# Patient Record
Sex: FEMALE | Race: WHITE | Employment: UNEMPLOYED | URBAN - METROPOLITAN AREA
[De-identification: names, ages, dates, MRNs, and addresses within clinical notes are randomized per-mention and may not be internally consistent; named-entity substitution may affect disease eponyms.]

---

## 2017-02-01 DIAGNOSIS — R68.89 UNWANTED HAIR: ICD-10-CM

## 2017-02-01 RX ORDER — SPIRONOLACTONE 50 MG/1
50 TABLET, FILM COATED ORAL 2 TIMES DAILY
Qty: 90 TAB | Refills: 3 | Status: SHIPPED | COMMUNITY
Start: 2017-02-01 | End: 2017-07-10 | Stop reason: SDUPTHER

## 2017-02-06 ENCOUNTER — OFFICE VISIT (OUTPATIENT)
Dept: INTERNAL MEDICINE CLINIC | Age: 37
End: 2017-02-06

## 2017-02-06 VITALS
SYSTOLIC BLOOD PRESSURE: 139 MMHG | WEIGHT: 230 LBS | HEIGHT: 66 IN | DIASTOLIC BLOOD PRESSURE: 79 MMHG | HEART RATE: 91 BPM | BODY MASS INDEX: 36.96 KG/M2 | TEMPERATURE: 98.2 F | OXYGEN SATURATION: 96 %

## 2017-02-06 DIAGNOSIS — J01.80 ACUTE NON-RECURRENT SINUSITIS OF OTHER SINUS: Primary | ICD-10-CM

## 2017-02-06 DIAGNOSIS — J45.20 MILD INTERMITTENT ASTHMA WITHOUT COMPLICATION: ICD-10-CM

## 2017-02-06 RX ORDER — ALBUTEROL SULFATE 0.83 MG/ML
2.5 SOLUTION RESPIRATORY (INHALATION)
Qty: 1 PACKAGE | Refills: 3 | Status: SHIPPED | OUTPATIENT
Start: 2017-02-06 | End: 2017-02-06

## 2017-02-06 RX ORDER — TIZANIDINE HYDROCHLORIDE 2 MG/1
2 CAPSULE, GELATIN COATED ORAL 2 TIMES DAILY
COMMUNITY
End: 2018-02-19

## 2017-02-06 RX ORDER — METHOTREXATE 25 MG/ML
INJECTION INTRA-ARTERIAL; INTRAMUSCULAR; INTRATHECAL; INTRAVENOUS ONCE
COMMUNITY
End: 2017-06-12 | Stop reason: SDUPTHER

## 2017-02-06 RX ORDER — ALBUTEROL SULFATE 90 UG/1
2 AEROSOL, METERED RESPIRATORY (INHALATION)
Qty: 1 INHALER | Refills: 3 | Status: SHIPPED | OUTPATIENT
Start: 2017-02-06 | End: 2018-09-11 | Stop reason: SDUPTHER

## 2017-02-06 RX ORDER — AMOXICILLIN 500 MG/1
500 CAPSULE ORAL 3 TIMES DAILY
Qty: 30 CAP | Refills: 0 | Status: SHIPPED | OUTPATIENT
Start: 2017-02-06 | End: 2017-02-06

## 2017-02-06 RX ORDER — AMOXICILLIN AND CLAVULANATE POTASSIUM 875; 125 MG/1; MG/1
1 TABLET, FILM COATED ORAL 2 TIMES DAILY
Qty: 20 TAB | Refills: 0 | Status: SHIPPED | OUTPATIENT
Start: 2017-02-06 | End: 2017-04-16

## 2017-02-06 RX ORDER — DICLOFENAC POTASSIUM 50 MG/1
50 TABLET, FILM COATED ORAL 2 TIMES DAILY
COMMUNITY
End: 2018-02-19

## 2017-02-06 NOTE — MR AVS SNAPSHOT
Visit Information Date & Time Provider Department Dept. Phone Encounter #  
 2/6/2017  2:15 PM Samantha Dia, [address] [place] [address],[address] [phone] [id number] Follow-up Instructions Return if symptoms worsen or fail to improve. Upcoming Health Maintenance Date Due  
 PAP AKA CERVICAL CYTOLOGY 7/6/2012 DTaP/Tdap/Td series (2 - Td) 10/10/2026 Allergies as of 2/6/2017  Review Complete On: 2/6/2017 By: Samantha Dia MD  
  
 Severity Noted Reaction Type Reactions Latex  04/17/2011    Swelling Entex [Phenylephrine-guaifenesin]  07/21/2012    Anaphylaxis, Hives, Palpitations Mucinex [Guaifenesin]  07/21/2012    Anaphylaxis, Hives Pepto-bismol [Bismuth Subsalicylate]  65/77/8249    Nausea and Vomiting Current Immunizations  Never Reviewed Name Date Hep B Vaccine 10/10/2000, 5/10/2000, 10/10/1999 Influenza High Dose Vaccine PF 10/10/2016 Pneumococcal Conjugate (PCV-13) 12/5/2016 TB Skin Test (PPD) 3/3/2016 Td, Adsorbed PF 10/10/2016  8:00 PM  
 Tdap 9/10/2016 Not reviewed this visit You Were Diagnosed With   
  
 Codes Comments Acute non-recurrent sinusitis of other sinus    -  Primary ICD-10-CM: J01.80 Moderate persistent asthma with acute exacerbation     ICD-10-CM: J45.41 
ICD-9-CM: 493.92 Vitals BP Pulse Temp Height(growth percentile) Weight(growth percentile) SpO2  
 139/79 (BP 1 Location: Left arm, BP Patient Position: Sitting) 91 98.2 °F (36.8 °C) (Oral) 5' 6\" (1.676 m) 230 lb (104.3 kg) 96% BMI OB Status Smoking Status 37.12 kg/m2 Having regular periods Never Smoker BMI and BSA Data Body Mass Index Body Surface Area  
 37.12 kg/m 2 2.2 m 2 Preferred Pharmacy Pharmacy Name Phone [id number], [facility] [phone] Your Updated Medication List  
  
   
 This list is accurate as of: 2/6/17  2:56 PM.  Always use your most recent med list.  
  
  
  
  
 albuterol 2.5 mg /3 mL (0.083 %) nebulizer solution Commonly known as:  PROVENTIL VENTOLIN  
3 mL by Nebulization route every four (4) hours as needed for Wheezing. albuterol 90 mcg/actuation inhaler Commonly known as:  Junita Granados Take 1-2 Puffs by inhalation every four (4) hours as needed for Wheezing. ALLEGRA-D 24 HOUR 180-240 mg per tablet Generic drug:  fexofenadine-pseudoephedrine Take 1 Tab by mouth nightly. amoxicillin-clavulanate 875-125 mg per tablet Commonly known as:  AUGMENTIN Take 1 Tab by mouth two (2) times a day. desogestrel-ethinyl estradiol 0.15-0.03 mg Tab Commonly known as:  DESOGEN Take 1 Tab by mouth nightly. diclofenac potassium 50 mg tablet Commonly known as:  CATAFLAM  
Take 50 mg by mouth two (2) times a day. escitalopram oxalate 20 mg tablet Commonly known as:  Pollyann [place] Take 1 Tab by mouth daily. folic acid 1 mg tablet Commonly known as:  FOLVITE  
TAKE 1 TABLET ORALLY DAILY  
  
 hydroxychloroquine 200 mg tablet Commonly known as:  PLAQUENIL  
2 TABLET ONCE A DAY  
  
 meloxicam 15 mg tablet Commonly known as:  MOBIC Take 15 mg by mouth daily. metFORMIN  mg tablet Commonly known as:  GLUCOPHAGE XR  
1,000 TAKE 2 TABLET BY MOUTH TWICE A DAY AS DIRECTED  
  
 methotrexate (PF) 25 mg/mL injection  
once. Nebulizer & Compressor machine 1 Each by Does Not Apply route three (3) times daily as needed. ROBINUL 1 mg tablet Generic drug:  glycopyrrolate Take 2 mg by mouth two (2) times a day. Indications: hyperhydrosis SINGULAIR 10 mg tablet Generic drug:  montelukast  
Take 10 mg by mouth nightly. spironolactone 50 mg tablet Commonly known as:  ALDACTONE Take 1 Tab by mouth two (2) times a day. SYNTHROID 75 mcg tablet Generic drug:  levothyroxine Take 36.5 mcg by mouth Daily (before breakfast). tiZANidine 2 mg capsule Commonly known as:  [facility] Take 2 mg by mouth two (2) times a day. VITAMIN D3 2,000 unit Tab Generic drug:  cholecalciferol (vitamin D3) Take 1 Tab by mouth daily. ZyrTEC 10 mg Cap Generic drug:  Cetirizine Take 10 mg by mouth daily. Indications: ALLERGIC RHINITIS Prescriptions Sent to Pharmacy Refills  
 albuterol (PROVENTIL VENTOLIN) 2.5 mg /3 mL (0.083 %) nebulizer solution 3 Sig: 3 mL by Nebulization route every four (4) hours as needed for Wheezing. Class: Normal  
 Pharmacy: [id number]- [address] Ph #: [phone] Route: Nebulization  
 amoxicillin-clavulanate (AUGMENTIN) 875-125 mg per tablet 0 Sig: Take 1 Tab by mouth two (2) times a day. Class: Normal  
 Pharmacy: [facility] #[id number]- [address] Ph #: [phone] Route: Oral  
  
Follow-up Instructions Return if symptoms worsen or fail to improve. Introducing hospitals & HEALTH SERVICES! Dear Shakira Birch: Thank you for requesting a [employer] account. Our records indicate that you already have an active [employer] account. You can access your account anytime at [email or web address] Did you know that you can access your hospital and ER discharge instructions at any time in [employer]? You can also review all of your test results from your hospital stay or ER visit. Additional Information If you have questions, please visit the Frequently Asked Questions section of the [employer] website at [email or web address] Remember, [employer] is NOT to be used for urgent needs. For medical emergencies, dial 911. Now available from your iPhone and Android! Please provide this summary of care documentation to your next provider. Your primary care clinician is listed as JOHNATHAN Lopez. If you have any questions after today's visit, please call [phone].

## 2017-02-06 NOTE — PROGRESS NOTES
HISTORY OF PRESENT ILLNESS  Denisha Razo is a 31 y.o. female. HPI   Here for acute care  C/o feeling sick x 1 week with productive cough, left ear pain, sinus congestion and sorethtroat  Her child completed abx for strep throat recently  Has hx asthma but no flare up per pt  No sob/mc   Low grade temps    Patient Active Problem List    Diagnosis Date Noted    Rheumatoid arthritis ([address] 12/23/2016    Asthma 12/23/2016    Rectal bleeding 06/13/2016    Family history of colonic polyps 06/13/2016    Encounter for screening colonoscopy 06/13/2016    Fibromyalgia 05/03/2016    Hyperhidrosis 05/03/2016    Environmental and seasonal allergies 05/03/2016    Hypothyroidism (acquired) 05/17/2011     Current Outpatient Prescriptions   Medication Sig Dispense Refill    diclofenac potassium (CATAFLAM) 50 mg tablet Take 50 mg by mouth two (2) times a day.  methotrexate, PF, 25 mg/mL injection once.  tiZANidine (ZANAFLEX) 2 mg capsule Take 2 mg by mouth two (2) times a day.  albuterol (PROVENTIL VENTOLIN) 2.5 mg /3 mL (0.083 %) nebulizer solution 3 mL by Nebulization route every four (4) hours as needed for Wheezing. 1 Package 3    amoxicillin-clavulanate (AUGMENTIN) 875-125 mg per tablet Take 1 Tab by mouth two (2) times a day. 20 Tab 0    spironolactone (ALDACTONE) 50 mg tablet Take 1 Tab by mouth two (2) times a day. 90 Tab 3    metFORMIN ER (GLUCOPHAGE XR) 500 mg tablet 1,000 TAKE 2 TABLET BY MOUTH TWICE A DAY AS DIRECTED  1    folic acid (FOLVITE) 1 mg tablet TAKE 1 TABLET ORALLY DAILY  11    hydroxychloroquine (PLAQUENIL) 200 mg tablet 2 TABLET ONCE A DAY  4    escitalopram oxalate (LEXAPRO) 20 mg tablet Take 1 Tab by mouth daily. 90 Tab 3    desogestrel-ethinyl estradiol (DESOGEN) 0.15-0.03 mg tab Take 1 Tab by mouth nightly.  albuterol (PROVENTIL, VENTOLIN) 90 mcg/actuation inhaler Take 1-2 Puffs by inhalation every four (4) hours as needed for Wheezing.  17 g 2    Nebulizer & Compressor machine 1 Each by Does Not Apply route three (3) times daily as needed. 1 Each 0    glycopyrrolate (ROBINUL) 1 mg tablet Take 2 mg by mouth two (2) times a day. Indications: hyperhydrosis      Cetirizine (ZYRTEC) 10 mg Cap Take 10 mg by mouth daily. Indications: ALLERGIC RHINITIS      montelukast (SINGULAIR) 10 mg tablet Take 10 mg by mouth nightly.  fexofenadine-pseudoephedrine (ALLEGRA-D 24 HOUR) 180-240 mg per tablet Take 1 Tab by mouth nightly.  cholecalciferol, vitamin D3, (VITAMIN D3) 2,000 unit Tab Take 1 Tab by mouth daily.  levothyroxine (SYNTHROID) 75 mcg tablet Take 36.5 mcg by mouth Daily (before breakfast).  meloxicam (MOBIC) 15 mg tablet Take 15 mg by mouth daily. Allergies   Allergen Reactions    Latex Swelling    Entex [Phenylephrine-Guaifenesin] Anaphylaxis, Hives and Palpitations    Mucinex [Guaifenesin] Anaphylaxis and Hives    Pepto-Bismol [Bismuth Subsalicylate] Nausea and Vomiting           ROS    Physical Exam   Constitutional: She appears well-developed and well-nourished. Appears stated age   HENT:   Mouth/Throat: Oropharynx is clear and moist.   TM's clear b/l   Cardiovascular: Normal rate, regular rhythm and normal heart sounds. Exam reveals no gallop and no friction rub. No murmur heard. Pulmonary/Chest: Effort normal and breath sounds normal. No respiratory distress. She has no wheezes. Abdominal: Soft. Bowel sounds are normal.   Musculoskeletal: She exhibits no edema. Lymphadenopathy:     She has no cervical adenopathy. Neurological: She is alert. Psychiatric: She has a normal mood and affect. Nursing note and vitals reviewed. ASSESSMENT and PLAN  Denisha was seen today for sore throat and cough.     Diagnoses and all orders for this visit:    Acute non-recurrent sinusitis of other sinus   augmentin x 10d   otc decongestants ok      Mild intermittent asthma without exacerbation  -     Refill albuterol MDI    Other orders  -     Discontinue: amoxicillin (AMOXIL) 500 mg capsule; Take 1 Cap by mouth three (3) times daily. -     amoxicillin-clavulanate (AUGMENTIN) 875-125 mg per tablet; Take 1 Tab by mouth two (2) times a day. Follow-up Disposition:  Return if symptoms worsen or fail to improve.

## 2017-04-16 ENCOUNTER — APPOINTMENT (OUTPATIENT)
Dept: CT IMAGING | Age: 37
End: 2017-04-16
Attending: PHYSICIAN ASSISTANT
Payer: COMMERCIAL

## 2017-04-16 ENCOUNTER — HOSPITAL ENCOUNTER (EMERGENCY)
Age: 37
Discharge: HOME | End: 2017-04-16
Attending: EMERGENCY MEDICINE
Payer: COMMERCIAL

## 2017-04-16 VITALS
RESPIRATION RATE: 19 BRPM | DIASTOLIC BLOOD PRESSURE: 60 MMHG | HEIGHT: 66 IN | TEMPERATURE: 98.5 F | OXYGEN SATURATION: 99 % | SYSTOLIC BLOOD PRESSURE: 110 MMHG | WEIGHT: 219.14 LBS | BODY MASS INDEX: 35.22 KG/M2 | HEART RATE: 100 BPM

## 2017-04-16 DIAGNOSIS — R10.30 LOWER ABDOMINAL PAIN: Primary | ICD-10-CM

## 2017-04-16 DIAGNOSIS — K59.00 CONSTIPATION, UNSPECIFIED CONSTIPATION TYPE: ICD-10-CM

## 2017-04-16 DIAGNOSIS — B37.9 YEAST INFECTION: ICD-10-CM

## 2017-04-16 DIAGNOSIS — Z86.59 H/O: DEPRESSION: ICD-10-CM

## 2017-04-16 LAB
ALBUMIN SERPL BCP-MCNC: 3.2 G/DL (ref 3.5–5)
ALBUMIN/GLOB SERPL: 0.9 {RATIO} (ref 1.1–2.2)
ALP SERPL-CCNC: 56 U/L (ref 45–117)
ALT SERPL-CCNC: 35 U/L (ref 12–78)
ANION GAP BLD CALC-SCNC: 10 MMOL/L (ref 5–15)
APPEARANCE UR: CLEAR
AST SERPL W P-5'-P-CCNC: 22 U/L (ref 15–37)
BACTERIA URNS QL MICRO: NEGATIVE /HPF
BASOPHILS # BLD AUTO: 0 K/UL (ref 0–0.1)
BASOPHILS # BLD: 1 % (ref 0–1)
BILIRUB SERPL-MCNC: 0.4 MG/DL (ref 0.2–1)
BILIRUB UR QL: NEGATIVE
BUN SERPL-MCNC: 10 MG/DL (ref 6–20)
BUN/CREAT SERPL: 12 (ref 12–20)
CALCIUM SERPL-MCNC: 8.5 MG/DL (ref 8.5–10.1)
CHLORIDE SERPL-SCNC: 106 MMOL/L (ref 97–108)
CK SERPL-CCNC: 46 U/L (ref 26–192)
CO2 SERPL-SCNC: 23 MMOL/L (ref 21–32)
COLOR UR: ABNORMAL
CREAT SERPL-MCNC: 0.86 MG/DL (ref 0.55–1.02)
EOSINOPHIL # BLD: 0.2 K/UL (ref 0–0.4)
EOSINOPHIL NFR BLD: 4 % (ref 0–7)
EPITH CASTS URNS QL MICRO: ABNORMAL /LPF
ERYTHROCYTE [DISTWIDTH] IN BLOOD BY AUTOMATED COUNT: 12.6 % (ref 11.5–14.5)
GLOBULIN SER CALC-MCNC: 3.5 G/DL (ref 2–4)
GLUCOSE SERPL-MCNC: 96 MG/DL (ref 65–100)
GLUCOSE UR STRIP.AUTO-MCNC: NEGATIVE MG/DL
HCG UR QL: NEGATIVE
HCT VFR BLD AUTO: 39.2 % (ref 35–47)
HGB BLD-MCNC: 13.4 G/DL (ref 11.5–16)
HGB UR QL STRIP: NEGATIVE
KETONES UR QL STRIP.AUTO: NEGATIVE MG/DL
LEUKOCYTE ESTERASE UR QL STRIP.AUTO: ABNORMAL
LIPASE SERPL-CCNC: 149 U/L (ref 73–393)
LYMPHOCYTES # BLD AUTO: 45 % (ref 12–49)
LYMPHOCYTES # BLD: 2.6 K/UL (ref 0.8–3.5)
MCH RBC QN AUTO: 31.1 PG (ref 26–34)
MCHC RBC AUTO-ENTMCNC: 34.2 G/DL (ref 30–36.5)
MCV RBC AUTO: 91 FL (ref 80–99)
MONOCYTES # BLD: 0.5 K/UL (ref 0–1)
MONOCYTES NFR BLD AUTO: 9 % (ref 5–13)
MUCOUS THREADS URNS QL MICRO: ABNORMAL /LPF
NEUTS SEG # BLD: 2.4 K/UL (ref 1.8–8)
NEUTS SEG NFR BLD AUTO: 41 % (ref 32–75)
NITRITE UR QL STRIP.AUTO: NEGATIVE
PH UR STRIP: 6 [PH] (ref 5–8)
PLATELET # BLD AUTO: 275 K/UL (ref 150–400)
POTASSIUM SERPL-SCNC: 4.2 MMOL/L (ref 3.5–5.1)
PROT SERPL-MCNC: 6.7 G/DL (ref 6.4–8.2)
PROT UR STRIP-MCNC: NEGATIVE MG/DL
RBC # BLD AUTO: 4.31 M/UL (ref 3.8–5.2)
RBC #/AREA URNS HPF: ABNORMAL /HPF (ref 0–5)
SODIUM SERPL-SCNC: 139 MMOL/L (ref 136–145)
SP GR UR REFRACTOMETRY: 1.02 (ref 1–1.03)
TROPONIN I SERPL-MCNC: <0.04 NG/ML
UA: UC IF INDICATED,UAUC: ABNORMAL
UROBILINOGEN UR QL STRIP.AUTO: 1 EU/DL (ref 0.2–1)
WBC # BLD AUTO: 5.8 K/UL (ref 3.6–11)
WBC URNS QL MICRO: ABNORMAL /HPF (ref 0–4)

## 2017-04-16 PROCEDURE — 93005 ELECTROCARDIOGRAM TRACING: CPT

## 2017-04-16 PROCEDURE — 96361 HYDRATE IV INFUSION ADD-ON: CPT

## 2017-04-16 PROCEDURE — 74176 CT ABD & PELVIS W/O CONTRAST: CPT

## 2017-04-16 PROCEDURE — 36415 COLL VENOUS BLD VENIPUNCTURE: CPT | Performed by: PHYSICIAN ASSISTANT

## 2017-04-16 PROCEDURE — 80053 COMPREHEN METABOLIC PANEL: CPT | Performed by: PHYSICIAN ASSISTANT

## 2017-04-16 PROCEDURE — 82550 ASSAY OF CK (CPK): CPT | Performed by: PHYSICIAN ASSISTANT

## 2017-04-16 PROCEDURE — 87491 CHLMYD TRACH DNA AMP PROBE: CPT | Performed by: PHYSICIAN ASSISTANT

## 2017-04-16 PROCEDURE — 84484 ASSAY OF TROPONIN QUANT: CPT | Performed by: PHYSICIAN ASSISTANT

## 2017-04-16 PROCEDURE — 74011250636 HC RX REV CODE- 250/636: Performed by: PHYSICIAN ASSISTANT

## 2017-04-16 PROCEDURE — 81001 URINALYSIS AUTO W/SCOPE: CPT | Performed by: PHYSICIAN ASSISTANT

## 2017-04-16 PROCEDURE — 96376 TX/PRO/DX INJ SAME DRUG ADON: CPT

## 2017-04-16 PROCEDURE — 85025 COMPLETE CBC W/AUTO DIFF WBC: CPT | Performed by: PHYSICIAN ASSISTANT

## 2017-04-16 PROCEDURE — 96374 THER/PROPH/DIAG INJ IV PUSH: CPT

## 2017-04-16 PROCEDURE — 83690 ASSAY OF LIPASE: CPT | Performed by: PHYSICIAN ASSISTANT

## 2017-04-16 PROCEDURE — 96375 TX/PRO/DX INJ NEW DRUG ADDON: CPT

## 2017-04-16 PROCEDURE — 81025 URINE PREGNANCY TEST: CPT | Performed by: PHYSICIAN ASSISTANT

## 2017-04-16 PROCEDURE — 99285 EMERGENCY DEPT VISIT HI MDM: CPT

## 2017-04-16 RX ORDER — POLYETHYLENE GLYCOL 3350 17 G/17G
17 POWDER, FOR SOLUTION ORAL DAILY
Qty: 1 EACH | Refills: 0 | Status: SHIPPED | OUTPATIENT
Start: 2017-04-16 | End: 2018-02-19

## 2017-04-16 RX ORDER — ONDANSETRON 2 MG/ML
4 INJECTION INTRAMUSCULAR; INTRAVENOUS
Status: COMPLETED | OUTPATIENT
Start: 2017-04-16 | End: 2017-04-16

## 2017-04-16 RX ORDER — MORPHINE SULFATE 2 MG/ML
2 INJECTION, SOLUTION INTRAMUSCULAR; INTRAVENOUS
Status: COMPLETED | OUTPATIENT
Start: 2017-04-16 | End: 2017-04-16

## 2017-04-16 RX ORDER — MAGNESIUM CITRATE
296 SOLUTION, ORAL ORAL
Qty: 1 BOTTLE | Refills: 0 | Status: SHIPPED | OUTPATIENT
Start: 2017-04-16 | End: 2017-04-16

## 2017-04-16 RX ORDER — MICONAZOLE NITRATE 1200MG-2%
1 KIT VAGINAL
Qty: 1 EACH | Refills: 0 | Status: SHIPPED | OUTPATIENT
Start: 2017-04-16 | End: 2017-04-16

## 2017-04-16 RX ORDER — DICYCLOMINE HYDROCHLORIDE 10 MG/1
10 CAPSULE ORAL 3 TIMES DAILY
Qty: 20 CAP | Refills: 0 | Status: SHIPPED | OUTPATIENT
Start: 2017-04-16 | End: 2018-02-19

## 2017-04-16 RX ORDER — ONDANSETRON 4 MG/1
4 TABLET, ORALLY DISINTEGRATING ORAL
Qty: 20 TAB | Refills: 0 | Status: SHIPPED | OUTPATIENT
Start: 2017-04-16 | End: 2017-06-12

## 2017-04-16 RX ORDER — HYDROMORPHONE HYDROCHLORIDE 1 MG/ML
1 INJECTION, SOLUTION INTRAMUSCULAR; INTRAVENOUS; SUBCUTANEOUS
Status: COMPLETED | OUTPATIENT
Start: 2017-04-16 | End: 2017-04-16

## 2017-04-16 RX ADMIN — Medication 2 MG: at 11:12

## 2017-04-16 RX ADMIN — HYDROMORPHONE HYDROCHLORIDE 1 MG: 1 INJECTION, SOLUTION INTRAMUSCULAR; INTRAVENOUS; SUBCUTANEOUS at 12:55

## 2017-04-16 RX ADMIN — ONDANSETRON HYDROCHLORIDE 4 MG: 2 INJECTION, SOLUTION INTRAMUSCULAR; INTRAVENOUS at 12:53

## 2017-04-16 RX ADMIN — ONDANSETRON HYDROCHLORIDE 4 MG: 2 INJECTION, SOLUTION INTRAMUSCULAR; INTRAVENOUS at 11:12

## 2017-04-16 RX ADMIN — SODIUM CHLORIDE 1000 ML: 900 INJECTION, SOLUTION INTRAVENOUS at 11:12

## 2017-04-16 NOTE — ED NOTES
Bedside shift change report given to RN Page B (oncoming nurse) by RN  (offgoing nurse). Report included the following information SBAR, ED Summary and MAR.

## 2017-04-16 NOTE — DISCHARGE INSTRUCTIONS
Abdominal Pain: Care Instructions  Your Care Instructions    Abdominal pain has many possible causes. Some aren't serious and get better on their own in a few days. Others need more testing and treatment. If your pain continues or gets worse, you need to be rechecked and may need more tests to find out what is wrong. You may need surgery to correct the problem. Don't ignore new symptoms, such as fever, nausea and vomiting, urination problems, pain that gets worse, and dizziness. These may be signs of a more serious problem. Your doctor may have recommended a follow-up visit in the next 8 to 12 hours. If you are not getting better, you may need more tests or treatment. The doctor has checked you carefully, but problems can develop later. If you notice any problems or new symptoms, get medical treatment right away. Follow-up care is a key part of your treatment and safety. Be sure to make and go to all appointments, and call your doctor if you are having problems. It's also a good idea to know your test results and keep a list of the medicines you take. How can you care for yourself at home? · Rest until you feel better. · To prevent dehydration, drink plenty of fluids, enough so that your urine is light yellow or clear like water. Choose water and other caffeine-free clear liquids until you feel better. If you have kidney, heart, or liver disease and have to limit fluids, talk with your doctor before you increase the amount of fluids you drink. · If your stomach is upset, eat mild foods, such as rice, dry toast or crackers, bananas, and applesauce. Try eating several small meals instead of two or three large ones. · Wait until 48 hours after all symptoms have gone away before you have spicy foods, alcohol, and drinks that contain caffeine. · Do not eat foods that are high in fat. · Avoid anti-inflammatory medicines such as aspirin, ibuprofen (Advil, Motrin), and naproxen (Aleve).  These can cause stomach upset. Talk to your doctor if you take daily aspirin for another health problem. When should you call for help? Call 911 anytime you think you may need emergency care. For example, call if:  · You passed out (lost consciousness). · You pass maroon or very bloody stools. · You vomit blood or what looks like coffee grounds. · You have new, severe belly pain. Call your doctor now or seek immediate medical care if:  · Your pain gets worse, especially if it becomes focused in one area of your belly. · You have a new or higher fever. · Your stools are black and look like tar, or they have streaks of blood. · You have unexpected vaginal bleeding. · You have symptoms of a urinary tract infection. These may include:  ¨ Pain when you urinate. ¨ Urinating more often than usual.  ¨ Blood in your urine. · You are dizzy or lightheaded, or you feel like you may faint. Watch closely for changes in your health, and be sure to contact your doctor if:  · You are not getting better after 1 day (24 hours). Where can you learn more? Go to http://amandaBoxVentureslaz.info/. Enter T886 in the search box to learn more about \"Abdominal Pain: Care Instructions. \"  Current as of: May 27, 2016  Content Version: 11.2  © 5340-9439 [employer]. Care instructions adapted under license by [employer] (which disclaims liability or warranty for this information). If you have questions about a medical condition or this instruction, always ask your healthcare professional. [place] [address] any warranty or liability for your use of this information. Candidiasis: Care Instructions  Your Care Instructions  Candidiasis (say \"new-hpc-AW-uh-stephen\") is a yeast infection. Yeast normally lives in your body. But it can cause problems if your body's defenses don't work as they should. Some medicines can increase your chance of getting a yeast infection.  These include antibiotics, steroids, and cancer drugs. And some diseases like AIDS and diabetes can make you more likely to get yeast infections. There are different types of yeast infections. Neha Shad is a yeast infection in the mouth. It usually occurs in people with weak immune systems. It causes white patches inside the mouth and throat. Yeast infections of the skin usually occur in skin folds where the skin stays moist. They cause red, oozing patches on your skin. Babies can get these infections under the diaper. People who often wear gloves can get them on their hands. Many women get vaginal yeast infections. They are most common when women take antibiotics. These infections can cause the vagina to itch and burn. They also cause white discharge that looks like cottage cheese. In rare cases, yeast infects the blood. This can cause serious disease. This kind of infection is treated with medicine given through a needle into a vein (IV). After you start treatment, a yeast infection usually goes away quickly. But if your immune system is weak, the infection may come back. Tell your doctor if you get yeast infections often. Follow-up care is a key part of your treatment and safety. Be sure to make and go to all appointments, and call your doctor if you are having problems. It's also a good idea to know your test results and keep a list of the medicines you take. How can you care for yourself at home? · Take your medicines exactly as prescribed. Call your doctor if you think you are having a problem with your medicine. · Use antibiotics only as directed by your doctor. · Eat yogurt with live cultures. It has bacteria called lactobacillus. It may help prevent some types of yeast infections. · Keep your skin clean and dry. Put powder on moist places. · If you are using a cream or suppository to treat a vaginal yeast infection, don't use condoms or a diaphragm. Use a different type of birth control.   · Eat a healthy diet and get regular exercise. This will help keep your immune system strong. When should you call for help? Call your doctor now or seek immediate medical care if:  · You have a fever. · You are pregnant and have signs of a vaginal or urinary tract infection such as:  ¨ Severe itching in your vagina. ¨ Pain during sex or when you urinate. ¨ Unusual discharge from your vagina. ¨ A frequent urge to urinate. ¨ Urine that is cloudy or smells bad. Watch closely for changes in your health, and be sure to contact your doctor if:  · You do not get better as expected. Where can you learn more? Go to http://amanda-laz.info/. Enter U370 in the search box to learn more about \"Candidiasis: Care Instructions. \"  Current as of: October 13, 2016  Content Version: 11.2  © 4990-7362 [employer]. Care instructions adapted under license by [employer] (which disclaims liability or warranty for this information). If you have questions about a medical condition or this instruction, always ask your healthcare professional. [address] any warranty or liability for your use of this information.

## 2017-04-16 NOTE — LETTER
[address] 
[facility] EMERGENCY DEPT 
[address] [place] [address] [address] [id number] 
[phone] Work/School Note Date: 4/16/2017 To Whom It May concern: 
 
Baljinder Damon was seen and treated today in the emergency room by the following provider(s): 
Attending Provider: Tom Avila MD 
Physician Assistant: Asael Beckman. Denisha Kileybetsy Meebob may return to work on 4/18/2017. Sincerely, 
 
 
 
 
Asael Beckman

## 2017-04-16 NOTE — ED NOTES
Patient complain of chest pain and epigastric pain onset Wednesday. Patient also complain of nausea and intermittent loose stool with bright blood. Warm blanket and pillow given. Call bell within reach and lights dimmed for comfort.

## 2017-04-16 NOTE — ED PROVIDER NOTES
HPI Comments: Denis Joshua is a 31 y.o. Female with PMHx significant for gastritis and rheumatoid arthritis, [facility] [address] [facility] with cc of persistent and progressively worsening diarrhea, abdominal distention, abdominal pain, and nausea x 4 days. The patient states that her symptoms started three hours after eating leftover pizza. The patient states that her abdominal pain feels like a pressure, and it starts in her left upper quadrant and radiates diffusely. The patient notes that her pain is constant and exacerbated with movement, but she denies any relieving factors. The patient notes that she has taken Zofran and Tramadol with no lasting relief. The pt notes that she has visualized some blood in her stool, but notes that she has a hx of GI bleeds, and she states that she had a recent colonoscopy with no significant findings. The patient also c/o one episode of vomiting that occurred at 0630 this morning. The pt also c/o a low-grade fever of 100. 3 two nights ago with associated sweating. The patient denies taking any Tylenol/Motrin at that time. The patient also c/o chest pain that has been present for 6-9 months, but notes that it is a symptom of her RA, and that this is being followed by her rheumatologist. The patient notes that her LMP was 5 weeks ago, and she is currently on hormone therapy. The patient denies a hx of a cholecystectomy or anxiety. The patient specifically denies any new chest pain, chills, SOB, headaches, vaginal discharge, or vaginal bleeding at this time. PCP: Thomas Mccabe MD  Colorectal Surgery: Radha Hernandez MD  Rheumatology: Latrell Nunes. Araceli Tyson MD    Social history significant for: - Tobacco, + EtOH, - Illicit Drug Use    There are no other complaints, changes, or physical findings at this time. Written by JOSE Ng, as dictated by Awa Najera PA-C. The history is provided by the patient. No  was used. Past Medical History:   Diagnosis Date    Acquired hypothyroidism     Adverse effect of anesthesia     labile BP during/after C section    Asthma     Broken bones     history of 9 broken bones    Chronic UTI (urinary tract infection)     \"extra valve right kidney causes UTI\"    Gestational diabetes     GI bleed ,,,2016    lower GI last colonoscopy in  normal     Huntingtons chorea (HCC)     Hyperhydrosis disorder     Ill-defined condition     [place]/ tested genetically - daughter has Chris    Infertility     PCOS    PCOS (polycystic ovarian syndrome)     Precancerous skin lesion     removed from Right shoulder    Preeclampsia 2011    pregnancy    Rheumatoid arthritis ([address]     SVT (supraventricular tachycardia) ([address] 2011    occured during pregnancy when picc line inserted.        Past Surgical History:   Procedure Laterality Date    COLONOSCOPY N/A 2016    COLONOSCOPY performed by Corey Sanchez MD at [facility] ENDOSCOPY    HX  SECTION      HX DILATION AND CURETTAGE  2005    suction D+C    HX WISDOM TEETH EXTRACTION           Family History:   Problem Relation Age of Onset    Other Mother      Eden's disease    Hypertension Mother     High Cholesterol Father     Heart Disease Father     Diabetes Father     Hypertension Father     Asthma Brother     Diabetes Brother     Other Brother      varicocele, hernias    Hypertension Brother     Alcohol abuse Brother     Hypertension Maternal Grandmother     Elevated Lipids Maternal Grandmother     Cancer Maternal Grandmother      breast    Other Maternal Grandmother      polymyalgia rheumatica    Glaucoma Maternal Grandmother     Cancer Maternal Grandfather      prostate    Huntingtons disease Maternal Grandfather     Cancer Paternal Grandmother      lung with mets    Cancer Paternal Grandfather      prostate, colon    Diabetes Paternal Grandfather     Heart Disease Paternal Grandfather     Alzheimer Paternal Grandfather        Social History     Social History    Marital status:      Spouse name: N/A    Number of children: N/A    Years of education: N/A     Occupational History    Not on file. Social History Main Topics    Smoking status: Never Smoker    Smokeless tobacco: Never Used    Alcohol use Yes      Comment: few drinks per year    Drug use: No    Sexual activity: Not on file     Other Topics Concern    Not on file     Social History Narrative    ** Merged History Encounter **              ALLERGIES: Latex; Entex [phenylephrine-guaifenesin]; Mucinex [guaifenesin]; and Pepto-bismol [bismuth subsalicylate]    Review of Systems   Constitutional: Positive for diaphoresis and fever. Negative for activity change, appetite change, chills and unexpected weight change. HENT: Negative for congestion, hearing loss, rhinorrhea, sinus pressure, sneezing, sore throat and trouble swallowing. Eyes: Negative for pain, redness, itching and visual disturbance. Respiratory: Negative for cough, shortness of breath and wheezing. Cardiovascular: Positive for chest pain. Negative for palpitations and leg swelling. Gastrointestinal: Positive for abdominal distention, abdominal pain, diarrhea, nausea and vomiting. Negative for constipation. Genitourinary: Negative for dysuria, vaginal bleeding and vaginal discharge. Musculoskeletal: Negative for arthralgias, gait problem and myalgias. Skin: Negative for color change, pallor, rash and wound. Neurological: Negative for tremors, weakness, light-headedness, numbness and headaches. All other systems reviewed and are negative.     Patient Vitals for the past 12 hrs:   Temp Pulse Resp BP SpO2   04/16/17 1418 - 100 19 110/60 99 %   04/16/17 1130 - 77 19 120/69 95 %   04/16/17 1112 - 82 17 108/69 100 %   04/16/17 1030 - - - 119/77 100 %   04/16/17 1024 98.5 °F (36.9 °C) 80 18 115/70 100 %       Physical Exam Constitutional: She is oriented to person, place, and time. She appears well-developed and well-nourished. No distress. 31 y.o.  female in NAD  Communicates appropriately and in full sentences  Elevated BMI   HENT:   Head: Normocephalic and atraumatic. Right Ear: External ear normal.   Left Ear: External ear normal.   Mouth/Throat: Oropharynx is clear and moist. No oropharyngeal exudate. Dry mucus membranes   Eyes: Conjunctivae are normal. Pupils are equal, round, and reactive to light. Right eye exhibits no discharge. Left eye exhibits no discharge. No scleral icterus. Neck: Normal range of motion. Neck supple. No tracheal deviation present. Cardiovascular: Normal rate, regular rhythm, normal heart sounds and intact distal pulses. Exam reveals no gallop and no friction rub. No murmur heard. Pulmonary/Chest: Effort normal and breath sounds normal. No stridor. No respiratory distress. She has no wheezes. She has no rales. She exhibits no tenderness. Abdominal: Soft. Bowel sounds are normal. She exhibits no distension. There is no tenderness. No pulsatile mass   No abdominal scars  Normoactive bowel sounds x 4  No focal tenderness with light and deep palpation  no grimacing throughout entirety of abdominal exam  No rebound, guarding, or peritoneal signs   Musculoskeletal: Normal range of motion. She exhibits no edema, tenderness or deformity. Lymphadenopathy:     She has no cervical adenopathy. Neurological: She is alert and oriented to person, place, and time. No cranial nerve deficit. Coordination normal.   Skin: Skin is warm and dry. No rash noted. She is not diaphoretic. No erythema. No pallor. Psychiatric: She has a normal mood and affect. Nursing note and vitals reviewed.       MDM  Number of Diagnoses or Management Options  Constipation, unspecified constipation type:   H/O: depression:   Lower abdominal pain:   Yeast infection:   Diagnosis management comments: DDx: hepatitis, pancreatitis, cholecystitis, appendicitis, diverticulitis, obstruction, UTI, pyelonephritis, gastroenteritis, gastritis, SBO, colitis, IBD, mesenteric ischemia, AAA, ureteral stone, constipation. Though some of these considerations can be excluded by history and physical exam, others may require additional testing such as laboratory and imaging. Will begin by assessing a CBC, CMP, UA and reevaluating patient to perform serial abdominal exams to determine whether a CT is indicated. Upon re-examination, the patient has no focal abdominal tenderness to palpation. The patient has a normal WBC and signs and symptoms are consistent with a non-surgical cause of abdominal pain. The patient requests an abdominal CT. CT negative, but reveals constipation. Will treat with Mg Citrate and Miralax as well as symptomatic treatment with Bentyl and Zofran. Pt pain improved symptomatically. Will treat for yeast infection. Pt declines empiric STI treatment. Amount and/or Complexity of Data Reviewed  Clinical lab tests: ordered and reviewed  Tests in the medicine section of CPT®: ordered and reviewed  Review and summarize past medical records: yes  Independent visualization of images, tracings, or specimens: yes    Patient Progress  Patient progress: stable    ED Course       Pelvic Exam  Date/Time: 4/16/2017 12:41 PM  Performed by: PA  Exam assisted by:  Funmilayo Stock CNA. Type of exam performed: bimanual and speculum. External genitalia appearance: normal.    Vaginal exam:  discharge. The amount of discharge was:  profuse. The discharge was thick, cottage cheese like and white. Cervical exam:  normal, os closed and no cervical motion tenderness. Specimen(s) collected:  chlamydia, GC and vaginal culture.   Bimanual exam:  normal.    Patient tolerance: Patient tolerated the procedure well with no immediate complications        I reviewed our electronic medical record system for any past medical records that were available that may contribute to the patients current condition, the nursing notes and and vital signs from today's visit     Nursing notes will be reviewed as they become available in realtime while the pt is in the ED. The patients presenting problems have been discussed, and they are in agreement with the care plan formulated and outlined with them. I have encouraged them to ask questions as they arise throughout their visit. Progress Note:  10:32 AM  During the evaluation, the patient c/o chest pain. The patient states that this chest pain is not new, but she was offered an EKG, and she states that she would like an EKG performed at this time. Written by JOSE Barrett, as dictated by Arlen Olson PA-C.    1:40 PM  Prabhakar Pagan requests pain medications. Patient has an available ride not present in ED and patient is aware that the provider must see their ride before discharge. Pain medications ordered. Pt informed that he/she should not drive while taking medication. Pt is in understanding. PROGRESS NOTE:  10:45 AM  Per medical records, the patient had a colonoscopy on 6/13/16. Per medical records, the patients colonoscopy indicated that she had no evidence of any colonic polyps, hemorrhoids, arteriovenous malformations,diverticula, or inflammation. Written by JOSE Guerrero, as dictated by BAUTISTA Schwab PA-C.    EKG interpretation: (Preliminary)  11:08 AM  Rhythm: normal sinus rhythm; and regular . Rate (approx.): 79 bpm; Axis: normal; TN interval: normal; QRS interval: normal ; ST/T wave: normal.  Written by JOSE Borges, as dictated by Doyle Farias MD     11:12 AM  Pt has received their first dose of medications that I have ordered for her. Will reevaluate the patient in 20-30 minutes to monitor their symptoms and the efficacy of the treatment they have received thus far.     Procedure Note - Rectal Exam:   11:25 PM  Performed by: BAUTISTA Schwab PA-C  Chaperoned by: Kait Khan RN  Rectal exam performed. Light brown stool was collected. Stool was Hemoccult tested, and found to be heme Negative. The procedure took 1-15 minutes, and pt tolerated well. Written by JOSE Sen, as dictated by BAUTISTA Schwab PA-C. Progress Note:  11:26 AM  The patient states that she has had increased stress in her life. 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 She also states that her divorce was finalized 2 weeks ago. Written by JOSE Bethea, as dictated by Jumana Allen PA-C. Progress Note:  12:05 PM  The pt was re-evaluated and does admit to having vaginal discharge. The patient was offered a pelvic exam, and she would like one at this time. Pt declines empiric treatment today in [facility] ED. Discussed with patient the medical necessity of performing a pelvic exam. Pt consents to have the exam performed. Explained that the KOH and wet prep swabs while result while in [facility] ED, but the GC/CT will take 48-72 hours to result, which would prompt a provider to call her if the results are positive. Spoke of importance for receiving pap smears regularly with a decided timeline prescribed by her gynecologist. Pt expresses understanding. The pt states that her pain has improved to 6/10. The patient was offered to continue treating her symptomatically or have a CT ordered. Discussed with the pt the risks/benefits of a CT scan, and she states, Id rather just know what it is.  Reassured the pt that her lab results have been within normal limits so far, and her vital signs have remained stable. Written by JOSE Bethea, as dictated by Jumana Allen PA-C. Procedure Note - Pelvic Exam:    12:41 PM  Performed by: BAUTISTA Schwab PA-C   Chaperoned by: Cade Mcbride CNA  Pelvic exam was performed using bimanual and speculum.  Further findings noted in physical exam.   The procedure took 1-15 minutes, and pt tolerated well. LABS COMPLETED AND REVIEWED:  Recent Results (from the past 12 hour(s))   CBC WITH AUTOMATED DIFF    Collection Time: 04/16/17 11:02 AM   Result Value Ref Range    WBC 5.8 3.6 - 11.0 K/uL    RBC 4.31 3.80 - 5.20 M/uL    HGB 13.4 11.5 - 16.0 g/dL    HCT 39.2 35.0 - 47.0 %    MCV 91.0 80.0 - 99.0 FL    MCH 31.1 26.0 - 34.0 PG    MCHC 34.2 30.0 - 36.5 g/dL    RDW 12.6 11.5 - 14.5 %    PLATELET 954 778 - 931 K/uL    NEUTROPHILS 41 32 - 75 %    LYMPHOCYTES 45 12 - 49 %    MONOCYTES 9 5 - 13 %    EOSINOPHILS 4 0 - 7 %    BASOPHILS 1 0 - 1 %    ABS. NEUTROPHILS 2.4 1.8 - 8.0 K/UL    ABS. LYMPHOCYTES 2.6 0.8 - 3.5 K/UL    ABS. MONOCYTES 0.5 0.0 - 1.0 K/UL    ABS. EOSINOPHILS 0.2 0.0 - 0.4 K/UL    ABS. BASOPHILS 0.0 0.0 - 0.1 K/UL   METABOLIC PANEL, COMPREHENSIVE    Collection Time: 04/16/17 11:02 AM   Result Value Ref Range    Sodium 139 136 - 145 mmol/L    Potassium 4.2 3.5 - 5.1 mmol/L    Chloride 106 97 - 108 mmol/L    CO2 23 21 - 32 mmol/L    Anion gap 10 5 - 15 mmol/L    Glucose 96 65 - 100 mg/dL    BUN 10 6 - 20 MG/DL    Creatinine 0.86 0.55 - 1.02 MG/DL    BUN/Creatinine ratio 12 12 - 20      GFR est AA >60 >60 ml/min/1.73m2    GFR est non-AA >60 >60 ml/min/1.73m2    Calcium 8.5 8.5 - 10.1 MG/DL    Bilirubin, total 0.4 0.2 - 1.0 MG/DL    ALT (SGPT) 35 12 - 78 U/L    AST (SGOT) 22 15 - 37 U/L    Alk.  phosphatase 56 45 - 117 U/L    Protein, total 6.7 6.4 - 8.2 g/dL    Albumin 3.2 (L) 3.5 - 5.0 g/dL    Globulin 3.5 2.0 - 4.0 g/dL    A-G Ratio 0.9 (L) 1.1 - 2.2     LIPASE    Collection Time: 04/16/17 11:02 AM   Result Value Ref Range    Lipase 149 73 - 393 U/L   CK W/ REFLX CKMB    Collection Time: 04/16/17 11:02 AM   Result Value Ref Range    CK 46 26 - 192 U/L   TROPONIN I    Collection Time: 04/16/17 11:02 AM   Result Value Ref Range    Troponin-I, Qt. <0.04 <0.05 ng/mL   EKG, 12 LEAD, INITIAL    Collection Time: 04/16/17 11:08 AM   Result Value Ref Range    Ventricular Rate 79 BPM    Atrial Rate 79 BPM    P-R Interval 166 ms    QRS Duration 74 ms    Q-T Interval 396 ms    QTC Calculation (Bezet) 454 ms    Calculated P Axis 23 degrees    Calculated R Axis 51 degrees    Calculated T Axis 22 degrees    Diagnosis       Normal sinus rhythm  Normal ECG  When compared with ECG of 30-OCT-2015 17:03,  No significant change was found     URINALYSIS W/ REFLEX CULTURE    Collection Time: 04/16/17 11:52 AM   Result Value Ref Range    Color YELLOW/STRAW      Appearance CLEAR CLEAR      Specific gravity 1.022 1.003 - 1.030      pH (UA) 6.0 5.0 - 8.0      Protein NEGATIVE  NEG mg/dL    Glucose NEGATIVE  NEG mg/dL    Ketone NEGATIVE  NEG mg/dL    Bilirubin NEGATIVE  NEG      Blood NEGATIVE  NEG      Urobilinogen 1.0 0.2 - 1.0 EU/dL    Nitrites NEGATIVE  NEG      Leukocyte Esterase SMALL (A) NEG      WBC 0-4 0 - 4 /hpf    RBC 0-5 0 - 5 /hpf    Epithelial cells MODERATE (A) FEW /lpf    Bacteria NEGATIVE  NEG /hpf    UA:UC IF INDICATED CULTURE NOT INDICATED BY UA RESULT CNI      Mucus 2+ (A) NEG /lpf   HCG URINE, QL    Collection Time: 04/16/17 11:52 AM   Result Value Ref Range    HCG urine, Ql. NEGATIVE  NEG         IMAGING COMPLETED AND REVIEWED:  INDICATION: Abdominal pain     COMPARISON: April 2, 2016     TECHNIQUE:   Thin axial images were obtained through the abdomen and pelvis. Coronal and  sagittal reconstructions were generated. Oral contrast was not administered. CT  dose reduction was achieved through use of a standardized protocol tailored for  this examination and automatic exposure control for dose modulation.      The absence of intravenous contrast material reduces the sensitivity for  evaluation of the solid parenchymal organs of the abdomen.      FINDINGS:   LUNG BASES: Clear. INCIDENTALLY IMAGED HEART AND MEDIASTINUM: Unremarkable. LIVER: No mass or biliary dilatation. GALLBLADDER: Unremarkable. SPLEEN: No mass.   PANCREAS: No mass or ductal dilatation. ADRENALS: Unremarkable. KIDNEYS/URETERS: No mass, calculus, or hydronephrosis. STOMACH: Unremarkable. SMALL BOWEL: No dilatation or wall thickening. COLON: There is prominent amount of stool within the cecum and descending colon  with equalization of the contents of the distal ileum  APPENDIX: Unremarkable. PERITONEUM: No ascites or pneumoperitoneum. RETROPERITONEUM: No lymphadenopathy or aortic aneurysm. REPRODUCTIVE ORGANS: The uterus is not enlarged  URINARY BLADDER: No mass or calculus. BONES: No destructive bone lesion. ADDITIONAL COMMENTS: N/A     IMPRESSION  IMPRESSION:  Mild fecal retention otherwise no acute normality       CLINICAL IMPRESSION:  1. Lower abdominal pain    2. Yeast infection    3. Constipation, unspecified constipation type    4. H/O: depression        Plan:  1. Return precautions  2. Medications as prescribed  3. Follow-ups as discussed    Discharge Medication List as of 4/16/2017  2:07 PM      START taking these medications    Details   miconazole nitrate (MONISTAT 1 COMBO PACK) kit Insert 1 Each into vagina now for 1 dose. Indications: VULVOVAGINAL CANDIDIASIS, Normal, Disp-1 Each, R-0      magnesium citrate solution Take 296 mL by mouth now for 1 dose., Normal, Disp-1 Bottle, R-0      polyethylene glycol (MIRALAX) 17 gram packet Take 1 Packet by mouth daily. , Normal, Disp-1 Each, R-0         CONTINUE these medications which have NOT CHANGED    Details   diclofenac potassium (CATAFLAM) 50 mg tablet Take 50 mg by mouth two (2) times a day., Historical Med      methotrexate, PF, 25 mg/mL injection once., Historical Med      tiZANidine (ZANAFLEX) 2 mg capsule Take 2 mg by mouth two (2) times a day., Historical Med      albuterol (PROVENTIL HFA, VENTOLIN HFA, PROAIR HFA) 90 mcg/actuation inhaler Take 2 Puffs by inhalation every four (4) hours as needed for Wheezing., Normal, Disp-1 Inhaler, R-3      spironolactone (ALDACTONE) 50 mg tablet Take 1 Tab by mouth two (2) times a day., Mail Order, Disp-90 Tab, R-3      metFORMIN ER (GLUCOPHAGE XR) 500 mg tablet 1,000 TAKE 2 TABLET BY MOUTH TWICE A DAY AS DIRECTED, Historical Med, R-1      folic acid (FOLVITE) 1 mg tablet TAKE 1 TABLET ORALLY DAILY, Historical Med, R-11      hydroxychloroquine (PLAQUENIL) 200 mg tablet 2 TABLET ONCE A DAY, Historical Med, R-4      escitalopram oxalate (LEXAPRO) 20 mg tablet Take 1 Tab by mouth daily. , Normal, Disp-90 Tab, R-3      desogestrel-ethinyl estradiol (DESOGEN) 0.15-0.03 mg tab Take 1 Tab by mouth nightly., Historical Med      meloxicam (MOBIC) 15 mg tablet Take 15 mg by mouth daily. , Historical Med      albuterol (PROVENTIL, VENTOLIN) 90 mcg/actuation inhaler Take 1-2 Puffs by inhalation every four (4) hours as needed for Wheezing., Print, Disp-17 g, R-2      glycopyrrolate (ROBINUL) 1 mg tablet Take 2 mg by mouth two (2) times a day. Indications: hyperhydrosis, Historical Med      Cetirizine (ZYRTEC) 10 mg Cap Take 10 mg by mouth daily. Indications: ALLERGIC RHINITIS, Historical Med      montelukast (SINGULAIR) 10 mg tablet Take 10 mg by mouth nightly., Historical Med      fexofenadine-pseudoephedrine (ALLEGRA-D 24 HOUR) 180-240 mg per tablet Take 1 Tab by mouth nightly., Historical Med      cholecalciferol, vitamin D3, (VITAMIN D3) 2,000 unit Tab Take 1 Tab by mouth daily. , Historical Med      levothyroxine (SYNTHROID) 75 mcg tablet Take 36.5 mcg by mouth Daily (before breakfast). , Historical Med      amoxicillin-clavulanate (AUGMENTIN) 875-125 mg per tablet Take 1 Tab by mouth two (2) times a day., Normal, Disp-20 Tab, R-0      Nebulizer & Compressor machine 1 Each by Does Not Apply route three (3) times daily as needed. , Print, Disp-1 Each, R-0           Follow-up Information     Follow up With Details Comments [id number] [address] Schedule an appointment as soon as possible for a visit in 2 days As needed, If symptoms worsen, Possible further evaluation and treatment [facility]  [phone]      \[facility]\"" EMERGENCY DEPT Go to As needed, If symptoms worsen [address] [facility] [address] [address] Maged Lino MD Schedule an appointment as soon as possible for a visit in 2 days As needed, If symptoms worsen, Possible further evaluation and treatment [place] [address]  [place]  [phone]          DISCHARGE NOTE:  2:05 PM  Denisha Parekh Shadow  results have been reviewed with her. She has been counseled regarding her diagnosis. She verbally conveys understanding and agreement of the signs, symptoms, diagnosis, treatment and prognosis and additionally agrees to follow up as recommended with Dr. Goldie Lagunas MD in 24 - 48 hours. She also agrees with the care-plan and conveys that all of her questions have been answered. I have also put together some discharge instructions for her that include: 1) educational information regarding their diagnosis, 2) how to care for their diagnosis at home, as well a 3) list of reasons why they would want to return to the ED prior to their follow-up appointment, should their condition change. She and/or family's questions have been answered. I have encouraged them to see the official results in [facility]\" or to retrieve the specifics of their results from medical records. Return to the closest emergency room or follow up sooner for any deterioration    This note is prepared by Landon Mendoza, acting as Scribe for Angelika Amor [employer]. Angelika Amor PA-C: The scribe's documentation has been prepared under my direction and personally reviewed by me in its entirety. I confirm that the note above accurately reflects all work, treatment, procedures, and medical decision making performed by me. This note will not be viewable in [address]

## 2017-04-16 NOTE — ED NOTES
Patient has returned from radiology. Pt has been reconnected to monitor. Patient states that her pain has slightly improved and is now 3/10. Pt currently lying in bed with lights off and eyes closed.  Pt in NAD

## 2017-04-17 ENCOUNTER — PATIENT OUTREACH (OUTPATIENT)
Dept: OTHER | Age: 37
End: 2017-04-17

## 2017-04-17 LAB
ATRIAL RATE: 79 BPM
C TRACH DNA SPEC QL NAA+PROBE: NEGATIVE
CALCULATED P AXIS, ECG09: 23 DEGREES
CALCULATED R AXIS, ECG10: 51 DEGREES
CALCULATED T AXIS, ECG11: 22 DEGREES
DIAGNOSIS, 93000: NORMAL
N GONORRHOEA DNA SPEC QL NAA+PROBE: NEGATIVE
P-R INTERVAL, ECG05: 166 MS
Q-T INTERVAL, ECG07: 396 MS
QRS DURATION, ECG06: 74 MS
QTC CALCULATION (BEZET), ECG08: 454 MS
SAMPLE TYPE: NORMAL
SERVICE CMNT-IMP: NORMAL
SPECIMEN SOURCE: NORMAL
VENTRICULAR RATE, ECG03: 79 BPM

## 2017-04-17 NOTE — PROGRESS NOTES
Telephone outreach attempted for 04/16/17 ER visit for lower abdominal pain. Left discreet VM with my contact information. Will attempt again.

## 2017-04-18 ENCOUNTER — PATIENT OUTREACH (OUTPATIENT)
Dept: OTHER | Age: 37
End: 2017-04-18

## 2017-04-18 NOTE — PROGRESS NOTES
Second attempt telephone outreach for ANA PANTOJA for 04/16 ER visit for lower abdominal pain. Left discreet VM with my contact information. Will attempt again.

## 2017-04-21 ENCOUNTER — PATIENT OUTREACH (OUTPATIENT)
Dept: OTHER | Age: 37
End: 2017-04-21

## 2017-04-21 NOTE — PROGRESS NOTES
Telephone outreach from patient. Identity confirmed with two identifiers. Patient reviewed ER course. Care Plan:  Red flags: reviewed. She specifically denies fevers, vomiting or worsening abdominal pain. She does report nausea. GI: Reports nausea- although she states this has improved today. We reviewed methods of preventing dehydration. She is tolerating a bland diet today. She does report diarrhea. She remains on miralax as she states she has not had a formed BM yet. She states she is drinking well now and feel she is now able to maintain her hydration. She reports her abdominal pain has improved significantly. : She reports she still has a yeast infection. She completed the one day treatment. She states she has not felt any symptom relief. She contacted her GYN but was told no appointments were available. We have recommended she call the practice back and ask to leave a message for the nurse explaining the situation. She reports OTC treatment causes burning. Follow-Up: She has attempted to contact GYN. She will call again. She was encouraged to contact her PCP and speak with the nurse to update on her recent ER visit and current status. We discussed that she could be seen in the [facility] if she worsens or is unable to get prescribed treatment from her providers. Ms. Alli Jean Baptiste expressed thanks for the call. She expresses confidence in her ability to arrange this follow-up care. We have asked her to contact us should she need assistance. Will continue to follow.

## 2017-06-12 ENCOUNTER — OFFICE VISIT (OUTPATIENT)
Dept: PRIMARY CARE CLINIC | Age: 37
End: 2017-06-12

## 2017-06-12 VITALS
DIASTOLIC BLOOD PRESSURE: 78 MMHG | BODY MASS INDEX: 35.39 KG/M2 | SYSTOLIC BLOOD PRESSURE: 118 MMHG | HEART RATE: 60 BPM | OXYGEN SATURATION: 98 % | TEMPERATURE: 98.6 F | HEIGHT: 66 IN | WEIGHT: 220.2 LBS | RESPIRATION RATE: 16 BRPM

## 2017-06-12 DIAGNOSIS — J02.9 SORE THROAT: ICD-10-CM

## 2017-06-12 DIAGNOSIS — J02.0 STREP PHARYNGITIS: Primary | ICD-10-CM

## 2017-06-12 LAB
S PYO AG THROAT QL: NEGATIVE
VALID INTERNAL CONTROL?: YES

## 2017-06-12 RX ORDER — FLUCONAZOLE 150 MG/1
TABLET ORAL
Refills: 1 | COMMUNITY
Start: 2017-06-02 | End: 2017-06-12 | Stop reason: ALTCHOICE

## 2017-06-12 RX ORDER — SYRINGE WITH NEEDLE, 1 ML 28GX1/2"
SYRINGE, EMPTY DISPOSABLE MISCELLANEOUS
Refills: 2 | COMMUNITY
Start: 2017-03-21 | End: 2017-06-12

## 2017-06-12 RX ORDER — TRAMADOL HYDROCHLORIDE 50 MG/1
TABLET ORAL
Refills: 0 | COMMUNITY
Start: 2017-03-28 | End: 2017-06-12 | Stop reason: ALTCHOICE

## 2017-06-12 RX ORDER — TIZANIDINE 2 MG/1
TABLET ORAL
Refills: 6 | COMMUNITY
Start: 2017-05-19 | End: 2017-06-12 | Stop reason: SDUPTHER

## 2017-06-12 RX ORDER — ONDANSETRON 4 MG/1
TABLET, ORALLY DISINTEGRATING ORAL
Refills: 0 | COMMUNITY
Start: 2017-04-16 | End: 2017-06-12 | Stop reason: ALTCHOICE

## 2017-06-12 RX ORDER — METHOTREXATE 25 MG/ML
INJECTION, SOLUTION INTRA-ARTERIAL; INTRAMUSCULAR; INTRAVENOUS
Refills: 0 | COMMUNITY
Start: 2017-04-07 | End: 2020-05-28

## 2017-06-12 RX ORDER — AMOXICILLIN AND CLAVULANATE POTASSIUM 875; 125 MG/1; MG/1
1 TABLET, FILM COATED ORAL EVERY 12 HOURS
Qty: 20 TAB | Refills: 0 | Status: SHIPPED | OUTPATIENT
Start: 2017-06-12 | End: 2017-06-22

## 2017-06-12 RX ORDER — PREDNISONE 10 MG/1
TABLET ORAL
Refills: 0 | COMMUNITY
Start: 2017-05-23 | End: 2017-06-12 | Stop reason: ALTCHOICE

## 2017-06-12 RX ORDER — NYSTATIN AND TRIAMCINOLONE ACETONIDE 100000; 1 [USP'U]/G; MG/G
CREAM TOPICAL
Refills: 3 | COMMUNITY
Start: 2017-06-02 | End: 2017-06-12 | Stop reason: ALTCHOICE

## 2017-06-12 NOTE — PATIENT INSTRUCTIONS

## 2017-06-12 NOTE — PROGRESS NOTES
Chief Complaint   Patient presents with    Sore Throat     c/o sore throat x 2 days, states hurts to swallows and has seen white patches in back of throat, denies any fever, nausea or vomiting

## 2017-06-12 NOTE — PROGRESS NOTES
Subjective:   Marya Ferrari is a 31 y.o. female who complains of sore throat, swollen glands, nasal blockage, dry cough, productive cough, headache and pain while swallowing for 3 days. She denies a history of shortness of breath and wheezing. Patient does not smoke cigarettes. Relevant PMH: No pertinent additional PMH. Objective:      Visit Vitals    /78 (BP 1 Location: Left arm, BP Patient Position: Sitting)    Pulse 60    Temp 98.6 °F (37 °C) (Oral)    Resp 16    Ht 5' 6\" (1.676 m)    Wt 220 lb 3.2 oz (99.9 kg)    SpO2 98%    BMI 35.54 kg/m2      Appears in mild to moderate distress. Ears: bilateral TM's and external ear canals normal  Oropharynx: erythematous and exudate noted  Neck: bilateral symmetric anterior adenopathy  Lungs: clear to auscultation, no wheezes, rales or rhonchi, symmetric air entry  The abdomen is soft without tenderness or hepatosplenomegaly. Rapid Strep test is negative    Assessment/Plan:   strep pharyngitis  Per orders. Gargle, use acetaminophen or other OTC analgesic, and take Rx fully as prescribed. Call if other family members develop similar symptoms. See prn. ICD-10-CM ICD-9-CM    1. Strep pharyngitis J02.0 034.0 amoxicillin-clavulanate (AUGMENTIN) 875-125 mg per tablet   2. Sore throat J02.9 462 AMB POC RAPID STREP A   .

## 2017-06-12 NOTE — MR AVS SNAPSHOT
Visit Information Date & Time Provider Department Dept. Phone Encounter #  
 6/12/2017 12:45 PM Jenaro Underwood, [address] [facility] [phone] [id number] Follow-up Instructions Return if symptoms worsen or fail to improve. Upcoming Health Maintenance Date Due  
 PAP AKA CERVICAL CYTOLOGY 7/6/2012 INFLUENZA AGE 9 TO ADULT 8/1/2017 DTaP/Tdap/Td series (2 - Td) 10/10/2026 Allergies as of 6/12/2017  Review Complete On: 6/12/2017 By: Loye Baumgarten, LPN Severity Noted Reaction Type Reactions Latex  04/17/2011    Swelling Entex [Phenylephrine-guaifenesin]  07/21/2012    Anaphylaxis, Hives, Palpitations Mucinex [Guaifenesin]  07/21/2012    Anaphylaxis, Hives Pepto-bismol [Bismuth Subsalicylate]  19/50/0134    Nausea and Vomiting Current Immunizations  Never Reviewed Name Date Hep B Vaccine 10/10/2000, 5/10/2000, 10/10/1999 Influenza High Dose Vaccine PF 10/10/2016 Pneumococcal Conjugate (PCV-13) 12/5/2016 TB Skin Test (PPD) 3/3/2016 Td, Adsorbed PF 10/10/2016  8:00 PM  
 Tdap 9/10/2016 Not reviewed this visit You Were Diagnosed With   
  
 Codes Comments Strep pharyngitis    -  Primary ICD-10-CM: J02.0 ICD-9-CM: 034.0 Sore throat     ICD-10-CM: J02.9 ICD-9-CM: 737 Vitals BP Pulse Temp Resp Height(growth percentile) Weight(growth percentile) 118/78 (BP 1 Location: Left arm, BP Patient Position: Sitting) 60 98.6 °F (37 °C) (Oral) 16 5' 6\" (1.676 m) 220 lb 3.2 oz (99.9 kg) SpO2 BMI OB Status Smoking Status 98% 35.54 kg/m2 Having regular periods Never Smoker BMI and BSA Data Body Mass Index Body Surface Area 35.54 kg/m 2 2.16 m 2 Preferred Pharmacy Pharmacy Name Phone [id number] [address] [phone] Your Updated Medication List  
  
   
 This list is accurate as of: 6/12/17  1:18 PM.  Always use your most recent med list.  
  
  
  
  
 albuterol 90 mcg/actuation inhaler Commonly known as:  PROVENTIL HFA, VENTOLIN HFA, PROAIR HFA Take 2 Puffs by inhalation every four (4) hours as needed for Wheezing. ALLEGRA-D 24 HOUR 180-240 mg per tablet Generic drug:  fexofenadine-pseudoephedrine Take 1 Tab by mouth nightly. amoxicillin-clavulanate 875-125 mg per tablet Commonly known as:  AUGMENTIN Take 1 Tab by mouth every twelve (12) hours for 10 days. desogestrel-ethinyl estradiol 0.15-0.03 mg Tab Commonly known as:  DESOGEN Take 1 Tab by mouth nightly. diclofenac potassium 50 mg tablet Commonly known as:  CATAFLAM  
Take 50 mg by mouth two (2) times a day. dicyclomine 10 mg capsule Commonly known as:  BENTYL Take 1 Cap by mouth three (3) times daily. ENBREL SURECLICK SC  
50 mg by SubCUTAneous route every seven (7) days. escitalopram oxalate 20 mg tablet Commonly known as:  Beth Larger Take 1 Tab by mouth daily. folic acid 1 mg tablet Commonly known as:  FOLVITE  
TAKE 1 TABLET ORALLY DAILY  
  
 hydroxychloroquine 200 mg tablet Commonly known as:  PLAQUENIL  
2 TABLET ONCE A DAY  
  
 metFORMIN  mg tablet Commonly known as:  GLUCOPHAGE XR  
1,000 TAKE 2 TABLET BY MOUTH TWICE A DAY AS DIRECTED  
  
 methotrexate 25 mg/mL chemo injection INJECT 25MG (1ML) subcutaneously ONCE WEEKLY Nebulizer & Compressor machine 1 Each by Does Not Apply route three (3) times daily as needed. polyethylene glycol 17 gram packet Commonly known as:  Ken Hoops Take 1 Packet by mouth daily. ROBINUL 1 mg tablet Generic drug:  glycopyrrolate Take 2 mg by mouth two (2) times a day. Indications: hyperhydrosis SINGULAIR 10 mg tablet Generic drug:  montelukast  
Take 10 mg by mouth nightly. spironolactone 50 mg tablet Commonly known as:  ALDACTONE  
 Take 1 Tab by mouth two (2) times a day. SYNTHROID 75 mcg tablet Generic drug:  levothyroxine Take 75 mcg by mouth Daily (before breakfast). tiZANidine 2 mg capsule Commonly known as:  Oliverio Alcala Take 2 mg by mouth two (2) times a day. VITAMIN D3 2,000 unit Tab Generic drug:  cholecalciferol (vitamin D3) Take 1 Tab by mouth daily. ZyrTEC 10 mg Cap Generic drug:  Cetirizine Take 10 mg by mouth daily. Indications: ALLERGIC RHINITIS Prescriptions Sent to Pharmacy Refills  
 amoxicillin-clavulanate (AUGMENTIN) 875-125 mg per tablet 0 Sig: Take 1 Tab by mouth every twelve (12) hours for 10 days. Class: Normal  
 Pharmacy: [id number]- [address]  #: [phone] Route: Oral  
  
We Performed the Following AMB POC RAPID STREP A [79920 CPT(R)] Follow-up Instructions Return if symptoms worsen or fail to improve. Patient Instructions Strep Throat: Care Instructions Your Care Instructions Strep throat is a bacterial infection that causes sudden, severe sore throat and fever. Strep throat, which is caused by bacteria called streptococcus, is treated with antibiotics. Sometimes a strep test is necessary to tell if the sore throat is caused by strep bacteria. Treatment can help ease symptoms and may prevent future problems. Follow-up care is a key part of your treatment and safety. Be sure to make and go to all appointments, and call your doctor if you are having problems. It's also a good idea to know your test results and keep a list of the medicines you take. How can you care for yourself at home? · Take your antibiotics as directed. Do not stop taking them just because you feel better. You need to take the full course of antibiotics.  
· Strep throat can spread to others until 24 hours after you begin taking antibiotics. During this time, you should avoid contact with other people at work or home, especially infants and children. Do not sneeze or cough on others, and wash your hands often. Keep your drinking glass and eating utensils separate from those of others, and wash these items well in hot, soapy water. · Gargle with warm salt water at least once each hour to help reduce swelling and make your throat feel better. Use 1 teaspoon of salt mixed in 8 fluid ounces of warm water. · Take an over-the-counter pain medication, such as acetaminophen (Tylenol), ibuprofen (Advil, Motrin), or naproxen (Aleve). Read and follow all instructions on the label. · Try an over-the-counter anesthetic throat spray or throat lozenges, which may help relieve throat pain. · Drink plenty of fluids. Fluids may help soothe an irritated throat. Hot fluids, such as tea or soup, may help your throat feel better. · Eat soft solids and drink plenty of clear liquids. Flavored ice pops, ice cream, scrambled eggs, sherbet, and gelatin dessert (such as Jell-O) may also soothe the throat. · Get lots of rest. 
· Do not smoke, and avoid secondhand smoke. If you need help quitting, talk to your doctor about stop-smoking programs and medicines. These can increase your chances of quitting for good. · Use a vaporizer or humidifier to add moisture to the air in your bedroom. Follow the directions for cleaning the machine. When should you call for help? Call your doctor now or seek immediate medical care if: 
· You have a new or higher fever. · You have a fever with a stiff neck or severe headache. · You have new or worse trouble swallowing. · Your sore throat gets much worse on one side. · Your pain becomes much worse on one side of your throat. Watch closely for changes in your health, and be sure to contact your doctor if: 
· You are not getting better after 2 days (48 hours). · You do not get better as expected. Where can you learn more? Go to http://amanda-laz.info/. Enter K625 in the search box to learn more about \"Strep Throat: Care Instructions. \" Current as of: July 29, 2016 Content Version: 11.2 © 3434-3144 [employer]. Care instructions adapted under license by [employer] (which disclaims liability or warranty for this information). If you have questions about a medical condition or this instruction, always ask your healthcare professional. [address] any warranty or liability for your use of this information. Introducing hospitals & HEALTH SERVICES! Dear Kyle Mariscal: Thank you for requesting a [employer] account. Our records indicate that you already have an active [employer] account. You can access your account anytime at [email or web address] Did you know that you can access your hospital and ER discharge instructions at any time in [employer]? You can also review all of your test results from your hospital stay or ER visit. Additional Information If you have questions, please visit the Frequently Asked Questions section of the [employer] website at [email or web address] Remember, [employer] is NOT to be used for urgent needs. For medical emergencies, dial 911. Now available from your iPhone and Android! Please provide this summary of care documentation to your next provider. Your primary care clinician is listed as JOHNATHAN Lopez. If you have any questions after today's visit, please call [phone].

## 2017-07-10 DIAGNOSIS — R68.89 UNWANTED HAIR: ICD-10-CM

## 2017-07-10 RX ORDER — SPIRONOLACTONE 50 MG/1
TABLET, FILM COATED ORAL
Qty: 90 TAB | Refills: 2 | Status: SHIPPED | OUTPATIENT
Start: 2017-07-10 | End: 2018-02-19

## 2017-07-31 ENCOUNTER — DOCUMENTATION ONLY (OUTPATIENT)
Dept: OTHER | Age: 37
End: 2017-07-31

## 2017-07-31 ENCOUNTER — OFFICE VISIT (OUTPATIENT)
Dept: PRIMARY CARE CLINIC | Age: 37
End: 2017-07-31

## 2017-07-31 VITALS
OXYGEN SATURATION: 97 % | SYSTOLIC BLOOD PRESSURE: 91 MMHG | BODY MASS INDEX: 35.36 KG/M2 | TEMPERATURE: 98.4 F | HEIGHT: 66 IN | DIASTOLIC BLOOD PRESSURE: 54 MMHG | HEART RATE: 103 BPM | WEIGHT: 220 LBS | RESPIRATION RATE: 16 BRPM

## 2017-07-31 DIAGNOSIS — R82.90 BAD ODOR OF URINE: Primary | ICD-10-CM

## 2017-07-31 DIAGNOSIS — Z87.440 HISTORY OF RECURRENT UTI (URINARY TRACT INFECTION): ICD-10-CM

## 2017-07-31 DIAGNOSIS — Z01.89 PATIENT REQUESTED DIAGNOSTIC TESTING: ICD-10-CM

## 2017-07-31 LAB
BILIRUB UR QL STRIP: NEGATIVE
GLUCOSE UR-MCNC: NEGATIVE MG/DL
KETONES P FAST UR STRIP-MCNC: NEGATIVE MG/DL
PH UR STRIP: 5.5 [PH] (ref 4.6–8)
PROT UR QL STRIP: NEGATIVE MG/DL
S PYO AG THROAT QL: NEGATIVE
SP GR UR STRIP: 1.03 (ref 1–1.03)
UA UROBILINOGEN AMB POC: NORMAL (ref 0.2–1)
URINALYSIS CLARITY POC: NORMAL
URINALYSIS COLOR POC: YELLOW
URINE BLOOD POC: NEGATIVE
URINE LEUKOCYTES POC: NEGATIVE
URINE NITRITES POC: NEGATIVE
VALID INTERNAL CONTROL?: YES

## 2017-07-31 RX ORDER — FLUCONAZOLE 150 MG/1
TABLET ORAL
Refills: 5 | COMMUNITY
Start: 2017-06-12 | End: 2017-07-31 | Stop reason: ALTCHOICE

## 2017-07-31 RX ORDER — AMOXICILLIN AND CLAVULANATE POTASSIUM 875; 125 MG/1; MG/1
TABLET, FILM COATED ORAL
Refills: 0 | COMMUNITY
Start: 2017-06-12 | End: 2017-07-31 | Stop reason: ALTCHOICE

## 2017-07-31 RX ORDER — SYRINGE WITH NEEDLE, 1 ML 28GX1/2"
SYRINGE, EMPTY DISPOSABLE MISCELLANEOUS
Refills: 2 | COMMUNITY
Start: 2017-06-11 | End: 2017-07-31

## 2017-07-31 RX ORDER — NYSTATIN AND TRIAMCINOLONE ACETONIDE 100000; 1 [USP'U]/G; MG/G
CREAM TOPICAL
Refills: 3 | COMMUNITY
Start: 2017-06-02 | End: 2017-07-31 | Stop reason: ALTCHOICE

## 2017-07-31 RX ORDER — PREDNISONE 10 MG/1
TABLET ORAL
Refills: 0 | COMMUNITY
Start: 2017-05-23 | End: 2017-07-31 | Stop reason: ALTCHOICE

## 2017-07-31 RX ORDER — NITROFURANTOIN 25; 75 MG/1; MG/1
100 CAPSULE ORAL 2 TIMES DAILY
Qty: 14 CAP | Refills: 0 | Status: SHIPPED | OUTPATIENT
Start: 2017-07-31 | End: 2017-08-07

## 2017-07-31 RX ORDER — TIZANIDINE 2 MG/1
TABLET ORAL
Refills: 6 | COMMUNITY
Start: 2017-06-21 | End: 2017-07-31 | Stop reason: SDUPTHER

## 2017-07-31 NOTE — MR AVS SNAPSHOT
Visit Information Date & Time Provider Department Dept. Phone Encounter #  
 7/31/2017  3:30 PM Gayla Sicard, NP [address] [facility]36 [phone] [id number] Follow-up Instructions Return if symptoms worsen or fail to improve. Upcoming Health Maintenance Date Due  
 PAP AKA CERVICAL CYTOLOGY 7/6/2012 INFLUENZA AGE 9 TO ADULT 8/1/2017 DTaP/Tdap/Td series (2 - Td) 10/10/2026 Allergies as of 7/31/2017  Review Complete On: 7/31/2017 By: Gayla Sicard, NP Severity Noted Reaction Type Reactions Latex  04/17/2011    Swelling Entex [Phenylephrine-guaifenesin]  07/21/2012    Anaphylaxis, Hives, Palpitations Mucinex [Guaifenesin]  07/21/2012    Anaphylaxis, Hives Pepto-bismol [Bismuth Subsalicylate]  23/77/1721    Nausea and Vomiting Current Immunizations  Never Reviewed Name Date Hep B Vaccine 10/10/2000, 5/10/2000, 10/10/1999 Influenza High Dose Vaccine PF 10/10/2016 Pneumococcal Conjugate (PCV-13) 12/5/2016 TB Skin Test (PPD) 3/3/2016 Td, Adsorbed PF 10/10/2016  8:00 PM  
 Tdap 9/10/2016 Not reviewed this visit You Were Diagnosed With   
  
 Codes Comments Bad odor of urine    -  Primary ICD-10-CM: R82.90 ICD-9-CM: 791.9 History of recurrent UTI (urinary tract infection)     ICD-10-CM: Z87.440 ICD-9-CM: V13.02 Vitals BP Pulse Temp Resp Height(growth percentile) Weight(growth percentile) 91/54 (BP 1 Location: Left arm, BP Patient Position: Sitting) (!) 103 98.4 °F (36.9 °C) (Oral) 16 5' 6\" (1.676 m) 220 lb (99.8 kg) SpO2 BMI OB Status Smoking Status 97% 35.51 kg/m2 Having regular periods Never Smoker BMI and BSA Data Body Mass Index Body Surface Area 35.51 kg/m 2 2.16 m 2 Preferred Pharmacy Pharmacy Name Phone [address] [place] [address], [address] [place] [address] Lillie Russo Said [phone] Your Updated Medication List  
  
   
 This list is accurate as of: 7/31/17  4:03 PM.  Always use your most recent med list.  
  
  
  
  
 albuterol 90 mcg/actuation inhaler Commonly known as:  PROVENTIL HFA, VENTOLIN HFA, PROAIR HFA Take 2 Puffs by inhalation every four (4) hours as needed for Wheezing. ALLEGRA-D 24 HOUR 180-240 mg per tablet Generic drug:  fexofenadine-pseudoephedrine Take 1 Tab by mouth nightly. desogestrel-ethinyl estradiol 0.15-0.03 mg Tab Commonly known as:  DESOGEN Take 1 Tab by mouth nightly. diclofenac potassium 50 mg tablet Commonly known as:  CATAFLAM  
Take 50 mg by mouth two (2) times a day. dicyclomine 10 mg capsule Commonly known as:  BENTYL Take 1 Cap by mouth three (3) times daily. ENBREL SURECLICK SC  
50 mg by SubCUTAneous route every seven (7) days. escitalopram oxalate 20 mg tablet Commonly known as:  Catracho Lauren Take 1 Tab by mouth daily. folic acid 1 mg tablet Commonly known as:  FOLVITE  
TAKE 1 TABLET ORALLY DAILY  
  
 hydroxychloroquine 200 mg tablet Commonly known as:  PLAQUENIL  
2 TABLET ONCE A DAY  
  
 metFORMIN  mg tablet Commonly known as:  GLUCOPHAGE XR  
1,000 TAKE 2 TABLET BY MOUTH TWICE A DAY AS DIRECTED  
  
 methotrexate 25 mg/mL chemo injection INJECT 25MG (1ML) subcutaneously ONCE WEEKLY Nebulizer & Compressor machine 1 Each by Does Not Apply route three (3) times daily as needed. nitrofurantoin (macrocrystal-monohydrate) 100 mg capsule Commonly known as:  MACROBID Take 1 Cap by mouth two (2) times a day for 7 days. polyethylene glycol 17 gram packet Commonly known as:  Pate Lies Take 1 Packet by mouth daily. ROBINUL 1 mg tablet Generic drug:  glycopyrrolate Take 2 mg by mouth two (2) times a day. Indications: hyperhydrosis SINGULAIR 10 mg tablet Generic drug:  montelukast  
Take 10 mg by mouth nightly. spironolactone 50 mg tablet Commonly known as:  ALDACTONE  
 TAKE 1 TABLET BY MOUTH TWICE A DAY  
  
 SYNTHROID 75 mcg tablet Generic drug:  levothyroxine Take 75 mcg by mouth Daily (before breakfast). tiZANidine 2 mg capsule Commonly known as:  Annetta Del Angel Take 2 mg by mouth two (2) times a day. VITAMIN D3 2,000 unit Tab Generic drug:  cholecalciferol (vitamin D3) Take 1 Tab by mouth daily. ZyrTEC 10 mg Cap Generic drug:  Cetirizine Take 10 mg by mouth daily. Indications: ALLERGIC RHINITIS Prescriptions Sent to Pharmacy Refills  
 nitrofurantoin, macrocrystal-monohydrate, (MACROBID) 100 mg capsule 0 Sig: Take 1 Cap by mouth two (2) times a day for 7 days. Class: Normal  
 Pharmacy: [facility] at [address] #: [phone] Route: Oral  
  
We Performed the Following CULTURE, URINE [id number] CPT(R)] Follow-up Instructions Return if symptoms worsen or fail to improve. Patient Instructions Urine Culture: About This Test 
What is it? A urine culture is a test to find germs (such as bacteria) that can cause an infection. A sample of urine is added to a substance that promotes the growth of germs. If no germs grow, the culture is negative. If germs that can cause infection grow, the culture is positive. The type of germ may be identified using a microscope or chemical tests. Why is this test done? A urine culture may be done to: · Find the cause of a urinary tract infection (UTI). · Make decisions about the best treatment for a UTI. · Find out whether treatment for a UTI worked. How can you prepare for the test? 
You don't need to do anything before you have the test. If you are taking or have recently taken antibiotics, tell your doctor. What happens before the test? 
You will need to drink enough fluids and avoid urinating so that you will be able to collect a urine sample.  
What happens during the test? 
 You will be asked to collect a clean-catch midstream urine sample for testing. The first urine of the day is best because bacteria levels will be higher. · Wash your hands before collecting the urine. · If the container has a lid, remove the lid of the container and set it down with the inner surface up. · Clean the area around your penis or vagina. · Begin urinating into the toilet or urinal. 
· After the urine has flowed for several seconds, place the collection container in the stream and collect about 2 ounces (a quarter cup) of this \"midstream\" urine without stopping the flow. · Don't touch the rim of the container to your genital area. · Finish urinating into the toilet or urinal. 
· Carefully replace the lid on the container. · Wash your hands. How long does the test take? · The test will take a few minutes. What happens after the test? 
· You will probably be able to go home right away. The results of a urine culture are usually available in 1 to 3 days. · You can go back to your usual activities right away. Follow-up care is a key part of your treatment and safety. Be sure to make and go to all appointments, and call your doctor if you are having problems. It's also a good idea to keep a list of the medicines you take. Ask your doctor when you can expect to have your test results. Where can you learn more? Go to http://amanda-laz.info/. Enter Z692 in the search box to learn more about \"Urine Culture: About This Test.\" Current as of: October 14, 2016 Content Version: 11.3 © 5786-2901 [employer]. Care instructions adapted under license by [employer] (which disclaims liability or warranty for this information). If you have questions about a medical condition or this instruction, always ask your healthcare professional. [address] any warranty or liability for your use of this information. Introducing [facility] & HEALTH SERVICES! Dear Erin Sanchez: Thank you for requesting a [employer] account. Our records indicate that you already have an active [employer] account. You can access your account anytime at [email or web address] Did you know that you can access your hospital and ER discharge instructions at any time in [employer]? You can also review all of your test results from your hospital stay or ER visit. Additional Information If you have questions, please visit the Frequently Asked Questions section of the [employer] website at [email or web address] Remember, [employer] is NOT to be used for urgent needs. For medical emergencies, dial 911. Now available from your iPhone and Android! Please provide this summary of care documentation to your next provider. Your primary care clinician is listed as JOHNATHAN Lopez. If you have any questions after today's visit, please call [phone].

## 2017-07-31 NOTE — PROGRESS NOTES
Chart reviewed for SHIN resolution. No further ER or urgent center visits noted. Patient with PCP follow-up. SHIN episode resolved.

## 2017-07-31 NOTE — PATIENT INSTRUCTIONS
Urine Culture: About This Test  What is it? A urine culture is a test to find germs (such as bacteria) that can cause an infection. A sample of urine is added to a substance that promotes the growth of germs. If no germs grow, the culture is negative. If germs that can cause infection grow, the culture is positive. The type of germ may be identified using a microscope or chemical tests. Why is this test done? A urine culture may be done to:  · Find the cause of a urinary tract infection (UTI). · Make decisions about the best treatment for a UTI. · Find out whether treatment for a UTI worked. How can you prepare for the test?  You don't need to do anything before you have the test. If you are taking or have recently taken antibiotics, tell your doctor. What happens before the test?  You will need to drink enough fluids and avoid urinating so that you will be able to collect a urine sample. What happens during the test?  You will be asked to collect a clean-catch midstream urine sample for testing. The first urine of the day is best because bacteria levels will be higher. · Wash your hands before collecting the urine. · If the container has a lid, remove the lid of the container and set it down with the inner surface up. · Clean the area around your penis or vagina. · Begin urinating into the toilet or urinal.  · After the urine has flowed for several seconds, place the collection container in the stream and collect about 2 ounces (a quarter cup) of this \"midstream\" urine without stopping the flow. · Don't touch the rim of the container to your genital area. · Finish urinating into the toilet or urinal.  · Carefully replace the lid on the container. · Wash your hands. How long does the test take? · The test will take a few minutes. What happens after the test?  · You will probably be able to go home right away. The results of a urine culture are usually available in 1 to 3 days.   · You can go back to your usual activities right away. Follow-up care is a key part of your treatment and safety. Be sure to make and go to all appointments, and call your doctor if you are having problems. It's also a good idea to keep a list of the medicines you take. Ask your doctor when you can expect to have your test results. Where can you learn more? Go to http://amanda-laz.info/. Enter F038 in the search box to learn more about \"Urine Culture: About This Test.\"  Current as of: October 14, 2016  Content Version: 11.3  © 7245-0425 [employer]. Care instructions adapted under license by [employer] (which disclaims liability or warranty for this information). If you have questions about a medical condition or this instruction, always ask your healthcare professional. [address] any warranty or liability for your use of this information.

## 2017-07-31 NOTE — PROGRESS NOTES
Chief Complaint   Patient presents with    Urinary Odor     c/o urinary odor and lower abdominal pain after voiding x 2 days    Sore Throat     pt requesting strep test, states she went for teeth cleaning at dentist and advised she had strep      This note will not be viewable in [address]

## 2017-07-31 NOTE — PROGRESS NOTES
Subjective:     Lynnette Woods is a 30 y.o. female who complains of abnormal smelling urine, foul smelling urine, suprapubic pressure for 5-6 days. Patient denies flank pain, vomiting, fever, unusual vaginal discharge. Patient does have a remote history of recurrent UTI. Patient does not have a history of pyelonephritis. Pt reports some vaginal itching which she states is chronic and took Diflucan 6 days ago. The itching has improved but has not completely resolved. Pt also requests strep test.  Treated for strep ~ 6 weeks ago with Augmentin. Recently saw her dentist who told her he believed she still had strep. Denies sore throat at this time. Past Medical History:   Diagnosis Date    Acquired hypothyroidism     Adverse effect of anesthesia     labile BP during/after C section    Asthma     Broken bones     history of 9 broken bones    Chronic UTI (urinary tract infection)     \"extra valve right kidney causes UTI\"    Gestational diabetes     GI bleed ,,,    lower GI last colonoscopy in  normal     Huntingtons chorea (HCC)     Hyperhydrosis disorder     Ill-defined condition     [place]/ tested genetically - daughter has [place]    Infertility     PCOS    PCOS (polycystic ovarian syndrome)     Precancerous skin lesion     removed from Right shoulder    Preeclampsia 2011    pregnancy    Rheumatoid arthritis ([address]     SVT (supraventricular tachycardia) ([address] 2011    occured during pregnancy when picc line inserted.      Past Surgical History:   Procedure Laterality Date    COLONOSCOPY N/A 2016    COL[facility] at [facility] ENDOSCOPY    HX  SECTION      HX DILATION AND CURETTAGE  2005    suction D+C    HX WISDOM TEETH EXTRACTION       Allergies   Allergen Reactions    Latex Swelling    Entex [Phenylephrine-Guaifenesin] Anaphylaxis, Hives and Palpitations    Mucinex [Guaifenesin] Anaphylaxis and Hives    Pepto-Bismol [Bismuth Subsalicylate] Nausea and Vomiting       Review of Systems  Pertinent items are noted in HPI. Objective:     Visit Vitals    BP 91/54 (BP 1 Location: Left arm, BP Patient Position: Sitting)    Pulse (!) 103    Temp 98.4 °F (36.9 °C) (Oral)    Resp 16    Ht 5' 6\" (1.676 m)    Wt 220 lb (99.8 kg)    SpO2 97%    BMI 35.51 kg/m2     General:  alert, cooperative, no distress   Abdomen: soft, nontender, nondistended, no masses or organomegaly. Back:  CVA tenderness absent   :  defer exam   Throat - normal, no adenopathy    Laboratory:   Urine dipstick shows   Results for orders placed or performed in visit on 07/31/17   AMB POC RAPID STREP A   Result Value Ref Range    VALID INTERNAL CONTROL POC Yes     Group A Strep Ag Negative Negative   AMB POC URINALYSIS DIP STICK AUTO W/O MICRO   Result Value Ref Range    Color (UA POC) Yellow     Clarity (UA POC) Slightly Cloudy     Glucose (UA POC) Negative Negative    Bilirubin (UA POC) Negative Negative    Ketones (UA POC) Negative Negative    Specific gravity (UA POC) 1.030 1.001 - 1.035    Blood (UA POC) Negative Negative    pH (UA POC) 5.5 4.6 - 8.0    Protein (UA POC) Negative Negative mg/dL    Urobilinogen (UA POC) 0.2 mg/dL 0.2 - 1    Nitrites (UA POC) Negative Negative    Leukocyte esterase (UA POC) Negative Negative       Assessment/Plan:       ICD-10-CM ICD-9-CM    1. Bad odor of urine R82.90 791.9 CULTURE, URINE      AMB POC URINALYSIS DIP STICK AUTO W/O MICRO   2. History of recurrent UTI (urinary tract infection) Z87.440 V13.02 CULTURE, URINE   3. Patient requested diagnostic testing Z01.89 V72.85 AMB POC RAPID STREP A       1. nitrofurantoin pending culture  2. Maintain adequate hydration  3. Follow up if symptoms not improving, and prn. Constantino Alonso NP   This note will not be viewable in [address]

## 2017-08-03 LAB — BACTERIA UR CULT: ABNORMAL

## 2017-08-03 NOTE — PROGRESS NOTES
Please inform pt that the urine culture shows infection and the antibiotic (nitrofurantoin) that was prescribed should be effective. Is she feeling better? Recommend she complete course of antibiotics and follow-up as needed. Thanks.

## 2017-08-04 ENCOUNTER — TELEPHONE (OUTPATIENT)
Dept: FAMILY MEDICINE CLINIC | Age: 37
End: 2017-08-04

## 2017-08-04 NOTE — TELEPHONE ENCOUNTER
Patient returned call and after patient identifiers, given lab results and instructions per Hi Hurtal. Stated she felt better and verbalized understanding of results and instructions.

## 2017-08-16 ENCOUNTER — OFFICE VISIT (OUTPATIENT)
Dept: PRIMARY CARE CLINIC | Age: 37
End: 2017-08-16

## 2017-08-16 VITALS
WEIGHT: 223 LBS | TEMPERATURE: 98.5 F | DIASTOLIC BLOOD PRESSURE: 80 MMHG | HEIGHT: 66 IN | RESPIRATION RATE: 18 BRPM | HEART RATE: 105 BPM | BODY MASS INDEX: 35.84 KG/M2 | SYSTOLIC BLOOD PRESSURE: 124 MMHG | OXYGEN SATURATION: 96 %

## 2017-08-16 DIAGNOSIS — N39.0 CHRONIC UTI (URINARY TRACT INFECTION): Primary | ICD-10-CM

## 2017-08-16 LAB
BILIRUB UR QL STRIP: NEGATIVE
GLUCOSE UR-MCNC: NEGATIVE MG/DL
KETONES P FAST UR STRIP-MCNC: NORMAL MG/DL
PH UR STRIP: 5.5 [PH] (ref 4.6–8)
PROT UR QL STRIP: NORMAL MG/DL
SP GR UR STRIP: 1.03 (ref 1–1.03)
UA UROBILINOGEN AMB POC: NORMAL (ref 0.2–1)
URINALYSIS CLARITY POC: CLEAR
URINALYSIS COLOR POC: YELLOW
URINE BLOOD POC: NEGATIVE
URINE LEUKOCYTES POC: NEGATIVE
URINE NITRITES POC: NEGATIVE

## 2017-08-16 RX ORDER — INFLIXIMAB 100 MG/10ML
5 INJECTION, POWDER, LYOPHILIZED, FOR SOLUTION INTRAVENOUS ONCE
COMMUNITY
End: 2018-02-19

## 2017-08-16 RX ORDER — DOXYCYCLINE 100 MG/1
100 CAPSULE ORAL 2 TIMES DAILY
Qty: 20 CAP | Refills: 0 | Status: SHIPPED | OUTPATIENT
Start: 2017-08-16 | End: 2018-02-19

## 2017-08-16 NOTE — MR AVS SNAPSHOT
Visit Information Date & Time Provider Department Dept. Phone Encounter #  
 8/16/2017 12:30 PM John Shine, [address] [id number] Follow-up Instructions Return if symptoms worsen or fail to improve. Upcoming Health Maintenance Date Due  
 PAP AKA CERVICAL CYTOLOGY 7/6/2012 INFLUENZA AGE 9 TO ADULT 8/1/2017 DTaP/Tdap/Td series (2 - Td) 10/10/2026 Allergies as of 8/16/2017  Review Complete On: 8/16/2017 By: John Shine MD  
  
 Severity Noted Reaction Type Reactions Latex  04/17/2011    Swelling Entex [Phenylephrine-guaifenesin]  07/21/2012    Anaphylaxis, Hives, Palpitations Mucinex [Guaifenesin]  07/21/2012    Anaphylaxis, Hives Pepto-bismol [Bismuth Subsalicylate]  72/09/2721    Nausea and Vomiting Current Immunizations  Never Reviewed Name Date Hep B Vaccine 10/10/2000, 5/10/2000, 10/10/1999 Influenza High Dose Vaccine PF 10/10/2016 Pneumococcal Conjugate (PCV-13) 12/5/2016 TB Skin Test (PPD) 3/3/2016 Td, Adsorbed PF 10/10/2016  8:00 PM  
 Tdap 9/10/2016 Not reviewed this visit You Were Diagnosed With   
  
 Codes Comments Chronic UTI (urinary tract infection)    -  Primary ICD-10-CM: N39.0 ICD-9-CM: 599.0 Vitals BP Pulse Temp Resp Height(growth percentile) Weight(growth percentile) 124/80 (BP Patient Position: Sitting) (!) 105 98.5 °F (36.9 °C) (Oral) 18 5' 6\" (1.676 m) 223 lb (101.2 kg) SpO2 BMI OB Status Smoking Status 96% 35.99 kg/m2 Having regular periods Never Smoker BMI and BSA Data Body Mass Index Body Surface Area 35.99 kg/m 2 2.17 m 2 Preferred Pharmacy Pharmacy Name Phone [address] [place] [address], [address] [place] [address] Lillie Russo Said [phone] Your Updated Medication List  
  
   
This list is accurate as of: 8/16/17  1:31 PM.  Always use your most recent med list.  
  
  
  
  
 albuterol 90 mcg/actuation inhaler Commonly known as:  PROVENTIL HFA, VENTOLIN HFA, PROAIR HFA Take 2 Puffs by inhalation every four (4) hours as needed for Wheezing. ALLEGRA-D 24 HOUR 180-240 mg per tablet Generic drug:  fexofenadine-pseudoephedrine Take 1 Tab by mouth nightly. desogestrel-ethinyl estradiol 0.15-0.03 mg Tab Commonly known as:  DESOGEN Take 1 Tab by mouth nightly. diclofenac potassium 50 mg tablet Commonly known as:  CATAFLAM  
Take 50 mg by mouth two (2) times a day. dicyclomine 10 mg capsule Commonly known as:  BENTYL Take 1 Cap by mouth three (3) times daily. doxycycline 100 mg capsule Commonly known as:  [place] Sow Take 1 Cap by mouth two (2) times a day. ENBREL SURECLICK SC  
50 mg by SubCUTAneous route every seven (7) days. escitalopram oxalate 20 mg tablet Commonly known as:  Rosslyn Yaquelin Take 1 Tab by mouth daily. folic acid 1 mg tablet Commonly known as:  FOLVITE  
TAKE 1 TABLET ORALLY DAILY  
  
 hydroxychloroquine 200 mg tablet Commonly known as:  PLAQUENIL  
2 TABLET ONCE A DAY  
  
 metFORMIN  mg tablet Commonly known as:  GLUCOPHAGE XR  
1,000 TAKE 2 TABLET BY MOUTH TWICE A DAY AS DIRECTED  
  
 methotrexate 25 mg/mL chemo injection INJECT 25MG (1ML) subcutaneously ONCE WEEKLY Nebulizer & Compressor machine 1 Each by Does Not Apply route three (3) times daily as needed. polyethylene glycol 17 gram packet Commonly known as:  Octavio Aloe Take 1 Packet by mouth daily. REMICADE 100 mg injection Generic drug:  inFLIXimab  
5 mg/kg by IntraVENous route once. ROBINUL 1 mg tablet Generic drug:  glycopyrrolate Take 2 mg by mouth two (2) times a day. Indications: hyperhydrosis SINGULAIR 10 mg tablet Generic drug:  montelukast  
Take 10 mg by mouth nightly. spironolactone 50 mg tablet Commonly known as:  ALDACTONE  
TAKE 1 TABLET BY MOUTH TWICE A DAY  
  
 SYNTHROID 75 mcg tablet Generic drug:  levothyroxine Take 75 mcg by mouth Daily (before breakfast). tiZANidine 2 mg capsule Commonly known as:  Bearden Cervantes Take 2 mg by mouth two (2) times a day. VITAMIN D3 2,000 unit Tab Generic drug:  cholecalciferol (vitamin D3) Take 1 Tab by mouth daily. ZyrTEC 10 mg Cap Generic drug:  Cetirizine Take 10 mg by mouth daily. Indications: ALLERGIC RHINITIS Prescriptions Sent to Pharmacy Refills  
 doxycycline (MONODOX) 100 mg capsule 0 Sig: Take 1 Cap by mouth two (2) times a day. Class: Normal  
 Pharmacy: [facility]  at [address] #: [phone] Route: Oral  
  
We Performed the Following AMB POC URINALYSIS DIP STICK AUTO W/O MICRO [92581 CPT(R)] Follow-up Instructions Return if symptoms worsen or fail to improve. Patient Instructions I am concerned about the use of recurrent antibiotics. It sounds like you may have trigonitis (inflamation around the urethra) that can cause these symptoms and antibiotics may not be the answer, so consider a urological workup if your symptoms persist or recur. Increase your fluid intake. Cranberry juice or pills can help. Urinary Tract Infection in Women: Care Instructions Your Care Instructions A urinary tract infection, or UTI, is a general term for an infection anywhere between the kidneys and the urethra (where urine comes out). Most UTIs are bladder infections. They often cause pain or burning when you urinate. UTIs are caused by bacteria and can be cured with antibiotics. Be sure to complete your treatment so that the infection goes away. Follow-up care is a key part of your treatment and safety. Be sure to make and go to all appointments, and call your doctor if you are having problems. It's also a good idea to know your test results and keep a list of the medicines you take. How can you care for yourself at home? · Take your antibiotics as directed. Do not stop taking them just because you feel better. You need to take the full course of antibiotics. · Drink extra water and other fluids for the next day or two. This may help wash out the bacteria that are causing the infection. (If you have kidney, heart, or liver disease and have to limit fluids, talk with your doctor before you increase your fluid intake.) · Avoid drinks that are carbonated or have caffeine. They can irritate the bladder. · Urinate often. Try to empty your bladder each time. · To relieve pain, take a hot bath or lay a heating pad set on low over your lower belly or genital area. Never go to sleep with a heating pad in place. To prevent UTIs · Drink plenty of water each day. This helps you urinate often, which clears bacteria from your system. (If you have kidney, heart, or liver disease and have to limit fluids, talk with your doctor before you increase your fluid intake.) · Urinate when you need to. · Urinate right after you have sex. · Change sanitary pads often. · Avoid douches, bubble baths, feminine hygiene sprays, and other feminine hygiene products that have deodorants. · After going to the bathroom, wipe from front to back. When should you call for help? Call your doctor now or seek immediate medical care if: · Symptoms such as fever, chills, nausea, or vomiting get worse or appear for the first time. · You have new pain in your back just below your rib cage. This is called flank pain. · There is new blood or pus in your urine. · You have any problems with your antibiotic medicine. Watch closely for changes in your health, and be sure to contact your doctor if: 
· You are not getting better after taking an antibiotic for 2 days. · Your symptoms go away but then come back. Where can you learn more? Go to http://amanda-laz.info/. Enter V891 in the search box to learn more about \"Urinary Tract Infection in Women: Care Instructions. \" Current as of: November 28, 2016 Content Version: 11.3 © 7843-4572 stylemarks. Care instructions adapted under license by [employer] (which disclaims liability or warranty for this information). If you have questions about a medical condition or this instruction, always ask your healthcare professional. [place] [address] any warranty or liability for your use of this information. Introducing [facility]! Dear Toni Syed: Thank you for requesting a Kongregate account. Our records indicate that you already have an active Kongregate account. You can access your account anytime at [email or web address] Did you know that you can access your hospital and ER discharge instructions at any time in Kongregate? You can also review all of your test results from your hospital stay or ER visit. Additional Information If you have questions, please visit the Frequently Asked Questions section of the Kong[employer] website at [email or web address] Remember, Kongregate is NOT to be used for urgent needs. For medical emergencies, dial 911. Now available from your iPhone and Android! Please provide this summary of care documentation to your next provider. Your primary care clinician is listed as JOHNATHAN Lopez. If you have any questions after today's visit, please call [phone].

## 2017-08-16 NOTE — PROGRESS NOTES
Chief Complaint   Patient presents with    Bladder Infection     completed antibiotics and has symptoms return almost immediatley     Results for orders placed or performed in visit on 08/16/17   AMB POC URINALYSIS DIP STICK AUTO W/O MICRO     Status: None   Result Value Ref Range Status    Color (UA POC) Yellow  Final    Clarity (UA POC) Clear  Final    Glucose (UA POC) Negative Negative Final    Bilirubin (UA POC) Negative Negative Final    Ketones (UA POC) 1+ Negative Final    Specific gravity (UA POC) 1.030 1.001 - 1.035 Final    Blood (UA POC) Negative Negative Final    pH (UA POC) 5.5 4.6 - 8.0 Final    Protein (UA POC) 2+ Negative mg/dL Final    Urobilinogen (UA POC) 0.2 mg/dL 0.2 - 1 Final    Nitrites (UA POC) Negative Negative Final    Leukocyte esterase (UA POC) Negative Negative Final   Results for orders placed or performed in visit on 07/21/12   AMB POC URINALYSIS DIP STICK MANUAL W/O MICRO     Status: Abnormal   Result Value Ref Range Status    Color (UA POC) Brown (none) Final    Clarity (UA POC) Cloudy (none) Final    Glucose (UA POC) Negative (none) Final    Bilirubin (UA POC) 1+ (none) Final    Ketones (UA POC) Negative (none) Final    Specific gravity (UA POC) 1.025 1.001 - 1.035 Final    Blood (UA POC) 4+ (none) Final    pH (UA POC) 5.5 4.6 - 8.0 Final    Protein (UA POC) 100 Negative mg/dL Final    Urobilinogen (UA POC) 0.2 mg/dL  Final    Nitrites (UA POC) Negative (none) Final    Leukocyte esterase (UA POC) 4+ (none) Final

## 2017-08-16 NOTE — PROGRESS NOTES
Chief Complaint   Patient presents with    Bladder Infection     completed antibiotics and has symptoms return almost immediatley       HPI:  30year old female who presents with persistent urinary symptoms of urethral discomfort, more constant than just micturitionally. No fevers or chills, but has had night sweats. Also, some urinary frequency. Has now been on 3 antibiotics (Augmentin for Strep, then Bactrim, then Macrodantin) and her dipstick tends to be negative, but culture was positive once, E. coli susceptible to all of the 3 antibiotics recently used as well as Cipro & Doxycycline. The question is whether or not this represents actual infection or more of a bladder issue, trigonitis, etc... Review of Systems - no unexplained weight loss/gain, fevers, chills, chest pain, shortness of breath, cough, nausea, vomiting, diarrhea, urinary frequency/urgency/dysuria. Otherwise, ROS negative except as per HPI    Past Medical History:   Diagnosis Date    Acquired hypothyroidism     Adverse effect of anesthesia     labile BP during/after C section    Asthma     Broken bones     history of 9 broken bones    Chronic UTI (urinary tract infection)     \"extra valve right kidney causes UTI\"    Gestational diabetes     GI bleed 2007,2011,2015,2016    lower GI last colonoscopy in 2016 normal     Huntingtons chorea (HCC)     Hyperhydrosis disorder     Ill-defined condition     [place]/ tested genetically - daughter has [place]    Infertility     PCOS    PCOS (polycystic ovarian syndrome)     Precancerous skin lesion     removed from Right shoulder    Preeclampsia 2011    pregnancy    Rheumatoid arthritis ([address]     SVT (supraventricular tachycardia) ([address] 2011    occured during pregnancy when picc line inserted.        Past Surgical History:   Procedure Laterality Date    COLONOSCOPY N/A 6/13/2016    COL[facility] performed by David Osborne [facility]  2011    HX DILATION AND CURETTAGE  2005    suction D+C    HX WISDOM TEETH EXTRACTION  2004       Family History   Problem Relation Age of Onset    Other Mother      Nolan's disease    Hypertension Mother     High Cholesterol Father     Heart Disease Father     Diabetes Father     Hypertension Father     Asthma Brother     Diabetes Brother     Other Brother      varicocele, hernias    Hypertension Brother     Alcohol abuse Brother     Hypertension Maternal Grandmother     Elevated Lipids Maternal Grandmother     Cancer Maternal Grandmother      breast    Other Maternal Grandmother      polymyalgia rheumatica    Glaucoma Maternal Grandmother     Cancer Maternal Grandfather      prostate    Huntingtons disease Maternal Grandfather     Cancer Paternal Grandmother      lung with mets    Cancer Paternal Grandfather      prostate, colon    Diabetes Paternal Grandfather     Heart Disease Paternal Grandfather     Alzheimer Paternal Grandfather        Social History     Social History    Marital status:      Spouse name: N/A    Number of children: N/A    Years of education: N/A     Occupational History    Not on file. Social History Main Topics    Smoking status: Never Smoker    Smokeless tobacco: Never Used    Alcohol use Yes      Comment: few drinks per year    Drug use: No    Sexual activity: Not on file     Other Topics Concern    Not on file     Social History Narrative    ** Merged History Encounter **            Current Outpatient Prescriptions on File Prior to Visit   Medication Sig Dispense Refill    spironolactone (ALDACTONE) 50 mg tablet TAKE 1 TABLET BY MOUTH TWICE A DAY 90 Tab 2    methotrexate 25 mg/mL chemo injection INJECT 25MG (1ML) subcutaneously ONCE WEEKLY  0    ETANERCEPT (ENBREL SURECLICK SC) 50 mg by SubCUTAneous route every seven (7) days.  polyethylene glycol (MIRALAX) 17 gram packet Take 1 Packet by mouth daily.  1 Each 0    dicyclomine (BENTYL) 10 mg capsule Take 1 Cap by mouth three (3) times daily. 20 Cap 0    diclofenac potassium (CATAFLAM) 50 mg tablet Take 50 mg by mouth two (2) times a day.  tiZANidine (ZANAFLEX) 2 mg capsule Take 2 mg by mouth two (2) times a day.  albuterol (PROVENTIL HFA, VENTOLIN HFA, PROAIR HFA) 90 mcg/actuation inhaler Take 2 Puffs by inhalation every four (4) hours as needed for Wheezing. 1 Inhaler 3    metFORMIN ER (GLUCOPHAGE XR) 500 mg tablet 1,000 TAKE 2 TABLET BY MOUTH TWICE A DAY AS DIRECTED  1    folic acid (FOLVITE) 1 mg tablet TAKE 1 TABLET ORALLY DAILY  11    hydroxychloroquine (PLAQUENIL) 200 mg tablet 2 TABLET ONCE A DAY  4    escitalopram oxalate (LEXAPRO) 20 mg tablet Take 1 Tab by mouth daily. 90 Tab 3    desogestrel-ethinyl estradiol (DESOGEN) 0.15-0.03 mg tab Take 1 Tab by mouth nightly.  Nebulizer & Compressor machine 1 Each by Does Not Apply route three (3) times daily as needed. 1 Each 0    glycopyrrolate (ROBINUL) 1 mg tablet Take 2 mg by mouth two (2) times a day. Indications: hyperhydrosis      Cetirizine (ZYRTEC) 10 mg Cap Take 10 mg by mouth daily. Indications: ALLERGIC RHINITIS      montelukast (SINGULAIR) 10 mg tablet Take 10 mg by mouth nightly.  fexofenadine-pseudoephedrine (ALLEGRA-D 24 HOUR) 180-240 mg per tablet Take 1 Tab by mouth nightly.  cholecalciferol, vitamin D3, (VITAMIN D3) 2,000 unit Tab Take 1 Tab by mouth daily.  levothyroxine (SYNTHROID) 75 mcg tablet Take 75 mcg by mouth Daily (before breakfast). No current facility-administered medications on file prior to visit.         Allergies   Allergen Reactions    Latex Swelling    Entex [Phenylephrine-Guaifenesin] Anaphylaxis, Hives and Palpitations    Mucinex [Guaifenesin] Anaphylaxis and Hives    Pepto-Bismol [Bismuth Subsalicylate] Nausea and Vomiting       PE:    General:  Well-developed, well-nourished female in no apparent distress  HEENT:  Normocephalic, atraumatic, Pupils are equal, round, & reactive to light & accommodation. Extraocular movements intact. TM's normal, external auditory exam normal.  Oropharynx grossly normal.  Moderate tonsillar enlargement with crypts, no erythema or exudates. Neck:  Supple, nontender, full ROM. No lymphadenopathy. No thyromegaly. Chest:  clear to auscultation without rales, rhonchi, or wheezes. CV:  Regular rate & rhythm without murmurs, gallops, clicks, or rubs. Abdomen:  soft, nontender, nondistended, normoactive bowel sounds, no organomegaly. No CVA tenderness. Extremities:  No edema, clubbing, or cyanosis. Full ROM, nontender. Orders Placed This Encounter    AMB POC URINALYSIS DIP STICK AUTO W/O MICRO    inFLIXimab (REMICADE) 100 mg injection     Si mg/kg by IntraVENous route once.  doxycycline (MONODOX) 100 mg capsule     Sig: Take 1 Cap by mouth two (2) times a day. Dispense:  20 Cap     Refill:  0       1. Chronic UTI (urinary tract infection)    - AMB POC URINALYSIS DIP STICK AUTO W/O MICRO  - doxycycline (MONODOX) 100 mg capsule; Take 1 Cap by mouth two (2) times a day. Dispense: 20 Cap; Refill: 0      Follow-up Disposition:  Return if symptoms worsen or fail to improve.     Chavez Kirk MD

## 2017-08-16 NOTE — PATIENT INSTRUCTIONS
I am concerned about the use of recurrent antibiotics. It sounds like you may have trigonitis (inflamation around the urethra) that can cause these symptoms and antibiotics may not be the answer, so consider a urological workup if your symptoms persist or recur. Increase your fluid intake. Cranberry juice or pills can help. Urinary Tract Infection in Women: Care Instructions  Your Care Instructions    A urinary tract infection, or UTI, is a general term for an infection anywhere between the kidneys and the urethra (where urine comes out). Most UTIs are bladder infections. They often cause pain or burning when you urinate. UTIs are caused by bacteria and can be cured with antibiotics. Be sure to complete your treatment so that the infection goes away. Follow-up care is a key part of your treatment and safety. Be sure to make and go to all appointments, and call your doctor if you are having problems. It's also a good idea to know your test results and keep a list of the medicines you take. How can you care for yourself at home? · Take your antibiotics as directed. Do not stop taking them just because you feel better. You need to take the full course of antibiotics. · Drink extra water and other fluids for the next day or two. This may help wash out the bacteria that are causing the infection. (If you have kidney, heart, or liver disease and have to limit fluids, talk with your doctor before you increase your fluid intake.)  · Avoid drinks that are carbonated or have caffeine. They can irritate the bladder. · Urinate often. Try to empty your bladder each time. · To relieve pain, take a hot bath or lay a heating pad set on low over your lower belly or genital area. Never go to sleep with a heating pad in place. To prevent UTIs  · Drink plenty of water each day. This helps you urinate often, which clears bacteria from your system.  (If you have kidney, heart, or liver disease and have to limit fluids, talk with your doctor before you increase your fluid intake.)  · Urinate when you need to. · Urinate right after you have sex. · Change sanitary pads often. · Avoid douches, bubble baths, feminine hygiene sprays, and other feminine hygiene products that have deodorants. · After going to the bathroom, wipe from front to back. When should you call for help? Call your doctor now or seek immediate medical care if:  · Symptoms such as fever, chills, nausea, or vomiting get worse or appear for the first time. · You have new pain in your back just below your rib cage. This is called flank pain. · There is new blood or pus in your urine. · You have any problems with your antibiotic medicine. Watch closely for changes in your health, and be sure to contact your doctor if:  · You are not getting better after taking an antibiotic for 2 days. · Your symptoms go away but then come back. Where can you learn more? Go to http://amanda-laz.info/. Enter X326 in the search box to learn more about \"Urinary Tract Infection in Women: Care Instructions. \"  Current as of: November 28, 2016  Content Version: 11.3  © 0354-8773 [email or web address]. Care instructions adapted under license by [employer] (which disclaims liability or warranty for this information). If you have questions about a medical condition or this instruction, always ask your healthcare professional. [place] [address] any warranty or liability for your use of this information.

## 2017-08-18 LAB — BACTERIA UR CULT: NORMAL

## 2017-09-29 DIAGNOSIS — F33.41 RECURRENT MAJOR DEPRESSIVE DISORDER, IN PARTIAL REMISSION (HCC): ICD-10-CM

## 2017-09-29 RX ORDER — ESCITALOPRAM OXALATE 20 MG/1
TABLET ORAL
Qty: 90 TAB | Refills: 3 | Status: SHIPPED | OUTPATIENT
Start: 2017-09-29 | End: 2018-09-30 | Stop reason: SDUPTHER

## 2018-01-13 ENCOUNTER — OFFICE VISIT (OUTPATIENT)
Dept: PRIMARY CARE CLINIC | Age: 38
End: 2018-01-13

## 2018-01-13 VITALS
OXYGEN SATURATION: 97 % | HEART RATE: 105 BPM | BODY MASS INDEX: 37.28 KG/M2 | HEIGHT: 66 IN | SYSTOLIC BLOOD PRESSURE: 102 MMHG | TEMPERATURE: 98.2 F | WEIGHT: 232 LBS | RESPIRATION RATE: 20 BRPM | DIASTOLIC BLOOD PRESSURE: 68 MMHG

## 2018-01-13 DIAGNOSIS — R52 BODY ACHES: Primary | ICD-10-CM

## 2018-01-13 LAB
QUICKVUE INFLUENZA TEST: NEGATIVE
VALID INTERNAL CONTROL?: YES

## 2018-01-13 RX ORDER — SYRINGE WITH NEEDLE, 1 ML 28GX1/2"
SYRINGE, EMPTY DISPOSABLE MISCELLANEOUS
Refills: 2 | COMMUNITY
Start: 2017-12-01

## 2018-01-13 RX ORDER — CYCLOSPORINE 0.5 MG/ML
EMULSION OPHTHALMIC
Refills: 4 | COMMUNITY
Start: 2017-10-06

## 2018-01-13 NOTE — PROGRESS NOTES
Subjective:   Claudia Agosto is a 30 y.o. female who complains of congestion for 1 days, gradually worsening since that time. She denies a history of shortness of breath and wheezing. Evaluation to date: none. Treatment to date: OTC products. Patient does not smoke cigarettes. Relevant PMH: DM and Asthma. Patient Active Problem List   Diagnosis Code    Hypothyroidism (acquired) E03.9    Fibromyalgia M79.7    Hyperhidrosis L74.519    Environmental and seasonal allergies J30.89    Rectal bleeding K62.5    Family history of colonic polyps Z83.71    Encounter for screening colonoscopy Z12.11    Rheumatoid arthritis ([facility] [address] M06.9    Asthma J45.909     Patient Active Problem List    Diagnosis Date Noted    Rheumatoid arthritis ([facility] [address] 12/23/2016    Asthma 12/23/2016    Rectal bleeding 06/13/2016    Family history of colonic polyps 06/13/2016    Encounter for screening colonoscopy 06/13/2016    Fibromyalgia 05/03/2016    Hyperhidrosis 05/03/2016    Environmental and seasonal allergies 05/03/2016    Hypothyroidism (acquired) 05/17/2011     Current Outpatient Prescriptions   Medication Sig Dispense Refill    RESTASIS 0.05 % ophthalmic emulsion INSTILL 1 DROP INTO EACH EYE TWICE DAILY  4    methotrexate 25 mg/mL chemo injection INJECT 25MG (1ML) subcutaneously ONCE WEEKLY  0    ETANERCEPT (ENBREL SURECLICK SC) 50 mg by SubCUTAneous route every seven (7) days.  albuterol (PROVENTIL HFA, VENTOLIN HFA, PROAIR HFA) 90 mcg/actuation inhaler Take 2 Puffs by inhalation every four (4) hours as needed for Wheezing. 1 Inhaler 3    metFORMIN ER (GLUCOPHAGE XR) 500 mg tablet 1,000 TAKE 2 TABLET BY MOUTH TWICE A DAY AS DIRECTED  1    folic acid (FOLVITE) 1 mg tablet TAKE 1 TABLET ORALLY DAILY  11    hydroxychloroquine (PLAQUENIL) 200 mg tablet 2 TABLET ONCE A DAY  4    desogestrel-ethinyl estradiol (DESOGEN) 0.15-0.03 mg tab Take 1 Tab by mouth nightly.       Nebulizer & Compressor machine 1 Each by Does Not Apply route three (3) times daily as needed. 1 Each 0    glycopyrrolate (ROBINUL) 1 mg tablet Take 2 mg by mouth two (2) times a day. Indications: hyperhydrosis      Cetirizine (ZYRTEC) 10 mg Cap Take 10 mg by mouth daily. Indications: ALLERGIC RHINITIS      levothyroxine (SYNTHROID) 75 mcg tablet Take 75 mcg by mouth Daily (before breakfast).  SAFETY-ABDI TB SYR 1CC/25GX5/8\" 1 mL 25 gauge x 5/8\" syrg USE TO INJECT METHOTREXATE ONCE A WEEK  2    escitalopram oxalate (LEXAPRO) 20 mg tablet TAKE 1 TABLET BY MOUTH EVERY DAY 90 Tab 3    inFLIXimab (REMICADE) 100 mg injection 5 mg/kg by IntraVENous route once.  doxycycline (MONODOX) 100 mg capsule Take 1 Cap by mouth two (2) times a day. 20 Cap 0    spironolactone (ALDACTONE) 50 mg tablet TAKE 1 TABLET BY MOUTH TWICE A DAY 90 Tab 2    polyethylene glycol (MIRALAX) 17 gram packet Take 1 Packet by mouth daily. 1 Each 0    dicyclomine (BENTYL) 10 mg capsule Take 1 Cap by mouth three (3) times daily. 20 Cap 0    diclofenac potassium (CATAFLAM) 50 mg tablet Take 50 mg by mouth two (2) times a day.  tiZANidine (ZANAFLEX) 2 mg capsule Take 2 mg by mouth two (2) times a day.  montelukast (SINGULAIR) 10 mg tablet Take 10 mg by mouth nightly.  fexofenadine-pseudoephedrine (ALLEGRA-D 24 HOUR) 180-240 mg per tablet Take 1 Tab by mouth nightly.  cholecalciferol, vitamin D3, (VITAMIN D3) 2,000 unit Tab Take 1 Tab by mouth daily.        Allergies   Allergen Reactions    Latex Swelling    Entex [Phenylephrine-Guaifenesin] Anaphylaxis, Hives and Palpitations    Mucinex [Guaifenesin] Anaphylaxis and Hives    Pepto-Bismol [Bismuth Subsalicylate] Nausea and Vomiting     Past Medical History:   Diagnosis Date    Acquired hypothyroidism     Adverse effect of anesthesia     labile BP during/after C section    Asthma     Broken bones     history of 9 broken bones    Chronic UTI (urinary tract infection)     \"extra valve right kidney causes UTI\"    Gestational diabetes     GI bleed ,,,2016    lower GI last colonoscopy in 2016 normal     Huntingtons chorea (HCC)     Hyperhydrosis disorder     Ill-defined condition     Chris/ tested genetically - daughter has [place]    Infertility     PCOS    PCOS (polycystic ovarian syndrome)     Precancerous skin lesion     removed from Right shoulder    Preeclampsia     pregnancy    Rheumatoid arthritis ([address]     SVT (supraventricular tachycardia) ([facility] [address] 2011    occured during pregnancy when picc line inserted.      Past Surgical History:   Procedure Laterality Date    COLONOSCOPY N/A 2016    COLONOSCOPY performed by Piyush Hills MD at [facility] ENDOSCOPY    HX  SECTION      HX DILATION AND CURETTAGE      suction D+C    HX WISDOM TEETH EXTRACTION       Family History   Problem Relation Age of Onset    Other Mother      Chris's disease    Hypertension Mother     High Cholesterol Father     Heart Disease Father     Diabetes Father     Hypertension Father     Asthma Brother     Diabetes Brother     Other Brother      varicocele, hernias    Hypertension Brother     Alcohol abuse Brother     Hypertension Maternal Grandmother     Elevated Lipids Maternal Grandmother     Cancer Maternal Grandmother      breast    Other Maternal Grandmother      polymyalgia rheumatica    Glaucoma Maternal Grandmother     Cancer Maternal Grandfather      prostate    Huntingtons disease Maternal Grandfather     Cancer Paternal Grandmother      lung with mets    Cancer Paternal Grandfather      prostate, colon    Diabetes Paternal Grandfather     Heart Disease Paternal Grandfather     Alzheimer Paternal Grandfather      Social History   Substance Use Topics    Smoking status: Never Smoker    Smokeless tobacco: Never Used    Alcohol use Yes      Comment: few drinks per year        Review of Systems  Pertinent items are noted in HPI.    Objective:     Visit Vitals    /68 (BP 1 Location: Left arm, BP Patient Position: Sitting)    Pulse (!) 105    Temp 98.2 °F (36.8 °C) (Oral)    Resp 20    Ht 5' 6\" (1.676 m)    Wt 232 lb (105.2 kg)    SpO2 97%    BMI 37.45 kg/m2     General:  alert, cooperative, no distress   Eyes: negative   Ears: normal TM AD and AS- normal landmarks and mobility   Sinuses: Normal paranasal sinuses without tenderness   Mouth:  Lips, mucosa, and tongue normal. Teeth and gums normal   Neck: supple, symmetrical, trachea midline and no adenopathy. Heart: S1 and S2 normal, no murmurs noted. Lungs: clear to auscultation bilaterally   Abdomen: soft, non-tender. Bowel sounds normal. No masses,  no organomegaly        Assessment/Plan:   viral upper respiratory illness  Suggested symptomatic OTC remedies. RTC prn. Discussed diagnosis and treatment of viral URIs. Discussed the importance of avoiding unnecessary antibiotic therapy. ICD-10-CM ICD-9-CM    1. Body aches R52 780.96 AMB POC RAPID INFLUENZA TEST   .

## 2018-01-13 NOTE — PATIENT INSTRUCTIONS
Rhinitis: Care Instructions  Your Care Instructions  Rhinitis is swelling and irritation in the nose. Allergies and infections are often the cause. Your nose may run or feel stuffy. Other symptoms are itchy and sore eyes, ears, throat, and mouth. If allergies are the cause, your doctor may do tests to find out what you are allergic to. You may be able to stop symptoms if you avoid the things that cause them. Your doctor may suggest or prescribe medicine to ease your symptoms. Follow-up care is a key part of your treatment and safety. Be sure to make and go to all appointments, and call your doctor if you are having problems. It's also a good idea to know your test results and keep a list of the medicines you take. How can you care for yourself at home? · If your rhinitis is caused by allergies, try to find out what sets off (triggers) your symptoms. Take steps to avoid these triggers. ¨ Avoid yard work. It can stir up both pollen and mold. ¨ Do not smoke or allow others to smoke around you. If you need help quitting, talk to your doctor about stop-smoking programs and medicines. These can increase your chances of quitting for good. ¨ Do not use aerosol sprays, cleaning products, or perfumes. ¨ If pollen is one of your triggers, close your house and car windows during blooming season. ¨ Clean your house often to control dust.  ¨ Keep pets outside. · If your doctor recommends over-the-counter medicines to relieve symptoms, take your medicines exactly as prescribed. Call your doctor if you think you are having a problem with your medicine. · Use saline (saltwater) nasal washes to help keep your nasal passages open and wash out mucus and bacteria. You can buy saline nose drops at a grocery store or drugstore. Or you can make your own at home by adding 1 teaspoon of salt and 1 teaspoon of baking soda to 2 cups of distilled water.  If you make your own, fill a bulb syringe with the solution, insert the tip into your nostril, and squeeze gently. [place] your nose. When should you call for help? Call your doctor now or seek immediate medical care if:  ? · You are having trouble breathing. ? Watch closely for changes in your health, and be sure to contact your doctor if:  ? · Mucus from your nose gets thicker (like pus) or has new blood in it. ? · You have new or worse symptoms. ? · You do not get better as expected. Where can you learn more? Go to http://amanda-laz.info/. Enter M030 in the search box to learn more about \"Rhinitis: Care Instructions. \"  Current as of: May 12, 2017  Content Version: 11.4  © 3800-7775 [employer]. Care instructions adapted under license by [employer] (which disclaims liability or warranty for this information). If you have questions about a medical condition or this instruction, always ask your healthcare professional. [address] any warranty or liability for your use of this information.

## 2018-02-19 ENCOUNTER — HOSPITAL ENCOUNTER (OUTPATIENT)
Dept: LAB | Age: 38
Discharge: HOME | End: 2018-02-19

## 2018-02-19 ENCOUNTER — HOSPITAL ENCOUNTER (EMERGENCY)
Age: 38
Discharge: HOME | End: 2018-02-19
Attending: FAMILY MEDICINE

## 2018-02-19 VITALS
BODY MASS INDEX: 36.8 KG/M2 | HEIGHT: 66 IN | OXYGEN SATURATION: 97 % | RESPIRATION RATE: 20 BRPM | WEIGHT: 229 LBS | HEART RATE: 100 BPM | TEMPERATURE: 99.7 F | SYSTOLIC BLOOD PRESSURE: 132 MMHG | DIASTOLIC BLOOD PRESSURE: 63 MMHG

## 2018-02-19 DIAGNOSIS — J06.9 ACUTE UPPER RESPIRATORY INFECTION: Primary | ICD-10-CM

## 2018-02-19 DIAGNOSIS — J20.9 ACUTE BRONCHITIS, UNSPECIFIED ORGANISM: ICD-10-CM

## 2018-02-19 LAB
INFLUENZA A AG (POC): NORMAL
INFLUENZA AG (POC) NEGATIVE CTRL.: NORMAL
INFLUENZA AG (POC) POSITIVE CTRL.: NORMAL
INFLUENZA B AG (POC): NORMAL
LOT NUMBER POC: NORMAL
S PYO AG THROAT QL: NEGATIVE

## 2018-02-19 PROCEDURE — 87070 CULTURE OTHR SPECIMN AEROBIC: CPT | Performed by: FAMILY MEDICINE

## 2018-02-19 PROCEDURE — 87147 CULTURE TYPE IMMUNOLOGIC: CPT | Performed by: FAMILY MEDICINE

## 2018-02-19 RX ORDER — DOXYCYCLINE 100 MG/1
100 CAPSULE ORAL 2 TIMES DAILY
Qty: 20 CAP | Refills: 0 | Status: SHIPPED | OUTPATIENT
Start: 2018-02-19 | End: 2018-03-01

## 2018-02-19 RX ORDER — BENZONATATE 200 MG/1
200 CAPSULE ORAL
Qty: 30 CAP | Refills: 0 | Status: SHIPPED | OUTPATIENT
Start: 2018-02-19 | End: 2018-09-11

## 2018-02-20 ENCOUNTER — OFFICE VISIT (OUTPATIENT)
Dept: NEUROLOGY | Age: 38
End: 2018-02-20

## 2018-02-20 VITALS
OXYGEN SATURATION: 98 % | RESPIRATION RATE: 18 BRPM | HEART RATE: 121 BPM | WEIGHT: 230 LBS | BODY MASS INDEX: 37.12 KG/M2

## 2018-02-20 DIAGNOSIS — R20.2 PARESTHESIA OF BOTH FEET: ICD-10-CM

## 2018-02-20 DIAGNOSIS — R41.3 COMPLAINTS OF MEMORY DISTURBANCE: Primary | ICD-10-CM

## 2018-02-20 DIAGNOSIS — E03.9 HYPOTHYROIDISM (ACQUIRED): ICD-10-CM

## 2018-02-20 DIAGNOSIS — M06.9 RHEUMATOID ARTHRITIS INVOLVING MULTIPLE SITES, UNSPECIFIED RHEUMATOID FACTOR PRESENCE: ICD-10-CM

## 2018-02-20 DIAGNOSIS — M79.7 FIBROMYALGIA: ICD-10-CM

## 2018-02-20 DIAGNOSIS — R61 HYPERHIDROSIS: ICD-10-CM

## 2018-02-20 NOTE — DISCHARGE INSTRUCTIONS
Bronchitis: Care Instructions  Your Care Instructions    Bronchitis is inflammation of the bronchial tubes, which carry air to the lungs. The tubes swell and produce mucus, or phlegm. The mucus and inflamed bronchial tubes make you cough. You may have trouble breathing. Most cases of bronchitis are caused by viruses like those that cause colds. Antibiotics usually do not help and they may be harmful. Bronchitis usually develops rapidly and lasts about 2 to 3 weeks in otherwise healthy people. Follow-up care is a key part of your treatment and safety. Be sure to make and go to all appointments, and call your doctor if you are having problems. It's also a good idea to know your test results and keep a list of the medicines you take. How can you care for yourself at home? · Take all medicines exactly as prescribed. Call your doctor if you think you are having a problem with your medicine. · Get some extra rest.  · Take an over-the-counter pain medicine, such as acetaminophen (Tylenol), ibuprofen (Advil, Motrin), or naproxen (Aleve) to reduce fever and relieve body aches. Read and follow all instructions on the label. · Do not take two or more pain medicines at the same time unless the doctor told you to. Many pain medicines have acetaminophen, which is Tylenol. Too much acetaminophen (Tylenol) can be harmful. · Take an over-the-counter cough medicine that contains dextromethorphan to help quiet a dry, hacking cough so that you can sleep. Avoid cough medicines that have more than one active ingredient. Read and follow all instructions on the label. · Breathe moist air from a humidifier, hot shower, or sink filled with hot water. The heat and moisture will thin mucus so you can cough it out. · Do not smoke. Smoking can make bronchitis worse. If you need help quitting, talk to your doctor about stop-smoking programs and medicines. These can increase your chances of quitting for good.   When should you call for help? Call 911 anytime you think you may need emergency care. For example, call if:  ? · You have severe trouble breathing. ?Call your doctor now or seek immediate medical care if:  ? · You have new or worse trouble breathing. ? · You cough up dark brown or bloody mucus (sputum). ? · You have a new or higher fever. ? · You have a new rash. ? Watch closely for changes in your health, and be sure to contact your doctor if:  ? · You cough more deeply or more often, especially if you notice more mucus or a change in the color of your mucus. ? · You are not getting better as expected. Where can you learn more? Go to http://amanda-laz.info/. Enter H333 in the search box to learn more about \"Bronchitis: Care Instructions. \"  Current as of: May 12, 2017  Content Version: 11.4  © 9092-0490 [employer]. Care instructions adapted under license by [employer] (which disclaims liability or warranty for this information). If you have questions about a medical condition or this instruction, always ask your healthcare professional. [address] any warranty or liability for your use of this information.
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
## 2018-02-20 NOTE — UC PROVIDER NOTE
Patient is a 30 y.o. female presenting with cold symptoms. The history is provided by the patient. Cold Symptoms    This is a new problem. Episode onset: 5 days ago. The problem has been gradually worsening. There has been a fever of 101 - 101.9 F. The fever has been present for less than 1 day. Associated symptoms include congestion, ear pain, rhinorrhea, sore throat, swollen glands and cough. Pertinent negatives include no chest pain and no wheezing. Past Medical History:   Diagnosis Date    Acquired hypothyroidism     Adverse effect of anesthesia     labile BP during/after C section    Asthma     Broken bones     history of 9 broken bones    Chronic UTI (urinary tract infection)     \"extra valve right kidney causes UTI\"    Gestational diabetes     GI bleed ,,,2016    lower GI last colonoscopy in  normal     Huntingtons chorea (HCC)     Hyperhydrosis disorder     Ill-defined condition     Morrow/ tested genetically - daughter has Morrow    Infertility     PCOS    PCOS (polycystic ovarian syndrome)     Precancerous skin lesion     removed from Right shoulder    Preeclampsia 2011    pregnancy    Rheumatoid arthritis ([address]     SVT (supraventricular tachycardia) ([address] 2011    occured during pregnancy when picc line inserted.         Past Surgical History:   Procedure Laterality Date    COLONOSCOPY N/A 2016    COLONOS[facility] performed [facility] Luo [facility] at [facility] ENDOSCOPY    HX  SECTION      HX DILATION AND CURETTAGE      suction D+C    HX WISDOM TEETH EXTRACTION           Family History   Problem Relation Age of Onset    Other Mother      Morrow's disease    Hypertension Mother     High Cholesterol Father     Heart Disease Father     Diabetes Father     Hypertension Father     Asthma Brother     Diabetes Brother     Other Brother      varicocele, hernias    Hypertension Brother     Alcohol abuse Brother     Hypertension Maternal Grandmother     Elevated Lipids Maternal Grandmother     Cancer Maternal Grandmother      breast    Other Maternal Grandmother      polymyalgia rheumatica    Glaucoma Maternal Grandmother     Cancer Maternal Grandfather      prostate    Huntingtons disease Maternal Grandfather     Cancer Paternal Grandmother      lung with mets    Cancer Paternal Grandfather      prostate, colon    Diabetes Paternal Grandfather     Heart Disease Paternal Grandfather     Alzheimer Paternal Grandfather         Social History     Social History    Marital status:      Spouse name: N/A    Number of children: N/A    Years of education: N/A     Occupational History    Not on file. Social History Main Topics    Smoking status: Never Smoker    Smokeless tobacco: Never Used    Alcohol use Yes      Comment: few drinks per year    Drug use: No    Sexual activity: Not on file     Other Topics Concern    Not on file     Social History Narrative    ** Merged History Encounter **                     ALLERGIES: Latex; Entex [phenylephrine-guaifenesin]; Mucinex [guaifenesin]; and Pepto-bismol [bismuth subsalicylate]    Review of Systems   Constitutional: Positive for chills and fever. HENT: Positive for congestion, ear pain, rhinorrhea and sore throat. Respiratory: Positive for cough. Negative for shortness of breath and wheezing. Cardiovascular: Negative for chest pain and palpitations. Hematological: Positive for adenopathy. Vitals:    02/19/18 1921   BP: 132/63   Pulse: 100   Resp: 20   Temp: 99.7 °F (37.6 °C)   SpO2: 97%   Weight: 103.9 kg (229 lb)   Height: 5' 6\" (1.676 m)       Physical Exam   Constitutional: She appears well-developed and well-nourished. No distress. HENT:   Right Ear: Tympanic membrane, external ear and ear canal normal.   Left Ear: Tympanic membrane, external ear and ear canal normal.   Nose: Rhinorrhea present. Right sinus exhibits maxillary sinus tenderness. Right sinus exhibits no frontal sinus tenderness. Left sinus exhibits maxillary sinus tenderness. Left sinus exhibits no frontal sinus tenderness. Mouth/Throat: Mucous membranes are normal. Posterior oropharyngeal edema and posterior oropharyngeal erythema present. No oropharyngeal exudate or tonsillar abscesses. Cardiovascular: Normal rate, regular rhythm and normal heart sounds. Pulmonary/Chest: Effort normal and breath sounds normal. No respiratory distress. She has no wheezes. She has no rales. Lymphadenopathy:     She has cervical adenopathy. Neurological: She is alert. Skin: She is not diaphoretic. Psychiatric: She has a normal mood and affect. Her behavior is normal. Judgment and thought content normal.   Nursing note and vitals reviewed. MDM     Differential Diagnosis; Clinical Impression; Plan:     CLINICAL IMPRESSION:  Acute upper respiratory infection  (primary encounter diagnosis)  Acute bronchitis, unspecified organism    Plan: 1. Doxycycline  2. Tessalon prn  3. PCP INI  Amount and/or Complexity of Data Reviewed:   Clinical lab tests:  Ordered and reviewed  Risk of Significant Complications, Morbidity, and/or Mortality:   Presenting problems: Moderate  Diagnostic procedures: Moderate  Management options:   Moderate  Progress:   Patient progress:  Stable      Procedures

## 2018-02-20 NOTE — MR AVS SNAPSHOT
[place] [address] [address] 
[phone] Patient: Javi Crouch MRN: [id number] TEM:5/1/2322 Visit Information Date & Time Provider Department Dept. Phone Encounter #  
 2/20/2018 11:40 AM [facility] [address] [place] [address] [id number] Upcoming Health Maintenance Date Due  
 PAP AKA CERVICAL CYTOLOGY 7/6/2012 DTaP/Tdap/Td series (2 - Td) 10/10/2026 Allergies as of 2/20/2018  Review Complete On: 2/20/2018 By: Shonda Ndiaye DO Severity Noted Reaction Type Reactions Latex  04/17/2011    Swelling Entex [Phenylephrine-guaifenesin]  07/21/2012    Anaphylaxis, Hives, Palpitations Mucinex [Guaifenesin]  07/21/2012    Anaphylaxis, Hives Pepto-bismol [Bismuth Subsalicylate]  43/30/7752    Nausea and Vomiting Current Immunizations  Never Reviewed Name Date Hep B Vaccine 10/10/2000, 5/10/2000, 10/10/1999 Influenza High Dose Vaccine PF 10/10/2016 Pneumococcal Conjugate (PCV-13) 12/5/2016 TB Skin Test (PPD) 3/3/2016 Td, Adsorbed PF 10/10/2016  8:00 PM  
 Tdap 9/10/2016 Not reviewed this visit You Were Diagnosed With   
  
 Codes Comments Complaints of memory disturbance    -  Primary ICD-10-CM: R41.3 ICD-9-CM: 780.93 Hyperhidrosis     ICD-10-CM: L74.519 ICD-9-CM: 705.21 Paresthesia of both feet     ICD-10-CM: R20.2 ICD-9-CM: 588. 0 Rheumatoid arthritis involving multiple sites, unspecified rheumatoid factor presence ([facility]     ICD-10-CM: M06.9 ICD-9-CM: 714.0 Hypothyroidism (acquired)     ICD-10-CM: E03.9 ICD-9-CM: 244.9 Fibromyalgia     ICD-10-CM: M79.7 ICD-9-CM: 729.1 Vitals Pulse Resp Weight(growth percentile) SpO2 BMI OB Status (!) 121 18 230 lb (104.3 kg) 98% 37.12 kg/m2 Having regular periods Smoking Status Never Smoker Vitals History BMI and BSA Data Body Mass Index Body Surface Area  
 37.12 kg/m 2 2.2 m 2 Preferred Pharmacy Pharmacy Name Phone [facility]/PHARMACY #[id number]- [place] [id number] [facility] [phone] Your Updated Medication List  
  
   
This list is accurate as of: 2/20/18 12:06 PM.  Always use your most recent med list.  
  
  
  
  
 albuterol 90 mcg/actuation inhaler Commonly known as:  PROVENTIL HFA, VENTOLIN HFA, PROAIR HFA Take 2 Puffs by inhalation every four (4) hours as needed for Wheezing. ALLEGRA-D 24 HOUR 180-240 mg per tablet Generic drug:  fexofenadine-pseudoephedrine Take 1 Tab by mouth nightly. benzonatate 200 mg capsule Commonly known as:  TESSALON Take 1 Cap by mouth three (3) times daily as needed for Cough. desogestrel-ethinyl estradiol 0.15-0.03 mg Tab Commonly known as:  DESOGEN Take 1 Tab by mouth nightly. doxycycline 100 mg capsule Commonly known as:  VIBRAMYCIN Take 1 Cap by mouth two (2) times a day for 10 days. ENBREL SURECLICK SC  
50 mg by SubCUTAneous route every seven (7) days. escitalopram oxalate 20 mg tablet Commonly known as:  Fred [place] TAKE 1 TABLET BY MOUTH EVERY DAY  
  
 folic acid 1 mg tablet Commonly known as:  FOLVITE  
TAKE 1 TABLET ORALLY DAILY  
  
 hydroxychloroquine 200 mg tablet Commonly known as:  PLAQUENIL  
taking 100mg daily  
  
 metFORMIN  mg tablet Commonly known as:  GLUCOPHAGE XR  
1,000 TAKE 2 TABLET BY MOUTH TWICE A DAY AS DIRECTED  
  
 methotrexate 25 mg/mL chemo injection INJECT 25MG (1ML) subcutaneously ONCE WEEKLY Nebulizer & Compressor machine 1 Each by Does Not Apply route three (3) times daily as needed. RESTASIS 0.05 % ophthalmic emulsion Generic drug:  cycloSPORINE INSTILL 1 DROP INTO EACH EYE TWICE DAILY ROBINUL 1 mg tablet Generic drug:  glycopyrrolate Take 2 mg by mouth two (2) times a day. Indications: hyperhydrosis SAFETY-ABDI TB Syr 1cc/25Gx5/8\" 1 mL 25 gauge x 5/8\" Syrg Generic drug:  Syringe with Needle, Safety USE TO INJECT METHOTREXATE ONCE A WEEK SYNTHROID 75 mcg tablet Generic drug:  levothyroxine Take 75 mcg by mouth Daily (before breakfast). VITAMIN D3 2,000 unit Tab Generic drug:  cholecalciferol (vitamin D3) Take 1 Tab by mouth daily. ZyrTEC 10 mg Cap Generic drug:  Cetirizine Take 10 mg by mouth daily. Indications: ALLERGIC RHINITIS We Performed the Following REFERRAL TO PSYCHOLOGY [STD62 Custom] Referral Information Referral ID Referred By Referred To  
  
 [id number] Tyrone DAVIS [address]   
   [address] [facility] [address] [facility] Phone: [phone] Fax: [phone] Visits Status Start Date End Date 1 New Request 2/20/18 2/20/19 If your referral has a status of pending review or denied, additional information will be sent to support the outcome of this decision. Patient Instructions A Healthy Lifestyle: Care Instructions Your Care Instructions A healthy lifestyle can help you feel good, stay at a healthy weight, and have plenty of energy for both work and play. A healthy lifestyle is something you can share with your whole family. A healthy lifestyle also can lower your risk for serious health problems, such as high blood pressure, heart disease, and diabetes. You can follow a few steps listed below to improve your health and the health of your family. Follow-up care is a key part of your treatment and safety. Be sure to make and go to all appointments, and call your doctor if you are having problems. It's also a good idea to know your test results and keep a list of the medicines you take. How can you care for yourself at home? · Do not eat too much sugar, fat, or fast foods. You can still have dessert and treats now and then. The goal is moderation. · Start small to improve your eating habits. Pay attention to portion sizes, drink less juice and soda pop, and eat more fruits and vegetables. ¨ Eat a healthy amount of food. A 3-ounce serving of meat, for example, is about the size of a deck of cards. Fill the rest of your plate with vegetables and whole grains. ¨ Limit the amount of soda and sports drinks you have every day. Drink more water when you are thirsty. ¨ Eat at least 5 servings of fruits and vegetables every day. It may seem like a lot, but it is not hard to reach this goal. A serving or helping is 1 piece of fruit, 1 cup of vegetables, or 2 cups of leafy, raw vegetables. Have an apple or some carrot sticks as an afternoon snack instead of a candy bar. Try to have fruits and/or vegetables at every meal. 
· Make exercise part of your daily routine. You may want to start with simple activities, such as walking, bicycling, or slow swimming. Try to be active 30 to 60 minutes every day. You do not need to do all 30 to 60 minutes all at once. For example, you can exercise 3 times a day for 10 or 20 minutes. Moderate exercise is safe for most people, but it is always a good idea to talk to your doctor before starting an exercise program. 
· Keep moving. Asa Hoop the lawn, work in the garden, or [employer] Take the stairs instead of the elevator at work. · If you smoke, quit. People who smoke have an increased risk for heart attack, stroke, cancer, and other lung illnesses. Quitting is hard, but there are ways to boost your chance of quitting tobacco for good. ¨ Use nicotine gum, patches, or lozenges. ¨ Ask your doctor about stop-smoking programs and medicines. ¨ Keep trying.  
In addition to reducing your risk of diseases in the future, you will notice some benefits soon after you stop using tobacco. If you have shortness of breath or asthma symptoms, they will likely get better within a few weeks after you quit. · Limit how much alcohol you drink. Moderate amounts of alcohol (up to 2 drinks a day for men, 1 drink a day for women) are okay. But drinking too much can lead to liver problems, high blood pressure, and other health problems. Family health If you have a family, there are many things you can do together to improve your health. · Eat meals together as a family as often as possible. · Eat healthy foods. This includes fruits, vegetables, lean meats and dairy, and whole grains. · Include your family in your fitness plan. Most people think of activities such as jogging or tennis as the way to fitness, but there are many ways you and your family can be more active. Anything that makes you breathe hard and gets your heart pumping is exercise. Here are some tips: 
¨ Walk to do errands or to take your child to school or the bus. ¨ Go for a family bike ride after dinner instead of watching TV. Where can you learn more? Go to http://amanda-laz.info/. Enter T871 in the search box to learn more about \"A Healthy Lifestyle: Care Instructions. \" Current as of: May 12, 2017 Content Version: 11.4 © 9255-9200 Healthwise, Incorporated. Care instructions adapted under license by [employer] (which disclaims liability or warranty for this information). If you have questions about a medical condition or this instruction, always ask your healthcare professional. [place] [address] any warranty or liability for your use of this information. Introducing [facility] & HEALTH SERVICES! Dear Jaime Palafoxr: Thank you for requesting a [employer] account. Our records indicate that you already have an active [employer] account. You can access your account anytime at [email or web address] Did you know that you can access your hospital and ER discharge instructions at any time in [employer]? You can also review all of your test results from your hospital stay or ER visit. Additional Information If you have questions, please visit the Frequently Asked Questions section of the [employer] website at [email or web address] â€¢ [email or web address]/. [employer]/Style Jukeboxt/. Remember, [employer] is NOT to be used for urgent needs. For medical emergencies, dial 911. Now available from your iPhone and Android! Please provide this summary of care documentation to your next provider. Your primary care clinician is listed as JOHNATHAN  ROMAN Dominique Critz Jessica. If you have any questions after today's visit, please call [phone].

## 2018-02-20 NOTE — PROGRESS NOTES
Hyperhydrosis   Robinul was prescribed by [facility] with dermatology   Medication noted to give dementia-  is uncomfortable prescribing medication

## 2018-02-20 NOTE — PATIENT INSTRUCTIONS

## 2018-02-21 ENCOUNTER — DOCUMENTATION ONLY (OUTPATIENT)
Dept: NEUROLOGY | Age: 38
End: 2018-02-21

## 2018-02-21 LAB
BACTERIA SPEC CULT: ABNORMAL
BACTERIA SPEC CULT: ABNORMAL
SERVICE CMNT-IMP: ABNORMAL

## 2018-09-11 ENCOUNTER — OFFICE VISIT (OUTPATIENT)
Dept: PRIMARY CARE CLINIC | Age: 38
End: 2018-09-11

## 2018-09-11 VITALS
OXYGEN SATURATION: 98 % | TEMPERATURE: 98.7 F | WEIGHT: 234.8 LBS | RESPIRATION RATE: 17 BRPM | DIASTOLIC BLOOD PRESSURE: 85 MMHG | SYSTOLIC BLOOD PRESSURE: 122 MMHG | BODY MASS INDEX: 37.73 KG/M2 | HEART RATE: 103 BPM | HEIGHT: 66 IN

## 2018-09-11 DIAGNOSIS — J45.20 MILD INTERMITTENT ASTHMA WITHOUT COMPLICATION: ICD-10-CM

## 2018-09-11 DIAGNOSIS — J40 BRONCHITIS: Primary | ICD-10-CM

## 2018-09-11 PROBLEM — E66.01 SEVERE OBESITY (BMI 35.0-39.9): Status: ACTIVE | Noted: 2018-09-11

## 2018-09-11 RX ORDER — LANOLIN ALCOHOL/MO/W.PET/CERES
CREAM (GRAM) TOPICAL DAILY
COMMUNITY

## 2018-09-11 RX ORDER — PREDNISONE 10 MG/1
TABLET ORAL
Qty: 21 TAB | Refills: 0 | Status: SHIPPED | OUTPATIENT
Start: 2018-09-11 | End: 2018-12-21

## 2018-09-11 RX ORDER — ALBUTEROL SULFATE 90 UG/1
2 AEROSOL, METERED RESPIRATORY (INHALATION)
Qty: 1 INHALER | Refills: 0 | Status: SHIPPED | OUTPATIENT
Start: 2018-09-11 | End: 2019-06-09 | Stop reason: SDUPTHER

## 2018-09-11 NOTE — MR AVS SNAPSHOT
[address] [facility] 
 
 
 [address] [place] [address] [facility]. 
[phone] Patient: Curtiss Prader MRN: [id number] DWY:[birth year] Visit Information Date & Time Provider Department Dept. Phone Encounter #  
 9/11/2018  2:30 PM Joanette Opitz, NP [address] [place] [address] [phone] [id number] Follow-up Instructions Return if symptoms worsen or fail to improve. Your Appointments 10/4/2018  1:00 PM  
New Patient with Walker Babin PsyD [address] [facility] Appt Note: new patient memory/ [place] [address] [facility] [address] [facility] [address] [phone]  
  
   
 [place] [address] [address] [address] Upcoming Health Maintenance Date Due  
 PAP AKA CERVICAL CYTOLOGY 7/6/2012 Influenza Age 5 to Adult 8/1/2018 DTaP/Tdap/Td series (2 - Td) 10/10/2026 Allergies as of 9/11/2018  Review Complete On: 9/11/2018 By: Joanette Opitz, NP Severity Noted Reaction Type Reactions Latex  04/17/2011    Swelling Entex [Phenylephrine-guaifenesin]  07/21/2012    Anaphylaxis, Hives, Palpitations Mucinex [Guaifenesin]  07/21/2012    Anaphylaxis, Hives Pepto-bismol [Bismuth Subsalicylate]  55/38/9686    Nausea and Vomiting Current Immunizations  Never Reviewed Name Date Hep B Vaccine 10/10/2000, 5/10/2000, 10/10/1999 Influenza High Dose Vaccine PF 10/10/2016 Pneumococcal Conjugate (PCV-13) 12/5/2016 TB Skin Test (PPD) 3/3/2016 Td, Adsorbed PF 10/10/2016  8:00 PM  
 Tdap 9/10/2016 Not reviewed this visit You Were Diagnosed With   
  
 Codes Comments Bronchitis    -  Primary ICD-10-CM: Y77 ICD-9-CM: 559 Mild intermittent asthma without complication     TBN-29-MV: J45.20 ICD-9-CM: 493.90 Vitals BP Pulse Temp Resp Height(growth percentile) Weight(growth percentile) 122/85 (BP 1 Location: Left arm, BP Patient Position: Sitting) (!) 103 98.7 °F (37.1 °C) (Oral) 17 5' 6\" (1.676 m) 234 lb 12.8 oz (106.5 kg) SpO2 BMI OB Status Smoking Status 98% 37.9 kg/m2 Having regular periods Never Smoker Vitals History BMI and BSA Data Body Mass Index Body Surface Area  
 37.9 kg/m 2 2.23 m 2 Preferred Pharmacy Pharmacy Name Phone [facility]/PHARMACY #[id number] [phone] Your Updated Medication List  
  
   
This list is accurate as of 9/11/18  3:03 PM.  Always use your most recent med list.  
  
  
  
  
 albuterol 90 mcg/actuation inhaler Commonly known as:  PROVENTIL HFA, VENTOLIN HFA, PROAIR HFA Take 2 Puffs by inhalation every four (4) hours as needed for Wheezing. ALLEGRA-D 24 HOUR 180-240 mg per tablet Generic drug:  fexofenadine-pseudoephedrine Take 1 Tab by mouth nightly. desogestrel-ethinyl estradiol 0.15-0.03 mg Tab Commonly known as:  DESOGEN Take 1 Tab by mouth nightly. escitalopram oxalate 20 mg tablet Commonly known as:  Gradie Kil TAKE 1 TABLET BY MOUTH EVERY DAY  
  
 folic acid 1 mg tablet Commonly known as:  FOLVITE  
TAKE 1 TABLET ORALLY DAILY  
  
 hydroxychloroquine 200 mg tablet Commonly known as:  PLAQUENIL  
taking 100mg daily  
  
 metFORMIN  mg tablet Commonly known as:  GLUCOPHAGE XR  
1,000 TAKE 2 TABLET BY MOUTH TWICE A DAY AS DIRECTED  
  
 methotrexate 25 mg/mL chemo injection INJECT 25MG (1ML) subcutaneously ONCE WEEKLY Nebulizer & Compressor machine 1 Each by Does Not Apply route three (3) times daily as needed. ORENCIA SC  
by SubCUTAneous route. predniSONE 10 mg dose pack Commonly known as:  STERAPRED DS See administration instruction per 10mg dose pack RESTASIS 0.05 % ophthalmic emulsion Generic drug:  cycloSPORINE INSTILL 1 DROP INTO EACH EYE TWICE DAILY ROBINUL 1 mg tablet Generic drug:  glycopyrrolate Take 2 mg by mouth two (2) times a day. Indications: hyperhydrosis SAFETY-ABDI TB Syr 1cc/25Gx5/8\" 1 mL 25 gauge x 5/8\" Syrg Generic drug:  Syringe with Needle, Safety USE TO INJECT METHOTREXATE ONCE A WEEK SYNTHROID 75 mcg tablet Generic drug:  levothyroxine Take 75 mcg by mouth Daily (before breakfast). VITAMIN B-12 PO Take  by mouth. VITAMIN D3 2,000 unit Tab Generic drug:  cholecalciferol (vitamin D3) Take 1 Tab by mouth daily. ZyrTEC 10 mg Cap Generic drug:  Cetirizine Take 10 mg by mouth daily. Indications: ALLERGIC RHINITIS Prescriptions Sent to Pharmacy Refills  
 predniSONE (STERAPRED DS) 10 mg dose pack 0 Sig: See administration instruction per 10mg dose pack Class: Normal  
 Pharmacy: [facility]/pharmacy #[id number]- [place], [facility] Ph #: [phone]  
 albuterol (PROVENTIL HFA, VENTOLIN HFA, PROAIR HFA) 90 mcg/actuation inhaler 0 Sig: Take 2 Puffs by inhalation every four (4) hours as needed for Wheezing. Class: Normal  
 Pharmacy: [facility]/pharmacy #[id number]- [address] Ph #: [phone] Route: Inhalation Follow-up Instructions Return if symptoms worsen or fail to improve. Patient Instructions Bronchitis: Care Instructions Your Care Instructions Bronchitis is inflammation of the bronchial tubes, which carry air to the lungs. The tubes swell and produce mucus, or phlegm. The mucus and inflamed bronchial tubes make you cough. You may have trouble breathing. Most cases of bronchitis are caused by viruses like those that cause colds. Antibiotics usually do not help and they may be harmful. Bronchitis usually develops rapidly and lasts about 2 to 3 weeks in otherwise healthy people. Follow-up care is a key part of your treatment and safety. Be sure to make and go to all appointments, and call your doctor if you are having problems. It's also a good idea to know your test results and keep a list of the medicines you take. How can you care for yourself at home? · Take all medicines exactly as prescribed. Call your doctor if you think you are having a problem with your medicine. · Get some extra rest. 
· Take an over-the-counter pain medicine, such as acetaminophen (Tylenol), ibuprofen (Advil, Motrin), or naproxen (Aleve) to reduce fever and relieve body aches. Read and follow all instructions on the label. · Do not take two or more pain medicines at the same time unless the doctor told you to. Many pain medicines have acetaminophen, which is Tylenol. Too much acetaminophen (Tylenol) can be harmful. · Take an over-the-counter cough medicine that contains dextromethorphan to help quiet a dry, hacking cough so that you can sleep. Avoid cough medicines that have more than one active ingredient. Read and follow all instructions on the label. · Breathe moist air from a humidifier, hot shower, or sink filled with hot water. The heat and moisture will thin mucus so you can cough it out. · Do not smoke. Smoking can make bronchitis worse. If you need help quitting, talk to your doctor about stop-smoking programs and medicines. These can increase your chances of quitting for good. When should you call for help? Call 911 anytime you think you may need emergency care. For example, call if: 
  · You have severe trouble breathing.  
 Call your doctor now or seek immediate medical care if: 
  · You have new or worse trouble breathing.  
  · You cough up dark brown or bloody mucus (sputum).  
  · You have a new or higher fever.  
  · You have a new rash.  
 Watch closely for changes in your health, and be sure to contact your doctor if:   · You cough more deeply or more often, especially if you notice more mucus or a change in the color of your mucus.  
  · You are not getting better as expected. Where can you learn more? Go to http://amanda-laz.info/. Enter H333 in the search box to learn more about \"Bronchitis: Care Instructions. \" Current as of: December 6, 2017 Content Version: 11.7 © 6870-3193 [employer]. Care instructions adapted under license by [employer] (which disclaims liability or warranty for this information). If you have questions about a medical condition or this instruction, always ask your healthcare professional. [address] any warranty or liability for your use of this information. Introducing [facility] & HEALTH SERVICES! Dear Kait Starr: Thank you for requesting a [employer] account. Our records indicate that you already have an active [employer] account. You can access your account anytime at [email or web address] Did you know that you can access your hospital and ER discharge instructions at any time in [employer]? You can also review all of your test results from your hospital stay or ER visit. Additional Information If you have questions, please visit the Frequently Asked Questions section of the [employer] website at [email or web address] Remember, [employer]! is NOT to be used for urgent needs. For medical emergencies, dial 911. Now available from your iPhone and Android! Please provide this summary of care documentation to your next provider. Your primary care clinician is listed as JOHNATHAN Lopez. If you have any questions after today's visit, please call [phone].

## 2018-09-11 NOTE — PROGRESS NOTES
Chief Complaint   Patient presents with    Nasal Congestion   pt c/o nasal congestion, cough and chest congestion x 3 days, pt states she has tried otc tylenol, benadryl or sudafed. This note will not be viewable in [address]

## 2018-09-11 NOTE — PATIENT INSTRUCTIONS
Bronchitis: Care Instructions  Your Care Instructions    Bronchitis is inflammation of the bronchial tubes, which carry air to the lungs. The tubes swell and produce mucus, or phlegm. The mucus and inflamed bronchial tubes make you cough. You may have trouble breathing. Most cases of bronchitis are caused by viruses like those that cause colds. Antibiotics usually do not help and they may be harmful. Bronchitis usually develops rapidly and lasts about 2 to 3 weeks in otherwise healthy people. Follow-up care is a key part of your treatment and safety. Be sure to make and go to all appointments, and call your doctor if you are having problems. It's also a good idea to know your test results and keep a list of the medicines you take. How can you care for yourself at home? · Take all medicines exactly as prescribed. Call your doctor if you think you are having a problem with your medicine. · Get some extra rest.  · Take an over-the-counter pain medicine, such as acetaminophen (Tylenol), ibuprofen (Advil, Motrin), or naproxen (Aleve) to reduce fever and relieve body aches. Read and follow all instructions on the label. · Do not take two or more pain medicines at the same time unless the doctor told you to. Many pain medicines have acetaminophen, which is Tylenol. Too much acetaminophen (Tylenol) can be harmful. · Take an over-the-counter cough medicine that contains dextromethorphan to help quiet a dry, hacking cough so that you can sleep. Avoid cough medicines that have more than one active ingredient. Read and follow all instructions on the label. · Breathe moist air from a humidifier, hot shower, or sink filled with hot water. The heat and moisture will thin mucus so you can cough it out. · Do not smoke. Smoking can make bronchitis worse. If you need help quitting, talk to your doctor about stop-smoking programs and medicines. These can increase your chances of quitting for good.   When should you call for help? Call 911 anytime you think you may need emergency care. For example, call if:    · You have severe trouble breathing.    Call your doctor now or seek immediate medical care if:    · You have new or worse trouble breathing.     · You cough up dark brown or bloody mucus (sputum).     · You have a new or higher fever.     · You have a new rash.    Watch closely for changes in your health, and be sure to contact your doctor if:    · You cough more deeply or more often, especially if you notice more mucus or a change in the color of your mucus.     · You are not getting better as expected. Where can you learn more? Go to http://amanda-laz.info/. Enter H333 in the search box to learn more about \"Bronchitis: Care Instructions. \"  Current as of: December 6, 2017  Content Version: 11.7  © 0661-6685 [employer]omio. Care instructions adapted under license by CredSimple (which disclaims liability or warranty for this information). If you have questions about a medical condition or this instruction, always ask your healthcare professional. [address] any warranty or liability for your use of this information.

## 2018-09-11 NOTE — PROGRESS NOTES
Subjective:   Gaurav Valdez is a 41 y.o. female who complains of chest congestion, productive cough of yellow phlegm, ear pressure, and fatigue for 2-3 days, gradually worsening since that time. Symptoms initially started 2-3 days ago with sore throat which has since resolved. Now having about complaints with some wheezing today. Has not used albuterol inh or nebs. Needs new alb inh. Last night felt sweaty, no measured temps. Denies chills. Denies any SOB, N/V/D. Taking Tylenol and sudafed. Hx pneumonia x 3 in last 2 years. Evaluation to date: none. Treatment to date: OTC products. Patient does not smoke cigarettes. Relevant PMH:   Past Medical History:   Diagnosis Date    Acquired hypothyroidism     Adverse effect of anesthesia     labile BP during/after C section    Asthma     Broken bones     history of 9 broken bones    Chronic UTI (urinary tract infection)     \"extra valve right kidney causes UTI\"    Gestational diabetes     GI bleed ,,,    lower GI last colonoscopy in  normal     Huntingtons chorea (HCC)     Hyperhydrosis disorder     Ill-defined condition     [place]/ tested genetically - daughter has [place]    Infertility     PCOS    PCOS (polycystic ovarian syndrome)     Precancerous skin lesion     removed from Right shoulder    Preeclampsia     pregnancy    Rheumatoid arthritis ([address]     SVT (supraventricular tachycardia) ([address] 2011    occured during pregnancy when picc line inserted.      Past Surgical History:   Procedure Laterality Date    COLONOSCOPY N/A 2016    COLONOSCOPY performed by Lindsay Zuniga [facility] at [facility] ENDOSCOPY    HX  SECTION      HX DILATION AND CURETTAGE  2005    suction D+C    HX WISDOM TEETH EXTRACTION       Allergies   Allergen Reactions    Latex Swelling    Entex [Phenylephrine-Guaifenesin] Anaphylaxis, Hives and Palpitations    Mucinex [Guaifenesin] Anaphylaxis and Hives    Pepto-Bismol [Bismuth Subsalicylate] Nausea and Vomiting         Review of Systems  Pertinent items are noted in HPI. Objective:     Visit Vitals    /85 (BP 1 Location: Left arm, BP Patient Position: Sitting)    Pulse (!) 103    Temp 98.7 °F (37.1 °C) (Oral)    Resp 17    Ht 5' 6\" (1.676 m)    Wt 234 lb 12.8 oz (106.5 kg)    SpO2 98%    BMI 37.9 kg/m2     General:  alert, cooperative, no distress   Eyes: negative   Ears: normal TM's and external ear canals AU   Sinuses: tenderness over bilateral maxillary   Mouth:  Lips, mucosa, and tongue normal. Teeth and gums normal and normal findings: oropharynx pink & moist without lesions or evidence of thrush   Neck: supple, symmetrical, trachea midline and no adenopathy. Heart: S1 and S2 normal, no murmurs noted. Lungs: Few faint wheezes throughout with forceful exhalation. Congested cough noted. Assessment/Plan:       ICD-10-CM ICD-9-CM    1. Bronchitis J40 490    2. Mild intermittent asthma without complication Z34.82 941.83 albuterol (PROVENTIL HFA, VENTOLIN HFA, PROAIR HFA) 90 mcg/actuation inhaler     Orders Placed This Encounter    predniSONE (STERAPRED DS) 10 mg dose pack    albuterol (PROVENTIL HFA, VENTOLIN HFA, PROAIR HFA) 90 mcg/actuation inhaler       Suggested symptomatic OTC remedies. RTC prn. Follow-up if any persistent or worsening symptoms. Citlali Workman NP  This note will not be viewable in [address]

## 2018-09-30 DIAGNOSIS — F33.41 RECURRENT MAJOR DEPRESSIVE DISORDER, IN PARTIAL REMISSION (HCC): ICD-10-CM

## 2018-10-01 RX ORDER — ESCITALOPRAM OXALATE 20 MG/1
TABLET ORAL
Qty: 90 TAB | Refills: 3 | Status: SHIPPED | OUTPATIENT
Start: 2018-10-01 | End: 2019-03-15 | Stop reason: SDUPTHER

## 2018-10-26 ENCOUNTER — OFFICE VISIT (OUTPATIENT)
Dept: PRIMARY CARE CLINIC | Age: 38
End: 2018-10-26

## 2018-10-26 ENCOUNTER — TELEPHONE (OUTPATIENT)
Dept: PRIMARY CARE CLINIC | Age: 38
End: 2018-10-26

## 2018-10-26 VITALS
TEMPERATURE: 98.2 F | HEIGHT: 66 IN | BODY MASS INDEX: 38.8 KG/M2 | DIASTOLIC BLOOD PRESSURE: 85 MMHG | OXYGEN SATURATION: 97 % | RESPIRATION RATE: 16 BRPM | SYSTOLIC BLOOD PRESSURE: 136 MMHG | HEART RATE: 103 BPM | WEIGHT: 241.4 LBS

## 2018-10-26 DIAGNOSIS — J45.909 REACTIVE AIRWAY DISEASE WITHOUT COMPLICATION, UNSPECIFIED ASTHMA SEVERITY, UNSPECIFIED WHETHER PERSISTENT: Primary | ICD-10-CM

## 2018-10-26 RX ORDER — PREDNISONE 10 MG/1
TABLET ORAL
Refills: 2 | COMMUNITY
Start: 2018-10-07 | End: 2020-03-14 | Stop reason: ALTCHOICE

## 2018-10-26 RX ORDER — OXYBUTYNIN CHLORIDE 5 MG/1
5 TABLET ORAL 2 TIMES DAILY
Refills: 3 | COMMUNITY
Start: 2018-10-02 | End: 2021-07-30 | Stop reason: ALTCHOICE

## 2018-10-26 RX ORDER — FLUCONAZOLE 150 MG/1
TABLET ORAL
Refills: 0 | COMMUNITY
Start: 2018-08-22 | End: 2019-03-18 | Stop reason: ALTCHOICE

## 2018-10-26 RX ORDER — SYRINGE AND NEEDLE,INSULIN,1ML 25GX1"
SYRINGE, EMPTY DISPOSABLE MISCELLANEOUS
Refills: 11 | COMMUNITY
Start: 2018-08-24 | End: 2020-09-26

## 2018-10-26 RX ORDER — DROSPIRENONE AND ETHINYL ESTRADIOL 0.02-3(28)
KIT ORAL
Refills: 3 | COMMUNITY
Start: 2018-09-30 | End: 2018-12-21

## 2018-10-26 NOTE — PROGRESS NOTES
Chief Complaint   Patient presents with    Wheezing     Noted pops, crackles and wheezing in lungs for the past four days

## 2018-10-26 NOTE — PROGRESS NOTES
HISTORY OF PRESENT ILLNESS  Denisha Price is a 41 y.o. female. Images Obtained      Wheezing          Review of Systems   Respiratory: Positive for wheezing.         Physical Exam    ASSESSMENT and PLAN

## 2018-10-26 NOTE — TELEPHONE ENCOUNTER
Patient called, verified identifiers. Patient given results of CXR per Donna Allen NP. Patient verbalized understanding.

## 2018-10-29 ENCOUNTER — TELEPHONE (OUTPATIENT)
Dept: INTERNAL MEDICINE CLINIC | Age: 38
End: 2018-10-29

## 2018-10-29 NOTE — TELEPHONE ENCOUNTER
----- Message from [employer] sent at 10/26/2018  5:13 PM EDT -----  Regarding: Dr. Ang Everett  Patient needs to come in as soon as possible. She was diagnosed with scarring and inflammation in both lungs. Her number is [phone].     Copy/paste Envera

## 2018-10-29 NOTE — TELEPHONE ENCOUNTER
Spoke with patient. Two pt identifiers confirmed. Patient states that she would like to schedule an appointment with Dr. Chandra Main. Patient states that she had a chest xray about a week ago at [facility] and was diagnosed with Interstitial Lung Disease and was told to followup with her PCP as soon as possible. Patient offered an appointment on 10/30/18 at 9:15am.  Patient accepted. Patient advised if anything changes or if unable to keep this appointment to call the office  Pt verbalized understanding of information discussed w/ no further questions at this time.

## 2018-10-30 ENCOUNTER — HOSPITAL ENCOUNTER (OUTPATIENT)
Dept: CT IMAGING | Age: 38
Discharge: HOME | End: 2018-10-30
Attending: INTERNAL MEDICINE
Payer: COMMERCIAL

## 2018-10-30 ENCOUNTER — OFFICE VISIT (OUTPATIENT)
Dept: INTERNAL MEDICINE CLINIC | Age: 38
End: 2018-10-30

## 2018-10-30 VITALS
HEART RATE: 102 BPM | BODY MASS INDEX: 38.73 KG/M2 | DIASTOLIC BLOOD PRESSURE: 85 MMHG | SYSTOLIC BLOOD PRESSURE: 127 MMHG | OXYGEN SATURATION: 99 % | TEMPERATURE: 97.8 F | WEIGHT: 241 LBS | HEIGHT: 66 IN | RESPIRATION RATE: 18 BRPM

## 2018-10-30 DIAGNOSIS — E66.01 SEVERE OBESITY WITH BODY MASS INDEX (BMI) OF 35.0 TO 39.9 WITH SERIOUS COMORBIDITY (HCC): ICD-10-CM

## 2018-10-30 DIAGNOSIS — J84.9 INTERSTITIAL LUNG DISEASE (HCC): Primary | ICD-10-CM

## 2018-10-30 DIAGNOSIS — J84.9 INTERSTITIAL LUNG DISEASE (HCC): ICD-10-CM

## 2018-10-30 DIAGNOSIS — J40 BRONCHITIS: ICD-10-CM

## 2018-10-30 DIAGNOSIS — M06.9 RHEUMATOID ARTHRITIS INVOLVING MULTIPLE SITES, UNSPECIFIED RHEUMATOID FACTOR PRESENCE: ICD-10-CM

## 2018-10-30 DIAGNOSIS — F32.A DEPRESSION, UNSPECIFIED DEPRESSION TYPE: ICD-10-CM

## 2018-10-30 PROCEDURE — 71250 CT THORAX DX C-: CPT

## 2018-10-30 RX ORDER — FLUTICASONE FUROATE AND VILANTEROL 100; 25 UG/1; UG/1
1 POWDER RESPIRATORY (INHALATION) DAILY
Qty: 1 INHALER | Refills: 0 | Status: SHIPPED | OUTPATIENT
Start: 2018-10-30 | End: 2018-11-26 | Stop reason: SDUPTHER

## 2018-10-30 RX ORDER — DOXYCYCLINE 100 MG/1
100 CAPSULE ORAL 2 TIMES DAILY
Qty: 14 CAP | Refills: 0 | Status: SHIPPED | OUTPATIENT
Start: 2018-10-30 | End: 2018-12-21

## 2018-10-30 RX ORDER — BUPROPION HYDROCHLORIDE 150 MG/1
150 TABLET ORAL
Qty: 30 TAB | Refills: 0 | Status: SHIPPED | OUTPATIENT
Start: 2018-10-30 | End: 2018-11-13 | Stop reason: SDUPTHER

## 2018-10-30 NOTE — PROGRESS NOTES
CC: Abnormal Chest X Ray      HPI:    She is a 41 y.o. female who presents for evaluation of lung issues    Patient is on methotrexate for RA and Orencia  Has a hx of asthma ( mild intermittent)     Last winter patient bronchitis after the flu. This summer noted she was having hoarseness . Then last month patient had bronchitis and was prescribed steroid and inhaler. Symptoms improved. Daughter then had PNA. Then Ms Adeola Tabares started to have symptoms of recurrent  increased hoarseness and difficulty breathing out and increased dyspnea. [facility] and had x ray done of the chest which showed 10/26  IMPRESSION: No infiltrate. New accentuation of interstitial markings. Please  correlate with clinical factors. Patient reports symptoms chronic cough and sensation of chest tightness, specially at the end of breathing. Reports blood work done 2 weeks ago at Dr Jose Hernandez and also had recent blood work done at her endocrinologist   Using albuterol every 4 hours. No antibiotics in the past 6 months     Notes increased depressed mood with anhedonia, denies SI.  Patient is currently on lexapro 20mg ( has been on this for several years)       ROS:  10 systems reviewed and negative other than HPI     Past Medical History:   Diagnosis Date    Acquired hypothyroidism     Adverse effect of anesthesia     labile BP during/after C section    Asthma     Broken bones     history of 9 broken bones    Chronic UTI (urinary tract infection)     \"extra valve right kidney causes UTI\"    Gestational diabetes     GI bleed 2007,2011,2015,2016    lower GI last colonoscopy in 2016 normal     Huntingtons chorea (HCC)     Hyperhydrosis disorder     Ill-defined condition     [place]/ tested genetically - daughter has [place]    Infertility     PCOS    PCOS (polycystic ovarian syndrome)     Precancerous skin lesion     removed from Right shoulder    Preeclampsia 2011    pregnancy    Rheumat[facility]     SVT (supraventricular tachycardia) ([facility] 2011    occured during pregnancy when picc line inserted. Current Outpatient Medications on File Prior to Visit   Medication Sig Dispense Refill    oxybutynin (DITROPAN) 5 mg tablet TAKE 1/2 TAB DAILY X1WEEK THEN INCREASE TO 1/2 TAB 2XDAY X2WKS THEN 1 TAB 2XDAY  3    BD INSULIN SYRINGE 1 mL 25 gauge x 5/8\" syrg USE 1 SYRINGE ONCE A WEEK  11    escitalopram oxalate (LEXAPRO) 20 mg tablet TAKE 1 TABLET BY MOUTH EVERY DAY 90 Tab 3    abatacept (ORENCIA SC) by SubCUTAneous route.  cyanocobalamin, vitamin B-12, (VITAMIN B-12 PO) Take  by mouth.  albuterol (PROVENTIL HFA, VENTOLIN HFA, PROAIR HFA) 90 mcg/actuation inhaler Take 2 Puffs by inhalation every four (4) hours as needed for Wheezing. 1 Inhaler 0    RESTASIS 0.05 % ophthalmic emulsion INSTILL 1 DROP INTO EACH EYE TWICE DAILY  4    SAFETY-ABDI TB SYR 1CC/25GX5/8\" 1 mL 25 gauge x 5/8\" syrg USE TO INJECT METHOTREXATE ONCE A WEEK  2    methotrexate 25 mg/mL chemo injection INJECT 25MG (1ML) subcutaneously ONCE WEEKLY  0    metFORMIN ER (GLUCOPHAGE XR) 500 mg tablet 1,000 TAKE 2 TABLET BY MOUTH TWICE A DAY AS DIRECTED  1    folic acid (FOLVITE) 1 mg tablet TAKE 1 TABLET ORALLY DAILY  11    Nebulizer & Compressor machine 1 Each by Does Not Apply route three (3) times daily as needed. 1 Each 0    Cetirizine (ZYRTEC) 10 mg Cap Take 10 mg by mouth daily. Indications: ALLERGIC RHINITIS      fexofenadine-pseudoephedrine (ALLEGRA-D 24 HOUR) 180-240 mg per tablet Take 1 Tab by mouth nightly.  cholecalciferol, vitamin D3, (VITAMIN D3) 2,000 unit Tab Take 1 Tab by mouth daily.  levothyroxine (SYNTHROID) 75 mcg tablet Take 75 mcg by mouth Daily (before breakfast).       ELIZABETH, 28, 3-0.02 mg tab TAKE 1 TABLET BY MOUTH EVERY DAY  3    fluconazole (DIFLUCAN) 150 mg tablet TAKE 1 TABLET BY MOUTH AS NEEDED AND REPEAT IN 5 DAYS IF NEEDED.  0    predniSONE (DELTASONE) 10 mg tablet TAKE 1-3 TABLETS ONCE A DAY AS NEEDED  2    predniSONE (STERAPRED DS) 10 mg dose pack See administration instruction per 10mg dose pack 21 Tab 0    hydroxychloroquine (PLAQUENIL) 200 mg tablet taking 100mg daily  4    desogestrel-ethinyl estradiol (DESOGEN) 0.15-0.03 mg tab Take 1 Tab by mouth nightly.  glycopyrrolate (ROBINUL) 1 mg tablet Take 2 mg by mouth two (2) times a day. Indications: hyperhydrosis       No current facility-administered medications on file prior to visit.         Past Surgical History:   Procedure Laterality Date    HX  SECTION      HX DILATION AND CURETTAGE  2005    suction D+C    HX WISDOM TEETH EXTRACTION         Family History   Problem Relation Age of Onset    Other Mother         Taylor's disease    Hypertension Mother     Dementia Mother     High Cholesterol Father     Heart Disease Father     Diabetes Father     Hypertension Father     Asthma Brother     Diabetes Brother     Other Brother         varicocele, hernias    Hypertension Brother     Alcohol abuse Brother     Hypertension Maternal Grandmother     Elevated Lipids Maternal Grandmother     Cancer Maternal Grandmother         breast    Other Maternal Grandmother         polymyalgia rheumatica    Glaucoma Maternal Grandmother     Cancer Maternal Grandfather         prostate    Huntingtons disease Maternal Grandfather     Cancer Paternal Grandmother         lung with mets    Cancer Paternal Grandfather         prostate, colon    Diabetes Paternal Grandfather     Heart Disease Paternal Grandfather     Alzheimer Paternal Grandfather     Dementia Paternal Grandfather      Reviewed and no changes     Social History     Socioeconomic History    Marital status:      Spouse name: Not on file    Number of children: Not on file    Years of education: Not on file    Highest education level: Not on file   Social Needs    Financial resource strain: Not on file    Food insecurity - worry: Not on file    Food insecurity - inability: Not on file    Transportation needs - medical: Not on file    Transportation needs - non-medical: Not on file   Occupational History    Not on file   Tobacco Use    Smoking status: Never Smoker    Smokeless tobacco: Never Used   Substance and Sexual Activity    Alcohol use: Yes     Comment: few drinks per year    Drug use: No    Sexual activity: Not on file   Other Topics Concern    Not on file   Social History Narrative    ** Merged History Encounter **                 Visit Vitals  /85   Pulse (!) 102   Temp 97.8 °F (36.6 °C) (Oral)   Resp 18   Ht 5' 6\" (1.676 m)   Wt 241 lb (109.3 kg)   SpO2 99%   BMI 38.90 kg/m²       Physical Examination:   General - Well appearing female  HEENT - PERRL, TM no erythema/opacification, normal nasal turbinates, oropharynx no erythema or exudate, MMM  Neck - supple, no bruits, no TMG, no LAD  Pulm - not in respiratory distress, has inspiratory and expiratory rhonchi intermittently throughout lung fields, no crackles are heard  Cardio - RRR, normal S1 S2, no murmur gallops or rubs  Abd - soft, nontender, no masses, no HSM  Extrem - no edema, +2 distal pulses  Psych - normal affect, appropriate mood    Lab Results   Component Value Date/Time    WBC 5.8 04/16/2017 11:02 AM    HGB 13.4 04/16/2017 11:02 AM    HCT 39.2 04/16/2017 11:02 AM    PLATELET 463 28/54/9275 11:02 AM    MCV 91.0 04/16/2017 11:02 AM     Lab Results   Component Value Date/Time    Sodium 139 04/16/2017 11:02 AM    Potassium 4.2 04/16/2017 11:02 AM    Chloride 106 04/16/2017 11:02 AM    CO2 23 04/16/2017 11:02 AM    Anion gap 10 04/16/2017 11:02 AM    Glucose 96 04/16/2017 11:02 AM    BUN 10 04/16/2017 11:02 AM    Creatinine 0.86 04/16/2017 11:02 AM    BUN/Creatinine ratio 12 04/16/2017 11:02 AM    GFR est AA >60 04/16/2017 11:02 AM    GFR est non-AA >60 04/16/2017 11:02 AM    Calcium 8.5 04/16/2017 11:02 AM     No results found for: CHOL, CHOLPOCT, CHOLX, CHLST, CHOLV, HDL, HDLPOC, LDL, LDLCPOC, LDLC, DLDLP, VLDLC, VLDL, TGLX, TRIGL, TRIGP, TGLPOCT, CHHD, CHHDX  Lab Results   Component Value Date/Time    TSH 1.48 10/30/2015 05:11 PM     No results found for: PSA, Conner Night, [id number], [id number], PSALT  Lab Results   Component Value Date/Time    Hemoglobin A1c 5.8 07/03/2011 12:10 PM     No results found for: Real Race, VD3RIA    Lab Results   Component Value Date/Time    ALT (SGPT) 35 04/16/2017 11:02 AM    AST (SGOT) 22 04/16/2017 11:02 AM    Alk. phosphatase 56 04/16/2017 11:02 AM    Bilirubin, total 0.4 04/16/2017 11:02 AM           Assessment/Plan:    1. Interstitial lung markings on x ray 10/26. Given patient's hx of RA will order CAT scan of the chest for further evaluation and PFTs. Patient has not been treated with antibiotic therefore will prescribe doxycycline and will start Breo. - CT CHEST WO CONT; Future  - REFERRAL TO PULMONARY DISEASE  - PULMONARY FUNCTION TEST; Future    2. Rheumatoid arthritis involving multiple sites, unspecified rheumatoid factor presence ([facility]  - patient is on methotrexate and Winlevy Shreya reports recent labs done at Dr Kala Hutchins; Future  - PULMONARY FUNCTION TEST; Future    3. Bronchitis  - doxycycline (MONODOX) 100 mg capsule; Take 1 Cap by mouth two (2) times a day. Dispense: 14 Cap; Refill: 0  - fluticasone-vilanterol (BREO ELLIPTA) 100-25 mcg/dose inhaler; Take 1 Puff by inhalation daily. Dispense: 1 Inhaler; Refill: 0  - PULMONARY FUNCTION TEST; Future    4. Depression, unspecified depression type  - not well controlled, continue lexapro 20mg and add wellbutrin. - buPROPion XL (WELLBUTRIN XL) 150 mg tablet; Take 1 Tab by mouth every morning. Dispense: 30 Tab; Refill: 0  - follow up in one month       Obesity: work on weight loss, healthy diet and exercise  Follow-up Disposition:  Return in about 3 months (around 1/30/2019).     González Santiago MD Instructions given to patient    Request records of recent blood work done at rheumatologist Dr Jelly Ruff office     Schedule CAT scan of chest     Take doxycycline for 7 days with food and start breo for reactive airway and continue albuterol as needed    Make appointment with Dr Martinez Doll

## 2018-10-30 NOTE — PROGRESS NOTES
Reviewed record in preparation for visit and have obtained necessary documentation. Identified pt with two pt identifiers(name and ). Chief Complaint   Patient presents with    Abnormal Chest X R Mikael Bean 20 Maintenance Due   Topic Date Due    Cervical Cancer Screening  2012    Annual Well Visit  10/29/2018       Ms. Reginaldo Kessler has a reminder for a \"due or due soon\" health maintenance. I have asked that she discuss this further with her primary care provider for follow-up on this health maintenance. Coordination of Care Questionnaire:  :     1) Have you been to an emergency room, urgent care clinic since your last visit? no   Hospitalized since your last visit? no             2) Have you seen or consulted any other health care providers outside of [facility] since your last visit? no  (Include any pap smears or colon screenings in this section.)    3) In the event something were to happen to you and you were unable to speak on your behalf, do you have an Advance Directive/ Living Will in place stating your wishes? NO    Do you have an Advance Directive on file? no    4) Are you interested in receiving information on Advance Directives? NO    Patient is accompanied by self I have [facility] to discuss any/all medical information while they are present in the room.

## 2018-10-30 NOTE — LETTER
NOTIFICATION RETURN TO WORK / SCHOOL 
 
10/30/2018 10:06 AM 
 
Ms. Denisha [facility] [employer] [address] [facility] [address] Dear Dr Tere Styles Signs is currently being worked up for an abnormal chest x ray with interstitial lung markings that are increased, I have ordered a CAT scan of the chest and patient will make an appointment with pulmonary. Since she is on methotrexate I wanted to let you know about the abnormal chest x ray. I If there are questions or concerns please have the patient contact our office. Sincerely, Brandon Felton MD

## 2018-10-30 NOTE — PATIENT INSTRUCTIONS
Request records of recent blood work done at rheumatologist Dr Anusha Mckinney office     Schedule CAT scan of chest     Take doxycycline for 7 days with food and start breo for reactive airway and continue albuterol as needed    Make appointment with Dr Jenny Andino

## 2018-10-31 ENCOUNTER — HOSPITAL ENCOUNTER (OUTPATIENT)
Dept: PULMONOLOGY | Age: 38
Discharge: HOME | End: 2018-10-31
Attending: INTERNAL MEDICINE
Payer: COMMERCIAL

## 2018-10-31 ENCOUNTER — TELEPHONE (OUTPATIENT)
Dept: INTERNAL MEDICINE CLINIC | Age: 38
End: 2018-10-31

## 2018-10-31 DIAGNOSIS — J84.9 INTERSTITIAL LUNG DISEASE (HCC): ICD-10-CM

## 2018-10-31 DIAGNOSIS — J21.9 BRONCHIOLITIS: Primary | ICD-10-CM

## 2018-10-31 DIAGNOSIS — M06.9 RHEUMATOID ARTHRITIS INVOLVING MULTIPLE SITES, UNSPECIFIED RHEUMATOID FACTOR PRESENCE: ICD-10-CM

## 2018-10-31 DIAGNOSIS — J40 BRONCHITIS: ICD-10-CM

## 2018-10-31 PROCEDURE — 94375 RESPIRATORY FLOW VOLUME LOOP: CPT

## 2018-10-31 PROCEDURE — 94726 PLETHYSMOGRAPHY LUNG VOLUMES: CPT

## 2018-10-31 PROCEDURE — 94729 DIFFUSING CAPACITY: CPT

## 2018-10-31 NOTE — TELEPHONE ENCOUNTER
Pt is requesting for you to call her in regard to her CT results. Pts number is [phone].        Message received & copied from [facility]

## 2018-10-31 NOTE — PROGRESS NOTES
The CAT scan is not showing interstitial lung disease which is good news. It show bronchiolitis in the left lower lobe which is a general term used to describe a nonspecific inflammatory injury that primarily affects the small airways most commonly  Caused by an infection either viral or bacterial.   Lets repeat an x ray in 2 weeks and see how the lungs appear after the antibiotic course.

## 2018-10-31 NOTE — TELEPHONE ENCOUNTER
Called and spoke to patient. Called Dr Martinez Doll office to get a earlier appt. Was told that the office manger will call the patient to set up an appt. Informed patient that she needs to have the x-ray redone in two weeks. Patient did not have any other questions at this time.

## 2018-11-02 NOTE — PROCEDURES
[address] 
2D ECHOCARDIOGRAM 
 
Name:TANISHA YEBOAH 
MR#: [id number] : 1980 ACCOUNT #: [de-identified] DATE OF SERVICE: 10/31/2018 REASON FOR TEST:  Cough. Spirometry and lung volumes were performed and they reveal: 1. No airflow obstruction. 2.  No restrictive lung disease. 3.  Moderate reduction in DLCO. 4.  Normal flow volume loop. Viky Apple MD 
  
 
ERG / PN 
D: 2018 13:04    
T: 2018 19:00 
JOB #: [id number] CC: JEFFREY KEENAN DO

## 2018-11-03 NOTE — PROGRESS NOTES
The pulmonary function test showed no obstruction and no restriction but shows reduction in the amount of oxygen exchange in the lungs which is due to the bronchiolitis.

## 2018-11-03 NOTE — PROCEDURES
[address]  PULMONARY FUNCTION    Name:Nataliia YEBOAH  MR#: [id number]  : 1980  ACCOUNT #: [de-identified]   DATE OF SERVICE: 10/31/2018    REASON FOR TEST:  Cough. Spirometry and lung volumes were performed and they reveal:  1. No airflow obstruction. 2.  No restrictive lung disease. 3.  Moderate reduction in DLCO. 4.  Normal flow volume loop.       Christiano Finney MD       ERG / PN  D: 2018 13:04     T: 2018 19:00  JOB #: [id number]  CC: JEFFREY KEENAN DO

## 2018-11-12 ENCOUNTER — HOSPITAL ENCOUNTER (OUTPATIENT)
Dept: GENERAL RADIOLOGY | Age: 38
Discharge: HOME | End: 2018-11-12
Payer: COMMERCIAL

## 2018-11-12 DIAGNOSIS — J21.9 BRONCHIOLITIS: ICD-10-CM

## 2018-11-12 PROCEDURE — 71046 X-RAY EXAM CHEST 2 VIEWS: CPT

## 2018-11-13 ENCOUNTER — OFFICE VISIT (OUTPATIENT)
Dept: INTERNAL MEDICINE CLINIC | Age: 38
End: 2018-11-13

## 2018-11-13 VITALS
RESPIRATION RATE: 18 BRPM | SYSTOLIC BLOOD PRESSURE: 115 MMHG | HEIGHT: 66 IN | HEART RATE: 100 BPM | BODY MASS INDEX: 38.15 KG/M2 | TEMPERATURE: 97.8 F | WEIGHT: 237.4 LBS | OXYGEN SATURATION: 99 % | DIASTOLIC BLOOD PRESSURE: 81 MMHG

## 2018-11-13 DIAGNOSIS — F32.A DEPRESSION, UNSPECIFIED DEPRESSION TYPE: ICD-10-CM

## 2018-11-13 DIAGNOSIS — J45.30 MILD PERSISTENT ASTHMA WITHOUT COMPLICATION: ICD-10-CM

## 2018-11-13 DIAGNOSIS — J21.9 BRONCHIOLITIS: ICD-10-CM

## 2018-11-13 DIAGNOSIS — F32.1 MODERATE MAJOR DEPRESSION (HCC): Primary | ICD-10-CM

## 2018-11-13 RX ORDER — BUPROPION HYDROCHLORIDE 150 MG/1
150 TABLET ORAL
Qty: 30 TAB | Refills: 5 | Status: SHIPPED | OUTPATIENT
Start: 2018-11-13 | End: 2019-03-25 | Stop reason: SDUPTHER

## 2018-11-13 RX ORDER — BUPROPION HYDROCHLORIDE 150 MG/1
150 TABLET ORAL
Qty: 30 TAB | Refills: 0 | Status: SHIPPED | OUTPATIENT
Start: 2018-11-13 | End: 2018-11-13 | Stop reason: SDUPTHER

## 2018-11-13 NOTE — PROGRESS NOTES
CC: Depression  bronchiolitis    HPI:    She is a 41 y.o. female who presents for evaluation of lung issues    Recall patient presented with several weeks of wheezing and dyspnea and CAT scan of chest was done which showed bronchiolitis, doxycycline was prescribed and breo and symptoms resolved. She had a repeat x ray yesterday and PNA resolved    Mood is better on wellbutrin , recall patient was having increased depressed thoughts and wellbutrin 150mg was added to lexapro. Since then doing much better. Patient is on methotrexate for RA and Orencia and will increase dose of orencia - sees Dr Dav Marquez  Has a hx of asthma ( mild intermittent)       ROS:  10 systems reviewed and negative other than HPI     Past Medical History:   Diagnosis Date    Acquired hypothyroidism     Adverse effect of anesthesia     labile BP during/after C section    Asthma     Broken bones     history of 9 broken bones    Chronic UTI (urinary tract infection)     \"extra valve right kidney causes UTI\"    Gestational diabetes     GI bleed 2007,2011,2015,2016    lower GI last colonoscopy in 2016 normal     Huntingtons chorea (HCC)     Hyperhydrosis disorder     Ill-defined condition     [place]/ tested genetically - daughter has Chris    Infertility     PCOS    PCOS (polycystic ovarian syndrome)     Precancerous skin lesion     removed from Right shoulder    Preeclampsia 2011    pregnancy    Rheumatoid arthritis ([facility] [address]     SVT (supraventricular tachycardia) ([facility] [address] 2011    occured during pregnancy when picc line inserted. Current Outpatient Medications on File Prior to Visit   Medication Sig Dispense Refill    doxycycline (MONODOX) 100 mg capsule Take 1 Cap by mouth two (2) times a day. 14 Cap 0    fluticasone-vilanterol (BREO ELLIPTA) 100-25 mcg/dose inhaler Take 1 Puff by inhalation daily. 1 Inhaler 0    buPROPion XL (WELLBUTRIN XL) 150 mg tablet Take 1 Tab by mouth every morning.  [facility], 28, 3-0.02 mg tab TAKE 1 TABLET BY MOUTH EVERY DAY  3    fluconazole (DIFLUCAN) 150 mg tablet TAKE 1 TABLET BY MOUTH AS NEEDED AND REPEAT IN 5 DAYS IF NEEDED.  0    oxybutynin (DITROPAN) 5 mg tablet TAKE 1/2 TAB DAILY X1WEEK THEN INCREASE TO 1/2 TAB 2XDAY X2WKS THEN 1 TAB 2XDAY  3    BD INSULIN SYRINGE 1 mL 25 gauge x 5/8\" syrg USE 1 SYRINGE ONCE A WEEK  11    predniSONE (DELTASONE) 10 mg tablet TAKE 1-3 TABLETS ONCE A DAY AS NEEDED  2    escitalopram oxalate (LEXAPRO) 20 mg tablet TAKE 1 TABLET BY MOUTH EVERY DAY 90 Tab 3    abatacept (ORENCIA SC) by SubCUTAneous route.  cyanocobalamin, vitamin B-12, (VITAMIN B-12 PO) Take  by mouth.  predniSONE (STERAPRED DS) 10 mg dose pack See administration instruction per 10mg dose pack 21 Tab 0    albuterol (PROVENTIL HFA, VENTOLIN HFA, PROAIR HFA) 90 mcg/actuation inhaler Take 2 Puffs by inhalation every four (4) hours as needed for Wheezing. 1 Inhaler 0    RESTASIS 0.05 % ophthalmic emulsion INSTILL 1 DROP INTO EACH EYE TWICE DAILY  4    SAFETY-ABDI TB SYR 1CC/25GX5/8\" 1 mL 25 gauge x 5/8\" syrg USE TO INJECT METHOTREXATE ONCE A WEEK  2    methotrexate 25 mg/mL chemo injection INJECT 25MG (1ML) subcutaneously ONCE WEEKLY  0    metFORMIN ER (GLUCOPHAGE XR) 500 mg tablet 1,000 TAKE 2 TABLET BY MOUTH TWICE A DAY AS DIRECTED  1    folic acid (FOLVITE) 1 mg tablet TAKE 1 TABLET ORALLY DAILY  11    hydroxychloroquine (PLAQUENIL) 200 mg tablet taking 100mg daily  4    desogestrel-ethinyl estradiol (DESOGEN) 0.15-0.03 mg tab Take 1 Tab by mouth nightly.  Nebulizer & Compressor machine 1 Each by Does Not Apply route three (3) times daily as needed. 1 Each 0    glycopyrrolate (ROBINUL) 1 mg tablet Take 2 mg by mouth two (2) times a day. Indications: hyperhydrosis      Cetirizine (ZYRTEC) 10 mg Cap Take 10 mg by mouth daily. Indications: ALLERGIC RHINITIS      fexofenadine-pseudoephedrine (ALLEGRA-D 24 HOUR) 180-240 mg per tablet Take 1 Tab by mouth nightly.       cholecalciferol, vitamin D3, (VITAMIN D3) 2,000 unit Tab Take 1 Tab by mouth daily.  levothyroxine (SYNTHROID) 75 mcg tablet Take 75 mcg by mouth Daily (before breakfast). No current facility-administered medications on file prior to visit.         Past Surgical History:   Procedure Laterality Date    HX  SECTION      HX DILATION AND CURETTAGE  2005    suction D+C    HX WISDOM TEETH EXTRACTION         Family History   Problem Relation Age of Onset    Other Mother         Chris's disease    Hypertension Mother     Dementia Mother     High Cholesterol Father     Heart Disease Father     Diabetes Father     Hypertension Father     Asthma Brother     Diabetes Brother     Other Brother         varicocele, hernias    Hypertension Brother     Alcohol abuse Brother     Hypertension Maternal Grandmother     Elevated Lipids Maternal Grandmother     Cancer Maternal Grandmother         breast    Other Maternal Grandmother         polymyalgia rheumatica    Glaucoma Maternal Grandmother     Cancer Maternal Grandfather         prostate    Huntingtons disease Maternal Grandfather     Cancer Paternal Grandmother         lung with mets    Cancer Paternal Grandfather         prostate, colon    Diabetes Paternal Grandfather     Heart Disease Paternal Grandfather     Alzheimer Paternal Grandfather     Dementia Paternal Grandfather      Reviewed and no changes     Social History     Socioeconomic History    Marital status:      Spouse name: Not on file    Number of children: Not on file    Years of education: Not on file    Highest education level: Not on file   Social Needs    Financial resource strain: Not on file    Food insecurity - worry: Not on file    Food insecurity - inability: Not on file   [employer] needs - medical: Not on file   [employer] needs - non-medical: Not on file   Occupational History    Not on file   Tobacco Use    Smoking status: Never Smoker    Smokeless tobacco: Never Used   Substance and Sexual Activity    Alcohol use: Yes     Comment: few drinks per year    Drug use: No    Sexual activity: Not on file   Other Topics Concern    Not on file   Social History Narrative    ** Merged History Encounter **                 Visit Vitals  /81 (BP 1 Location: Right arm, BP Patient Position: Sitting)   Pulse 100   Temp 97.8 °F (36.6 °C) (Oral)   Resp 18   Ht 5' 6\" (1.676 m)   Wt 237 lb 6.4 oz (107.7 kg)   SpO2 99%   BMI 38.32 kg/m²       Physical Examination:   General - Well appearing female  HEENT - PERRL, TM no erythema/opacification, normal nasal turbinates, oropharynx no erythema or exudate, MMM  Neck - supple, no bruits, no TMG, no LAD  Pulm - not in respiratory distress,clear lungs B/L  Cardio - RRR, normal S1 S2, no murmur gallops or rubs  Abd - soft, nontender, no masses, no HSM  Extrem - no edema, +2 distal pulses  Psych - normal affect, appropriate mood    Lab Results   Component Value Date/Time    WBC 5.8 04/16/2017 11:02 AM    HGB 13.4 04/16/2017 11:02 AM    HCT 39.2 04/16/2017 11:02 AM    PLATELET 287 55/88/4890 11:02 AM    MCV 91.0 04/16/2017 11:02 AM     Lab Results   Component Value Date/Time    Sodium 139 04/16/2017 11:02 AM    Potassium 4.2 04/16/2017 11:02 AM    Chloride 106 04/16/2017 11:02 AM    CO2 23 04/16/2017 11:02 AM    Anion gap 10 04/16/2017 11:02 AM    Glucose 96 04/16/2017 11:02 AM    BUN 10 04/16/2017 11:02 AM    Creatinine 0.86 04/16/2017 11:02 AM    BUN/Creatinine ratio 12 04/16/2017 11:02 AM    GFR est AA >60 04/16/2017 11:02 AM    GFR est non-AA >60 04/16/2017 11:02 AM    Calcium 8.5 04/16/2017 11:02 AM     No results found for: CHOL, CHOLPOCT, CHOLX, CHLST, CHOLV, HDL, HDLPOC, LDL, LDLCPOC, LDLC, DLDLP, VLDLC, VLDL, TGLX, TRIGL, TRIGP, TGLPOCT, CHHD, CHHDX  Lab Results   Component Value Date/Time    TSH 1.48 10/30/2015 05:11 PM     No results found for: PSA, PSA2, PSAR1, PSA1, PSAR2, PSA3, PSAR3, [id number], [id number], PSALT  Lab Results   Component Value Date/Time    Hemoglobin A1c 5.8 07/03/2011 12:10 PM     No results found for: Josie Monten[place]ro [id number]    Lab Results   Component Value Date/Time    ALT (SGPT) 35 04/16/2017 11:02 AM    AST (SGOT) 22 04/16/2017 11:02 AM    Alk. phosphatase 56 04/16/2017 11:02 AM    Bilirubin, total 0.4 04/16/2017 11:02 AM           Assessment/Plan:    1. Interstitial lung markings on x ray 10/26. Given patient's hx of RA - CAT scan  Done which showed bronchiolitis she was prescribed doxycycline and breo and symptoms resolved. Repeat x ray is normal.   She has a hx of mild intermittent asthma   She will try to stop breo and if develops dyspnea will call ( and will resume breo)    2. Rheumatoid arthritis involving multiple sites, unspecified rheumatoid factor presence ([facility] [address]  - patient is on methotrexate and Orencia/ reports recent labs done at Dr Feli Garcia    3. Depression, unspecified depression type  - well controlled now continue lexapro 20mg and wellbutrin 150mg.   - buPROPion XL (WELLBUTRIN XL) 150 mg tablet; Take 1 Tab by mouth every morning. Dispense: 30 Tab; Refill: 0    4.  Obesity: work on weight loss, healthy diet and exercise  Follow-up Disposition: Not on MD Lauri

## 2018-11-13 NOTE — PROGRESS NOTES
Reviewed record in preparation for visit and have obtained necessary documentation. Identified pt with two pt identifiers(name and ). Chief Complaint   Patient presents with    Depression       Health Maintenance Due   Topic Date Due    Cervical Cancer Screening  2012       Ms. Jazmin Laboy has a reminder for a \"due or due soon\" health maintenance. I have asked that she discuss this further with her primary care provider for follow-up on this health maintenance. Coordination of Care Questionnaire:  :     1) Have you been to an emergency room, urgent care clinic since your last visit? no   Hospitalized since your last visit? no             2) Have you seen or consulted any other health care providers outside of [address] [place] [address] since your last visit? no  (Include any pap smears or colon screenings in this section.)    3) In the event something were to happen to you and you were unable to speak on your behalf, do you have an Advance Directive/ Living Will in place stating your wishes? NO    Do you have an Advance Directive on file? no    4) Are you interested in receiving information on Advance Directives? NO    Patient is accompanied by self I have received verbal consent from [address] [place] [address] to discuss any/all medical information while they are present in the room.

## 2018-11-21 NOTE — PROGRESS NOTES
HISTORY OF PRESENT ILLNESS  Denisha Hull is a 41 y.o. female. Wheezing    Pertinent negatives include no chest pain, no fever, no cough and no sputum production. This 41year old lady presents c/o \"wanting to know if she is wheezing\"  She states she hears a \"crackly noise in her chest sometimes\"  She denies fever. No dysnpea on exertion. No chest pain, no cough  States she has had \"bronchitis\" in the past  Review of Systems   Constitutional: Negative for chills and fever. Respiratory: Positive for wheezing. Negative for cough and sputum production. Cardiovascular: Negative for chest pain and palpitations. Musculoskeletal: Negative for myalgias. Psychiatric/Behavioral: Negative for depression. Physical Exam   Constitutional: She is oriented to person, place, and time. She appears well-developed and well-nourished. No distress. HENT:   Right Ear: External ear normal.   Left Ear: External ear normal.   Nose: Nose normal.   Mouth/Throat: Oropharynx is clear and moist. No oropharyngeal exudate. Eyes: Pupils are equal, round, and reactive to light. Neck: Normal range of motion. Neck supple. Cardiovascular: Normal rate, regular rhythm and normal heart sounds. No murmur heard. Pulmonary/Chest: Effort normal and breath sounds normal. No respiratory distress. She has no wheezes. She has no rales. Neurological: She is alert and oriented to person, place, and time. She has normal reflexes. Skin: She is not diaphoretic.        ASSESSMENT and PLAN    ICD-10-CM ICD-9-CM    1. Reactive airway disease without complication, unspecified asthma severity, unspecified whether persistent J45.909 493.90 XR CHEST PA LAT   No wheezing or crackles on my lung exam  Will however get a chest xray  Precautions given

## 2018-11-26 DIAGNOSIS — J40 BRONCHITIS: ICD-10-CM

## 2018-11-26 RX ORDER — FLUTICASONE FUROATE AND VILANTEROL TRIFENATATE 100; 25 UG/1; UG/1
POWDER RESPIRATORY (INHALATION)
Qty: 2 INHALER | Refills: 0 | Status: SHIPPED | OUTPATIENT
Start: 2018-11-26 | End: 2019-01-21 | Stop reason: SDUPTHER

## 2018-12-12 ENCOUNTER — OFFICE VISIT (OUTPATIENT)
Dept: NEUROLOGY | Age: 38
End: 2018-12-12

## 2018-12-12 ENCOUNTER — TELEPHONE (OUTPATIENT)
Dept: NEUROLOGY | Age: 38
End: 2018-12-12

## 2018-12-12 VITALS
BODY MASS INDEX: 38.09 KG/M2 | HEIGHT: 66 IN | HEART RATE: 95 BPM | SYSTOLIC BLOOD PRESSURE: 130 MMHG | OXYGEN SATURATION: 98 % | WEIGHT: 237 LBS | RESPIRATION RATE: 16 BRPM | DIASTOLIC BLOOD PRESSURE: 70 MMHG

## 2018-12-12 DIAGNOSIS — R20.2 PARESTHESIA OF BOTH FEET: ICD-10-CM

## 2018-12-12 DIAGNOSIS — R61 HYPERHIDROSIS: ICD-10-CM

## 2018-12-12 DIAGNOSIS — R25.1 EXCESSIVE PHYSIOLOGIC TREMOR: Primary | ICD-10-CM

## 2018-12-12 DIAGNOSIS — H04.123 CHRONIC DRYNESS OF BOTH EYES: ICD-10-CM

## 2018-12-12 RX ORDER — PROPRANOLOL HYDROCHLORIDE 60 MG/1
60 CAPSULE, EXTENDED RELEASE ORAL DAILY
Qty: 30 CAP | Refills: 0 | Status: SHIPPED | OUTPATIENT
Start: 2018-12-12 | End: 2019-01-09 | Stop reason: SDUPTHER

## 2018-12-12 RX ORDER — TRAMADOL HYDROCHLORIDE 50 MG/1
50 TABLET ORAL
COMMUNITY
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
## 2018-12-21 ENCOUNTER — HOSPITAL ENCOUNTER (OUTPATIENT)
Dept: INTERVENTIONAL RADIOLOGY/VASCULAR | Age: 38
Discharge: HOME | End: 2018-12-21
Attending: INTERNAL MEDICINE
Payer: COMMERCIAL

## 2018-12-21 VITALS
BODY MASS INDEX: 36.1 KG/M2 | RESPIRATION RATE: 18 BRPM | HEIGHT: 67 IN | DIASTOLIC BLOOD PRESSURE: 74 MMHG | HEART RATE: 88 BPM | SYSTOLIC BLOOD PRESSURE: 131 MMHG | TEMPERATURE: 98.8 F | WEIGHT: 230 LBS | OXYGEN SATURATION: 98 %

## 2018-12-21 DIAGNOSIS — Z79.60 LONG-TERM USE OF IMMUNOSUPPRESSANT MEDICATION: ICD-10-CM

## 2018-12-21 DIAGNOSIS — Z45.2 ENCOUNTER FOR PERIPHERAL LINE PLACEMENT: ICD-10-CM

## 2018-12-21 PROCEDURE — 77030031139 HC SUT VCRL2 J&J -A

## 2018-12-21 PROCEDURE — 77030039266 HC ADH SKN EXOFIN S2SG -A

## 2018-12-21 PROCEDURE — 77001 FLUOROGUIDE FOR VEIN DEVICE: CPT

## 2018-12-21 PROCEDURE — C1892 INTRO/SHEATH,FIXED,PEEL-AWAY: HCPCS

## 2018-12-21 PROCEDURE — 74011000250 HC RX REV CODE- 250: Performed by: RADIOLOGY

## 2018-12-21 PROCEDURE — C1788 PORT, INDWELLING, IMP: HCPCS

## 2018-12-21 PROCEDURE — 74011250636 HC RX REV CODE- 250/636: Performed by: RADIOLOGY

## 2018-12-21 RX ORDER — CEFAZOLIN SODIUM/WATER 2 G/20 ML
2 SYRINGE (ML) INTRAVENOUS ONCE
Status: COMPLETED | OUTPATIENT
Start: 2018-12-21 | End: 2018-12-21

## 2018-12-21 RX ORDER — SODIUM CHLORIDE 9 MG/ML
25 INJECTION, SOLUTION INTRAVENOUS CONTINUOUS
Status: DISCONTINUED | OUTPATIENT
Start: 2018-12-21 | End: 2018-12-21

## 2018-12-21 RX ORDER — HEPARIN SODIUM 200 [USP'U]/100ML
400 INJECTION, SOLUTION INTRAVENOUS ONCE
Status: COMPLETED | OUTPATIENT
Start: 2018-12-21 | End: 2018-12-21

## 2018-12-21 RX ORDER — MIDAZOLAM HYDROCHLORIDE 1 MG/ML
5 INJECTION, SOLUTION INTRAMUSCULAR; INTRAVENOUS
Status: DISCONTINUED | OUTPATIENT
Start: 2018-12-21 | End: 2018-12-21

## 2018-12-21 RX ORDER — FENTANYL CITRATE 50 UG/ML
100 INJECTION, SOLUTION INTRAMUSCULAR; INTRAVENOUS
Status: DISCONTINUED | OUTPATIENT
Start: 2018-12-21 | End: 2018-12-21

## 2018-12-21 RX ORDER — LIDOCAINE HYDROCHLORIDE 20 MG/ML
20 INJECTION, SOLUTION INFILTRATION; PERINEURAL ONCE
Status: COMPLETED | OUTPATIENT
Start: 2018-12-21 | End: 2018-12-21

## 2018-12-21 RX ORDER — HEPARIN 100 UNIT/ML
300 SYRINGE INTRAVENOUS ONCE
Status: COMPLETED | OUTPATIENT
Start: 2018-12-21 | End: 2018-12-21

## 2018-12-21 RX ORDER — LIDOCAINE HYDROCHLORIDE AND EPINEPHRINE 10; 10 MG/ML; UG/ML
1.5 INJECTION, SOLUTION INFILTRATION; PERINEURAL ONCE
Status: COMPLETED | OUTPATIENT
Start: 2018-12-21 | End: 2018-12-21

## 2018-12-21 RX ADMIN — LIDOCAINE HYDROCHLORIDE 200 MG: 20 INJECTION, SOLUTION INFILTRATION; PERINEURAL at 09:05

## 2018-12-21 RX ADMIN — MIDAZOLAM HYDROCHLORIDE 2 MG: 1 INJECTION, SOLUTION INTRAMUSCULAR; INTRAVENOUS at 09:01

## 2018-12-21 RX ADMIN — LIDOCAINE HYDROCHLORIDE AND EPINEPHRINE 15 MG: 10; 10 INJECTION, SOLUTION INFILTRATION; PERINEURAL at 09:05

## 2018-12-21 RX ADMIN — SODIUM CHLORIDE 25 ML/HR: 900 INJECTION, SOLUTION INTRAVENOUS at 08:20

## 2018-12-21 RX ADMIN — FENTANYL CITRATE 25 MCG: 50 INJECTION, SOLUTION INTRAMUSCULAR; INTRAVENOUS at 09:15

## 2018-12-21 RX ADMIN — HEPARIN SODIUM IN SODIUM CHLORIDE 200 UNITS: 200 INJECTION INTRAVENOUS at 09:05

## 2018-12-21 RX ADMIN — MIDAZOLAM HYDROCHLORIDE 1 MG: 1 INJECTION, SOLUTION INTRAMUSCULAR; INTRAVENOUS at 09:16

## 2018-12-21 RX ADMIN — FENTANYL CITRATE 50 MCG: 50 INJECTION, SOLUTION INTRAMUSCULAR; INTRAVENOUS at 09:01

## 2018-12-21 RX ADMIN — SODIUM CHLORIDE, PRESERVATIVE FREE 300 UNITS: 5 INJECTION INTRAVENOUS at 09:05

## 2018-12-21 RX ADMIN — MIDAZOLAM HYDROCHLORIDE 1 MG: 1 INJECTION, SOLUTION INTRAMUSCULAR; INTRAVENOUS at 09:20

## 2018-12-21 RX ADMIN — MIDAZOLAM HYDROCHLORIDE 2 MG: 1 INJECTION, SOLUTION INTRAMUSCULAR; INTRAVENOUS at 09:08

## 2018-12-21 RX ADMIN — Medication 2 G: at 08:44

## 2018-12-21 RX ADMIN — FENTANYL CITRATE 50 MCG: 50 INJECTION, SOLUTION INTRAMUSCULAR; INTRAVENOUS at 09:08

## 2018-12-21 RX ADMIN — FENTANYL CITRATE 25 MCG: 50 INJECTION, SOLUTION INTRAMUSCULAR; INTRAVENOUS at 09:18

## 2018-12-21 NOTE — H&P
Interventional and Vascular Radiology History and Physical    Patient: Chance Peguero 41 y.o. female       Chief Complaint: No chief complaint on file. History of Present Illness: rheumatoid infusions     History:    Past Medical History:   Diagnosis Date    Acquired hypothyroidism     Adverse effect of anesthesia     labile BP during/after C section    Asthma     Broken bones     history of 9 broken bones    Chronic UTI (urinary tract infection)     \"extra valve right kidney causes UTI\"    Gestational diabetes     GI bleed 2007,2011,2015,2016    lower GI last colonoscopy in 2016 normal     Huntingtons chorea (HCC)     Hyperhydrosis disorder     Ill-defined condition     [place]/ tested genetically - daughter has [place]    Infertility     PCOS    PCOS (polycystic ovarian syndrome)     Precancerous skin lesion     removed from Right shoulder    Preeclampsia 2011    pregnancy    Rheumatoid arthritis ([address]     SVT (supraventricular tachycardia) ([address] 2011    occured during pregnancy when picc line inserted.      Family History   Problem Relation Age of Onset    Other Mother         [place]'s disease    Hypertension Mother     Dementia Mother     High Cholesterol Father     Heart Disease Father     Diabetes Father     Hypertension Father     Asthma Brother     Diabetes Brother     Other Brother         varicocele, hernias    Hypertension Brother     Alcohol abuse Brother     Hypertension Maternal Grandmother     Elevated Lipids Maternal Grandmother     Cancer Maternal Grandmother         breast    Other Maternal Grandmother         polymyalgia rheumatica    Glaucoma Maternal Grandmother     Cancer Maternal Grandfather         prostate    Huntingtons disease Maternal Grandfather     Cancer Paternal Grandmother         lung with mets    Cancer Paternal Grandfather         prostate, colon    Diabetes Paternal Grandfather     Heart Disease Simon Clemonsjamilah Lawson Alzheimer Paternal Grandfather     Dementia Paternal Grandfather      Social History     Socioeconomic History    Marital status:      Spouse name: Not on file    Number of children: Not on file    Years of education: Not on file    Highest education level: Not on file   Social Needs    Financial resource strain: Not on file    Food insecurity - worry: Not on file    Food insecurity - inability: Not on file   [employer] needs - medical: Not on file   [employer] needs - non-medical: Not on file   Occupational History    Not on file   Tobacco Use    Smoking status: Never Smoker    Smokeless tobacco: Never Used   Substance and Sexual Activity    Alcohol use: Yes     Comment: few drinks per year    Drug use: No    Sexual activity: Not on file   Other Topics Concern    Not on file   Social History Narrative    ** Merged History Encounter **            Allergies: Allergies   Allergen Reactions    Latex Swelling    Entex [Phenylephrine-Guaifenesin] Anaphylaxis, Hives and Palpitations    Mucinex [Guaifenesin] Anaphylaxis and Hives    Pepto-Bismol [Bismuth Subsalicylate] Nausea and Vomiting       Current Medications:  Current Outpatient Medications   Medication Sig    traMADol (ULTRAM) 50 mg tablet Take 50 mg by mouth every six (6) hours as needed for Pain.  BREO ELLIPTA 100-25 mcg/dose inhaler INHALE 1 PUFF BY MOUTH EVERY DAY    buPROPion XL (WELLBUTRIN XL) 150 mg tablet Take 1 Tab by mouth every morning.  fluconazole (DIFLUCAN) 150 mg tablet TAKE 1 TABLET BY MOUTH AS NEEDED AND REPEAT IN 5 DAYS IF NEEDED.  oxybutynin (DITROPAN) 5 mg tablet TAKE 1/2 TAB DAILY X1WEEK THEN INCREASE TO 1/2 TAB 2XDAY X2WKS THEN 1 TAB 2XDAY    predniSONE (DELTASONE) 10 mg tablet TAKE 1-4 TABLETS ONCE A DAY AS NEEDED    escitalopram oxalate (LEXAPRO) 20 mg tablet TAKE 1 TABLET BY MOUTH EVERY DAY    cyanocobalamin, vitamin B-12, (VITAMIN B-12 PO) Take  by mouth.     albuterol (PROVENTIL HFA, VENTOLIN HFA, PROAIR HFA) 90 mcg/actuation inhaler Take 2 Puffs by inhalation every four (4) hours as needed for Wheezing.  RESTASIS 0.05 % ophthalmic emulsion INSTILL 1 DROP INTO EACH EYE TWICE DAILY    methotrexate 25 mg/mL chemo injection INJECT 25MG (1ML) subcutaneously ONCE WEEKLY    metFORMIN ER (GLUCOPHAGE XR) 500 mg tablet 1,000 TAKE 2 TABLET BY MOUTH TWICE A DAY AS DIRECTED    folic acid (FOLVITE) 1 mg tablet TAKE 1 TABLET ORALLY DAILY    desogestrel-ethinyl estradiol (DESOGEN) 0.15-0.03 mg tab Take 1 Tab by mouth nightly.  glycopyrrolate (ROBINUL) 1 mg tablet Take 2 mg by mouth two (2) times a day. Indications: hyperhydrosis    Cetirizine (ZYRTEC) 10 mg Cap Take 10 mg by mouth daily. Indications: ALLERGIC RHINITIS    fexofenadine-pseudoephedrine (ALLEGRA-D 24 HOUR) 180-240 mg per tablet Take 1 Tab by mouth nightly.  cholecalciferol, vitamin D3, (VITAMIN D3) 2,000 unit Tab Take 1 Tab by mouth daily.  levothyroxine (SYNTHROID) 75 mcg tablet Take 75 mcg by mouth Daily (before breakfast).  propranolol LA (INDERAL LA) 60 mg SR capsule Take 1 Cap by mouth daily.  BD INSULIN SYRINGE 1 mL 25 gauge x 5/8\" syrg USE 1 SYRINGE ONCE A WEEK    abatacept (ORENCIA SC) by SubCUTAneous route.  SAFETY-ABDI TB SYR 1CC/25GX5/8\" 1 mL 25 gauge x 5/8\" syrg USE TO INJECT METHOTREXATE ONCE A WEEK    Nebulizer & Compressor machine 1 Each by Does Not Apply route three (3) times daily as needed.      Current Facility-Administered Medications   Medication Dose Route Frequency    heparin (porcine) pf 300 Units  300 Units InterCATHeter ONCE    heparinized saline 2 units/mL infusion 800 Units  400 mL Irrigation ONCE    lidocaine (XYLOCAINE) 20 mg/mL (2 %) injection 400 mg  20 mL SubCUTAneous ONCE    lidocaine-EPINEPHrine (XYLOCAINE) 1 %-1:100,000 injection 15 mg  1.5 mL IntraDERMal ONCE    midazolam (VERSED) injection 5 mg  5 mg IntraVENous Multiple    fentaNYL citrate (PF) injection 100 mcg  100 mcg IntraVENous Multiple    0.9% sodium chloride infusion  25 mL/hr IntraVENous CONTINUOUS    ceFAZolin (ANCEF) 2 g/20 mL in sterile water IV syringe  2 g IntraVENous ONCE        Physical Exam:  Blood pressure 152/75, pulse (!) 110, temperature 98.8 °F (37.1 °C), resp. rate 20, height 5' 6.5\" (1.689 m), weight 104.3 kg (230 lb), SpO2 99 %, not currently breastfeeding. LUNG: clear to auscultation bilaterally, HEART: regular rate and rhythm, S1, S2 normal, no murmur, click, rub or gallop      Alerts:    Hospital Problems  Date Reviewed: 12/12/2018    None          Laboratory:    No results for input(s): HGB, HCT, WBC, PLT, INR, BUN, CREA, K, CRCLT, HGBEXT, HCTEXT, PLTEXT in the last 72 hours. No lab exists for component: PTT, PT, INREXT      Plan of Care/Planned Procedure:  Risks, benefits, and alternatives reviewed with patient and she agrees to proceed with the procedure. Conscious sedation will be performed with IV fentanyl and versed.      Plan is for chest port placement       Apoorva Holloway MD

## 2018-12-21 NOTE — ROUTINE PROCESS
Dr. Apoorva Holloway into assess pt before procedure, procedure explained along with risks and benefits, opportunities for questions given, consent obtained for [facility] Placement at this time.

## 2018-12-21 NOTE — DISCHARGE INSTRUCTIONS
[facility]  Angiography Department      Radiologist:   Dr. Oscar Willson      Date:   12/21/2018      [facility] Discharge Instructions      Watch for signs of infection:    1. Redness,   2. Fever, chills,   3. Increased pain, and/or drainage from the site. If this occurs, call your physician at once. Keep your dressing clean and dry for the next five days, after 5 days you may remove the dressing and get the site wet. Mild soap and water over site until it heals completely and then pat dry. Leave uncovered. Continue your previous diet and restart your regularly prescribed medications. You may take Tylenol, as directed on the label, for pain if needed. Avoid ibuprofen (Advil, Motrin) and aspirin as they may cause you to bleed. Because you received sedation, you are not to drive or sign any legal documents for the next 24 hours. Do not lift anything heavier than 5 pounds with the affected arm and avoid pushing and pulling movements for 5 days. If you have any questions or concerns, please call our radiology department at [phone].

## 2019-01-02 NOTE — TELEPHONE ENCOUNTER
Called and spoke to patient scheduled for ans testing  Monday, January 07, 2019 01:00 PM instructions given to hold all oral medications for 48 hrs prior to testing   Patient verbalized understanding

## 2019-01-07 ENCOUNTER — OFFICE VISIT (OUTPATIENT)
Dept: NEUROLOGY | Age: 39
End: 2019-01-07

## 2019-01-07 DIAGNOSIS — R20.2 PARESTHESIA: Primary | ICD-10-CM

## 2019-01-07 NOTE — PROCEDURES
[facility]  [address] [place], [address] [place], [address] [facility]  Phone: [phone]  FAX: [phone]     Clinical Autonomic Testing Report     Patient ID:  Talha Caballero  [id number]  89 y.o.  1980     REFERRED BY: Jim Burr DO  PCP: Bryn Olivas MD    Date of Testin2019     Indication/History:    Since 11 yo, patient has constellation of symptoms (sweating, poor balance, hand tremors, water retention, fatigue, muscle and joint pain); (+) DM (+) Rheumatoid arthritis (+) anxiety/depression    Patient is coming for syncope/autonomic dysfunction evaluation. Medications taken 48 hrs before the test: Zofran, Meclizine, Tramadol     Procedure: This Autonomic Nervous System (ANS) testing is performed by utilizing [address] [place] [address] [employer] Autonomic System, with established protocol. Multiple procedures performed: Heart rate response to deep breathing (HRDB), Valsalva ratio, Heart rate and BP response to head up tilt (HUT), and Quantitative sudomotor axon reflex testing (QSWEAT) . Result:  1. Heart response to deep  breathing (HRDB): 2 series of 6 cycles were performed and the mean of 6 consecutive cycles was obtained. Average HR difference was 28.5 and E:I ratio was 1.37. Normal response    2. Heart rate response to Valsalva maneuver:  [id number] BP to Valsalva was measured and BP response in all 4 phases was normal. Heart response was the opposite of BP, a normal response. ( VR = 1.98 )  3. HUT (head-up tilt) : Kqqh-rg-[id number] BP and HR were measured, up to 15 minutes post tilt. No significant BP reduction or sustained HR acceleration/deceleration. 4. SUDOMOTOR: QSWEAT response showed relatively preserved sweat production in all 4 localities (forearm, proximal leg, distal leg, foot) of the right upper and lower limbs, comparing patient to age group. Impression:   NORMAL    Relatively preserved autonomic function for this age.          Peter Shetty, MD [place] [facility] Board of Psychiatry and Neurology  Diplomate, Neuromuscular Medicine  Diplomate, American Board of Electrodiagnostic Medicine    Note: Raw Data will be scanned separately in Media

## 2019-01-21 DIAGNOSIS — J40 BRONCHITIS: ICD-10-CM

## 2019-01-21 RX ORDER — PROPRANOLOL HYDROCHLORIDE 60 MG/1
CAPSULE, EXTENDED RELEASE ORAL
Qty: 30 CAP | Refills: 0 | Status: SHIPPED | OUTPATIENT
Start: 2019-01-21 | End: 2019-02-19 | Stop reason: SDUPTHER

## 2019-01-21 RX ORDER — FLUTICASONE FUROATE AND VILANTEROL TRIFENATATE 100; 25 UG/1; UG/1
POWDER RESPIRATORY (INHALATION)
Qty: 1 INHALER | Refills: 5 | Status: SHIPPED | OUTPATIENT
Start: 2019-01-21 | End: 2019-05-16 | Stop reason: ALTCHOICE

## 2019-02-12 ENCOUNTER — ANESTHESIA (OUTPATIENT)
Dept: ENDOSCOPY | Age: 39
End: 2019-02-12
Payer: COMMERCIAL

## 2019-02-12 ENCOUNTER — HOSPITAL ENCOUNTER (OUTPATIENT)
Age: 39
Setting detail: OUTPATIENT SURGERY
Discharge: HOME | End: 2019-02-12
Attending: INTERNAL MEDICINE | Admitting: INTERNAL MEDICINE
Payer: COMMERCIAL

## 2019-02-12 ENCOUNTER — ANESTHESIA EVENT (OUTPATIENT)
Dept: ENDOSCOPY | Age: 39
End: 2019-02-12
Payer: COMMERCIAL

## 2019-02-12 VITALS
OXYGEN SATURATION: 95 % | TEMPERATURE: 98.6 F | HEIGHT: 66 IN | DIASTOLIC BLOOD PRESSURE: 72 MMHG | RESPIRATION RATE: 14 BRPM | WEIGHT: 241 LBS | HEART RATE: 96 BPM | SYSTOLIC BLOOD PRESSURE: 125 MMHG | BODY MASS INDEX: 38.73 KG/M2

## 2019-02-12 LAB — HCG UR QL: NEGATIVE

## 2019-02-12 PROCEDURE — 74011250636 HC RX REV CODE- 250/636

## 2019-02-12 PROCEDURE — 77030018712 HC DEV BLLN INFL BSC -B: Performed by: INTERNAL MEDICINE

## 2019-02-12 PROCEDURE — 76040000019: Performed by: INTERNAL MEDICINE

## 2019-02-12 PROCEDURE — 88305 TISSUE EXAM BY PATHOLOGIST: CPT

## 2019-02-12 PROCEDURE — 76060000031 HC ANESTHESIA FIRST 0.5 HR: Performed by: INTERNAL MEDICINE

## 2019-02-12 PROCEDURE — 77030019988 HC FCPS ENDOSC DISP BSC -B: Performed by: INTERNAL MEDICINE

## 2019-02-12 PROCEDURE — 81025 URINE PREGNANCY TEST: CPT

## 2019-02-12 PROCEDURE — 74011250636 HC RX REV CODE- 250/636: Performed by: INTERNAL MEDICINE

## 2019-02-12 RX ORDER — MIDAZOLAM HYDROCHLORIDE 1 MG/ML
INJECTION, SOLUTION INTRAMUSCULAR; INTRAVENOUS
Status: COMPLETED
Start: 2019-02-12 | End: 2019-02-12

## 2019-02-12 RX ORDER — FENTANYL CITRATE 50 UG/ML
25 INJECTION, SOLUTION INTRAMUSCULAR; INTRAVENOUS
Status: DISCONTINUED | OUTPATIENT
Start: 2019-02-12 | End: 2019-02-12 | Stop reason: HOSPADM

## 2019-02-12 RX ORDER — FLUMAZENIL 0.1 MG/ML
0.2 INJECTION INTRAVENOUS
Status: DISCONTINUED | OUTPATIENT
Start: 2019-02-12 | End: 2019-02-12 | Stop reason: HOSPADM

## 2019-02-12 RX ORDER — DEXTROMETHORPHAN/PSEUDOEPHED 2.5-7.5/.8
1.2 DROPS ORAL
Status: DISCONTINUED | OUTPATIENT
Start: 2019-02-12 | End: 2019-02-12 | Stop reason: HOSPADM

## 2019-02-12 RX ORDER — LIDOCAINE HYDROCHLORIDE 20 MG/ML
INJECTION, SOLUTION EPIDURAL; INFILTRATION; INTRACAUDAL; PERINEURAL AS NEEDED
Status: DISCONTINUED | OUTPATIENT
Start: 2019-02-12 | End: 2019-02-12 | Stop reason: HOSPADM

## 2019-02-12 RX ORDER — MIDAZOLAM HYDROCHLORIDE 1 MG/ML
INJECTION, SOLUTION INTRAMUSCULAR; INTRAVENOUS AS NEEDED
Status: DISCONTINUED | OUTPATIENT
Start: 2019-02-12 | End: 2019-02-12 | Stop reason: HOSPADM

## 2019-02-12 RX ORDER — SODIUM CHLORIDE 0.9 % (FLUSH) 0.9 %
5-40 SYRINGE (ML) INJECTION EVERY 8 HOURS
Status: DISCONTINUED | OUTPATIENT
Start: 2019-02-12 | End: 2019-02-12 | Stop reason: HOSPADM

## 2019-02-12 RX ORDER — ATROPINE SULFATE 0.1 MG/ML
0.5 INJECTION INTRAVENOUS
Status: DISCONTINUED | OUTPATIENT
Start: 2019-02-12 | End: 2019-02-12 | Stop reason: HOSPADM

## 2019-02-12 RX ORDER — SODIUM CHLORIDE 9 MG/ML
75 INJECTION, SOLUTION INTRAVENOUS CONTINUOUS
Status: DISCONTINUED | OUTPATIENT
Start: 2019-02-12 | End: 2019-02-12 | Stop reason: HOSPADM

## 2019-02-12 RX ORDER — MIDAZOLAM HYDROCHLORIDE 1 MG/ML
.25-5 INJECTION, SOLUTION INTRAMUSCULAR; INTRAVENOUS
Status: DISCONTINUED | OUTPATIENT
Start: 2019-02-12 | End: 2019-02-12 | Stop reason: HOSPADM

## 2019-02-12 RX ORDER — EPINEPHRINE 0.1 MG/ML
1 INJECTION INTRACARDIAC; INTRAVENOUS
Status: DISCONTINUED | OUTPATIENT
Start: 2019-02-12 | End: 2019-02-12 | Stop reason: HOSPADM

## 2019-02-12 RX ORDER — PROPOFOL 10 MG/ML
INJECTION, EMULSION INTRAVENOUS AS NEEDED
Status: DISCONTINUED | OUTPATIENT
Start: 2019-02-12 | End: 2019-02-12 | Stop reason: HOSPADM

## 2019-02-12 RX ORDER — NALOXONE HYDROCHLORIDE 0.4 MG/ML
0.4 INJECTION, SOLUTION INTRAMUSCULAR; INTRAVENOUS; SUBCUTANEOUS
Status: DISCONTINUED | OUTPATIENT
Start: 2019-02-12 | End: 2019-02-12 | Stop reason: HOSPADM

## 2019-02-12 RX ORDER — SODIUM CHLORIDE 0.9 % (FLUSH) 0.9 %
5-40 SYRINGE (ML) INJECTION AS NEEDED
Status: DISCONTINUED | OUTPATIENT
Start: 2019-02-12 | End: 2019-02-12 | Stop reason: HOSPADM

## 2019-02-12 RX ADMIN — SODIUM CHLORIDE 75 ML/HR: 900 INJECTION, SOLUTION INTRAVENOUS at 08:10

## 2019-02-12 RX ADMIN — PROPOFOL 250 MG: 10 INJECTION, EMULSION INTRAVENOUS at 08:50

## 2019-02-12 RX ADMIN — MIDAZOLAM HYDROCHLORIDE 1 MG: 1 INJECTION, SOLUTION INTRAMUSCULAR; INTRAVENOUS at 08:33

## 2019-02-12 RX ADMIN — LIDOCAINE HYDROCHLORIDE 100 MG: 20 INJECTION, SOLUTION EPIDURAL; INFILTRATION; INTRACAUDAL; PERINEURAL at 08:33

## 2019-02-12 NOTE — H&P
Gastroenterology Outpatient History and Physical    Patient: Bethany Beauchamp    Physician: Gildardo Alcala MD    Chief Complaint: dysphagia  History of Present Illness: 46yo F with solid phase dysphagia over last few mo. Hx of Chris's chorea. History:  Past Medical History:   Diagnosis Date    Acquired hypothyroidism     Adverse effect of anesthesia     labile BP during/after C section    Asthma     Broken bones     history of 9 broken bones    Chronic UTI (urinary tract infection)     \"extra valve right kidney causes UTI\"    Gestational diabetes     GI bleed 2007,2011,2015,2016    lower GI last colonoscopy in 2016 normal     Huntingtons chorea (HCC)     Hyperhydrosis disorder     Ill-defined condition     [place]/ tested genetically - daughter has [place]    Infertility     PCOS    PCOS (polycystic ovarian syndrome)     Precancerous skin lesion     removed from Right shoulder    Preeclampsia 2011    pregnancy    Rheumatoid arthritis ([address]     SVT (supraventricular tachycardia) ([facility] 2011    occured during pregnancy when picc line inserted.       Past Surgical History:   Procedure Laterality Date    COLONOSCOPY N/A 6/13/2016    COLONOSCOPY performed by Charlotte Marrufo MD at [facility] ENDOSCOPY    HX HEENT      HX WISDOM TEETH EXTRACTION  2004      Social History     Socioeconomic History    Marital status:      Spouse name: Not on file    Number of children: Not on file    Years of education: Not on file    Highest education level: Not on file   Tobacco Use    Smoking status: Never Smoker    Smokeless tobacco: Never Used   Substance and Sexual Activity    Alcohol use: Yes     Comment: few drinks per year    Drug use: No   Social History Narrative    ** Merged History Encounter **           Family History   Problem Relation Age of Onset    Other Mother         Chris's disease    Hypertension Mother     Dementia Mother     High Cholesterol Father     Heart Disease Father     Diabetes Father     Hypertension Father     Asthma Brother     Diabetes Brother     Other Brother         varicocele, hernias    Hypertension Brother     Alcohol abuse Brother     Hypertension Maternal Grandmother     Elevated Lipids Maternal Grandmother     Cancer Maternal Grandmother         breast    Other Maternal Grandmother         polymyalgia rheumatica    Glaucoma Maternal Grandmother     Cancer Maternal Grandfather         prostate    Huntingtons disease Maternal Grandfather     Cancer Paternal Grandmother         lung with mets    Cancer Paternal Grandfather         prostate, colon    Diabetes Paternal Grandfather     Heart Disease Paternal Grandfather     Alzheimer Paternal Grandfather     Dementia Paternal Grandfather       Patient Active Problem List   Diagnosis Code    Hypothyroidism (acquired) E03.9    Fibromyalgia M79.7    Hyperhidrosis R61    Environmental and seasonal allergies J30.89    Rectal bleeding K62.5    Family history of colonic polyps Z83.71    Encounter for screening colonoscopy Z12.11    Rheumatoid arthritis ([facility] M06.9    Asthma J45.909    Severe obesity (BMI 35.0-39. 9) E66.01    Moderate major depression (HCC) F32.1       Allergies: Allergies   Allergen Reactions    Latex Swelling    Entex [Phenylephrine-Guaifenesin] Anaphylaxis, Hives and Palpitations    Mucinex [Guaifenesin] Anaphylaxis and Hives    Pepto-Bismol [Bismuth Subsalicylate] Nausea and Vomiting     Medications:   Prior to Admission medications    Medication Sig Start Date End Date Taking? Authorizing Provider   BREO ELLIPTA 100-25 mcg/dose inhaler INHALE 1 PUFF BY MOUTH EVERY DAY 1/21/19  Yes Watson Anton MD   traMADol (ULTRAM) 50 mg tablet Take 50 mg by mouth every six (6) hours as needed for Pain. Yes Provider, Historical   buPROPion XL (WELLBUTRIN XL) 150 mg tablet Take 1 Tab by mouth every morning.  11/13/18  Yes Yamilex Crawford MD   oxybutynin (DITROPAN) 5 mg tablet TAKE 1/2 TAB DAILY X1WEEK THEN INCREASE TO 1/2 TAB 2XDAY X2WKS THEN 1 TAB 2XDAY 10/2/18  Yes Provider, Historical   predniSONE (DELTASONE) 10 mg tablet TAKE 1-4 TABLETS ONCE A DAY AS NEEDED 10/7/18  Yes Provider, Historical   escitalopram oxalate (LEXAPRO) 20 mg tablet TAKE 1 TABLET BY MOUTH EVERY DAY 10/1/18  Yes Vivienne Negron MD   cyanocobalamin, vitamin B-12, (VITAMIN B-12 PO) Take  by mouth. Yes Provider, Historical   albuterol (PROVENTIL HFA, VENTOLIN HFA, PROAIR HFA) 90 mcg/actuation inhaler Take 2 Puffs by inhalation every four (4) hours as needed for Wheezing. 9/11/18  Yes Carla Jaquez NP   RESTASIS 0.05 % ophthalmic emulsion INSTILL 1 DROP INTO EACH EYE TWICE DAILY 10/6/17  Yes Provider, Historical   metFORMIN ER (GLUCOPHAGE XR) 500 mg tablet 1,000 TAKE 2 TABLET BY MOUTH TWICE A DAY AS DIRECTED 11/22/16  Yes Provider, Historical   folic acid (FOLVITE) 1 mg tablet TAKE 1 TABLET ORALLY DAILY 11/29/16  Yes Provider, Historical   desogestrel-ethinyl estradiol (DESOGEN) 0.15-0.03 mg tab Take 1 Tab by mouth nightly. Yes Provider, Historical   glycopyrrolate (ROBINUL) 1 mg tablet Take 2 mg by mouth two (2) times a day. Indications: hyperhydrosis   Yes Provider, Historical   Cetirizine (ZYRTEC) 10 mg Cap Take 10 mg by mouth daily. Indications: ALLERGIC RHINITIS   Yes Provider, Historical   fexofenadine-pseudoephedrine (ALLEGRA-D 24 HOUR) 180-240 mg per tablet Take 1 Tab by mouth nightly. Yes Provider, Historical   cholecalciferol, vitamin D3, (VITAMIN D3) 2,000 unit Tab Take 1 Tab by mouth daily. Yes Provider, Historical   levothyroxine (SYNTHROID) 75 mcg tablet Take 75 mcg by mouth Daily (before breakfast).    Yes Provider, Historical   propranolol LA (INDERAL LA) 60 mg SR capsule TAKE 1 CAPSULE BY MOUTH EVERY DAY 1/21/19   Cyrilla Click, DO   fluconazole (DIFLUCAN) 150 mg tablet TAKE 1 TABLET BY MOUTH AS NEEDED AND REPEAT IN 5 DAYS IF NEEDED. 8/22/18   Provider, Historical   BD INSULIN SYRINGE 1 mL 25 gauge x 5/8\" syrg USE 1 SYRINGE ONCE A WEEK 8/24/18   Provider, Historical   abatacept (ORENCIA SC) by SubCUTAneous route. Provider, Historical   SAFETY-ABDI TB SYR 1CC/25GX5/8\" 1 mL 25 gauge x 5/8\" syrg USE TO INJECT METHOTREXATE ONCE A WEEK 12/1/17   Provider, Historical   methotrexate 25 mg/mL chemo injection INJECT 25MG (1ML) subcutaneously ONCE WEEKLY 4/7/17   Provider, Historical   Nebulizer & Compressor machine 1 Each by Does Not Apply route three (3) times daily as needed. 12/10/12   Kj Ramírez MD     Physical Exam:   Vital Signs: Blood pressure (!) 123/93, pulse (!) 105, temperature 98.5 °F (36.9 °C), resp. rate 21, height 5' 6\" (1.676 m), weight 109.3 kg (241 lb), SpO2 98 %, not currently breastfeeding.   General: well developed, well nourished   HEENT: unremarkable   Heart: regular rhythm no mumur    Lungs: clear   Abdominal:  benign   Neurological: unremarkable   Extremities: no edema     Findings/Diagnosis: Dysphagia  Plan of Care/Planned Procedure: EGD with bx and dilatation with conscious/deep sedation    Signed:  Almaz Ramirez MD 2/12/2019

## 2019-02-12 NOTE — PROGRESS NOTES
Anesthesia reports 250mg Propofol, 1mg versed, 100mg Lidocaine and 400mL NS given during procedure. Received report from anesthesia staff on vital signs and status of patient. Endoscope was pre-cleaned at the bedside immediately following procedure by Sunshine Avendaño.

## 2019-02-12 NOTE — DISCHARGE INSTRUCTIONS
Verner Dany  [id number]  1980    EGD DISCHARGE INSTRUCTIONS  Discomfort:  Sore throat- throat lozenges or warm salt water gargle  redness at IV site- apply warm compress to area; if redness or soreness persist- contact your physician  Gaseous discomfort- walking, belching will help relieve any discomfort  You may not operate a vehicle for 12 hours  You may not engage in an occupation involving machinery or appliances for rest of today  You may not drink alcoholic beverages for at least 12 hours  Avoid making any critical decisions for at least 24 hour  DIET  You may have minimal sips at this time-- do not eat or drink for two hours. You may eat and drink after 9am today  You may resume your regular diet - however -  remember your colon is empty and a heavy meal will produce gas. Avoid these foods:  vegetables, fried / greasy foods, carbonated drinks    MEDICATIONS:        ACTIVITY  You may resume your normal daily activities until tomorrow AM;  Spend the remainder of the day resting -  avoid any strenuous activity. CALL M.D.   ANY SIGN OF   Increasing pain, nausea, vomiting  Abdominal distension (swelling)  New increased bleeding (oral or rectal)  Fever (chills)  Pain in chest area  Bloody discharge from nose or mouth  Shortness of breath    IMPRESSION:  -Normal esophageal mucosa without inflammation; biopsied, followed by empiric dilatation with 54FR Savary dilator over wire with site reexamined afterwards  -Normal stomach mucosa  -Normal duodenal mucosa    Follow-up Instructions:   Call Dr. Mirna Rodney for the results of procedure / biopsy in 7-10 days   Telephone # [phone]  Further treatment and work-up dependent on biopsy results and response to dilatation    Esme Sellers MD

## 2019-02-12 NOTE — ANESTHESIA POSTPROCEDURE EVALUATION
Procedure(s):  ESOPHAGOGASTRODUODENOSCOPY (EGD)  ESOPHAGOGASTRODUODENAL (EGD) BIOPSY  ESOPHAGEAL DILATION. Anesthesia Post Evaluation        Patient location during evaluation: PACU  Note status: Adequate. Level of consciousness: responsive to verbal stimuli and sleepy but conscious  Pain management: satisfactory to patient  Airway patency: patent  Anesthetic complications: no  Cardiovascular status: acceptable  Respiratory status: acceptable  Hydration status: acceptable  Comments: +Post-Anesthesia Evaluation and Assessment    Patient: Lanell Gilford MRN: [id number]  SSN: [id number]   YOB: 1980  Age: 41 y.o. Sex: female      Cardiovascular Function/Vital Signs    /72   Pulse 96   Temp 37 °C (98.6 °F)   Resp 14   Ht 5' 6\" (1.676 m)   Wt 109.3 kg (241 lb)   SpO2 95%   Breastfeeding? No   BMI 38.90 kg/m²     Patient is status post Procedure(s):  ESOPHAGOGASTRODUODENOSCOPY (EGD)  ESOPHAGOGASTRODUODENAL (EGD) BIOPSY  ESOPHAGEAL DILATION. Nausea/Vomiting: Controlled. Postoperative hydration reviewed and adequate. Pain:  Pain Scale 1: Numeric (0 - 10) (02/12/19 0917)  Pain Intensity 1: 0 (02/12/19 0917)   Managed. Neurological Status: At baseline. Mental Status and Level of Consciousness: Arousable. Pulmonary Status:   O2 Device: Room air (02/12/19 0917)   Adequate oxygenation and airway patent. Complications related to anesthesia: None    Post-anesthesia assessment completed. No concerns. Signed By: Scott Heredia DO    2/12/2019  Post anesthesia nausea and vomiting:  controlled      Visit Vitals  /72   Pulse 96   Temp 37 °C (98.6 °F)   Resp 14   Ht 5' 6\" (1.676 m)   Wt 109.3 kg (241 lb)   SpO2 95%   Breastfeeding?  No   BMI 38.90 kg/m²

## 2019-02-12 NOTE — ANESTHESIA PREPROCEDURE EVALUATION
Anesthetic History     PONV     Comments: \"labile BP during /after \"     Review of Systems / Medical History  Patient summary reviewed, nursing notes reviewed and pertinent labs reviewed    Pulmonary            Asthma (allergy induced)     Comments: Environmental and seasonal allergies   Neuro/Psych         Psychiatric history    Comments: Huntingtons Chorea Cardiovascular    Hypertension        Dysrhythmias (with PICC line insertion)         Comments:  EKG:NSR     ECHO: 55-60%EF, no AS   GI/Hepatic/Renal     GERD    Renal disease (Chronic UTIs bec of an \"extra valve\" in rt kidney)      Comments: Multiple GI bleeds--, , ,  Endo/Other    Diabetes (pregnancy induced)  Hypothyroidism  Obesity and arthritis     Other Findings   Comments: Dysphagia     Polycystic ovarian syndrome    Fibromyalgia    Hyperhidrosis           Physical Exam    Airway  Mallampati: II  TM Distance: 4 - 6 cm  Neck ROM: normal range of motion   Mouth opening: Normal     Cardiovascular  Regular rate and rhythm,  S1 and S2 normal,  no murmur, click, rub, or gallop             Dental  No notable dental hx       Pulmonary  Breath sounds clear to auscultation               Abdominal  GI exam deferred       Other Findings            Anesthetic Plan    ASA: 2  Anesthesia type: total IV anesthesia and MAC          Induction: Intravenous  Anesthetic plan and risks discussed with: Patient

## 2019-02-12 NOTE — PROCEDURES
NAME:  Verner Ned   :   1980   MRN:   [id number]     Date/Time:  2019 8:51 AM    Esophagogastroduodenoscopy (EGD) Procedure Note    Procedure: Esophagogastroduodenoscopy with biopsy, esophageal dilation    Indication:  Dysphagia/odynophagia  Pre-operative Diagnosis: see indication above  Post-operative Diagnosis: see findings below  :  Esme Sellers MD  Referring Provider:   -Diana Head MD    Exam:  Airway: clear, no airway problems anticipated  Heart: RRR, without gallops or rubs  Lungs: clear bilaterally without wheezes, crackles, or rhonchi  Abdomen: soft, nontender, nondistended, bowel sounds present  Mental Status: awake, alert and oriented to person, place and time     Anethesia/Sedation:  MAC anesthesia Propofol 250mg IV and Versed 1mg IV  Procedure Details   After informed consent was obtained for the procedure, with all risks and benefits of procedure explained the patient was taken to the endoscopy suite and placed in the left lateral decubitus position. Following sequential administration of sedation as per above, the [id number] gastroscope was inserted into the mouth and advanced under direct vision to second portion of the duodenum. A careful inspection was made as the gastroscope was withdrawn, including a retroflexed view of the proximal stomach; findings and interventions are described below. Findings:    -Normal esophageal mucosa without inflammation; biopsied, followed by empiric dilatation with 54FR Savary dilator over wire with site reexamined afterwards  -Normal stomach mucosa  -Normal duodenal mucosa    Therapies:  esophageal dilation with savary sizes 54FR over wire; biopsy of esophagus  Specimens: #1 g-e jxn  EBL:  None. Complications:   None; patient tolerated the procedure well.            Impression:    -Normal esophageal mucosa without inflammation; biopsied, followed by empiric dilatation with 54FR Savary dilator over wire with site reexamined afterwards  -Normal stomach mucosa  -Normal duodenal mucosa    Recommendations:  -Await pathology.     Discharge disposition:  Home in the company of  when able to ambulate    Tristin Bender MD

## 2019-02-12 NOTE — ROUTINE PROCESS
Randy Valenciarod  1980  [id number]    Situation:  Verbal report received from: [facility] BLOOD  Procedure: Procedure(s):  ESOPHAGOGASTRODUODENOSCOPY (EGD)  ESOPHAGOGASTRODUODENAL (EGD) BIOPSY  ESOPHAGEAL DILATION    Background:    Preoperative diagnosis: DYSPHAGIA  Postoperative diagnosis: EGD: Dysphagia    :  Dr. Zeb Rubio  Assistant(s): Endoscopy Technician-1: Sukh BATRES  Endoscopy RN-1: Juanito Hodges RN    Specimens:   ID Type Source Tests Collected by Time Destination   1 : GE junction Bx Preservative   Fercho Miramontes MD 2/12/2019 0848 Pathology     H. Pylori  no    Assessment:  Intra-procedure medications       Anesthesia gave intra-procedure sedation and medications, see anesthesia flow sheet yes    Intravenous fluids: NS@ KVO     Vital signs stable       Abdominal assessment: round and soft       Recommendation:  Discharge patient per MD order. Family or Chris Honour Mother in law.   Permission to share finding with family or friend yes

## 2019-02-20 RX ORDER — PROPRANOLOL HYDROCHLORIDE 60 MG/1
CAPSULE, EXTENDED RELEASE ORAL
Qty: 30 CAP | Refills: 0 | Status: SHIPPED | OUTPATIENT
Start: 2019-02-20 | End: 2019-05-17 | Stop reason: SDUPTHER

## 2019-03-08 ENCOUNTER — HOSPITAL ENCOUNTER (OUTPATIENT)
Dept: INTERVENTIONAL RADIOLOGY/VASCULAR | Age: 39
Discharge: HOME | End: 2019-03-08
Attending: INTERNAL MEDICINE
Payer: COMMERCIAL

## 2019-03-08 DIAGNOSIS — T82.598A: ICD-10-CM

## 2019-03-08 PROCEDURE — 36598 INJ W/FLUOR EVAL CV DEVICE: CPT

## 2019-03-08 PROCEDURE — 74011636320 HC RX REV CODE- 636/320: Performed by: RADIOLOGY

## 2019-03-08 PROCEDURE — 77030003560 HC NDL HUBR BARD -A

## 2019-03-08 RX ORDER — HEPARIN 100 UNIT/ML
500 SYRINGE INTRAVENOUS AS NEEDED
Status: DISCONTINUED | OUTPATIENT
Start: 2019-03-08 | End: 2019-03-12 | Stop reason: HOSPADM

## 2019-03-08 RX ADMIN — IOPAMIDOL 4 ML: 755 INJECTION, SOLUTION INTRAVENOUS at 08:06

## 2019-03-08 NOTE — PROGRESS NOTES
Patient arrived from home. Laid patient down flat on bed and accessed port no issues, blood return and easily flushed. Patient explained that is was a pain in the tissue and the nurses at the infusion center have been trying to lift port to hold in place instead of pressing down the tissue to hold port in place to access.

## 2019-03-15 DIAGNOSIS — F33.41 RECURRENT MAJOR DEPRESSIVE DISORDER, IN PARTIAL REMISSION (HCC): ICD-10-CM

## 2019-03-15 NOTE — TELEPHONE ENCOUNTER
PCP: Grazyna Monique MD    Last appt: 11/13/2018  Future Appointments   Date Time Provider Екатерина Kern   4/25/2019  2:20 PM Raj Ferris   5/16/2019 10:30 AM Appa Pedro Brown MD [facility] 87       Requested Prescriptions     Pending Prescriptions Disp Refills    escitalopram oxalate (LEXAPRO) 20 mg tablet 90 Tab 3     Sig: TAKE 1 TABLET BY MOUTH EVERY DAY

## 2019-03-18 RX ORDER — ESCITALOPRAM OXALATE 20 MG/1
TABLET ORAL
Qty: 90 TAB | Refills: 3 | Status: SHIPPED | OUTPATIENT
Start: 2019-03-18 | End: 2019-03-25 | Stop reason: SDUPTHER

## 2019-03-21 ENCOUNTER — TELEPHONE (OUTPATIENT)
Dept: INTERNAL MEDICINE CLINIC | Age: 39
End: 2019-03-21

## 2019-03-21 DIAGNOSIS — F33.41 RECURRENT MAJOR DEPRESSIVE DISORDER, IN PARTIAL REMISSION (HCC): ICD-10-CM

## 2019-03-21 NOTE — TELEPHONE ENCOUNTER
Radha//[facility] needs a call back to discuss prescription received for escitalopram oxalate (LEXAPRO) 20 mg tablet that insurance will not cover or Prior Auth is required. Insurance suggests Zoloft that is covered. Please call Trina Pryor to advise is medication change or Prior Alda Vasquez will be done.  Thank you

## 2019-03-21 NOTE — TELEPHONE ENCOUNTER
MD Speedy Morales LPN   Caller: Unspecified (Today,  9:26 AM)             lexapro costs  9 dollars at [facility] - I would suggest to continue with lexapro as mood is stable on medication.    We can do prior auth      Left message for patient to return call [facility]  Brief Operative Note     SUMMARY     Surgery Date: 12/13/2017     Surgeon(s) and Role:     * Rangel Loza MD - Primary     * Frank Fitch MD - Resident - Assisting        Pre-op Diagnosis:  Hyperparathyroidism [E21.3]    Post-op Diagnosis:  Post-Op Diagnosis Codes:     * Hyperparathyroidism [E21.3]    Procedure(s) (LRB):  PARATHYROIDECTOMY  LEFT LOWER  WITH INTRA-OP PTH LEVEL  (Left)    Anesthesia: General    Description of the findings of the procedure: Excision of left inferior parathyroid adenoma with appropriate drop in PTH levels    Findings/Key Components: As above    Estimated Blood Loss: minimal         Specimens:   Specimen (12h ago through future)    Start     Ordered    12/13/17 1114  Specimen to Pathology - Surgery  Once     Comments:  1. Left inferior parathyroid - frozen section      12/13/17 1114          Discharge Note    SUMMARY     Admit Date: 12/13/2017    Discharge Date and Time:  12/13/2017 2:36 PM    Hospital Course (synopsis of major diagnoses, care, treatment, and services provided during the course of the hospital stay): OP surgery     Final Diagnosis: Post-Op Diagnosis Codes:     * Hyperparathyroidism [E21.3]    Disposition: Home or Self Care    Follow Up/Patient Instructions:     Medications:  Reconciled Home Medications:   Discharge Medication List as of 12/13/2017 12:55 PM      START taking these medications    Details   calcium carbonate (OS-SYDNEE) 500 mg calcium (1,250 mg) tablet Take 2 tablets (1,000 mg total) by mouth 2 (two) times daily., Starting Wed 12/13/2017, Until Wed 12/27/2017, OTC      docusate sodium (COLACE) 100 MG capsule Take 1 capsule (100 mg total) by mouth 2 (two) times daily., Starting Wed 12/13/2017, OTC      oxyCODONE-acetaminophen (PERCOCET) 5-325 mg per tablet Take 1-2 tablets by mouth every 4 (four) hours as needed., Starting Wed 12/13/2017, Print         CONTINUE these medications which have NOT CHANGED    Details    amlodipine (NORVASC) 5 MG tablet Take 1 tablet (5 mg total) by mouth once daily., Starting Mon 8/14/2017, Normal      atorvastatin (LIPITOR) 20 MG tablet Take 1 tablet (20 mg total) by mouth once daily., Starting Thu 7/20/2017, Normal      DEXILANT 60 mg capsule Take 60 mg by mouth once daily. , Starting Tue 11/25/2014, Historical Med      escitalopram oxalate (LEXAPRO) 20 MG tablet Take 1 tablet (20 mg total) by mouth once daily., Starting Mon 8/14/2017, Normal      nimesh-m.blue-s.phos-phsal-hyo 118-10-40.8-36 mg Cap Take 1 capsule by mouth every 6 (six) hours as needed., Starting Mon 10/30/2017, Normal      progesterone (PROMETRIUM) 100 MG capsule Take 1 capsule (100 mg total) by mouth once daily., Starting Mon 11/27/2017, Normal      valacyclovir (VALTREX) 1000 MG tablet TAKE (1/2) HALF BY MOUTH EVERY 12 HOURS, Normal      valsartan (DIOVAN) 160 MG tablet Take 1 tablet (160 mg total) by mouth every morning., Starting Thu 8/24/2017, Normal      vitamin D 1000 units Tab Take 1,000 Units by mouth once daily., Historical Med             Discharge Procedure Orders  PTH, intact   Standing Status: Future  Standing Exp. Date: 02/11/19     CALCIUM   Standing Status: Future  Standing Exp. Date: 02/11/19     Diet general     Activity as tolerated     Call MD for:  increased confusion or weakness     Call MD for:  persistent dizziness, light-headedness, or visual disturbances     Call MD for:  worsening rash     Call MD for:  severe persistent headache     Call MD for:  difficulty breathing or increased cough     Call MD for:  redness, tenderness, or signs of infection (pain, swelling, redness, odor or green/yellow discharge around incision site)     Call MD for:  severe uncontrolled pain     Call MD for:  persistent nausea and vomiting or diarrhea     Call MD for:  temperature >100.4     Remove dressing in 48 hours   Order Comments: Ok to remove outer dressing in 48 hours and shower.  No soaking.  Leave steri strips on  till seen in clinic       Follow-up Information     Rangel Loza MD. Schedule an appointment as soon as possible for a visit in 2 weeks.    Specialty:  General Surgery  Why:  for wound check with labs prior  Contact information:  [address]  [facility] [id number] [phone]

## 2019-03-22 NOTE — TELEPHONE ENCOUNTER
Patient is returning your call. Her number is [phone].        Message received & copied from [facility]

## 2019-03-25 DIAGNOSIS — F32.A DEPRESSION, UNSPECIFIED DEPRESSION TYPE: ICD-10-CM

## 2019-03-25 RX ORDER — ESCITALOPRAM OXALATE 20 MG/1
TABLET ORAL
Qty: 90 TAB | Refills: 3 | Status: SHIPPED | OUTPATIENT
Start: 2019-03-25 | End: 2020-03-26

## 2019-03-25 RX ORDER — BUPROPION HYDROCHLORIDE 150 MG/1
150 TABLET ORAL
Qty: 30 TAB | Refills: 5 | Status: SHIPPED | OUTPATIENT
Start: 2019-03-25 | End: 2019-05-16 | Stop reason: SDUPTHER

## 2019-03-25 NOTE — TELEPHONE ENCOUNTER
PCP: Randy Crandall MD    Last appt: 11/13/2018  Future Appointments   Date Time Provider Екатерина Kern   4/25/2019  2:20 PM Raj [address] 27   5/16/2019 10:30 AM Appa Viktor D[facility]       Requested Prescriptions     Pending Prescriptions Disp Refills    buPROPion XL (WELLBUTRIN XL) 150 mg tablet 30 Tab 5     Sig: Take 1 Tab by mouth every morning.

## 2019-03-25 NOTE — TELEPHONE ENCOUNTER
Spoke with patient. Two pt identifiers confirmed. Patient advised that we received a call from [facility] pharmacy stating that her Lexapro prescription is not covered by her insurance and the insurance is asking the doctor to change her prescription to Zoloft. Patient advised per Dr. Chelle Calderón that lexapro costs  9 dollars at [facility] - I would suggest to continue with lexapro as mood is stable on medication. Patient states that she would like to have the lexapro prescription sent to [facility] in [place]. Advised patient that we will send that over for her today. Pt verbalized understanding of information discussed w/ no further questions at this time.

## 2019-04-01 ENCOUNTER — OFFICE VISIT (OUTPATIENT)
Dept: URGENT CARE | Age: 39
End: 2019-04-01

## 2019-04-01 VITALS
TEMPERATURE: 98.2 F | DIASTOLIC BLOOD PRESSURE: 75 MMHG | SYSTOLIC BLOOD PRESSURE: 135 MMHG | HEART RATE: 88 BPM | RESPIRATION RATE: 18 BRPM | OXYGEN SATURATION: 97 % | HEIGHT: 66 IN | WEIGHT: 231 LBS | BODY MASS INDEX: 37.12 KG/M2

## 2019-04-01 DIAGNOSIS — N39.0 ACUTE UTI: Primary | ICD-10-CM

## 2019-04-01 DIAGNOSIS — R39.9 URINARY SYMPTOM OR SIGN: ICD-10-CM

## 2019-04-01 LAB
BILIRUB UR QL STRIP: NEGATIVE
GLUCOSE UR-MCNC: NEGATIVE MG/DL
HCG URINE, QL. (POC): NEGATIVE
KETONES P FAST UR STRIP-MCNC: NEGATIVE MG/DL
PH UR STRIP: 6 [PH] (ref 4.6–8)
PROT UR QL STRIP: NEGATIVE
SP GR UR STRIP: 1.02 (ref 1–1.03)
UA UROBILINOGEN AMB POC: NORMAL (ref 0.2–1)
URINALYSIS CLARITY POC: NORMAL
URINALYSIS COLOR POC: NORMAL
URINE BLOOD POC: NORMAL
URINE LEUKOCYTES POC: NORMAL
URINE NITRITES POC: NEGATIVE
VALID INTERNAL CONTROL?: YES

## 2019-04-01 RX ORDER — CEPHALEXIN 500 MG/1
500 CAPSULE ORAL 3 TIMES DAILY
Qty: 21 CAP | Refills: 0 | Status: SHIPPED | OUTPATIENT
Start: 2019-04-01 | End: 2019-04-08

## 2019-04-01 NOTE — PROGRESS NOTES
Here for UTI  Burning and increased urinary frequency for past 4-5 days  No fevers or flank pain  Did just see her OBGYN preceding this and is being treated for yeast infection; diflucan x 3 doses over 1 week  promotes improving  Denies concern for STIs and declines testing  Hx significant for RA and is on immune suppressive medications. Overall not improving. Denies: blood in urine, weakness, dizziness. Past Medical History:   Diagnosis Date    Acquired hypothyroidism     Adverse effect of anesthesia     labile BP during/after C section    Asthma     Broken bones     history of 9 broken bones    Chronic UTI (urinary tract infection)     \"extra valve right kidney causes UTI\"    Fibromyalgia     Gestational diabetes     GI bleed 2007,2011,2015,2016    lower GI last colonoscopy in 2016 normal     Huntingtons chorea (HCC)     Hyperhydrosis disorder     Ill-defined condition     [place]/ tested genetically - daughter has Chris    Infertility     PCOS    PCOS (polycystic ovarian syndrome)     Precancerous skin lesion     removed from Right shoulder    Preeclampsia 2011    pregnancy    Reactive airway disease     Rheumatoid arthritis (HCC)     SVT (supraventricular tachycardia) ([address] 2011    occured during pregnancy when picc line inserted.         Past Surgical History:   Procedure Laterality Date    COLONOSCOPY N/A 6/13/2016    COL[facility] ENDOSCOPY    HX HEENT      HX WISDOM TEETH EXTRACTION  2004    UPPER GI ENDOSCOPY,BIOPSY  2/12/2019         UPPER GI ENDOSCOPY,DILATN W GUIDE  2/12/2019              Family History   Problem Relation Age of Onset    Other Mother         Watonwan's disease    Hypertension Mother     Dementia Mother     High Cholesterol Father     Heart Disease Father     Diabetes Father     Hypertension Father     Asthma Brother     Diabetes Brother     Other Brother         varicocele, hernias  Hypertension Brother     Alcohol abuse Brother     Hypertension Maternal Grandmother     Elevated Lipids Maternal Grandmother     Cancer Maternal Grandmother         breast    Other Maternal Grandmother         polymyalgia rheumatica    Glaucoma Maternal Grandmother     Cancer Maternal Grandfather         prostate    Huntingtons disease Maternal Grandfather     Cancer Paternal Grandmother         lung with mets    Cancer Paternal Grandfather         prostate, colon    Diabetes Paternal Grandfather     Heart Disease Paternal Grandfather     Alzheimer Paternal Grandfather     Dementia Paternal Grandfather         Social History     Socioeconomic History    Marital status:      Spouse name: Not on file    Number of children: Not on file    Years of education: Not on file    Highest education level: Not on file   Occupational History    Not on file   Social Needs    Financial resource strain: Not on file    Food insecurity:     Worry: Not on file     Inability: Not on file    Transportation needs:     Medical: Not on file     Non-medical: Not on file   Tobacco Use    Smoking status: Never Smoker    Smokeless tobacco: Never Used   Substance and Sexual Activity    Alcohol use: Yes     Comment: few drinks per year    Drug use: No    Sexual activity: Not on file   Lifestyle    Physical activity:     Days per week: Not on file     Minutes per session: Not on file    Stress: Not on file   Relationships    Social connections:     Talks on phone: Not on file     Gets together: Not on file     Attends Sabianist service: Not on file     Active member of club or organization: Not on file     Attends meetings of clubs or organizations: Not on file     Relationship status: Not on file    Intimate partner violence:     Fear of current or ex partner: Not on file     Emotionally abused: Not on file     Physically abused: Not on file     Forced sexual activity: Not on file   Other Topics Concern    Not on file   Social History Narrative    ** Merged History Encounter **                     ALLERGIES: Latex; Entex [phenylephrine-guaifenesin]; Mucinex [guaifenesin]; and Pepto-bismol [bismuth subsalicylate]    Review of Systems   Neurological: Negative for dizziness. All other systems reviewed and are negative. Vitals:    04/01/19 1710 04/01/19 1800   BP: 135/75    Pulse: (!) 101 88   Resp: 16 18   Temp: 98.2 °F (36.8 °C)    SpO2: 97%    Weight: 231 lb (104.8 kg)    Height: 5' 6\" (1.676 m)        Physical Exam   Constitutional: She is oriented to person, place, and time. No distress. HENT:   Head: Normocephalic and atraumatic. Mouth/Throat: Oropharynx is clear and moist.   Eyes: EOM are normal.   Neck: Normal range of motion. Neck supple. Cardiovascular: Normal rate, regular rhythm and normal heart sounds. Exam reveals no gallop and no friction rub. No murmur heard. Pulmonary/Chest: Effort normal and breath sounds normal. No respiratory distress. She has no wheezes. She has no rales. Abdominal: Soft. She exhibits no distension and no mass. There is no rebound and no guarding. Mild generalized abdominal TTP; non focal.    Musculoskeletal:   No CVA tenderness    Neurological: She is alert and oriented to person, place, and time. Skin: She is not diaphoretic. No pallor. Psychiatric: She has a normal mood and affect. Her behavior is normal. Thought content normal.       MDM     Differential Diagnosis; Clinical Impression; Plan:       CLINICAL IMPRESSION:  (N39.0) Acute UTI  (primary encounter diagnosis)  (R39.9) Urinary symptom or sign    Orders Placed This Encounter      cephALEXin (KEFLEX) 500 mg capsule          Sig: Take 1 Cap by mouth three (3) times daily for 7 days. Dispense:  21 Cap          Refill:  0      Plan:  UA suggests UTI. Do not suspect pyelo. Given drug interactions with multiple meds, will Rx cephalexin and send urine culture.   Maintain adequate fluid intake  Advised PCP follow up in 3-4 days if not improving  Call for culture results      We have reviewed concerning signs/symptoms, normal vs abnormal progression of medical condition and when to seek immediate medical attention. Schedule with PCP or Urgent Care immediately for worsening or new symptoms. See your PCP if there is not at least some improvement in symptoms within the next 2-3 days. You should see your PCP for updates on your routine health maintenance.                Procedures

## 2019-04-01 NOTE — PATIENT INSTRUCTIONS
Follow up with PCP without improvement over next 2-3 days sooner/immediately for new or worsening    Urinary Tract Infection in Women: Care Instructions  Your Care Instructions    A urinary tract infection, or UTI, is a general term for an infection anywhere between the kidneys and the urethra (where urine comes out). Most UTIs are bladder infections. They often cause pain or burning when you urinate. UTIs are caused by bacteria and can be cured with antibiotics. Be sure to complete your treatment so that the infection goes away. Follow-up care is a key part of your treatment and safety. Be sure to make and go to all appointments, and call your doctor if you are having problems. It's also a good idea to know your test results and keep a list of the medicines you take. How can you care for yourself at home? · Take your antibiotics as directed. Do not stop taking them just because you feel better. You need to take the full course of antibiotics. · Drink extra water and other fluids for the next day or two. This may help wash out the bacteria that are causing the infection. (If you have kidney, heart, or liver disease and have to limit fluids, talk with your doctor before you increase your fluid intake.)  · Avoid drinks that are carbonated or have caffeine. They can irritate the bladder. · Urinate often. Try to empty your bladder each time. · To relieve pain, take a hot bath or lay a heating pad set on low over your lower belly or genital area. Never go to sleep with a heating pad in place. To prevent UTIs  · Drink plenty of water each day. This helps you urinate often, which clears bacteria from your system. (If you have kidney, heart, or liver disease and have to limit fluids, talk with your doctor before you increase your fluid intake.)  · Urinate when you need to. · Urinate right after you have sex. · Change sanitary pads often.   · Avoid douches, bubble baths, feminine hygiene sprays, and other feminine hygiene products that have deodorants. · After going to the bathroom, wipe from front to back. When should you call for help? Call your doctor now or seek immediate medical care if:    · Symptoms such as fever, chills, nausea, or vomiting get worse or appear for the first time.     · You have new pain in your back just below your rib cage. This is called flank pain.     · There is new blood or pus in your urine.     · You have any problems with your antibiotic medicine.    Watch closely for changes in your health, and be sure to contact your doctor if:    · You are not getting better after taking an antibiotic for 2 days.     · Your symptoms go away but then come back. Where can you learn more? Go to http://amanda-laz.info/. Enter L773 in the search box to learn more about \"Urinary Tract Infection in Women: Care Instructions. \"  Current as of: March 20, 2018  Content Version: 11.9  © 9402-1279 [employer]. Care instructions adapted under license by [employer] (which disclaims liability or warranty for this information). If you have questions about a medical condition or this instruction, always ask your healthcare professional. [address] any warranty or liability for your use of this information.

## 2019-04-03 LAB — BACTERIA UR CULT: ABNORMAL

## 2019-04-25 ENCOUNTER — OFFICE VISIT (OUTPATIENT)
Dept: NEUROLOGY | Age: 39
End: 2019-04-25

## 2019-04-25 DIAGNOSIS — M06.9 RHEUMATOID ARTHRITIS INVOLVING MULTIPLE SITES, UNSPECIFIED RHEUMATOID FACTOR PRESENCE: ICD-10-CM

## 2019-04-25 DIAGNOSIS — R20.2 PARESTHESIA: ICD-10-CM

## 2019-04-25 DIAGNOSIS — Z82.0 FAMILY HISTORY OF HUNTINGTON'S CHOREA: ICD-10-CM

## 2019-04-25 DIAGNOSIS — M79.7 FIBROMYALGIA: ICD-10-CM

## 2019-04-25 DIAGNOSIS — R41.3 SHORT-TERM MEMORY LOSS: ICD-10-CM

## 2019-04-25 DIAGNOSIS — R41.3 COMPLAINTS OF MEMORY DISTURBANCE: ICD-10-CM

## 2019-04-25 DIAGNOSIS — R20.2 PARESTHESIA OF BOTH FEET: ICD-10-CM

## 2019-04-25 DIAGNOSIS — E03.9 HYPOTHYROIDISM (ACQUIRED): ICD-10-CM

## 2019-04-25 DIAGNOSIS — F32.1 MODERATE MAJOR DEPRESSION (HCC): ICD-10-CM

## 2019-04-25 DIAGNOSIS — R25.1 EXCESSIVE PHYSIOLOGIC TREMOR: ICD-10-CM

## 2019-04-25 DIAGNOSIS — G31.84 MILD COGNITIVE IMPAIRMENT: Primary | ICD-10-CM
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
## 2019-04-26 ENCOUNTER — OFFICE VISIT (OUTPATIENT)
Dept: NEUROLOGY | Age: 39
End: 2019-04-26

## 2019-04-26 DIAGNOSIS — F41.8 ANXIETY WITH SOMATIC FEATURES: ICD-10-CM

## 2019-04-26 DIAGNOSIS — G31.84 MILD COGNITIVE IMPAIRMENT: Primary | ICD-10-CM

## 2019-04-26 DIAGNOSIS — F32.0 MILD MAJOR DEPRESSION (HCC): ICD-10-CM
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
## 2019-05-16 ENCOUNTER — TELEPHONE (OUTPATIENT)
Dept: INTERNAL MEDICINE CLINIC | Age: 39
End: 2019-05-16

## 2019-05-16 ENCOUNTER — OFFICE VISIT (OUTPATIENT)
Dept: INTERNAL MEDICINE CLINIC | Age: 39
End: 2019-05-16

## 2019-05-16 VITALS
TEMPERATURE: 97.8 F | DIASTOLIC BLOOD PRESSURE: 82 MMHG | HEART RATE: 97 BPM | SYSTOLIC BLOOD PRESSURE: 120 MMHG | HEIGHT: 66 IN | RESPIRATION RATE: 18 BRPM | WEIGHT: 228 LBS | BODY MASS INDEX: 36.64 KG/M2 | OXYGEN SATURATION: 99 %

## 2019-05-16 DIAGNOSIS — J45.30 MILD PERSISTENT ASTHMA WITHOUT COMPLICATION: Primary | ICD-10-CM

## 2019-05-16 DIAGNOSIS — F32.A DEPRESSION, UNSPECIFIED DEPRESSION TYPE: Primary | ICD-10-CM

## 2019-05-16 DIAGNOSIS — J45.30 MILD PERSISTENT ASTHMA WITHOUT COMPLICATION: ICD-10-CM

## 2019-05-16 DIAGNOSIS — M06.9 RHEUMATOID ARTHRITIS INVOLVING MULTIPLE SITES, UNSPECIFIED RHEUMATOID FACTOR PRESENCE: ICD-10-CM

## 2019-05-16 RX ORDER — BUPROPION HYDROCHLORIDE 150 MG/1
150 TABLET ORAL
Qty: 90 TAB | Refills: 3 | Status: SHIPPED | OUTPATIENT
Start: 2019-05-16 | End: 2020-06-03 | Stop reason: SDUPTHER

## 2019-05-16 RX ORDER — SEMAGLUTIDE 1.34 MG/ML
INJECTION, SOLUTION SUBCUTANEOUS
Refills: 1 | COMMUNITY
Start: 2019-04-21 | End: 2022-03-24 | Stop reason: ALTCHOICE

## 2019-05-16 RX ORDER — BUDESONIDE AND FORMOTEROL FUMARATE DIHYDRATE 160; 4.5 UG/1; UG/1
AEROSOL RESPIRATORY (INHALATION)
Refills: 12 | COMMUNITY
Start: 2019-03-28

## 2019-05-16 RX ORDER — NALTREXONE HYDROCHLORIDE 50 MG/1
12.5 TABLET, FILM COATED ORAL DAILY
COMMUNITY
End: 2020-09-26

## 2019-05-16 RX ORDER — ONDANSETRON 4 MG/1
4 TABLET, FILM COATED ORAL
COMMUNITY
End: 2021-07-30 | Stop reason: ALTCHOICE

## 2019-05-16 RX ORDER — MONTELUKAST SODIUM 10 MG/1
10 TABLET ORAL DAILY
Qty: 90 TAB | Refills: 4 | Status: SHIPPED | OUTPATIENT
Start: 2019-05-16 | End: 2020-06-03 | Stop reason: SDUPTHER

## 2019-05-16 RX ORDER — NORETHINDRONE ACETATE AND ETHINYL ESTRADIOL 1MG-20(21)
KIT ORAL
COMMUNITY
End: 2020-04-18

## 2019-05-16 RX ORDER — DIPHENHYDRAMINE HCL 25 MG
CAPSULE ORAL
COMMUNITY
End: 2019-09-28 | Stop reason: ALTCHOICE

## 2019-05-16 RX ORDER — EPINEPHRINE 0.3 MG/.3ML
INJECTION INTRAMUSCULAR
Refills: 0 | COMMUNITY
Start: 2019-04-30

## 2019-05-16 NOTE — TELEPHONE ENCOUNTER
Lenetta Sever, MD Julio Dimitri, LPN   Caller: Unspecified (Today, 12:38 PM)             singulair prescribed      Responded to patients [employer] message, letting her know that a prescription for singulair has been sent to her pharmacy

## 2019-05-16 NOTE — PROGRESS NOTES
Reviewed record in preparation for visit and have obtained necessary documentation. Identified pt with two pt identifiers(name and ). Chief Complaint   Patient presents with   [employer] Maintenance Due   Topic Date Due    Pap Test  2012    Pneumococcal Vaccine (1 of 1 - PPSV23) 2017       Ms. Felicita Meyer has a reminder for a \"due or due soon\" health maintenance. I have asked that she discuss this further with her primary care provider for follow-up on this health maintenance. Coordination of Care Questionnaire:  :     1) Have you been to an emergency room, urgent care clinic since your last visit? no   Hospitalized since your last visit? no             2) Have you seen or consulted any other health care providers outside of [address] [place] [address] since your last visit? no  (Include any pap smears or colon screenings in this section.)    3) In the event something were to happen to you and you were unable to speak on your behalf, do you have an Advance Directive/ Living Will in place stating your wishes? NO    Do you have an Advance Directive on file? no    4) Are you interested in receiving information on Advance Directives? NO    Patient is accompanied by self I have received verbal consent from [employer] [facility] [facility] to discuss any/all medical information while they are present in the room.

## 2019-05-16 NOTE — TELEPHONE ENCOUNTER
Regarding: Visit Follow-Up Question  Contact: [phone]  ----- Message from [place], Generic sent at 5/16/2019 12:03 PM EDT -----    I forgot to clarify before I left the office, will you be prescribing singulair for my allergies? Again, I used to take it but it has been a few years. Currently taking zyrtec and allegra and Benadryl for my allergies daily.   thanks,  CG

## 2019-05-16 NOTE — PROGRESS NOTES
CC: Anxiety      HPI:    She is a 41 y.o. female who presents for evaluation of anxiety     In the interval had esophageal dilation     For the orencia infusion patient had power port placed in her right upper chest    For the bronchiolitis patient is seeing Dr Etelvina Stock and on Cherl Maple and Symbicort BID  Breo stopped due to cost and also easier to take Symbicort. Repeat CT chest coming up   She generally feels better - breathing is controlled     Rheumatoid arthritis involving multiple sites, unspecified rheumatoid factor presence ([address]  - patient is on methotrexate and Cayla Houston reports recent labs done at Dr Abhishek Ralph    Depression - well controlled now continue lexapro 20mg and wellbutrin 150mg. Feels mood is controlled. Emotional variation associated with menstrual cycle resolved    Obesity: work on weight loss, healthy diet and exercise  Recently started on ozempic and loosing weight ( had hemoglobin A1C 6.7) per Dr Bryce Thayer    [facility] FACILITY  Working full time  Has 1 child   Recently  engaged    ROS:  10 systems reviewed and negative other than HPI    Past Medical History:   Diagnosis Date    Acquired hypothyroidism     Adverse effect of anesthesia     labile BP during/after C section    Asthma     Broken bones     history of 9 broken bones    Chronic UTI (urinary tract infection)     \"extra valve right kidney causes UTI\"    Fibromyalgia     Gestational diabetes     GI bleed 2007,2011,2015,2016    lower GI last colonoscopy in 2016 normal     Huntingtons chorea ([address]     Hyperhydrosis disorder     Ill-defined condition     [place]/ tested genetically - daughter has [place]    Infertility     PCOS    PCOS (polycystic ovarian syndrome)     Precancerous skin lesion     removed from Right shoulder    Preeclampsia 2011    pregnancy    Reactive airway disease     Rheumatoid arthritis ([address]     SVT (supraventricular tachycardia) ([address] 2011    occured during pregnancy when picc line inserted.        Current Outpatient Medications on File Prior to Visit   Medication Sig Dispense Refill    norethindrone-ethinyl estradiol (JUNEL FE 1/20, 28,) 1 mg-20 mcg (21)/75 mg (7) tab Junel FE 1/20 (28) 1 mg-20 mcg (21)/75 mg (7) tablet   TAKE 1 TABLET BY MOUTH EVERY DAY      SYMBICORT 160-4.5 mcg/actuation HFAA TAKE 2 PUFFS BY MOUTH TWICE A DAY  12    OZEMPIC 0.25 mg or 0.5 mg(2 mg/1.5 mL) sub-q pen INJECT 0.5MG SUBCUTAMEOUSLY ONCE PER WEEK ON FRIDAY  1    ondansetron hcl (ZOFRAN) 4 mg tablet Take 4 mg by mouth every eight (8) hours as needed for Nausea.  naltrexone HCl (NALTREXONE PO) Take  by mouth.  escitalopram oxalate (LEXAPRO) 20 mg tablet TAKE 1 TABLET BY MOUTH EVERY DAY 90 Tab 3    propranolol LA (INDERAL LA) 60 mg SR capsule TAKE 1 CAPSULE BY MOUTH EVERY DAY 30 Cap 0    traMADol (ULTRAM) 50 mg tablet Take 50 mg by mouth every six (6) hours as needed for Pain.  oxybutynin (DITROPAN) 5 mg tablet TAKE 1/2 TAB DAILY X1WEEK THEN INCREASE TO 1/2 TAB 2XDAY X2WKS THEN 1 TAB 2XDAY  3    BD INSULIN SYRINGE 1 mL 25 gauge x 5/8\" syrg USE 1 SYRINGE ONCE A WEEK  11    predniSONE (DELTASONE) 10 mg tablet TAKE 1-4 TABLETS ONCE A DAY AS NEEDED  2    abatacept (ORENCIA SC) by SubCUTAneous route.  cyanocobalamin, vitamin B-12, (VITAMIN B-12 PO) Take  by mouth.  albuterol (PROVENTIL HFA, VENTOLIN HFA, PROAIR HFA) 90 mcg/actuation inhaler Take 2 Puffs by inhalation every four (4) hours as needed for Wheezing.  1 Inhaler 0    RESTASIS 0.05 % ophthalmic emulsion INSTILL 1 DROP INTO EACH EYE TWICE DAILY  4    SAFETY-ABDI TB SYR 1CC/25GX5/8\" 1 mL 25 gauge x 5/8\" syrg USE TO INJECT METHOTREXATE ONCE A WEEK  2    methotrexate 25 mg/mL chemo injection INJECT 25MG (1ML) subcutaneously ONCE WEEKLY  0    metFORMIN ER (GLUCOPHAGE XR) 500 mg tablet 1,000 TAKE 2 TABLET BY MOUTH TWICE A DAY AS DIRECTED  1    folic acid (FOLVITE) 1 mg tablet TAKE 1 TABLET ORALLY DAILY  11    Nebulizer & Compressor machine 1 Each by Does Not Apply route three (3) times daily as needed. 1 Each 0    glycopyrrolate (ROBINUL) 1 mg tablet Take 2 mg by mouth two (2) times a day. Indications: hyperhydrosis      Cetirizine (ZYRTEC) 10 mg Cap Take 10 mg by mouth daily. Indications: ALLERGIC RHINITIS      fexofenadine-pseudoephedrine (ALLEGRA-D 24 HOUR) 180-240 mg per tablet Take 1 Tab by mouth nightly.  cholecalciferol, vitamin D3, (VITAMIN D3) 2,000 unit Tab Take 1 Tab by mouth daily.  levothyroxine (SYNTHROID) 75 mcg tablet Take 75 mcg by mouth Daily (before breakfast).  diphenhydrAMINE (BENADRYL) 25 mg capsule Benadryl      EPIPEN 2-SANJAY 0.3 mg/0.3 mL injection 1 (ONE) INJECTION AS NEEDED  0    desogestrel-ethinyl estradiol (DESOGEN) 0.15-0.03 mg tab Take 1 Tab by mouth nightly. No current facility-administered medications on file prior to visit.         Past Surgical History:   Procedure Laterality Date    COLONOSCOPY N/A 6/13/2016    COLONOSCOPY performed by Kylie Noel [facility] at [facility] ENDOSCOPY    HX HEENT      HX WISDOM TEETH EXTRACTION  2004    UPPER GI ENDOSCOPY,BIOPSY  2/12/2019         UPPER GI ENDOSCOPY,DILATN W GUIDE  2/12/2019            Family History   Problem Relation Age of Onset    Other Mother         Camden's disease    Hypertension Mother     Dementia Mother     High Cholesterol Father     Heart Disease Father     Diabetes Father     Hypertension Father     Asthma Brother     Diabetes Brother     Other Brother         varicocele, hernias    Hypertension Brother     Alcohol abuse Brother     Hypertension Maternal Grandmother     Elevated Lipids Maternal Grandmother     Cancer Maternal Grandmother         breast    Other Maternal Grandmother         polymyalgia rheumatica    Glaucoma Maternal Grandmother     Cancer Maternal Grandfather         prostate    Huntingtons disease Maternal Grandfather     Cancer Paternal Grandmother         lung with mets    Cancer Paternal Grandfather prostate, colon    Diabetes Paternal Grandfather     Heart Disease Paternal Grandfather     Alzheimer Paternal Grandfather     Dementia Paternal Grandfather      Reviewed and no changes            Visit Vitals  /82 (BP 1 Location: Right arm, BP Patient Position: Sitting)   Pulse 97   Temp 97.8 °F (36.6 °C) (Oral)   Resp 18   Ht 5' 6\" (1.676 m)   Wt 228 lb (103.4 kg)   SpO2 99%   BMI 36.80 kg/m²       Physical Examination:   General - Well appearing female  HEENT - PERRL, TM no erythema/opacification, normal nasal turbinates, oropharynx no erythema or exudate, MMM  Neck - supple, no bruits, no TMG, no LAD  Pulm - clear to auscultation bilaterally  Cardio - RRR, normal S1 S2, no murmur gallops or rubs  Abd - soft, nontender, no masses, no HSM  Extrem - no edema, +2 distal pulses  Psych - normal affect, appropriate mood    Lab Results   Component Value Date/Time    WBC 5.8 04/16/2017 11:02 AM    HGB 13.4 04/16/2017 11:02 AM    HCT 39.2 04/16/2017 11:02 AM    PLATELET 417 13/60/4658 11:02 AM    MCV 91.0 04/16/2017 11:02 AM     Lab Results   Component Value Date/Time    Sodium 139 04/16/2017 11:02 AM    Potassium 4.2 04/16/2017 11:02 AM    Chloride 106 04/16/2017 11:02 AM    CO2 23 04/16/2017 11:02 AM    Anion gap 10 04/16/2017 11:02 AM    Glucose 96 04/16/2017 11:02 AM    BUN 10 04/16/2017 11:02 AM    Creatinine 0.86 04/16/2017 11:02 AM    BUN/Creatinine ratio 12 04/16/2017 11:02 AM    GFR est AA >60 04/16/2017 11:02 AM    GFR est non-AA >60 04/16/2017 11:02 AM    Calcium 8.5 04/16/2017 11:02 AM     No results found for: CHOL, CHOLPOCT, CHOLX, CHLST, CHOLV, HDL, HDLPOC, LDL, LDLCPOC, LDLC, DLDLP, VLDLC, VLDL, TGLX, TRIGL, TRIGP, TGLPOCT, CHHD, CHHDX  Lab Results   Component Value Date/Time    TSH 1.48 10/30/2015 05:11 PM     No results found for: PSA, Daren Hew, PSAR3, [id number], [id number], PSALT  Lab Results   Component Value Date/Time    Hemoglobin A1c 5.8 07/03/2011 12:10 PM     No results found for: Wenceslao Francis, [facility]    Lab Results   Component Value Date/Time    ALT (SGPT) 35 04/16/2017 11:02 AM    AST (SGOT) 22 04/16/2017 11:02 AM    Alk. phosphatase 56 04/16/2017 11:02 AM    Bilirubin, total 0.4 04/16/2017 11:02 AM           Assessment/Plan:    1. Depression, unspecified depression type  Well controlled on lexapro and wellbutrin  - buPROPion XL (WELLBUTRIN XL) 150 mg tablet; Take 1 Tab by mouth every morning. Dispense: 90 Tab; Refill: 3    2. Rheumatoid arthritis involving multiple sites, unspecified rheumatoid factor presence ([facility]  Followed by Dr Mariangel Boston    3. Asthma and allergies/ hx of bronchiolitis  - doing better, on symbicort. Refilled singulair today. Followed by Dr Ramu Concepcion    4. Hypothyroidism: euthyroid followed by Dr Aleksandr Muhammad. 5. Diabetes type 2: well controlled and on ozempic which is helping with weight loss        Follow-up and Dispositions    · Return in about 6 months (around 11/16/2019) for depression.          Mya Neri MD

## 2019-05-17 ENCOUNTER — OFFICE VISIT (OUTPATIENT)
Dept: NEUROLOGY | Age: 39
End: 2019-05-17

## 2019-05-17 VITALS
WEIGHT: 230 LBS | DIASTOLIC BLOOD PRESSURE: 78 MMHG | SYSTOLIC BLOOD PRESSURE: 110 MMHG | HEART RATE: 90 BPM | RESPIRATION RATE: 18 BRPM | BODY MASS INDEX: 37.12 KG/M2 | OXYGEN SATURATION: 98 %

## 2019-05-17 DIAGNOSIS — R25.1 EXCESSIVE PHYSIOLOGIC TREMOR: Primary | ICD-10-CM

## 2019-05-17 DIAGNOSIS — M06.9 RHEUMATOID ARTHRITIS INVOLVING MULTIPLE SITES, UNSPECIFIED RHEUMATOID FACTOR PRESENCE: ICD-10-CM

## 2019-05-17 DIAGNOSIS — F32.A DEPRESSION, UNSPECIFIED DEPRESSION TYPE: ICD-10-CM

## 2019-05-17 DIAGNOSIS — M79.7 FIBROMYALGIA: ICD-10-CM

## 2019-05-17 RX ORDER — PROPRANOLOL HYDROCHLORIDE 60 MG/1
CAPSULE, EXTENDED RELEASE ORAL
Qty: 90 CAP | Refills: 3 | Status: SHIPPED | OUTPATIENT
Start: 2019-05-17 | End: 2020-10-28 | Stop reason: SDUPTHER
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
## 2019-05-17 NOTE — PATIENT INSTRUCTIONS
[facility]   Statement to Patients  April 1, 2014      In an effort to ensure the large volume of patient prescription refills is processed in the most efficient and expeditious manner, we are asking our patients to assist us by calling your Pharmacy for all prescription refills, this will include also your  Mail Order Pharmacy. The pharmacy will contact our office electronically to continue the refill process. Please do not wait until the last minute to call your pharmacy. We need at least 48 hours (2days) to fill prescriptions. We also encourage you to call your pharmacy before going to  your prescription to make sure it is ready. With regard to controlled substance prescription refill requests (narcotic refills) that need to be picked up at our office, we ask your cooperation by providing us with at least 72 hours (3days) notice that you will need a refill. We will not refill narcotic prescription refill requests after 4:00pm on any weekday, Monday through Thursday, or after 2:00pm on Fridays, or on the weekends. We encourage everyone to explore another way of getting your prescription refill request processed using [employer], our patient web portal through our electronic medical record system. [employer] is an efficient and effective way to communicate your medication request directly to the office and  downloadable as an otf on your smart phone . [employer] also features a review functionality that allows you to view your medication list as well as leave messages for your physician. Are you ready to get connected? If so please review the attatched instructions or speak to any of our staff to get you set up right away! Thank you so much for your cooperation. Should you have any questions please contact our Practice Administrator.     The Physicians and Staff,  [facility]

## 2019-06-09 ENCOUNTER — OFFICE VISIT (OUTPATIENT)
Dept: URGENT CARE | Age: 39
End: 2019-06-09

## 2019-06-09 VITALS
HEART RATE: 62 BPM | TEMPERATURE: 98.1 F | WEIGHT: 226 LBS | OXYGEN SATURATION: 97 % | SYSTOLIC BLOOD PRESSURE: 141 MMHG | BODY MASS INDEX: 36.32 KG/M2 | HEIGHT: 66 IN | RESPIRATION RATE: 18 BRPM | DIASTOLIC BLOOD PRESSURE: 65 MMHG

## 2019-06-09 DIAGNOSIS — J04.0 ACUTE LARYNGITIS: ICD-10-CM

## 2019-06-09 DIAGNOSIS — J45.20 MILD INTERMITTENT ASTHMA WITHOUT COMPLICATION: ICD-10-CM

## 2019-06-09 DIAGNOSIS — J20.9 ACUTE BRONCHITIS, UNSPECIFIED ORGANISM: Primary | ICD-10-CM

## 2019-06-09 RX ORDER — DOXYCYCLINE 100 MG/1
100 CAPSULE ORAL 2 TIMES DAILY
Qty: 20 CAP | Refills: 0 | Status: SHIPPED | OUTPATIENT
Start: 2019-06-09 | End: 2019-08-01 | Stop reason: ALTCHOICE

## 2019-06-09 RX ORDER — ALBUTEROL SULFATE 90 UG/1
2 AEROSOL, METERED RESPIRATORY (INHALATION)
Qty: 1 INHALER | Refills: 0 | Status: SHIPPED | OUTPATIENT
Start: 2019-06-09

## 2019-06-09 RX ORDER — METHYLPREDNISOLONE 4 MG/1
TABLET ORAL
Qty: 1 DOSE PACK | Refills: 0 | Status: SHIPPED | OUTPATIENT
Start: 2019-06-09 | End: 2019-08-01 | Stop reason: ALTCHOICE

## 2019-06-09 RX ORDER — BENZONATATE 200 MG/1
200 CAPSULE ORAL
Qty: 21 CAP | Refills: 0 | Status: SHIPPED | OUTPATIENT
Start: 2019-06-09 | End: 2019-06-16

## 2019-06-09 NOTE — PROGRESS NOTES
Cold Symptoms   The history is provided by the patient. This is a new problem. The current episode started more than 1 week ago. The problem occurs every few minutes. The problem has not changed since onset. The cough is productive of sputum. There has been no fever. Associated symptoms include sore throat and wheezing. Pertinent negatives include no chest pain. She has tried nothing for the symptoms. She is not a smoker. Her past medical history does not include asthma. Past Medical History:   Diagnosis Date    Acquired hypothyroidism     Adverse effect of anesthesia     labile BP during/after C section    Asthma     Broken bones     history of 9 broken bones    Chronic UTI (urinary tract infection)     \"extra valve right kidney causes UTI\"    Fibromyalgia     Gestational diabetes     GI bleed 2007,2011,2015,2016    lower GI last colonoscopy in 2016 normal     Huntingtons chorea (HCC)     Hyperhydrosis disorder     Ill-defined condition     Chris/ tested genetically - daughter has Chris    Infertility     PCOS    PCOS (polycystic ovarian syndrome)     Precancerous skin lesion     removed from Right shoulder    Preeclampsia 2011    pregnancy    Reactive airway disease     Rheumatoid arthritis (HCC)     SVT (supraventricular tachycardia) ([address] 2011    occured during pregnancy when picc line inserted.         Past Surgical History:   Procedure Laterality Date    COLONOSCOPY N/A 6/13/2016    [facility] performed [facility]undra [facility] ENDOSCOPY    HX HEENT      HX WISDOM TEETH EXTRACTION  2004    UPPER GI ENDOSCOPY,BIOPSY  2/12/2019         UPPER GI ENDOSCOPY,DILATN W GUIDE  2/12/2019              Family History   Problem Relation Age of Onset    Other Mother         [place]'s disease    Hypertension Mother     Dementia Mother     High Cholesterol Father     Heart Disease Father     Diabetes Father     Hypertension Father     Asthma Brother     Diabetes Brother     Other Brother         varicocele, hernias    Hypertension Brother     Alcohol abuse Brother     Hypertension Maternal Grandmother     Elevated Lipids Maternal Grandmother     Cancer Maternal Grandmother         breast    Other Maternal Grandmother         polymyalgia rheumatica    Glaucoma Maternal Grandmother     Cancer Maternal Grandfather         prostate    Huntingtons disease Maternal Grandfather     Cancer Paternal Grandmother         lung with mets    Cancer Paternal Grandfather         prostate, colon    Diabetes Paternal Grandfather     Heart Disease Paternal Grandfather     Alzheimer Paternal Grandfather     Dementia Paternal Grandfather         Social History     Socioeconomic History    Marital status:      Spouse name: Not on file    Number of children: Not on file    Years of education: Not on file    Highest education level: Not on file   Occupational History    Not on file   Social Needs    Financial resource strain: Not on file    Food insecurity:     Worry: Not on file     Inability: Not on file    Transportation needs:     Medical: Not on file     Non-medical: Not on file   Tobacco Use    Smoking status: Never Smoker    Smokeless tobacco: Never Used   Substance and Sexual Activity    Alcohol use: Yes     Comment: few drinks per year    Drug use: No    Sexual activity: Not on file   Lifestyle    Physical activity:     Days per week: Not on file     Minutes per session: Not on file    Stress: Not on file   Relationships    Social connections:     Talks on phone: Not on file     Gets together: Not on file     Attends Anabaptist service: Not on file     Active member of club or organization: Not on file     Attends meetings of clubs or organizations: Not on file     Relationship status: Not on file    Intimate partner violence:     Fear of current or ex partner: Not on file     Emotionally abused: Not on file     Physically abused: Not on file Forced sexual activity: Not on file   Other Topics Concern    Not on file   Social History Narrative    ** Merged History Encounter **                     ALLERGIES: Latex; Entex [phenylephrine-guaifenesin]; Mucinex [guaifenesin]; and Pepto-bismol [bismuth subsalicylate]    Review of Systems   HENT: Positive for congestion, sore throat and voice change. Respiratory: Positive for chest tightness and wheezing. Cardiovascular: Negative for chest pain. All other systems reviewed and are negative. Vitals:    06/09/19 1117   BP: 141/65   Pulse: 62   Resp: 18   Temp: 98.1 °F (36.7 °C)   SpO2: 97%   Weight: 226 lb (102.5 kg)   Height: 5' 6\" (1.676 m)       Physical Exam   Constitutional: No distress. HENT:   Right Ear: Tympanic membrane and ear canal normal.   Left Ear: Tympanic membrane and ear canal normal.   Nose: Nose normal.   Mouth/Throat: No oropharyngeal exudate, posterior oropharyngeal edema or posterior oropharyngeal erythema. Eyes: Conjunctivae are normal. Right eye exhibits no discharge. Left eye exhibits no discharge. Neck: Neck supple. Pulmonary/Chest: Effort normal. No respiratory distress. She has decreased breath sounds. She has no wheezes. She has rhonchi. She has no rales. Lymphadenopathy:     She has no cervical adenopathy. Skin: No rash noted. Nursing note and vitals reviewed. MDM    Procedures        ICD-10-CM ICD-9-CM    1. Acute bronchitis, unspecified organism J20.9 466.0    2. Acute laryngitis J04.0 464.00    3. Mild intermittent asthma without complication W01.41 344.49 albuterol (PROVENTIL HFA, VENTOLIN HFA, PROAIR HFA) 90 mcg/actuation inhaler     Medications Ordered Today   Medications    benzonatate (TESSALON) 200 mg capsule     Sig: Take 1 Cap by mouth three (3) times daily as needed for Cough for up to 7 days.      Dispense:  21 Cap     Refill:  0    methylPREDNISolone (MEDROL, SANJAY,) 4 mg tablet     Sig: As directed     Dispense:  1 Dose Pack     Refill:  0  albuterol (PROVENTIL HFA, VENTOLIN HFA, PROAIR HFA) 90 mcg/actuation inhaler     Sig: Take 2 Puffs by inhalation every four (4) hours as needed for Wheezing. Dispense:  1 Inhaler     Refill:  0    doxycycline (MONODOX) 100 mg capsule     Sig: Take 1 Cap by mouth two (2) times a day. Dispense:  20 Cap     Refill:  0     No results found for any visits on 06/09/19. The patients condition was discussed with the patient and they understand. The patient is to follow up with primary care doctor. If signs and symptoms become worse the pt is to go to the ER. The patient is to take medications as prescribed.

## 2019-06-09 NOTE — PATIENT INSTRUCTIONS
Bronchitis: Care Instructions  Your Care Instructions    Bronchitis is inflammation of the bronchial tubes, which carry air to the lungs. The tubes swell and produce mucus, or phlegm. The mucus and inflamed bronchial tubes make you cough. You may have trouble breathing. Most cases of bronchitis are caused by viruses like those that cause colds. Antibiotics usually do not help and they may be harmful. Bronchitis usually develops rapidly and lasts about 2 to 3 weeks in otherwise healthy people. Follow-up care is a key part of your treatment and safety. Be sure to make and go to all appointments, and call your doctor if you are having problems. It's also a good idea to know your test results and keep a list of the medicines you take. How can you care for yourself at home? · Take all medicines exactly as prescribed. Call your doctor if you think you are having a problem with your medicine. · Get some extra rest.  · Take an over-the-counter pain medicine, such as acetaminophen (Tylenol), ibuprofen (Advil, Motrin), or naproxen (Aleve) to reduce fever and relieve body aches. Read and follow all instructions on the label. · Do not take two or more pain medicines at the same time unless the doctor told you to. Many pain medicines have acetaminophen, which is Tylenol. Too much acetaminophen (Tylenol) can be harmful. · Take an over-the-counter cough medicine that contains dextromethorphan to help quiet a dry, hacking cough so that you can sleep. Avoid cough medicines that have more than one active ingredient. Read and follow all instructions on the label. · Breathe moist air from a humidifier, hot shower, or sink filled with hot water. The heat and moisture will thin mucus so you can cough it out. · Do not smoke. Smoking can make bronchitis worse. If you need help quitting, talk to your doctor about stop-smoking programs and medicines. These can increase your chances of quitting for good.   When should you call for help? Call 911 anytime you think you may need emergency care. For example, call if:    · You have severe trouble breathing.    Call your doctor now or seek immediate medical care if:    · You have new or worse trouble breathing.     · You cough up dark brown or bloody mucus (sputum).     · You have a new or higher fever.     · You have a new rash.    Watch closely for changes in your health, and be sure to contact your doctor if:    · You cough more deeply or more often, especially if you notice more mucus or a change in the color of your mucus.     · You are not getting better as expected. Where can you learn more? Go to http://amanda-laz.info/. Enter H333 in the search box to learn more about \"Bronchitis: Care Instructions. \"  Current as of: September 5, 2018  Content Version: 11.9  © 3091-7141 [employer]. Care instructions adapted under license by [employer] (which disclaims liability or warranty for this information). If you have questions about a medical condition or this instruction, always ask your healthcare professional. [place] [address] any warranty or liability for your use of this information. Laryngitis: Care Instructions  Your Care Instructions    Laryngitis is an inflammation of the voice box (larynx) that causes your voice to become raspy or hoarse. It can be short-lived or long-lasting. Most of the time, laryngitis comes on quickly and lasts as long as 2 weeks. It is caused by overuse, irritation, or infection of the vocal cords inside the larynx. Some of the most common causes are a cold, the flu, or allergies. Loud talking, shouting, cheering, or singing also can cause laryngitis. Stomach acid that backs up into the throat also can make you lose your voice. Resting your voice and taking other steps at home can help you get your voice back. Follow-up care is a key part of your treatment and safety.  Be sure to make and go to all appointments, and call your doctor if you are having problems. It's also a good idea to know your test results and keep a list of the medicines you take. How can you care for yourself at home? · Follow your doctor's directions for treating the condition that caused you to lose your voice. If your doctor prescribed antibiotics, take them as directed. Do not stop taking them just because you feel better. You need to take the full course of antibiotics. · Before you use cough and cold medicines, check the label. They may not be safe for young children or for people with certain health problems. · Try to keep stomach acid from backing up into your throat. Do not eat just before bedtime. Reduce the amount of coffee and alcohol you drink, and eat healthy foods. Taking over-the-counter acid reducers can help when these steps are not enough. In some cases, you may need prescription medicine. · Rest your voice. You do not have to stop speaking, but use your voice as little as possible. Speak softly but do not whisper; whispering can bother your larynx more than speaking softly. Avoid talking on the telephone or trying to speak loudly. · Try not to clear your throat. This can cause more irritation of your larynx. Take an over-the-counter cough suppressant (if your doctor recommends it) if you have a dry cough that does not produce mucus. · Do not smoke or allow others to smoke around you. If you need help quitting, talk to your doctor about stop-smoking programs and medicines. These can increase your chances of quitting for good. · Use a humidifier or vaporizer to add moisture to your bedroom. Humidity helps to thin the mucus in the nasal membranes that causes stuffiness or postnasal drip. Follow the directions for cleaning the machine. · Drink plenty of fluids, enough so that your urine is light yellow or clear like water.  If you have kidney, heart, or liver disease and have to limit fluids, talk with your doctor before you increase the amount of fluids you drink. · Use saline (saltwater) nasal washes to help keep your nasal passages open and wash out mucus and bacteria. You can buy saline nose drops at a grocery store or drugstore. Or, you can make your own at home by mixing ½ teaspoon salt, 1 cup water (at room temperature), and ½ teaspoon baking soda. If you make your own, fill a bulb syringe with the solution, insert the tip into your nostril, and squeeze gently. [place]  your nose. When should you call for help? Call 911 anytime you think you may need emergency care. For example, call if:    · You have trouble breathing.    Call your doctor now or seek immediate medical care if:    · You have new or worse pain.     · You have trouble swallowing.    Watch closely for changes in your health, and be sure to contact your doctor if:    · You do not get better as expected. Where can you learn more? Go to http://amanda-laz.info/. Enter Z371 in the search box to learn more about \"Laryngitis: Care Instructions. \"  Current as of: March 27, 2018  Content Version: 11.9  © 4573-7795 [employer]. Care instructions adapted under license by "[employer]" (which disclaims liability or warranty for this information). If you have questions about a medical condition or this instruction, always ask your healthcare professional. [place] [address] any warranty or liability for your use of this information.

## 2019-06-13 ENCOUNTER — TELEPHONE (OUTPATIENT)
Dept: INTERNAL MEDICINE CLINIC | Age: 39
End: 2019-06-13

## 2019-06-13 ENCOUNTER — OFFICE VISIT (OUTPATIENT)
Dept: PRIMARY CARE CLINIC | Age: 39
End: 2019-06-13

## 2019-06-13 VITALS
BODY MASS INDEX: 35.97 KG/M2 | HEART RATE: 99 BPM | RESPIRATION RATE: 18 BRPM | WEIGHT: 223.8 LBS | HEIGHT: 66 IN | DIASTOLIC BLOOD PRESSURE: 78 MMHG | OXYGEN SATURATION: 95 % | TEMPERATURE: 98.3 F | SYSTOLIC BLOOD PRESSURE: 116 MMHG

## 2019-06-13 DIAGNOSIS — J45.30 MILD PERSISTENT ASTHMA WITHOUT COMPLICATION: ICD-10-CM

## 2019-06-13 DIAGNOSIS — B37.0 THRUSH, ORAL: Primary | ICD-10-CM

## 2019-06-13 DIAGNOSIS — J40 BRONCHITIS: ICD-10-CM

## 2019-06-13 DIAGNOSIS — J01.90 ACUTE SINUSITIS, RECURRENCE NOT SPECIFIED, UNSPECIFIED LOCATION: ICD-10-CM

## 2019-06-13 RX ORDER — NYSTATIN 100000 [USP'U]/ML
1 SUSPENSION ORAL 4 TIMES DAILY
Qty: 200 ML | Refills: 0 | Status: SHIPPED | OUTPATIENT
Start: 2019-06-13 | End: 2019-06-23

## 2019-06-13 NOTE — TELEPHONE ENCOUNTER
----- Message from Azalea Fleischer sent at 6/13/2019  9:15 AM EDT -----  Regarding: Dr. Annell Dancer is requesting a sooner appt. Than first available June 21st. She has \"thrush\" on her tongue and would like to be seen today. Pt's best contact number is [phone].        Message copied/pasted from [facility]

## 2019-06-13 NOTE — TELEPHONE ENCOUNTER
Spoke with patient. Two pt identifiers confirmed. Patient states that she was seen at the [facility] this morning and was diagnosed with bronchitis, sinus infection and thrush. Patient states that she was prescribed medication to help. Patient advised if she is not getting better to let us know. Pt verbalized understanding of information discussed w/ no further questions at this time.

## 2019-06-13 NOTE — PATIENT INSTRUCTIONS
Candidiasis: Care Instructions  Your Care Instructions  Candidiasis (say \"ape-lgf-EC-uh-stephen\") is a yeast infection. Yeast normally lives in your body. But it can cause problems if your body's defenses don't work as they should. Some medicines can increase your chance of getting a yeast infection. These include antibiotics, steroids, and cancer drugs. And some diseases like AIDS and diabetes can make you more likely to get yeast infections. There are different types of yeast infections. Amelie Cure is a yeast infection in the mouth. It usually occurs in people with weak immune systems. It causes white patches inside the mouth and throat. Yeast infections of the skin usually occur in skin folds where the skin stays moist. They cause red, oozing patches on your skin. Babies can get these infections under the diaper. People who often wear gloves can get them on their hands. Many women get vaginal yeast infections. They are most common when women take antibiotics. These infections can cause the vagina to itch and burn. They also cause white discharge that looks like cottage cheese. In rare cases, yeast infects the blood. This can cause serious disease. This kind of infection is treated with medicine given through a needle into a vein (IV). After you start treatment, a yeast infection usually goes away quickly. But if your immune system is weak, the infection may come back. Tell your doctor if you get yeast infections often. Follow-up care is a key part of your treatment and safety. Be sure to make and go to all appointments, and call your doctor if you are having problems. It's also a good idea to know your test results and keep a list of the medicines you take. How can you care for yourself at home? · Take your medicines exactly as prescribed. Call your doctor if you think you are having a problem with your medicine. · Use antibiotics only as directed by your doctor. · Eat yogurt with live cultures.  It has bacteria called lactobacillus. It may help prevent some types of yeast infections. · Keep your skin clean and dry. Put powder on moist places. · If you are using a cream or suppository to treat a vaginal yeast infection, don't use condoms or a diaphragm. Use a different type of birth control. · Eat a healthy diet and get regular exercise. This will help keep your immune system strong. When should you call for help? Watch closely for changes in your health, and be sure to contact your doctor if:    · You do not get better as expected. Where can you learn more? Go to http://amanda-laz.info/. Enter I712 in the search box to learn more about \"Candidiasis: Care Instructions. \"  Current as of: May 14, 2018  Content Version: 11.9  © 6842-3594 [employer]. Care instructions adapted under license by [employer] (which disclaims liability or warranty for this information). If you have questions about a medical condition or this instruction, always ask your healthcare professional. [address] any warranty or liability for your use of this information.

## 2019-06-13 NOTE — PROGRESS NOTES
Identified pt with two pt identifiers(name and ). Reviewed record in preparation for visit and have obtained necessary documentation. Chief Complaint   Patient presents with    Sore Throat     pain 8/10, thrush? Visit Vitals  /78 (BP 1 Location: Left arm, BP Patient Position: Sitting)   Pulse 99   Temp 98.3 °F (36.8 °C) (Oral)   Resp 18   Ht 5' 6\" (1.676 m)   Wt 223 lb 12.8 oz (101.5 kg)   SpO2 95%   BMI 36.12 kg/m²       Health Maintenance Due   Topic    PAP AKA CERVICAL CYTOLOGY     Pneumococcal 0-64 years (1 of  - PPSV23)       Coordination of Care Questionnaire:  :   1) Have you been to an emergency room, urgent care, or hospitalized since your last visit? If yes, where when, and reason for visit? no       2. Have seen or consulted any other health care provider since your last visit? If yes, where when, and reason for visit? NO      3) Do you have an Advanced Directive/ Living Will in place? NO  If yes, do we have a copy on file NO  If no, would you like information YES    Patient is accompanied by self I have received verbal consent from [place] to discuss any/all medical information while they are present in the room.

## 2019-06-13 NOTE — PROGRESS NOTES
Subjective:   Rocael Chan is a 41 y.o. female who complains of sore throat and post nasal drainage for 2-3 days, gradually worsening since that time. Tongue feels raw and sore, diminished taste, white coating on tongue. Also has sinus pressure and drainage. Seen at [address] [place] [address] on 6/9 for bronchitis. Treated with doxy, steroid taper, tessalon, and albuterol inh. She has RA and currently on orencia & methotrexate. Also being treated for early interstitial lung disease with xolair, symbicort, singulair. Taking zyrtec, Allegra D daily and benadryl prn. Patient does not smoke cigarettes. Relevant PMH:   Past Medical History:   Diagnosis Date    Acquired hypothyroidism     Adverse effect of anesthesia     labile BP during/after C section    Asthma     Broken bones     history of 9 broken bones    Chronic UTI (urinary tract infection)     \"extra valve right kidney causes UTI\"    Fibromyalgia     Gestational diabetes     GI bleed 2007,2011,2015,2016    lower GI last colonoscopy in 2016 normal     Huntingtons chorea (HCC)     Hyperhydrosis disorder     Ill-defined condition     Chris/ tested genetically - daughter has [place]    Infertility     PCOS    PCOS (polycystic ovarian syndrome)     Precancerous skin lesion     removed from Right shoulder    Preeclampsia 2011    pregnancy    Reactive airway disease     Rheumatoid arthritis (HCC)     SVT (supraventricular tachycardia) ([address] 2011    occured during pregnancy when picc line inserted.      Past Surgical History:   Procedure Laterality Date    COLONOSCOPY N/A 6/13/2016    [facility] ENDOSCOPY    HX HEENT      HX WISDOM TEETH EXTRACTION  2004    UPPER GI ENDOSCOPY,BIOPSY  2/12/2019         UPPER GI ENDOSCOPY,DILATN W GUIDE  2/12/2019          Allergies   Allergen Reactions    Latex Swelling    Entex [Phenylephrine-Guaifenesin] Anaphylaxis, Hives and Palpitations    Mucinex [Guaifenesin] Anaphylaxis and Hives    Pepto-Bismol [Bismuth Subsalicylate] Nausea and Vomiting         Review of Systems  Pertinent items are noted in HPI. Objective:     Visit Vitals  /78 (BP 1 Location: Left arm, BP Patient Position: Sitting)   Pulse 99   Temp 98.3 °F (36.8 °C) (Oral)   Resp 18   Ht 5' 6\" (1.676 m)   Wt 223 lb 12.8 oz (101.5 kg)   SpO2 95%   BMI 36.12 kg/m²     General:  alert, cooperative, no distress   Eyes: negative   Ears: normal TM's and external ear canals AU   Sinuses: tenderness over bilateral maxillary   Mouth:  Lips, mucosa, and tongue normal. Teeth and gums normal and abnormal findings: thrush and mild oropharyngeal erythema   Neck: supple, symmetrical, trachea midline and no adenopathy. Heart: S1 and S2 normal, no murmurs noted. Lungs: clear to auscultation bilaterally          Assessment/Plan:       ICD-10-CM ICD-9-CM    1. Thrush, oral B37.0 112.0    2. Bronchitis J40 490    3. Acute sinusitis, recurrence not specified, unspecified location J01.90 461.9    4. Mild persistent asthma without complication P57.18 784.69      Orders Placed This Encounter    nystatin (MYCOSTATIN) 100,000 unit/mL suspension     Continue doxy, steroids, and other meds. Rinse mouth after every inhaler use. Nasal saline rinse. Suggested symptomatic OTC remedies. RTC prn. Tracie Wayne NP  This note will not be viewable in [address]

## 2019-07-02 ENCOUNTER — OFFICE VISIT (OUTPATIENT)
Dept: NEUROLOGY | Age: 39
End: 2019-07-02

## 2019-07-02 DIAGNOSIS — F41.8 ANXIETY WITH SOMATIC FEATURES: Primary | ICD-10-CM

## 2019-07-02 DIAGNOSIS — G31.84 MILD COGNITIVE IMPAIRMENT: ICD-10-CM

## 2019-07-02 DIAGNOSIS — F32.0 MILD MAJOR DEPRESSION (HCC): ICD-10-CM

## 2019-07-02 NOTE — PROGRESS NOTES
Psychoeducational and supportive psychotherapy and feedback session with the patient today. I reviewed the results of the recent Neuropsychological Evaluation, including discussing individual tests as well as patient's areas of neurocognitive strength versus weakness. Prior to seeing the patient I reviewed the records, including the previously completed report, the records in [place], and any updated visits from other providers since I saw the patient last.      We discussed, in detail, the following: This patient generated a predominantly normal range Neuropsychological Evaluation with respect to neurocognitive functioning. In this regard, the only impairments noted were for sustained visual attention and bilateral fine motor dexterity. Otherwise, her performance across all other neurocognitive domains assessed, including mental status, verbal fluency, confrontation naming, auditory learning and memory, visual organization, visual memory, working memory, processing speed, perceptual reasoning, verbal comprehension, executive functioning, and bilateral motor speed were within or above the normal range. From an emotional standpoint, there is moderate anxiety and mild depression along with a psychological component/exacerbation to her physical pain issues.                   It is my opinion that the patient's reported (but not clinically corroborated) day-to-day memory problems are secondary to emotional distress. She does have mild problems related to attention and moderate problems related to fine motor dexterity. Consider treatment for same including medication for attention if not medically contraindicated and an OT consult for motor dexterity problems. Psychiatric treatment for anxiety as well as counseling for both anxiety and depression is also advised. That her memory scores are above normal is very reassuring news. Stay active mentally, physically, and socially.   I am not concerned about competency, driving, day-to-day supervision, etc. Baseline now established. Follow up prn. Clinical correlation is, of course, indicated.                 I will discuss these findings with the patient when she follows up with me in the near future. A follow up Neuropsychological Evaluation is indicated on a prn basis, especially if there are any cognitive and/or emotional changes.       DIAGNOSES:                         Mild and Selected Cognitive Difficulties                                                  Anxiety, moderate, with somatic features                                                  Depression, Mild                                                  Rule out Chronic ADHD- Inattentive (Mild)                                                  HD                                                          Education was provided regarding my diagnostic impressions, and we discussed treatment plan/options. I also answered numerous questions related to the clinical findings, including discussing various methods to improve cognition and mood. Counseling provided regarding mood and cognition. CBT and supportive psychotherapy techniques were utilized. The patient needs to see psychiatry and also consider treatment for depression and anxiety. Counseling with patient 30 minutes.

## 2019-07-15 ENCOUNTER — HOSPITAL ENCOUNTER (OUTPATIENT)
Dept: CT IMAGING | Age: 39
Discharge: HOME | End: 2019-07-15
Attending: NURSE PRACTITIONER
Payer: COMMERCIAL

## 2019-07-15 DIAGNOSIS — M06.4 RHEUMATOID ARTHRITIS WITH INFLAMMATORY POLYARTHROPATHY (HCC): ICD-10-CM

## 2019-07-15 PROCEDURE — 71250 CT THORAX DX C-: CPT

## 2019-07-23 ENCOUNTER — TELEPHONE (OUTPATIENT)
Dept: INTERNAL MEDICINE CLINIC | Age: 39
End: 2019-07-23

## 2019-07-23 NOTE — TELEPHONE ENCOUNTER
Appointment not available     Caller's first and last name and relationship to patient (if not the patient):       Best contact number:   [phone]     Preferred date and time:   As soon as possible     Scheduled appointment date and time:   October 4th 2019     Reason for appointment:   Pt needs to come in to speak to Dr. Kandice Cox about starting a new medication requested by another doctor.      Details to clarify the request:       [facility] received & copied from [facility]

## 2019-07-23 NOTE — TELEPHONE ENCOUNTER
Spoke with patient. Two pt identifiers confirmed. Patient states that she needs to schedule an appointment to come to talk with  about her medication for ADHD. Patient offered an appointment on 08/01/19. Appointment accepted. Patient advised if anything changes or if unable to keep this appointment to call the office  Pt verbalized understanding of information discussed w/ no further questions at this time.

## 2019-08-01 ENCOUNTER — OFFICE VISIT (OUTPATIENT)
Dept: INTERNAL MEDICINE CLINIC | Age: 39
End: 2019-08-01

## 2019-08-01 VITALS
HEIGHT: 66 IN | DIASTOLIC BLOOD PRESSURE: 72 MMHG | SYSTOLIC BLOOD PRESSURE: 110 MMHG | HEART RATE: 98 BPM | RESPIRATION RATE: 18 BRPM | WEIGHT: 224 LBS | BODY MASS INDEX: 36 KG/M2 | OXYGEN SATURATION: 98 % | TEMPERATURE: 98.4 F

## 2019-08-01 DIAGNOSIS — R82.90 FOUL SMELLING URINE: ICD-10-CM

## 2019-08-01 DIAGNOSIS — N30.00 ACUTE CYSTITIS WITHOUT HEMATURIA: ICD-10-CM

## 2019-08-01 DIAGNOSIS — F90.0 ATTENTION DEFICIT HYPERACTIVITY DISORDER (ADHD), PREDOMINANTLY INATTENTIVE TYPE: Primary | ICD-10-CM

## 2019-08-01 LAB
BILIRUB UR QL STRIP: NEGATIVE
GLUCOSE UR-MCNC: NEGATIVE MG/DL
KETONES P FAST UR STRIP-MCNC: NEGATIVE MG/DL
PH UR STRIP: 6 [PH] (ref 4.6–8)
PROT UR QL STRIP: NEGATIVE
SP GR UR STRIP: 1.03 (ref 1–1.03)
UA UROBILINOGEN AMB POC: NORMAL (ref 0.2–1)
URINALYSIS CLARITY POC: NORMAL
URINALYSIS COLOR POC: YELLOW
URINE BLOOD POC: NORMAL
URINE LEUKOCYTES POC: NORMAL
URINE NITRITES POC: NEGATIVE

## 2019-08-01 RX ORDER — NITROFURANTOIN 25; 75 MG/1; MG/1
100 CAPSULE ORAL 2 TIMES DAILY
Qty: 14 CAP | Refills: 0 | Status: SHIPPED | OUTPATIENT
Start: 2019-08-01 | End: 2019-09-28

## 2019-08-01 RX ORDER — DULAGLUTIDE 1.5 MG/.5ML
INJECTION, SOLUTION SUBCUTANEOUS
Refills: 3 | COMMUNITY
Start: 2019-07-19 | End: 2019-09-28

## 2019-08-01 NOTE — PROGRESS NOTES
CC: Behavioral Problem (Neurocognitive specialist recommended medication for ADHD)      HPI:    She is a 42 y.o. female who presents for evaluation of     For rheumatoid arthritis patient is still seen Dr Radha White and on immunotherapy    Dr Aleja Joseph is still following patient and had CT chest in June which looked good. Did have a recurrent bout of bronchitis 6 weeks ago, currently on xolair injections and symbicort. Using albuterol twice a month     CT scan    1. The lungs are clear of an acute infectious or inflammatory process. No  evidence for fibrosis or bronchiectasis. 2. There are 2 very small stable nodules that are likely not significant.     Patient went for neurocognitive testing and told she has ADHD. She was concerned with her family hx of Otto's disease. She has difficulty remembering tasks and difficulty concentrating    ROS:  Constitutional: negative for fevers, chills, anorexia and weight loss    Complains of urinary discomfort, bladder pain and foul smelling urine for 3 days     10 other systems reviewed and negative    Past Medical History:   Diagnosis Date    Acquired hypothyroidism     Adverse effect of anesthesia     labile BP during/after C section    Asthma     Broken bones     history of 9 broken bones    Chronic UTI (urinary tract infection)     \"extra valve right kidney causes UTI\"    Fibromyalgia     Gestational diabetes     GI bleed 2007,2011,2015,2016    lower GI last colonoscopy in 2016 normal     Huntingtons chorea (HCC)     Hyperhydrosis disorder     Ill-defined condition     Chris/ tested genetically - daughter has Chris    Infertility     PCOS    PCOS (polycystic ovarian syndrome)     Precancerous skin lesion     removed from Right shoulder    Preeclampsia 2011    pregnancy    Reactive airway disease     Rheumatoid arthritis ([address]     SVT (supraventricular tachycardia) ([address] 2011    occured during pregnancy when picc line inserted.        Current Outpatient Medications on File Prior to Visit   Medication Sig Dispense Refill    albuterol (PROVENTIL HFA, VENTOLIN HFA, PROAIR HFA) 90 mcg/actuation inhaler Take 2 Puffs by inhalation every four (4) hours as needed for Wheezing. 1 Inhaler 0    omalizumab (XOLAIR SC) by SubCUTAneous route.  propranolol LA (INDERAL LA) 60 mg SR capsule TAKE 1 CAPSULE BY MOUTH EVERY DAY 90 Cap 3    norethindrone-ethinyl estradiol (JUNEL FE 1/20, 28,) 1 mg-20 mcg (21)/75 mg (7) tab Junel FE 1/20 (28) 1 mg-20 mcg (21)/75 mg (7) tablet   TAKE 1 TABLET BY MOUTH EVERY DAY      SYMBICORT 160-4.5 mcg/actuation HFAA TAKE 2 PUFFS BY MOUTH TWICE A DAY  12    ondansetron hcl (ZOFRAN) 4 mg tablet Take 4 mg by mouth every eight (8) hours as needed for Nausea.  diphenhydrAMINE (BENADRYL) 25 mg capsule Benadryl      naltrexone HCl (NALTREXONE PO) Take  by mouth.  EPIPEN 2-SANJAY 0.3 mg/0.3 mL injection 1 (ONE) INJECTION AS NEEDED  0    buPROPion XL (WELLBUTRIN XL) 150 mg tablet Take 1 Tab by mouth every morning. 90 Tab 3    montelukast (SINGULAIR) 10 mg tablet Take 1 Tab by mouth daily. 90 Tab 4    escitalopram oxalate (LEXAPRO) 20 mg tablet TAKE 1 TABLET BY MOUTH EVERY DAY 90 Tab 3    traMADol (ULTRAM) 50 mg tablet Take 50 mg by mouth every six (6) hours as needed for Pain.  oxybutynin (DITROPAN) 5 mg tablet TAKE 1/2 TAB DAILY X1WEEK THEN INCREASE TO 1/2 TAB 2XDAY X2WKS THEN 1 TAB 2XDAY  3    BD INSULIN SYRINGE 1 mL 25 gauge x 5/8\" syrg USE 1 SYRINGE ONCE A WEEK  11    predniSONE (DELTASONE) 10 mg tablet TAKE 1-4 TABLETS ONCE A DAY AS NEEDED  2    abatacept (ORENCIA SC) by SubCUTAneous route.  cyanocobalamin, vitamin B-12, (VITAMIN B-12 PO) Take  by mouth.       RESTASIS 0.05 % ophthalmic emulsion INSTILL 1 DROP INTO EACH EYE TWICE DAILY  4    SAFETY-ABDI TB SYR 1CC/25GX5/8\" 1 mL 25 gauge x 5/8\" syrg USE TO INJECT METHOTREXATE ONCE A WEEK  2    methotrexate 25 mg/mL chemo injection INJECT 25MG (1ML) subcutaneously ONCE WEEKLY  0    metFORMIN ER (GLUCOPHAGE XR) 500 mg tablet 1,000 TAKE 2 TABLET BY MOUTH TWICE A DAY AS DIRECTED  1    folic acid (FOLVITE) 1 mg tablet TAKE 1 TABLET ORALLY DAILY  11    Nebulizer & Compressor machine 1 Each by Does Not Apply route three (3) times daily as needed. 1 Each 0    glycopyrrolate (ROBINUL) 1 mg tablet Take 2 mg by mouth two (2) times a day. Indications: hyperhydrosis      Cetirizine (ZYRTEC) 10 mg Cap Take 10 mg by mouth daily. Indications: ALLERGIC RHINITIS      fexofenadine-pseudoephedrine (ALLEGRA-D 24 HOUR) 180-240 mg per tablet Take 1 Tab by mouth nightly.  cholecalciferol, vitamin D3, (VITAMIN D3) 2,000 unit Tab Take 1 Tab by mouth daily.  levothyroxine (SYNTHROID) 75 mcg tablet Take 75 mcg by mouth Daily (before breakfast).  TRULICITY 1.5 WT/8.0 mL sub-q pen USE AS DIRECTED INJECT 1.5MG SUBCUTANEOUSLY ONCE A WEEK  3    OZEMPIC 0.25 mg or 0.5 mg(2 mg/1.5 mL) sub-q pen INJECT 0.5MG SUBCUTAMEOUSLY ONCE PER WEEK ON FRIDAY  1    desogestrel-ethinyl estradiol (DESOGEN) 0.15-0.03 mg tab Take 1 Tab by mouth nightly. No current facility-administered medications on file prior to visit.         Past Surgical History:   Procedure Laterality Date    COLONOSCOPY N/A 6/13/2016    COLONOSCOPY performed by Dom Roque [facility]    HX HEENT      HX WISDOM TEETH EXTRACTION  2004    UPPER GI ENDOSCOPY,BIOPSY  2/12/2019         UPPER GI ENDOSCOPY,DILATN W GUIDE  2/12/2019            Family History   Problem Relation Age of Onset    Other Mother         Chris's disease    Hypertension Mother     Dementia Mother     High Cholesterol Father     Heart Disease Father     Diabetes Father     Hypertension Father     Asthma Brother     Diabetes Brother     Other Brother         varicocele, hernias    Hypertension Brother     Alcohol abuse Brother     Hypertension Maternal Grandmother     Elevated Lipids Maternal Grandmother  Cancer Maternal Grandmother         breast    Other Maternal Grandmother         polymyalgia rheumatica    Glaucoma Maternal Grandmother     Cancer Maternal Grandfather         prostate    Huntingtons disease Maternal Grandfather     Cancer Paternal Grandmother         lung with mets    Cancer Paternal Grandfather         prostate, colon    Diabetes Paternal Grandfather     Heart Disease Paternal Grandfather     Alzheimer Paternal Grandfather     Dementia Paternal Grandfather      Reviewed and no changes     Social History     Socioeconomic History    Marital status:      Spouse name: Not on file    Number of children: Not on file    Years of education: Not on file    Highest education level: Not on file   Occupational History    Not on file   Social Needs    Financial resource strain: Not on file    Food insecurity:     Worry: Not on file     Inability: Not on file    Transportation needs:     Medical: Not on file     Non-medical: Not on file   Tobacco Use    Smoking status: Never Smoker    Smokeless tobacco: Never Used   Substance and Sexual Activity    Alcohol use: Yes     Comment: few drinks per year    Drug use: No    Sexual activity: Not on file   Lifestyle    Physical activity:     Days per week: Not on file     Minutes per session: Not on file    Stress: Not on file   Relationships    Social connections:     Talks on phone: Not on file     Gets together: Not on file     Attends Buddhism service: Not on file     Active member of club or organization: Not on file     Attends meetings of clubs or organizations: Not on file     Relationship status: Not on file    Intimate partner violence:     Fear of current or ex partner: Not on file     Emotionally abused: Not on file     Physically abused: Not on file     Forced sexual activity: Not on file   Other Topics Concern    Not on file   Social History Narrative    ** Merged History Encounter **                 Visit Vitals  /72 (BP 1 Location: Right arm, BP Patient Position: Sitting)   Pulse 98   Temp 98.4 °F (36.9 °C) (Oral)   Resp 18   Ht 5' 6\" (1.676 m)   Wt 224 lb (101.6 kg)   SpO2 98%   BMI 36.15 kg/m²       Physical Examination:   General - Well appearing female  HEENT - PERRL, TM no erythema/opacification, normal nasal turbinates, oropharynx no erythema or exudate, MMM  Neck - supple, no bruits, no TMG, no LAD  Pulm - clear to auscultation bilaterally  Cardio - RRR, normal S1 S2, no murmur gallops or rubs  Abd - soft, nontender, no masses, no HSM  Extrem - no edema, +2 distal pulses  Psych - normal affect, appropriate mood    Lab Results   Component Value Date/Time    WBC 5.8 04/16/2017 11:02 AM    HGB 13.4 04/16/2017 11:02 AM    HCT 39.2 04/16/2017 11:02 AM    PLATELET 896 92/77/3639 11:02 AM    MCV 91.0 04/16/2017 11:02 AM     Lab Results   Component Value Date/Time    Sodium 139 04/16/2017 11:02 AM    Potassium 4.2 04/16/2017 11:02 AM    Chloride 106 04/16/2017 11:02 AM    CO2 23 04/16/2017 11:02 AM    Anion gap 10 04/16/2017 11:02 AM    Glucose 96 04/16/2017 11:02 AM    BUN 10 04/16/2017 11:02 AM    Creatinine 0.86 04/16/2017 11:02 AM    BUN/Creatinine ratio 12 04/16/2017 11:02 AM    GFR est AA >60 04/16/2017 11:02 AM    GFR est non-AA >60 04/16/2017 11:02 AM    Calcium 8.5 04/16/2017 11:02 AM     No results found for: CHOL, CHOLPOCT, CHOLX, CHLST, CHOLV, HDL, HDLPOC, LDL, LDLCPOC, LDLC, DLDLP, VLDLC, VLDL, TGLX, TRIGL, TRIGP, TGLPOCT, CHHD, CHHDX  Lab Results   Component Value Date/Time    TSH 1.48 10/30/2015 05:11 PM     No results found for: PSA, Rebecca Head, [id number], [id number], PSALT  Lab Results   Component Value Date/Time    Hemoglobin A1c 5.8 07/03/2011 12:10 PM     No results found for: BHUPINDER Salinas    Lab Results   Component Value Date/Time    ALT (SGPT) 35 04/16/2017 11:02 AM    AST (SGOT) 22 04/16/2017 11:02 AM    Alk.  phosphatase 56 04/16/2017 11:02 AM    Bilirubin, total 0.4 04/16/2017 11:02 AM           Assessment/Plan:      Attention deficit hyperactivity disorder (ADHD), predominantly inattentive type  - new diagnosis   - lisdexamfetamine (VYVANSE) 30 mg capsule; Take 1 Cap by mouth every morning. Max Daily Amount: 30 mg. Dispense: 30 Cap; Refill: 0  - UDS done at Dr Dinora Calles for tramadol, will get records    Depression, unspecified depression type  Well controlled on lexapro and wellbutrin       Rheumatoid arthritis involving multiple sites, unspecified rheumatoid factor presence ([facility] [address]  Followed by Dr Mariama Arrieta and allergies/ hx of bronchiolitis  - doing better, on symbicort and zolair Followed by Dr Tawny Mills     Hypothyroidism: euthyroid followed by Dr Walter Schlatter.      Diabetes type 2: well controlled and currently on trulicity ( lost 30 lbs on ozempic but caused too many side effects)       Possible UTI: check urine / UA with leukocytes treat empirically with macrobid 100mg BID for 7 days   Follow urine culture  Reviewed previous culture data    Alexis Burk MD

## 2019-08-01 NOTE — PROGRESS NOTES
Identified pt with two pt identifiers(name and ). Reviewed record in preparation for visit and have obtained necessary documentation. No chief complaint on file. Health Maintenance Due   Topic    PAP AKA CERVICAL CYTOLOGY     Pneumococcal 0-64 years (1 of 1 - PPSV23)    Influenza Age 5 to Adult        Coordination of Care Questionnaire:  :   1) Have you been to an emergency room, urgent care, or hospitalized since your last visit? If yes, where when, and reason for visit? Yes,  for broncitis      2. Have seen or consulted any other health care provider since your last visit? If yes, where when, and reason for visit?   YES      \

## 2019-08-01 NOTE — PATIENT INSTRUCTIONS
Request records of UDS from Dr Modesto Amezquita     Possible UTI go ahead and start antibiotic will call you with culture results

## 2019-08-03 LAB — BACTERIA UR CULT: ABNORMAL

## 2019-09-19 ENCOUNTER — HOSPITAL ENCOUNTER (OUTPATIENT)
Dept: GENERAL RADIOLOGY | Age: 39
Discharge: HOME | End: 2019-09-19
Payer: COMMERCIAL

## 2019-09-19 DIAGNOSIS — R06.02 SOB (SHORTNESS OF BREATH): ICD-10-CM

## 2019-09-19 PROCEDURE — 71046 X-RAY EXAM CHEST 2 VIEWS: CPT

## 2019-09-24 ENCOUNTER — OFFICE VISIT (OUTPATIENT)
Dept: INTERNAL MEDICINE CLINIC | Age: 39
End: 2019-09-24

## 2019-09-24 VITALS
HEIGHT: 66 IN | OXYGEN SATURATION: 98 % | SYSTOLIC BLOOD PRESSURE: 115 MMHG | TEMPERATURE: 98.3 F | HEART RATE: 104 BPM | DIASTOLIC BLOOD PRESSURE: 83 MMHG | BODY MASS INDEX: 35.68 KG/M2 | RESPIRATION RATE: 16 BRPM | WEIGHT: 222 LBS

## 2019-09-24 DIAGNOSIS — J45.30 MILD PERSISTENT ASTHMA WITHOUT COMPLICATION: ICD-10-CM

## 2019-09-24 DIAGNOSIS — B37.0 THRUSH: ICD-10-CM

## 2019-09-24 DIAGNOSIS — J06.9 URI, ACUTE: ICD-10-CM

## 2019-09-24 DIAGNOSIS — B37.9 YEAST INFECTION: Primary | ICD-10-CM

## 2019-09-24 RX ORDER — HYDROCODONE POLISTIREX AND CHLORPHENIRAMINE POLISTIREX 10; 8 MG/5ML; MG/5ML
1 SUSPENSION, EXTENDED RELEASE ORAL
Qty: 118 ML | Refills: 0 | OUTPATIENT
Start: 2019-09-24 | End: 2019-09-28 | Stop reason: ALTCHOICE

## 2019-09-24 RX ORDER — PREDNISONE 20 MG/1
TABLET ORAL
Qty: 20 TAB | Refills: 0 | Status: SHIPPED | OUTPATIENT
Start: 2019-09-24 | End: 2020-03-14 | Stop reason: ALTCHOICE

## 2019-09-24 RX ORDER — NYSTATIN 100000 [USP'U]/ML
SUSPENSION ORAL
Qty: 473 ML | Refills: 0 | Status: SHIPPED | OUTPATIENT
Start: 2019-09-24 | End: 2019-09-24 | Stop reason: SDUPTHER

## 2019-09-24 RX ORDER — FLUCONAZOLE 150 MG/1
150 TABLET ORAL DAILY
Qty: 1 TAB | Refills: 0 | Status: SHIPPED | OUTPATIENT
Start: 2019-09-24 | End: 2019-09-25

## 2019-09-24 RX ORDER — AZITHROMYCIN 250 MG/1
TABLET, FILM COATED ORAL
Refills: 0 | COMMUNITY
Start: 2019-09-19 | End: 2019-09-28

## 2019-09-24 NOTE — PATIENT INSTRUCTIONS
flonase over the counter each morning    Cough syrup as needed at night    Increase prednisone to 80mg --then taper    1 diflucan     Nystatin prn

## 2019-09-24 NOTE — PROGRESS NOTES
HISTORY OF PRESENT ILLNESS  Denisha Bonilla is a 42 y.o. female. HPI   Pt normally follows with Dr. Beny Jessica (PCP). Pt is here for acute care. Pt c/o bronchitis and thursh and a yeast infection x 3 weeks   Pt is on mtx for RA,     xolair and orencia   Has severe asthma  Also on symbicort and albuterol       Pt states she has been fighting infections since may   Had thrush and sinus infection, uti and yeast infection and bronchitis   Pt saw pulm Dr Tara Casas last week and had a cxr --given pred 50mg daily and zpak  Reviewed cxr 9/19/19: No acute process.       Was on abx for bladder infection so she did not get orencia infusion at that time   Pt uses symbicort inhaler and has had to use albuterol 4x a day   Just finished z pack course today   Is also currently on prednisone which has not been making a difference   Reports feeling dehydrated    Also feels like her sinuses are very congested   Has now ran out of nystatin --has white on tongue and throat progressive  Will give oral diflucan   Will give nystain   Will bump up prednisone taper to 80 mg   Advised to use flonase   Will give tessanex to use prn at night   Discussed not taking cough syrup with tramadol     REVIEWED LAST LABS  CR NL 2017  URINE CX POSITIVE 8/19  REVIEWED CXR    Patient Active Problem List    Diagnosis Date Noted    Moderate major depression ([address] 11/13/2018    Severe obesity (BMI 35.0-39.9) 09/11/2018    Rheumatoid arthritis [facility] [address] 12/23/2016    Asthma 12/23/2016    Rectal bleeding 06/13/2016    Family history of colonic polyps 06/13/2016    Encounter for screening colonoscopy 06/13/2016    Fibromyalgia 05/03/2016    Hyperhidrosis 05/03/2016    Environmental and seasonal allergies 05/03/2016    Hypothyroidism (acquired) 05/17/2011     Current Outpatient Medications   Medication Sig Dispense Refill    TRULICITY 1.5 RT/7.9 mL sub-q pen USE AS DIRECTED INJECT 1.5MG SUBCUTANEOUSLY ONCE A WEEK  3    lisdexamfetamine (VYVANSE) 30 mg capsule Take 1 Cap by mouth every morning. Max Daily Amount: 30 mg. 30 Cap 0    nitrofurantoin, macrocrystal-monohydrate, (MACROBID) 100 mg capsule Take 1 Cap by mouth two (2) times a day. 14 Cap 0    albuterol (PROVENTIL HFA, VENTOLIN HFA, PROAIR HFA) 90 mcg/actuation inhaler Take 2 Puffs by inhalation every four (4) hours as needed for Wheezing. 1 Inhaler 0    omalizumab (XOLAIR SC) by SubCUTAneous route.  propranolol LA (INDERAL LA) 60 mg SR capsule TAKE 1 CAPSULE BY MOUTH EVERY DAY 90 Cap 3    norethindrone-ethinyl estradiol (JUNEL FE 1/20, 28,) 1 mg-20 mcg (21)/75 mg (7) tab Junel FE 1/20 (28) 1 mg-20 mcg (21)/75 mg (7) tablet   TAKE 1 TABLET BY MOUTH EVERY DAY      SYMBICORT 160-4.5 mcg/actuation HFAA TAKE 2 PUFFS BY MOUTH TWICE A DAY  12    OZEMPIC 0.25 mg or 0.5 mg(2 mg/1.5 mL) sub-q pen INJECT 0.5MG SUBCUTAMEOUSLY ONCE PER WEEK ON FRIDAY  1    ondansetron hcl (ZOFRAN) 4 mg tablet Take 4 mg by mouth every eight (8) hours as needed for Nausea.  diphenhydrAMINE (BENADRYL) 25 mg capsule Benadryl      naltrexone HCl (NALTREXONE PO) Take  by mouth.  EPIPEN 2-SANJAY 0.3 mg/0.3 mL injection 1 (ONE) INJECTION AS NEEDED  0    buPROPion XL (WELLBUTRIN XL) 150 mg tablet Take 1 Tab by mouth every morning. 90 Tab 3    montelukast (SINGULAIR) 10 mg tablet Take 1 Tab by mouth daily. 90 Tab 4    escitalopram oxalate (LEXAPRO) 20 mg tablet TAKE 1 TABLET BY MOUTH EVERY DAY 90 Tab 3    traMADol (ULTRAM) 50 mg tablet Take 50 mg by mouth every six (6) hours as needed for Pain.  oxybutynin (DITROPAN) 5 mg tablet TAKE 1/2 TAB DAILY X1WEEK THEN INCREASE TO 1/2 TAB 2XDAY X2WKS THEN 1 TAB 2XDAY  3    BD INSULIN SYRINGE 1 mL 25 gauge x 5/8\" syrg USE 1 SYRINGE ONCE A WEEK  11    predniSONE (DELTASONE) 10 mg tablet TAKE 1-4 TABLETS ONCE A DAY AS NEEDED  2    abatacept (ORENCIA SC) by SubCUTAneous route.  cyanocobalamin, vitamin B-12, (VITAMIN B-12 PO) Take  by mouth.       RESTASIS 0.05 % ophthalmic emulsion INSTILL 1 DROP INTO EACH EYE TWICE DAILY  4    SAFETY-ABDI TB SYR 1CC/25GX5/8\" 1 mL 25 gauge x 5/8\" syrg USE TO INJECT METHOTREXATE ONCE A WEEK  2    methotrexate 25 mg/mL chemo injection INJECT 25MG (1ML) subcutaneously ONCE WEEKLY  0    metFORMIN ER (GLUCOPHAGE XR) 500 mg tablet 1,000 TAKE 2 TABLET BY MOUTH TWICE A DAY AS DIRECTED  1    folic acid (FOLVITE) 1 mg tablet TAKE 1 TABLET ORALLY DAILY  11    desogestrel-ethinyl estradiol (DESOGEN) 0.15-0.03 mg tab Take 1 Tab by mouth nightly.  Nebulizer & Compressor machine 1 Each by Does Not Apply route three (3) times daily as needed. 1 Each 0    glycopyrrolate (ROBINUL) 1 mg tablet Take 2 mg by mouth two (2) times a day. Indications: hyperhydrosis      Cetirizine (ZYRTEC) 10 mg Cap Take 10 mg by mouth daily. Indications: ALLERGIC RHINITIS      cholecalciferol, vitamin D3, (VITAMIN D3) 2,000 unit Tab Take 1 Tab by mouth daily.  levothyroxine (SYNTHROID) 75 mcg tablet Take 75 mcg by mouth Daily (before breakfast).        Past Surgical History:   Procedure Laterality Date    COLONOSCOPY N/A 6/13/2016    [facility] ENDOSCOPY    HX HEENT      HX WISDOM TEETH EXTRACTION  2004    UPPER GI ENDOSCOPY,BIOPSY  2/12/2019         UPPER GI ENDOSCOPY,DILATN W GUIDE  2/12/2019           Lab Results   Component Value Date/Time    WBC 5.8 04/16/2017 11:02 AM    HGB 13.4 04/16/2017 11:02 AM    HCT 39.2 04/16/2017 11:02 AM    PLATELET 433 28/43/8943 11:02 AM    MCV 91.0 04/16/2017 11:02 AM     No results found for: CHOL, CHOLPOCT, HDL, LDL, LDLC, LDLCPOC, LDLCEXT, TRIGL, TGLPOCT, CHHD, CHHDX  Lab Results   Component Value Date/Time    GFR est non-AA >60 04/16/2017 11:02 AM    GFR est AA >60 04/16/2017 11:02 AM    Creatinine 0.86 04/16/2017 11:02 AM    BUN 10 04/16/2017 11:02 AM    Sodium 139 04/16/2017 11:02 AM    Potassium 4.2 04/16/2017 11:02 AM    Chloride 106 04/16/2017 11:02 AM    CO2 23 04/16/2017 11:02 AM    Magnesium 1.7 04/02/2016 02:00 PM        Review of Systems   HENT: Positive for congestion. Respiratory: Positive for cough. Negative for shortness of breath. Cardiovascular: Negative for chest pain. Physical Exam   Constitutional: She is oriented to person, place, and time. She appears well-developed and well-nourished. No distress. HENT:   Head: Normocephalic and atraumatic. Right Ear: External ear normal.   Left Ear: External ear normal.   Mouth/Throat: Oropharynx is clear and moist. No oropharyngeal exudate. Fluid behind both TM     whiteness on tongue and back of throat   Eyes: Conjunctivae and EOM are normal. Right eye exhibits no discharge. Left eye exhibits no discharge. Neck: Normal range of motion. Neck supple. Cardiovascular: Normal rate, regular rhythm and normal heart sounds. Exam reveals no gallop and no friction rub. No murmur heard. Pulmonary/Chest: Effort normal and breath sounds normal. No respiratory distress. She has no wheezes. She has no rales. She exhibits no tenderness. Musculoskeletal: Normal range of motion. She exhibits no edema, tenderness or deformity. Lymphadenopathy:     She has no cervical adenopathy. Neurological: She is alert and oriented to person, place, and time. Coordination normal.   Skin: Skin is warm and dry. No rash noted. She is not diaphoretic. No erythema. No pallor. Psychiatric: She has a normal mood and affect. Her behavior is normal.       ASSESSMENT and PLAN    ICD-10-CM ICD-9-CM    1. Yeast infection              Will give dilfucan x 1  B37.9 112.9 fluconazole (DIFLUCAN) 150 mg tablet   2. Thrush            Will give nystain swish and spit  B37.0 112.0    3. Mild persistent asthma without complication                Pt with severe asthma on zolair also on symbicort currently with a flare will  Increase prednisone and taper  J45.30 493.90    4.  URI, acute                Complete course of zpack cxr negative no additional abx needed adjust prednisone taper tessanex for cough  J06.9 465.9 chlorpheniramine-HYDROcodone (TUSSIONEX) 10-8 mg/5 mL suspension          Scribed by Alma Velarde of Dane Donahue, as dictated by Dr. Siva Merchant. Current diagnosis and concerns discussed with pt at length. Pt understands risks and benefits or current treatment plan and medications, and accepts the treatment and medication with any possible risks. Pt asks appropriate questions, which were answered. Pt was instructed to call with any concerns or problems. I have reviewed the note documented by the scribe. The services provided are my own.   The documentation is accurate

## 2019-09-24 NOTE — TELEPHONE ENCOUNTER
PCP: Mike Rachel MD    Last appt: 9/24/2019  Future Appointments   Date Time Provider Екатерина Vergarai   10/4/2019  2:30 PM Appkirsten Meyers MD [facility]   11/15/2019 11:00 AM Appkirsten Meyers MD [place] [id number]       Requested Prescriptions     Pending Prescriptions Disp Refills    nystatin (MYCOSTATIN) 100,000 unit/mL suspension 473 mL 0     Sig: Take  by mouth. swish and spit       Pharmacy request directions and how often medication is to be used.

## 2019-09-26 RX ORDER — NYSTATIN 100000 [USP'U]/ML
1 SUSPENSION ORAL 4 TIMES DAILY
Qty: 473 ML | Refills: 0 | Status: SHIPPED | OUTPATIENT
Start: 2019-09-26 | End: 2019-09-28

## 2019-09-28 ENCOUNTER — OFFICE VISIT (OUTPATIENT)
Dept: URGENT CARE | Age: 39
End: 2019-09-28

## 2019-09-28 VITALS
BODY MASS INDEX: 36.17 KG/M2 | DIASTOLIC BLOOD PRESSURE: 72 MMHG | RESPIRATION RATE: 16 BRPM | SYSTOLIC BLOOD PRESSURE: 128 MMHG | OXYGEN SATURATION: 99 % | WEIGHT: 224.1 LBS | TEMPERATURE: 99.1 F | HEART RATE: 99 BPM

## 2019-09-28 DIAGNOSIS — R05.9 COUGH: Primary | ICD-10-CM

## 2019-09-28 RX ORDER — PROMETHAZINE HYDROCHLORIDE AND DEXTROMETHORPHAN HYDROBROMIDE 6.25; 15 MG/5ML; MG/5ML
5 SYRUP ORAL
Qty: 60 ML | Refills: 0 | Status: SHIPPED | OUTPATIENT
Start: 2019-09-28 | End: 2020-05-14

## 2019-09-28 RX ORDER — OMEPRAZOLE 40 MG/1
40 CAPSULE, DELAYED RELEASE ORAL DAILY
Qty: 30 CAP | Refills: 0 | Status: SHIPPED | OUTPATIENT
Start: 2019-09-28 | End: 2022-03-24 | Stop reason: ALTCHOICE

## 2019-09-28 RX ORDER — BENZONATATE 200 MG/1
200 CAPSULE ORAL
Qty: 21 CAP | Refills: 0 | Status: SHIPPED | OUTPATIENT
Start: 2019-09-28 | End: 2019-10-05

## 2019-09-28 NOTE — PATIENT INSTRUCTIONS
Chronic Cough: Care Instructions  Your Care Instructions    A cough is your body's response to something that bothers your throat or airways. Many things can cause a cough. You might cough because of a cold or the flu, bronchitis, or asthma. Smoking, postnasal drip, allergies, and stomach acid that backs up into your throat also can cause a cough. A cough can be short-term (acute) or long-term (chronic). A chronic cough lasts more than 3 weeks. A chronic cough is often caused by a long-term problem, such as asthma. Another cause might be a medicine, such as an ACE inhibitor. A cough is a symptom, not a disease. To treat a chronic cough, you may need to treat the problem that causes it. You can take a few steps at home to cough less and feel better. Some people cough or clear their throat out of habit for no clear reason. Follow-up care is a key part of your treatment and safety. Be sure to make and go to all appointments, and call your doctor if you are having problems. It's also a good idea to know your test results and keep a list of the medicines you take. How can you care for yourself at home? · Drink plenty of water and other fluids. This may help soothe a dry or sore throat. Honey or lemon juice in hot water or tea may ease a dry cough. · Prop up your head on pillows to help you breathe and ease a cough. · Do not smoke or allow others to smoke around you. Smoke can make a cough worse. If you need help quitting, talk to your doctor about stop-smoking programs and medicines. These can increase your chances of quitting for good. · Avoid exposure to smoke, dust, or other pollutants, or wear a face mask. Check with your doctor or pharmacist to find out which type of face mask will give you the most benefit. · Take cough medicine as directed by your doctor. · Try cough drops to soothe a dry or sore throat. Cough drops don't stop a cough.  Medicine-flavored cough drops are no better than candy-flavored drops or hard candy. Throat clearing  When you have a chronic cough or a disease that may cause this type of cough, you may often feel like you want to clear your throat. This helps bring up mucus. But throat clearing does not always have a cause. Throat clearing can become a habit. The more you do it, the more you feel like you need to do it. But frequent throat clearing can be hard on your vocal cords. It's like slamming them together. To help lessen throat clearing, you can try:  · Taking small sips of water. · Not clearing your throat when you feel you need to. · Swallowing hard when you want to clear your throat. You may want to ask your doctor if a medicine that thins mucus would help. When should you call for help? Call 911 anytime you think you may need emergency care. For example, call if:    · You have severe trouble breathing.    Call your doctor now or seek immediate medical care if:    · You cough up blood.     · You have new or worse trouble breathing.     · You have a new or higher fever.    Watch closely for changes in your health, and be sure to contact your doctor if:    · You cough more deeply or more often, especially if you notice more mucus or a change in the color of your mucus.     · You do not get better as expected. Where can you learn more? Go to http://amanda-laz.info/. Enter P727 in the search box to learn more about \"Chronic Cough: Care Instructions. \"  Current as of: June 9, 2019  Content Version: 12.2  © 9218-8201 [employer]. Care instructions adapted under license by [employer] (which disclaims liability or warranty for this information). If you have questions about a medical condition or this instruction, always ask your healthcare professional. [address] any warranty or liability for your use of this information.

## 2019-09-28 NOTE — PROGRESS NOTES
Cold Symptoms   The history is provided by the patient. This is a new problem. The current episode started more than 1 week ago. The problem occurs every few minutes. The problem has not changed since onset. The cough is non-productive. There has been no fever. Associated symptoms include ear congestion, rhinorrhea, sore throat and wheezing. She has tried antibiotics, steroids, inhalers, nebulizers and cough syrup for the symptoms. She is not a smoker. Her past medical history is significant for bronchitis and asthma. Past Medical History:   Diagnosis Date    Acquired hypothyroidism     Adverse effect of anesthesia     labile BP during/after C section    Asthma     Broken bones     history of 9 broken bones    Chronic UTI (urinary tract infection)     \"extra valve right kidney causes UTI\"    Fibromyalgia     Gestational diabetes     GI bleed 2007,2011,2015,2016    lower GI last colonoscopy in 2016 normal     Huntingtons chorea (HCC)     Hyperhydrosis disorder     Ill-defined condition     Crisp/ tested genetically - daughter has Crisp    Infertility     PCOS    PCOS (polycystic ovarian syndrome)     Precancerous skin lesion     removed from Right shoulder    Preeclampsia 2011    pregnancy    Reactive airway disease     Rheumatoid arthritis (HCC)     SVT (supraventricular tachycardia) ([facility] [address] 2011    occured during pregnancy when picc line inserted.         Past Surgical History:   Procedure Laterality Date    COLONOSCOPY N/A 6/13/2016    COL[facility] by Unruly Bradley MD at [facility] ENDOSCOPY    HX HEENT      HX WISDOM TEETH EXTRACTION  2004    UPPER GI ENDOSCOPY,BIOPSY  2/12/2019         UPPER GI ENDOSCOPY,DILATN W GUIDE  2/12/2019              Family History   Problem Relation Age of Onset    Other Mother         Chris's disease    Hypertension Mother     Dementia Mother     High Cholesterol Father     Heart Disease Father     Diabetes Father    DarylSteven Hypertension Father     Asthma Brother     Diabetes Brother     Other Brother         varicocele, hernias    Hypertension Brother     Alcohol abuse Brother     Hypertension Maternal Grandmother     Elevated Lipids Maternal Grandmother     Cancer Maternal Grandmother         breast    Other Maternal Grandmother         polymyalgia rheumatica    Glaucoma Maternal Grandmother     Cancer Maternal Grandfather         prostate    Huntingtons disease Maternal Grandfather     Cancer Paternal Grandmother         lung with mets    Cancer Paternal Grandfather         prostate, colon    Diabetes Paternal Grandfather     Heart Disease Paternal Grandfather     Alzheimer Paternal Grandfather     Dementia Paternal Grandfather         Social History     Socioeconomic History    Marital status:      Spouse name: Not on file    Number of children: Not on file    Years of education: Not on file    Highest education level: Not on file   Occupational History    Not on file   Social Needs    Financial resource strain: Not on file    Food insecurity:     Worry: Not on file     Inability: Not on file    Transportation needs:     Medical: Not on file     Non-medical: Not on file   Tobacco Use    Smoking status: Never Smoker    Smokeless tobacco: Never Used   Substance and Sexual Activity    Alcohol use: Yes     Comment: few drinks per year    Drug use: No    Sexual activity: Not on file   Lifestyle    Physical activity:     Days per week: Not on file     Minutes per session: Not on file    Stress: Not on file   Relationships    Social connections:     Talks on phone: Not on file     Gets together: Not on file     Attends [employer] service: Not on file     Active member of club or organization: Not on file     Attends meetings of clubs or organizations: Not on file     Relationship status: Not on file    Intimate partner violence:     Fear of current or ex partner: Not on file     Emotionally abused: Not on file     Physically abused: Not on file     Forced sexual activity: Not on file   Other Topics Concern    Not on file   Social History Narrative    ** Merged History Encounter **                     ALLERGIES: Latex; Entex [phenylephrine-guaifenesin]; Mucinex [guaifenesin]; and Pepto-bismol [bismuth subsalicylate]    Review of Systems   HENT: Positive for congestion, rhinorrhea and sore throat. Respiratory: Positive for wheezing. All other systems reviewed and are negative. Vitals:    09/28/19 1555   BP: 128/72   Pulse: 99   Resp: 16   Temp: 99.1 °F (37.3 °C)   SpO2: 99%   Weight: 224 lb 1.6 oz (101.7 kg)       Physical Exam   Constitutional: No distress. HENT:   Right Ear: Tympanic membrane and ear canal normal.   Left Ear: Tympanic membrane and ear canal normal.   Nose: Nose normal.   Mouth/Throat: No oropharyngeal exudate, posterior oropharyngeal edema or posterior oropharyngeal erythema. Eyes: Conjunctivae are normal. Right eye exhibits no discharge. Left eye exhibits no discharge. Neck: Neck supple. Pulmonary/Chest: Effort normal and breath sounds normal. No respiratory distress. She has no wheezes. She has no rales. Lymphadenopathy:     She has no cervical adenopathy. Skin: No rash noted. Nursing note and vitals reviewed. MDM    Procedures      ICD-10-CM ICD-9-CM    1. Cough R05 786.2 XR CHEST PA LAT    ? allergic vs GERD     Medications Ordered Today   Medications    omeprazole (PRILOSEC) 40 mg capsule     Sig: Take 1 Cap by mouth daily. Dispense:  30 Cap     Refill:  0    benzonatate (TESSALON) 200 mg capsule     Sig: Take 1 Cap by mouth three (3) times daily as needed for Cough for up to 7 days. Dispense:  21 Cap     Refill:  0    promethazine-dextromethorphan (PROMETHAZINE-DM) 6.25-15 mg/5 mL syrup     Sig: Take 5 mL by mouth nightly as needed for Cough. Dispense:  60 mL     Refill:  0    Cetirizine (ZYRTEC) 10 mg cap     Sig: Take 10 mg by mouth daily. Indications: inflammation of the nose due to an allergy     Dispense:  30 Cap     Refill:  0     Results for orders placed or performed in visit on 09/28/19   XR CHEST PA LAT    Narrative    history: Cough    COMPARISON: 9/19/2019    FINDINGS:    Frontal and lateral chest radiograph submitted for review. The heart is not enlarged. Stable right chest port    No acute pulmonary process. No pleural effusion. No evidence for pneumothorax. Impression    IMPRESSION:    No acute pulmonary process. The patients condition was discussed with the patient and they understand. The patient is to follow up with primary care doctor. If signs and symptoms become worse the pt is to go to the ER. The patient is to take medications as prescribed.

## 2019-12-05 ENCOUNTER — OFFICE VISIT (OUTPATIENT)
Dept: PRIMARY CARE CLINIC | Age: 39
End: 2019-12-05

## 2019-12-05 VITALS
BODY MASS INDEX: 35.2 KG/M2 | OXYGEN SATURATION: 98 % | HEIGHT: 66 IN | DIASTOLIC BLOOD PRESSURE: 86 MMHG | HEART RATE: 97 BPM | TEMPERATURE: 98.5 F | WEIGHT: 219 LBS | RESPIRATION RATE: 18 BRPM | SYSTOLIC BLOOD PRESSURE: 142 MMHG

## 2019-12-05 DIAGNOSIS — K52.9 GASTROENTERITIS: Primary | ICD-10-CM

## 2019-12-05 DIAGNOSIS — R68.89 FLU-LIKE SYMPTOMS: ICD-10-CM

## 2019-12-05 LAB
FLUAV+FLUBV AG NOSE QL IA.RAPID: NEGATIVE POS/NEG
FLUAV+FLUBV AG NOSE QL IA.RAPID: NEGATIVE POS/NEG
VALID INTERNAL CONTROL?: YES

## 2019-12-05 RX ORDER — NORETHINDRONE ACETATE AND ETHINYL ESTRADIOL 1MG-20(21)
KIT ORAL
COMMUNITY
End: 2020-01-23 | Stop reason: SDUPTHER

## 2019-12-05 RX ORDER — METHOTREXATE 25 MG/ML
INJECTION INTRA-ARTERIAL; INTRAMUSCULAR; INTRATHECAL; INTRAVENOUS
Refills: 3 | COMMUNITY
Start: 2019-10-27 | End: 2020-01-23 | Stop reason: SDUPTHER

## 2019-12-05 NOTE — PROGRESS NOTES
Subjective:   Renetta Arnett is a 42 y.o. female who present complaining of flu-like symptoms: She denies dyspnea or wheezing. Smoking status: non-smoker. Flu vaccine status: vaccinated currently. Relevant PMH: No pertinent additional PMH. Review of Systems  A comprehensive review of systems was negative except for that written in the HPI. Patient Active Problem List   Diagnosis Code    Hypothyroidism (acquired) E03.9    Fibromyalgia M79.7    Hyperhidrosis R61    Environmental and seasonal allergies J30.89    Rectal bleeding K62.5    Family history of colonic polyps Z83.71    Encounter for screening colonoscopy Z12.11    Rheumatoid arthritis ([facility] M06.9    Asthma J45.909    Severe obesity (BMI 35.0-39. 9) E66.01    Mod[facility] depression ([facility] F32.1     Patient Active Problem List    Diagnosis Date Noted    Moderate major depression ([facility] 11/13/2018    Severe obesity (BMI 35.0-39.9) 09/11/2018    Rheumatoid arthritis ([facility] 12/23/2016    Asthma 12/23/2016    Rectal bleeding 06/13/2016    Family history of colonic polyps 06/13/2016    Encounter for screening colonoscopy 06/13/2016    Fibromyalgia 05/03/2016    Hyperhidrosis 05/03/2016    Environmental and seasonal allergies 05/03/2016    Hypothyroidism (acquired) 05/17/2011     Current Outpatient Medications   Medication Sig Dispense Refill    norethindrone-ethinyl estradiol (JUNEL FE 1/20, 28,) 1 mg-20 mcg (21)/75 mg (7) tab Junel FE 1/20 (28) 1 mg-20 mcg (21)/75 mg (7) tablet   TAKE 1 TABLET BY MOUTH EVERY DAY TAKING INLY THE ACTIVE PILLS AND SKIPPING THE PLACEBO PILLS      omeprazole (PRILOSEC) 40 mg capsule Take 1 Cap by mouth daily. 30 Cap 0    promethazine-dextromethorphan (PROMETHAZINE-DM) 6.25-15 mg/5 mL syrup Take 5 mL by mouth nightly as needed for Cough. 60 mL 0    Cetirizine (ZYRTEC) 10 mg cap Take 10 mg by mouth daily.  Indications: inflammation of the nose due to an allergy 30 Cap 0    predniSONE (DELTASONE) 20 mg tablet 80mg po daily for 2 days, 60mg po daily for 2days, 40mg po daily for 2 days, 20mg po daily for 2days then stop 20 Tab 0    lisdexamfetamine (VYVANSE) 30 mg capsule Take 1 Cap by mouth every morning. Max Daily Amount: 30 mg. 30 Cap 0    albuterol (PROVENTIL HFA, VENTOLIN HFA, PROAIR HFA) 90 mcg/actuation inhaler Take 2 Puffs by inhalation every four (4) hours as needed for Wheezing. 1 Inhaler 0    omalizumab (XOLAIR SC) by SubCUTAneous route every thirty (30) days.  propranolol LA (INDERAL LA) 60 mg SR capsule TAKE 1 CAPSULE BY MOUTH EVERY DAY 90 Cap 3    SYMBICORT 160-4.5 mcg/actuation HFAA TAKE 2 PUFFS BY MOUTH TWICE A DAY  12    OZEMPIC 0.25 mg or 0.5 mg(2 mg/1.5 mL) sub-q pen INJECT 0.5MG SUBCUTAMEOUSLY ONCE PER WEEK ON FRIDAY  1    ondansetron hcl (ZOFRAN) 4 mg tablet Take 4 mg by mouth every eight (8) hours as needed for Nausea.  naltrexone HCl (NALTREXONE PO) Take  by mouth.  EPIPEN 2-SANJAY 0.3 mg/0.3 mL injection 1 (ONE) INJECTION AS NEEDED  0    buPROPion XL (WELLBUTRIN XL) 150 mg tablet Take 1 Tab by mouth every morning. 90 Tab 3    montelukast (SINGULAIR) 10 mg tablet Take 1 Tab by mouth daily. 90 Tab 4    escitalopram oxalate (LEXAPRO) 20 mg tablet TAKE 1 TABLET BY MOUTH EVERY DAY 90 Tab 3    traMADol (ULTRAM) 50 mg tablet Take 50 mg by mouth every six (6) hours as needed for Pain.  oxybutynin (DITROPAN) 5 mg tablet TAKE 1/2 TAB DAILY X1WEEK THEN INCREASE TO 1/2 TAB 2XDAY X2WKS THEN 1 TAB 2XDAY  3    BD INSULIN SYRINGE 1 mL 25 gauge x 5/8\" syrg USE 1 SYRINGE ONCE A WEEK  11    predniSONE (DELTASONE) 10 mg tablet TAKE 1-4 TABLETS ONCE A DAY AS NEEDED  2    abatacept (ORENCIA SC) by SubCUTAneous route.  cyanocobalamin, vitamin B-12, (VITAMIN B-12 PO) Take  by mouth.       RESTASIS 0.05 % ophthalmic emulsion INSTILL 1 DROP INTO EACH EYE TWICE DAILY  4    SAFETY-ABDI TB SYR 1CC/25GX5/8\" 1 mL 25 gauge x 5/8\" syrg USE TO INJECT METHOTREXATE ONCE A WEEK  2    methotrexate 25 mg/mL chemo injection INJECT 25MG (1ML) subcutaneously ONCE WEEKLY  0    metFORMIN ER (GLUCOPHAGE XR) 500 mg tablet 1,000 TAKE 2 TABLET BY MOUTH TWICE A DAY AS DIRECTED  1    folic acid (FOLVITE) 1 mg tablet TAKE 1 TABLET ORALLY DAILY  11    Nebulizer & Compressor machine 1 Each by Does Not Apply route three (3) times daily as needed. 1 Each 0    glycopyrrolate (ROBINUL) 1 mg tablet Take 2 mg by mouth two (2) times a day. Indications: hyperhydrosis      cholecalciferol, vitamin D3, (VITAMIN D3) 2,000 unit Tab Take 1 Tab by mouth daily.  levothyroxine (SYNTHROID) 75 mcg tablet Take 75 mcg by mouth Daily (before breakfast).  methotrexate, PF, 25 mg/mL injection INJECT 1 ML ONCE A WEEK  3    norethindrone-ethinyl estradiol (JUNEL FE 1/20, 28,) 1 mg-20 mcg (21)/75 mg (7) tab Junel FE 1/20 (28) 1 mg-20 mcg (21)/75 mg (7) tablet   TAKE 1 TABLET BY MOUTH EVERY DAY      desogestrel-ethinyl estradiol (DESOGEN) 0.15-0.03 mg tab Take 1 Tab by mouth nightly.        Allergies   Allergen Reactions    Latex Swelling    Rituxan [Rituximab] Anaphylaxis    Entex [Phenylephrine-Guaifenesin] Anaphylaxis, Hives and Palpitations    Mucinex [Guaifenesin] Anaphylaxis and Hives    Pepto-Bismol [Bismuth Subsalicylate] Nausea and Vomiting     Past Medical History:   Diagnosis Date    Acquired hypothyroidism     Adverse effect of anesthesia     labile BP during/after C section    Asthma     Broken bones     history of 9 broken bones    Chronic UTI (urinary tract infection)     \"extra valve right kidney causes UTI\"    Fibromyalgia     Gestational diabetes     GI bleed 2007,2011,2015,2016    lower GI last colonoscopy in 2016 normal     Huntingtons chorea (HCC)     Hyperhydrosis disorder     Ill-defined condition     [place]/ tested genetically - daughter has [place]    Infertility     PCOS    PCOS (polycystic ovarian syndrome)     Precancerous skin lesion     removed from Right shoulder  Preeclampsia 2011    pregnancy    Reactive airway disease     Rheumatoid arthritis ([address]     SVT (supraventricular tachycardia) ([facility] 2011    occured during pregnancy when picc line inserted. Past Surgical History:   Procedure Laterality Date    COLONOSCOPY N/A 6/13/2016    COL[facility] performed [facility] at [facility] ENDOSCOPY    HX HEENT      HX WISDOM TEETH EXTRACTION  2004    UPPER GI ENDOSCOPY,BIOPSY  2/12/2019         UPPER GI ENDOSCOPY,DILATN W GUIDE  2/12/2019          Family History   Problem Relation Age of Onset    Other Mother         [place]'s disease    Hypertension Mother     Dementia Mother     High Cholesterol Father     Heart Disease Father     Diabetes Father     Hypertension Father     Asthma Brother     Diabetes Brother     Other Brother         varicocele, hernias    Hypertension Brother     Alcohol abuse Brother     Hypertension Maternal Grandmother     Elevated Lipids Maternal Grandmother     Cancer Maternal Grandmother         breast    Other Maternal Grandmother         polymyalgia rheumatica    Glaucoma Maternal Grandmother     Cancer Maternal Grandfather         prostate    Huntingtons disease Maternal Grandfather     Cancer Paternal Grandmother         lung with mets    Cancer Paternal Grandfather         prostate, colon    Diabetes Paternal Grandfather     Heart Disease Paternal Grandfather     Alzheimer Paternal Grandfather     Dementia Paternal Grandfather      Social History     Tobacco Use    Smoking status: Never Smoker    Smokeless tobacco: Never Used   Substance Use Topics    Alcohol use: Yes     Comment: few drinks per year       Objective:     Visit Vitals  /86   Pulse 97   Temp 98.5 °F (36.9 °C) (Oral)   Resp 18   Ht 5' 6\" (1.676 m)   Wt 219 lb (99.3 kg)   SpO2 98%   BMI 35.35 kg/m²       Appears moderately ill but not toxic; temperature as noted in vitals.    Ears normal.   Throat and pharynx normal.    Neck supple. No adenopathy in the neck. Sinuses non tender. The chest is clear. Assessment/Plan:   Influenza swab negative. Likely gastroenteritis   Symptomatic therapy suggested: rest, increase fluids and call prn if symptoms persist or worsen. Call or return to clinic prn if these symptoms worsen or fail to improve as anticipated. ICD-10-CM ICD-9-CM    1. Gastroenteritis K52.9 558.9    2. Flu-like symptoms R68.89 780.99 AMB POC DOREEN INFLUENZA A/B TEST   .elevated blood pressure discussed.

## 2019-12-05 NOTE — PATIENT INSTRUCTIONS
Gastroenteritis: Care Instructions  Your Care Instructions    Gastroenteritis is an illness that may cause nausea, vomiting, and diarrhea. It is sometimes called \"stomach flu. \" It can be caused by bacteria or a virus. You will probably begin to feel better in 1 to 2 days. In the meantime, get plenty of rest and make sure you do not become dehydrated. Dehydration occurs when your body loses too much fluid. Follow-up care is a key part of your treatment and safety. Be sure to make and go to all appointments, and call your doctor if you are having problems. It's also a good idea to know your test results and keep a list of the medicines you take. How can you care for yourself at home? · If your doctor prescribed antibiotics, take them as directed. Do not stop taking them just because you feel better. You need to take the full course of antibiotics. · Drink plenty of fluids to prevent dehydration, enough so that your urine is light yellow or clear like water. Choose water and other caffeine-free clear liquids until you feel better. If you have kidney, heart, or liver disease and have to limit fluids, talk with your doctor before you increase your fluid intake. · Drink fluids slowly, in frequent, small amounts, because drinking too much too fast can cause vomiting. · Begin eating mild foods, such as dry toast, yogurt, applesauce, bananas, and rice. Avoid spicy, hot, or high-fat foods, and do not drink alcohol or caffeine for a day or two. Do not drink milk or eat ice cream until you are feeling better. How to prevent gastroenteritis  · Keep hot foods hot and cold foods cold. · Do not eat meats, dressings, salads, or other foods that have been kept at room temperature for more than 2 hours. · Use a thermometer to check your refrigerator. It should be between 34°F and 40°F.  · Defrost meats in the refrigerator or microwave, not on the kitchen counter. · Keep your hands and your kitchen clean.  Wash your hands, cutting boards, and countertops with hot soapy water frequently. · Cook meat until it is well done. · Do not eat raw eggs or uncooked sauces made with raw eggs. · Do not take chances. If food looks or tastes spoiled, throw it out. When should you call for help? Call 911 anytime you think you may need emergency care. For example, call if:    · You vomit blood or what looks like coffee grounds.     · You passed out (lost consciousness).     · You pass maroon or very bloody stools.    Call your doctor now or seek immediate medical care if:    · You have severe belly pain.     · You have signs of needing more fluids. You have sunken eyes, a dry mouth, and pass only a little dark urine.     · You feel like you are going to faint.     · You have increased belly pain that does not go away in 1 to 2 days.     · You have new or increased nausea, or you are vomiting.     · You have a new or higher fever.     · Your stools are black and tarlike or have streaks of blood.    Watch closely for changes in your health, and be sure to contact your doctor if:    · You are dizzy or lightheaded.     · You urinate less than usual, or your urine is dark yellow or brown.     · You do not feel better with each day that goes by. Where can you learn more? Go to http://amanda-laz.info/. Enter N142 in the search box to learn more about \"Gastroenteritis: Care Instructions. \"  Current as of: June 9, 2019  Content Version: 12.2  © 1160-6439 [employer]. Care instructions adapted under license by [employer] (which disclaims liability or warranty for this information). If you have questions about a medical condition or this instruction, always ask your healthcare professional. [address] any warranty or liability for your use of this information.

## 2019-12-09 ENCOUNTER — TELEPHONE (OUTPATIENT)
Dept: INTERNAL MEDICINE CLINIC | Age: 39
End: 2019-12-09

## 2019-12-09 DIAGNOSIS — F90.0 ATTENTION DEFICIT HYPERACTIVITY DISORDER (ADHD), PREDOMINANTLY INATTENTIVE TYPE: ICD-10-CM

## 2019-12-09 NOTE — TELEPHONE ENCOUNTER
Caller's first and last name: N/A   Reason for call: Medication update   Callback required yes/no and why: Yes   Best contact number(s): [phone]   Details to clarify the request: Pt wants to provide an update about her new prescription Vyvanse. Pt has nausea as a side effect.  She also wanted to know if a visit to the office is needed for a refill. Pt was only given a 30 day supply.          Copy past Dwight david

## 2019-12-10 NOTE — TELEPHONE ENCOUNTER
MD Nhi Blackwell LPN   Caller: Unspecified Willard Ding,  1:14 PM)             I sent 30 day prescription      MyChart message sent to patient advising that Vyvanse refill was sent to her pharmacy on file

## 2019-12-12 ENCOUNTER — TELEPHONE (OUTPATIENT)
Dept: INTERNAL MEDICINE CLINIC | Age: 39
End: 2019-12-12

## 2019-12-12 NOTE — TELEPHONE ENCOUNTER
Patient states she needs a call back to be advised if she needs to schedule an appt before her next Vyvanse refill or if just call to request. Patient states a detailed message can be left on her voice mail to advise what's required for next refill. Please call.  Thank you

## 2020-01-23 ENCOUNTER — OFFICE VISIT (OUTPATIENT)
Dept: PRIMARY CARE CLINIC | Age: 40
End: 2020-01-23

## 2020-01-23 VITALS
RESPIRATION RATE: 16 BRPM | OXYGEN SATURATION: 97 % | HEIGHT: 66 IN | WEIGHT: 121 LBS | TEMPERATURE: 97.9 F | HEART RATE: 93 BPM | DIASTOLIC BLOOD PRESSURE: 69 MMHG | SYSTOLIC BLOOD PRESSURE: 103 MMHG | BODY MASS INDEX: 19.44 KG/M2

## 2020-01-23 DIAGNOSIS — R82.90 ABNORMAL URINALYSIS: ICD-10-CM

## 2020-01-23 DIAGNOSIS — N30.00 ACUTE CYSTITIS WITHOUT HEMATURIA: Primary | ICD-10-CM

## 2020-01-23 DIAGNOSIS — R82.90 FOUL SMELLING URINE: ICD-10-CM

## 2020-01-23 LAB
BILIRUB UR QL STRIP: NEGATIVE
GLUCOSE UR-MCNC: NEGATIVE MG/DL
KETONES P FAST UR STRIP-MCNC: NEGATIVE MG/DL
PH UR STRIP: 6 [PH] (ref 4.6–8)
PROT UR QL STRIP: NEGATIVE
SP GR UR STRIP: 1.02 (ref 1–1.03)
UA UROBILINOGEN AMB POC: NORMAL (ref 0.2–1)
URINALYSIS CLARITY POC: NORMAL
URINALYSIS COLOR POC: YELLOW
URINE BLOOD POC: NEGATIVE
URINE LEUKOCYTES POC: NORMAL
URINE NITRITES POC: POSITIVE

## 2020-01-23 RX ORDER — LIDOCAINE AND PRILOCAINE 25; 25 MG/G; MG/G
CREAM TOPICAL
Refills: 2 | COMMUNITY
Start: 2019-11-25

## 2020-01-23 RX ORDER — HYDROXYCHLOROQUINE SULFATE 200 MG/1
200 TABLET, FILM COATED ORAL DAILY
COMMUNITY
Start: 2019-12-23

## 2020-01-23 RX ORDER — NITROFURANTOIN 25; 75 MG/1; MG/1
100 CAPSULE ORAL 2 TIMES DAILY
Qty: 10 CAP | Refills: 0 | Status: SHIPPED | OUTPATIENT
Start: 2020-01-23 | End: 2020-01-28

## 2020-01-23 RX ORDER — FLUCONAZOLE 150 MG/1
TABLET ORAL
Qty: 2 TAB | Refills: 0 | Status: SHIPPED | OUTPATIENT
Start: 2020-01-23 | End: 2020-02-13 | Stop reason: SDUPTHER

## 2020-01-23 RX ORDER — NEEDLES, SAFETY 22GX1 1/2"
NEEDLE, DISPOSABLE MISCELLANEOUS
Refills: 4 | COMMUNITY
Start: 2019-11-28 | End: 2020-08-07 | Stop reason: ALTCHOICE

## 2020-01-23 NOTE — PROGRESS NOTES
Subjective:     Odalis Shelton is a 42 y.o. female who complains of foul smelling urine for 1 week. She notes vaginal irritation with urination x 2 weeks. Patient denies flank pain, vomiting, fever, unusual vaginal discharge. Patient does not have a history of recurrent UTI. Patient does not have a history of pyelonephritis. Patient Active Problem List   Diagnosis Code    Hypothyroidism (acquired) E03.9    Fibromyalgia M79.7    Hyperhidrosis R61    Environmental and seasonal allergies J30.89    Rectal bleeding K62.5    Family history of colonic polyps Z83.71    Encounter for screening colonoscopy Z12.11    [facility] M06.9    Asthma J45.909    Severe obesity (BMI 35.0-39. 9) E66.01    Moderate major depression ([facility] F32.1     Patient Active Problem List    Diagnosis Date Noted    Moderate major depression ([facility] 11/13/2018    Severe obesity (BMI 35.0-39.9) 09/11/2018    Rheumatoid arthritis ([facility] 12/23/2016    Asthma 12/23/2016    Rectal bleeding 06/13/2016    Family history of colonic polyps 06/13/2016    Encounter for screening colonoscopy 06/13/2016    Fibromyalgia 05/03/2016    Hyperhidrosis 05/03/2016    Environmental and seasonal allergies 05/03/2016    Hypothyroidism (acquired) 05/17/2011     Current Outpatient Medications   Medication Sig Dispense Refill    heparin sodium,porcine (HEPARIN LOCK FLUSH, PORCINE, IV) by IntraVENous route.  nitrofurantoin, macrocrystal-monohydrate, (MACROBID) 100 mg capsule Take 1 Cap by mouth two (2) times a day for 5 days. 10 Cap 0    fluconazole (DIFLUCAN) 150 mg tablet Take 1 tab today and repeat dose in 5 days 2 Tab 0    lisdexamfetamine (VYVANSE) 30 mg capsule Take 1 Cap by mouth every morning. Max Daily Amount: 30 mg. 30 Cap 0    omeprazole (PRILOSEC) 40 mg capsule Take 1 Cap by mouth daily. 30 Cap 0    promethazine-dextromethorphan (PROMETHAZINE-DM) 6.25-15 mg/5 mL syrup Take 5 mL by mouth nightly as needed for Cough. 60 mL 0    Cetirizine (ZYRTEC) 10 mg cap Take 10 mg by mouth daily. Indications: inflammation of the nose due to an allergy 30 Cap 0    predniSONE (DELTASONE) 20 mg tablet 80mg po daily for 2 days, 60mg po daily for 2days, 40mg po daily for 2 days, 20mg po daily for 2days then stop 20 Tab 0    albuterol (PROVENTIL HFA, VENTOLIN HFA, PROAIR HFA) 90 mcg/actuation inhaler Take 2 Puffs by inhalation every four (4) hours as needed for Wheezing. 1 Inhaler 0    omalizumab (XOLAIR SC) by SubCUTAneous route every thirty (30) days.  propranolol LA (INDERAL LA) 60 mg SR capsule TAKE 1 CAPSULE BY MOUTH EVERY DAY 90 Cap 3    norethindrone-ethinyl estradiol (JUNEL FE 1/20, 28,) 1 mg-20 mcg (21)/75 mg (7) tab Junel FE 1/20 (28) 1 mg-20 mcg (21)/75 mg (7) tablet   TAKE 1 TABLET BY MOUTH EVERY DAY      SYMBICORT 160-4.5 mcg/actuation HFAA TAKE 2 PUFFS BY MOUTH TWICE A DAY  12    OZEMPIC 0.25 mg or 0.5 mg(2 mg/1.5 mL) sub-q pen INJECT 0.5MG SUBCUTAMEOUSLY ONCE PER WEEK ON FRIDAY  1    ondansetron hcl (ZOFRAN) 4 mg tablet Take 4 mg by mouth every eight (8) hours as needed for Nausea.  EPIPEN 2-SANJAY 0.3 mg/0.3 mL injection 1 (ONE) INJECTION AS NEEDED  0    buPROPion XL (WELLBUTRIN XL) 150 mg tablet Take 1 Tab by mouth every morning. 90 Tab 3    montelukast (SINGULAIR) 10 mg tablet Take 1 Tab by mouth daily. 90 Tab 4    escitalopram oxalate (LEXAPRO) 20 mg tablet TAKE 1 TABLET BY MOUTH EVERY DAY 90 Tab 3    traMADol (ULTRAM) 50 mg tablet Take 50 mg by mouth every six (6) hours as needed for Pain.  oxybutynin (DITROPAN) 5 mg tablet TAKE 1/2 TAB DAILY X1WEEK THEN INCREASE TO 1/2 TAB 2XDAY X2WKS THEN 1 TAB 2XDAY  3    BD INSULIN SYRINGE 1 mL 25 gauge x 5/8\" syrg USE 1 SYRINGE ONCE A WEEK  11    predniSONE (DELTASONE) 10 mg tablet TAKE 1-4 TABLETS ONCE A DAY AS NEEDED  2    abatacept (ORENCIA SC) by SubCUTAneous route.  cyanocobalamin, vitamin B-12, (VITAMIN B-12 PO) Take  by mouth.       RESTASIS 0.05 % ophthalmic emulsion INSTILL 1 DROP INTO EACH EYE TWICE DAILY  4    SAFETY-ABDI TB SYR 1CC/25GX5/8\" 1 mL 25 gauge x 5/8\" syrg USE TO INJECT METHOTREXATE ONCE A WEEK  2    methotrexate 25 mg/mL chemo injection INJECT 25MG (1ML) subcutaneously ONCE WEEKLY  0    metFORMIN ER (GLUCOPHAGE XR) 500 mg tablet 1,000 TAKE 2 TABLET BY MOUTH TWICE A DAY AS DIRECTED  1    folic acid (FOLVITE) 1 mg tablet TAKE 1 TABLET ORALLY DAILY  11    desogestrel-ethinyl estradiol (DESOGEN) 0.15-0.03 mg tab Take 1 Tab by mouth nightly.  Nebulizer & Compressor machine 1 Each by Does Not Apply route three (3) times daily as needed. 1 Each 0    cholecalciferol, vitamin D3, (VITAMIN D3) 2,000 unit Tab Take 1 Tab by mouth daily.  levothyroxine (SYNTHROID) 75 mcg tablet Take 75 mcg by mouth Daily (before breakfast).  hydroxychloroquine (PLAQUENIL) 200 mg tablet       lidocaine-prilocaine (EMLA) topical cream APPLY 1/2 TUBE 1 HOUR PRIOR TO INFUSION  2    BD SYRINGE 1 mL 25 gauge x 5/8\" syrg 1 SYRINGE ONCE A WEEK  4    naltrexone HCl (NALTREXONE PO) Take  by mouth.  glycopyrrolate (ROBINUL) 1 mg tablet Take 2 mg by mouth two (2) times a day.  Indications: hyperhydrosis       Allergies   Allergen Reactions    Latex Swelling    Rituxan [Rituximab] Anaphylaxis    Entex [Phenylephrine-Guaifenesin] Anaphylaxis, Hives and Palpitations    Mucinex [Guaifenesin] Anaphylaxis and Hives    Pepto-Bismol [Bismuth Subsalicylate] Nausea and Vomiting     Past Medical History:   Diagnosis Date    Acquired hypothyroidism     Adverse effect of anesthesia     labile BP during/after C section    Asthma     Broken bones     history of 9 broken bones    Chronic UTI (urinary tract infection)     \"extra valve right kidney causes UTI\"    Fibromyalgia     Gestational diabetes     GI bleed 2007,2011,2015,2016    lower GI last colonoscopy in 2016 normal     Huntingtons chorea (HCC)     Hyperhydrosis disorder     Ill-defined condition [place]/ tested genetically - daughter has [place]    Infertility     PCOS    PCOS (polycystic ovarian syndrome)     Precancerous skin lesion     removed from Right shoulder    Preeclampsia 2011    pregnancy    Reactive airway disease     Rheumatoid arthritis (HCC)     SVT [facility]supraventricular tachycardia) ([facility] 2011    occured during pregnancy when picc line inserted. Past Surgical History:   Procedure Laterality Date    COLONOSCOPY N/A 6/13/2016    COL[facility] ENDOSCOPY    HX HEENT      HX WISDOM TEETH EXTRACTION  2004    UPPER GI ENDOSCOPY,BIOPSY  2/12/2019         UPPER GI ENDOSCOPY,DILATN W GUIDE  2/12/2019          Family History   Problem Relation Age of Onset    Other Mother         Chris's disease    Hypertension Mother     Dementia Mother     High Cholesterol Father     Heart Disease Father     Diabetes Father     Hypertension Father     Asthma Brother     Diabetes Brother     Other Brother         varicocele, hernias    Hypertension Brother     Alcohol abuse Brother     Hypertension Maternal Grandmother     Elevated Lipids Maternal Grandmother     Cancer Maternal Grandmother         breast    Other Maternal Grandmother         polymyalgia rheumatica    Glaucoma Maternal Grandmother     Cancer Maternal Grandfather         prostate    Huntingtons disease Maternal Grandfather     Cancer Paternal Grandmother         lung with mets    Cancer Paternal Grandfather         prostate, colon    Diabetes Paternal Grandfather     Heart Disease Paternal Grandfather     Alzheimer Paternal Grandfather     Dementia Paternal Grandfather      Social History     Tobacco Use    Smoking status: Never Smoker    Smokeless tobacco: Never Used   Substance Use Topics    Alcohol use: Yes     Comment: few drinks per year        Review of Systems  Pertinent items are noted in HPI.     Objective:     Visit Vitals  /69 (BP 1 Location: Left arm, BP Patient Position: Sitting)   Pulse 93   Temp 97.9 °F (36.6 °C) (Oral)   Resp 16   Ht 5' 6\" (1.676 m)   Wt 121 lb (54.9 kg)   SpO2 97%   BMI 19.53 kg/m²     General:  alert, cooperative, no distress   Abdomen: not examined. Back:  bilateral and mild soreness CVA tenderness   :  defer exam     Laboratory:   Urine dipstick shows   Results for orders placed or performed in visit on 01/23/20   AMB POC URINALYSIS DIP STICK MANUAL W/O MICRO   Result Value Ref Range    Color (UA POC) Yellow     Clarity (UA POC) Cloudy     Glucose (UA POC) Negative Negative    Bilirubin (UA POC) Negative Negative    Ketones (UA POC) Negative Negative    Specific gravity (UA POC) 1.025 1.001 - 1.035    Blood (UA POC) Negative Negative    pH (UA POC) 6.0 4.6 - 8.0    Protein (UA POC) Negative Negative    Urobilinogen (UA POC) 0.2 mg/dL 0.2 - 1    Nitrites (UA POC) Positive Negative    Leukocyte esterase (UA POC) 1+ Negative         Assessment/Plan:     UTI, Acute cystitis     1. nitrofurantoin  2. Maintain adequate hydration  3. May use OTC pyridium as desired, which will turn urine orange/red color  4. Follow up if symptoms not improving, and prn. ICD-10-CM ICD-9-CM    1. Acute cystitis without hematuria N30.00 595.0    2. Foul smelling urine R82.90 791.9 AMB POC URINALYSIS DIP STICK MANUAL W/O MICRO   3. Abnormal urinalysis R82.90 791.9 CULTURE, URINE     Orders Placed This Encounter    CULTURE, URINE    AMB POC URINALYSIS DIP STICK MANUAL W/O MICRO    hydroxychloroquine (PLAQUENIL) 200 mg tablet    lidocaine-prilocaine (EMLA) topical cream    BD SYRINGE 1 mL 25 gauge x 5/8\" syrg    heparin sodium,porcine (HEPARIN LOCK FLUSH, PORCINE, IV)    nitrofurantoin, macrocrystal-monohydrate, (MACROBID) 100 mg capsule    fluconazole (DIFLUCAN) 150 mg tablet   . Patient states she has taken diflucan before with all current medications with no side effects

## 2020-01-23 NOTE — PROGRESS NOTES
RM 4    Chief Complaint   Patient presents with    Urinary Frequency     vaginal irritation x 2 weeks, last week urine has had a strong ordor        Visit Vitals  /69 (BP 1 Location: Left arm, BP Patient Position: Sitting)   Pulse 93   Temp 97.9 °F (36.6 °C) (Oral)   Resp 16   Ht 5' 6\" (1.676 m)   Wt 121 lb (54.9 kg)   SpO2 97%   BMI 19.53 kg/m²

## 2020-01-25 LAB — BACTERIA UR CULT: ABNORMAL

## 2020-01-30 RX ORDER — ACETAMINOPHEN 325 MG/1
500 TABLET ORAL ONCE
Status: COMPLETED | OUTPATIENT
Start: 2020-01-31 | End: 2020-01-31

## 2020-01-30 RX ORDER — DIPHENHYDRAMINE HCL 25 MG
25 CAPSULE ORAL ONCE
Status: COMPLETED | OUTPATIENT
Start: 2020-01-31 | End: 2020-01-31

## 2020-01-31 ENCOUNTER — HOSPITAL ENCOUNTER (OUTPATIENT)
Dept: INFUSION THERAPY | Age: 40
Discharge: HOME | End: 2020-01-31
Payer: COMMERCIAL

## 2020-01-31 VITALS
HEIGHT: 66 IN | TEMPERATURE: 98.7 F | DIASTOLIC BLOOD PRESSURE: 65 MMHG | OXYGEN SATURATION: 100 % | HEART RATE: 98 BPM | BODY MASS INDEX: 34.63 KG/M2 | SYSTOLIC BLOOD PRESSURE: 100 MMHG | RESPIRATION RATE: 16 BRPM | WEIGHT: 215.5 LBS

## 2020-01-31 LAB
ALBUMIN SERPL-MCNC: 3.4 G/DL (ref 3.5–5)
ALBUMIN/GLOB SERPL: 1 {RATIO} (ref 1.1–2.2)
ALP SERPL-CCNC: 55 U/L (ref 45–117)
ALT SERPL-CCNC: 16 U/L (ref 12–78)
ANION GAP SERPL CALC-SCNC: 6 MMOL/L (ref 5–15)
APPEARANCE UR: CLEAR
AST SERPL-CCNC: 7 U/L (ref 15–37)
BACTERIA URNS QL MICRO: ABNORMAL /HPF
BASOPHILS # BLD: 0.1 K/UL (ref 0–0.1)
BASOPHILS NFR BLD: 0 % (ref 0–1)
BILIRUB SERPL-MCNC: 0.7 MG/DL (ref 0.2–1)
BILIRUB UR QL: NEGATIVE
BUN SERPL-MCNC: 14 MG/DL (ref 6–20)
BUN/CREAT SERPL: 14 (ref 12–20)
CALCIUM SERPL-MCNC: 8.7 MG/DL (ref 8.5–10.1)
CHLORIDE SERPL-SCNC: 103 MMOL/L (ref 97–108)
CK SERPL-CCNC: 32 U/L (ref 26–192)
CO2 SERPL-SCNC: 29 MMOL/L (ref 21–32)
COLOR UR: ABNORMAL
CREAT SERPL-MCNC: 1.01 MG/DL (ref 0.55–1.02)
CREAT UR-MCNC: 226 MG/DL
CRP SERPL-MCNC: 1.04 MG/DL (ref 0–0.6)
DIFFERENTIAL METHOD BLD: ABNORMAL
EOSINOPHIL # BLD: 0 K/UL (ref 0–0.4)
EOSINOPHIL NFR BLD: 0 % (ref 0–7)
EPITH CASTS URNS QL MICRO: ABNORMAL /LPF
ERYTHROCYTE [DISTWIDTH] IN BLOOD BY AUTOMATED COUNT: 13.1 % (ref 11.5–14.5)
ERYTHROCYTE [SEDIMENTATION RATE] IN BLOOD: 51 MM/HR (ref 0–20)
GLOBULIN SER CALC-MCNC: 3.4 G/DL (ref 2–4)
GLUCOSE SERPL-MCNC: 91 MG/DL (ref 65–100)
GLUCOSE UR STRIP.AUTO-MCNC: NEGATIVE MG/DL
HCT VFR BLD AUTO: 39.3 % (ref 35–47)
HGB BLD-MCNC: 12.8 G/DL (ref 11.5–16)
HGB UR QL STRIP: NEGATIVE
IMM GRANULOCYTES # BLD AUTO: 0.1 K/UL (ref 0–0.04)
IMM GRANULOCYTES NFR BLD AUTO: 1 % (ref 0–0.5)
KETONES UR QL STRIP.AUTO: NEGATIVE MG/DL
LEUKOCYTE ESTERASE UR QL STRIP.AUTO: NEGATIVE
LYMPHOCYTES # BLD: 4.8 K/UL (ref 0.8–3.5)
LYMPHOCYTES NFR BLD: 41 % (ref 12–49)
MCH RBC QN AUTO: 30.6 PG (ref 26–34)
MCHC RBC AUTO-ENTMCNC: 32.6 G/DL (ref 30–36.5)
MCV RBC AUTO: 94 FL (ref 80–99)
MONOCYTES # BLD: 0.8 K/UL (ref 0–1)
MONOCYTES NFR BLD: 7 % (ref 5–13)
NEUTS SEG # BLD: 6.1 K/UL (ref 1.8–8)
NEUTS SEG NFR BLD: 51 % (ref 32–75)
NITRITE UR QL STRIP.AUTO: NEGATIVE
NRBC # BLD: 0 K/UL (ref 0–0.01)
NRBC BLD-RTO: 0 PER 100 WBC
PH UR STRIP: 6 [PH] (ref 5–8)
PLATELET # BLD AUTO: 335 K/UL (ref 150–400)
PMV BLD AUTO: 9.3 FL (ref 8.9–12.9)
POTASSIUM SERPL-SCNC: 4 MMOL/L (ref 3.5–5.1)
PROT SERPL-MCNC: 6.8 G/DL (ref 6.4–8.2)
PROT UR STRIP-MCNC: NEGATIVE MG/DL
PROT UR-MCNC: 34 MG/DL (ref 0–11.9)
RBC # BLD AUTO: 4.18 M/UL (ref 3.8–5.2)
RBC #/AREA URNS HPF: ABNORMAL /HPF (ref 0–5)
SODIUM SERPL-SCNC: 138 MMOL/L (ref 136–145)
SP GR UR REFRACTOMETRY: 1.02 (ref 1–1.03)
UA: UC IF INDICATED,UAUC: ABNORMAL
UROBILINOGEN UR QL STRIP.AUTO: 1 EU/DL (ref 0.2–1)
WBC # BLD AUTO: 11.8 K/UL (ref 3.6–11)
WBC URNS QL MICRO: ABNORMAL /HPF (ref 0–4)

## 2020-01-31 PROCEDURE — 74011250637 HC RX REV CODE- 250/637: Performed by: INTERNAL MEDICINE

## 2020-01-31 PROCEDURE — 81001 URINALYSIS AUTO W/SCOPE: CPT

## 2020-01-31 PROCEDURE — 74011000250 HC RX REV CODE- 250: Performed by: INTERNAL MEDICINE

## 2020-01-31 PROCEDURE — 82550 ASSAY OF CK (CPK): CPT

## 2020-01-31 PROCEDURE — 77030012965 HC NDL HUBR BBMI -A

## 2020-01-31 PROCEDURE — 86160 COMPLEMENT ANTIGEN: CPT

## 2020-01-31 PROCEDURE — 74011250636 HC RX REV CODE- 250/636: Performed by: INTERNAL MEDICINE

## 2020-01-31 PROCEDURE — 85025 COMPLETE CBC W/AUTO DIFF WBC: CPT

## 2020-01-31 PROCEDURE — 84156 ASSAY OF PROTEIN URINE: CPT

## 2020-01-31 PROCEDURE — 80053 COMPREHEN METABOLIC PANEL: CPT

## 2020-01-31 PROCEDURE — 36415 COLL VENOUS BLD VENIPUNCTURE: CPT

## 2020-01-31 PROCEDURE — 87147 CULTURE TYPE IMMUNOLOGIC: CPT

## 2020-01-31 PROCEDURE — 96375 TX/PRO/DX INJ NEW DRUG ADDON: CPT

## 2020-01-31 PROCEDURE — 96365 THER/PROPH/DIAG IV INF INIT: CPT

## 2020-01-31 PROCEDURE — 86140 C-REACTIVE PROTEIN: CPT

## 2020-01-31 PROCEDURE — 85652 RBC SED RATE AUTOMATED: CPT

## 2020-01-31 PROCEDURE — 87086 URINE CULTURE/COLONY COUNT: CPT

## 2020-01-31 PROCEDURE — 74011000258 HC RX REV CODE- 258: Performed by: INTERNAL MEDICINE

## 2020-01-31 PROCEDURE — 82570 ASSAY OF URINE CREATININE: CPT

## 2020-01-31 RX ORDER — SODIUM CHLORIDE 9 MG/ML
25 INJECTION, SOLUTION INTRAVENOUS AS NEEDED
Status: DISCONTINUED | OUTPATIENT
Start: 2020-01-31 | End: 2020-02-01 | Stop reason: HOSPADM

## 2020-01-31 RX ORDER — SODIUM CHLORIDE 9 MG/ML
10 INJECTION INTRAMUSCULAR; INTRAVENOUS; SUBCUTANEOUS AS NEEDED
Status: DISCONTINUED | OUTPATIENT
Start: 2020-01-31 | End: 2020-02-01 | Stop reason: HOSPADM

## 2020-01-31 RX ORDER — HEPARIN 100 UNIT/ML
500 SYRINGE INTRAVENOUS AS NEEDED
Status: DISCONTINUED | OUTPATIENT
Start: 2020-01-31 | End: 2020-02-01 | Stop reason: HOSPADM

## 2020-01-31 RX ORDER — SODIUM CHLORIDE 0.9 % (FLUSH) 0.9 %
5-10 SYRINGE (ML) INJECTION AS NEEDED
Status: DISCONTINUED | OUTPATIENT
Start: 2020-01-31 | End: 2020-02-01 | Stop reason: HOSPADM

## 2020-01-31 RX ADMIN — SODIUM CHLORIDE 1000 MG: 9 INJECTION, SOLUTION INTRAVENOUS at 10:47

## 2020-01-31 RX ADMIN — SODIUM CHLORIDE 10 ML: 9 INJECTION INTRAMUSCULAR; INTRAVENOUS; SUBCUTANEOUS at 08:20

## 2020-01-31 RX ADMIN — Medication 500 UNITS: at 12:46

## 2020-01-31 RX ADMIN — METHYLPREDNISOLONE SODIUM SUCCINATE 40 MG: 40 INJECTION, POWDER, FOR SOLUTION INTRAMUSCULAR; INTRAVENOUS at 10:05

## 2020-01-31 RX ADMIN — Medication 10 ML: at 08:20

## 2020-01-31 RX ADMIN — Medication 10 ML: at 12:46

## 2020-01-31 RX ADMIN — ACETAMINOPHEN 487.5 MG: 325 TABLET ORAL at 10:02

## 2020-01-31 RX ADMIN — DIPHENHYDRAMINE HYDROCHLORIDE 25 MG: 25 CAPSULE ORAL at 10:03

## 2020-01-31 RX ADMIN — SODIUM CHLORIDE 25 ML/HR: 900 INJECTION, SOLUTION INTRAVENOUS at 09:57

## 2020-01-31 NOTE — PROGRESS NOTES
80- Pt arrived to [facility] ambulatory in no acute distress for Orencia.  Assessment unremarkable except generalized pain which is chronic. R chest port accessed without issue using 1\" Zee  and positive blood return noted. Confirmed patient took Pepcid at home.      Labs obtained - CBC w/diff, CMP, ESR, CRP, CPK, C3, C4, UA with Protein and Creatinine    Recent Results (from the past 12 hour(s))   CBC WITH AUTOMATED DIFF    Collection Time: 01/31/20  8:39 AM   Result Value Ref Range    WBC 11.8 (H) 3.6 - 11.0 K/uL    RBC 4.18 3.80 - 5.20 M/uL    HGB 12.8 11.5 - 16.0 g/dL    HCT 39.3 35.0 - 47.0 %    MCV 94.0 80.0 - 99.0 FL    MCH 30.6 26.0 - 34.0 PG    MCHC 32.6 30.0 - 36.5 g/dL    RDW 13.1 11.5 - 14.5 %    PLATELET 514 928 - 496 K/uL    MPV 9.3 8.9 - 12.9 FL    NRBC 0.0 0  WBC    ABSOLUTE NRBC 0.00 0.00 - 0.01 K/uL    NEUTROPHILS 51 32 - 75 %    LYMPHOCYTES 41 12 - 49 %    MONOCYTES 7 5 - 13 %    EOSINOPHILS 0 0 - 7 %    BASOPHILS 0 0 - 1 %    IMMATURE GRANULOCYTES 1 (H) 0.0 - 0.5 %    ABS. NEUTROPHILS 6.1 1.8 - 8.0 K/UL    ABS. LYMPHOCYTES 4.8 (H) 0.8 - 3.5 K/UL    ABS. MONOCYTES 0.8 0.0 - 1.0 K/UL    ABS. EOSINOPHILS 0.0 0.0 - 0.4 K/UL    ABS. BASOPHILS 0.1 0.0 - 0.1 K/UL    ABS. IMM. GRANS. 0.1 (H) 0.00 - 0.04 K/UL    DF AUTOMATED     METABOLIC PANEL, COMPREHENSIVE    Collection Time: 01/31/20  8:39 AM   Result Value Ref Range    Sodium 138 136 - 145 mmol/L    Potassium 4.0 3.5 - 5.1 mmol/L    Chloride 103 97 - 108 mmol/L    CO2 29 21 - 32 mmol/L    Anion gap 6 5 - 15 mmol/L    Glucose 91 65 - 100 mg/dL    BUN 14 6 - 20 MG/DL    Creatinine 1.01 0.55 - 1.02 MG/DL    BUN/Creatinine ratio 14 12 - 20      GFR est AA >60 >60 ml/min/1.73m2    GFR est non-AA >60 >60 ml/min/1.73m2    Calcium 8.7 8.5 - 10.1 MG/DL    Bilirubin, total 0.7 0.2 - 1.0 MG/DL    ALT (SGPT) 16 12 - 78 U/L    AST (SGOT) 7 (L) 15 - 37 U/L    Alk.  phosphatase 55 45 - 117 U/L    Protein, total 6.8 6.4 - 8.2 g/dL    Albumin 3.4 (L) 3.5 - 5.0 g/dL    Globulin 3.4 2.0 - 4.0 g/dL    A-G Ratio 1.0 (L) 1.1 - 2.2     SED RATE (ESR)    Collection Time: 01/31/20  8:39 AM   Result Value Ref Range    Sed rate, automated 51 (H) 0 - 20 mm/hr   C REACTIVE PROTEIN, QT    Collection Time: 01/31/20  8:39 AM   Result Value Ref Range    C-Reactive protein 1.04 (H) 0.00 - 0.60 mg/dL   CK    Collection Time: 01/31/20  8:39 AM   Result Value Ref Range    CK 32 26 - 192 U/L   URINALYSIS W/ REFLEX CULTURE    Collection Time: 01/31/20  8:39 AM   Result Value Ref Range    Color YELLOW/STRAW      Appearance CLEAR CLEAR      Specific gravity 1.025 1.003 - 1.030      pH (UA) 6.0 5.0 - 8.0      Protein NEGATIVE  NEG mg/dL    Glucose NEGATIVE  NEG mg/dL    Ketone NEGATIVE  NEG mg/dL    Bilirubin NEGATIVE  NEG      Blood NEGATIVE  NEG      Urobilinogen 1.0 0.2 - 1.0 EU/dL    Nitrites NEGATIVE  NEG      Leukocyte Esterase NEGATIVE  NEG      WBC 0-4 0 - 4 /hpf    RBC 0-5 0 - 5 /hpf    Epithelial cells MODERATE (A) FEW /lpf    Bacteria 2+ (A) NEG /hpf    UA:UC IF INDICATED URINE CULTURE ORDERED (A) CNI     PROTEIN URINE, RANDOM    Collection Time: 01/31/20  8:39 AM   Result Value Ref Range    Protein, urine random 34 (H) 0.0 - 11.9 mg/dL   CREATININE, UR, RANDOM    Collection Time: 01/31/20  8:39 AM   Result Value Ref Range    Creatinine, urine 226.00 mg/dL     The following medications administered:  NS @ KVO  Benadryl 25 mg PO  Tylenol 487.5 mg PO  Solumedrol 40 mg IVP  Orencia 1000 mg IV - infused at 100 ml/hr due to patient reporting history of hypersensitivity    Patient Vitals for the past 12 hrs:   Temp Pulse Resp BP SpO2   01/31/20 1245 98.7 °F (37.1 °C) 98 16 100/65 --   01/31/20 1217 98.5 °F (36.9 °C) 95 16 105/67 --   01/31/20 0817 98.4 °F (36.9 °C) 98 16 133/90 100 %     1250- Pt tolerated treatment well.  Patient observed for 30 mins post infusion and no adverse reactions noted.  Port flushed per policy and de-accessed, 2x2 and tape placed.  Pt discharged ambulatory in no acute distress, accompanied by self. Next appointment 2/28/20.

## 2020-02-01 LAB
BACTERIA SPEC CULT: ABNORMAL
C3 SERPL-MCNC: 132 MG/DL (ref 82–167)
C4 SERPL-MCNC: 28 MG/DL (ref 14–44)
CC UR VC: ABNORMAL
SERVICE CMNT-IMP: ABNORMAL

## 2020-02-03 DIAGNOSIS — F90.0 ATTENTION DEFICIT HYPERACTIVITY DISORDER (ADHD), PREDOMINANTLY INATTENTIVE TYPE: ICD-10-CM

## 2020-02-04 NOTE — TELEPHONE ENCOUNTER
Pt is out of medication. Please call to schedule as there are no appts for psr to schedule. 2-6-20 pt has made the first available appt. Please call pertaining to medication that pt has been out of for two days.

## 2020-02-12 ENCOUNTER — PATIENT MESSAGE (OUTPATIENT)
Dept: INTERNAL MEDICINE CLINIC | Age: 40
End: 2020-02-12

## 2020-02-12 DIAGNOSIS — F90.0 ATTENTION DEFICIT HYPERACTIVITY DISORDER (ADHD), PREDOMINANTLY INATTENTIVE TYPE: ICD-10-CM

## 2020-02-13 ENCOUNTER — OFFICE VISIT (OUTPATIENT)
Dept: INTERNAL MEDICINE CLINIC | Age: 40
End: 2020-02-13

## 2020-02-13 VITALS
BODY MASS INDEX: 34.55 KG/M2 | HEIGHT: 66 IN | SYSTOLIC BLOOD PRESSURE: 113 MMHG | OXYGEN SATURATION: 97 % | RESPIRATION RATE: 16 BRPM | HEART RATE: 103 BPM | WEIGHT: 215 LBS | TEMPERATURE: 97.7 F | DIASTOLIC BLOOD PRESSURE: 73 MMHG

## 2020-02-13 DIAGNOSIS — N30.00 ACUTE CYSTITIS WITHOUT HEMATURIA: Primary | ICD-10-CM

## 2020-02-13 DIAGNOSIS — B37.9 YEAST INFECTION: ICD-10-CM

## 2020-02-13 LAB
BILIRUB UR QL STRIP: NORMAL
GLUCOSE UR-MCNC: NEGATIVE MG/DL
KETONES P FAST UR STRIP-MCNC: NORMAL MG/DL
PH UR STRIP: 5.5 [PH] (ref 4.6–8)
PROT UR QL STRIP: NORMAL
SP GR UR STRIP: 1.03 (ref 1–1.03)
UA UROBILINOGEN AMB POC: NORMAL (ref 0.2–1)
URINALYSIS CLARITY POC: CLEAR
URINALYSIS COLOR POC: YELLOW
URINE BLOOD POC: NORMAL
URINE LEUKOCYTES POC: NORMAL
URINE NITRITES POC: NEGATIVE

## 2020-02-13 RX ORDER — CEPHALEXIN 500 MG/1
500 CAPSULE ORAL 3 TIMES DAILY
Qty: 21 CAP | Refills: 0 | Status: SHIPPED | OUTPATIENT
Start: 2020-02-13 | End: 2020-02-20

## 2020-02-13 RX ORDER — FLUCONAZOLE 150 MG/1
TABLET ORAL
Qty: 2 TAB | Refills: 0 | Status: SHIPPED | OUTPATIENT
Start: 2020-02-13 | End: 2020-03-14

## 2020-02-13 NOTE — PROGRESS NOTES
HISTORY OF PRESENT ILLNESS  Denisha Bhandari is a 42 y.o. female. HPI   Pt normally follows with Dr. Aura Cummins (PCP). Pt is here for acute care. Pt c/o UTI x a month ago   Having cloudy, foul smelling urine, back pain, nausea, urethral irritation, night sweats   She states she had one a month ago and was treated with macrobid   She did not think it was totally resolved   Had a UA on 1/23/20 which was positive and pan sensitive   She states she had UA a couples week ago  which came back positive for a UTI on 1/31/20   Reviewed UA 1/31/20, discussed this came back negative   Has not taken anything for this   Will give keflex       States she gets yeast infections after abx   Will give diflucan       Patient Active Problem List    Diagnosis Date Noted    Moderate major depression ([facility] [address] 11/13/2018    Severe obesity (BMI 35.0-39.9) 09/11/2018    Rheumatoid arthritis ([facility] [address] 12/23/2016    Asthma 12/23/2016    Rectal bleeding 06/13/2016    Family history of colonic polyps 06/13/2016    Encounter for screening colonoscopy 06/13/2016    Fibromyalgia 05/03/2016    Hyperhidrosis 05/03/2016    Environmental and seasonal allergies 05/03/2016    Hypothyroidism (acquired) 05/17/2011     Current Outpatient Medications   Medication Sig Dispense Refill    hydroxychloroquine (PLAQUENIL) 200 mg tablet       lidocaine-prilocaine (EMLA) topical cream APPLY 1/2 TUBE 1 HOUR PRIOR TO INFUSION  2    BD SYRINGE 1 mL 25 gauge x 5/8\" syrg 1 SYRINGE ONCE A WEEK  4    heparin sodium,porcine (HEPARIN LOCK FLUSH, PORCINE, IV) by IntraVENous route.  fluconazole (DIFLUCAN) 150 mg tablet Take 1 tab today and repeat dose in 5 days 2 Tab 0    lisdexamfetamine (VYVANSE) 30 mg capsule Take 1 Cap by mouth every morning. Max Daily Amount: 30 mg. 30 Cap 0    omeprazole (PRILOSEC) 40 mg capsule Take 1 Cap by mouth daily.  30 Cap 0    promethazine-dextromethorphan (PROMETHAZINE-DM) 6.25-15 mg/5 mL syrup Take 5 mL by mouth nightly as needed for Cough. 60 mL 0    Cetirizine (ZYRTEC) 10 mg cap Take 10 mg by mouth daily. Indications: inflammation of the nose due to an allergy 30 Cap 0    predniSONE (DELTASONE) 20 mg tablet 80mg po daily for 2 days, 60mg po daily for 2days, 40mg po daily for 2 days, 20mg po daily for 2days then stop 20 Tab 0    albuterol (PROVENTIL HFA, VENTOLIN HFA, PROAIR HFA) 90 mcg/actuation inhaler Take 2 Puffs by inhalation every four (4) hours as needed for Wheezing. 1 Inhaler 0    omalizumab (XOLAIR SC) by SubCUTAneous route every thirty (30) days.  propranolol LA (INDERAL LA) 60 mg SR capsule TAKE 1 CAPSULE BY MOUTH EVERY DAY 90 Cap 3    norethindrone-ethinyl estradiol (JUNEL FE 1/20, 28,) 1 mg-20 mcg (21)/75 mg (7) tab Junel FE 1/20 (28) 1 mg-20 mcg (21)/75 mg (7) tablet   TAKE 1 TABLET BY MOUTH EVERY DAY      SYMBICORT 160-4.5 mcg/actuation HFAA TAKE 2 PUFFS BY MOUTH TWICE A DAY  12    OZEMPIC 0.25 mg or 0.5 mg(2 mg/1.5 mL) sub-q pen INJECT 0.5MG SUBCUTAMEOUSLY ONCE PER WEEK ON FRIDAY  1    ondansetron hcl (ZOFRAN) 4 mg tablet Take 4 mg by mouth every eight (8) hours as needed for Nausea.  naltrexone HCl (NALTREXONE PO) Take  by mouth.  EPIPEN 2-SANJAY 0.3 mg/0.3 mL injection 1 (ONE) INJECTION AS NEEDED  0    buPROPion XL (WELLBUTRIN XL) 150 mg tablet Take 1 Tab by mouth every morning. 90 Tab 3    montelukast (SINGULAIR) 10 mg tablet Take 1 Tab by mouth daily. 90 Tab 4    escitalopram oxalate (LEXAPRO) 20 mg tablet TAKE 1 TABLET BY MOUTH EVERY DAY 90 Tab 3    traMADol (ULTRAM) 50 mg tablet Take 50 mg by mouth every six (6) hours as needed for Pain.  oxybutynin (DITROPAN) 5 mg tablet TAKE 1/2 TAB DAILY X1WEEK THEN INCREASE TO 1/2 TAB 2XDAY X2WKS THEN 1 TAB 2XDAY  3    BD INSULIN SYRINGE 1 mL 25 gauge x 5/8\" syrg USE 1 SYRINGE ONCE A WEEK  11    predniSONE (DELTASONE) 10 mg tablet TAKE 1-4 TABLETS ONCE A DAY AS NEEDED  2    abatacept (ORENCIA SC) by SubCUTAneous route.       cyanocobalamin, vitamin B-12, (VITAMIN B-12 PO) Take  by mouth.  RESTASIS 0.05 % ophthalmic emulsion INSTILL 1 DROP INTO EACH EYE TWICE DAILY  4    SAFETY-ABDI TB SYR 1CC/25GX5/8\" 1 mL 25 gauge x 5/8\" syrg USE TO INJECT METHOTREXATE ONCE A WEEK  2    methotrexate 25 mg/mL chemo injection INJECT 25MG (1ML) subcutaneously ONCE WEEKLY  0    metFORMIN ER (GLUCOPHAGE XR) 500 mg tablet 1,000 TAKE 2 TABLET BY MOUTH TWICE A DAY AS DIRECTED  1    folic acid (FOLVITE) 1 mg tablet TAKE 1 TABLET ORALLY DAILY  11    desogestrel-ethinyl estradiol (DESOGEN) 0.15-0.03 mg tab Take 1 Tab by mouth nightly.  Nebulizer & Compressor machine 1 Each by Does Not Apply route three (3) times daily as needed. 1 Each 0    glycopyrrolate (ROBINUL) 1 mg tablet Take 2 mg by mouth two (2) times a day. Indications: hyperhydrosis      cholecalciferol, vitamin D3, (VITAMIN D3) 2,000 unit Tab Take 1 Tab by mouth daily.  levothyroxine (SYNTHROID) 75 mcg tablet Take 75 mcg by mouth Daily (before breakfast).        Past Surgical History:   Procedure Laterality Date    COLONOSCOPY N/A 6/13/2016    COLONOSCOPY performed by Rayray León [facility] at [facility] ENDOSCOPY    HX HEENT      HX WISDOM TEETH EXTRACTION  2004    UPPER GI ENDOSCOPY,BIOPSY  2/12/2019         UPPER GI ENDOSCOPY,DILATN W GUIDE  2/12/2019           Lab Results   Component Value Date/Time    WBC 11.8 (H) 01/31/2020 08:39 AM    HGB 12.8 01/31/2020 08:39 AM    HCT 39.3 01/31/2020 08:39 AM    PLATELET 863 63/68/8569 08:39 AM    MCV 94.0 01/31/2020 08:39 AM     No results found for: CHOL, CHOLPOCT, HDL, LDL, LDLC, LDLCPOC, LDLCEXT, TRIGL, TGLPOCT, CHHD, CHHDX  Lab Results   Component Value Date/Time    GFR est non-AA >60 01/31/2020 08:39 AM    GFR est AA >60 01/31/2020 08:39 AM    Creatinine 1.01 01/31/2020 08:39 AM    BUN 14 01/31/2020 08:39 AM    Sodium 138 01/31/2020 08:39 AM    Potassium 4.0 01/31/2020 08:39 AM    Chloride 103 01/31/2020 08:39 AM    CO2 29 01/31/2020 08:39 AM Magnesium 1.7 04/02/2016 02:00 PM        Review of Systems   Constitutional: Negative for chills and fever. Respiratory: Negative for shortness of breath. Cardiovascular: Negative for chest pain. Gastrointestinal: Positive for nausea. Negative for vomiting. Genitourinary: Positive for frequency. Musculoskeletal: Positive for back pain. Physical Exam  Constitutional:       General: She is not in acute distress. Appearance: She is well-developed. She is not diaphoretic. HENT:      Head: Normocephalic and atraumatic. Mouth/Throat:      Mouth: Mucous membranes are moist.      Pharynx: Oropharynx is clear. No oropharyngeal exudate or posterior oropharyngeal erythema. Eyes:      General:         Right eye: No discharge. Left eye: No discharge. Conjunctiva/sclera: Conjunctivae normal.   Neck:      Musculoskeletal: Normal range of motion and neck supple. Cardiovascular:      Rate and Rhythm: Normal rate and regular rhythm. Heart sounds: Normal heart sounds. No murmur. No friction rub. No gallop. Pulmonary:      Effort: Pulmonary effort is normal. No respiratory distress. Breath sounds: Normal breath sounds. No wheezing or rales. Chest:      Chest wall: No tenderness. Abdominal:      General: There is no distension. Palpations: There is no mass. Tenderness: There is no abdominal tenderness. Hernia: No hernia is present. Musculoskeletal: Normal range of motion. General: No tenderness or deformity. Comments: Slight R CVA TTP, nothing on the L    Lymphadenopathy:      Cervical: No cervical adenopathy. Skin:     General: Skin is warm and dry. Coloration: Skin is not pale. Findings: No erythema or rash. Neurological:      Mental Status: She is alert and oriented to person, place, and time.       Coordination: Coordination normal.   Psychiatric:         Behavior: Behavior normal.         ASSESSMENT and PLAN    ICD-10-CM ICD-9-CM    1. Acute cystitis without hematuria                  Will tx with keflex for 7 days pt had recurrent sxs on macrobid several weeks ago will check urine and run culture  N30.00 595.0    2. Yeast infection        Diflucan for yeast infection which she gets after UTI  B37.9 112.9             Scribed by Annie Mccann, as dictated by Dr. Tacos Juarez. Current diagnosis and concerns discussed with pt at length. Pt understands risks and benefits or current treatment plan and medications, and accepts the treatment and medication with any possible risks. Pt asks appropriate questions, which were answered. Pt was instructed to call with any concerns or problems. I have reviewed the note documented by the scribe. The services provided are my own.   The documentation is accurate

## 2020-02-13 NOTE — TELEPHONE ENCOUNTER
Jackie Query 2/12/2020 4:15 PM EST      ----- Message -----  From: Cameron Push  Sent: 2/12/2020 12:40 PM EST  To: [facility] Nurse Pool  Subject: Test Results Question     Hello All,  I am writing because I had a bladder infection diagnosed at the [address] [facility] clinic at [facility]. I had a course of antibiotics for it. Then when I had my infusion last week, I had a repeat urine done. It came back 2+ bacteria and I had suspected my UTI had not cleared. Are yall able to review the test results from the 23rd and send in an antibiotic prescription? If not, I will make a sick appointment. I have a well visit for my vyvance refill scheduled but the first available wasn't until April. I am also requesting a refill on Vyvance. Thanks!   Denisha

## 2020-02-15 LAB — BACTERIA UR CULT: ABNORMAL

## 2020-02-21 RX ORDER — DIPHENHYDRAMINE HCL 25 MG
25 CAPSULE ORAL ONCE
Status: ACTIVE | OUTPATIENT
Start: 2020-02-28 | End: 2020-02-28

## 2020-02-21 RX ORDER — ACETAMINOPHEN 325 MG/1
500 TABLET ORAL ONCE
Status: DISPENSED | OUTPATIENT
Start: 2020-02-28 | End: 2020-02-28

## 2020-02-28 ENCOUNTER — HOSPITAL ENCOUNTER (OUTPATIENT)
Dept: INFUSION THERAPY | Age: 40
Discharge: HOME | End: 2020-02-28

## 2020-03-02 ENCOUNTER — OFFICE VISIT (OUTPATIENT)
Dept: INTERNAL MEDICINE CLINIC | Age: 40
End: 2020-03-02

## 2020-03-02 VITALS
WEIGHT: 217 LBS | BODY MASS INDEX: 34.87 KG/M2 | TEMPERATURE: 97.8 F | HEIGHT: 66 IN | RESPIRATION RATE: 18 BRPM | OXYGEN SATURATION: 99 % | HEART RATE: 84 BPM

## 2020-03-02 DIAGNOSIS — F90.0 ATTENTION DEFICIT HYPERACTIVITY DISORDER (ADHD), PREDOMINANTLY INATTENTIVE TYPE: ICD-10-CM

## 2020-03-02 DIAGNOSIS — M06.9 RHEUMATOID ARTHRITIS INVOLVING MULTIPLE SITES, UNSPECIFIED RHEUMATOID FACTOR PRESENCE: ICD-10-CM

## 2020-03-02 DIAGNOSIS — F32.A DEPRESSION, UNSPECIFIED DEPRESSION TYPE: Primary | ICD-10-CM

## 2020-03-02 RX ORDER — SODIUM FLUORIDE/POTASSIUM NIT 1.1 %-5 %
PASTE (ML) DENTAL
COMMUNITY
Start: 2020-02-07

## 2020-03-02 RX ORDER — METHOTREXATE 25 MG/ML
INJECTION INTRA-ARTERIAL; INTRAMUSCULAR; INTRATHECAL; INTRAVENOUS
COMMUNITY
Start: 2020-02-28 | End: 2020-05-14 | Stop reason: SDUPTHER

## 2020-03-02 NOTE — PROGRESS NOTES
CC: Behavioral Problem      HPI:    She is a 42 y.o. female who presents for evaluation of ADHD       Patient went for neurocognitive testing and told she has ADHD. She has difficulty remembering tasks and difficulty concentrating and doing things on time      For rheumatoid arthritis patient is still seen Dr Aide Vieyra and on immunotherapy       Patient went for neurocognitive testing and told she has ADHD. She was concerned with her family hx of Chris's disease. She has difficulty remembering tasks and difficulty concentrating\  Since starting vyvanse 30mg feels concentration is much better   Out of medication for 3 weeks ( due to delayed follow up)    ROS:  Constitutional: negative for fevers, chills, anorexia and weight loss    Complains of urinary discomfort, bladder pain and foul smelling urine for 3 days     10 other systems reviewed and negative    Past Medical History:   Diagnosis Date    Acquired hypothyroidism     Adverse effect of anesthesia     labile BP during/after C section    Asthma     Broken bones     history of 9 broken bones    Chronic UTI (urinary tract infection)     \"extra valve right kidney causes UTI\"    Fibromyalgia     Gestational diabetes     GI bleed 2007,2011,2015,2016    lower GI last colonoscopy in 2016 normal     Huntingtons chorea (HCC)     Hyperhydrosis disorder     Ill-defined condition     [place]/ tested genetically - daughter has [place]    Infertility     PCOS    PCOS (polycystic ovarian syndrome)     Precancerous skin lesion     removed from Right shoulder    Preeclampsia 2011    pregnancy    Reactive airway disease     Rheumatoid arthritis ([address]     SVT (supraventricular tachycardia) ([address] 2011    occured during pregnancy when picc line inserted.        Current Outpatient Medications on File Prior to Visit   Medication Sig Dispense Refill    fluconazole (DIFLUCAN) 150 mg tablet Take 1 tab today and repeat dose in 5 days 2 Tab 0    lisdexamfetamine (VYVANSE) 30 mg capsule Take 1 Cap by mouth every morning. Max Daily Amount: 30 mg. 30 Cap 0    hydroxychloroquine (PLAQUENIL) 200 mg tablet       lidocaine-prilocaine (EMLA) topical cream APPLY 1/2 TUBE 1 HOUR PRIOR TO INFUSION  2    BD SYRINGE 1 mL 25 gauge x 5/8\" syrg 1 SYRINGE ONCE A WEEK  4    heparin sodium,porcine (HEPARIN LOCK FLUSH, PORCINE, IV) by IntraVENous route.  omeprazole (PRILOSEC) 40 mg capsule Take 1 Cap by mouth daily. 30 Cap 0    Cetirizine (ZYRTEC) 10 mg cap Take 10 mg by mouth daily. Indications: inflammation of the nose due to an allergy 30 Cap 0    predniSONE (DELTASONE) 20 mg tablet 80mg po daily for 2 days, 60mg po daily for 2days, 40mg po daily for 2 days, 20mg po daily for 2days then stop 20 Tab 0    albuterol (PROVENTIL HFA, VENTOLIN HFA, PROAIR HFA) 90 mcg/actuation inhaler Take 2 Puffs by inhalation every four (4) hours as needed for Wheezing. 1 Inhaler 0    omalizumab (XOLAIR SC) by SubCUTAneous route every thirty (30) days.  propranolol LA (INDERAL LA) 60 mg SR capsule TAKE 1 CAPSULE BY MOUTH EVERY DAY 90 Cap 3    norethindrone-ethinyl estradiol (JUNEL FE 1/20, 28,) 1 mg-20 mcg (21)/75 mg (7) tab Junel FE 1/20 (28) 1 mg-20 mcg (21)/75 mg (7) tablet   TAKE 1 TABLET BY MOUTH EVERY DAY      SYMBICORT 160-4.5 mcg/actuation HFAA TAKE 2 PUFFS BY MOUTH TWICE A DAY  12    OZEMPIC 0.25 mg or 0.5 mg(2 mg/1.5 mL) sub-q pen INJECT 0.5MG SUBCUTAMEOUSLY ONCE PER WEEK ON FRIDAY 1    ondansetron hcl (ZOFRAN) 4 mg tablet Take 4 mg by mouth every eight (8) hours as needed for Nausea.  naltrexone HCl (NALTREXONE PO) Take  by mouth.  EPIPEN 2-SANJAY 0.3 mg/0.3 mL injection 1 (ONE) INJECTION AS NEEDED  0    buPROPion XL (WELLBUTRIN XL) 150 mg tablet Take 1 Tab by mouth every morning. 90 Tab 3    montelukast (SINGULAIR) 10 mg tablet Take 1 Tab by mouth daily.  90 Tab 4    escitalopram oxalate (LEXAPRO) 20 mg tablet TAKE 1 TABLET BY MOUTH EVERY DAY 90 Tab 3  traMADol (ULTRAM) 50 mg tablet Take 50 mg by mouth every six (6) hours as needed for Pain.  oxybutynin (DITROPAN) 5 mg tablet TAKE 1/2 TAB DAILY X1WEEK THEN INCREASE TO 1/2 TAB 2XDAY X2WKS THEN 1 TAB 2XDAY  3    BD INSULIN SYRINGE 1 mL 25 gauge x 5/8\" syrg USE 1 SYRINGE ONCE A WEEK  11    predniSONE (DELTASONE) 10 mg tablet TAKE 1-4 TABLETS ONCE A DAY AS NEEDED  2    cyanocobalamin, vitamin B-12, (VITAMIN B-12 PO) Take  by mouth.  RESTASIS 0.05 % ophthalmic emulsion INSTILL 1 DROP INTO EACH EYE TWICE DAILY  4    SAFETY-ABID TB SYR 1CC/25GX5/8\" 1 mL 25 gauge x 5/8\" syrg USE TO INJECT METHOTREXATE ONCE A WEEK  2    methotrexate 25 mg/mL chemo injection INJECT 25MG (1ML) subcutaneously ONCE WEEKLY  0    metFORMIN ER (GLUCOPHAGE XR) 500 mg tablet 1,000 TAKE 2 TABLET BY MOUTH TWICE A DAY AS DIRECTED  1    folic acid (FOLVITE) 1 mg tablet TAKE 1 TABLET ORALLY DAILY  11    desogestrel-ethinyl estradiol (DESOGEN) 0.15-0.03 mg tab Take 1 Tab by mouth nightly.  Nebulizer & Compressor machine 1 Each by Does Not Apply route three (3) times daily as needed. 1 Each 0    cholecalciferol, vitamin D3, (VITAMIN D3) 2,000 unit Tab Take 1 Tab by mouth daily.  levothyroxine (SYNTHROID) 75 mcg tablet Take 75 mcg by mouth Daily (before breakfast).  methotrexate, PF, 25 mg/mL injection       PREVIDENT 5000 SENSITIVE 1.1-5 % pste USE AS DIRECTED TWICE A DAY SPIT OUT EXCESS AND DO NOT RINSE, EAT OR DRINK FOR 30 MINUTES      promethazine-dextromethorphan (PROMETHAZINE-DM) 6.25-15 mg/5 mL syrup Take 5 mL by mouth nightly as needed for Cough. 60 mL 0    abatacept (ORENCIA SC) by SubCUTAneous route.  glycopyrrolate (ROBINUL) 1 mg tablet Take 2 mg by mouth two (2) times a day. Indications: hyperhydrosis       No current facility-administered medications on file prior to visit.         Past Surgical History:   Procedure Laterality Date    COLONOSCOPY N/A 6/13/2016    COLONOSCOPY performed by Fracisco Doe Brandee Abdi [facility] at [facility] ENDOSCOPY    HX HEENT      HX WISDOM TEETH EXTRACTION  2004    UPPER GI ENDOSCOPY,BIOPSY  2/12/2019         UPPER GI ENDOSCOPY,DILATN W GUIDE  2/12/2019            Family History   Problem Relation Age of Onset    Other Mother         Chris's disease    Hypertension Mother     Dementia Mother     High Cholesterol Father     Heart Disease Father     Diabetes Father     Hypertension Father     Asthma Brother     Diabetes Brother     Other Brother         varicocele, hernias    Hypertension Brother     Alcohol abuse Brother     Hypertension Maternal Grandmother     Elevated Lipids Maternal Grandmother     Cancer Maternal Grandmother         breast    Other Maternal Grandmother         polymyalgia rheumatica    Glaucoma Maternal Grandmother     Cancer Maternal Grandfather         prostate    Huntingtons disease Maternal Grandfather     Cancer Paternal Grandmother         lung with mets    Cancer Paternal Grandfather         prostate, colon    Diabetes Paternal Grandfather     Heart Disease Paternal Grandfather     Alzheimer Paternal Grandfather     Dementia Paternal Grandfather      Reviewed and no changes     Social History     Socioeconomic History    Marital status:      Spouse name: Not on file    Number of children: Not on file    Years of education: Not on file    Highest education level: Not on file   Occupational History    Not on file   Social Needs    Financial resource strain: Not on file    Food insecurity:     Worry: Not on file     Inability: Not on file    Transportation needs:     Medical: Not on file     Non-medical: Not on file   Tobacco Use    Smoking status: Never Smoker    Smokeless tobacco: Never Used   Substance and Sexual Activity    Alcohol use: Yes     Comment: few drinks per year    Drug use: No    Sexual activity: Not on file   Lifestyle    Physical activity:     Days per week: Not on file     Minutes per session: Not on file    Stress: Not on file   Relationships    Social connections:     Talks on phone: Not on file     Gets together: Not on file     Attends [employer] service: Not on file     Active member of club or organization: Not on file     Attends meetings of clubs or organizations: Not on file     Relationship status: Not on file    Intimate partner violence:     Fear of current or ex partner: Not on file     Emotionally abused: Not on file     Physically abused: Not on file     Forced sexual activity: Not on file   Other Topics Concern    Not on file   Social History Narrative    ** Merged History Encounter **                 Visit Vitals  Pulse 84   Temp 97.8 °F (36.6 °C) (Oral)   Resp 18   Ht 5' 6\" (1.676 m)   Wt 217 lb (98.4 kg)   SpO2 99%   BMI 35.02 kg/m²       Physical Examination:   General - Well appearing female  HEENT - PERRL, TM no erythema/opacification, normal nasal turbinates, oropharynx no erythema or exudate, MMM  Neck - supple, no bruits, no TMG, no LAD  Pulm - clear to auscultation bilaterally  Cardio - RRR, normal S1 S2, no murmur gallops or rubs  Abd - soft, nontender, no masses, no HSM  Extrem - no edema, +2 distal pulses  Psych - normal affect, appropriate mood    Lab Results   Component Value Date/Time    WBC 11.8 (H) 01/31/2020 08:39 AM    HGB 12.8 01/31/2020 08:39 AM    HCT 39.3 01/31/2020 08:39 AM    PLATELET 047 42/01/2652 08:39 AM    MCV 94.0 01/31/2020 08:39 AM     Lab Results   Component Value Date/Time    Sodium 138 01/31/2020 08:39 AM    Potassium 4.0 01/31/2020 08:39 AM    Chloride 103 01/31/2020 08:39 AM    CO2 29 01/31/2020 08:39 AM    Anion gap 6 01/31/2020 08:39 AM    Glucose 91 01/31/2020 08:39 AM    BUN 14 01/31/2020 08:39 AM    Creatinine 1.01 01/31/2020 08:39 AM    BUN/Creatinine ratio 14 01/31/2020 08:39 AM    GFR est AA >60 01/31/2020 08:39 AM    GFR est non-AA >60 01/31/2020 08:39 AM    Calcium 8.7 01/31/2020 08:39 AM     No results found for: FR[place], [address] [facility], CHLST, CHOLV, HDL, HDLPOC, HDLP, LDL, LDLCPOC, LDLC, DLDLP, VLDLC, VLDL, TGLX, TRIGL, TRIGP, TGLPOCT, CHHD, CHHDX  Lab Results   Component Value Date/Time    TSH 1.48 10/30/2015 05:11 PM     No results found for: PSA, [place], [id number], [id number], PSALT  Lab Results   Component Value Date/Time    Hemoglobin A1c 5.8 07/03/2011 12:10 PM     No results found for: Emperatriz Barajas, [id number]    Lab Results   Component Value Date/Time    ALT (SGPT) 16 01/31/2020 08:39 AM    AST (SGOT) 7 (L) 01/31/2020 08:39 AM    Alk. phosphatase 55 01/31/2020 08:39 AM    Bilirubin, total 0.7 01/31/2020 08:39 AM           Assessment/Plan:      Attention deficit hyperactivity disorder (ADHD), predominantly inattentive type  - new diagnosis   - lisdexamfetamine (VYVANSE) 30 mg capsule; Take 1 Cap by mouth every morning. Max Daily Amount: 30 mg. Dispense: 30 Cap; Refill: 0  - UDS today    Depression, unspecified depression type  Well controlled on lexapro and wellbutrin       Rheumatoid arthritis involving multiple sites, unspecified rheumatoid factor presence ([facility] [address] [facility]  Followed by Dr Artur Lopez and allergies/ hx of bronchiolitis  - doing better, on symbicort and zolair Followed by Dr René Disla     Hypothyroidism: euthyroid followed by Dr Jeannie Parnell.      Diabetes type 2: well controlled followed by Dr Asha Maxwell MD

## 2020-03-02 NOTE — PROGRESS NOTES
Reviewed record in preparation for visit and have obtained necessary documentation. Identified pt with two pt identifiers(name and ). Chief Complaint   Patient presents with    Behavioral Problem       Health Maintenance Due   Topic Date Due    Pap Test  2012    Pneumococcal Vaccine (1 of  - PPSV23) 2017       Ms. Ghada Nino has a reminder for a \"due or due soon\" health maintenance. I have asked that she discuss this further with her primary care provider for follow-up on this health maintenance. Coordination of Care Questionnaire:  :     1) Have you been to an emergency room, urgent care clinic since your last visit? no   Hospitalized since your last visit? no             2) Have you seen or consulted any other health care providers outside of [address] [place] [address] since your last visit? no  (Include any pap smears or colon screenings in this section.)    3) In the event something were to happen to you and you were unable to speak on your behalf, do you have an Advance Directive/ Living Will in place stating your wishes? NO    Do you have an Advance Directive on file? no    4) Are you interested in receiving information on Advance Directives? NO    Patient is accompanied by self I have received verbal consent from [email or web address] [facility] to discuss any/all medical information while they are present in the room.

## 2020-03-02 NOTE — PATIENT INSTRUCTIONS
Office Policies    Phone calls/patient messages:            Please allow up to 24 hours for someone in the office to contact you about your call or message. Be mindful your provider may be out of the office or your message may require further review. We encourage you to use [employer] for your messages as this is a faster, more efficient way to communicate with our office                         Medication Refills:            Prescription medications require 48-72 business hours to process. We encourage you to use [employer] for your refills. For controlled medications: Please allow 72 business hours to process. Certain medications may require you to  a written prescription at our office. NO narcotic/controlled medications will be prescribed after 4pm Monday through Friday or on weekends              Form/Paperwork Completion:            Please note a $25 fee may incur for all paperwork for completed by our providers. We ask that you allow 7-10 business days. Pre-payment is due prior to picking up/faxing the completed form. You may also download your forms to [employer] to have your doctor print off.

## 2020-03-05 ENCOUNTER — DOCUMENTATION ONLY (OUTPATIENT)
Dept: INTERNAL MEDICINE CLINIC | Age: 40
End: 2020-03-05

## 2020-03-06 LAB — DRUGS UR: NORMAL

## 2020-03-14 ENCOUNTER — OFFICE VISIT (OUTPATIENT)
Dept: URGENT CARE | Age: 40
End: 2020-03-14

## 2020-03-14 VITALS
WEIGHT: 217 LBS | TEMPERATURE: 98.1 F | OXYGEN SATURATION: 98 % | HEART RATE: 87 BPM | HEIGHT: 66 IN | BODY MASS INDEX: 34.87 KG/M2 | RESPIRATION RATE: 18 BRPM | SYSTOLIC BLOOD PRESSURE: 132 MMHG | DIASTOLIC BLOOD PRESSURE: 95 MMHG

## 2020-03-14 DIAGNOSIS — R35.0 FREQUENCY OF MICTURITION: Primary | ICD-10-CM

## 2020-03-14 LAB
BILIRUB UR QL STRIP: NEGATIVE
GLUCOSE UR-MCNC: NEGATIVE MG/DL
HCG URINE, QL. (POC): NEGATIVE
KETONES P FAST UR STRIP-MCNC: NEGATIVE MG/DL
PH UR STRIP: 5.5 [PH] (ref 4.6–8)
PROT UR QL STRIP: NORMAL
SP GR UR STRIP: 1.03 (ref 1–1.03)
UA UROBILINOGEN AMB POC: NORMAL (ref 0.2–1)
URINALYSIS CLARITY POC: NORMAL
URINALYSIS COLOR POC: NORMAL
URINE BLOOD POC: NORMAL
URINE LEUKOCYTES POC: NORMAL
URINE NITRITES POC: NEGATIVE
VALID INTERNAL CONTROL?: YES

## 2020-03-14 RX ORDER — NITROFURANTOIN 25; 75 MG/1; MG/1
100 CAPSULE ORAL 2 TIMES DAILY
Qty: 14 CAP | Refills: 0 | Status: SHIPPED | OUTPATIENT
Start: 2020-03-14 | End: 2020-03-21

## 2020-03-14 RX ORDER — FLUCONAZOLE 150 MG/1
150 TABLET ORAL DAILY
Qty: 1 TAB | Refills: 1 | Status: SHIPPED | OUTPATIENT
Start: 2020-03-14 | End: 2020-03-15

## 2020-03-14 RX ORDER — PHENAZOPYRIDINE HYDROCHLORIDE 200 MG/1
200 TABLET, FILM COATED ORAL
Qty: 10 TAB | Refills: 0 | Status: SHIPPED | OUTPATIENT
Start: 2020-03-14 | End: 2020-04-27 | Stop reason: ALTCHOICE

## 2020-03-14 NOTE — PROGRESS NOTES
Bladder Infection   This is a recurrent problem. The current episode started more than 2 days ago. The problem occurs constantly. The problem has not changed since onset. Associated symptoms include abdominal pain. Nothing aggravates the symptoms. Nothing relieves the symptoms. She has tried nothing for the symptoms. Sore Throat    The history is provided by the patient. This is a new problem. The current episode started yesterday. The problem has not changed since onset. There has been no fever. Associated symptoms include congestion. Pertinent negatives include no swollen glands, no trouble swallowing and no cough. She has tried nothing for the symptoms. Past Medical History:   Diagnosis Date    Acquired hypothyroidism     Adverse effect of anesthesia     labile BP during/after C section    Asthma     Broken bones     history of 9 broken bones    Chronic UTI (urinary tract infection)     \"extra valve right kidney causes UTI\"    Fibromyalgia     Gestational diabetes     GI bleed 2007,2011,2015,2016    lower GI last colonoscopy in 2016 normal     Huntingtons chorea (HCC)     Hyperhydrosis disorder     Ill-defined condition     [place]/ tested genetically - daughter has [place]    Infertility     PCOS    PCOS (polycystic ovarian syndrome)     Precancerous skin lesion     removed from Right shoulder    Preeclampsia 2011    pregnancy    Reactive airway disease     Rheumatoid arthritis (HCC)     SVT (supraventricular tachycardia) ([facility] 2011    occured during pregnancy when picc line inserted.         Past Surgical History:   Procedure Laterality Date    COLONOSCOPY N/A 6/13/2016    [facility] ENDOSCOPY    HX HEENT      HX WISDOM TEETH EXTRACTION  2004    UPPER GI ENDOSCOPY,BIOPSY  2/12/2019         UPPER GI ENDOSCOPY,DILATN W GUIDE  2/12/2019              Family History   Problem Relation Age of Onset    Other Mother         Alcorn's disease    Hypertension Mother     Dementia Mother     High Cholesterol Father     Heart Disease Father     Diabetes Father     Hypertension Father     Asthma Brother     Diabetes Brother     Other Brother         varicocele, hernias    Hypertension Brother     Alcohol abuse Brother     Hypertension Maternal Grandmother     Elevated Lipids Maternal Grandmother     Cancer Maternal Grandmother         breast    Other Maternal Grandmother         polymyalgia rheumatica    Glaucoma Maternal Grandmother     Cancer Maternal Grandfather         prostate    Huntingtons disease Maternal Grandfather     Cancer Paternal Grandmother         lung with mets    Cancer Paternal Grandfather         prostate, colon    Diabetes Paternal Grandfather     Heart Disease Paternal Grandfather     Alzheimer Paternal Grandfather     Dementia Paternal Grandfather         Social History     Socioeconomic History    Marital status:      Spouse name: Not on file    Number of children: Not on file    Years of education: Not on file    Highest education level: Not on file   Occupational History    Not on file   Social Needs    Financial resource strain: Not on file    Food insecurity     Worry: Not on file     Inability: Not on file   [employer] needs     Medical: Not on file     Non-medical: Not on file   Tobacco Use    Smoking status: Never Smoker    Smokeless tobacco: Never Used   Substance and Sexual Activity    Alcohol use: Yes     Comment: few drinks per year    Drug use: No    Sexual activity: Not on file   Lifestyle    Physical activity     Days per week: Not on file     Minutes per session: Not on file    Stress: Not on file   Relationships    Social connections     Talks on phone: Not on file     Gets together: Not on file     Attends Worship service: Not on file     Active member of club or organization: Not on file     Attends meetings of clubs or organizations: Not on file Relationship status: Not on file    Intimate partner violence     Fear of current or ex partner: Not on file     Emotionally abused: Not on file     Physically abused: Not on file     Forced sexual activity: Not on file   Other Topics Concern    Not on file   Social History Narrative    ** Merged History Encounter **                     ALLERGIES: Latex; Rituxan [rituximab]; Entex [phenylephrine-guaifenesin]; Mucinex [guaifenesin]; and Pepto-bismol [bismuth subsalicylate]    Review of Systems   HENT: Positive for congestion and sore throat. Negative for trouble swallowing. Respiratory: Negative for cough. Gastrointestinal: Positive for abdominal pain. Genitourinary: Positive for dysuria, frequency, urgency and vaginal discharge. All other systems reviewed and are negative. Vitals:    03/14/20 1449   BP: (!) 132/95   Pulse: 87   Resp: 18   Temp: 98.1 °F (36.7 °C)   SpO2: 98%   Weight: 217 lb (98.4 kg)   Height: 5' 6\" (1.676 m)       Physical Exam  Vitals signs and nursing note reviewed. Constitutional:       General: She is not in acute distress. Appearance: Normal appearance. HENT:      Right Ear: Tympanic membrane and ear canal normal.      Left Ear: Tympanic membrane and ear canal normal.      Nose: Nose normal.      Mouth/Throat:      Pharynx: No oropharyngeal exudate or posterior oropharyngeal erythema. Eyes:      General:         Right eye: No discharge. Left eye: No discharge. Conjunctiva/sclera: Conjunctivae normal.   Neck:      Musculoskeletal: Neck supple. Pulmonary:      Effort: Pulmonary effort is normal. No respiratory distress. Breath sounds: Normal breath sounds. No wheezing or rales. Abdominal:      General: Bowel sounds are normal.      Palpations: Abdomen is not rigid. Tenderness: There is abdominal tenderness in the suprapubic area. There is no guarding or rebound.    Genitourinary:     Comments: deferred  Lymphadenopathy:      Cervical: No cervical adenopathy. Skin:     Findings: No rash. MDM    Procedures        ICD-10-CM ICD-9-CM    1. Frequency of micturition R35.0 788.41 AMB POC URINALYSIS DIP STICK AUTO W/O MICRO      AMB POC URINE PREGNANCY TEST, VISUAL COLOR COMPARISON     Medications Ordered Today   Medications    fluconazole (DIFLUCAN) 150 mg tablet     Sig: Take 1 Tab by mouth daily for 1 day. FDA advises cautious prescribing of oral fluconazole in pregnancy. Dispense:  1 Tab     Refill:  1    nitrofurantoin, macrocrystal-monohydrate, (Macrobid) 100 mg capsule     Sig: Take 1 Cap by mouth two (2) times a day for 7 days. Dispense:  14 Cap     Refill:  0    phenazopyridine (Pyridium) 200 mg tablet     Sig: Take 1 Tab by mouth three (3) times daily as needed for Pain. Dispense:  10 Tab     Refill:  0    miconazole nitrate 200 mg/5 gram (4 %) vaginal cream     Sig: Apply to affected area as needed once a day     Dispense:  15 g     Refill:  0     Results for orders placed or performed in visit on 03/14/20   AMB POC URINALYSIS DIP STICK AUTO W/O MICRO   Result Value Ref Range    Color (UA POC)      Clarity (UA POC)      Glucose (UA POC) Negative Negative    Bilirubin (UA POC) Negative Negative    Ketones (UA POC) Negative Negative    Specific gravity (UA POC) 1.030 1.001 - 1.035    Blood (UA POC) Trace Negative    pH (UA POC) 5.5 4.6 - 8.0    Protein (UA POC) 1+ Negative    Urobilinogen (UA POC) 0.2 mg/dL 0.2 - 1    Nitrites (UA POC) Negative Negative    Leukocyte esterase (UA POC) 2+ Negative   AMB POC URINE PREGNANCY TEST, VISUAL COLOR COMPARISON   Result Value Ref Range    VALID INTERNAL CONTROL POC Yes     HCG urine, Ql. (POC) Negative Negative     The patients condition was discussed with the patient and they understand. The patient is to follow up with primary care doctor. If signs and symptoms become worse the pt is to go to the ER. The patient is to take medications as prescribed.

## 2020-03-26 DIAGNOSIS — F33.41 RECURRENT MAJOR DEPRESSIVE DISORDER, IN PARTIAL REMISSION (HCC): ICD-10-CM

## 2020-03-26 RX ORDER — ESCITALOPRAM OXALATE 20 MG/1
TABLET ORAL
Qty: 30 TAB | Refills: 11 | Status: SHIPPED | OUTPATIENT
Start: 2020-03-26 | End: 2020-06-03 | Stop reason: SDUPTHER

## 2020-03-27 ENCOUNTER — APPOINTMENT (OUTPATIENT)
Dept: INFUSION THERAPY | Age: 40
End: 2020-03-27

## 2020-04-03 DIAGNOSIS — F90.0 ATTENTION DEFICIT HYPERACTIVITY DISORDER (ADHD), PREDOMINANTLY INATTENTIVE TYPE: ICD-10-CM

## 2020-04-03 NOTE — TELEPHONE ENCOUNTER
----- Message from Wilman Dudley sent at 4/3/2020 10:06 AM EDT -----  Regarding: Dr Candace Reilly: [phone]  Medication Refill    Caller (if not patient):      Relationship of caller (if not patient):      Best contact number(s):[phone]      Name of medication and dosage if known:vyvanse 11 mg      Is patient out of this medication (yes/no):yes      Pharmacy name:[facility]    Pharmacy listed in chart? (yes/no):yes  Pharmacy phone number:      Details to clarify the request:      Wilman Dudley      Copy/paste envera

## 2020-04-18 ENCOUNTER — HOSPITAL ENCOUNTER (EMERGENCY)
Age: 40
Discharge: HOME | End: 2020-04-19
Attending: EMERGENCY MEDICINE
Payer: COMMERCIAL

## 2020-04-18 ENCOUNTER — APPOINTMENT (OUTPATIENT)
Dept: GENERAL RADIOLOGY | Age: 40
End: 2020-04-18
Attending: EMERGENCY MEDICINE
Payer: COMMERCIAL

## 2020-04-18 VITALS
SYSTOLIC BLOOD PRESSURE: 103 MMHG | HEART RATE: 98 BPM | DIASTOLIC BLOOD PRESSURE: 72 MMHG | RESPIRATION RATE: 16 BRPM | WEIGHT: 211.42 LBS | OXYGEN SATURATION: 98 % | HEIGHT: 67 IN | TEMPERATURE: 99 F | BODY MASS INDEX: 33.18 KG/M2

## 2020-04-18 DIAGNOSIS — N30.00 ACUTE CYSTITIS WITHOUT HEMATURIA: ICD-10-CM

## 2020-04-18 DIAGNOSIS — Z20.828 EXPOSURE TO SARS-ASSOCIATED CORONAVIRUS: ICD-10-CM

## 2020-04-18 DIAGNOSIS — R50.9 FEVER, UNSPECIFIED FEVER CAUSE: ICD-10-CM

## 2020-04-18 DIAGNOSIS — R05.9 COUGH: Primary | ICD-10-CM

## 2020-04-18 LAB
ALBUMIN SERPL-MCNC: 3.2 G/DL (ref 3.5–5)
ALBUMIN/GLOB SERPL: 0.9 {RATIO} (ref 1.1–2.2)
ALP SERPL-CCNC: 59 U/L (ref 45–117)
ALT SERPL-CCNC: 22 U/L (ref 12–78)
ANION GAP SERPL CALC-SCNC: 4 MMOL/L (ref 5–15)
APPEARANCE UR: ABNORMAL
AST SERPL-CCNC: 14 U/L (ref 15–37)
BACTERIA URNS QL MICRO: ABNORMAL /HPF
BASOPHILS # BLD: 0 K/UL (ref 0–0.1)
BASOPHILS NFR BLD: 0 % (ref 0–1)
BILIRUB SERPL-MCNC: 0.5 MG/DL (ref 0.2–1)
BILIRUB UR QL: NEGATIVE
BUN SERPL-MCNC: 10 MG/DL (ref 6–20)
BUN/CREAT SERPL: 11 (ref 12–20)
CALCIUM SERPL-MCNC: 8.7 MG/DL (ref 8.5–10.1)
CHLORIDE SERPL-SCNC: 108 MMOL/L (ref 97–108)
CO2 SERPL-SCNC: 27 MMOL/L (ref 21–32)
COLOR UR: ABNORMAL
CREAT SERPL-MCNC: 0.91 MG/DL (ref 0.55–1.02)
DIFFERENTIAL METHOD BLD: ABNORMAL
EOSINOPHIL # BLD: 0.1 K/UL (ref 0–0.4)
EOSINOPHIL NFR BLD: 1 % (ref 0–7)
EPITH CASTS URNS QL MICRO: ABNORMAL /LPF
ERYTHROCYTE [DISTWIDTH] IN BLOOD BY AUTOMATED COUNT: 13.7 % (ref 11.5–14.5)
FLUAV AG NPH QL IA: NEGATIVE
FLUBV AG NOSE QL IA: NEGATIVE
GLOBULIN SER CALC-MCNC: 3.6 G/DL (ref 2–4)
GLUCOSE SERPL-MCNC: 73 MG/DL (ref 65–100)
GLUCOSE UR STRIP.AUTO-MCNC: NEGATIVE MG/DL
HCT VFR BLD AUTO: 35.3 % (ref 35–47)
HGB BLD-MCNC: 11.8 G/DL (ref 11.5–16)
HGB UR QL STRIP: NEGATIVE
IMM GRANULOCYTES # BLD AUTO: 0 K/UL (ref 0–0.04)
IMM GRANULOCYTES NFR BLD AUTO: 0 % (ref 0–0.5)
KETONES UR QL STRIP.AUTO: NEGATIVE MG/DL
LEUKOCYTE ESTERASE UR QL STRIP.AUTO: ABNORMAL
LYMPHOCYTES # BLD: 2.7 K/UL (ref 0.8–3.5)
LYMPHOCYTES NFR BLD: 40 % (ref 12–49)
MCH RBC QN AUTO: 31.3 PG (ref 26–34)
MCHC RBC AUTO-ENTMCNC: 33.4 G/DL (ref 30–36.5)
MCV RBC AUTO: 93.6 FL (ref 80–99)
MONOCYTES # BLD: 0.7 K/UL (ref 0–1)
MONOCYTES NFR BLD: 10 % (ref 5–13)
NEUTS SEG # BLD: 3.3 K/UL (ref 1.8–8)
NEUTS SEG NFR BLD: 49 % (ref 32–75)
NITRITE UR QL STRIP.AUTO: POSITIVE
NRBC # BLD: 0 K/UL (ref 0–0.01)
NRBC BLD-RTO: 0 PER 100 WBC
PH UR STRIP: 6 [PH] (ref 5–8)
PLATELET # BLD AUTO: 309 K/UL (ref 150–400)
PMV BLD AUTO: 9.1 FL (ref 8.9–12.9)
POTASSIUM SERPL-SCNC: 3.9 MMOL/L (ref 3.5–5.1)
PROT SERPL-MCNC: 6.8 G/DL (ref 6.4–8.2)
PROT UR STRIP-MCNC: ABNORMAL MG/DL
RBC # BLD AUTO: 3.77 M/UL (ref 3.8–5.2)
RBC #/AREA URNS HPF: ABNORMAL /HPF (ref 0–5)
SODIUM SERPL-SCNC: 139 MMOL/L (ref 136–145)
SP GR UR REFRACTOMETRY: 1.02 (ref 1–1.03)
UA: UC IF INDICATED,UAUC: ABNORMAL
UROBILINOGEN UR QL STRIP.AUTO: 1 EU/DL (ref 0.2–1)
WBC # BLD AUTO: 6.8 K/UL (ref 3.6–11)
WBC URNS QL MICRO: ABNORMAL /HPF (ref 0–4)

## 2020-04-18 PROCEDURE — 99283 EMERGENCY DEPT VISIT LOW MDM: CPT

## 2020-04-18 PROCEDURE — 36415 COLL VENOUS BLD VENIPUNCTURE: CPT

## 2020-04-18 PROCEDURE — 87804 INFLUENZA ASSAY W/OPTIC: CPT

## 2020-04-18 PROCEDURE — 71045 X-RAY EXAM CHEST 1 VIEW: CPT

## 2020-04-18 PROCEDURE — 77030003560 HC NDL HUBR BARD -A

## 2020-04-18 PROCEDURE — 74011250636 HC RX REV CODE- 250/636: Performed by: EMERGENCY MEDICINE

## 2020-04-18 PROCEDURE — 87086 URINE CULTURE/COLONY COUNT: CPT

## 2020-04-18 PROCEDURE — 77030012341 HC CHMB SPCR OPTC MDI VYRM -A

## 2020-04-18 PROCEDURE — C1751 CATH, INF, PER/CENT/MIDLINE: HCPCS

## 2020-04-18 PROCEDURE — 81001 URINALYSIS AUTO W/SCOPE: CPT

## 2020-04-18 PROCEDURE — 87077 CULTURE AEROBIC IDENTIFY: CPT

## 2020-04-18 PROCEDURE — 85025 COMPLETE CBC W/AUTO DIFF WBC: CPT

## 2020-04-18 PROCEDURE — 87186 SC STD MICRODIL/AGAR DIL: CPT

## 2020-04-18 PROCEDURE — 87635 SARS-COV-2 COVID-19 AMP PRB: CPT

## 2020-04-18 PROCEDURE — 74011000250 HC RX REV CODE- 250: Performed by: EMERGENCY MEDICINE

## 2020-04-18 PROCEDURE — 80053 COMPREHEN METABOLIC PANEL: CPT

## 2020-04-18 PROCEDURE — 0100U RESPIRATORY PANEL,PCR,NASOPHARYNGEAL: CPT

## 2020-04-18 PROCEDURE — 74011250637 HC RX REV CODE- 250/637: Performed by: EMERGENCY MEDICINE

## 2020-04-18 RX ORDER — CEFDINIR 300 MG/1
300 CAPSULE ORAL 2 TIMES DAILY
Qty: 10 CAP | Refills: 0 | Status: SHIPPED | OUTPATIENT
Start: 2020-04-18 | End: 2020-04-23

## 2020-04-18 RX ORDER — HEPARIN 100 UNIT/ML
300 SYRINGE INTRAVENOUS
Status: COMPLETED | OUTPATIENT
Start: 2020-04-18 | End: 2020-04-18

## 2020-04-18 RX ORDER — ALBUTEROL SULFATE 90 UG/1
4 AEROSOL, METERED RESPIRATORY (INHALATION)
Status: COMPLETED | OUTPATIENT
Start: 2020-04-18 | End: 2020-04-18

## 2020-04-18 RX ADMIN — Medication 1 SPRAY: at 18:38

## 2020-04-18 RX ADMIN — Medication 300 UNITS: at 22:59

## 2020-04-18 RX ADMIN — ALBUTEROL SULFATE 4 PUFF: 90 AEROSOL, METERED RESPIRATORY (INHALATION) at 18:53

## 2020-04-18 NOTE — ED PROVIDER NOTES
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     Cough    Urinary Pain     The patient reports that she also is concerned of a UTI. History Provided By: Patient    HPI: Jayla Hoyt, 42 y.o. female with history of rheumatoid arthritis immunocompromised on methotrexate and Xeljanz, recurrent UTI, asthma and chronic bronchitis followed by pulmonology on Xolair presents to the ED with cc of shortness of breath, cough, subjective fevers and chills, as well as dysuria and concern for another UTI. Shortness of breath and cough have been present for the past few days. 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 She does not have direct exposure to patient care but she is in direct contact with healthcare providers. She does not report any documented fever but does report subjective chills. Cough started out as nonproductive and has become productive. Shortness of breath is chronic, slightly worse than usual, typically present with cough. She does endorse some more dyspnea on exertion than what she is used to. Urinary symptoms consistent with her prior history of UTI, she states that she just had urinary tract infection in February of this year. Denies any abdominal pain, nausea, vomiting, flank pain. There are no other complaints, changes, or physical findings at this time. PCP: Reese Barbour MD    No current facility-administered medications on file prior to encounter. Current Outpatient Medications on File Prior to Encounter   Medication Sig Dispense Refill    lisdexamfetamine (Vyvanse) 30 mg capsule Take 1 Cap by mouth every morning.  Max Daily Amount: 30 mg. 30 Cap 0    escitalopram oxalate (LEXAPRO) 20 mg tablet TAKE 1 TABLET BY MOUTH EVERY DAY 30 Tab 11    tofacitinib (Xeljanz XR) 11 mg Tb24 Take  by mouth.  phenazopyridine (Pyridium) 200 mg tablet Take 1 Tab by mouth three (3) times daily as needed for Pain. 10 Tab 0    methotrexate, PF, 25 mg/mL injection       PREVIDENT 5000 SENSITIVE 1.1-5 % pste USE AS DIRECTED TWICE A DAY SPIT OUT EXCESS AND DO NOT RINSE, EAT OR DRINK FOR 30 MINUTES      hydroxychloroquine (PLAQUENIL) 200 mg tablet       lidocaine-prilocaine (EMLA) topical cream APPLY 1/2 TUBE 1 HOUR PRIOR TO INFUSION  2    BD SYRINGE 1 mL 25 gauge x 5/8\" syrg 1 SYRINGE ONCE A WEEK  4    heparin sodium,porcine (HEPARIN LOCK FLUSH, PORCINE, IV) by IntraVENous route.  omeprazole (PRILOSEC) 40 mg capsule Take 1 Cap by mouth daily. 30 Cap 0    Cetirizine (ZYRTEC) 10 mg cap Take 10 mg by mouth daily. Indications: inflammation of the nose due to an allergy 30 Cap 0    albuterol (PROVENTIL HFA, VENTOLIN HFA, PROAIR HFA) 90 mcg/actuation inhaler Take 2 Puffs by inhalation every four (4) hours as needed for Wheezing. 1 Inhaler 0    omalizumab (XOLAIR SC) by SubCUTAneous route every thirty (30) days.  propranolol LA (INDERAL LA) 60 mg SR capsule TAKE 1 CAPSULE BY MOUTH EVERY DAY 90 Cap 3    SYMBICORT 160-4.5 mcg/actuation HFAA TAKE 2 PUFFS BY MOUTH TWICE A DAY  12    OZEMPIC 0.25 mg or 0.5 mg(2 mg/1.5 mL) sub-q pen INJECT 0.5MG SUBCUTAMEOUSLY ONCE PER WEEK ON FRIDAY  1    naltrexone HCl (NALTREXONE PO) Take  by mouth.  EPIPEN 2-SANJAY 0.3 mg/0.3 mL injection 1 (ONE) INJECTION AS NEEDED  0    buPROPion XL (WELLBUTRIN XL) 150 mg tablet Take 1 Tab by mouth every morning. 90 Tab 3    montelukast (SINGULAIR) 10 mg tablet Take 1 Tab by mouth daily. 90 Tab 4    traMADol (ULTRAM) 50 mg tablet Take 50 mg by mouth every six (6) hours as needed for Pain.       oxybutynin (DITROPAN) 5 mg tablet TAKE 1/2 TAB DAILY X1WEEK THEN INCREASE TO 1/2 TAB 2XDAY X2WKS THEN 1 TAB 2XDAY  3    BD INSULIN SYRINGE 1 mL 25 gauge x 5/8\" syrg USE 1 SYRINGE ONCE A WEEK  11    cyanocobalamin, vitamin B-12, (VITAMIN B-12 PO) Take  by mouth.  RESTASIS 0.05 % ophthalmic emulsion INSTILL 1 DROP INTO EACH EYE TWICE DAILY  4    SAFETY-ABDI TB SYR 1CC/25GX5/8\" 1 mL 25 gauge x 5/8\" syrg USE TO INJECT METHOTREXATE ONCE A WEEK  2    metFORMIN ER (GLUCOPHAGE XR) 500 mg tablet 1,000 TAKE 2 TABLET BY MOUTH TWICE A DAY AS DIRECTED  1    desogestrel-ethinyl estradiol (DESOGEN) 0.15-0.03 mg tab Take 1 Tab by mouth nightly.  Nebulizer & Compressor machine 1 Each by Does Not Apply route three (3) times daily as needed. 1 Each 0    cholecalciferol, vitamin D3, (VITAMIN D3) 2,000 unit Tab Take 1 Tab by mouth daily.  levothyroxine (SYNTHROID) 75 mcg tablet Take 75 mcg by mouth Daily (before breakfast).  miconazole nitrate 200 mg/5 gram (4 %) vaginal cream Apply to affected area as needed once a day 15 g 0    promethazine-dextromethorphan (PROMETHAZINE-DM) 6.25-15 mg/5 mL syrup Take 5 mL by mouth nightly as needed for Cough. 60 mL 0    ondansetron hcl (ZOFRAN) 4 mg tablet Take 4 mg by mouth every eight (8) hours as needed for Nausea.  methotrexate 25 mg/mL chemo injection INJECT 25MG (1ML) subcutaneously ONCE WEEKLY  0    folic acid (FOLVITE) 1 mg tablet TAKE 1 TABLET ORALLY DAILY  11    glycopyrrolate (ROBINUL) 1 mg tablet Take 2 mg by mouth two (2) times a day.  Indications: hyperhydrosis         Past History     Past Medical History:  Past Medical History:   Diagnosis Date    Acquired hypothyroidism     Adverse effect of anesthesia     labile BP during/after C section    Asthma     Broken bones     history of 9 broken bones    Chronic UTI (urinary tract infection)     \"extra valve right kidney causes UTI\"    Fibromyalgia     Gestational diabetes     GI bleed 2007,2011,2015,2016    lower GI last colonoscopy in 2016 normal     Huntingtons chorea ([address]     Hyperhydrosis disorder     Ill-defined condition     Chris/ tested genetically - daughter has Crenshaw    Infertility     PCOS    PCOS (polycystic ovarian syndrome)     Precancerous skin lesion     removed from Right shoulder    Preeclampsia 2011    pregnancy    Reactive airway disease     Rheumatoid arthritis (HCC)     SVT ([address]raventricular tachycardia) [address] [address] 2011    occured during pregnancy when picc line inserted.        Past Surgical History:  Past Surgical History:   Procedure Laterality Date    COLONOSCOPY N/A 6/13/2016    COLONOSCOPY performed by [facility] Guaman MD at [facility] ENDOSCOPY    HX HEENT      HX WISDOM TEETH EXTRACTION  2004    UPPER GI ENDOSCOPY,BIOPSY  2/12/2019         UPPER GI ENDOSCOPY,DILATN W GUIDE  2/12/2019            Family History:  Family History   Problem Relation Age of Onset    Other Mother         Chris's disease    Hypertension Mother     Dementia Mother     High Cholesterol Father     Heart Disease Father     Diabetes Father     Hypertension Father     Asthma Brother     Diabetes Brother     Other Brother         varicocele, hernias    Hypertension Brother     Alcohol abuse Brother     Hypertension Maternal Grandmother     Elevated Lipids Maternal Grandmother     Cancer Maternal Grandmother         breast    Other Maternal Grandmother         polymyalgia rheumatica    Glaucoma Maternal Grandmother     Cancer Maternal Grandfather         prostate    Huntingtons disease Maternal Grandfather     Cancer Paternal Grandmother         lung with mets    Cancer Paternal Grandfather         prostate, colon    Diabetes Paternal Grandfather     Heart Disease Paternal Grandfather     Alzheimer Paternal Grandfather     Dementia Paternal Grandfather        Social History:  Social History     Tobacco Use    Smoking status: Never Smoker    Smokeless tobacco: Never Used   Substance Use Topics    Alcohol use: Yes     Comment: few drinks per year  Drug use: No       Allergies: Allergies   Allergen Reactions    Latex Swelling    Rituxan [Rituximab] Anaphylaxis    Entex [Phenylephrine-Guaifenesin] Anaphylaxis, Hives and Palpitations    Mucinex [Guaifenesin] Anaphylaxis and Hives    Pepto-Bismol [Bismuth Subsalicylate] Nausea and Vomiting         Review of Systems   Review of Systems   Constitutional: Positive for chills. Negative for fever. HENT: Positive for congestion and sore throat. Eyes: Negative for visual disturbance. Respiratory: Positive for cough, chest tightness and shortness of breath. Cardiovascular: Negative for chest pain and leg swelling. Gastrointestinal: Negative for abdominal pain, diarrhea, nausea and vomiting. Genitourinary: Positive for dysuria, frequency and urgency. Musculoskeletal: Negative for back pain and gait problem. Skin: Negative for color change and rash. Neurological: Negative for dizziness, weakness, light-headedness and headaches. Hematological: Does not bruise/bleed easily. All other systems reviewed and are negative. Physical Exam   Physical Exam  Vitals signs and nursing note reviewed. Constitutional:       General: She is not in acute distress. Appearance: Normal appearance. She is not ill-appearing, toxic-appearing or diaphoretic. HENT:      Head: Normocephalic and atraumatic. Cardiovascular:      Rate and Rhythm: Normal rate and regular rhythm. Heart sounds: Normal heart sounds. No murmur. Pulmonary:      Effort: Pulmonary effort is normal. No respiratory distress. Breath sounds: Normal breath sounds. No wheezing. Abdominal:      Palpations: Abdomen is soft. Tenderness: There is no abdominal tenderness. There is no guarding or rebound. Skin:     General: Skin is warm and dry. Findings: No erythema or rash. Neurological:      General: No focal deficit present. Mental Status: She is alert and oriented to person, place, and time. Diagnostic Study Results     Labs -  Labs Reviewed   CBC WITH AUTOMATED DIFF - Abnormal; Notable for the following components:       Result Value    RBC 3.77 (*)     All other components within normal limits   METABOLIC PANEL, COMPREHENSIVE - Abnormal; Notable for the following components:    Anion gap 4 (*)     BUN/Creatinine ratio 11 (*)     AST (SGOT) 14 (*)     Albumin 3.2 (*)     A-G Ratio 0.9 (*)     All other components within normal limits   URINALYSIS W/ REFLEX CULTURE - Abnormal; Notable for the following components:    Appearance CLOUDY (*)     Protein TRACE (*)     Nitrites Positive (*)     Leukocyte Esterase MODERATE (*)     Bacteria 3+ (*)     UA:UC IF INDICATED URINE CULTURE ORDERED (*)     All other components within normal limits   CULTURE, URINE   RESPIRATORY PANEL,PCR,NASOPHARYNGEAL   INFLUENZA A+B VIRAL AGS   SARS-COV-2       Radiologic Studies -   XR CHEST PORT   Final Result   Impression:   1. No acute disease            CT Results  (Last 48 hours)    None        CXR Results  (Last 48 hours)               04/18/20 1801  XR CHEST PORT Final result    Impression:  Impression:   1. No acute disease           Narrative:  INDICATION:  fever, SOB, cough        Exam: Portable chest 1757. Comparison: 9/28/2019. Findings: Cardiomediastinal silhouette is within normal limits. Pulmonary   vasculature is not engorged. There are no focal parenchymal opacities,   effusions, or pneumothorax. Right IJ port unchanged                 Medical Decision Making   I am the first provider for this patient. I reviewed the vital signs, available nursing notes, past medical history, past surgical history, family history and social history. Vital Signs-Reviewed the patient's vital signs.   Visit Vitals  /72 (BP 1 Location: Right arm, BP Patient Position: At rest)   Pulse 98   Temp 99 °F (37.2 °C)   Resp 16   Ht 5' 6.5\" (1.689 m)   Wt 95.9 kg (211 lb 6.7 oz)   SpO2 98%   BMI 33.61 kg/m² Records Reviewed: Nursing Notes    Provider Notes (Medical Decision Making):   28-year-old female presenting with cough, shortness of breath, subjective chills, and urinary symptoms. On examination she is in no acute distress. She is afebrile and vital signs stable through entire ED stay. She demonstrates no significant increased work of breathing and she is without tachypnea or hypoxia. O2 sats 98% on room air this is unchanged despite her getting up and walking around the exam room. Urinalysis is consistent with infection and she will be treated with a course of outpatient cephalosporin. Laboratory evaluation does not demonstrate any significant leukocytosis. Chemistry within normal limits. Chest x-ray does not show any acute process. 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 She is feeling improved after albuterol treatment and does feel comfortable with plan for discharge home with outpatient follow-up with PCP. She was encouraged to isolate at home as per [facility] recommendations. She was given strict return ED precautions and agrees plan as above all, all questions answered. ED Course:   Initial assessment performed. The patients presenting problems have been discussed, and they are in agreement with the care plan formulated and outlined with them. I have encouraged them to ask questions as they arise throughout their visit. Discharge Note:  The patient has been re-evaluated and is ready for discharge. Reviewed available results with patient. Counseled patient on diagnosis and care plan. Patient has expressed understanding, and all questions have been answered. Patient agrees with plan and agrees to follow up as recommended, or to return to the ED if their symptoms worsen. Discharge instructions have been provided and explained to the patient, along with reasons to return to the ED. Disposition:  Home    DISCHARGE PLAN:  1.    Discharge Medication List as of 4/18/2020 10:46 PM        2. Follow-up Information     Follow up With Specialties Details Why Contact Info    Meliton Mccann MD Internal Medicine Call in 3 days As needed, If symptoms worsen [facility]  [phone]          3. Return to ED if worse     Diagnosis     Clinical Impression:   1. Cough    2. Fever, unspecified fever cause    3. Acute cystitis without hematuria    4. Exposure to SARS-associated coronavirus        Attestations:    Robert Gorman MD    Please note that this dictation was completed with [employer], the [employer] voice recognition software. Quite often unanticipated grammatical, syntax, homophones, and other interpretive errors are inadvertently transcribed by the computer software. Please disregard these errors. Please excuse any errors that have escaped final proofreading. Thank you.

## 2020-04-18 NOTE — LETTER
[address] 
\[facility]\"" EMERGENCY DEPT 
[address] [place] [id number] 
[phone] Work/School Note Date: 4/18/2020 To Whom It May concern: 
 
Kerry Freed was seen and treated today in the ER by the following provider(s): 
Attending Provider: Ruy Uriostegui may return to work on 4/25/20. Sincerely, William Kimball MD

## 2020-04-19 LAB

## 2020-04-19 NOTE — ED NOTES
I have reviewed discharge instructions with the patient. The patient verbalized understanding. Prescription for Omnicef given at discharge.
Report received from Jarret Barron [facility].
SpO2 while standing 97% on room air.
Swabs collected for respiratory panel & COVID testing; pt tolerated well. Specimens taken to lab for processing.
accidentally cut her left leg with a knife while opening a toy box

## 2020-04-19 NOTE — DISCHARGE INSTRUCTIONS
Prevention steps for patients with confirmed or suspected COVID-19 who do not need to be hospitalized    Timing for Self-Isolation    If you have been exposed but do not have symptoms:  If you suspect you have been exposed to someone with COVID-19 and don't have any symptoms, you should self-isolate at home for 14 days from the time of exposure. If you have symptoms whether or not you have been tested: If you have symptoms suggestive of COVID-19, such as fever or cough, regardless of whether you have been tested, you should self-isolate at home until 72 hours (3 days) after your symptoms have completely resolved AND 7 days after your symptoms first started, whichever is later. How to Properly Self-Isolate Due to COVID-19    Stay home except to get medical care  People who are mildly ill with COVID-19 are able to isolate at home during their illness. You should restrict activities outside your home, except for getting medical care. Do not go to work, school, or public areas. Avoid using public transportation, ride-sharing, or taxis. If you or a loved one must go out, please make sure to wash hands properly immediately on returning home. Disinfect touched surfaces. Do not touch your face. Separate yourself from other people and animals in your home  People: As much as possible, you should stay in a specific room and away from other people in your home. Also, you should use a separate bathroom, if available. Animals: You should restrict contact with pets and other animals while you are sick with COVID-19, just like you would around other people. Although there have not been reports of pets or other animals becoming sick with COVID-19, it is still recommended that people sick with COVID-19 limit contact with animals until more information is known about the virus. When possible, have another member of your household care for your animals while you are sick.  If you are sick with COVID-19, avoid contact with your pet, including petting, snuggling, being kissed or licked, and sharing food. If you must care for your pet or be around animals while you are sick, wash your hands before and after you interact with pets and wear a facemask. Call ahead before visiting your doctor  If you have a medical appointment, call the healthcare provider and tell them that you have or may have COVID-19. This will help the healthcare providers office take steps to keep other people from getting infected or exposed. Wear a facemask  You should wear a facemask when you are around other people (e.g., sharing a room or vehicle) or pets and before you enter a healthcare providers office. If you are not able to wear a facemask (for example, because it causes trouble breathing), then people who live with you should not stay in the same room with you, or they should wear a facemask if they enter your room. Cover your coughs and sneezes  Cover your mouth and nose with a tissue when you cough or sneeze. Throw used tissues in a lined trash can. Immediately wash your hands with soap and water for at least 20 seconds or, if soap and water are not available, clean your hands with an alcohol-based hand  that contains at least 60% alcohol. Clean your hands often  Wash your hands often with soap and water for at least 20 seconds, especially after blowing your nose, coughing, or sneezing; going to the bathroom; and before eating or preparing food. If soap and water are not readily available, use an alcohol-based hand  with at least 60% alcohol, covering all surfaces of your hands and rubbing them together until they feel dry. Soap and water are the best option if hands are visibly dirty. Avoid touching your eyes, nose, and mouth with unwashed hands. Avoid sharing personal household items  You should not share dishes, drinking glasses, cups, eating utensils, towels, or bedding with other people or pets in your home.  After using these items, they should be washed thoroughly with soap and water. Clean all high-touch surfaces everyday  High touch surfaces include counters, tabletops, doorknobs, bathroom fixtures, toilets, phones, keyboards, tablets, and bedside tables. Also, clean any surfaces that may have blood, stool, or body fluids on them. Use a household cleaning spray or wipe, according to the label instructions. Labels contain instructions for safe and effective use of the cleaning product including precautions you should take when applying the product, such as wearing gloves and making sure you have good ventilation during use of the product. Monitor your symptoms  Seek prompt medical attention if your illness is worsening (e.g., difficulty breathing). Before seeking care, call your healthcare provider and tell them that you have, or are being evaluated for, COVID-19. Put on a facemask before you enter the facility. These steps will help the healthcare providers office to keep other people in the office or waiting room from getting infected or exposed. Ask your healthcare provider to call the local or [facility] health department. Persons who are placed under active monitoring or facilitated self-monitoring should follow instructions provided by their local health department or occupational health professionals, as appropriate. When working with your local health department check their available hours. If you have a medical emergency and need to call 911, notify the dispatch personnel that you have, or are being evaluated for COVID-19. If possible, put on a facemask before emergency medical services arrive. Discontinuing home isolation  Patients with confirmed COVID-19 should remain under home isolation precautions until the risk of secondary transmission to others is thought to be low based on the above CDC recommendations.  The decision to discontinue home isolation precautions should be made on a case-by-case basis, in consultation with healthcare providers and state and local health departments. Oupatient testing  You can now get tested on an outpatient basis if you are showing symptoms of Covid19 at one of the [facility] or [facility] by appointment only. BETTER MED:  [email or web address] to make an appointment. [facility]: [facility] to make an appointment. This service is currently offered at the [facility] and [facility]. To make an appointment, call your preferred Center and enter 5 during the recording to speak with the [employer]. All [facility] Centers, including the [facility], remain Open Every Day for Walk-in care of Illness and Injury. Who can be tested? In order to make an appointment to be tested, you must meet screening criteria, which are based on CDC guidance. The screening criteria include either of the following conditions: You have a symptom or symptoms of COVID-19 (fever, coughing, shortness of breath, sore throat). You are a healthcare worker or . What is the cost?  For insured patients, there is no out-of-pocket expense. The visit will be submitted to patients' insurance. [facility] accepts all major insurance plans, including Medicare and Medicaid. For self-pay patients, the cost is $90 for the exam plus a separate bill from the lab, which is $51.31 in [place]. What is the process for being tested? First, make an appointment by calling your local Designated Patient Paul Mondragon. Please have your insurance card on hand when you call. Bring your insurance card and picture ID with you to your appointment. Upon arrival, follow the Coronavirus Testing signs and park in a designated testing parking spot.  A staff member will meet you at your car to verify your information, complete your registration, check vitals, and take a sample for testing. The sample will be sent to a reference lab for testing. Self-quarantine until you receive your results. A nurse will call you once your results are available, and will provide you with guidance and answer any questions you may have. Further resources and [address] include 234 beds at the [facility]ina  Please visit the CDC website for more information. [email or web address]. [place] residents with questions about COVID-19 can call the [address] at [employer].

## 2020-04-20 ENCOUNTER — PATIENT OUTREACH (OUTPATIENT)
Dept: OTHER | Age: 40
End: 2020-04-20

## 2020-04-20 LAB
SARS-COV-2, COV2: NOT DETECTED
SPECIMEN SOURCE, FCOV2M: NORMAL

## 2020-04-20 NOTE — PROGRESS NOTES
The patient was called for notification of a NEGATIVE test result for COVID-19. The following information was given to the patient:    The COVID-19 test, also known as novel coronavirus, result was negative  Until your symptoms are fully resolved, you may still be contagious. We recommend that you remain isolated for total of 14 days minimum or 72 hours after your symptoms have completely resolved, whichever is longer. Continually monitor symptoms. Contact a medical provider if symptoms are worsening.    If you have any additional questions, reach out to your Primary Care Provider or Care Team.  For more information visit the CDC website: DotProtection.gl

## 2020-04-20 NOTE — PROGRESS NOTES
Patient on [facility] discharge report dated 04/18/2020. Left message on voicemail. Will attempt to contact again. Need to complete post-discharge assessment.

## 2020-04-21 ENCOUNTER — PATIENT OUTREACH (OUTPATIENT)
Dept: OTHER | Age: 40
End: 2020-04-21

## 2020-04-21 LAB
BACTERIA SPEC CULT: ABNORMAL
CC UR VC: ABNORMAL
SERVICE CMNT-IMP: ABNORMAL

## 2020-04-21 NOTE — LETTER
Ms. Ashwin Navarro [address] [address] Dear Ms. Amaro, My name is Geovany More RN, Employee Care Manager for [employer], and I have been trying to reach you. The Employee Care  is a free-of-charge, confidential service provided to our employees and their family members covered by the [employer]. Part of my job is to follow up with members who have recently been in the hospital or emergency room, to help them coordinate their care and answer questions they may have about their visit. I am able to provide assistance with medication questions, scheduling needed follow-up appointments, and arranging services like home health or home medical equipment. I can also provide education regarding your hospital or ER visit as well as your medical conditions. As healthcare providers, we know that patients do better when they have close follow up with a primary care provider (PCP), especially after a hospital or emergency department visit. If you do not have a PCP, I can help you find one that is convenient to you and covered by your insurance. I can also help you understand any after visit instructions, such as what symptoms to watch out for, or any new or changed medications. Remember that you can access your After Visit Summary by logging into your [email or web address] account. If you do not have a [email or web address] account, I can help you request access. Our program is designed to provide you with the opportunity to have a [employer] care manager partner with you for your healthcare needs. Please contact me at the below number if I can provide you with assistance for any of the above services. Sincerely, Geovany More RN, BSN, [address] [email or web address]

## 2020-04-21 NOTE — PROGRESS NOTES
Second attempt to reach patient for [facility], and discharge assessment. Discreet VM left. Will send UTR letter.

## 2020-04-22 ENCOUNTER — PATIENT OUTREACH (OUTPATIENT)
Dept: OTHER | Age: 40
End: 2020-04-22

## 2020-04-22 NOTE — PROGRESS NOTES
Patient contacted regarding COVID-19  risk. Care Transition Nurse/ Ambulatory Care Manager contacted the patient by telephone to perform post discharge assessment. Verified name and  with patient as identifiers. Provided introduction to self, and explanation of the CTN/ACM role, and reason for call due to risk factors for infection and/or exposure to COVID-19. Symptoms reviewed with patient who verbalized the following symptoms: fever, SOB, cough. Due to no new or worsening symptoms encounter was not routed to provider for escalation. Patient has following risk factors of: immunocompromised, Rheumatoid Arthritis;    CTN/ACM reviewed discharge instructions, medical action plan and red flags such as increased shortness of breath, increasing fever and signs of decompensation with patient who verbalized understanding. Discussed exposure protocols and quarantine with CDC Guidelines What to do if you are sick with coronavirus disease .  Patient was given an opportunity for questions and concerns. The patient agrees to contact the [employer] exposure line [phone], [facility]  ([phone]) and PCP office for questions related to their healthcare. CTN/ACM provided contact information for future needs. Reviewed and educated patient on any new and changed medications related to discharge diagnosis. Patient/family/caregiver given information for [employer] and agrees to enroll no, declines, \"my results were negative, it's just my RA I am sure\"  Patient's preferred e-mail:  declined  Patient's preferred phone number: declined  Based on Loop alert triggers, patient will be contacted by nurse care manager for worsening symptoms. Plan for follow-up call in 5-7 days based on severity of symptoms and risk factors.

## 2020-04-24 ENCOUNTER — APPOINTMENT (OUTPATIENT)
Dept: INFUSION THERAPY | Age: 40
End: 2020-04-24

## 2020-04-27 ENCOUNTER — OFFICE VISIT (OUTPATIENT)
Dept: INTERNAL MEDICINE CLINIC | Age: 40
End: 2020-04-27

## 2020-04-27 DIAGNOSIS — B37.9 YEAST INFECTION: ICD-10-CM

## 2020-04-27 DIAGNOSIS — N39.0 RECURRENT UTI: Primary | ICD-10-CM

## 2020-04-27 DIAGNOSIS — F32.A DEPRESSION, UNSPECIFIED DEPRESSION TYPE: ICD-10-CM

## 2020-04-27 DIAGNOSIS — F90.0 ATTENTION DEFICIT HYPERACTIVITY DISORDER (ADHD), PREDOMINANTLY INATTENTIVE TYPE: ICD-10-CM

## 2020-04-27 RX ORDER — NITROFURANTOIN MACROCRYSTALS 50 MG/1
50 CAPSULE ORAL
Qty: 30 CAP | Refills: 1 | Status: SHIPPED | OUTPATIENT
Start: 2020-04-27 | End: 2020-09-26

## 2020-04-27 NOTE — PROGRESS NOTES
CC: Diabetes and Depression      HPI:    She is a 42 y.o. female who presents for evaluation of     In the interval she had asthma exacerbation and UTI 04/18 tested negative for COVID and doing better    Since the beginning of the year had 4 UTIs  Reviewed cultures and E coli   Attention deficit hyperactivity disorder (ADHD), predominantly inattentive type  - new diagnosis and she is doing well on vyvanse. - lisdexamfetamine (VYVANSE) 30 mg capsule; Take 1 Cap by mouth every morning. Max Daily Amount: 30 mg. Dispense: 30 Cap; Refill: 0  - UDS today    Depression, unspecified depression type  Well controlled on lexapro and wellbutrin       Rheumatoid arthritis involving multiple sites, unspecified rheumatoid factor presence ([facility] [address]  Followed by Dr Katrin Gonzalez and recent change Jhonny Reynolds and doing well      Asthma and allergies/ hx of bronchiolitis  - doing better, on symbicort and zolair Followed by Dr Tristen Hansen     Hypothyroidism: euthyroid followed by Dr Bairon Patel. Diabetes type 2: well controlled followed by Dr Bairon Patel      This is an established visit conducted via telemedicine with video. The patient has been instructed that this meets HIPAA criteria and acknowledges and agrees to this method of visitation. Pursuant to the emergency declaration under the [facility]5 waiver authority and the [employer] Act, this Virtual Visit was conducted, with patient's consent, to reduce the patient's risk of exposure to COVID-19 and provide continuity of care for an established patient. Services were provided through a video synchronous discussion virtually to substitute for in-person clinic visit.        ROS:  Constitutional: negative for fevers, chills, anorexia and weight loss  She has vaginal discharge and itching since completing on antibiotic for UTI  10 systems reviewed and negative other than HPI    Past Medical History: Diagnosis Date    Acquired hypothyroidism     Adverse effect of anesthesia     labile BP during/after C section    Asthma     Broken bones     history of 9 broken bones    Chronic UTI (urinary tract infection)     \"extra valve right kidney causes UTI\"    Fibromyalgia     Gestational diabetes     GI bleed 2007,2011,2015,2016    lower GI last colonoscopy in 2016 normal     Huntingtons chorea (HCC)     Hyperhydrosis disorder     Ill-defined condition     [place]/ tested genetically - daughter has Chris    Infertility     PCOS    PCOS (polycystic ovarian syndrome)     Precancerous skin lesion     removed from Right shoulder    Preeclampsia 2011    pregnancy    Reactive airway disease     Rheumatoid arthritis ([address]     SVT (supraventricular tachycardia) ([facility] [address] 2011    occured during pregnancy when picc line inserted. Current Outpatient Medications on File Prior to Visit   Medication Sig Dispense Refill    escitalopram oxalate (LEXAPRO) 20 mg tablet TAKE 1 TABLET BY MOUTH EVERY DAY 30 Tab 11    tofacitinib (Xeljanz XR) 11 mg Tb24 Take  by mouth.  miconazole nitrate 200 mg/5 gram (4 %) vaginal cream Apply to affected area as needed once a day 15 g 0    methotrexate, PF, 25 mg/mL injection       PREVIDENT 5000 SENSITIVE 1.1-5 % pste USE AS DIRECTED TWICE A DAY SPIT OUT EXCESS AND DO NOT RINSE, EAT OR DRINK FOR 30 MINUTES      hydroxychloroquine (PLAQUENIL) 200 mg tablet       lidocaine-prilocaine (EMLA) topical cream APPLY 1/2 TUBE 1 HOUR PRIOR TO INFUSION  2    BD SYRINGE 1 mL 25 gauge x 5/8\" syrg 1 SYRINGE ONCE A WEEK  4    heparin sodium,porcine (HEPARIN LOCK FLUSH, PORCINE, IV) by IntraVENous route.  omeprazole (PRILOSEC) 40 mg capsule Take 1 Cap by mouth daily. 30 Cap 0    promethazine-dextromethorphan (PROMETHAZINE-DM) 6.25-15 mg/5 mL syrup Take 5 mL by mouth nightly as needed for Cough. 60 mL 0    Cetirizine (ZYRTEC) 10 mg cap Take 10 mg by mouth daily. Indications: inflammation of the nose due to an allergy 30 Cap 0    albuterol (PROVENTIL HFA, VENTOLIN HFA, PROAIR HFA) 90 mcg/actuation inhaler Take 2 Puffs by inhalation every four (4) hours as needed for Wheezing. 1 Inhaler 0    omalizumab (XOLAIR SC) by SubCUTAneous route every thirty (30) days.  propranolol LA (INDERAL LA) 60 mg SR capsule TAKE 1 CAPSULE BY MOUTH EVERY DAY 90 Cap 3    SYMBICORT 160-4.5 mcg/actuation HFAA TAKE 2 PUFFS BY MOUTH TWICE A DAY  12    OZEMPIC 0.25 mg or 0.5 mg(2 mg/1.5 mL) sub-q pen INJECT 0.5MG SUBCUTAMEOUSLY ONCE PER WEEK ON FRIDAY  1    ondansetron hcl (ZOFRAN) 4 mg tablet Take 4 mg by mouth every eight (8) hours as needed for Nausea.  naltrexone HCl (NALTREXONE PO) Take  by mouth.  EPIPEN 2-SANJAY 0.3 mg/0.3 mL injection 1 (ONE) INJECTION AS NEEDED  0    buPROPion XL (WELLBUTRIN XL) 150 mg tablet Take 1 Tab by mouth every morning. 90 Tab 3    montelukast (SINGULAIR) 10 mg tablet Take 1 Tab by mouth daily. 90 Tab 4    traMADol (ULTRAM) 50 mg tablet Take 50 mg by mouth every six (6) hours as needed for Pain.  oxybutynin (DITROPAN) 5 mg tablet TAKE 1/2 TAB DAILY X1WEEK THEN INCREASE TO 1/2 TAB 2XDAY X2WKS THEN 1 TAB 2XDAY  3    BD INSULIN SYRINGE 1 mL 25 gauge x 5/8\" syrg USE 1 SYRINGE ONCE A WEEK  11    cyanocobalamin, vitamin B-12, (VITAMIN B-12 PO) Take  by mouth.  RESTASIS 0.05 % ophthalmic emulsion INSTILL 1 DROP INTO EACH EYE TWICE DAILY  4    SAFETY-ABDI TB SYR 1CC/25GX5/8\" 1 mL 25 gauge x 5/8\" syrg USE TO INJECT METHOTREXATE ONCE A WEEK  2    methotrexate 25 mg/mL chemo injection INJECT 25MG (1ML) subcutaneously ONCE WEEKLY  0    metFORMIN ER (GLUCOPHAGE XR) 500 mg tablet 1,000 TAKE 2 TABLET BY MOUTH TWICE A DAY AS DIRECTED  1    folic acid (FOLVITE) 1 mg tablet TAKE 1 TABLET ORALLY DAILY  11    desogestrel-ethinyl estradiol (DESOGEN) 0.15-0.03 mg tab Take 1 Tab by mouth nightly.       Nebulizer & Compressor machine 1 Each by Does Not Apply route three (3) times daily as needed. 1 Each 0    glycopyrrolate (ROBINUL) 1 mg tablet Take 2 mg by mouth two (2) times a day. Indications: hyperhydrosis      cholecalciferol, vitamin D3, (VITAMIN D3) 2,000 unit Tab Take 1 Tab by mouth daily.  levothyroxine (SYNTHROID) 75 mcg tablet Take 75 mcg by mouth Daily (before breakfast). No current facility-administered medications on file prior to visit.         Past Surgical History:   Procedure Laterality Date    COLONOSCOPY N/A 6/13/2016    [facility] ENDOSCOPY    HX HEENT      HX WISDOM TEETH EXTRACTION  2004    UPPER GI ENDOSCOPY,BIOPSY  2/12/2019         UPPER GI ENDOSCOPY,DILATN W GUIDE  2/12/2019            Family History   Problem Relation Age of Onset    Other Mother         Chris's disease    Hypertension Mother     Dementia Mother     High Cholesterol Father     Heart Disease Father     Diabetes Father     Hypertension Father     Asthma Brother     Diabetes Brother     Other Brother         varicocele, hernias    Hypertension Brother     Alcohol abuse Brother     Hypertension Maternal Grandmother     Elevated Lipids Maternal Grandmother     Cancer Maternal Grandmother         breast    Other Maternal Grandmother         polymyalgia rheumatica    Glaucoma Maternal Grandmother     Cancer Maternal Grandfather         prostate    Huntingtons disease Maternal Grandfather     Cancer Paternal Grandmother         lung with mets    Cancer Paternal Grandfather         prostate, colon    Diabetes Paternal Grandfather     Heart Disease Paternal Grandfather     Alzheimer Paternal Grandfather     Dementia Paternal Grandfather      Reviewed and no changes     Social History     Socioeconomic History    Marital status:      Spouse name: Not on file    Number of children: Not on file    Years of education: Not on file    Highest education level: Not on file   Occupational History    Not on file   Social Needs    Financial resource strain: Not on file    Food insecurity     Worry: Not on file     Inability: Not on file    Transportation needs     Medical: Not on file     Non-medical: Not on file   Tobacco Use    Smoking status: Never Smoker    Smokeless tobacco: Never Used   Substance and Sexual Activity    Alcohol use: Yes     Comment: few drinks per year    Drug use: No    Sexual activity: Not on file   Lifestyle    Physical activity     Days per week: Not on file     Minutes per session: Not on file    Stress: Not on file   Relationships    Social connections     Talks on phone: Not on file     Gets together: Not on file     Attends Orthodox service: Not on file     Active member of club or organization: Not on file     Attends meetings of clubs or organizations: Not on file     Relationship status: Not on file    Intimate partner violence     Fear of current or ex partner: Not on file     Emotionally abused: Not on file     Physically abused: Not on file     Forced sexual activity: Not on file   Other Topics Concern    Not on file   Social History Narrative    ** Merged History Encounter **               There were no vitals taken for this visit. Physical Examination:   Gen: well appearing female  HEENT: normal conjunctiva, no audible congestion, patient does not see oral erythema, has MMM  Neck: patient does not feel enlarged or tender LAD or masses  Resp: normal respiratory effort, no audible wheezing. CV: patient does not feel palpitations or heart irregularity  Abd: patient does not feel abdominal tenderness or mass, patient does not notice distension  Extrem: patient does not see swelling in ankles or joints.    Neuro: Alert and oriented, able to answer questions without difficulty, able to move all extremities and walk normally          Lab Results   Component Value Date/Time    WBC 6.8 04/18/2020 06:37 PM    HGB 11.8 04/18/2020 06:37 PM    HCT 35.3 04/18/2020 06:37 PM    PLATELET 194 32/78/3012 06:37 PM    MCV 93.6 04/18/2020 06:37 PM     Lab Results   Component Value Date/Time    Sodium 139 04/18/2020 06:37 PM    Potassium 3.9 04/18/2020 06:37 PM    Chloride 108 04/18/2020 06:37 PM    CO2 27 04/18/2020 06:37 PM    Anion gap 4 (L) 04/18/2020 06:37 PM    Glucose 73 04/18/2020 06:37 PM    BUN 10 04/18/2020 06:37 PM    Creatinine 0.91 04/18/2020 06:37 PM    BUN/Creatinine ratio 11 (L) 04/18/2020 06:37 PM    GFR est AA >60 04/18/2020 06:37 PM    GFR est non-AA >60 04/18/2020 06:37 PM    Calcium 8.7 04/18/2020 06:37 PM     No results found for: CHOL, CHOLPOCT, CHOLX, CHLST, CHOLV, HDL, HDLPOC, HDLP, LDL, LDLCPOC, LDLC, DLDLP, VLDLC, VLDL, TGLX, TRIGL, TRIGP, TGLPOCT, CHHD, CHHDX  Lab Results   Component Value Date/Time    TSH 1.48 10/30/2015 05:11 PM     No results found for: PSA, Isa Rollins, [id number], [id number], PSALT  Lab Results   Component Value Date/Time    Hemoglobin A1c 5.8 07/03/2011 12:10 PM     No results found for: Zaynab Bur, [id number]    Lab Results   Component Value Date/Time    ALT (SGPT) 22 04/18/2020 06:37 PM    AST (SGOT) 14 (L) 04/18/2020 06:37 PM    Alk. phosphatase 59 04/18/2020 06:37 PM    Bilirubin, total 0.5 04/18/2020 06:37 PM           Assessment/Plan:    1. Recurrent UTI  4 UTIs this year trial of post coital prophylaxis and if no improvement will change to daily   - nitrofurantoin (MACRODANTIN) 50 mg capsule; Take 1 Cap by mouth daily as needed (post coital). Dispense: 30 Cap; Refill: 1    2. Depression, unspecified depression type  Stable on lexapro and wellbutrin      3. Attention deficit hyperactivity disorder (ADHD), predominantly inattentive type  Doing well on vyvanxe  - lisdexamfetamine (Vyvanse) 30 mg capsule; Take 1 Cap by mouth every morning. Max Daily Amount: 30 mg. Dispense: 30 Cap; Refill: 0    4.  Yeast infection  Discussed that since she is on plaquenil and lexapro the QTC prolongation risk with fluconazole is too high and should use OTC yeast infection Rx             Manuel Baxter MD    This is an established visit conducted via real time video and audio telemedicine. The patient has been instructed that this meets HIPAA criteria and acknowledges and agrees to this method of visitation.

## 2020-04-27 NOTE — PROGRESS NOTES
Reviewed record in preparation for visit and have obtained necessary documentation. Identified pt with two pt identifiers(name and ). Chief Complaint   Patient presents with    Diabetes    Depression       Health Maintenance Due   Topic Date Due    Pap Test  2012    Pneumococcal Vaccine (1 of  - PPSV23) 2017       Ms. Venita Pratt has a reminder for a \"due or due soon\" health maintenance. I have asked that she discuss this further with her primary care provider for follow-up on this health maintenance. Coordination of Care Questionnaire:  :     1) Have you been to an emergency room, urgent care clinic since your last visit? no   Hospitalized since your last visit? no             2) Have you seen or consulted any other health care providers outside of [address] [place] [address] since your last visit? no  (Include any pap smears or colon screenings in this section.)    3) In the event something were to happen to you and you were unable to speak on your behalf, do you have an Advance Directive/ Living Will in place stating your wishes? NO    Do you have an Advance Directive on file? no    4) Are you interested in receiving information on Advance Directives? NO    Patient is accompanied by self I have [facility] discuss any/all medical information while they are present in the room.

## 2020-05-01 ENCOUNTER — PATIENT OUTREACH (OUTPATIENT)
Dept: OTHER | Age: 40
End: 2020-05-01

## 2020-05-01 NOTE — PROGRESS NOTES
Patient contacted regarding COVID-19 risk and screening. Care Transition Nurse/ Ambulatory Care Manager contacted the patient by telephone to perform follow-up assessment. Verified name and  with patient as identifiers. Patient has following risk factors of: immunocompromised. Symptoms reviewed with patient who verbalized the following symptoms: no new symptoms. For more information on steps you can take to protect yourself, see CDC's How to [address] [address] for follow-up call in 7-14 days based on severity of symptoms and risk factors.

## 2020-05-14 ENCOUNTER — APPOINTMENT (OUTPATIENT)
Dept: CT IMAGING | Age: 40
DRG: 815 | End: 2020-05-14
Attending: EMERGENCY MEDICINE
Payer: COMMERCIAL

## 2020-05-14 ENCOUNTER — APPOINTMENT (OUTPATIENT)
Dept: GENERAL RADIOLOGY | Age: 40
DRG: 815 | End: 2020-05-14
Attending: EMERGENCY MEDICINE
Payer: COMMERCIAL

## 2020-05-14 ENCOUNTER — HOSPITAL ENCOUNTER (INPATIENT)
Age: 40
LOS: 4 days | Discharge: HOME | DRG: 815 | End: 2020-05-18
Attending: EMERGENCY MEDICINE | Admitting: INTERNAL MEDICINE
Payer: COMMERCIAL

## 2020-05-14 ENCOUNTER — PATIENT OUTREACH (OUTPATIENT)
Dept: OTHER | Age: 40
End: 2020-05-14

## 2020-05-14 ENCOUNTER — TELEPHONE (OUTPATIENT)
Dept: INTERNAL MEDICINE CLINIC | Age: 40
End: 2020-05-14

## 2020-05-14 DIAGNOSIS — R10.12 ABDOMINAL PAIN, LUQ (LEFT UPPER QUADRANT): ICD-10-CM

## 2020-05-14 DIAGNOSIS — R50.9 FEVER, UNSPECIFIED FEVER CAUSE: Primary | ICD-10-CM

## 2020-05-14 PROBLEM — D73.5 SPLENIC INFARCTION: Status: ACTIVE | Noted: 2020-05-14

## 2020-05-14 PROBLEM — A41.9 SEPSIS (HCC): Status: ACTIVE | Noted: 2020-05-14

## 2020-05-14 LAB
ALBUMIN SERPL-MCNC: 3.5 G/DL (ref 3.5–5)
ALBUMIN/GLOB SERPL: 0.7 {RATIO} (ref 1.1–2.2)
ALP SERPL-CCNC: 89 U/L (ref 45–117)
ALT SERPL-CCNC: 25 U/L (ref 12–78)
ANION GAP SERPL CALC-SCNC: 9 MMOL/L (ref 5–15)
APPEARANCE UR: CLEAR
AST SERPL-CCNC: 16 U/L (ref 15–37)
ATRIAL RATE: 115 BPM
BACTERIA URNS QL MICRO: ABNORMAL /HPF
BASOPHILS # BLD: 0 K/UL (ref 0–0.1)
BASOPHILS NFR BLD: 0 % (ref 0–1)
BILIRUB SERPL-MCNC: 0.6 MG/DL (ref 0.2–1)
BILIRUB UR QL: NEGATIVE
BUN SERPL-MCNC: 10 MG/DL (ref 6–20)
BUN/CREAT SERPL: 8 (ref 12–20)
CALCIUM SERPL-MCNC: 8.8 MG/DL (ref 8.5–10.1)
CALCULATED P AXIS, ECG09: 18 DEGREES
CALCULATED R AXIS, ECG10: 63 DEGREES
CALCULATED T AXIS, ECG11: 6 DEGREES
CHLORIDE SERPL-SCNC: 99 MMOL/L (ref 97–108)
CO2 SERPL-SCNC: 27 MMOL/L (ref 21–32)
COLOR UR: ABNORMAL
COMMENT, HOLDF: NORMAL
CREAT SERPL-MCNC: 1.28 MG/DL (ref 0.55–1.02)
CRP SERPL HS-MCNC: >9.5 MG/L
D DIMER PPP FEU-MCNC: 0.63 MG/L FEU (ref 0–0.65)
DIAGNOSIS, 93000: NORMAL
DIFFERENTIAL METHOD BLD: ABNORMAL
EOSINOPHIL # BLD: 0 K/UL (ref 0–0.4)
EOSINOPHIL NFR BLD: 0 % (ref 0–7)
EPITH CASTS URNS QL MICRO: ABNORMAL /LPF
ERYTHROCYTE [DISTWIDTH] IN BLOOD BY AUTOMATED COUNT: 13.4 % (ref 11.5–14.5)
ERYTHROCYTE [SEDIMENTATION RATE] IN BLOOD: 18 MM/HR (ref 0–20)
FERRITIN SERPL-MCNC: 85 NG/ML (ref 8–252)
GLOBULIN SER CALC-MCNC: 4.8 G/DL (ref 2–4)
GLUCOSE BLD STRIP.AUTO-MCNC: 136 MG/DL (ref 65–100)
GLUCOSE BLD STRIP.AUTO-MCNC: 73 MG/DL (ref 65–100)
GLUCOSE BLD STRIP.AUTO-MCNC: 74 MG/DL (ref 65–100)
GLUCOSE SERPL-MCNC: 92 MG/DL (ref 65–100)
GLUCOSE UR STRIP.AUTO-MCNC: NEGATIVE MG/DL
HCT VFR BLD AUTO: 37.1 % (ref 35–47)
HGB BLD-MCNC: 12.5 G/DL (ref 11.5–16)
HGB UR QL STRIP: NEGATIVE
IMM GRANULOCYTES # BLD AUTO: 0.1 K/UL (ref 0–0.04)
IMM GRANULOCYTES NFR BLD AUTO: 1 % (ref 0–0.5)
KETONES UR QL STRIP.AUTO: NEGATIVE MG/DL
LACTATE SERPL-SCNC: 1.9 MMOL/L (ref 0.4–2)
LEUKOCYTE ESTERASE UR QL STRIP.AUTO: NEGATIVE
LYMPHOCYTES # BLD: 2.1 K/UL (ref 0.8–3.5)
LYMPHOCYTES NFR BLD: 20 % (ref 12–49)
MCH RBC QN AUTO: 31.3 PG (ref 26–34)
MCHC RBC AUTO-ENTMCNC: 33.7 G/DL (ref 30–36.5)
MCV RBC AUTO: 92.8 FL (ref 80–99)
MONOCYTES # BLD: 0.5 K/UL (ref 0–1)
MONOCYTES NFR BLD: 5 % (ref 5–13)
NEUTS SEG # BLD: 8 K/UL (ref 1.8–8)
NEUTS SEG NFR BLD: 74 % (ref 32–75)
NITRITE UR QL STRIP.AUTO: NEGATIVE
NRBC # BLD: 0 K/UL (ref 0–0.01)
NRBC BLD-RTO: 0 PER 100 WBC
OTHER,OTHU: ABNORMAL
P-R INTERVAL, ECG05: 146 MS
PH UR STRIP: 8 [PH] (ref 5–8)
PLATELET # BLD AUTO: 395 K/UL (ref 150–400)
PMV BLD AUTO: 8.8 FL (ref 8.9–12.9)
POTASSIUM SERPL-SCNC: 3.6 MMOL/L (ref 3.5–5.1)
PROCALCITONIN SERPL-MCNC: <0.05 NG/ML
PROT SERPL-MCNC: 8.3 G/DL (ref 6.4–8.2)
PROT UR STRIP-MCNC: ABNORMAL MG/DL
Q-T INTERVAL, ECG07: 316 MS
QRS DURATION, ECG06: 76 MS
QTC CALCULATION (BEZET), ECG08: 437 MS
RBC # BLD AUTO: 4 M/UL (ref 3.8–5.2)
RBC #/AREA URNS HPF: ABNORMAL /HPF (ref 0–5)
SAMPLES BEING HELD,HOLD: NORMAL
SARS-COV-2, COV2: NOT DETECTED
SERVICE CMNT-IMP: ABNORMAL
SERVICE CMNT-IMP: NORMAL
SERVICE CMNT-IMP: NORMAL
SODIUM SERPL-SCNC: 135 MMOL/L (ref 136–145)
SOURCE, COVRS: NORMAL
SP GR UR REFRACTOMETRY: 1.02 (ref 1–1.03)
SPECIMEN SOURCE, FCOV2M: NORMAL
UA: UC IF INDICATED,UAUC: ABNORMAL
UROBILINOGEN UR QL STRIP.AUTO: 0.2 EU/DL (ref 0.2–1)
VENTRICULAR RATE, ECG03: 115 BPM
WBC # BLD AUTO: 10.8 K/UL (ref 3.6–11)
WBC URNS QL MICRO: ABNORMAL /HPF (ref 0–4)

## 2020-05-14 PROCEDURE — 74011250637 HC RX REV CODE- 250/637: Performed by: INTERNAL MEDICINE

## 2020-05-14 PROCEDURE — 85025 COMPLETE CBC W/AUTO DIFF WBC: CPT

## 2020-05-14 PROCEDURE — 36415 COLL VENOUS BLD VENIPUNCTURE: CPT

## 2020-05-14 PROCEDURE — 87086 URINE CULTURE/COLONY COUNT: CPT

## 2020-05-14 PROCEDURE — 87635 SARS-COV-2 COVID-19 AMP PRB: CPT

## 2020-05-14 PROCEDURE — 99285 EMERGENCY DEPT VISIT HI MDM: CPT

## 2020-05-14 PROCEDURE — 74011000258 HC RX REV CODE- 258: Performed by: INTERNAL MEDICINE

## 2020-05-14 PROCEDURE — 80053 COMPREHEN METABOLIC PANEL: CPT

## 2020-05-14 PROCEDURE — 94640 AIRWAY INHALATION TREATMENT: CPT

## 2020-05-14 PROCEDURE — 85652 RBC SED RATE AUTOMATED: CPT

## 2020-05-14 PROCEDURE — 74011250636 HC RX REV CODE- 250/636: Performed by: EMERGENCY MEDICINE

## 2020-05-14 PROCEDURE — 84145 PROCALCITONIN (PCT): CPT

## 2020-05-14 PROCEDURE — 82962 GLUCOSE BLOOD TEST: CPT

## 2020-05-14 PROCEDURE — 81001 URINALYSIS AUTO W/SCOPE: CPT

## 2020-05-14 PROCEDURE — 94760 N-INVAS EAR/PLS OXIMETRY 1: CPT

## 2020-05-14 PROCEDURE — 87040 BLOOD CULTURE FOR BACTERIA: CPT

## 2020-05-14 PROCEDURE — 82728 ASSAY OF FERRITIN: CPT

## 2020-05-14 PROCEDURE — 96361 HYDRATE IV INFUSION ADD-ON: CPT

## 2020-05-14 PROCEDURE — 83605 ASSAY OF LACTIC ACID: CPT

## 2020-05-14 PROCEDURE — 93005 ELECTROCARDIOGRAM TRACING: CPT

## 2020-05-14 PROCEDURE — 74011636320 HC RX REV CODE- 636/320: Performed by: EMERGENCY MEDICINE

## 2020-05-14 PROCEDURE — 71045 X-RAY EXAM CHEST 1 VIEW: CPT

## 2020-05-14 PROCEDURE — 65270000029 HC RM PRIVATE

## 2020-05-14 PROCEDURE — 86141 C-REACTIVE PROTEIN HS: CPT

## 2020-05-14 PROCEDURE — 74011250637 HC RX REV CODE- 250/637: Performed by: EMERGENCY MEDICINE

## 2020-05-14 PROCEDURE — 74011250636 HC RX REV CODE- 250/636: Performed by: INTERNAL MEDICINE

## 2020-05-14 PROCEDURE — 96374 THER/PROPH/DIAG INJ IV PUSH: CPT

## 2020-05-14 PROCEDURE — 74177 CT ABD & PELVIS W/CONTRAST: CPT

## 2020-05-14 PROCEDURE — 83520 IMMUNOASSAY QUANT NOS NONAB: CPT

## 2020-05-14 PROCEDURE — 85379 FIBRIN DEGRADATION QUANT: CPT

## 2020-05-14 RX ORDER — ALBUTEROL SULFATE 0.83 MG/ML
2.5 SOLUTION RESPIRATORY (INHALATION)
Status: DISCONTINUED | OUTPATIENT
Start: 2020-05-14 | End: 2020-05-14

## 2020-05-14 RX ORDER — SODIUM CHLORIDE 0.9 % (FLUSH) 0.9 %
5-40 SYRINGE (ML) INJECTION AS NEEDED
Status: DISCONTINUED | OUTPATIENT
Start: 2020-05-14 | End: 2020-05-18 | Stop reason: HOSPADM

## 2020-05-14 RX ORDER — NORETHINDRONE ACETATE AND ETHINYL ESTRADIOL 1MG-20(21)
KIT ORAL
COMMUNITY
End: 2020-05-28

## 2020-05-14 RX ORDER — ACETAMINOPHEN 500 MG
1000 TABLET ORAL ONCE
Status: COMPLETED | OUTPATIENT
Start: 2020-05-14 | End: 2020-05-14

## 2020-05-14 RX ORDER — ONDANSETRON 2 MG/ML
4 INJECTION INTRAMUSCULAR; INTRAVENOUS
Status: DISCONTINUED | OUTPATIENT
Start: 2020-05-14 | End: 2020-05-18 | Stop reason: HOSPADM

## 2020-05-14 RX ORDER — ACETAMINOPHEN 325 MG/1
650 TABLET ORAL
Status: DISCONTINUED | OUTPATIENT
Start: 2020-05-14 | End: 2020-05-17 | Stop reason: SDUPTHER

## 2020-05-14 RX ORDER — OXYBUTYNIN CHLORIDE 5 MG/1
5 TABLET ORAL 2 TIMES DAILY
Status: DISCONTINUED | OUTPATIENT
Start: 2020-05-14 | End: 2020-05-18 | Stop reason: HOSPADM

## 2020-05-14 RX ORDER — DEXTROSE 50 % IN WATER (D50W) INTRAVENOUS SYRINGE
12.5-25 AS NEEDED
Status: DISCONTINUED | OUTPATIENT
Start: 2020-05-14 | End: 2020-05-18 | Stop reason: HOSPADM

## 2020-05-14 RX ORDER — GLYCOPYRROLATE 1 MG/1
2 TABLET ORAL 2 TIMES DAILY
Status: DISCONTINUED | OUTPATIENT
Start: 2020-05-14 | End: 2020-05-14

## 2020-05-14 RX ORDER — DOCUSATE SODIUM 100 MG/1
100 CAPSULE, LIQUID FILLED ORAL
Status: DISCONTINUED | OUTPATIENT
Start: 2020-05-14 | End: 2020-05-18 | Stop reason: HOSPADM

## 2020-05-14 RX ORDER — SODIUM CHLORIDE 0.9 % (FLUSH) 0.9 %
5-40 SYRINGE (ML) INJECTION EVERY 8 HOURS
Status: DISCONTINUED | OUTPATIENT
Start: 2020-05-14 | End: 2020-05-18 | Stop reason: HOSPADM

## 2020-05-14 RX ORDER — ESCITALOPRAM OXALATE 10 MG/1
20 TABLET ORAL DAILY
Status: DISCONTINUED | OUTPATIENT
Start: 2020-05-14 | End: 2020-05-18 | Stop reason: HOSPADM

## 2020-05-14 RX ORDER — PANTOPRAZOLE SODIUM 40 MG/1
40 TABLET, DELAYED RELEASE ORAL
Status: DISCONTINUED | OUTPATIENT
Start: 2020-05-15 | End: 2020-05-18 | Stop reason: HOSPADM

## 2020-05-14 RX ORDER — ALBUTEROL SULFATE 90 UG/1
1 AEROSOL, METERED RESPIRATORY (INHALATION)
Status: DISCONTINUED | OUTPATIENT
Start: 2020-05-14 | End: 2020-05-18

## 2020-05-14 RX ORDER — DIPHENHYDRAMINE HCL 25 MG
25 CAPSULE ORAL
COMMUNITY
End: 2021-07-30 | Stop reason: ALTCHOICE

## 2020-05-14 RX ORDER — ACETAMINOPHEN 325 MG/1
650 TABLET ORAL
Status: DISCONTINUED | OUTPATIENT
Start: 2020-05-14 | End: 2020-05-18 | Stop reason: HOSPADM

## 2020-05-14 RX ORDER — FLUTICASONE FUROATE AND VILANTEROL 200; 25 UG/1; UG/1
1 POWDER RESPIRATORY (INHALATION) DAILY
Status: DISCONTINUED | OUTPATIENT
Start: 2020-05-14 | End: 2020-05-18 | Stop reason: HOSPADM

## 2020-05-14 RX ORDER — BUPROPION HYDROCHLORIDE 150 MG/1
150 TABLET ORAL
Status: DISCONTINUED | OUTPATIENT
Start: 2020-05-14 | End: 2020-05-18 | Stop reason: HOSPADM

## 2020-05-14 RX ORDER — MORPHINE SULFATE 10 MG/ML
4 INJECTION, SOLUTION INTRAMUSCULAR; INTRAVENOUS ONCE
Status: COMPLETED | OUTPATIENT
Start: 2020-05-14 | End: 2020-05-14

## 2020-05-14 RX ORDER — SODIUM CHLORIDE 0.9 % (FLUSH) 0.9 %
5-10 SYRINGE (ML) INJECTION AS NEEDED
Status: DISCONTINUED | OUTPATIENT
Start: 2020-05-14 | End: 2020-05-18 | Stop reason: HOSPADM

## 2020-05-14 RX ORDER — MAGNESIUM SULFATE 100 %
4 CRYSTALS MISCELLANEOUS AS NEEDED
Status: DISCONTINUED | OUTPATIENT
Start: 2020-05-14 | End: 2020-05-18 | Stop reason: HOSPADM

## 2020-05-14 RX ORDER — FOLIC ACID 1 MG/1
1 TABLET ORAL DAILY
Status: DISCONTINUED | OUTPATIENT
Start: 2020-05-14 | End: 2020-05-18 | Stop reason: HOSPADM

## 2020-05-14 RX ORDER — HYDROXYCHLOROQUINE SULFATE 200 MG/1
200 TABLET, FILM COATED ORAL DAILY
Status: DISCONTINUED | OUTPATIENT
Start: 2020-05-14 | End: 2020-05-18 | Stop reason: HOSPADM

## 2020-05-14 RX ORDER — PREDNISONE 20 MG/1
20 TABLET ORAL DAILY
Status: ON HOLD | COMMUNITY
End: 2020-05-28 | Stop reason: SDUPTHER

## 2020-05-14 RX ORDER — INSULIN LISPRO 100 [IU]/ML
INJECTION, SOLUTION INTRAVENOUS; SUBCUTANEOUS
Status: DISCONTINUED | OUTPATIENT
Start: 2020-05-14 | End: 2020-05-18 | Stop reason: HOSPADM

## 2020-05-14 RX ORDER — HEPARIN SODIUM 5000 [USP'U]/ML
5000 INJECTION, SOLUTION INTRAVENOUS; SUBCUTANEOUS EVERY 8 HOURS
Status: DISCONTINUED | OUTPATIENT
Start: 2020-05-14 | End: 2020-05-18 | Stop reason: HOSPADM

## 2020-05-14 RX ORDER — METRONIDAZOLE 500 MG/100ML
500 INJECTION, SOLUTION INTRAVENOUS
Status: COMPLETED | OUTPATIENT
Start: 2020-05-14 | End: 2020-05-14

## 2020-05-14 RX ORDER — MONTELUKAST SODIUM 10 MG/1
10 TABLET ORAL DAILY
Status: DISCONTINUED | OUTPATIENT
Start: 2020-05-14 | End: 2020-05-18 | Stop reason: HOSPADM

## 2020-05-14 RX ORDER — SODIUM CHLORIDE 9 MG/ML
100 INJECTION, SOLUTION INTRAVENOUS CONTINUOUS
Status: DISCONTINUED | OUTPATIENT
Start: 2020-05-14 | End: 2020-05-18 | Stop reason: HOSPADM

## 2020-05-14 RX ORDER — CETIRIZINE HCL 10 MG
10 TABLET ORAL DAILY
Status: DISCONTINUED | OUTPATIENT
Start: 2020-05-14 | End: 2020-05-18 | Stop reason: HOSPADM

## 2020-05-14 RX ORDER — SODIUM CHLORIDE 0.9 % (FLUSH) 0.9 %
10 SYRINGE (ML) INJECTION
Status: COMPLETED | OUTPATIENT
Start: 2020-05-14 | End: 2020-05-14

## 2020-05-14 RX ORDER — METRONIDAZOLE 500 MG/100ML
500 INJECTION, SOLUTION INTRAVENOUS EVERY 8 HOURS
Status: DISCONTINUED | OUTPATIENT
Start: 2020-05-14 | End: 2020-05-18 | Stop reason: HOSPADM

## 2020-05-14 RX ORDER — PROPRANOLOL HYDROCHLORIDE 60 MG/1
60 CAPSULE, EXTENDED RELEASE ORAL DAILY
Status: DISCONTINUED | OUTPATIENT
Start: 2020-05-14 | End: 2020-05-15

## 2020-05-14 RX ORDER — TRAMADOL HYDROCHLORIDE 50 MG/1
50 TABLET ORAL
Status: DISCONTINUED | OUTPATIENT
Start: 2020-05-14 | End: 2020-05-18 | Stop reason: HOSPADM

## 2020-05-14 RX ORDER — LEVOTHYROXINE SODIUM 75 UG/1
75 TABLET ORAL
Status: DISCONTINUED | OUTPATIENT
Start: 2020-05-15 | End: 2020-05-18 | Stop reason: HOSPADM

## 2020-05-14 RX ADMIN — PROPRANOLOL HYDROCHLORIDE 60 MG: 60 CAPSULE, EXTENDED RELEASE ORAL at 10:44

## 2020-05-14 RX ADMIN — ESCITALOPRAM OXALATE 20 MG: 10 TABLET ORAL at 10:44

## 2020-05-14 RX ADMIN — METRONIDAZOLE 500 MG: 500 INJECTION, SOLUTION INTRAVENOUS at 17:13

## 2020-05-14 RX ADMIN — Medication 10 ML: at 06:53

## 2020-05-14 RX ADMIN — ALBUTEROL SULFATE 1 PUFF: 90 AEROSOL, METERED RESPIRATORY (INHALATION) at 14:23

## 2020-05-14 RX ADMIN — HEPARIN SODIUM 5000 UNITS: 5000 INJECTION INTRAVENOUS; SUBCUTANEOUS at 13:47

## 2020-05-14 RX ADMIN — MORPHINE SULFATE 4 MG: 10 INJECTION INTRAVENOUS at 09:45

## 2020-05-14 RX ADMIN — SODIUM CHLORIDE 1000 ML: 900 INJECTION, SOLUTION INTRAVENOUS at 05:59

## 2020-05-14 RX ADMIN — TRAMADOL HYDROCHLORIDE 50 MG: 50 TABLET, FILM COATED ORAL at 17:13

## 2020-05-14 RX ADMIN — FOLIC ACID 1 MG: 1 TABLET ORAL at 10:37

## 2020-05-14 RX ADMIN — Medication 10 ML: at 13:48

## 2020-05-14 RX ADMIN — ALBUTEROL SULFATE 1 PUFF: 90 AEROSOL, METERED RESPIRATORY (INHALATION) at 20:47

## 2020-05-14 RX ADMIN — METRONIDAZOLE 500 MG: 500 INJECTION, SOLUTION INTRAVENOUS at 09:07

## 2020-05-14 RX ADMIN — CETIRIZINE HYDROCHLORIDE 10 MG: 10 TABLET, FILM COATED ORAL at 10:37

## 2020-05-14 RX ADMIN — IOPAMIDOL 100 ML: 755 INJECTION, SOLUTION INTRAVENOUS at 06:53

## 2020-05-14 RX ADMIN — BUPROPION HYDROCHLORIDE 150 MG: 150 TABLET, EXTENDED RELEASE ORAL at 10:44

## 2020-05-14 RX ADMIN — ACETAMINOPHEN 1000 MG: 500 TABLET ORAL at 05:50

## 2020-05-14 RX ADMIN — CEFTRIAXONE SODIUM 2 G: 2 INJECTION, POWDER, FOR SOLUTION INTRAMUSCULAR; INTRAVENOUS at 10:22

## 2020-05-14 RX ADMIN — MONTELUKAST SODIUM 10 MG: 10 TABLET, FILM COATED ORAL at 10:44

## 2020-05-14 RX ADMIN — METHYLPREDNISOLONE SODIUM SUCCINATE 40 MG: 40 INJECTION, POWDER, FOR SOLUTION INTRAMUSCULAR; INTRAVENOUS at 13:46

## 2020-05-14 RX ADMIN — FLUTICASONE FUROATE AND VILANTEROL TRIFENATATE 1 PUFF: 200; 25 POWDER RESPIRATORY (INHALATION) at 14:23

## 2020-05-14 RX ADMIN — SODIUM CHLORIDE 100 ML/HR: 900 INJECTION, SOLUTION INTRAVENOUS at 10:35

## 2020-05-14 RX ADMIN — OXYBUTYNIN CHLORIDE 5 MG: 5 TABLET ORAL at 10:44

## 2020-05-14 RX ADMIN — OXYBUTYNIN CHLORIDE 5 MG: 5 TABLET ORAL at 17:13

## 2020-05-14 RX ADMIN — HYDROXYCHLOROQUINE SULFATE 200 MG: 200 TABLET ORAL at 11:38

## 2020-05-14 RX ADMIN — SODIUM CHLORIDE 1000 ML: 900 INJECTION, SOLUTION INTRAVENOUS at 06:00

## 2020-05-14 NOTE — CONSULTS
Hematology Oncology Consultation    Reason for consult: possible splenic infarction    Admitting Diagnosis: Sepsis ([facility] [address] [A41.9]  Splenic infarction [D73.5]    Impression:   *) Lesions in the spleen:  - I reviewed films with radiologist, Dr Aide Zambrano in detail and he felt infarct is extremely unlikely as they should be wedge shaped and her lesions are round. He feels these look more like microabscesses. He didn't feel there was high probability of endocarditis causing these as there were no other areas of concern on her CT. States sarcoidosis can appear this way as well. There was no splenomegaly, no LAD to suggest a lymphoma.   - She is immunocompromised and infection is of highest concern, recommend ID consult and input tmw.    *) Febrile illness/sepsis:  - Febrile to 102.3 with tachycardia in ED  - on flagyl, rocephin  - agree with ruling out COVID19    *) RA and on chronic immunosuppression:  - Has been on long term immunosuppression with prednisone 20mg, plaquenil, MTX, and Xeljanz  - cont FA    *) Abdominal pain:  - Her pain is in LUQ, there is a lesion in the upper portion of spleen that is in contact with capsule and suspect this is causing her pain. - She also states she hasn't had a BM in 10 days, will need to get her bowels moving as this can contribute to abd pain as well. *) SOB and pleuritic pain:  - no acute findings on her CT chest.  - agree with CTA in am to r/o PE.  - could be lesion in spleen causing some diaphragm inflamation    Thank you for this kind referral, we will follow along with you. Discussion: Christia Schirmer is a  42y.o. year old seen in consultation at the request of Dr. Castro Campbell for possible splenic infarct. 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  She states she had fever at home yesterday evening and she started having pain in her LUQ that she attributed to constipation, she started having SOB worse with movement as well. She has been on prednisone 20 mg long term, plaquenil, MTX, and was started on Elihue Lamp about 4-5 months ago for her RA. Of note,she has had 3 miscarriages one at 9 weeks, one 6 weeks and one at 12 weeks and was apparently normal morphology. Imagin20 CT A/P:  IMPRESSION:  Multiple hypodensities are seen within the spleen. Differential diagnostic  possibilities include infection, inflammation,  and neoplasia. Labs:    Recent Results (from the past 24 hour(s))   EKG, 12 LEAD, INITIAL    Collection Time: 20  5:47 AM   Result Value Ref Range    Ventricular Rate 115 BPM    Atrial Rate 115 BPM    P-R Interval 146 ms    QRS Duration 76 ms    Q-T Interval 316 ms    QTC Calculation (Bezet) 437 ms    Calculated P Axis 18 degrees    Calculated R Axis 63 degrees    Calculated T Axis 6 degrees    Diagnosis       Sinus tachycardia  Nonspecific ST abnormality    When compared with ECG of 2017 11:08,  No significant change was found  Confirmed by Boby Chau ([id number]) on 2020 8:59:23 AM     CBC WITH AUTOMATED DIFF    Collection Time: 20  5:57 AM   Result Value Ref Range    WBC 10.8 3.6 - 11.0 K/uL    RBC 4.00 3.80 - 5.20 M/uL    HGB 12.5 11.5 - 16.0 g/dL    HCT 37.1 35.0 - 47.0 %    MCV 92.8 80.0 - 99.0 FL    MCH 31.3 26.0 - 34.0 PG    MCHC 33.7 30.0 - 36.5 g/dL    RDW 13.4 11.5 - 14.5 %    PLATELET 968 376 - 046 K/uL    MPV 8.8 (L) 8.9 - 12.9 FL    NRBC 0.0 0  WBC    ABSOLUTE NRBC 0.00 0.00 - 0.01 K/uL    NEUTROPHILS 74 32 - 75 %    LYMPHOCYTES 20 12 - 49 %    MONOCYTES 5 5 - 13 %    EOSINOPHILS 0 0 - 7 %    BASOPHILS 0 0 - 1 %    IMMATURE GRANULOCYTES 1 (H) 0.0 - 0.5 %    ABS. NEUTROPHILS 8.0 1.8 - 8.0 K/UL    ABS. LYMPHOCYTES 2.1 0.8 - 3.5 K/UL    ABS. MONOCYTES 0.5 0.0 - 1.0 K/UL    ABS. EOSINOPHILS 0.0 0.0 - 0.4 K/UL    ABS.  BASOPHILS 0.0 0.0 - 0.1 K/UL    ABS. IMM. GRANS. 0.1 (H) 0.00 - 0.04 K/UL    DF AUTOMATED     METABOLIC PANEL, COMPREHENSIVE    Collection Time: 05/14/20  5:57 AM   Result Value Ref Range    Sodium 135 (L) 136 - 145 mmol/L    Potassium 3.6 3.5 - 5.1 mmol/L    Chloride 99 97 - 108 mmol/L    CO2 27 21 - 32 mmol/L    Anion gap 9 5 - 15 mmol/L    Glucose 92 65 - 100 mg/dL    BUN 10 6 - 20 MG/DL    Creatinine 1.28 (H) 0.55 - 1.02 MG/DL    BUN/Creatinine ratio 8 (L) 12 - 20      GFR est AA 57 (L) >60 ml/min/1.73m2    GFR est non-AA 47 (L) >60 ml/min/1.73m2    Calcium 8.8 8.5 - 10.1 MG/DL    Bilirubin, total 0.6 0.2 - 1.0 MG/DL    ALT (SGPT) 25 12 - 78 U/L    AST (SGOT) 16 15 - 37 U/L    Alk. phosphatase 89 45 - 117 U/L    Protein, total 8.3 (H) 6.4 - 8.2 g/dL    Albumin 3.5 3.5 - 5.0 g/dL    Globulin 4.8 (H) 2.0 - 4.0 g/dL    A-G Ratio 0.7 (L) 1.1 - 2.2     LACTIC ACID    Collection Time: 05/14/20  5:57 AM   Result Value Ref Range    Lactic acid 1.9 0.4 - 2.0 MMOL/L   CULTURE, BLOOD    Collection Time: 05/14/20  5:57 AM   Result Value Ref Range    Special Requests: NO SPECIAL REQUESTS      Culture result: NO GROWTH AFTER 1 HOUR     SAMPLES BEING HELD    Collection Time: 05/14/20  5:57 AM   Result Value Ref Range    SAMPLES BEING HELD  1 BLUE, 1RD     COMMENT        Add-on orders for these samples will be processed based on acceptable specimen integrity and analyte stability, which may vary by analyte.    URINALYSIS W/ REFLEX CULTURE    Collection Time: 05/14/20  7:20 AM   Result Value Ref Range    Color YELLOW/STRAW      Appearance CLEAR CLEAR      Specific gravity 1.020 1.003 - 1.030      pH (UA) 8.0 5.0 - 8.0      Protein TRACE (A) NEG mg/dL    Glucose Negative NEG mg/dL    Ketone Negative NEG mg/dL    Bilirubin Negative NEG      Blood Negative NEG      Urobilinogen 0.2 0.2 - 1.0 EU/dL    Nitrites Negative NEG      Leukocyte Esterase Negative NEG      WBC 10-20 0 - 4 /hpf    RBC 10-20 0 - 5 /hpf    Epithelial cells MODERATE (A) FEW /lpf    Bacteria 2+ (A) NEG /hpf    UA:UC IF INDICATED URINE CULTURE ORDERED (A) CNI      Other: Renal Epithelial cells Present     SARS-COV-2    Collection Time: 05/14/20  9:09 AM   Result Value Ref Range    Specimen source Nasopharyngeal      SARS-CoV-2 PENDING     Specimen source Nasopharyngeal     D DIMER    Collection Time: 05/14/20  9:42 AM   Result Value Ref Range    D-dimer 0.63 0.00 - 0.65 mg/L FEU   CRP, HIGH SENSITIVITY    Collection Time: 05/14/20  9:42 AM   Result Value Ref Range    CRP, High sensitivity >9.5 mg/L   SED RATE (ESR)    Collection Time: 05/14/20  9:42 AM   Result Value Ref Range    Sed rate, automated 18 0 - 20 mm/hr   PROCALCITONIN    Collection Time: 05/14/20  9:42 AM   Result Value Ref Range    Procalcitonin <0.05 ng/mL   FERRITIN    Collection Time: 05/14/20  9:42 AM   Result Value Ref Range    Ferritin 85 8 - 252 NG/ML   GLUCOSE, POC    Collection Time: 05/14/20 11:17 AM   Result Value Ref Range    Glucose (POC) 74 65 - 100 mg/dL    Performed by Moisés Donahue (RN)        History:  Past Medical History:   Diagnosis Date    Acquired hypothyroidism     Adverse effect of anesthesia     labile BP during/after C section    Asthma     Broken bones     history of 9 broken bones    Chronic UTI (urinary tract infection)     \"extra valve right kidney causes UTI\"    Fibromyalgia     Gestational diabetes     GI bleed 2007,2011,2015,2016    lower GI last colonoscopy in 2016 normal     Huntingtons chorea (HCC)     Hyperhydrosis disorder     Ill-defined condition     [place]/ tested genetically - daughter has [place]    Infertility     PCOS    PCOS (polycystic ovarian syndrome)     Precancerous skin lesion     removed from Right shoulder    Preeclampsia 2011    pregnancy    Reactive airway disease     Rheumatoid arthritis ([address]     SVT (supraventricular tachycardia) ([address] 2011    occured during pregnancy when picc line inserted.       Past Surgical History:   Procedure Laterality Date    COLONOSCOPY N/A 6/13/2016    [facility] ENDOSCOPY    HX HEENT      HX WISDOM TEETH EXTRACTION  2004    UPPER GI ENDOSCOPY,BIOPSY  2/12/2019         UPPER GI ENDOSCOPY,DILATN W GUIDE  2/12/2019           Prior to Admission medications    Medication Sig Start Date End Date Taking? Authorizing Provider   diphenhydrAMINE (BenadryL) 25 mg capsule Take 25 mg by mouth every six (6) hours as needed. Yes Other, MD Victoria   BORIC ACID 600 mg by Does Not Apply route daily. Vaginal   Yes Other, MD Victoria   predniSONE (DELTASONE) 20 mg tablet Take 20 mg by mouth daily. Yes Other, MD Victoria   norethindrone-ethinyl estradiol (Junel FE 1/20, 28,) 1 mg-20 mcg (21)/75 mg (7) tab Take  by mouth. Yes Other, MD Victoria   nitrofurantoin (MACRODANTIN) 50 mg capsule Take 1 Cap by mouth daily as needed (post coital). 4/27/20  Yes Appkirsten Bedoya MD   lisdexamfetamine (Vyvanse) 30 mg capsule Take 1 Cap by mouth every morning. Max Daily Amount: 30 mg. 4/27/20  Yes Vivienne Negron MD   escitalopram oxalate (LEXAPRO) 20 mg tablet TAKE 1 TABLET BY MOUTH EVERY DAY 3/26/20  Yes Vivienne Negron MD   tofacitinib citrate (XELJANZ XR PO) Take 30 mg by mouth daily. Yes Provider, Historical   miconazole nitrate 200 mg/5 gram (4 %) vaginal cream Apply to affected area as needed once a day 3/14/20  Yes Manjeet Dominguez MD   PREVIDENT 5000 SENSITIVE 1.1-5 % pste USE AS DIRECTED TWICE A DAY SPIT OUT EXCESS AND DO NOT RINSE, EAT OR DRINK FOR 30 MINUTES 2/7/20  Yes Provider, Historical   hydroxychloroquine (PLAQUENIL) 200 mg tablet 200 mg daily. 12/23/19  Yes Provider, Historical   lidocaine-prilocaine (EMLA) topical cream For port flushing every 3 month 11/25/19  Yes Provider, Historical   heparin sodium,porcine (HEPARIN LOCK FLUSH, PORCINE, IV) by IntraVENous route.  For port flush every 3 months   Yes Provider, Historical   omeprazole (PRILOSEC) 40 mg capsule Take 1 Cap by mouth daily. 9/28/19  Yes Breann Whiteside MD   Cetirizine (ZYRTEC) 10 mg cap Take 10 mg by mouth daily. Indications: inflammation of the nose due to an allergy 9/28/19  Yes Breann Whiteside MD   albuterol (PROVENTIL HFA, VENTOLIN HFA, PROAIR HFA) 90 mcg/actuation inhaler Take 2 Puffs by inhalation every four (4) hours as needed for Wheezing. 6/9/19  Yes Breann Whiteside MD   omalizumab Lauren Marques SC) by SubCUTAneous route every thirty (30) days. Given by Dr Dorene Muse in his office   Yes Provider, Historical   propranolol LA (INDERAL LA) 60 mg SR capsule TAKE 1 CAPSULE BY MOUTH EVERY DAY 5/17/19  Yes Sammy Cody DO   SYMBICORT 160-4.5 mcg/actuation HFAA TAKE 2 PUFFS BY MOUTH TWICE A DAY 3/28/19  Yes Provider, Historical   OZEMPIC 0.25 mg or 0.5 mg(2 mg/1.5 mL) sub-q pen by SubCUTAneous route every seven (7) days. Patient states she takes 1 ml every friday 4/21/19  Yes Provider, Historical   ondansetron hcl (ZOFRAN) 4 mg tablet Take 4 mg by mouth every eight (8) hours as needed for Nausea. Yes Provider, Historical   naltrexone (DEPADE) 50 mg tablet Take  by mouth daily. Yes Provider, Historical   EPIPEN 2-SANJAY 0.3 mg/0.3 mL injection 1 (ONE) INJECTION AS NEEDED 4/30/19  Yes Provider, Historical   buPROPion XL (WELLBUTRIN XL) 150 mg tablet Take 1 Tab by mouth every morning. 5/16/19  Yes Appa Vivienne Gonzalez MD   montelukast (SINGULAIR) 10 mg tablet Take 1 Tab by mouth daily. 5/16/19  Yes Appa Delsie Harada, MD   traMADol (ULTRAM) 50 mg tablet Take 50 mg by mouth every six (6) hours as needed for Pain. Yes Provider, Historical   oxybutynin (DITROPAN) 5 mg tablet 5 mg two (2) times a day. Patient states she takes 1 tablet bid 10/2/18  Yes Provider, Historical   BD INSULIN SYRINGE 1 mL 25 gauge x 5/8\" syrg USE 1 SYRINGE ONCE A WEEK 8/24/18  Yes Provider, Historical   cyanocobalamin (Vitamin B-12) 1,000 mcg tablet Take  by mouth daily.    Yes Provider, Historical   RESTASIS 0.05 % ophthalmic emulsion INSTILL 1 DROP INTO EACH EYE TWICE DAILY 10/6/17  Yes Provider, Historical   SAFETY-ABDI TB SYR 1CC/25GX5/8\" 1 mL 25 gauge x 5/8\" syrg USE TO INJECT METHOTREXATE ONCE A WEEK 12/1/17  Yes Provider, Historical   methotrexate 25 mg/mL chemo injection by SubCUTAneous route every seven (7) days. On friday 4/7/17  Yes Provider, Historical   metFORMIN ER (GLUCOPHAGE XR) 500 mg tablet 500 mg two (2) times a day. 11/22/16  Yes Provider, Historical   folic acid (FOLVITE) 1 mg tablet TAKE 1 TABLET ORALLY DAILY 11/29/16  Yes Provider, Historical   desogestrel-ethinyl estradiol (DESOGEN) 0.15-0.03 mg tab Take 1 Tab by mouth nightly. Yes Provider, Historical   Nebulizer & Compressor machine 1 Each by Does Not Apply route three (3) times daily as needed. 12/10/12  Yes Job Foots, MD   glycopyrrolate (ROBINUL) 1 mg tablet Take 2 mg by mouth two (2) times a day. Indications: hyperhydrosis   Yes Provider, Historical   cholecalciferol (Vitamin D3) (1000 Units /25 mcg) tablet Take 1 Tab by mouth daily. Yes Provider, Historical   levothyroxine (SYNTHROID) 75 mcg tablet Take 75 mcg by mouth Daily (before breakfast).    Yes Provider, Historical   BD SYRINGE 1 mL 25 gauge x 5/8\" syrg 1 SYRINGE ONCE A WEEK 11/28/19   Provider, Historical     Allergies   Allergen Reactions    Latex Swelling    Rituxan [Rituximab] Anaphylaxis    Entex [Phenylephrine-Guaifenesin] Anaphylaxis, Hives and Palpitations    Mucinex [Guaifenesin] Anaphylaxis and Hives    Pepto-Bismol [Bismuth Subsalicylate] Nausea and Vomiting      Social History     Tobacco Use    Smoking status: Never Smoker    Smokeless tobacco: Never Used   Substance Use Topics    Alcohol use: Yes     Comment: few drinks per year      Family History   Problem Relation Age of Onset    Other Mother         Chris's disease    Hypertension Mother     Dementia Mother     High Cholesterol Father     Heart Disease Father     Diabetes Father     Hypertension Father     Asthma Brother     Diabetes Brother     Other Brother         varicocele, hernias    Hypertension Brother     Alcohol abuse Brother     Hypertension Maternal Grandmother     Elevated Lipids Maternal Grandmother     Cancer Maternal Grandmother         breast    Other Maternal Grandmother         polymyalgia rheumatica    Glaucoma Maternal Grandmother     Cancer Maternal Grandfather         prostate    Huntingtons disease Maternal Grandfather     Cancer Paternal Grandmother         lung with mets    Cancer Paternal Grandfather         prostate, colon    Diabetes Paternal Grandfather     Heart Disease Paternal Grandfather     Alzheimer Paternal Grandfather     Dementia Paternal Grandfather         Current Medications:  Current Facility-Administered Medications   Medication Dose Route Frequency    sodium chloride (NS) flush 5-10 mL  5-10 mL IntraVENous PRN    acetaminophen (TYLENOL) tablet 650 mg  650 mg Oral Q6H PRN    buPROPion XL (WELLBUTRIN XL) tablet 150 mg  150 mg Oral 7am    cetirizine (ZYRTEC) tablet 10 mg  10 mg Oral DAILY    escitalopram oxalate (LEXAPRO) tablet 20 mg  20 mg Oral DAILY    folic acid (FOLVITE) tablet 1 mg  1 mg Oral DAILY    [START ON 5/15/2020] levothyroxine (SYNTHROID) tablet 75 mcg  75 mcg Oral ACB    montelukast (SINGULAIR) tablet 10 mg  10 mg Oral DAILY    [START ON 5/15/2020] pantoprazole (PROTONIX) tablet 40 mg  40 mg Oral ACB    oxybutynin (DITROPAN) tablet 5 mg  5 mg Oral BID    propranolol LA (INDERAL LA) capsule 60 mg  60 mg Oral DAILY    traMADoL (ULTRAM) tablet 50 mg  50 mg Oral Q6H PRN    sodium chloride (NS) flush 5-40 mL  5-40 mL IntraVENous Q8H    sodium chloride (NS) flush 5-40 mL  5-40 mL IntraVENous PRN    acetaminophen (TYLENOL) tablet 650 mg  650 mg Oral Q6H PRN    ondansetron (ZOFRAN) injection 4 mg  4 mg IntraVENous Q6H PRN    docusate sodium (COLACE) capsule 100 mg  100 mg Oral DAILY PRN    heparin (porcine) injection 5,000 Units  5,000 Units SubCUTAneous Q8H    0.9% sodium chloride infusion  100 mL/hr IntraVENous CONTINUOUS    [START ON 5/15/2020] cefTRIAXone (ROCEPHIN) 2 g in 0.9% sodium chloride (MBP/ADV) 50 mL  2 g IntraVENous Q24H    metroNIDAZOLE (FLAGYL) IVPB premix 500 mg  500 mg IntraVENous Q8H    hydroxychloroquine (PLAQUENIL) tablet 200 mg  200 mg Oral DAILY    methylPREDNISolone (PF) (SOLU-MEDROL) injection 40 mg  40 mg IntraVENous Q8H    insulin lispro (HUMALOG) injection   SubCUTAneous AC&HS    glucose chewable tablet 16 g  4 Tab Oral PRN    dextrose (D50W) injection syrg 12.5-25 g  12.5-25 g IntraVENous PRN    glucagon (GLUCAGEN) injection 1 mg  1 mg IntraMUSCular PRN    fluticasone-vilanterol (BREO ELLIPTA) 200mcg-25mcg/puff  1 Puff Inhalation DAILY    albuterol (PROVENTIL HFA, VENTOLIN HFA, PROAIR HFA) inhaler 1 Puff  1 Puff Inhalation Q6H RT         Review of Systems:12 point obtained and negative other than stated in HPI    Physical Exam:  Constitutional Alert, cooperative, oriented. Mood and affect appropriate. Appears close to chronological age. Well nourished. Well developed. Head Normocephalic; no scars   Eyes Conjunctivae and sclerae are clear and without icterus. Pupils are reactive and equal.   ENMT Sinuses are nontender. No oral exudates, ulcers, masses, thrush or mucositis. Oropharynx clear. Tongue normal.   Neck Supple without masses or thyromegaly. No jugular venous distension. Hematologic/Lymphatic No petechiae or purpura. No tender or palpable lymph nodes in the cervical, supraclavicular, axillary or inguinal area. Respiratory Lungs are clear to auscultation without rhonchi or wheezing. Cardiovascular Regular rate and rhythm of heart without murmurs, gallops or rubs. Chest / Line Site Chest is symmetric with no chest wall deformities. Abdomen Tender to deep palpation to LUQ, no rebound   Musculoskeletal No tenderness or swelling, normal range of motion without obvious weakness.    Extremities No visible deformities, no cyanosis, clubbing or edema. Skin No rashes, scars, or lesions suggestive of malignancy. No petechiae, purpura, or ecchymoses. No excoriations. Neurologic No sensory or motor deficits   Psychiatric Alert and oriented times three. Coherent speech. Verbalizes understanding of our discussions today.

## 2020-05-14 NOTE — H&P
Hospitalist Admission Note    NAME: Lindsey Braun   :  1980   MRN:  [id number]     Date/Time:  2020 10:22 AM    Patient PCP: Shaw Hurst MD  ________________________________________________________________________    My assessment of this patient's clinical condition and my plan of care is as follows. Assessment / Plan:  Possible splenic infarction versus inflammation/infection  Sepsis due to above  -Patient had a SARS-CoV-2 test negative about 2 weeks ago  -He is presenting with left lower quadrant abdominal pain since the last 2 days and was noted to have possible hypodensities in the spleen consistent with infection, inflammation, neoplasia and infarction  -She was febrile with temperature of 101 and was also tachycardic on arrival  -She continues to report exertional shortness of breath along with cough and small amount but chest x-ray shows no acute cardiopulmonary process  -Given sepsis, will start empiric antibiotics with ceftriaxone and Flagyl. Check blood cultures. Consult hematology due to suspicion of splenic infarction  -She may need a hypercoagulable panel but will defer to oncology. 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  -Lactic acid is within normal limits  -Will need to rule out the possibility of pulmonary embolism as well. She did get contrast this morning so we will hydrate with normal saline and check a CT scan in a.m. tomorrow. Check d-dimer level. History of rheumatoid arthritis  -Hold methotrexate and Xeljanz  -On prednisone chronically at home so will start stress dose of steroids with solumedrol     Diabetes mellitus type 2  -Hold home metformin. Start insulin sliding scale with blood sugar checks.  Consider adding NPH to steroids if blood sugars go up    History of depression  History of GERD  Hypothyroidism  Hypertension  -Continue home Wellbutrin, Lexapro, levothyroxine and Protonix  -Continue home propranolol    History of hyperreactive airway disease  -Continue home albuterol and Symbicort. Consider adding steroids if she develops any wheezing    Consult pharmacy for medication reconciliation since list is not accurate          Code Status: Full code  Surrogate Decision Maker:     DVT Prophylaxis: Heparin      Baseline: From home independent        Subjective:   CHIEF COMPLAINT: Left lower quadrant pain    HISTORY OF PRESENT ILLNESS:     Noah Oh is a 42 y. o.female who presents with past medical history of rheumatoid arthritis, diabetes mellitus is coming to the hospital chief complaints of left lower quadrant pain. Patient reports being in his usual health until about 2 weeks ago when she started having some shortness of breath along with cough and phlegm and was tested for SARS-CoV-2 and was negative. Since then she continued to have small amount of cough with phlegm. She also continues to have shortness of breath. She reports that the last 2 days she developed sudden onset of left quadrant pain which is sharp, 4 x 10, radiating to the back, aggravated with deep breathing. She also reports exertional shortness of breath. She also reports fever at home. Denies nausea, vomiting, diarrhea or constipation. Denies focal weakness, tingling or numbness. On arrival to the hospital, she was tachycardic and also febrile. On lab work she was noted to have normal CBC. Creatinine was 1.28. She had a CT abdomen in the emergency department which showed evidence of multiple hypodensities in the spleen suggesting infection/inflammation or neoplasia. Chest x-ray was within normal limits. She was given antibiotics in the emergency department.     We were asked to admit for work up and evaluation of the above problems. Past Medical History:   Diagnosis Date    Acquired hypothyroidism     Adverse effect of anesthesia     labile BP during/after C section    Asthma     Broken bones     history of 9 broken bones    Chronic UTI (urinary tract infection)     \"extra valve right kidney causes UTI\"    Fibromyalgia     Gestational diabetes     GI bleed 2007,2011,2015,2016    lower GI last colonoscopy in 2016 normal     Huntingtons chorea (HCC)     Hyperhydrosis disorder     Ill-defined condition     [place]/ tested genetically - daughter has [place]    Infertility     PCOS    PCOS (polycystic ovarian syndrome)     Precancerous skin lesion     removed from Right shoulder    Preeclampsia 2011    pregnancy    Reactive airway disease     Rheumatoid arthritis (HCC)     SVT (supraventricular tachycardia) ([address] 2011    occured during pregnancy when picc line inserted.         Past Surgical History:   Procedure Laterality Date    COLONOSCOPY N/A 6/13/2016    COL[facility] Ivette [facility] ENDOSCOPY    HX HEENT      HX [id number] [facility]  2004    UPPER GI ENDOSCOPY,BIOPSY  2/12/2019         UPPER GI ENDOSCOPY,DILATN W GUIDE  2/12/2019            Social History     Tobacco Use    Smoking status: Never Smoker    Smokeless tobacco: Never Used   Substance Use Topics    Alcohol use: Yes     Comment: few drinks per year        Family History   Problem Relation Age of Onset    Other Mother         [place]'s disease    Hypertension Mother     Dementia Mother     High Cholesterol Father     Heart Disease Father     Diabetes Father     Hypertension Father     Asthma Brother     Diabetes Brother     Other Brother         varicocele, hernias    Hypertension Brother     Alcohol abuse Brother     Hypertension Maternal Grandmother     Elevated Lipids Maternal Grandmother     Cancer Maternal Grandmother         breast    Other Maternal Grandmother         polymyalgia rheumatica    Glaucoma Maternal Grandmother     Cancer Maternal Grandfather         prostate    Huntingtons disease Maternal Grandfather     Cancer Paternal Grandmother         lung with mets    Cancer Paternal Grandfather         prostate, colon    Diabetes Paternal Grandfather     Heart Disease Paternal Grandfather     Alzheimer Paternal Grandfather     Dementia Paternal Grandfather      Allergies   Allergen Reactions    Latex Swelling    Rituxan [Rituximab] Anaphylaxis    Entex [Phenylephrine-Guaifenesin] Anaphylaxis, Hives and Palpitations    Mucinex [Guaifenesin] Anaphylaxis and Hives    Pepto-Bismol [Bismuth Subsalicylate] Nausea and Vomiting        Prior to Admission medications    Medication Sig Start Date End Date Taking? Authorizing Provider   diphenhydrAMINE (BenadryL) 25 mg capsule Take 25 mg by mouth every six (6) hours as needed. Yes Other, MD Victoria   BORIC ACID 600 mg by Does Not Apply route daily. Vaginal   Yes Other, MD Victoria   predniSONE (DELTASONE) 20 mg tablet Take 20 mg by mouth daily. Yes Other, MD Victoria   norethindrone-ethinyl estradiol (Junel FE 1/20, 28,) 1 mg-20 mcg (21)/75 mg (7) tab Take  by mouth. Yes Other, MD Victoria   nitrofurantoin (MACRODANTIN) 50 mg capsule Take 1 Cap by mouth daily as needed (post coital). 4/27/20  Yes Appkirsten Kolb MD   lisdexamfetamine (Vyvanse) 30 mg capsule Take 1 Cap by mouth every morning. Max Daily Amount: 30 mg. 4/27/20  Yes Vivienne Negron MD   escitalopram oxalate (LEXAPRO) 20 mg tablet TAKE 1 TABLET BY MOUTH EVERY DAY 3/26/20  Yes Vivienne Negron MD   tofacitinib citrate (XELJANZ XR PO) Take 30 mg by mouth daily.    Yes Provider, Historical   miconazole nitrate 200 mg/5 gram (4 %) vaginal cream Apply to affected area as needed once a day 3/14/20  Yes Jake Pedroza MD   PREVIDENT 5000 SENSITIVE 1.1-5 % pste USE AS DIRECTED TWICE A DAY SPIT OUT EXCESS AND DO NOT RINSE, EAT OR DRINK FOR 30 MINUTES 2/7/20  Yes Provider, Historical   hydroxychloroquine (PLAQUENIL) 200 mg tablet 200 mg daily. 12/23/19  Yes Provider, Historical   lidocaine-prilocaine (EMLA) topical cream For port flushing every 3 month 11/25/19  Yes Provider, Historical   heparin sodium,porcine (HEPARIN LOCK FLUSH, PORCINE, IV) by IntraVENous route. For port flush every 3 months   Yes Provider, Historical   omeprazole (PRILOSEC) 40 mg capsule Take 1 Cap by mouth daily. 9/28/19  Yes Andrew Pascual MD   Cetirizine (ZYRTEC) 10 mg cap Take 10 mg by mouth daily. Indications: inflammation of the nose due to an allergy 9/28/19  Yes Andrew Pascual MD   albuterol (PROVENTIL HFA, VENTOLIN HFA, PROAIR HFA) 90 mcg/actuation inhaler Take 2 Puffs by inhalation every four (4) hours as needed for Wheezing. 6/9/19  Yes Andrew Pascual MD   omalizumab [facility]) by SubCUTAneous route every thirty (30) days. Given by Dr Glo Balbuena in his office   Yes Provider, Historical   propranolol LA (INDERAL LA) 60 mg SR capsule TAKE 1 CAPSULE BY MOUTH EVERY DAY 5/17/19  Yes Caren Seymour DO   SYMBICORT 160-4.5 mcg/actuation HFAA TAKE 2 PUFFS BY MOUTH TWICE A DAY 3/28/19  Yes Provider, Historical   OZEMPIC 0.25 mg or 0.5 mg(2 mg/1.5 mL) sub-q pen by SubCUTAneous route every seven (7) days. Patient states she takes 1 ml every friday 4/21/19  Yes Provider, Historical   ondansetron hcl (ZOFRAN) 4 mg tablet Take 4 mg by mouth every eight (8) hours as needed for Nausea. Yes Provider, Historical   naltrexone (DEPADE) 50 mg tablet Take  by mouth daily. Yes Provider, Historical   EPIPEN 2-SANJAY 0.3 mg/0.3 mL injection 1 (ONE) INJECTION AS NEEDED 4/30/19  Yes Provider, Historical   buPROPion XL (WELLBUTRIN XL) 150 mg tablet Take 1 Tab by mouth every morning. 5/16/19  Yes Appa Vivienne Gonzalez MD   montelukast (SINGULAIR) 10 mg tablet Take 1 Tab by mouth daily. 5/16/19  Yes Appa Jai Hawley MD   traMADol (ULTRAM) 50 mg tablet Take 50 mg by mouth every six (6) hours as needed for Pain.    Yes Provider, Historical   oxybutynin (DITROPAN) 5 mg tablet 5 mg two (2) times a day. Patient states she takes 1 tablet bid 10/2/18  Yes Provider, Historical   BD INSULIN SYRINGE 1 mL 25 gauge x 5/8\" syrg USE 1 SYRINGE ONCE A WEEK 8/24/18  Yes Provider, Historical   cyanocobalamin (Vitamin B-12) 1,000 mcg tablet Take  by mouth daily. Yes Provider, Historical   RESTASIS 0.05 % ophthalmic emulsion INSTILL 1 DROP INTO EACH EYE TWICE DAILY 10/6/17  Yes Provider, Historical   SAFETY-ABDI TB SYR 1CC/25GX5/8\" 1 mL 25 gauge x 5/8\" syrg USE TO INJECT METHOTREXATE ONCE A WEEK 12/1/17  Yes Provider, Historical   methotrexate 25 mg/mL chemo injection by SubCUTAneous route every seven (7) days. On friday 4/7/17  Yes Provider, Historical   metFORMIN ER (GLUCOPHAGE XR) 500 mg tablet 500 mg two (2) times a day. 11/22/16  Yes Provider, Historical   folic acid (FOLVITE) 1 mg tablet TAKE 1 TABLET ORALLY DAILY 11/29/16  Yes Provider, Historical   desogestrel-ethinyl estradiol (DESOGEN) 0.15-0.03 mg tab Take 1 Tab by mouth nightly. Yes Provider, Historical   Nebulizer & Compressor machine 1 Each by Does Not Apply route three (3) times daily as needed. 12/10/12  Yes Sherald Spurling, MD   glycopyrrolate (ROBINUL) 1 mg tablet Take 2 mg by mouth two (2) times a day. Indications: hyperhydrosis   Yes Provider, Historical   cholecalciferol (Vitamin D3) (1000 Units /25 mcg) tablet Take 1 Tab by mouth daily. Yes Provider, Historical   levothyroxine (SYNTHROID) 75 mcg tablet Take 75 mcg by mouth Daily (before breakfast). Yes Provider, Historical   BD SYRINGE 1 mL 25 gauge x 5/8\" syrg 1 SYRINGE ONCE A WEEK 11/28/19   Provider, Historical       REVIEW OF SYSTEMS:     I am not able to complete the review of systems because:    The patient is intubated and sedated    The patient has altered mental status due to his acute medical problems    The patient has baseline aphasia from prior stroke(s)    The patient has baseline dementia and is not reliable historian    The patient is in acute medical distress and unable to provide information           Total of 12 systems reviewed as follows:       POSITIVE= underlined text  Negative = text not underlined  General:  fever, chills, sweats, generalized weakness, weight loss/gain,      loss of appetite   Eyes:    blurred vision, eye pain, loss of vision, double vision  ENT:    rhinorrhea, pharyngitis   Respiratory:   cough, sputum production, SOB, MENDOZA, wheezing, pleuritic pain   Cardiology:   chest pain, palpitations, orthopnea, PND, edema, syncope   Gastrointestinal:  abdominal pain , N/V, diarrhea, dysphagia, constipation, bleeding   Genitourinary:  frequency, urgency, dysuria, hematuria, incontinence   Muskuloskeletal :  arthralgia, myalgia, back pain  Hematology:  easy bruising, nose or gum bleeding, lymphadenopathy   Dermatological: rash, ulceration, pruritis, color change / jaundice  Endocrine:   hot flashes or polydipsia   Neurological:  headache, dizziness, confusion, focal weakness, paresthesia,     Speech difficulties, memory loss, gait difficulty  Psychological: Feelings of anxiety, depression, agitation    Objective:   VITALS:    Visit Vitals  /71   Pulse 94   Temp (!) 100.5 °F (38.1 °C)   Resp 14   Ht 5' 6.5\" (1.689 m)   Wt 94 kg (207 lb 3.7 oz)   SpO2 98%   BMI 32.95 kg/m²       PHYSICAL EXAM:    General:    Alert, cooperative, no distress, appears stated age. HEENT: Atraumatic, anicteric sclerae, pink conjunctivae     No oral ulcers, mucosa moist  Neck:  Supple, symmetrical,  thyroid: non tender  Lungs:   Clear to auscultation bilaterally. No Wheezing or Rhonchi. No rales. Chest wall:  No tenderness  No Accessory muscle use. Heart:   Regular  rhythm,  No  murmur   No edema  Abdomen:   Soft, left lower quadrant tenderness, no guarding or rigidity  Extremities: No cyanosis. No clubbing,      Skin turgor normal, Capillary refill normal, Radial dial pulse 2+  Skin:     Not pale.   Not Jaundiced  No rashes   Psych:  Not anxious or agitated. Neurologic: EOMs intact. No facial asymmetry. No aphasia or slurred speech. Symmetrical strength, Sensation grossly intact. Alert and oriented X 4.     _______________________________________________________________________  Care Plan discussed with:    Comments   Patient y    Family      RN y    Care Manager                    Consultant:      _______________________________________________________________________  Expected  Disposition:   Home with Family y   HH/PT/OT/RN    SNF/LTC    NADIA    ________________________________________________________________________  TOTAL TIME:  61 Minutes    Critical Care Provided     Minutes non procedure based      Comments    y Reviewed previous records   >50% of visit spent in counseling and coordination of care y Discussion with patient and/or family and questions answered       ________________________________________________________________________  Signed: Laurel Goodpasture, MD    Procedures: see electronic medical records for all procedures/Xrays and details which were not copied into this note but were reviewed prior to creation of Plan.     LAB DATA REVIEWED:    Recent Results (from the past 24 hour(s))   EKG, 12 LEAD, INITIAL    Collection Time: 05/14/20  5:47 AM   Result Value Ref Range    Ventricular Rate 115 BPM    Atrial Rate 115 BPM    P-R Interval 146 ms    QRS Duration 76 ms    Q-T Interval 316 ms    QTC Calculation (Bezet) 437 ms    Calculated P Axis 18 degrees    Calculated R Axis 63 degrees    Calculated T Axis 6 degrees    Diagnosis       Sinus tachycardia  Nonspecific ST abnormality    When compared with ECG of 16-APR-2017 11:08,  No significant change was found  Confirmed by Lamin Magaña ([id number]) on 5/14/2020 8:59:23 AM     CBC WITH AUTOMATED DIFF    Collection Time: 05/14/20  5:57 AM   Result Value Ref Range    WBC 10.8 3.6 - 11.0 K/uL    RBC 4.00 3.80 - 5.20 M/uL    HGB 12.5 11.5 - 16.0 g/dL    HCT 37.1 35.0 - 47.0 %    MCV 92.8 80.0 - 99.0 FL    MCH 31.3 26.0 - 34.0 PG    MCHC 33.7 30.0 - 36.5 g/dL    RDW 13.4 11.5 - 14.5 %    PLATELET 334 741 - 164 K/uL    MPV 8.8 (L) 8.9 - 12.9 FL    NRBC 0.0 0  WBC    ABSOLUTE NRBC 0.00 0.00 - 0.01 K/uL    NEUTROPHILS 74 32 - 75 %    LYMPHOCYTES 20 12 - 49 %    MONOCYTES 5 5 - 13 %    EOSINOPHILS 0 0 - 7 %    BASOPHILS 0 0 - 1 %    IMMATURE GRANULOCYTES 1 (H) 0.0 - 0.5 %    ABS. NEUTROPHILS 8.0 1.8 - 8.0 K/UL    ABS. LYMPHOCYTES 2.1 0.8 - 3.5 K/UL    ABS. MONOCYTES 0.5 0.0 - 1.0 K/UL    ABS. EOSINOPHILS 0.0 0.0 - 0.4 K/UL    ABS. BASOPHILS 0.0 0.0 - 0.1 K/UL    ABS. IMM. GRANS. 0.1 (H) 0.00 - 0.04 K/UL    DF AUTOMATED     METABOLIC PANEL, COMPREHENSIVE    Collection Time: 05/14/20  5:57 AM   Result Value Ref Range    Sodium 135 (L) 136 - 145 mmol/L    Potassium 3.6 3.5 - 5.1 mmol/L    Chloride 99 97 - 108 mmol/L    CO2 27 21 - 32 mmol/L    Anion gap 9 5 - 15 mmol/L    Glucose 92 65 - 100 mg/dL    BUN 10 6 - 20 MG/DL    Creatinine 1.28 (H) 0.55 - 1.02 MG/DL    BUN/Creatinine ratio 8 (L) 12 - 20      GFR est AA 57 (L) >60 ml/min/1.73m2    GFR est non-AA 47 (L) >60 ml/min/1.73m2    Calcium 8.8 8.5 - 10.1 MG/DL    Bilirubin, total 0.6 0.2 - 1.0 MG/DL    ALT (SGPT) 25 12 - 78 U/L    AST (SGOT) 16 15 - 37 U/L    Alk.  phosphatase 89 45 - 117 U/L    Protein, total 8.3 (H) 6.4 - 8.2 g/dL    Albumin 3.5 3.5 - 5.0 g/dL    Globulin 4.8 (H) 2.0 - 4.0 g/dL    A-G Ratio 0.7 (L) 1.1 - 2.2     LACTIC ACID    Collection Time: 05/14/20  5:57 AM   Result Value Ref Range    Lactic acid 1.9 0.4 - 2.0 MMOL/L   CULTURE, BLOOD    Collection Time: 05/14/20  5:57 AM   Result Value Ref Range    Special Requests: NO SPECIAL REQUESTS      Culture result: NO GROWTH AFTER 1 HOUR     SAMPLES BEING HELD    Collection Time: 05/14/20  5:57 AM   Result Value Ref Range    SAMPLES BEING HELD  1 BLUE, 1RD     COMMENT        Add-on orders for these samples will be processed based on acceptable specimen integrity and analyte stability, which may vary by analyte.    URINALYSIS W/ REFLEX CULTURE    Collection Time: 05/14/20  7:20 AM   Result Value Ref Range    Color YELLOW/STRAW      Appearance CLEAR CLEAR      Specific gravity 1.020 1.003 - 1.030      pH (UA) 8.0 5.0 - 8.0      Protein TRACE (A) NEG mg/dL    Glucose Negative NEG mg/dL    Ketone Negative NEG mg/dL    Bilirubin Negative NEG      Blood Negative NEG      Urobilinogen 0.2 0.2 - 1.0 EU/dL    Nitrites Negative NEG      Leukocyte Esterase Negative NEG      WBC 10-20 0 - 4 /hpf    RBC 10-20 0 - 5 /hpf    Epithelial cells MODERATE (A) FEW /lpf    Bacteria 2+ (A) NEG /hpf    UA:UC IF INDICATED URINE CULTURE ORDERED (A) CNI      Other: Renal Epithelial cells Present     SARS-COV-2    Collection Time: 05/14/20  9:09 AM   Result Value Ref Range    Specimen source Nasopharyngeal      SARS-CoV-2 PENDING     SARS-CoV-2 PENDING     Specimen source Nasopharyngeal      COVID-19 rapid test PENDING     COVID-19 PENDING NEG   D DIMER    Collection Time: 05/14/20  9:42 AM   Result Value Ref Range    D-dimer 0.63 0.00 - 0.65 mg/L FEU

## 2020-05-14 NOTE — PROGRESS NOTES
Care Manager contacted the patient in follow up outreach. No response and left a brief, discreet voicemail message with my contact information, asking for return call. Will await FU call response. Patient urgently admitted today for LUQ abdominal pain, worsening cough, SOB and fever at 102.3;  Previous COVID testing was negative;  CT abdomen showed multiple hypodensities within the spleen, with differential diagnostic possibilities including infection, inflammation,  and neoplasia;  COVID testing repeated awaiting results;  D-dimer WNL; Patient does have PMH of Rheumatid Arthritis on chronic  immunosuppression (MTX, Prednisone, Chaz Neigh); Noted no BM in 10 days;   Started on IV Flagyl and IV Rocephin in ER:    Noted multiple, recurrent UTIs and other infections like yeast, bronchitis. Bryanna Salazar, etc. over past year;       Will continue to await DC to resume FU outreach with this patient;

## 2020-05-14 NOTE — PROGRESS NOTES
Bedside shift change report given to edgar   (oncoming nurse) by shelli (offgoing nurse). Report included the following information SBAR, Kardex, ED Summary, Intake/Output, MAR and Recent Results.

## 2020-05-14 NOTE — PROGRESS NOTES
Spoke to nursing staff regarding sepsis bundle needs.      Darek NELSON, Wilman Prasad [address]  [phone]

## 2020-05-14 NOTE — PROGRESS NOTES
Nurse Clarification of the Prior to Admission Medication Regimen     The patient was interviewed regarding clarification of the prior to admission medication regimen. The individual(s) listed above were questioned regarding the patient's use of any other inhalers, topical products, over the counter medications, herbal medications, vitamin products or ophthalmic/nasal/otic medication use. Information Obtained From: patient    Recommendations/Findings: The following amendments were made to the patient's active medication list on file at [facility]:     1) Additions:    Prednisone 20 mg po every day   junel FE 1/20 28 qd    2) Removals:    Methotrexate 76/MJ duplicate    3) Changes:   (Old regimen: metformin  mg take 2 tablet bid /New regimen: metformin 500 mg bid)   (Old regimen: naltrexone no dose or time listed /New regimen: Naltrexone 50 mg takes 1/4 tablet qd)   (Old regimen: ditropan 5 mg 1/2 tablet bid /New regimen: increased to 1 tablet bid)   (Old regimen: tofacitinib 11 mg Tb24 no time /New regimen: tofacitinib 30 mg qd)   Added Boric Acid 600 mg vaginal supp   Vitamin B12 1000mg every day   Added: plaquenil 200 mg every day          PTA medication list was corrected to the following:     Prior to Admission Medications   Prescriptions Last Dose Informant Taking? BD INSULIN SYRINGE 1 mL 25 gauge x 5/8\" syrg   Yes   Sig: USE 1 SYRINGE ONCE A WEEK   BD SYRINGE 1 mL 25 gauge x 5/8\" syrg   No   Si SYRINGE ONCE A WEEK   BORIC ACID   Yes   Si mg by Does Not Apply route daily. Vaginal   Cetirizine (ZYRTEC) 10 mg cap   Yes   Sig: Take 10 mg by mouth daily. Indications: inflammation of the nose due to an allergy   EPIPEN 2-SANJAY 0.3 mg/0.3 mL injection   Yes   Si (ONE) INJECTION AS NEEDED   Nebulizer & Compressor machine   Yes   Si Each by Does Not Apply route three (3) times daily as needed.    OZEMPIC 0.25 mg or 0.5 mg(2 mg/1.5 mL) sub-q pen   Yes   Sig: by SubCUTAneous route every seven (7) days. Patient states she takes 1 ml every friday   PREVIDENT 5000 SENSITIVE 1.1-5 % pste   Yes   Sig: USE AS DIRECTED TWICE A DAY SPIT OUT EXCESS AND DO NOT RINSE, EAT OR DRINK FOR 30 MINUTES   RESTASIS 0.05 % ophthalmic emulsion   Yes   Sig: INSTILL 1 DROP INTO EACH EYE TWICE DAILY   SAFETY-ABDI TB SYR 1CC/25GX5/8\" 1 mL 25 gauge x 5/8\" syrg   Yes   Sig: USE TO INJECT METHOTREXATE ONCE A WEEK   SYMBICORT 160-4.5 mcg/actuation HFAA   Yes   Sig: TAKE 2 PUFFS BY MOUTH TWICE A DAY   albuterol (PROVENTIL HFA, VENTOLIN HFA, PROAIR HFA) 90 mcg/actuation inhaler   Yes   Sig: Take 2 Puffs by inhalation every four (4) hours as needed for Wheezing. buPROPion XL (WELLBUTRIN XL) 150 mg tablet   Yes   Sig: Take 1 Tab by mouth every morning. cholecalciferol (Vitamin D3) (1000 Units /25 mcg) tablet   Yes   Sig: Take 1 Tab by mouth daily. cyanocobalamin (Vitamin B-12) 1,000 mcg tablet   Yes   Sig: Take  by mouth daily. desogestrel-ethinyl estradiol (DESOGEN) 0.15-0.03 mg tab   Yes   Sig: Take 1 Tab by mouth nightly. diphenhydrAMINE (BenadryL) 25 mg capsule   Yes   Sig: Take 25 mg by mouth every six (6) hours as needed. escitalopram oxalate (LEXAPRO) 20 mg tablet   Yes   Sig: TAKE 1 TABLET BY MOUTH EVERY DAY   folic acid (FOLVITE) 1 mg tablet   Yes   Sig: TAKE 1 TABLET ORALLY DAILY   glycopyrrolate (ROBINUL) 1 mg tablet   Yes   Sig: Take 2 mg by mouth two (2) times a day. Indications: hyperhydrosis   heparin sodium,porcine (HEPARIN LOCK FLUSH, PORCINE, IV)   Yes   Sig: by IntraVENous route. For port flush every 3 months   hydroxychloroquine (PLAQUENIL) 200 mg tablet   Yes   Si mg daily. levothyroxine (SYNTHROID) 75 mcg tablet   Yes   Sig: Take 75 mcg by mouth Daily (before breakfast). lidocaine-prilocaine (EMLA) topical cream   Yes   Sig: For port flushing every 3 month   lisdexamfetamine (Vyvanse) 30 mg capsule   Yes   Sig: Take 1 Cap by mouth every morning.  Max Daily Amount: 30 mg.   metFORMIN ER (GLUCOPHAGE XR) 500 mg tablet   Yes   Si mg two (2) times a day. methotrexate 25 mg/mL chemo injection   Yes   Sig: by SubCUTAneous route every seven (7) days. On friday   miconazole nitrate 200 mg/5 gram (4 %) vaginal cream   Yes   Sig: Apply to affected area as needed once a day   montelukast (SINGULAIR) 10 mg tablet   Yes   Sig: Take 1 Tab by mouth daily. naltrexone (DEPADE) 50 mg tablet   Yes   Sig: Take  by mouth daily. nitrofurantoin (MACRODANTIN) 50 mg capsule   Yes   Sig: Take 1 Cap by mouth daily as needed (post coital). norethindrone-ethinyl estradiol (Junel FE 1/20, ,) 1 mg-20 mcg ()/75 mg (7) tab   Yes   Sig: Take  by mouth. omalizumab (XOLAIR SC)   Yes   Sig: by SubCUTAneous route every thirty (30) days. Given by Dr Rochelle Alvarado in his office   omeprazole (PRILOSEC) 40 mg capsule   Yes   Sig: Take 1 Cap by mouth daily. ondansetron hcl (ZOFRAN) 4 mg tablet   Yes   Sig: Take 4 mg by mouth every eight (8) hours as needed for Nausea. oxybutynin (DITROPAN) 5 mg tablet   Yes   Si mg two (2) times a day. Patient states she takes 1 tablet bid   predniSONE (DELTASONE) 20 mg tablet   Yes   Sig: Take 20 mg by mouth daily. propranolol LA (INDERAL LA) 60 mg SR capsule   Yes   Sig: TAKE 1 CAPSULE BY MOUTH EVERY DAY   tofacitinib citrate (XELJANZ XR PO)   Yes   Sig: Take 30 mg by mouth daily. traMADol (ULTRAM) 50 mg tablet   Yes   Sig: Take 50 mg by mouth every six (6) hours as needed for Pain.       Facility-Administered Medications: None        Thank you,  [facility]

## 2020-05-14 NOTE — ED PROVIDER NOTES
EMERGENCY DEPARTMENT HISTORY AND PHYSICAL EXAM          Date: 5/14/2020  Patient Name: Riaz Alcala    History of Presenting Illness     Chief Complaint   Patient presents with    Abdominal Pain     Pt has been constipated x 10 days-  Reports chills, RUQ pain (10/10) that radiates to lower back x 2 days. PMHX of SVT. History Provided By: Patient    HPI: Riaz Alcala is a 42 y.o. female, pmhx Jennings chorea, SVT, chronic constipation, chronic UTIs, PCOS, arthritis and fibromyalgia who presents ambulatory to the ED c/o abdominal pain    Patient notes she was feeling better after her last visit here in April when she was diagnosed with a UTI. 10 days ago she noticed that she started to develop constipation. She has been using MiraLAX and Senokot without relief every day. On Tuesday she started to use magnesium citrate but began vomiting after drinking the bottle of medication. Wednesday evening she started with a fever around 8 PM and states she has had constant left upper quadrant pain and nausea since that time. Her last dose of nausea medicine was Zofran Wednesday a.m. which did help her symptoms so she could eat small bites of food yesterday. Currently patient is complaining of 10 out of 10 left upper abdominal pain (appreciate triage note but it is incorrect) that radiates around to her left flank. She describes it as a pressure type pain. PCP: Bo Meier MD    Allergies: multiple  Social Hx: -tobacco, -vaping, rare EtOH, -Illicit Drugs; There are no other complaints, changes, or physical findings at this time.      Current Facility-Administered Medications   Medication Dose Route Frequency Provider Last Rate Last Dose    sodium chloride (NS) flush 5-10 mL  5-10 mL IntraVENous PRN Cesar Yañez MD        acetaminophen (TYLENOL) tablet 650 mg  650 mg Oral Q6H PRN Cesar Yañez MD        buPROPion XL (WELLBUTRIN XL) tablet 150 mg  150 mg Oral 7am Cesar Yañez MD 150 mg at 05/14/20 1044    cetirizine (ZYRTEC) tablet 10 mg  10 mg Oral DAILY Vicki Hutton MD   10 mg at 05/14/20 1037    escitalopram oxalate (LEXAPRO) tablet 20 mg  20 mg Oral DAILY Vicki Hutton MD   20 mg at 75/50/96 0009    folic acid (FOLVITE) tablet 1 mg  1 mg Oral DAILY Vicki Hutton MD   1 mg at 05/14/20 1037    [START ON 5/15/2020] levothyroxine (SYNTHROID) tablet 75 mcg  75 mcg Oral ACB Kylie Vivas MD        montelukast (SINGULAIR) tablet 10 mg  10 mg Oral DAILY Vicki Hutton MD   10 mg at 05/14/20 1044    [START ON 5/15/2020] pantoprazole (PROTONIX) tablet 40 mg  40 mg Oral ACB Vicki Hutton MD        oxybutynin (DITROPAN) tablet 5 mg  5 mg Oral BID Vicki Hutton MD   5 mg at 05/14/20 1713    propranolol LA (INDERAL LA) capsule 60 mg  60 mg Oral DAILY Vicki Hutton MD   60 mg at 05/14/20 1044    traMADoL (ULTRAM) tablet 50 mg  50 mg Oral Q6H PRN Vicki Hutton MD   50 mg at 05/14/20 1713    sodium chloride (NS) flush 5-40 mL  5-40 mL IntraVENous Q8H Vicki Hutton MD   10 mL at 05/14/20 1348    sodium chloride (NS) flush 5-40 mL  5-40 mL IntraVENous PRN Vicki Hutton MD        acetaminophen (TYLENOL) tablet 650 mg  650 mg Oral Q6H PRN Vicki Hutton MD        ondansetron [facility]) injection 4 mg  4 mg IntraVENous Q6H PRN Vicki Hutton MD        docusate sodium (COLACE) capsule 100 mg  100 mg Oral DAILY PRN Vicki Hutton MD        heparin (porcine) injection 5,000 Units  5,000 Units SubCUTAneous Q8H Vicki Hutton MD   5,000 Units at 05/14/20 1347    0.9% sodium chloride infusion  100 mL/hr IntraVENous CONTINUOUS Manette Salmons K,  mL/hr at 05/14/20 1035 100 mL/hr at 05/14/20 1035    [START ON 5/15/2020] cefTRIAXone (ROCEPHIN) 2 g in 0.9% sodium chloride (MBP/ADV) 50 mL  2 g IntraVENous Q24H Vicki Hutton MD        metroNIDAZOLE (FLAGYL) IVPB premix 500 mg  500 mg IntraVENous Q8H Celeste HERNANDEZ  mL/hr at 05/14/20 1713 500 mg at 05/14/20 1713    hydroxychloroquine (PLAQUENIL) tablet 200 mg  200 mg Oral DAILY Romelia Morrissey MD   200 mg at 05/14/20 1138    methylPREDNISolone (PF) (SOLU-MEDROL) injection 40 mg  40 mg IntraVENous Q8H Romelia Morrissey MD   40 mg at 05/14/20 1346    insulin lispro (HUMALOG) injection   SubCUTAneous AC&HS Romelia Morrissey MD   Stopped at 05/14/20 1130    glucose chewable tablet 16 g  4 Tab Oral PRN Romelia Morrissey MD        dextrose (D50W) injection syrg 12.5-25 g  12.5-25 g IntraVENous PRN Romelia Morrissey MD        glucagon (GLUCAGEN) injection 1 mg  1 mg IntraMUSCular PRN Romelia Morrissey MD        fluticasone-vilanterol (BREO ELLIPTA) 200mcg-25mcg/puff  1 Puff Inhalation DAILY Romelia Morrissey MD   1 Puff at 05/14/20 1423    albuterol (PROVENTIL HFA, VENTOLIN HFA, PROAIR HFA) inhaler 1 Puff  1 Puff Inhalation Q6H RT Romelia Morrissey MD   1 Puff at 05/14/20 1423       Past History     Past Medical History:  Past Medical History:   Diagnosis Date    Acquired hypothyroidism     Adverse effect of anesthesia     labile BP during/after C section    Asthma     Broken bones     history of 9 broken bones    Chronic UTI (urinary tract infection)     \"extra valve right kidney causes UTI\"    Fibromyalgia     Gestational diabetes     GI bleed 2007,2011,2015,2016    lower GI last colonoscopy in 2016 normal     [facility]a ([facility] [address]     Hyperhydrosis disorder     Ill-defined condition     Chris/ tested genetically - daughter has [place]    Infertility     PCOS    PCOS (polycystic ovarian syndrome)     Precancerous skin lesion     removed from Right shoulder    Preeclampsia 2011    pregnancy    Reactive airway disease     Rheumatoid arthritis ([address]     SVT (supraventricular tachycardia) ([address] 2011    occured during pregnancy when picc line inserted.        Past Surgical History:  Past Surgical History:   Procedure Laterality Date    COLONOSCOPY N/A 6/13/2016    COLONOSCOPY performed by Jose Gillis MD at [facility]    HX HEENT      HX WISDOM TEETH EXTRACTION  2004    UPPER GI ENDOSCOPY,BIOPSY  2/12/2019         UPPER GI ENDOSCOPY,DILATN W GUIDE  2/12/2019            Family History:  Family History   Problem Relation Age of Onset    Other Mother         Pacific's disease    Hypertension Mother     Dementia Mother     High Cholesterol Father     Heart Disease Father     Diabetes Father     Hypertension Father     Asthma Brother     Diabetes Brother     Other Brother         varicocele, hernias    Hypertension Brother     Alcohol abuse Brother     Hypertension Maternal Grandmother     Elevated Lipids Maternal Grandmother     Cancer Maternal Grandmother         breast    Other Maternal Grandmother         polymyalgia rheumatica    Glaucoma Maternal Grandmother     Cancer Maternal Grandfather         prostate    Huntingtons disease Maternal Grandfather     Cancer Paternal Grandmother         lung with mets    Cancer Paternal Grandfather         prostate, colon    Diabetes Paternal Grandfather     Heart Disease Paternal Grandfather     Alzheimer Paternal Grandfather     Dementia Paternal Grandfather        Social History:  Social History     Tobacco Use    Smoking status: Never Smoker    Smokeless tobacco: Never Used   Substance Use Topics    Alcohol use: Yes     Comment: few drinks per year    Drug use: No       Allergies: Allergies   Allergen Reactions    Latex Swelling    Rituxan [Rituximab] Anaphylaxis    Entex [Phenylephrine-Guaifenesin] Anaphylaxis, Hives and Palpitations    Mucinex [Guaifenesin] Anaphylaxis and Hives    Pepto-Bismol [Bismuth Subsalicylate] Nausea and Vomiting         Review of Systems   Review of Systems   Constitutional: Positive for fever. Negative for activity change, appetite change, chills and unexpected weight change. HENT: Negative for congestion. Eyes: Negative for pain and visual disturbance.    Respiratory: Negative for cough and shortness of breath. Cardiovascular: Negative for chest pain. Gastrointestinal: Positive for abdominal pain, constipation, nausea and vomiting. Negative for anal bleeding, blood in stool and diarrhea. Genitourinary: Negative for dysuria. Musculoskeletal: Negative for back pain. Skin: Negative for rash. Neurological: Negative for headaches. Physical Exam   Physical Exam  Vitals signs and nursing note reviewed. Constitutional:       Appearance: She is well-developed. She is not diaphoretic. Comments: Obese young female who appears anxious in moderate distress with pain, tachycardia and fever   HENT:      Head: Normocephalic and atraumatic. Eyes:      General:         Right eye: No discharge. Left eye: No discharge. Conjunctiva/sclera: Conjunctivae normal.      Pupils: Pupils are equal, round, and reactive to light. Neck:      Musculoskeletal: Normal range of motion and neck supple. Cardiovascular:      Rate and Rhythm: Normal rate and regular rhythm. Heart sounds: Normal heart sounds. No murmur. Pulmonary:      Effort: Pulmonary effort is normal. No respiratory distress. Breath sounds: Normal breath sounds. No wheezing or rales. Abdominal:      General: Abdomen is protuberant. Bowel sounds are normal. There is no distension or abdominal bruit. There are no signs of injury. Palpations: Abdomen is soft. There is no shifting dullness. Tenderness: There is abdominal tenderness in the epigastric area and left upper quadrant. There is guarding. Negative signs include McBurney's sign. Musculoskeletal: Normal range of motion. Skin:     General: Skin is warm and dry. Findings: No rash. Neurological:      Mental Status: She is alert and oriented to person, place, and time. Cranial Nerves: No cranial nerve deficit. Motor: No abnormal muscle tone.          Diagnostic Study Results     Labs -     Recent Results (from the past 12 hour(s))   URINALYSIS W/ REFLEX CULTURE    Collection Time: 05/14/20  7:20 AM   Result Value Ref Range    Color YELLOW/STRAW      Appearance CLEAR CLEAR      Specific gravity 1.020 1.003 - 1.030      pH (UA) 8.0 5.0 - 8.0      Protein TRACE (A) NEG mg/dL    Glucose Negative NEG mg/dL    Ketone Negative NEG mg/dL    Bilirubin Negative NEG      Blood Negative NEG      Urobilinogen 0.2 0.2 - 1.0 EU/dL    Nitrites Negative NEG      Leukocyte Esterase Negative NEG      WBC 10-20 0 - 4 /hpf    RBC 10-20 0 - 5 /hpf    Epithelial cells MODERATE (A) FEW /lpf    Bacteria 2+ (A) NEG /hpf    UA:UC IF INDICATED URINE CULTURE ORDERED (A) CNI      Other: Renal Epithelial cells Present     SARS-COV-2    Collection Time: 05/14/20  9:09 AM   Result Value Ref Range    Specimen source Nasopharyngeal      SARS-CoV-2 PENDING     Specimen source Nasopharyngeal     D DIMER    Collection Time: 05/14/20  9:42 AM   Result Value Ref Range    D-dimer 0.63 0.00 - 0.65 mg/L FEU   CRP, HIGH SENSITIVITY    Collection Time: 05/14/20  9:42 AM   Result Value Ref Range    CRP, High sensitivity >9.5 mg/L   SED RATE (ESR)    Collection Time: 05/14/20  9:42 AM   Result Value Ref Range    Sed rate, automated 18 0 - 20 mm/hr   PROCALCITONIN    Collection Time: 05/14/20  9:42 AM   Result Value Ref Range    Procalcitonin <0.05 ng/mL   FERRITIN    Collection Time: 05/14/20  9:42 AM   Result Value Ref Range    Ferritin 85 8 - 252 NG/ML   GLUCOSE, POC    Collection Time: 05/14/20 11:17 AM   Result Value Ref Range    Glucose (POC) 74 65 - 100 mg/dL    Performed by Jesús Kearney (JEREMI)    GLUCOSE, POC    Collection Time: 05/14/20  4:41 PM   Result Value Ref Range    Glucose (POC) 73 65 - 100 mg/dL    Performed by Jesús Kearney (JEREMI)        Radiologic Studies -   XR CHEST PORT   Final Result   IMPRESSION:      No acute pulmonary process. CT ABD PELV W CONT   Final Result   IMPRESSION:   Multiple hypodensities are seen within the spleen.  Differential diagnostic   possibilities include infection, inflammation,  and neoplasia. XR CHEST PORT    (Results Pending)     CT Results  (Last 48 hours)               05/14/20 0652  CT ABD PELV W CONT Final result    Impression:  IMPRESSION:   Multiple hypodensities are seen within the spleen. Differential diagnostic   possibilities include infection, inflammation,  and neoplasia. Narrative:  EXAM: CT ABD PELV W CONT       INDICATION: Left upper quadrant abdominal pain       COMPARISON: 2017        CONTRAST: 100 mL of Isovue-370. TECHNIQUE:    Following the uneventful intravenous administration of contrast, thin axial   images were obtained through the abdomen and pelvis. Coronal and sagittal   reconstructions were generated. Oral contrast was not administered. CT dose   reduction was achieved through use of a standardized protocol tailored for this   examination and automatic exposure control for dose modulation. FINDINGS:    LOWER THORAX: No significant abnormality in the incidentally imaged lower chest.   LIVER: No mass. BILIARY TREE: Gallbladder is within normal limits. CBD is not dilated. SPLEEN: Multiple hypodensities are seen within the spleen. PANCREAS: No mass or ductal dilatation. ADRENALS: Unremarkable. KIDNEYS: No mass, calculus, or hydronephrosis. STOMACH: Unremarkable. SMALL BOWEL: No dilatation or wall thickening. COLON: Moderate stool in the colon. APPENDIX: Unremarkable. PERITONEUM: No ascites or pneumoperitoneum. RETROPERITONEUM: No lymphadenopathy or aortic aneurysm. REPRODUCTIVE ORGANS: Normal.   URINARY BLADDER: No mass or calculus. BONES: Degenerative disc disease L5-S1 with bilateral foraminal narrowing,   greater on the right. ABDOMINAL WALL: No mass or hernia. ADDITIONAL COMMENTS: N/A               CXR Results  (Last 48 hours)               05/14/20 0922  XR CHEST PORT Final result    Impression:  IMPRESSION:       No acute pulmonary process.             Narrative:  history: Shortness of breath       COMPARISON: 4/18/2020       FINDINGS:       Frontal chest radiograph submitted for review. Stable right chest port. Stable heart size. No evidence for acute pulmonary   process. No pleural effusion or pneumothorax. Medical Decision Making   I am the first provider for this patient. I reviewed the vital signs, available nursing notes, past medical history, past surgical history, family history and social history. Vital Signs-Reviewed the patient's vital signs. Patient Vitals for the past 12 hrs:   Temp Pulse Resp BP SpO2   05/14/20 1533 99.9 °F (37.7 °C) 97 18 117/75 96 %   05/14/20 1022 98.6 °F (37 °C) 97 20 127/70 100 %   05/14/20 0930 -- 94 14 115/71 98 %   05/14/20 0915 -- 99 21 105/77 97 %   05/14/20 0900 -- 96 23 104/61 99 %   05/14/20 0845 -- 98 22 115/62 99 %   05/14/20 0830 -- 97 21 114/65 97 %   05/14/20 0815 -- 97 20 116/68 96 %   05/14/20 0800 -- 92 22 119/67 94 %   05/14/20 0745 -- 98 19 114/65 98 %   05/14/20 0730 -- 95 17 108/61 97 %   05/14/20 0700 -- 95 20 119/66 98 %   05/14/20 0657 -- 98 20 -- 99 %      Temp Pulse Resp BP SpO2   05/14/20 0657 -- 98 20 -- 99 %   05/14/20 0630 -- 99 20 111/65 97 %   05/14/20 0616 (!) 100.5 °F (38.1 °C) (!) 102 26 111/72 100 %   05/14/20 0604 -- (!) 107 20 -- 99 %   05/14/20 0602 -- -- -- 113/71 --   05/14/20 0536 (!) 102.3 °F (39.1 °C) (!) 113 20 126/74 100 %       Pulse Oximetry Analysis - 100% on RA    Cardiac Monitor:   Rate: 115bpm  Rhythm: Sinus Tachycardia      Records Reviewed: Nursing Notes, Old Medical Records, Previous Radiology Studies and Previous Laboratory Studies    Provider Notes (Medical Decision Making):   MDM: Basia Mike female with an extensive list of medical history presenting with left-sided abdominal pain, left flank pain, fever. Patient has a history of multiple UTIs in fact was diagnosed with UTI in April which grew out E. coli but was pansensitive; patient was treated appropriately. Will initiate sepsis evaluation with broad-spectrum coverage. 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    ED Course:   Initial assessment performed. The patients presenting problems have been discussed, and they are in agreement with the care plan formulated and outlined with them. I have encouraged them to ask questions as they arise throughout their visit. EKG interpretation: (Preliminary)  Rhythm: Tachycardia at a rate of 115 bpm; normal MS; normal QRS; normal QTC; T wave inversions noted in lead III and aVF. Appears similar to April 16, 2017  This EKG was interpreted by ED Provider Mady Lama MD    PROGRESS NOTE:  6:40 AM  Pt appears to be improving. Heart rate and temperature are both decreased. Continue IV infusion. Lab results unremarkable, await urinalysis, urine pregnancy and CT scan. ED Course as of May 14 1854   Thu May 14, 2020   2239 Received patient in signout from Dr. Geeta Solomon pending CT abdomen and pelvis. In short 68-year-old female immunocompromised secondary to history of rheumatoid arthritis here with left upper quadrant abdominal pain, nausea, vomiting, and fever of 102.3 °F.  CT abdomen and pelvis demonstrates multiple hypodensities in the spleen which correlates with her symptoms. On my examination patient has non-peritoneal abdomen but persistent left upper quadrant TTP. She is still feeling poor and given her immunocompromise status, fever, nausea, vomiting, CT findings and left upper quadrant pain I have offered her admission. She will be covered with Rocephin and Flagyl for intra-abdominal infection. I did consider thromboembolic event and given that she has had fever, persistent cough, I did consider coronavirus and COVID-19 swab will be sent.   I have discussed with Dr. Mark Longoria, hospitalist, who will evaluate patient for admission.     Marcus Sanz      ED Course User Index  [AK] Chris Jeronimo MD    7 AM  Patient signed out to Dr. Jm Daniel who will follow the results of her urinalysis and CT scan and disposition appropriately. Diagnosis     Clinical Impression:   1. Fever, unspecified fever cause    2. Abdominal pain, LUQ (left upper quadrant)        PLAN:  Admission to the hospitalist service      Please note, this dictation was completed with Accolo, the computer voice recognition software. Quite often unanticipated grammatical, syntax, homophones, and other interpretive errors are inadvertently transcribed by the computer software. Please disregard these errors. Please excuse any errors that have escaped final proof reading.

## 2020-05-14 NOTE — ED NOTES
TRANSFER - OUT REPORT:    Verbal report given to RN(name) on Denisha Phipps  being transferred to Ortho room 3236(unit) for routine progression of care       Report consisted of patients Situation, Background, Assessment and   Recommendations(SBAR). Information from the following report(s) SBAR, ED Summary, [facility] and Recent Results was reviewed with the receiving nurse. Lines:   Peripheral IV 05/14/20 Right Antecubital (Active)        Opportunity for questions and clarification was provided.       Patient transported with:   [employer]

## 2020-05-14 NOTE — PROGRESS NOTES
SHIN  1) home with family and follow up appointments   2) Fiance to provide transportation at d/c    Reason for Admission:  Sepsis, splenic infarction                    RUR Score:  19%                PCP: First and Last name:  Dr. Yusra Mcgregor    Name of Practice: [facility]   Are you a current patient: Yes/No: ues   Approximate date of last visit: 4/27/2020    Specialist   Pulmonology: Dr. Bianca Medina  Endocrinology: Dr. Sterling Solano  Rheumatology: Gloria Quiroga  Neurology: Dr. Jeffry Mcneal  Gastroenterology: Dr. Hayden Wright you (patient/family) have any concerns for transition/discharge? No concerns identified at this time                  Plan for utilizing home health: per recommendation       Current Advanced Directive/Advance Care Plan:  CM addressed ACP, patient does not have an ACP             Transition of Care Plan:          Patient is a 41 y/o female who was admitted to [facility] on 05/14/2020 for sepsis and splenic infarction. Patient has pmhx of DM2, rheumatoid arthritis, depression, GERD, HTN, hypothyroidism, hyperactive airway disease, fibromyalgia, Huntingtons, and PCOS. CM contacted patient's bed phone due to COVD-19 to complete initial assessment. Patient confirmed demographics, insurance, and emergency contact. Patient requested to add her fianceJami ([phone]) as primary emergency contact. Patient also reported that the emergency contact listed, Gissell Sanders, is her ex-mother-in-law but still has a close relationship. Patient reported that her fiance and ex-mother-in-law are her only two support systems. Chart updated per request.     Patient, yesi, 7 y/o daughter (with special needs), and patient's mother (with dementia) reside together. 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 Patient is independent with ADLs/IADLs and driving. Patient uses [facility] pharmacy on [address] and [address] [place] [address] [place].  Patient reported that she does have some difficulty at time affording her medications due to the new insurance and RX co-pays being high. Patient has access to shower chair and RW if ever needed. Patient has no history of HH, SNF, or IPR. Per patient her plan is to return home with family. Patient's  to provide transportation at d/c. Patient will need follow up appointments arranged prior to d/c. Care Management Interventions  PCP Verified by CM: Yes  Last Visit to PCP: 04/27/20  Mode of Transport at Discharge: Other (see comment)(yesi)  Transition of Care Consult (CM Consult): Discharge Planning  Discharge Durable Medical Equipment: No  Physical Therapy Consult: No  Occupational Therapy Consult: No  Speech Therapy Consult: No  Current Support Network: Lives with Spouse, Own Home(patient, fiance, daughter, and mother reside together.  patient care giver for mother and daughter)  Confirm Follow Up Transport: Family  Discharge Location  Discharge Placement: [place], [place], [address]

## 2020-05-15 ENCOUNTER — APPOINTMENT (OUTPATIENT)
Dept: CT IMAGING | Age: 40
DRG: 815 | End: 2020-05-15
Attending: INTERNAL MEDICINE
Payer: COMMERCIAL

## 2020-05-15 LAB
ANION GAP SERPL CALC-SCNC: 6 MMOL/L (ref 5–15)
BACTERIA SPEC CULT: NORMAL
BASOPHILS # BLD: 0 K/UL (ref 0–0.1)
BASOPHILS NFR BLD: 0 % (ref 0–1)
BUN SERPL-MCNC: 7 MG/DL (ref 6–20)
BUN/CREAT SERPL: 8 (ref 12–20)
CALCIUM SERPL-MCNC: 8.4 MG/DL (ref 8.5–10.1)
CC UR VC: NORMAL
CHLORIDE SERPL-SCNC: 105 MMOL/L (ref 97–108)
CO2 SERPL-SCNC: 24 MMOL/L (ref 21–32)
CREAT SERPL-MCNC: 0.87 MG/DL (ref 0.55–1.02)
DIFFERENTIAL METHOD BLD: ABNORMAL
EOSINOPHIL # BLD: 0 K/UL (ref 0–0.4)
EOSINOPHIL NFR BLD: 0 % (ref 0–7)
ERYTHROCYTE [DISTWIDTH] IN BLOOD BY AUTOMATED COUNT: 13.2 % (ref 11.5–14.5)
FERRITIN SERPL-MCNC: 137 NG/ML (ref 26–388)
GLUCOSE BLD STRIP.AUTO-MCNC: 140 MG/DL (ref 65–100)
GLUCOSE BLD STRIP.AUTO-MCNC: 217 MG/DL (ref 65–100)
GLUCOSE BLD STRIP.AUTO-MCNC: 222 MG/DL (ref 65–100)
GLUCOSE SERPL-MCNC: 150 MG/DL (ref 65–100)
HCT VFR BLD AUTO: 36 % (ref 35–47)
HGB BLD-MCNC: 11.5 G/DL (ref 11.5–16)
IL6 SERPL-MCNC: 103.6 PG/ML (ref 0–15.5)
IMM GRANULOCYTES # BLD AUTO: 0.1 K/UL (ref 0–0.04)
IMM GRANULOCYTES NFR BLD AUTO: 1 % (ref 0–0.5)
LDH SERPL L TO P-CCNC: 210 U/L (ref 81–246)
LYMPHOCYTES # BLD: 0.4 K/UL (ref 0.8–3.5)
LYMPHOCYTES NFR BLD: 6 % (ref 12–49)
MCH RBC QN AUTO: 30.7 PG (ref 26–34)
MCHC RBC AUTO-ENTMCNC: 31.9 G/DL (ref 30–36.5)
MCV RBC AUTO: 96 FL (ref 80–99)
MONOCYTES # BLD: 0.1 K/UL (ref 0–1)
MONOCYTES NFR BLD: 2 % (ref 5–13)
NEUTS SEG # BLD: 5.8 K/UL (ref 1.8–8)
NEUTS SEG NFR BLD: 91 % (ref 32–75)
NRBC # BLD: 0 K/UL (ref 0–0.01)
NRBC BLD-RTO: 0 PER 100 WBC
PLATELET # BLD AUTO: 320 K/UL (ref 150–400)
PMV BLD AUTO: 8.9 FL (ref 8.9–12.9)
POTASSIUM SERPL-SCNC: 3.9 MMOL/L (ref 3.5–5.1)
RBC # BLD AUTO: 3.75 M/UL (ref 3.8–5.2)
RBC MORPH BLD: ABNORMAL
SERVICE CMNT-IMP: ABNORMAL
SERVICE CMNT-IMP: NORMAL
SODIUM SERPL-SCNC: 135 MMOL/L (ref 136–145)
WBC # BLD AUTO: 6.4 K/UL (ref 3.6–11)

## 2020-05-15 PROCEDURE — 74011636637 HC RX REV CODE- 636/637: Performed by: INTERNAL MEDICINE

## 2020-05-15 PROCEDURE — 94640 AIRWAY INHALATION TREATMENT: CPT

## 2020-05-15 PROCEDURE — 65660000000 HC RM CCU STEPDOWN

## 2020-05-15 PROCEDURE — 74011250637 HC RX REV CODE- 250/637: Performed by: INTERNAL MEDICINE

## 2020-05-15 PROCEDURE — 85025 COMPLETE CBC W/AUTO DIFF WBC: CPT

## 2020-05-15 PROCEDURE — 74011250636 HC RX REV CODE- 250/636: Performed by: INTERNAL MEDICINE

## 2020-05-15 PROCEDURE — 74011000258 HC RX REV CODE- 258: Performed by: INTERNAL MEDICINE

## 2020-05-15 PROCEDURE — 82962 GLUCOSE BLOOD TEST: CPT

## 2020-05-15 PROCEDURE — 80048 BASIC METABOLIC PNL TOTAL CA: CPT

## 2020-05-15 PROCEDURE — 83615 LACTATE (LD) (LDH) ENZYME: CPT

## 2020-05-15 PROCEDURE — 71275 CT ANGIOGRAPHY CHEST: CPT

## 2020-05-15 PROCEDURE — 82728 ASSAY OF FERRITIN: CPT

## 2020-05-15 PROCEDURE — 36415 COLL VENOUS BLD VENIPUNCTURE: CPT

## 2020-05-15 PROCEDURE — 94760 N-INVAS EAR/PLS OXIMETRY 1: CPT

## 2020-05-15 PROCEDURE — 74011636320 HC RX REV CODE- 636/320: Performed by: INTERNAL MEDICINE

## 2020-05-15 RX ORDER — GLYCOPYRROLATE 1 MG/1
2 TABLET ORAL
Status: DISCONTINUED | OUTPATIENT
Start: 2020-05-15 | End: 2020-05-18 | Stop reason: HOSPADM

## 2020-05-15 RX ORDER — PROPRANOLOL HYDROCHLORIDE 60 MG/1
60 CAPSULE, EXTENDED RELEASE ORAL DAILY PRN
Status: DISCONTINUED | OUTPATIENT
Start: 2020-05-15 | End: 2020-05-18 | Stop reason: HOSPADM

## 2020-05-15 RX ORDER — SODIUM CHLORIDE 0.9 % (FLUSH) 0.9 %
10 SYRINGE (ML) INJECTION
Status: COMPLETED | OUTPATIENT
Start: 2020-05-15 | End: 2020-05-15

## 2020-05-15 RX ORDER — METOCLOPRAMIDE HYDROCHLORIDE 5 MG/5ML
5 SOLUTION ORAL
Status: DISCONTINUED | OUTPATIENT
Start: 2020-05-15 | End: 2020-05-18 | Stop reason: HOSPADM

## 2020-05-15 RX ORDER — METHOTREXATE 25 MG/ML
25 INJECTION, SOLUTION INTRA-ARTERIAL; INTRAMUSCULAR; INTRAVENOUS
Status: DISCONTINUED | OUTPATIENT
Start: 2020-05-15 | End: 2020-05-18 | Stop reason: HOSPADM

## 2020-05-15 RX ADMIN — MONTELUKAST SODIUM 10 MG: 10 TABLET, FILM COATED ORAL at 08:36

## 2020-05-15 RX ADMIN — OXYBUTYNIN CHLORIDE 5 MG: 5 TABLET ORAL at 17:39

## 2020-05-15 RX ADMIN — HEPARIN SODIUM 5000 UNITS: 5000 INJECTION INTRAVENOUS; SUBCUTANEOUS at 00:24

## 2020-05-15 RX ADMIN — Medication 5 ML: at 05:44

## 2020-05-15 RX ADMIN — METRONIDAZOLE 500 MG: 500 INJECTION, SOLUTION INTRAVENOUS at 17:30

## 2020-05-15 RX ADMIN — Medication 30 ML: at 17:52

## 2020-05-15 RX ADMIN — LEVOTHYROXINE SODIUM 75 MCG: 0.07 TABLET ORAL at 08:38

## 2020-05-15 RX ADMIN — METRONIDAZOLE 500 MG: 500 INJECTION, SOLUTION INTRAVENOUS at 08:31

## 2020-05-15 RX ADMIN — METOCLOPRAMIDE HYDROCHLORIDE 5 MG: 5 SOLUTION ORAL at 17:58

## 2020-05-15 RX ADMIN — ALBUTEROL SULFATE 1 PUFF: 90 AEROSOL, METERED RESPIRATORY (INHALATION) at 01:54

## 2020-05-15 RX ADMIN — METHYLPREDNISOLONE SODIUM SUCCINATE 40 MG: 40 INJECTION, POWDER, FOR SOLUTION INTRAMUSCULAR; INTRAVENOUS at 23:04

## 2020-05-15 RX ADMIN — SODIUM CHLORIDE 100 ML/HR: 900 INJECTION, SOLUTION INTRAVENOUS at 23:04

## 2020-05-15 RX ADMIN — HYDROXYCHLOROQUINE SULFATE 200 MG: 200 TABLET ORAL at 08:37

## 2020-05-15 RX ADMIN — METHOTREXATE 25 MG: 25 SOLUTION INTRA-ARTERIAL; INTRAMUSCULAR; INTRATHECAL; INTRAVENOUS at 16:06

## 2020-05-15 RX ADMIN — FOLIC ACID 1 MG: 1 TABLET ORAL at 08:39

## 2020-05-15 RX ADMIN — ALBUTEROL SULFATE 1 PUFF: 90 AEROSOL, METERED RESPIRATORY (INHALATION) at 19:59

## 2020-05-15 RX ADMIN — METHYLPREDNISOLONE SODIUM SUCCINATE 40 MG: 40 INJECTION, POWDER, FOR SOLUTION INTRAMUSCULAR; INTRAVENOUS at 05:43

## 2020-05-15 RX ADMIN — HEPARIN SODIUM 5000 UNITS: 5000 INJECTION INTRAVENOUS; SUBCUTANEOUS at 05:43

## 2020-05-15 RX ADMIN — HEPARIN SODIUM 5000 UNITS: 5000 INJECTION INTRAVENOUS; SUBCUTANEOUS at 15:59

## 2020-05-15 RX ADMIN — HEPARIN SODIUM 5000 UNITS: 5000 INJECTION INTRAVENOUS; SUBCUTANEOUS at 23:05

## 2020-05-15 RX ADMIN — Medication 10 ML: at 10:46

## 2020-05-15 RX ADMIN — GLYCOPYRROLATE 2 MG: 1 TABLET ORAL at 14:11

## 2020-05-15 RX ADMIN — OXYBUTYNIN CHLORIDE 5 MG: 5 TABLET ORAL at 08:37

## 2020-05-15 RX ADMIN — INSULIN LISPRO 3 UNITS: 100 INJECTION, SOLUTION INTRAVENOUS; SUBCUTANEOUS at 17:34

## 2020-05-15 RX ADMIN — METHYLPREDNISOLONE SODIUM SUCCINATE 40 MG: 40 INJECTION, POWDER, FOR SOLUTION INTRAMUSCULAR; INTRAVENOUS at 00:24

## 2020-05-15 RX ADMIN — TRAMADOL HYDROCHLORIDE 50 MG: 50 TABLET, FILM COATED ORAL at 06:47

## 2020-05-15 RX ADMIN — INSULIN LISPRO 2 UNITS: 100 INJECTION, SOLUTION INTRAVENOUS; SUBCUTANEOUS at 23:05

## 2020-05-15 RX ADMIN — Medication 5 ML: at 00:24

## 2020-05-15 RX ADMIN — METHYLPREDNISOLONE SODIUM SUCCINATE 40 MG: 40 INJECTION, POWDER, FOR SOLUTION INTRAMUSCULAR; INTRAVENOUS at 16:02

## 2020-05-15 RX ADMIN — CETIRIZINE HYDROCHLORIDE 10 MG: 10 TABLET, FILM COATED ORAL at 08:39

## 2020-05-15 RX ADMIN — ALBUTEROL SULFATE 1 PUFF: 90 AEROSOL, METERED RESPIRATORY (INHALATION) at 14:14

## 2020-05-15 RX ADMIN — ESCITALOPRAM OXALATE 20 MG: 10 TABLET ORAL at 08:38

## 2020-05-15 RX ADMIN — PANTOPRAZOLE SODIUM 40 MG: 40 TABLET, DELAYED RELEASE ORAL at 08:37

## 2020-05-15 RX ADMIN — PROPRANOLOL HYDROCHLORIDE 60 MG: 60 CAPSULE, EXTENDED RELEASE ORAL at 08:33

## 2020-05-15 RX ADMIN — IOPAMIDOL 100 ML: 755 INJECTION, SOLUTION INTRAVENOUS at 10:46

## 2020-05-15 RX ADMIN — BUPROPION HYDROCHLORIDE 150 MG: 150 TABLET, EXTENDED RELEASE ORAL at 08:38

## 2020-05-15 RX ADMIN — CEFTRIAXONE SODIUM 2 G: 2 INJECTION, POWDER, FOR SOLUTION INTRAMUSCULAR; INTRAVENOUS at 11:58

## 2020-05-15 RX ADMIN — SODIUM CHLORIDE 100 ML/HR: 900 INJECTION, SOLUTION INTRAVENOUS at 00:22

## 2020-05-15 RX ADMIN — ALBUTEROL SULFATE 1 PUFF: 90 AEROSOL, METERED RESPIRATORY (INHALATION) at 08:40

## 2020-05-15 RX ADMIN — FLUTICASONE FUROATE AND VILANTEROL TRIFENATATE 1 PUFF: 200; 25 POWDER RESPIRATORY (INHALATION) at 08:40

## 2020-05-15 RX ADMIN — INSULIN LISPRO 4 UNITS: 100 INJECTION, SOLUTION INTRAVENOUS; SUBCUTANEOUS at 11:57

## 2020-05-15 RX ADMIN — METRONIDAZOLE 500 MG: 500 INJECTION, SOLUTION INTRAVENOUS at 00:22

## 2020-05-15 RX ADMIN — Medication 5 ML: at 23:06

## 2020-05-15 RX ADMIN — TRAMADOL HYDROCHLORIDE 50 MG: 50 TABLET, FILM COATED ORAL at 00:29

## 2020-05-15 NOTE — PROGRESS NOTES
-Hematology / Oncology ([facility]) -  -Primary Oncologist-   -Anaya 1468 she is feeling improved today, less abdominal pain, still hasn't had a bm yet. Tildon Smoker-      Patient Vitals for the past 24 hrs:   Temp Pulse Resp BP SpO2   05/15/20 1140 98.4 °F (36.9 °C) 87 18 103/60 99 %   05/15/20 0840 -- -- -- -- 100 %   05/15/20 0833 -- 89 -- 109/65 --   05/15/20 0800 98 °F (36.7 °C) 89 18 109/65 100 %   05/15/20 0544 97.7 °F (36.5 °C) 81 18 118/66 100 %   05/15/20 0156 -- -- -- -- 98 %   05/15/20 0025 98.1 °F (36.7 °C) 86 18 113/60 98 %   05/14/20 2047 -- -- -- -- 98 %   05/14/20 1928 98.7 °F (37.1 °C) 93 18 112/61 98 %   05/14/20 1533 99.9 °F (37.7 °C) 97 18 117/75 96 %     05/15 0701 - 05/15 1900  In: 1200 [I.V.:1200]  Out: -   Review of Systems:12 point obtained and negative other than stated in HPI     Physical Exam:  Constitutional Alert, cooperative, oriented. Mood and affect appropriate. Appears close to chronological age. Well nourished. Well developed. Head Normocephalic; no scars   Eyes Conjunctivae and sclerae are clear and without icterus. Pupils are reactive and equal.   ENMT Sinuses are nontender. No oral exudates, ulcers, masses, thrush or mucositis. Oropharynx clear. Tongue normal.   Neck Supple without masses or thyromegaly. No jugular venous distension. Hematologic/Lymphatic No petechiae or purpura. No tender or palpable lymph nodes in the cervical, supraclavicular, axillary or inguinal area. Respiratory Lungs are clear to auscultation without rhonchi or wheezing. Cardiovascular Regular rate and rhythm of heart without murmurs, gallops or rubs. Chest / Line Site Chest is symmetric with no chest wall deformities. Abdomen Tender to deep palpation to LUQ, no rebound   Musculoskeletal No tenderness or swelling, normal range of motion without obvious weakness. Extremities No visible deformities, no cyanosis, clubbing or edema. Skin No rashes, scars, or lesions suggestive of malignancy.  No petechiae, purpura, or ecchymoses. No excoriations. Neurologic No sensory or motor deficits   Psychiatric Alert and oriented times three. Coherent speech. Verbalizes understanding of our discussions today.         -Labs-    Recent Labs     05/15/20  0554 05/14/20  0557   WBC 6.4 10.8   HGB 11.5 12.5    395   ANEU 5.8 8.0   * 135*   K 3.9 3.6   * 92   BUN 7 10   CREA 0.87 1.28*   ALT  --  25   SGOT  --  16   TBILI  --  0.6   AP  --  89   CA 8.4* 8.8       -Imaging-   5/14/20 CT A/P:  IMPRESSION:  Multiple hypodensities are seen within the spleen. Differential diagnostic  possibilities include infection, inflammation,  and neoplasia    5/15/20 CT Chest:  IMPRESSION   impression: No significant abnormalities. -Assessment + Plan-      *) Lesions in the spleen:  - On 5/14 I reviewed films with radiologist, Dr Aide Zambrano in detail and he felt infarct is extremely unlikely as they should be wedge shaped and her lesions are round. He feels these look more like microabscesses. He didn't feel there was high probability of endocarditis causing these as there were no other areas of concern on her CT (no showering). States sarcoidosis can appear this way as well.   There was no splenomegaly, no LAD to suggest a lymphoma.   - She is immunocompromised and infection is of highest concern, I would ensure she has repeat imaging in future to ensure resolution  - FU pending blood and urine cultures    - Will sign off for now, discussed with Dr Regina Reynolds, please call with any new questions     *) Febrile illness/sepsis:  - Febrile to 102.3 with tachycardia in ED  - on flagyl, rocephin  -  COVID19 negative     *) RA and on chronic immunosuppression:  - Has been on long term immunosuppression with prednisone 20mg, plaquenil, MTX, and Xeljanz  - cont FA     *) Abdominal pain:  - Her pain is in LUQ, there is a lesion in the upper portion of spleen that is in contact with capsule and suspect this is causing her pain.  - She also states she hasn't had a BM in 11 days, will need to get her bowels moving as this can contribute to abd pain as well.     *) SOB and pleuritic pain:  - no acute findings on her CT chest.  - CTA chest negative for anything acute  - could be lesion in spleen causing some diaphragm inflamation

## 2020-05-15 NOTE — PROGRESS NOTES
0730:  Bedside and Verbal shift change report given to ISAIAS Morales RN (oncoming nurse) by [employer] (offgoing nurse). Report included the following information SBAR, Kardex, [facility], Recent Results and Cardiac Rhythm NSR.     0908:  Paged the on call attending, Dr. Jackson Avelar at this time. The patient has been cleared (tested negative) of COVID19.      1920: We are awaiting a non-Covid bed for this patient to be moved to. The patient ate well today. She has no problems ambulating around the room. She tolerated all her medications very well. Bedside and Verbal shift change report given to [address] [place] [address] (oncoming nurse) by ISAIAS Brice RN (offgoing nurse). Report included the following information SBAR, Kardex, MAR, Med Rec Status and Cardiac Rhythm NSR.

## 2020-05-15 NOTE — PROGRESS NOTES
Initial Nutrition Assessment:    INTERVENTIONS/RECOMMENDATIONS:   · Consider regular diet or consistent carb if BG become uncontrolled  · Snacks: fruit cup and vegetable cup  · Please document % meals consumed in flowsheet I/O's under intake     ASSESSMENT:   In attempt to conserve PPE d/t COVID-19 isolation, assessment was conducted through chart review and phone conversation. Medically noted for Lesions in the spleen, h/o dysphagia s/p dilation (2/12/19), PMH shown below. Nutrition referral triggered due to MST score. Pt reports 40 lbs weight loss over the past 6 months due to nausea, constipation, loss of appetite and early satiety. She does not eat full meals but snacks throughout the day. Weight history (see below) does not show this severity of weight loss. Weight history shows no significant weigh loss over the past year but does shows gradual weight loss since 2018. She has a h/o dysphagia with dilation (2019). She has ongoing issues with constipation, no BM for >10 days, and reports that MD is going to start her on Reglan TID. She reports consuming ~25% of dinner last night and lunch today. She declined nutrition supplements but did request snacks of fruit and vegetables.       Past Medical History:   Diagnosis Date    Acquired hypothyroidism     Adverse effect of anesthesia     labile BP during/after C section    Asthma     Broken bones     history of 9 broken bones    Chronic UTI (urinary tract infection)     \"extra valve right kidney causes UTI\"    Fibromyalgia     Gestational diabetes     GI bleed 2007,2011,2015,2016    lower GI last colonoscopy in 2016 normal     Huntingtons chorea (HCC)     Hyperhydrosis disorder     Ill-defined condition     [place]/ tested genetically - daughter has Chris    Infertility     PCOS    PCOS (polycystic ovarian syndrome)     Precancerous skin lesion     removed from Right shoulder    Preeclampsia 2011    pregnancy    Reactive airway disease     [facility]     SVT (supraventricular tachycardia) ([facility] 2011    occured during pregnancy when picc line inserted. Diet Order: Cardiac  % Eaten:  No data found. Pertinent Medications: [x]Reviewed: NS, folic acid, humalog, solumedrol, PPI,   Pertinent Labs: [x]Reviewed:  Food Allergies: [x]NKFA  []Other   Last BM: 10 days ago  Edema:   n/a     []RUE   []LUE   []RLE   []LLE      Pressure Injury:   n/a   [] Stage I   [] Stage II   [] Stage III   [] Stage IV      Wt Readings from Last 30 Encounters:   05/14/20 94 kg (207 lb 3.7 oz)   04/18/20 95.9 kg (211 lb 6.7 oz)   03/14/20 98.4 kg (217 lb)   03/02/20 98.4 kg (217 lb)   02/13/20 97.5 kg (215 lb)   01/31/20 97.8 kg (215 lb 8 oz)   01/23/20 54.9 kg (121 lb)   12/05/19 99.3 kg (219 lb)   09/28/19 101.7 kg (224 lb 1.6 oz)   09/24/19 100.7 kg (222 lb)   08/01/19 101.6 kg (224 lb)   06/13/19 101.5 kg (223 lb 12.8 oz)   06/09/19 102.5 kg (226 lb)   05/17/19 104.3 kg (230 lb)   05/16/19 103.4 kg (228 lb)   04/01/19 104.8 kg (231 lb)   02/12/19 109.3 kg (241 lb)   12/21/18 104.3 kg (230 lb)   12/12/18 107.5 kg (237 lb)   11/13/18 107.7 kg (237 lb 6.4 oz)   10/30/18 109.3 kg (241 lb)   10/26/18 109.5 kg (241 lb 6.4 oz)   09/11/18 106.5 kg (234 lb 12.8 oz)   02/20/18 104.3 kg (230 lb)   02/19/18 103.9 kg (229 lb)   01/13/18 105.2 kg (232 lb)   08/16/17 101.2 kg (223 lb)   07/31/17 99.8 kg (220 lb)   06/12/17 99.9 kg (220 lb 3.2 oz)   04/16/17 99.4 kg (219 lb 2.2 oz)       Anthropometrics:   Height: 5' 6.5\" (168.9 cm) Weight: 94 kg (207 lb 3.7 oz)   IBW (%IBW):   ( ) UBW (%UBW):   (  %)   Last Weight Metrics:  Weight Loss Metrics 5/14/2020 4/18/2020 3/14/2020 3/2/2020 2/13/2020 1/31/2020 1/23/2020   Today's Wt 207 lb 3.7 oz 211 lb 6.7 oz 217 lb 217 lb 215 lb 215 lb 8 oz 121 lb   BMI 32.95 kg/m2 33.61 kg/m2 35.02 kg/m2 35.02 kg/m2 34.7 kg/m2 34.78 kg/m2 19.53 kg/m2       BMI: Body mass index is 32.95 kg/m².     This BMI is indicative of:   []Underweight []Normal    []Overweight    [x] Obesity   [] Extreme Obesity (BMI>40)     Estimated Nutrition Needs (Based on):   1800 Kcals/day(BMR: 1650 x 1.1) , 75 g(-95 (0.8-1 g/kg)) Protein  Carbohydrate: At Least 130 g/day  Fluids: 1800 mL/day (1ml/kcal) or per primary team    NUTRITION DIAGNOSES:   Problem:  Altered GI function      Etiology: related to constipation      Signs/Symptoms: as evidenced by No BM x 10 days      NUTRITION INTERVENTIONS:  Meals/Snacks: General/healthful diet                  GOAL:   consume >50% of meals in 3-5 days    LEARNING NEEDS (Diet, Food/Nutrient-Drug Interaction):    [x] None Identified   [] Identified and Education Provided/Documented   [] Identified and Pt declined/was not appropriate     Cultureal, Gnosticism, OR Ethnic Dietary Needs:    [x] None Identified   [] Identified and Addressed     [x] Interdisciplinary Care Plan Reviewed/Documented    [x] Discharge Planning: Small frequent meals if early satiety persists,       MONITORING /EVALUATION:      Food/Nutrient Intake Outcomes:  Total energy intake  Physical Signs/Symptoms Outcomes: Weight/weight change, Electrolyte and renal profile, Glucose profile, GI, GI profile    NUTRITION RISK:    [] High              [x] Moderate           []  Low  []  Minimal/Uncompromised    PT SEEN FOR:    []  MD Consult: []Calorie Count      []Diabetic Diet Education        []Diet Education     []Electrolyte Management     []General Nutrition Management and Supplements     []Management of Tube Feeding     []TPN Recommendations    [x]  RN Referral:  [x]MST score >=2     []Enteral/Parenteral Nutrition PTA     []Pregnant: Gestational DM or Multigestation     []Pressure Ulcer/Wound Care needs        []  Low BMI  []  LOS Referral       Joslyn Rios RDN  Pager [phone]  Weekend Pager [phone]

## 2020-05-15 NOTE — PROGRESS NOTES
Dr. Ivett Rodriguez notified via perfect serve of pt's negative covid results. Order given for transfer.

## 2020-05-16 LAB
GLUCOSE BLD STRIP.AUTO-MCNC: 134 MG/DL (ref 65–100)
GLUCOSE BLD STRIP.AUTO-MCNC: 145 MG/DL (ref 65–100)
GLUCOSE BLD STRIP.AUTO-MCNC: 152 MG/DL (ref 65–100)
GLUCOSE BLD STRIP.AUTO-MCNC: 189 MG/DL (ref 65–100)
SERVICE CMNT-IMP: ABNORMAL

## 2020-05-16 PROCEDURE — 74011250637 HC RX REV CODE- 250/637: Performed by: INTERNAL MEDICINE

## 2020-05-16 PROCEDURE — 74011636637 HC RX REV CODE- 636/637: Performed by: INTERNAL MEDICINE

## 2020-05-16 PROCEDURE — 82962 GLUCOSE BLOOD TEST: CPT

## 2020-05-16 PROCEDURE — 74011000258 HC RX REV CODE- 258: Performed by: INTERNAL MEDICINE

## 2020-05-16 PROCEDURE — 94640 AIRWAY INHALATION TREATMENT: CPT

## 2020-05-16 PROCEDURE — 74011250637 HC RX REV CODE- 250/637: Performed by: FAMILY MEDICINE

## 2020-05-16 PROCEDURE — 94760 N-INVAS EAR/PLS OXIMETRY 1: CPT

## 2020-05-16 PROCEDURE — 65660000000 HC RM CCU STEPDOWN

## 2020-05-16 PROCEDURE — 74011250636 HC RX REV CODE- 250/636: Performed by: INTERNAL MEDICINE

## 2020-05-16 RX ORDER — DIPHENHYDRAMINE HCL 25 MG
25 CAPSULE ORAL ONCE
Status: COMPLETED | OUTPATIENT
Start: 2020-05-16 | End: 2020-05-16

## 2020-05-16 RX ADMIN — Medication 5 ML: at 06:38

## 2020-05-16 RX ADMIN — CETIRIZINE HYDROCHLORIDE 10 MG: 10 TABLET, FILM COATED ORAL at 08:40

## 2020-05-16 RX ADMIN — ALBUTEROL SULFATE 1 PUFF: 90 AEROSOL, METERED RESPIRATORY (INHALATION) at 07:33

## 2020-05-16 RX ADMIN — MONTELUKAST SODIUM 10 MG: 10 TABLET, FILM COATED ORAL at 08:40

## 2020-05-16 RX ADMIN — METOCLOPRAMIDE HYDROCHLORIDE 5 MG: 5 SOLUTION ORAL at 08:40

## 2020-05-16 RX ADMIN — FOLIC ACID 1 MG: 1 TABLET ORAL at 08:40

## 2020-05-16 RX ADMIN — HEPARIN SODIUM 5000 UNITS: 5000 INJECTION INTRAVENOUS; SUBCUTANEOUS at 06:37

## 2020-05-16 RX ADMIN — METRONIDAZOLE 500 MG: 500 INJECTION, SOLUTION INTRAVENOUS at 16:59

## 2020-05-16 RX ADMIN — OXYBUTYNIN CHLORIDE 5 MG: 5 TABLET ORAL at 08:40

## 2020-05-16 RX ADMIN — INSULIN LISPRO 3 UNITS: 100 INJECTION, SOLUTION INTRAVENOUS; SUBCUTANEOUS at 13:01

## 2020-05-16 RX ADMIN — ALBUTEROL SULFATE 1 PUFF: 90 AEROSOL, METERED RESPIRATORY (INHALATION) at 01:15

## 2020-05-16 RX ADMIN — HYDROXYCHLOROQUINE SULFATE 200 MG: 200 TABLET ORAL at 08:40

## 2020-05-16 RX ADMIN — INSULIN LISPRO 3 UNITS: 100 INJECTION, SOLUTION INTRAVENOUS; SUBCUTANEOUS at 08:39

## 2020-05-16 RX ADMIN — METHYLPREDNISOLONE SODIUM SUCCINATE 40 MG: 40 INJECTION, POWDER, FOR SOLUTION INTRAMUSCULAR; INTRAVENOUS at 06:37

## 2020-05-16 RX ADMIN — FLUTICASONE FUROATE AND VILANTEROL TRIFENATATE 1 PUFF: 200; 25 POWDER RESPIRATORY (INHALATION) at 07:33

## 2020-05-16 RX ADMIN — METHYLPREDNISOLONE SODIUM SUCCINATE 40 MG: 40 INJECTION, POWDER, FOR SOLUTION INTRAMUSCULAR; INTRAVENOUS at 21:37

## 2020-05-16 RX ADMIN — PANTOPRAZOLE SODIUM 40 MG: 40 TABLET, DELAYED RELEASE ORAL at 06:37

## 2020-05-16 RX ADMIN — BUPROPION HYDROCHLORIDE 150 MG: 150 TABLET, EXTENDED RELEASE ORAL at 06:37

## 2020-05-16 RX ADMIN — METRONIDAZOLE 500 MG: 500 INJECTION, SOLUTION INTRAVENOUS at 01:05

## 2020-05-16 RX ADMIN — METRONIDAZOLE 500 MG: 500 INJECTION, SOLUTION INTRAVENOUS at 08:41

## 2020-05-16 RX ADMIN — ESCITALOPRAM OXALATE 20 MG: 10 TABLET ORAL at 08:40

## 2020-05-16 RX ADMIN — CEFTRIAXONE SODIUM 2 G: 2 INJECTION, POWDER, FOR SOLUTION INTRAMUSCULAR; INTRAVENOUS at 10:51

## 2020-05-16 RX ADMIN — LEVOTHYROXINE SODIUM 75 MCG: 0.07 TABLET ORAL at 06:37

## 2020-05-16 RX ADMIN — DIPHENHYDRAMINE HYDROCHLORIDE 25 MG: 25 CAPSULE ORAL at 23:07

## 2020-05-16 RX ADMIN — HEPARIN SODIUM 5000 UNITS: 5000 INJECTION INTRAVENOUS; SUBCUTANEOUS at 21:37

## 2020-05-16 RX ADMIN — METHYLPREDNISOLONE SODIUM SUCCINATE 40 MG: 40 INJECTION, POWDER, FOR SOLUTION INTRAMUSCULAR; INTRAVENOUS at 14:34

## 2020-05-16 RX ADMIN — METOCLOPRAMIDE HYDROCHLORIDE 5 MG: 5 SOLUTION ORAL at 13:02

## 2020-05-16 RX ADMIN — HEPARIN SODIUM 5000 UNITS: 5000 INJECTION INTRAVENOUS; SUBCUTANEOUS at 14:34

## 2020-05-16 RX ADMIN — TRAMADOL HYDROCHLORIDE 50 MG: 50 TABLET, FILM COATED ORAL at 21:37

## 2020-05-16 RX ADMIN — Medication 10 ML: at 14:00

## 2020-05-16 NOTE — PROGRESS NOTES
Hospitalist Progress Note    NAME: Kerry Freed   :  1980   MRN:  [id number]       Assessment / Plan:      Possible splenic infarction versus inflammation/infection  Sepsis due to above  -Patient had a SARS-CoV-2 test negative about 2 weeks ago  -she is presenting with left lower quadrant abdominal pain since the last 2 days before admission and was noted to have possible hypodensities in the spleen consistent with infection, inflammation, neoplasia and infarction  -She was febrile with temperature of 101 and was also tachycardic on arrival  -She continues to report exertional shortness of breath along with cough and small amount but chest x-ray shows no acute cardiopulmonary process, reported that is improving today  -Given sepsis, was started empiric antibiotics with ceftriaxone and Flagyl. f/u blood cultures.    -Consulted hematology due to suspicion of splenic infarction, input is appreciated. I discussed the case with Dr. Cindy Hawkins. After hematology discussion with radiology there was less suspicion for infarct and they recommended to repeat imaging  -She may need a hypercoagulable panel but will defer to oncology. 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 Started insulin sliding scale with blood sugar checks.  Consider adding NPH to steroids if blood sugars go up     History of depression  History of GERD  Hypothyroidism  Hypertension  -Continue home Wellbutrin, Lexapro, levothyroxine and Protonix  -Continue home propranolol     History of hyperreactive airway disease  -Continue home albuterol and Symbicort. Consider adding steroids if she develops any wheezing                   Code Status: Full code  Surrogate Decision Maker:      DVT Prophylaxis: Heparin        Subjective:     Chief Complaint / Reason for Physician Visit  Possible splenic infarction versus inflammation/infection  Sepsis due to above  Discussed with RN events overnight. Patient was seen and examined. No acute events overnight. \"I am feeling much better today and able to breath again\"    Review of Systems:  Symptom Y/N Comments  Symptom Y/N Comments   Fever/Chills n   Chest Pain n    Poor Appetite    Edema     Cough n   Abdominal Pain n    Sputum n   Joint Pain     SOB/MENDOZA y improving  Pruritis/Rash     Nausea/vomit n   Tolerating PT/OT     Diarrhea    Tolerating Diet y    Constipation    Other       Could NOT obtain due to:      Objective:     VITALS:   Last 24hrs VS reviewed since prior progress note. Most recent are:  Patient Vitals for the past 24 hrs:   Temp Pulse Resp BP SpO2   05/16/20 1126 97.9 °F (36.6 °C) 85 16 132/75 98 %   05/16/20 1059 98 °F (36.7 °C) 82 15 142/81 97 %   05/16/20 0802 97.9 °F (36.6 °C) 79 16 112/64 99 %   05/16/20 0638 -- 76 16 132/90 97 %   05/16/20 0115 -- -- -- -- 95 %   05/15/20 2306 98.1 °F (36.7 °C) 87 16 111/71 98 %   05/15/20 2001 -- -- -- -- 97 %   05/15/20 1955 98 °F (36.7 °C) 83 16 118/73 97 %       Intake/Output Summary (Last 24 hours) at 5/16/2020 1203  Last data filed at 5/16/2020 0604  Gross per 24 hour   Intake 1000 ml   Output --   Net 1000 ml        PHYSICAL EXAM:  General: WD, WN. Alert, cooperative, no acute distress    EENT:  EOMI. Anicteric sclerae. MMM  Resp:  CTA bilaterally, no wheezing or rales.   No accessory muscle use  CV:  Regular  rhythm,  No edema  GI:  Soft, Non distended, Non tender.  +Bowel sounds  Neurologic:  Alert and oriented X 3, normal speech,   Psych:   Good insight. Not anxious nor agitated  Skin:  No marcella. No jaundice    Reviewed most current lab test results and cultures  YES  Reviewed most current radiology test results   YES  Review and summation of old records today    NO  Reviewed patient's current orders and MAR    YES  PMH/SH reviewed - no change compared to H&P  ________________________________________________________________________  Care Plan discussed with:    Comments   Patient x    Family      RN x    Care Manager     Consultant                        Multidiciplinary team rounds were held today with , nursing, pharmacist and clinical coordinator. Patient's plan of care was discussed; medications were reviewed and discharge planning was addressed. ________________________________________________________________________  Total NON critical care TIME: 40   Minutes    Total CRITICAL CARE TIME Spent:   Minutes non procedure based      Comments   >50% of visit spent in counseling and coordination of care     ________________________________________________________________________  Maolrie Spear MD     Procedures: see electronic medical records for all procedures/Xrays and details which were not copied into this note but were reviewed prior to creation of Plan. LABS:  I reviewed today's most current labs and imaging studies.   Pertinent labs include:  Recent Labs     05/15/20  0554 05/14/20  0557   WBC 6.4 10.8   HGB 11.5 12.5   HCT 36.0 37.1    395     Recent Labs     05/15/20  0554 05/14/20  0557   * 135*   K 3.9 3.6    99   CO2 24 27   * 92   BUN 7 10   CREA 0.87 1.28*   CA 8.4* 8.8   ALB  --  3.5   TBILI  --  0.6   SGOT  --  16   ALT  --  25       Signed: Malorie Spear MD

## 2020-05-16 NOTE — PROGRESS NOTES
Hospitalist Progress Note    NAME: Elvia Libman   :  1980   MRN:  [id number]       Assessment / Plan:      Possible splenic infarction versus inflammation/infection  Sepsis due to above  -Patient had a SARS-CoV-2 test negative about 2 weeks ago  -she is presenting with left lower quadrant abdominal pain since the last 2 days before admission and was noted to have possible hypodensities in the spleen consistent with infection, inflammation, neoplasia and infarction  -She was febrile with temperature of 101 and was also tachycardic on arrival  -She continues to report exertional shortness of breath along with cough and small amount but chest x-ray shows no acute cardiopulmonary process, reported that is improving today  -Given sepsis, was started empiric antibiotics with ceftriaxone and Flagyl. f/u blood cultures.    -Consulted hematology due to suspicion of splenic infarction, input is appreciated. I discussed the case with Dr. Leidy Alegria. After hematology discussion with radiology there was less suspicion for infarct and they recommended to repeat imaging  -She may need a hypercoagulable panel but will defer to oncology. 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 Started insulin sliding scale with blood sugar checks.  Consider adding NPH to steroids if blood sugars go up     History of depression  History of GERD  Hypothyroidism  Hypertension  -Continue home Wellbutrin, Lexapro, levothyroxine and Protonix  -Continue home propranolol     History of hyperreactive airway disease  -Continue home albuterol and Symbicort. Consider adding steroids if she develops any wheezing                   Code Status: Full code  Surrogate Decision Maker:      DVT Prophylaxis: Heparin        Subjective:     Chief Complaint / Reason for Physician Visit  Possible splenic infarction versus inflammation/infection  Sepsis due to above  Discussed with RN events overnight. Patient was seen and examined. No acute events overnight. \"I am feeling 25% better\"    Review of Systems:  Symptom Y/N Comments  Symptom Y/N Comments   Fever/Chills n   Chest Pain n    Poor Appetite    Edema     Cough n   Abdominal Pain n    Sputum n   Joint Pain     SOB/MENDOZA y improving  Pruritis/Rash     Nausea/vomit n   Tolerating PT/OT     Diarrhea    Tolerating Diet y    Constipation    Other       Could NOT obtain due to:      Objective:     VITALS:   Last 24hrs VS reviewed since prior progress note. Most recent are:  Patient Vitals for the past 24 hrs:   Temp Pulse Resp BP SpO2   05/15/20 2001 -- -- -- -- 97 %   05/15/20 1955 98 °F (36.7 °C) 83 16 118/73 97 %   05/15/20 1140 98.4 °F (36.9 °C) 87 18 103/60 99 %   05/15/20 0840 -- -- -- -- 100 %   05/15/20 0833 -- 89 -- 109/65 --   05/15/20 0800 98 °F (36.7 °C) 89 18 109/65 100 %   05/15/20 0544 97.7 °F (36.5 °C) 81 18 118/66 100 %   05/15/20 0156 -- -- -- -- 98 %   05/15/20 0025 98.1 °F (36.7 °C) 86 18 113/60 98 %       Intake/Output Summary (Last 24 hours) at 5/15/2020 7362  Last data filed at 5/15/2020 0931  Gross per 24 hour   Intake 1300 ml   Output --   Net 1300 ml        PHYSICAL EXAM:  General: WD, WN. Alert, cooperative, no acute distress    EENT:  EOMI. Anicteric sclerae. MMM  Resp:  CTA bilaterally, no wheezing or rales. No accessory muscle use  CV:  Regular  rhythm,  No edema  GI:  Soft, Non distended, Non tender.  +Bowel sounds  Neurologic:  Alert and oriented X 3, normal speech,   Psych:   Good insight. Not anxious nor agitated  Skin:  No rashes.   No jaundice    Reviewed most current lab test results and cultures  YES  Reviewed most current radiology test results   YES  Review and summation of old records today    NO  Reviewed patient's current orders and MAR    YES  PMH/SH reviewed - no change compared to H&P  ________________________________________________________________________  Care Plan discussed with:    Comments   Patient x    Family      RN x    Care Manager     Consultant  x Hematology                     Multidiciplinary team rounds were held today with , nursing, pharmacist and clinical coordinator. Patient's plan of care was discussed; medications were reviewed and discharge planning was addressed. ________________________________________________________________________  Total NON critical care TIME: 40   Minutes    Total CRITICAL CARE TIME Spent:   Minutes non procedure based      Comments   >50% of visit spent in counseling and coordination of care     ________________________________________________________________________  Sigifredo Vargas MD     Procedures: see electronic medical records for all procedures/Xrays and details which were not copied into this note but were reviewed prior to creation of Plan. LABS:  I reviewed today's most current labs and imaging studies.   Pertinent labs include:  Recent Labs     05/15/20  0554 05/14/20  0557   WBC 6.4 10.8   HGB 11.5 12.5   HCT 36.0 37.1    395     Recent Labs     05/15/20  0554 05/14/20  0557   * 135*   K 3.9 3.6    99   CO2 24 27   * 92   BUN 7 10   CREA 0.87 1.28*   CA 8.4* 8.8   ALB  --  3.5   TBILI  --  0.6   SGOT  --  16   ALT  --  25       Signed: Sigifredo Vargas MD

## 2020-05-17 ENCOUNTER — APPOINTMENT (OUTPATIENT)
Dept: CT IMAGING | Age: 40
DRG: 815 | End: 2020-05-17
Attending: INTERNAL MEDICINE
Payer: COMMERCIAL

## 2020-05-17 LAB
GLUCOSE BLD STRIP.AUTO-MCNC: 168 MG/DL (ref 65–100)
GLUCOSE BLD STRIP.AUTO-MCNC: 172 MG/DL (ref 65–100)
GLUCOSE BLD STRIP.AUTO-MCNC: 206 MG/DL (ref 65–100)
GLUCOSE BLD STRIP.AUTO-MCNC: 235 MG/DL (ref 65–100)
SERVICE CMNT-IMP: ABNORMAL

## 2020-05-17 PROCEDURE — 82962 GLUCOSE BLOOD TEST: CPT

## 2020-05-17 PROCEDURE — 74011000255 HC RX REV CODE- 255: Performed by: INTERNAL MEDICINE

## 2020-05-17 PROCEDURE — 94760 N-INVAS EAR/PLS OXIMETRY 1: CPT

## 2020-05-17 PROCEDURE — 74177 CT ABD & PELVIS W/CONTRAST: CPT

## 2020-05-17 PROCEDURE — 65660000000 HC RM CCU STEPDOWN

## 2020-05-17 PROCEDURE — 74011636320 HC RX REV CODE- 636/320: Performed by: INTERNAL MEDICINE

## 2020-05-17 PROCEDURE — 74011250636 HC RX REV CODE- 250/636: Performed by: INTERNAL MEDICINE

## 2020-05-17 PROCEDURE — 74011250637 HC RX REV CODE- 250/637: Performed by: INTERNAL MEDICINE

## 2020-05-17 PROCEDURE — 74011636637 HC RX REV CODE- 636/637: Performed by: INTERNAL MEDICINE

## 2020-05-17 PROCEDURE — 74011000258 HC RX REV CODE- 258: Performed by: INTERNAL MEDICINE

## 2020-05-17 RX ORDER — POLYETHYLENE GLYCOL 3350 17 G/17G
17 POWDER, FOR SOLUTION ORAL DAILY
Status: DISCONTINUED | OUTPATIENT
Start: 2020-05-17 | End: 2020-05-18 | Stop reason: HOSPADM

## 2020-05-17 RX ORDER — BARIUM SULFATE 20 MG/ML
900 SUSPENSION ORAL
Status: COMPLETED | OUTPATIENT
Start: 2020-05-17 | End: 2020-05-17

## 2020-05-17 RX ORDER — SODIUM CHLORIDE 0.9 % (FLUSH) 0.9 %
10 SYRINGE (ML) INJECTION
Status: COMPLETED | OUTPATIENT
Start: 2020-05-17 | End: 2020-05-17

## 2020-05-17 RX ADMIN — METOCLOPRAMIDE HYDROCHLORIDE 5 MG: 5 SOLUTION ORAL at 08:02

## 2020-05-17 RX ADMIN — Medication 10 ML: at 14:00

## 2020-05-17 RX ADMIN — INSULIN LISPRO 2 UNITS: 100 INJECTION, SOLUTION INTRAVENOUS; SUBCUTANEOUS at 16:45

## 2020-05-17 RX ADMIN — HEPARIN SODIUM 5000 UNITS: 5000 INJECTION INTRAVENOUS; SUBCUTANEOUS at 06:29

## 2020-05-17 RX ADMIN — BUPROPION HYDROCHLORIDE 150 MG: 150 TABLET, EXTENDED RELEASE ORAL at 06:29

## 2020-05-17 RX ADMIN — INSULIN LISPRO 2 UNITS: 100 INJECTION, SOLUTION INTRAVENOUS; SUBCUTANEOUS at 21:20

## 2020-05-17 RX ADMIN — TRAMADOL HYDROCHLORIDE 50 MG: 50 TABLET, FILM COATED ORAL at 20:11

## 2020-05-17 RX ADMIN — METRONIDAZOLE 500 MG: 500 INJECTION, SOLUTION INTRAVENOUS at 16:45

## 2020-05-17 RX ADMIN — HYDROXYCHLOROQUINE SULFATE 200 MG: 200 TABLET ORAL at 08:34

## 2020-05-17 RX ADMIN — Medication 10 ML: at 21:21

## 2020-05-17 RX ADMIN — IOPAMIDOL 100 ML: 755 INJECTION, SOLUTION INTRAVENOUS at 14:00

## 2020-05-17 RX ADMIN — ALBUTEROL SULFATE 1 PUFF: 90 AEROSOL, METERED RESPIRATORY (INHALATION) at 15:10

## 2020-05-17 RX ADMIN — ALBUTEROL SULFATE 1 PUFF: 90 AEROSOL, METERED RESPIRATORY (INHALATION) at 08:06

## 2020-05-17 RX ADMIN — METOCLOPRAMIDE HYDROCHLORIDE 5 MG: 5 SOLUTION ORAL at 11:25

## 2020-05-17 RX ADMIN — LEVOTHYROXINE SODIUM 75 MCG: 0.07 TABLET ORAL at 08:02

## 2020-05-17 RX ADMIN — METHYLPREDNISOLONE SODIUM SUCCINATE 40 MG: 40 INJECTION, POWDER, FOR SOLUTION INTRAMUSCULAR; INTRAVENOUS at 21:20

## 2020-05-17 RX ADMIN — CEFTRIAXONE SODIUM 2 G: 2 INJECTION, POWDER, FOR SOLUTION INTRAMUSCULAR; INTRAVENOUS at 09:45

## 2020-05-17 RX ADMIN — PANTOPRAZOLE SODIUM 40 MG: 40 TABLET, DELAYED RELEASE ORAL at 08:02

## 2020-05-17 RX ADMIN — FLUTICASONE FUROATE AND VILANTEROL TRIFENATATE 1 PUFF: 200; 25 POWDER RESPIRATORY (INHALATION) at 08:06

## 2020-05-17 RX ADMIN — Medication 10 ML: at 06:29

## 2020-05-17 RX ADMIN — METRONIDAZOLE 500 MG: 500 INJECTION, SOLUTION INTRAVENOUS at 02:15

## 2020-05-17 RX ADMIN — INSULIN LISPRO 4 UNITS: 100 INJECTION, SOLUTION INTRAVENOUS; SUBCUTANEOUS at 11:25

## 2020-05-17 RX ADMIN — POLYETHYLENE GLYCOL 3350 17 G: 17 POWDER, FOR SOLUTION ORAL at 11:25

## 2020-05-17 RX ADMIN — OXYBUTYNIN CHLORIDE 5 MG: 5 TABLET ORAL at 08:34

## 2020-05-17 RX ADMIN — INSULIN LISPRO 3 UNITS: 100 INJECTION, SOLUTION INTRAVENOUS; SUBCUTANEOUS at 08:02

## 2020-05-17 RX ADMIN — Medication 10 ML: at 15:10

## 2020-05-17 RX ADMIN — OXYBUTYNIN CHLORIDE 5 MG: 5 TABLET ORAL at 17:23

## 2020-05-17 RX ADMIN — METHYLPREDNISOLONE SODIUM SUCCINATE 40 MG: 40 INJECTION, POWDER, FOR SOLUTION INTRAMUSCULAR; INTRAVENOUS at 15:09

## 2020-05-17 RX ADMIN — ALBUTEROL SULFATE 1 PUFF: 90 AEROSOL, METERED RESPIRATORY (INHALATION) at 20:00

## 2020-05-17 RX ADMIN — CETIRIZINE HYDROCHLORIDE 10 MG: 10 TABLET, FILM COATED ORAL at 08:34

## 2020-05-17 RX ADMIN — FOLIC ACID 1 MG: 1 TABLET ORAL at 08:34

## 2020-05-17 RX ADMIN — METHYLPREDNISOLONE SODIUM SUCCINATE 40 MG: 40 INJECTION, POWDER, FOR SOLUTION INTRAMUSCULAR; INTRAVENOUS at 06:29

## 2020-05-17 RX ADMIN — ACETAMINOPHEN 650 MG: 325 TABLET, FILM COATED ORAL at 23:55

## 2020-05-17 RX ADMIN — BARIUM SULFATE 900 ML: 20 SUSPENSION ORAL at 12:39

## 2020-05-17 RX ADMIN — HEPARIN SODIUM 5000 UNITS: 5000 INJECTION INTRAVENOUS; SUBCUTANEOUS at 15:09

## 2020-05-17 RX ADMIN — HEPARIN SODIUM 5000 UNITS: 5000 INJECTION INTRAVENOUS; SUBCUTANEOUS at 21:20

## 2020-05-17 RX ADMIN — ESCITALOPRAM OXALATE 20 MG: 10 TABLET ORAL at 08:34

## 2020-05-17 RX ADMIN — FLUTICASONE FUROATE AND VILANTEROL TRIFENATATE 1 PUFF: 200; 25 POWDER RESPIRATORY (INHALATION) at 08:00

## 2020-05-17 RX ADMIN — SODIUM CHLORIDE 100 ML/HR: 900 INJECTION, SOLUTION INTRAVENOUS at 00:27

## 2020-05-17 RX ADMIN — METRONIDAZOLE 500 MG: 500 INJECTION, SOLUTION INTRAVENOUS at 08:34

## 2020-05-17 RX ADMIN — MONTELUKAST SODIUM 10 MG: 10 TABLET, FILM COATED ORAL at 08:34

## 2020-05-17 RX ADMIN — METOCLOPRAMIDE HYDROCHLORIDE 5 MG: 5 SOLUTION ORAL at 16:45

## 2020-05-17 NOTE — PROGRESS NOTES
General Surgery End of Shift Nursing Note    Shift worked:   0700 - 1900   Summary of shift:    Uneventful shift, patient had CT scan with contrast today. Patient did not complain of any pain today and was not medicated with pain medications. Patient showered today. Patient did not have any nausea or vomiting. Issues for physician to address:   None at this time.         Maged Skinner LPN

## 2020-05-17 NOTE — PROGRESS NOTES
Hospitalist Progress Note    NAME: Tavo Chauhan   :  1980   MRN:  [id number]       Assessment / Plan:      Possible splenic infarction versus inflammation/infection  Sepsis due to above  -Patient had a SARS-CoV-2 test negative about 2 weeks ago  -she is presenting with left lower quadrant abdominal pain since the last 2 days before admission and was noted to have possible hypodensities in the spleen consistent with infection, inflammation, neoplasia and infarction  -She was febrile with temperature of 101 and was also tachycardic on arrival  -She continues to report exertional shortness of breath along with cough and small amount but chest x-ray shows no acute cardiopulmonary process, reported that is improving today  -Given sepsis, was started empiric antibiotics with ceftriaxone and Flagyl. f/u blood cultures.    -Consulted hematology due to suspicion of splenic infarction, input is appreciated. I discussed the case with Dr. Kasie Mansfield. After hematology discussion with radiology there was less suspicion for infarct and they recommended to repeat imaging  -She may need a hypercoagulable panel but will defer to oncology. 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 Started insulin sliding scale with blood sugar checks.  Consider adding NPH to steroids if blood sugars go up     History of depression  History of GERD  Hypothyroidism  Hypertension  -Continue home Wellbutrin, Lexapro, levothyroxine and Protonix  -Continue home propranolol     History of hyperreactive airway disease  -Continue home albuterol and Symbicort.                   Code Status: Full code  Surrogate Decision Maker:      DVT Prophylaxis: Heparin        Subjective:     Chief Complaint / Reason for Physician Visit  Possible splenic infarction versus inflammation/infection  Sepsis due to above  Discussed with RN events overnight. Patient was seen and examined. No acute events overnight. \"I am feeling ok but I think I over did it yesterday\"    Review of Systems:  Symptom Y/N Comments  Symptom Y/N Comments   Fever/Chills n   Chest Pain n    Poor Appetite    Edema     Cough n   Abdominal Pain n    Sputum n   Joint Pain     SOB/MENDOZA y improving  Pruritis/Rash     Nausea/vomit n   Tolerating PT/OT     Diarrhea    Tolerating Diet y    Constipation    Other       Could NOT obtain due to:      Objective:     VITALS:   Last 24hrs VS reviewed since prior progress note. Most recent are:  Patient Vitals for the past 24 hrs:   Temp Pulse Resp BP SpO2   05/17/20 0841 98 °F (36.7 °C) 69 18 156/84 97 %   05/17/20 0312 97.9 °F (36.6 °C) 74 18 139/68 99 %   05/16/20 2335 98.5 °F (36.9 °C) 69 18 136/84 99 %   05/16/20 2018 97.3 °F (36.3 °C) 86 18 128/82 100 %   05/16/20 1428 97.9 °F (36.6 °C) 83 17 133/85 100 %   05/16/20 1126 97.9 °F (36.6 °C) 85 16 132/75 98 %   05/16/20 1059 98 °F (36.7 °C) 82 15 142/81 97 %     No intake or output data in the 24 hours ending 05/17/20 1009     PHYSICAL EXAM:  General: WD, WN. Alert, cooperative, no acute distress    EENT:  EOMI. Anicteric sclerae. MMM  Resp:  CTA bilaterally, no wheezing or rales. No accessory muscle use  CV:  Regular  rhythm,  No edema  GI:  Soft, Non distended, mild tenderness in LUQ.  +Bowel sounds  Neurologic:  Alert and oriented X 3, normal speech,   Psych:   Good insight. Not anxious nor agitated  Skin:  No rashes.   No jaundice    Reviewed most current lab test results and cultures  YES  Reviewed most current radiology test results   YES  Review and summation of old records today    NO  Reviewed patient's current orders and MAR    YES  PMH/SH reviewed - no change compared to H&P  ________________________________________________________________________  Care Plan discussed with:    Comments   Patient x    Family      RN x    Care Manager     Consultant                        Multidiciplinary team rounds were held today with , nursing, pharmacist and clinical coordinator. Patient's plan of care was discussed; medications were reviewed and discharge planning was addressed. ________________________________________________________________________  Total NON critical care TIME: 35Minutes    Total CRITICAL CARE TIME Spent:   Minutes non procedure based      Comments   >50% of visit spent in counseling and coordination of care     ________________________________________________________________________  Malorie Spear MD     Procedures: see electronic medical records for all procedures/Xrays and details which were not copied into this note but were reviewed prior to creation of Plan. LABS:  I reviewed today's most current labs and imaging studies.   Pertinent labs include:  Recent Labs     05/15/20  0554   WBC 6.4   HGB 11.5   HCT 36.0        Recent Labs     05/15/20  0554   *   K 3.9      CO2 24   *   BUN 7   CREA 0.87   CA 8.4*       Signed: Malorie Spear MD

## 2020-05-17 NOTE — PROGRESS NOTES
TELE-HOSPITALIST CROSS COVER NOTE:    Visit Vitals  /82   Pulse 86   Temp 97.3 °F (36.3 °C)   Resp 18   Ht 5' 6.5\" (1.689 m)   Wt 94 kg (207 lb 3.7 oz)   SpO2 100%   BMI 32.95 kg/m²         D/W RN; medical records reviewed; Benadryl 25mg PO x 1 for symptomatic management.       Mirtha Grant

## 2020-05-18 VITALS
HEIGHT: 67 IN | OXYGEN SATURATION: 99 % | HEART RATE: 66 BPM | RESPIRATION RATE: 16 BRPM | WEIGHT: 207.23 LBS | TEMPERATURE: 98.2 F | DIASTOLIC BLOOD PRESSURE: 87 MMHG | SYSTOLIC BLOOD PRESSURE: 147 MMHG | BODY MASS INDEX: 32.53 KG/M2

## 2020-05-18 PROBLEM — A41.9 SEPSIS (HCC): Status: RESOLVED | Noted: 2020-05-14 | Resolved: 2020-05-18

## 2020-05-18 LAB
GLUCOSE BLD STRIP.AUTO-MCNC: 128 MG/DL (ref 65–100)
SERVICE CMNT-IMP: ABNORMAL

## 2020-05-18 PROCEDURE — 74011250637 HC RX REV CODE- 250/637: Performed by: INTERNAL MEDICINE

## 2020-05-18 PROCEDURE — 82962 GLUCOSE BLOOD TEST: CPT

## 2020-05-18 PROCEDURE — 74011250636 HC RX REV CODE- 250/636: Performed by: INTERNAL MEDICINE

## 2020-05-18 PROCEDURE — 94760 N-INVAS EAR/PLS OXIMETRY 1: CPT

## 2020-05-18 PROCEDURE — 74011000258 HC RX REV CODE- 258: Performed by: INTERNAL MEDICINE

## 2020-05-18 PROCEDURE — 94640 AIRWAY INHALATION TREATMENT: CPT

## 2020-05-18 RX ORDER — METOCLOPRAMIDE HYDROCHLORIDE 5 MG/5ML
5 SOLUTION ORAL
Qty: 210 ML | Refills: 0 | Status: SHIPPED | OUTPATIENT
Start: 2020-05-18 | End: 2020-06-01

## 2020-05-18 RX ORDER — ALBUTEROL SULFATE 90 UG/1
1 AEROSOL, METERED RESPIRATORY (INHALATION)
Status: DISCONTINUED | OUTPATIENT
Start: 2020-05-18 | End: 2020-05-18 | Stop reason: HOSPADM

## 2020-05-18 RX ADMIN — FLUTICASONE FUROATE AND VILANTEROL TRIFENATATE 1 PUFF: 200; 25 POWDER RESPIRATORY (INHALATION) at 09:18

## 2020-05-18 RX ADMIN — BUPROPION HYDROCHLORIDE 150 MG: 150 TABLET, EXTENDED RELEASE ORAL at 06:39

## 2020-05-18 RX ADMIN — TRAMADOL HYDROCHLORIDE 50 MG: 50 TABLET, FILM COATED ORAL at 02:13

## 2020-05-18 RX ADMIN — METOCLOPRAMIDE HYDROCHLORIDE 5 MG: 5 SOLUTION ORAL at 11:45

## 2020-05-18 RX ADMIN — ALBUTEROL SULFATE 1 PUFF: 90 AEROSOL, METERED RESPIRATORY (INHALATION) at 02:00

## 2020-05-18 RX ADMIN — METOCLOPRAMIDE HYDROCHLORIDE 5 MG: 5 SOLUTION ORAL at 06:40

## 2020-05-18 RX ADMIN — FOLIC ACID 1 MG: 1 TABLET ORAL at 09:16

## 2020-05-18 RX ADMIN — LEVOTHYROXINE SODIUM 75 MCG: 0.07 TABLET ORAL at 06:39

## 2020-05-18 RX ADMIN — POLYETHYLENE GLYCOL 3350 17 G: 17 POWDER, FOR SOLUTION ORAL at 09:15

## 2020-05-18 RX ADMIN — GLYCOPYRROLATE 2 MG: 1 TABLET ORAL at 09:27

## 2020-05-18 RX ADMIN — METHYLPREDNISOLONE SODIUM SUCCINATE 40 MG: 40 INJECTION, POWDER, FOR SOLUTION INTRAMUSCULAR; INTRAVENOUS at 06:39

## 2020-05-18 RX ADMIN — ESCITALOPRAM OXALATE 20 MG: 10 TABLET ORAL at 09:16

## 2020-05-18 RX ADMIN — ONDANSETRON 4 MG: 2 INJECTION INTRAMUSCULAR; INTRAVENOUS at 02:33

## 2020-05-18 RX ADMIN — CEFTRIAXONE SODIUM 2 G: 2 INJECTION, POWDER, FOR SOLUTION INTRAMUSCULAR; INTRAVENOUS at 10:47

## 2020-05-18 RX ADMIN — PANTOPRAZOLE SODIUM 40 MG: 40 TABLET, DELAYED RELEASE ORAL at 06:40

## 2020-05-18 RX ADMIN — HYDROXYCHLOROQUINE SULFATE 200 MG: 200 TABLET ORAL at 09:15

## 2020-05-18 RX ADMIN — CETIRIZINE HYDROCHLORIDE 10 MG: 10 TABLET, FILM COATED ORAL at 09:16

## 2020-05-18 RX ADMIN — METRONIDAZOLE 500 MG: 500 INJECTION, SOLUTION INTRAVENOUS at 02:04

## 2020-05-18 RX ADMIN — HEPARIN SODIUM 5000 UNITS: 5000 INJECTION INTRAVENOUS; SUBCUTANEOUS at 06:39

## 2020-05-18 RX ADMIN — OXYBUTYNIN CHLORIDE 5 MG: 5 TABLET ORAL at 09:16

## 2020-05-18 RX ADMIN — MONTELUKAST SODIUM 10 MG: 10 TABLET, FILM COATED ORAL at 09:16

## 2020-05-18 RX ADMIN — Medication 10 ML: at 06:40

## 2020-05-18 RX ADMIN — METRONIDAZOLE 500 MG: 500 INJECTION, SOLUTION INTRAVENOUS at 09:17

## 2020-05-18 NOTE — DISCHARGE SUMMARY
Hospitalist Discharge Summary     Patient ID:  Gloria Hurst  [id number]  78 y.o.  1980 5/14/2020    PCP on record: Roz Ríos MD    Admit date: 5/14/2020  Discharge date and time: 5/18/2020    DISCHARGE DIAGNOSIS:    Possible splenic infarction versus inflammation/infection  Sepsis due to above    CONSULTATIONS:  IP CONSULT TO HOSPITALIST  IP CONSULT TO ONCOLOGY    Excerpted HPI from H&P of Alicia Tovar MD:    Nandini Pederson is a 42 y. o.female who presents with past medical history of rheumatoid arthritis, diabetes mellitus is coming to the hospital chief complaints of left lower quadrant pain. Patient reports being in his usual health until about 2 weeks ago when she started having some shortness of breath along with cough and phlegm and was tested for SARS-CoV-2 and was negative. Since then she continued to have small amount of cough with phlegm. She also continues to have shortness of breath. She reports that the last 2 days she developed sudden onset of left quadrant pain which is sharp, 4 x 10, radiating to the back, aggravated with deep breathing. She also reports exertional shortness of breath. She also reports fever at home. Denies nausea, vomiting, diarrhea or constipation. Denies focal weakness, tingling or numbness.     On arrival to the hospital, she was tachycardic and also febrile. On lab work she was noted to have normal CBC. Creatinine was 1.28. She had a CT abdomen in the emergency department which showed evidence of multiple hypodensities in the spleen suggesting infection/inflammation or neoplasia. Chest x-ray was within normal limits. She was given antibiotics in the emergency department.  ______________________________________________________________________  DISCHARGE SUMMARY/HOSPITAL COURSE:  for full details see H&P, daily progress notes, labs, consult notes.      Possible splenic infarction versus inflammation/infection  Sepsis due to above  -Patient had a SARS-CoV-2 test negative about 2 weeks ago  -she is presenting with left lower quadrant abdominal pain since the last 2 days before admission and was noted to have possible hypodensities in the spleen consistent with infection, inflammation, neoplasia and infarction  -Given sepsis, was started empiric antibiotics with ceftriaxone and Flagyl.  f/u blood cultures -ve x 4 days. Will dc Abx likely she was having SIRS +ve only  -Consulted hematology due to suspicion of splenic infarction, input is appreciated. I discussed the case with Dr. Beverley Steve. After hematology discussion with radiology there was less suspicion for infarct and they recommended to repeat imaging   -SARS-CoV-2 testing negative again this admission  -Lactic acid is within normal limits  -CTA \"no pulmonary embolism\"   -repeat imaging CT Abd W to follow up \"Improvement in appearance of the spleen. No new findings otherwise\"  -repeat CT abdomen w c in 1 week  -Hematology/PCP/Rheumatology follow up    Chronic Constipation  -will add small dose Reglan  -f/u with GI as put patent           _______________________________________________________________________  Patient seen and examined by me on discharge day. Pertinent Findings:  Gen:    Not in distress  Chest: Clear lungs  CVS:   Regular rhythm. No edema  Abd:  Soft, not distended, not tender  Neuro:  Alert  _______________________________________________________________________  DISCHARGE MEDICATIONS:   Current Discharge Medication List      START taking these medications    Details   metoclopramide (REGLAN) 5 mg/5 mL syrup Take 5 mL by mouth Before breakfast, lunch, and dinner for 14 days. Qty: 210 mL, Refills: 0         CONTINUE these medications which have NOT CHANGED    Details   diphenhydrAMINE (BenadryL) 25 mg capsule Take 25 mg by mouth every six (6) hours as needed. BORIC ACID 600 mg by Does Not Apply route daily.  Vaginal      predniSONE (DELTASONE) 20 mg tablet Take 20 mg by mouth daily. norethindrone-ethinyl estradiol (Junel FE 1/20, 28,) 1 mg-20 mcg (21)/75 mg (7) tab Take  by mouth. nitrofurantoin (MACRODANTIN) 50 mg capsule Take 1 Cap by mouth daily as needed (post coital). Qty: 30 Cap, Refills: 1    Associated Diagnoses: Recurrent UTI      lisdexamfetamine (Vyvanse) 30 mg capsule Take 1 Cap by mouth every morning. Max Daily Amount: 30 mg.  Qty: 30 Cap, Refills: 0    Associated Diagnoses: Attention deficit hyperactivity disorder (ADHD), predominantly inattentive type      escitalopram oxalate (LEXAPRO) 20 mg tablet TAKE 1 TABLET BY MOUTH EVERY DAY  Qty: 30 Tab, Refills: 11    Associated Diagnoses: Recurrent major depressive disorder, in partial remission (HCC)      tofacitinib citrate (XELJANZ XR PO) Take 30 mg by mouth daily. miconazole nitrate 200 mg/5 gram (4 %) vaginal cream Apply to affected area as needed once a day  Qty: 15 g, Refills: 0      PREVIDENT 5000 SENSITIVE 1.1-5 % pste USE AS DIRECTED TWICE A DAY SPIT OUT EXCESS AND DO NOT RINSE, EAT OR DRINK FOR 30 MINUTES      hydroxychloroquine (PLAQUENIL) 200 mg tablet 200 mg daily. lidocaine-prilocaine (EMLA) topical cream For port flushing every 3 month  Refills: 2      heparin sodium,porcine (HEPARIN LOCK FLUSH, PORCINE, IV) by IntraVENous route. For port flush every 3 months      omeprazole (PRILOSEC) 40 mg capsule Take 1 Cap by mouth daily. Qty: 30 Cap, Refills: 0      Cetirizine (ZYRTEC) 10 mg cap Take 10 mg by mouth daily. Indications: inflammation of the nose due to an allergy  Qty: 30 Cap, Refills: 0      albuterol (PROVENTIL HFA, VENTOLIN HFA, PROAIR HFA) 90 mcg/actuation inhaler Take 2 Puffs by inhalation every four (4) hours as needed for Wheezing. Qty: 1 Inhaler, Refills: 0    Associated Diagnoses: Mild intermittent asthma without complication      omalizumab (XOLAIR SC) by SubCUTAneous route every thirty (30) days.  Given by Dr Espinoza Garces in his office      propranolol LA (INDERAL LA) 60 mg SR capsule TAKE 1 CAPSULE BY MOUTH EVERY DAY  Qty: 90 Cap, Refills: 3      SYMBICORT 160-4.5 mcg/actuation HFAA TAKE 2 PUFFS BY MOUTH TWICE A DAY  Refills: 12      OZEMPIC 0.25 mg or 0.5 mg(2 mg/1.5 mL) sub-q pen by SubCUTAneous route every seven (7) days. Patient states she takes 1 ml every friday  Refills: 1      ondansetron hcl (ZOFRAN) 4 mg tablet Take 4 mg by mouth every eight (8) hours as needed for Nausea. naltrexone (DEPADE) 50 mg tablet Take  by mouth daily. EPIPEN 2-SANJAY 0.3 mg/0.3 mL injection 1 (ONE) INJECTION AS NEEDED  Refills: 0      buPROPion XL (WELLBUTRIN XL) 150 mg tablet Take 1 Tab by mouth every morning. Qty: 90 Tab, Refills: 3    Associated Diagnoses: Depression, unspecified depression type      montelukast (SINGULAIR) 10 mg tablet Take 1 Tab by mouth daily. Qty: 90 Tab, Refills: 4    Associated Diagnoses: Mild persistent asthma without complication      traMADol (ULTRAM) 50 mg tablet Take 50 mg by mouth every six (6) hours as needed for Pain. oxybutynin (DITROPAN) 5 mg tablet 5 mg two (2) times a day. Patient states she takes 1 tablet bid  Refills: 3      BD INSULIN SYRINGE 1 mL 25 gauge x 5/8\" syrg USE 1 SYRINGE ONCE A WEEK  Refills: 11      cyanocobalamin (Vitamin B-12) 1,000 mcg tablet Take  by mouth daily. RESTASIS 0.05 % ophthalmic emulsion INSTILL 1 DROP INTO EACH EYE TWICE DAILY  Refills: 4      SAFETY-ABDI TB SYR 1CC/25GX5/8\" 1 mL 25 gauge x 5/8\" syrg USE TO INJECT METHOTREXATE ONCE A WEEK  Refills: 2      methotrexate 25 mg/mL chemo injection by SubCUTAneous route every seven (7) days. On friday  Refills: 0      metFORMIN ER (GLUCOPHAGE XR) 500 mg tablet 500 mg two (2) times a day. Refills: 1      folic acid (FOLVITE) 1 mg tablet TAKE 1 TABLET ORALLY DAILY  Refills: 11      desogestrel-ethinyl estradiol (DESOGEN) 0.15-0.03 mg tab Take 1 Tab by mouth nightly.       Nebulizer & Compressor machine 1 Each by Does Not Apply route three (3) times daily as needed. Qty: 1 Each, Refills: 0      glycopyrrolate (ROBINUL) 1 mg tablet Take 2 mg by mouth two (2) times a day. Indications: hyperhydrosis      cholecalciferol (Vitamin D3) (1000 Units /25 mcg) tablet Take 1 Tab by mouth daily. levothyroxine (SYNTHROID) 75 mcg tablet Take 75 mcg by mouth Daily (before breakfast). BD SYRINGE 1 mL 25 gauge x 5/8\" syrg 1 SYRINGE ONCE A WEEK  Refills: 4               Patient Follow Up Instructions:    Activity: Activity as tolerated  Diet: Regular Diet  Wound Care: None needed      Follow-up tests/labs CT abdomen W C 05/25/2020  Follow-up Information     Follow up With Specialties Details Why Contact Info    Diane Biswas MD Internal Medicine Call in 2 days For hospital follow up [address] [place], MD Hematology and Oncology In 2 days For hospital follow up [id number] [facility] [address]  [facility].  [phone]          ________________________________________________________________    Risk of deterioration: Low    Condition at Discharge:  Stable  __________________________________________________________________    Disposition  Home with family, no needs    ____________________________________________________________________    Code Status: Full Code  ___________________________________________________________________      Total time in minutes spent coordinating this discharge (includes going over instructions, follow-up, prescriptions, and preparing report for sign off to her PCP) :  35 minutes    Signed:  Desire Rodríguez MD

## 2020-05-18 NOTE — PROGRESS NOTES
I talked with the patient's mother in-law and she was updated on the plan, all questions were answered.

## 2020-05-18 NOTE — PROGRESS NOTES
SHIN:     Follow Up Appointment  Home with family      CM: Demetrice Dawkins is currently working with pt in the Gen Surg Unit. Pt is known to be independent with ADLs,and has an great support system at home. 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 Pt will have transport provided by family. CM will continue to follow.     Demetrice Dawkins, FROYLAN, [address] [place] [address]

## 2020-05-18 NOTE — PROGRESS NOTES
Problem: Breathing Pattern - Ineffective  Goal: *Absence of hypoxia  Outcome: Progressing Towards Goal  Goal: *Use of effective breathing techniques  Outcome: Progressing Towards Goal  Goal: *PALLIATIVE CARE:  Alleviation of Dyspnea  Outcome: Progressing Towards Goal     Problem: Falls - Risk of  Goal: *Absence of Falls  Description: Document Linda Fall Risk and appropriate interventions in the flowsheet.   Outcome: Progressing Towards Goal  Note: Fall Risk Interventions:            Medication Interventions: Teach patient to arise slowly                   Problem: Patient Education: Go to Patient Education Activity  Goal: Patient/Family Education  Outcome: Progressing Towards Goal

## 2020-05-18 NOTE — PROGRESS NOTES
.I have reviewed discharge instructions with the patient. The patient verbalized understanding. Discharged to home with family member. Hep loc discontinued.

## 2020-05-19 ENCOUNTER — HOSPITAL ENCOUNTER (INPATIENT)
Age: 40
LOS: 8 days | Discharge: HOME HEALTH | DRG: 815 | End: 2020-05-28
Attending: EMERGENCY MEDICINE | Admitting: INTERNAL MEDICINE
Payer: COMMERCIAL

## 2020-05-19 ENCOUNTER — PATIENT OUTREACH (OUTPATIENT)
Dept: OTHER | Age: 40
End: 2020-05-19

## 2020-05-19 ENCOUNTER — APPOINTMENT (OUTPATIENT)
Dept: GENERAL RADIOLOGY | Age: 40
DRG: 815 | End: 2020-05-19
Attending: EMERGENCY MEDICINE
Payer: COMMERCIAL

## 2020-05-19 ENCOUNTER — APPOINTMENT (OUTPATIENT)
Dept: CT IMAGING | Age: 40
DRG: 815 | End: 2020-05-19
Attending: EMERGENCY MEDICINE
Payer: COMMERCIAL

## 2020-05-19 DIAGNOSIS — J45.30 MILD PERSISTENT ASTHMA WITHOUT COMPLICATION: ICD-10-CM

## 2020-05-19 DIAGNOSIS — R06.02 SOB (SHORTNESS OF BREATH): ICD-10-CM

## 2020-05-19 DIAGNOSIS — D84.9 IMMUNOSUPPRESSED STATUS (HCC): ICD-10-CM

## 2020-05-19 DIAGNOSIS — M79.7 FIBROMYALGIA: ICD-10-CM

## 2020-05-19 DIAGNOSIS — R10.12 LEFT UPPER QUADRANT ABDOMINAL PAIN: ICD-10-CM

## 2020-05-19 DIAGNOSIS — D73.9: ICD-10-CM

## 2020-05-19 DIAGNOSIS — R10.12 ABDOMINAL PAIN, LUQ (LEFT UPPER QUADRANT): ICD-10-CM

## 2020-05-19 DIAGNOSIS — D73.5 SPLENIC INFARCTION: Primary | ICD-10-CM

## 2020-05-19 DIAGNOSIS — R78.81 BACTEREMIA: ICD-10-CM

## 2020-05-19 DIAGNOSIS — E11.9 CONTROLLED TYPE 2 DIABETES MELLITUS WITHOUT COMPLICATION, WITHOUT LONG-TERM CURRENT USE OF INSULIN (HCC): ICD-10-CM

## 2020-05-19 PROBLEM — R10.9 ABDOMINAL PAIN: Status: ACTIVE | Noted: 2020-05-19

## 2020-05-19 LAB
ALBUMIN SERPL-MCNC: 2.9 G/DL (ref 3.5–5)
ALBUMIN/GLOB SERPL: 0.8 {RATIO} (ref 1.1–2.2)
ALP SERPL-CCNC: 59 U/L (ref 45–117)
ALT SERPL-CCNC: 54 U/L (ref 12–78)
ANION GAP SERPL CALC-SCNC: 10 MMOL/L (ref 5–15)
APPEARANCE UR: CLEAR
AST SERPL-CCNC: 31 U/L (ref 15–37)
BACTERIA URNS QL MICRO: NEGATIVE /HPF
BASOPHILS # BLD: 0 K/UL (ref 0–0.1)
BASOPHILS NFR BLD: 0 % (ref 0–1)
BILIRUB SERPL-MCNC: 0.4 MG/DL (ref 0.2–1)
BILIRUB UR QL: NEGATIVE
BUN SERPL-MCNC: 12 MG/DL (ref 6–20)
BUN/CREAT SERPL: 12 (ref 12–20)
CALCIUM SERPL-MCNC: 8.5 MG/DL (ref 8.5–10.1)
CHLORIDE SERPL-SCNC: 103 MMOL/L (ref 97–108)
CO2 SERPL-SCNC: 27 MMOL/L (ref 21–32)
COLOR UR: ABNORMAL
CREAT SERPL-MCNC: 0.98 MG/DL (ref 0.55–1.02)
DIFFERENTIAL METHOD BLD: ABNORMAL
EOSINOPHIL # BLD: 0 K/UL (ref 0–0.4)
EOSINOPHIL NFR BLD: 0 % (ref 0–7)
EPITH CASTS URNS QL MICRO: ABNORMAL /LPF
ERYTHROCYTE [DISTWIDTH] IN BLOOD BY AUTOMATED COUNT: 13.7 % (ref 11.5–14.5)
GLOBULIN SER CALC-MCNC: 3.6 G/DL (ref 2–4)
GLUCOSE SERPL-MCNC: 96 MG/DL (ref 65–100)
GLUCOSE UR STRIP.AUTO-MCNC: NEGATIVE MG/DL
HCG UR QL: NEGATIVE
HCT VFR BLD AUTO: 35.5 % (ref 35–47)
HGB BLD-MCNC: 11.8 G/DL (ref 11.5–16)
HGB UR QL STRIP: ABNORMAL
HYALINE CASTS URNS QL MICRO: ABNORMAL /LPF (ref 0–5)
IMM GRANULOCYTES # BLD AUTO: 0.2 K/UL (ref 0–0.04)
IMM GRANULOCYTES NFR BLD AUTO: 2 % (ref 0–0.5)
KETONES UR QL STRIP.AUTO: NEGATIVE MG/DL
LACTATE SERPL-SCNC: 1.5 MMOL/L (ref 0.4–2)
LACTATE SERPL-SCNC: 2.6 MMOL/L (ref 0.4–2)
LEUKOCYTE ESTERASE UR QL STRIP.AUTO: ABNORMAL
LYMPHOCYTES # BLD: 2.2 K/UL (ref 0.8–3.5)
LYMPHOCYTES NFR BLD: 23 % (ref 12–49)
MCH RBC QN AUTO: 30.9 PG (ref 26–34)
MCHC RBC AUTO-ENTMCNC: 33.2 G/DL (ref 30–36.5)
MCV RBC AUTO: 92.9 FL (ref 80–99)
MONOCYTES # BLD: 0.7 K/UL (ref 0–1)
MONOCYTES NFR BLD: 7 % (ref 5–13)
NEUTS SEG # BLD: 6.6 K/UL (ref 1.8–8)
NEUTS SEG NFR BLD: 68 % (ref 32–75)
NITRITE UR QL STRIP.AUTO: NEGATIVE
NRBC # BLD: 0 K/UL (ref 0–0.01)
NRBC BLD-RTO: 0 PER 100 WBC
PH UR STRIP: 6 [PH] (ref 5–8)
PLATELET # BLD AUTO: 354 K/UL (ref 150–400)
PMV BLD AUTO: 9.3 FL (ref 8.9–12.9)
POTASSIUM SERPL-SCNC: 3 MMOL/L (ref 3.5–5.1)
PROCALCITONIN SERPL-MCNC: <0.05 NG/ML
PROT SERPL-MCNC: 6.5 G/DL (ref 6.4–8.2)
PROT UR STRIP-MCNC: NEGATIVE MG/DL
RBC # BLD AUTO: 3.82 M/UL (ref 3.8–5.2)
RBC #/AREA URNS HPF: ABNORMAL /HPF (ref 0–5)
SODIUM SERPL-SCNC: 140 MMOL/L (ref 136–145)
SP GR UR REFRACTOMETRY: 1.02 (ref 1–1.03)
UA: UC IF INDICATED,UAUC: ABNORMAL
UROBILINOGEN UR QL STRIP.AUTO: 0.2 EU/DL (ref 0.2–1)
WBC # BLD AUTO: 9.7 K/UL (ref 3.6–11)
WBC URNS QL MICRO: ABNORMAL /HPF (ref 0–4)

## 2020-05-19 PROCEDURE — 87040 BLOOD CULTURE FOR BACTERIA: CPT

## 2020-05-19 PROCEDURE — 74011636320 HC RX REV CODE- 636/320: Performed by: EMERGENCY MEDICINE

## 2020-05-19 PROCEDURE — 83605 ASSAY OF LACTIC ACID: CPT

## 2020-05-19 PROCEDURE — 87077 CULTURE AEROBIC IDENTIFY: CPT

## 2020-05-19 PROCEDURE — 74011250637 HC RX REV CODE- 250/637: Performed by: INTERNAL MEDICINE

## 2020-05-19 PROCEDURE — 74011250636 HC RX REV CODE- 250/636: Performed by: INTERNAL MEDICINE

## 2020-05-19 PROCEDURE — 84145 PROCALCITONIN (PCT): CPT

## 2020-05-19 PROCEDURE — 87186 SC STD MICRODIL/AGAR DIL: CPT

## 2020-05-19 PROCEDURE — 36415 COLL VENOUS BLD VENIPUNCTURE: CPT

## 2020-05-19 PROCEDURE — 99218 HC RM OBSERVATION: CPT

## 2020-05-19 PROCEDURE — 81001 URINALYSIS AUTO W/SCOPE: CPT

## 2020-05-19 PROCEDURE — 71045 X-RAY EXAM CHEST 1 VIEW: CPT

## 2020-05-19 PROCEDURE — 74011250636 HC RX REV CODE- 250/636: Performed by: EMERGENCY MEDICINE

## 2020-05-19 PROCEDURE — 85025 COMPLETE CBC W/AUTO DIFF WBC: CPT

## 2020-05-19 PROCEDURE — 96374 THER/PROPH/DIAG INJ IV PUSH: CPT

## 2020-05-19 PROCEDURE — 96361 HYDRATE IV INFUSION ADD-ON: CPT

## 2020-05-19 PROCEDURE — 80053 COMPREHEN METABOLIC PANEL: CPT

## 2020-05-19 PROCEDURE — 81025 URINE PREGNANCY TEST: CPT

## 2020-05-19 PROCEDURE — 93005 ELECTROCARDIOGRAM TRACING: CPT

## 2020-05-19 PROCEDURE — 96375 TX/PRO/DX INJ NEW DRUG ADDON: CPT

## 2020-05-19 PROCEDURE — 99285 EMERGENCY DEPT VISIT HI MDM: CPT

## 2020-05-19 PROCEDURE — 94640 AIRWAY INHALATION TREATMENT: CPT

## 2020-05-19 PROCEDURE — 74011000250 HC RX REV CODE- 250: Performed by: INTERNAL MEDICINE

## 2020-05-19 PROCEDURE — 74177 CT ABD & PELVIS W/CONTRAST: CPT

## 2020-05-19 PROCEDURE — 77030029684 HC NEB SM VOL KT MONA -A

## 2020-05-19 RX ORDER — MONTELUKAST SODIUM 10 MG/1
10 TABLET ORAL DAILY
Status: DISCONTINUED | OUTPATIENT
Start: 2020-05-20 | End: 2020-05-28 | Stop reason: HOSPADM

## 2020-05-19 RX ORDER — LANOLIN ALCOHOL/MO/W.PET/CERES
250 CREAM (GRAM) TOPICAL DAILY
Status: DISCONTINUED | OUTPATIENT
Start: 2020-05-20 | End: 2020-05-28 | Stop reason: HOSPADM

## 2020-05-19 RX ORDER — LEVOTHYROXINE SODIUM 75 UG/1
75 TABLET ORAL
Status: DISCONTINUED | OUTPATIENT
Start: 2020-05-20 | End: 2020-05-28 | Stop reason: HOSPADM

## 2020-05-19 RX ORDER — MELATONIN
1 DAILY
Status: DISCONTINUED | OUTPATIENT
Start: 2020-05-20 | End: 2020-05-28 | Stop reason: HOSPADM

## 2020-05-19 RX ORDER — FENTANYL CITRATE 50 UG/ML
50 INJECTION, SOLUTION INTRAMUSCULAR; INTRAVENOUS
Status: COMPLETED | OUTPATIENT
Start: 2020-05-19 | End: 2020-05-20

## 2020-05-19 RX ORDER — ONDANSETRON 2 MG/ML
4 INJECTION INTRAMUSCULAR; INTRAVENOUS
Status: DISCONTINUED | OUTPATIENT
Start: 2020-05-19 | End: 2020-05-28 | Stop reason: HOSPADM

## 2020-05-19 RX ORDER — SODIUM CHLORIDE 9 MG/ML
100 INJECTION, SOLUTION INTRAVENOUS CONTINUOUS
Status: DISPENSED | OUTPATIENT
Start: 2020-05-19 | End: 2020-05-20

## 2020-05-19 RX ORDER — ONDANSETRON 2 MG/ML
4 INJECTION INTRAMUSCULAR; INTRAVENOUS
Status: COMPLETED | OUTPATIENT
Start: 2020-05-19 | End: 2020-05-19

## 2020-05-19 RX ORDER — IPRATROPIUM BROMIDE AND ALBUTEROL SULFATE 2.5; .5 MG/3ML; MG/3ML
3 SOLUTION RESPIRATORY (INHALATION)
Status: DISCONTINUED | OUTPATIENT
Start: 2020-05-19 | End: 2020-05-28 | Stop reason: HOSPADM

## 2020-05-19 RX ORDER — BUPROPION HYDROCHLORIDE 150 MG/1
150 TABLET ORAL
Status: DISCONTINUED | OUTPATIENT
Start: 2020-05-20 | End: 2020-05-28 | Stop reason: HOSPADM

## 2020-05-19 RX ORDER — SODIUM CHLORIDE 0.9 % (FLUSH) 0.9 %
5-40 SYRINGE (ML) INJECTION AS NEEDED
Status: DISCONTINUED | OUTPATIENT
Start: 2020-05-19 | End: 2020-05-28 | Stop reason: HOSPADM

## 2020-05-19 RX ORDER — TRAMADOL HYDROCHLORIDE 50 MG/1
50 TABLET ORAL
Status: DISCONTINUED | OUTPATIENT
Start: 2020-05-19 | End: 2020-05-28 | Stop reason: HOSPADM

## 2020-05-19 RX ORDER — PROPRANOLOL HYDROCHLORIDE 60 MG/1
60 CAPSULE, EXTENDED RELEASE ORAL DAILY
Status: DISCONTINUED | OUTPATIENT
Start: 2020-05-20 | End: 2020-05-28 | Stop reason: HOSPADM

## 2020-05-19 RX ORDER — HYDROMORPHONE HYDROCHLORIDE 1 MG/ML
1 INJECTION, SOLUTION INTRAMUSCULAR; INTRAVENOUS; SUBCUTANEOUS ONCE
Status: COMPLETED | OUTPATIENT
Start: 2020-05-19 | End: 2020-05-19

## 2020-05-19 RX ORDER — PREDNISONE 20 MG/1
20 TABLET ORAL DAILY
Status: DISCONTINUED | OUTPATIENT
Start: 2020-05-20 | End: 2020-05-23

## 2020-05-19 RX ORDER — SODIUM CHLORIDE 0.9 % (FLUSH) 0.9 %
10 SYRINGE (ML) INJECTION
Status: COMPLETED | OUTPATIENT
Start: 2020-05-19 | End: 2020-05-19

## 2020-05-19 RX ORDER — DIPHENHYDRAMINE HCL 25 MG
25 CAPSULE ORAL
Status: DISCONTINUED | OUTPATIENT
Start: 2020-05-19 | End: 2020-05-28 | Stop reason: HOSPADM

## 2020-05-19 RX ORDER — ESCITALOPRAM OXALATE 10 MG/1
5 TABLET ORAL DAILY
Status: DISCONTINUED | OUTPATIENT
Start: 2020-05-20 | End: 2020-05-28 | Stop reason: HOSPADM

## 2020-05-19 RX ORDER — CETIRIZINE HCL 10 MG
10 TABLET ORAL
Status: DISCONTINUED | OUTPATIENT
Start: 2020-05-19 | End: 2020-05-28 | Stop reason: HOSPADM

## 2020-05-19 RX ORDER — METOCLOPRAMIDE HYDROCHLORIDE 5 MG/ML
10 INJECTION INTRAMUSCULAR; INTRAVENOUS
Status: COMPLETED | OUTPATIENT
Start: 2020-05-19 | End: 2020-05-19

## 2020-05-19 RX ORDER — OXYBUTYNIN CHLORIDE 5 MG/1
5 TABLET ORAL 2 TIMES DAILY
Status: DISCONTINUED | OUTPATIENT
Start: 2020-05-19 | End: 2020-05-28 | Stop reason: HOSPADM

## 2020-05-19 RX ORDER — ACETAMINOPHEN 325 MG/1
650 TABLET ORAL
Status: DISCONTINUED | OUTPATIENT
Start: 2020-05-19 | End: 2020-05-28 | Stop reason: HOSPADM

## 2020-05-19 RX ORDER — METOCLOPRAMIDE HYDROCHLORIDE 5 MG/5ML
5 SOLUTION ORAL
Status: DISCONTINUED | OUTPATIENT
Start: 2020-05-19 | End: 2020-05-19

## 2020-05-19 RX ORDER — GLYCOPYRROLATE 1 MG/1
2 TABLET ORAL 2 TIMES DAILY
Status: DISCONTINUED | OUTPATIENT
Start: 2020-05-19 | End: 2020-05-28 | Stop reason: HOSPADM

## 2020-05-19 RX ORDER — ENOXAPARIN SODIUM 100 MG/ML
40 INJECTION SUBCUTANEOUS EVERY 24 HOURS
Status: DISCONTINUED | OUTPATIENT
Start: 2020-05-19 | End: 2020-05-28 | Stop reason: HOSPADM

## 2020-05-19 RX ORDER — CYCLOSPORINE 0.5 MG/ML
1 EMULSION OPHTHALMIC 2 TIMES DAILY
Status: DISCONTINUED | OUTPATIENT
Start: 2020-05-20 | End: 2020-05-28 | Stop reason: HOSPADM

## 2020-05-19 RX ORDER — ONDANSETRON 4 MG/1
4 TABLET, ORALLY DISINTEGRATING ORAL
Status: DISCONTINUED | OUTPATIENT
Start: 2020-05-19 | End: 2020-05-28 | Stop reason: HOSPADM

## 2020-05-19 RX ORDER — FOLIC ACID 1 MG/1
1 TABLET ORAL DAILY
Status: DISCONTINUED | OUTPATIENT
Start: 2020-05-20 | End: 2020-05-28 | Stop reason: HOSPADM

## 2020-05-19 RX ORDER — PANTOPRAZOLE SODIUM 40 MG/1
40 TABLET, DELAYED RELEASE ORAL
Status: DISCONTINUED | OUTPATIENT
Start: 2020-05-20 | End: 2020-05-28 | Stop reason: HOSPADM

## 2020-05-19 RX ORDER — SODIUM CHLORIDE 0.9 % (FLUSH) 0.9 %
5-40 SYRINGE (ML) INJECTION EVERY 8 HOURS
Status: DISCONTINUED | OUTPATIENT
Start: 2020-05-19 | End: 2020-05-28 | Stop reason: HOSPADM

## 2020-05-19 RX ORDER — HYDROXYCHLOROQUINE SULFATE 200 MG/1
200 TABLET, FILM COATED ORAL DAILY
Status: DISCONTINUED | OUTPATIENT
Start: 2020-05-20 | End: 2020-05-28 | Stop reason: HOSPADM

## 2020-05-19 RX ADMIN — HYDROMORPHONE HYDROCHLORIDE 1 MG: 1 INJECTION, SOLUTION INTRAMUSCULAR; INTRAVENOUS; SUBCUTANEOUS at 12:12

## 2020-05-19 RX ADMIN — ACETAMINOPHEN 650 MG: 325 TABLET, FILM COATED ORAL at 21:30

## 2020-05-19 RX ADMIN — SODIUM CHLORIDE 1000 ML: 900 INJECTION, SOLUTION INTRAVENOUS at 12:08

## 2020-05-19 RX ADMIN — METOCLOPRAMIDE 10 MG: 5 INJECTION, SOLUTION INTRAMUSCULAR; INTRAVENOUS at 16:07

## 2020-05-19 RX ADMIN — Medication 10 ML: at 14:48

## 2020-05-19 RX ADMIN — SODIUM CHLORIDE, SODIUM LACTATE, POTASSIUM CHLORIDE, AND CALCIUM CHLORIDE 1000 ML: 600; 310; 30; 20 INJECTION, SOLUTION INTRAVENOUS at 13:43

## 2020-05-19 RX ADMIN — IOPAMIDOL 100 ML: 755 INJECTION, SOLUTION INTRAVENOUS at 14:48

## 2020-05-19 RX ADMIN — ARFORMOTEROL TARTRATE: 15 SOLUTION RESPIRATORY (INHALATION) at 19:54

## 2020-05-19 RX ADMIN — ONDANSETRON 4 MG: 2 INJECTION INTRAMUSCULAR; INTRAVENOUS at 12:12

## 2020-05-19 RX ADMIN — SODIUM CHLORIDE 100 ML/HR: 900 INJECTION, SOLUTION INTRAVENOUS at 16:07

## 2020-05-19 RX ADMIN — ENOXAPARIN SODIUM 40 MG: 40 INJECTION SUBCUTANEOUS at 21:30

## 2020-05-19 RX ADMIN — FENTANYL CITRATE 50 MCG: 50 INJECTION INTRAMUSCULAR; INTRAVENOUS at 18:24

## 2020-05-19 RX ADMIN — Medication 10 ML: at 21:32

## 2020-05-19 RX ADMIN — GLYCOPYRROLATE 2 MG: 1 TABLET ORAL at 22:13

## 2020-05-19 RX ADMIN — OXYBUTYNIN CHLORIDE 5 MG: 5 TABLET ORAL at 21:30

## 2020-05-19 NOTE — PROGRESS NOTES
Outreach call placed to patient, no response,left discreet VM message in follow up of DC from hospital yesterday;     43 yo female presented to ED this AM with recurrent LUQ abdominal pain;  /96, , Temp 98. .5; Lactic Acid 2.6H; K3.0L;  Urine +blood;  Previously admitted on 05/14/20 with DC on 05/18/20 for sepsis, fever and abdominal pain with suspected splenic infarct vs infection/inflammation; Recent COVID testing negative x 2;     Will follow up after DC and continue outreach;

## 2020-05-19 NOTE — ED PROVIDER NOTES
EMERGENCY DEPARTMENT HISTORY AND PHYSICAL EXAM      Date: 5/19/2020  Patient Name: Edilson Bazzi  Patient Age and Sex: 42 y.o. female    History of Presenting Illness     Chief Complaint   Patient presents with    Abdominal Pain     pt states that she was recently admitted and discharged from this facility for spleen infarcts. Pt states she is still having L sided pain causing her to become SOB. Pt states she was tested for covid19 when admitted and was negative        History Provided By: Patient    HPI: Edilson Bazzi, is a 42 y.o. female whose past medical history includes diabetes and rheumatoid arthritis, presents a day after she was discharged from this hospital with shortness of breath and abdominal pain. She was admitted on 5/14 with similar symptoms to what she describes having today. Namely, she has not exertional dyspnea, no chest pain, fevers or chills. She also has left-sided abdominal and left flank pain. The left-sided pain is mainly mid to lower abdomen. She has had normal bowel movements. During her last presentation to the hospital on 5/14, she was found initially to be tachycardic and febrile. She was treated with broad-spectrum antibiotics for several days and these were finally discontinued once her blood cultures came back negative and no other bacterial source of infection was found. As part of her work-up that last hospitalization, she had a CTA of her abdomen pelvis which showed multiple hypodensities in her spleen that were initially thought to be from either infection, inflammation, neoplasia or infarction. The patient clinically improved after a few days in the hospital, several consultants including hematology discussed the case together with reading radiologist and thought that the likelihood of infarction was low. Because the patient was feeling much better, she was discharged home yesterday with a plan to repeat the CT in about a week.   Unfortunately, this morning she started experiencing very similar pain to what she had during her last admission and it was severe enough for her to not be able to tolerate it at home. In addition to her abdominal pain, she also has shortness of breath. Non-positional or pleuritic. Very similar to what she also had during her last hospitalization. As part of her evaluation during the last hospitalization, she had a CTA of her chest which ruled out pneumonia or PE. She had a COVID-19 test a few weeks ago as well as a few days ago, both of these have been negative. Pt denies any other alleviating or exacerbating factors. There are no other complaints, changes or physical findings at this time. Past Medical History:   Diagnosis Date    Acquired hypothyroidism     Adverse effect of anesthesia     labile BP during/after C section    Asthma     Broken bones     history of 9 broken bones    Chronic UTI (urinary tract infection)     \"extra valve right kidney causes UTI\"    Fibromyalgia     Gestational diabetes     GI bleed 2007,2011,2015,2016    lower GI last colonoscopy in 2016 normal     Huntingtons chorea (HCC)     Hyperhydrosis disorder     Ill-defined condition     [place]/ tested genetically - daughter has [place]    Infertility     PCOS    PCOS (polycystic ovarian syndrome)     Precancerous skin lesion     removed from Right shoulder    Preeclampsia 2011    pregnancy    Reactive airway disease     Rheumatoid arthritis (HCC)     SVT (supraventricular tachycardia) ([address] 2011    occured during pregnancy when picc line inserted.      Past Surgical History:   Procedure Laterality Date    COLONOSCOPY N/A 6/13/2016    [facility] ENDOSCOPY    HX HEENT      HX WISDOM TEETH EXTRACTION  2004    UPPER GI ENDOSCOPY,BIOPSY  2/12/2019         UPPER GI ENDOSCOPY,DILATN W GUIDE  2/12/2019            PCP: Mitali Felder MD    Past History   Past Medical History:  Past Medical History:   Diagnosis Date    Acquired hypothyroidism     Adverse effect of anesthesia     labile BP during/after C section    Asthma     Broken bones     history of 9 broken bones    Chronic UTI (urinary tract infection)     \"extra valve right kidney causes UTI\"    Fibromyalgia     Gestational diabetes     GI bleed 2007,2011,2015,2016    lower GI last colonoscopy in 2016 normal     Huntingtons chorea (HCC)     Hyperhydrosis disorder     Ill-defined condition     [place]/ tested genetically - daughter has [place]    Infertility     PCOS    PCOS (polycystic ovarian syndrome)     Precancerous skin lesion     removed from Right shoulder    Preeclampsia 2011    pregnancy    Reactive airway disease     Rheumatoid arthritis ([address]     SVT (supraventricular tachycardia) ([address] 2011    occured during pregnancy when picc line inserted.        Past Surgical History:  Past Surgical History:   Procedure Laterality Date    COLONOSCOPY N/A 6/13/2016    COLON[facility] performed by [facility]ady [facility]gu[facility] at [facility] ENDOSCOPY    HX HEENT      HX WISDOM TEETH EXTRACTION  2004    UPPER GI ENDOSCOPY,BIOPSY  2/12/2019         UPPER GI ENDOSCOPY,DILATN W GUIDE  2/12/2019            Family History:  Family History   Problem Relation Age of Onset    Other Mother         Dushore's disease    Hypertension Mother     Dementia Mother     High Cholesterol Father     Heart Disease Father     Diabetes Father     Hypertension Father     Asthma Brother     Diabetes Brother     Other Brother         varicocele, hernias    Hypertension Brother     Alcohol abuse Brother     Hypertension Maternal Grandmother     Elevated Lipids Maternal Grandmother     Cancer Maternal Grandmother         breast    Other Maternal Grandmother         polymyalgia rheumatica    Glaucoma Maternal Grandmother     Cancer Maternal Grandfather         prostate    Huntingtons disease Maternal Grandfather     Cancer Paternal Grandmother         lung with mets    Cancer Paternal Grandfather         prostate, colon    Diabetes Paternal Grandfather     Heart Disease Paternal Grandfather     Alzheimer Paternal Grandfather     Dementia Paternal Grandfather        Social History:  Social History     Tobacco Use    Smoking status: Never Smoker    Smokeless tobacco: Never Used   Substance Use Topics    Alcohol use: Yes     Comment: few drinks per year    Drug use: No       Allergies:   Allergies   Allergen Reactions    Latex Swelling    Rituxan [Rituximab] Anaphylaxis    Entex [Phenylephrine-Guaifenesin] Anaphylaxis, Hives and Palpitations    Mucinex [Guaifenesin] Anaphylaxis and Hives    Pepto-Bismol [Bismuth Subsalicylate] Nausea and Vomiting       Current Medications:  Current Facility-Administered Medications on File Prior to Encounter   Medication Dose Route Frequency Provider Last Rate Last Dose    [DISCONTINUED] albuterol (PROVENTIL HFA, VENTOLIN HFA, PROAIR HFA) inhaler 1 Puff  1 Puff Inhalation Q4H PRN Maine Delgado MD        [DISCONTINUED] polyethylene glycol (MIRALAX) packet 17 g  17 g Oral DAILY Cecilia Maurer MD   17 g at 05/18/20 0915    [DISCONTINUED] metoclopramide (REGLAN) 5 mg/5 mL oral syrup 5 mg  5 mg Oral TIDAC Robert Maradiaga MD   5 mg at 05/18/20 1145    [DISCONTINUED] propranolol LA (INDERAL LA) capsule 60 mg  60 mg Oral DAILY PRN Cecilia Maurer MD        [DISCONTINUED] glycopyrrolate (ROBINUL) tablet 2 mg  2 mg Oral BID PRN Cecilia Maurer MD   2 mg at 05/18/20 8190    [DISCONTINUED] methotrexate chemo syringe 25 mg  25 mg SubCUTAneous Q7D Cecilia Maurer MD   25 mg at 05/15/20 1606    [DISCONTINUED] lisdexamfetamine (VYVANSE) capsule 30 mg  30 mg Oral 7am Adry Maradiaga MD   Stopped at 05/17/20 0700    [DISCONTINUED] sodium chloride (NS) flush 5-10 mL  5-10 mL IntraVENous PRN Elyssa Millan MD        [DISCONTINUED] buPROPion XL (WELLBUTRIN XL) tablet 150 mg  150 mg Oral 7am Jose Vivas MD   150 mg at 05/18/20 8929    [DISCONTINUED] cetirizine (ZYRTEC) tablet 10 mg  10 mg Oral DAILY Magnolia Babin MD   10 mg at 05/18/20 8439    [DISCONTINUED] escitalopram oxalate (LEXAPRO) tablet 20 mg  20 mg Oral DAILY Magnolia Babni MD   20 mg at 05/18/20 8752    [DISCONTINUED] folic acid (FOLVITE) tablet 1 mg  1 mg Oral DAILY Magnolia Babin MD   1 mg at 05/18/20 0916    [DISCONTINUED] levothyroxine (SYNTHROID) tablet 75 mcg  75 mcg Oral ACB Magnolia Babin MD   75 mcg at 05/18/20 0639    [DISCONTINUED] montelukast (SINGULAIR) tablet 10 mg  10 mg Oral DAILY Magnolia Babin MD   10 mg at 05/18/20 0916    [DISCONTINUED] pantoprazole (PROTONIX) tablet 40 mg  40 mg Oral ACB Magnolia Babin MD   40 mg at 05/18/20 0640    [DISCONTINUED] oxybutynin (DITROPAN) tablet 5 mg  5 mg Oral BID Magnolia Babin MD   5 mg at 05/18/20 0916    [DISCONTINUED] traMADoL (ULTRAM) tablet 50 mg  50 mg Oral Q6H PRN Magnolia Babin MD   50 mg at 05/18/20 0213    [DISCONTINUED] sodium chloride (NS) flush 5-40 mL  5-40 mL IntraVENous Q8H Magnolia Babin MD   10 mL at 05/18/20 0640    [DISCONTINUED] sodium chloride (NS) flush 5-40 mL  5-40 mL IntraVENous PRN Magnolia Babin MD        [DISCONTINUED] acetaminophen (TYLENOL) tablet 650 mg  650 mg Oral Q6H PRN Magnolia Babin MD   650 mg at 05/17/20 2355    [DISCONTINUED] ondansetron (ZOFRAN) injection 4 mg  4 mg IntraVENous Q6H PRN Magnolia Babin MD   4 mg at 05/18/20 0233    [DISCONTINUED] docusate sodium (COLACE) capsule 100 mg  100 mg Oral DAILY PRN Magnolia Babin MD        [DISCONTINUED] heparin (porcine) injection 5,000 Units  5,000 Units SubCUTAneous Q8H Magnolia Babin MD   5,000 Units at 05/18/20 2352    [DISCONTINUED] 0.9% sodium chloride infusion  100 mL/hr IntraVENous CONTINUOUS Magnolia Babin  mL/hr at 05/17/20 0027 100 mL/hr at 05/17/20 0027    [DISCONTINUED] cefTRIAXone (ROCEPHIN) 2 g in 0.9% sodium chloride (MBP/ADV) 50 mL  2 g IntraVENous Q24H Sangeetha Alphonso Hamm  mL/hr at 05/18/20 1047 2 g at 05/18/20 1047    [DISCONTINUED] metroNIDAZOLE (FLAGYL) IVPB premix 500 mg  500 mg IntraVENous Q8H Sin Ugalde  mL/hr at 05/18/20 0917 500 mg at 05/18/20 0917    [DISCONTINUED] hydroxychloroquine (PLAQUENIL) tablet 200 mg  200 mg Oral DAILY Sin Ugalde MD   200 mg at 05/18/20 0915    [DISCONTINUED] methylPREDNISolone (PF) (SOLU-MEDROL) injection 40 mg  40 mg IntraVENous Q8H Sin Ugalde MD   40 mg at 05/18/20 9871    [DISCONTINUED] insulin lispro (HUMALOG) injection   SubCUTAneous AC&HS Sin Ugalde MD   Stopped at 05/18/20 0730    [DISCONTINUED] glucose chewable tablet 16 g  4 Tab Oral PRN Sin Ugalde MD        [DISCONTINUED] dextrose (D50W) injection syrg 12.5-25 g  12.5-25 g IntraVENous PRN Sin Ugalde MD        [DISCONTINUED] glucagon (GLUCAGEN) injection 1 mg  1 mg IntraMUSCular PRN Sin Ugalde MD        [DISCONTINUED] fluticasone-vilanterol (BREO ELLIPTA) 200mcg-25mcg/puff  1 Puff Inhalation DAILY Sin Ugalde MD   1 Puff at 05/18/20 0886     Current Outpatient Medications on File Prior to Encounter   Medication Sig Dispense Refill    metoclopramide (REGLAN) 5 mg/5 mL syrup Take 5 mL by mouth Before breakfast, lunch, and dinner for 14 days. 210 mL 0    diphenhydrAMINE (BenadryL) 25 mg capsule Take 25 mg by mouth every six (6) hours as needed.  BORIC ACID 600 mg by Does Not Apply route daily. Vaginal      predniSONE (DELTASONE) 20 mg tablet Take 20 mg by mouth daily.  norethindrone-ethinyl estradiol (Junel FE 1/20, 28,) 1 mg-20 mcg (21)/75 mg (7) tab Take  by mouth.  nitrofurantoin (MACRODANTIN) 50 mg capsule Take 1 Cap by mouth daily as needed (post coital). 30 Cap 1    lisdexamfetamine (Vyvanse) 30 mg capsule Take 1 Cap by mouth every morning.  Max Daily Amount: 30 mg. 30 Cap 0    escitalopram oxalate (LEXAPRO) 20 mg tablet TAKE 1 TABLET BY MOUTH EVERY DAY 30 Tab 11    tofacitinib citrate (XELJANZ XR PO) Take 30 mg by mouth daily.  miconazole nitrate 200 mg/5 gram (4 %) vaginal cream Apply to affected area as needed once a day 15 g 0    PREVIDENT 5000 SENSITIVE 1.1-5 % pste USE AS DIRECTED TWICE A DAY SPIT OUT EXCESS AND DO NOT RINSE, EAT OR DRINK FOR 30 MINUTES      hydroxychloroquine (PLAQUENIL) 200 mg tablet 200 mg daily.  lidocaine-prilocaine (EMLA) topical cream For port flushing every 3 month  2    BD SYRINGE 1 mL 25 gauge x 5/8\" syrg 1 SYRINGE ONCE A WEEK  4    heparin sodium,porcine (HEPARIN LOCK FLUSH, PORCINE, IV) by IntraVENous route. For port flush every 3 months      omeprazole (PRILOSEC) 40 mg capsule Take 1 Cap by mouth daily. 30 Cap 0    Cetirizine (ZYRTEC) 10 mg cap Take 10 mg by mouth daily. Indications: inflammation of the nose due to an allergy 30 Cap 0    albuterol (PROVENTIL HFA, VENTOLIN HFA, PROAIR HFA) 90 mcg/actuation inhaler Take 2 Puffs by inhalation every four (4) hours as needed for Wheezing. 1 Inhaler 0    omalizumab (XOLAIR SC) by SubCUTAneous route every thirty (30) days. Given by Dr Barbara Venegas in his office      propranolol LA (INDERAL LA) 60 mg SR capsule TAKE 1 CAPSULE BY MOUTH EVERY DAY 90 Cap 3    SYMBICORT 160-4.5 mcg/actuation HFAA TAKE 2 PUFFS BY MOUTH TWICE A DAY  12    OZEMPIC 0.25 mg or 0.5 mg(2 mg/1.5 mL) sub-q pen by SubCUTAneous route every seven (7) days. Patient states she takes 1 ml every friday  1    ondansetron hcl (ZOFRAN) 4 mg tablet Take 4 mg by mouth every eight (8) hours as needed for Nausea.  naltrexone (DEPADE) 50 mg tablet Take  by mouth daily.  EPIPEN 2-SANJAY 0.3 mg/0.3 mL injection 1 (ONE) INJECTION AS NEEDED  0    buPROPion XL (WELLBUTRIN XL) 150 mg tablet Take 1 Tab by mouth every morning. 90 Tab 3    montelukast (SINGULAIR) 10 mg tablet Take 1 Tab by mouth daily. 90 Tab 4    traMADol (ULTRAM) 50 mg tablet Take 50 mg by mouth every six (6) hours as needed for Pain.       oxybutynin (DITROPAN) 5 mg tablet 5 mg two (2) times a day. Patient states she takes 1 tablet bid  3    BD INSULIN SYRINGE 1 mL 25 gauge x 5/8\" syrg USE 1 SYRINGE ONCE A WEEK  11    cyanocobalamin (Vitamin B-12) 1,000 mcg tablet Take  by mouth daily.  RESTASIS 0.05 % ophthalmic emulsion INSTILL 1 DROP INTO EACH EYE TWICE DAILY  4    SAFETY-ABDI TB SYR 1CC/25GX5/8\" 1 mL 25 gauge x 5/8\" syrg USE TO INJECT METHOTREXATE ONCE A WEEK  2    methotrexate 25 mg/mL chemo injection by SubCUTAneous route every seven (7) days. On friday  0    metFORMIN ER (GLUCOPHAGE XR) 500 mg tablet 500 mg two (2) times a day. 1    folic acid (FOLVITE) 1 mg tablet TAKE 1 TABLET ORALLY DAILY  11    desogestrel-ethinyl estradiol (DESOGEN) 0.15-0.03 mg tab Take 1 Tab by mouth nightly.  Nebulizer & Compressor machine 1 Each by Does Not Apply route three (3) times daily as needed. 1 Each 0    glycopyrrolate (ROBINUL) 1 mg tablet Take 2 mg by mouth two (2) times a day. Indications: hyperhydrosis      cholecalciferol (Vitamin D3) (1000 Units /25 mcg) tablet Take 1 Tab by mouth daily.  levothyroxine (SYNTHROID) 75 mcg tablet Take 75 mcg by mouth Daily (before breakfast). Review of Systems     Review of Systems   Constitutional: Positive for appetite change. Negative for chills and fever. HENT: Negative for congestion, ear pain, rhinorrhea, sinus pain, trouble swallowing and voice change. Respiratory: Positive for shortness of breath. Negative for cough, chest tightness, wheezing and stridor. Cardiovascular: Negative for chest pain, palpitations and leg swelling. Gastrointestinal: Positive for abdominal pain and nausea. Negative for blood in stool, constipation, diarrhea and vomiting. Genitourinary: Negative for difficulty urinating, dysuria, flank pain, frequency and hematuria. Musculoskeletal: Negative for arthralgias and joint swelling. Skin: Negative. Neurological: Negative for dizziness, syncope, weakness, numbness and headaches.    All other systems reviewed and are negative. Physical Exam     Physical Exam  Vitals signs and nursing note reviewed. Constitutional:       General: She is in acute distress. HENT:      Mouth/Throat:      Mouth: Mucous membranes are moist.   Eyes:      General: No scleral icterus. Extraocular Movements: Extraocular movements intact. Conjunctiva/sclera: Conjunctivae normal.      Pupils: Pupils are equal, round, and reactive to light. Neck:      Musculoskeletal: Normal range of motion and neck supple. Vascular: No JVD. Cardiovascular:      Rate and Rhythm: Regular rhythm. Tachycardia present. Pulses: Normal pulses. Heart sounds: Normal heart sounds. Pulmonary:      Effort: Pulmonary effort is normal.      Breath sounds: Normal breath sounds. No wheezing. Chest:      Chest wall: No tenderness. Abdominal:      General: Bowel sounds are normal. There is no distension. Palpations: Abdomen is soft. Tenderness: There is abdominal tenderness in the periumbilical area, left upper quadrant and left lower quadrant. There is guarding and rebound. There is no right CVA tenderness or left CVA tenderness. Hernia: No hernia is present. Musculoskeletal: Normal range of motion. General: No swelling or tenderness. Right lower leg: No edema. Left lower leg: No edema. Skin:     General: Skin is warm and dry. Capillary Refill: Capillary refill takes less than 2 seconds. Findings: No erythema or rash. Neurological:      General: No focal deficit present. Mental Status: She is alert. Mental status is at baseline. Cranial Nerves: Cranial nerves are intact. Motor: Motor function is intact. Psychiatric:         Mood and Affect: Mood normal.         Behavior: Behavior normal.         Diagnostic Study Results     Labs - I have personally reviewed and interpreted all laboratory results.  Opal Youngblood MD, MSc  Recent Results (from the past 24 hour(s)) LACTIC ACID    Collection Time: 05/19/20 11:55 AM   Result Value Ref Range    Lactic acid 2.6 (HH) 0.4 - 2.0 MMOL/L   CBC WITH AUTOMATED DIFF    Collection Time: 05/19/20 12:05 PM   Result Value Ref Range    WBC 9.7 3.6 - 11.0 K/uL    RBC 3.82 3.80 - 5.20 M/uL    HGB 11.8 11.5 - 16.0 g/dL    HCT 35.5 35.0 - 47.0 %    MCV 92.9 80.0 - 99.0 FL    MCH 30.9 26.0 - 34.0 PG    MCHC 33.2 30.0 - 36.5 g/dL    RDW 13.7 11.5 - 14.5 %    PLATELET 653 217 - 892 K/uL    MPV 9.3 8.9 - 12.9 FL    NRBC 0.0 0  WBC    ABSOLUTE NRBC 0.00 0.00 - 0.01 K/uL    NEUTROPHILS 68 32 - 75 %    LYMPHOCYTES 23 12 - 49 %    MONOCYTES 7 5 - 13 %    EOSINOPHILS 0 0 - 7 %    BASOPHILS 0 0 - 1 %    IMMATURE GRANULOCYTES 2 (H) 0.0 - 0.5 %    ABS. NEUTROPHILS 6.6 1.8 - 8.0 K/UL    ABS. LYMPHOCYTES 2.2 0.8 - 3.5 K/UL    ABS. MONOCYTES 0.7 0.0 - 1.0 K/UL    ABS. EOSINOPHILS 0.0 0.0 - 0.4 K/UL    ABS. BASOPHILS 0.0 0.0 - 0.1 K/UL    ABS. IMM. GRANS. 0.2 (H) 0.00 - 0.04 K/UL    DF AUTOMATED     METABOLIC PANEL, COMPREHENSIVE    Collection Time: 05/19/20 12:05 PM   Result Value Ref Range    Sodium 140 136 - 145 mmol/L    Potassium 3.0 (L) 3.5 - 5.1 mmol/L    Chloride 103 97 - 108 mmol/L    CO2 27 21 - 32 mmol/L    Anion gap 10 5 - 15 mmol/L    Glucose 96 65 - 100 mg/dL    BUN 12 6 - 20 MG/DL    Creatinine 0.98 0.55 - 1.02 MG/DL    BUN/Creatinine ratio 12 12 - 20      GFR est AA >60 >60 ml/min/1.73m2    GFR est non-AA >60 >60 ml/min/1.73m2    Calcium 8.5 8.5 - 10.1 MG/DL    Bilirubin, total 0.4 0.2 - 1.0 MG/DL    ALT (SGPT) 54 12 - 78 U/L    AST (SGOT) 31 15 - 37 U/L    Alk.  phosphatase 59 45 - 117 U/L    Protein, total 6.5 6.4 - 8.2 g/dL    Albumin 2.9 (L) 3.5 - 5.0 g/dL    Globulin 3.6 2.0 - 4.0 g/dL    A-G Ratio 0.8 (L) 1.1 - 2.2     URINALYSIS W/ REFLEX CULTURE    Collection Time: 05/19/20 12:05 PM   Result Value Ref Range    Color YELLOW/STRAW      Appearance CLEAR CLEAR      Specific gravity 1.020 1.003 - 1.030      pH (UA) 6.0 5.0 - 8.0 Protein Negative NEG mg/dL    Glucose Negative NEG mg/dL    Ketone Negative NEG mg/dL    Bilirubin Negative NEG      Blood MODERATE (A) NEG      Urobilinogen 0.2 0.2 - 1.0 EU/dL    Nitrites Negative NEG      Leukocyte Esterase TRACE (A) NEG      WBC 0-4 0 - 4 /hpf    RBC 10-20 0 - 5 /hpf    Epithelial cells MODERATE (A) FEW /lpf    Bacteria Negative NEG /hpf    UA:UC IF INDICATED CULTURE NOT INDICATED BY UA RESULT CNI      Hyaline cast 2-5 0 - 5 /lpf   HCG URINE, QL. - POC    Collection Time: 05/19/20 12:15 PM   Result Value Ref Range    Pregnancy test,urine (POC) Negative NEG     EKG, 12 LEAD, INITIAL    Collection Time: 05/19/20 12:34 PM   Result Value Ref Range    Ventricular Rate 83 BPM    Atrial Rate 83 BPM    P-R Interval 148 ms    QRS Duration 74 ms    Q-T Interval 370 ms    QTC Calculation (Bezet) 434 ms    Calculated P Axis 57 degrees    Calculated R Axis 65 degrees    Calculated T Axis 31 degrees    Diagnosis       Normal sinus rhythm  Nonspecific T wave abnormality  When compared with ECG of 14-MAY-2020 05:47,  Nonspecific T wave abnormality now evident in Anterior leads     PROCALCITONIN    Collection Time: 05/19/20  1:46 PM   Result Value Ref Range    Procalcitonin <0.05 ng/mL       Radiologic Studies - I have personally reviewed and interpreted all imaging studies and agree with radiology interpretation and report. - Jim Landers MD, MSc  CT ABD PELV W CONT   Final Result   IMPRESSION:      1. No significant change in numerous subtle subcentimeter hypodensities in the   spleen. No splenomegaly. 2. No definite change in round cervical hypodensity 2.3 x 2.2 cm which could   represent polyp, fibroid, mass, etc. Correlate clinically. XR CHEST PORT   Final Result   IMPRESSION: No Acute Disease. Medical Decision Making   I am the first provider for this patient.     Records Reviewed: I reviewed our electronic medical record system for any past medical records that were available that may contribute to the patient's current condition, including their PMH, surgical history, social and family history. Reviewed the nursing notes and vital signs from today's visit. Nursing notes will be reviewed as they become available in realtime while the pt has been in the ED. Rosanne Porras MD Msc    Vital Signs-Reviewed the patient's vital signs. Patient Vitals for the past 24 hrs:   Temp Pulse Resp BP SpO2   05/19/20 1248 -- -- -- (!) 157/96 99 %   05/19/20 1228 -- -- -- -- 100 %   05/19/20 1152 -- -- -- (!) 131/93 --   05/19/20 1130 98.5 °F (36.9 °C) (!) 111 20 (!) 152/93 99 %       ECG interpretation: Normal sinus rhythm with rate 83 normal intervals, and no changes concerning for acute ischemia. This ECG has been viewed and interpreted by me personally. Rosanne Porras MD, Msc    Provider Notes (Medical Decision Making): The patient is a 72-year-old immunocompromised female who presents with a recurrence of shortness of breath and severe abdominal pain. She has had quite an extensive evaluation of the same symptoms during her last hospitalization. I will rescan her here today. Of recent her labs, lactate is slightly elevated but I will repeat this after she received some fluids. I am adding a procalcitonin to further risk stratify for potential bacterial infection at this point. Holding antibiotics at this time and there is no clear bacterial infectious source. Need to be readmitted given bounce back within 24 hours of last admission with same symptoms. ED Course:   Initial assessment performed. The patients presenting problems have been discussed, and they are in agreement with the care plan formulated and outlined with them. I have encouraged them to ask questions as they arise throughout their visit. Rosetta Singleton MD, am the attending of record for this patient encounter.     ED Orders Placed/Medications Administered During ED Course:   Orders Placed This Encounter    CULTURE, BLOOD, PAIRED    XR CHEST PORT    CT ABD PELV W CONT    CBC WITH AUTOMATED DIFF    METABOLIC PANEL, COMPREHENSIVE    URINALYSIS W/ REFLEX CULTURE    LACTIC ACID    LACTIC ACID    PROCALCITONIN    POC URINE PREGNANCY TEST    HCG URINE, QL. - POC    EKG, 12 LEAD, INITIAL    SALINE LOCK IV ONE TIME STAT    sodium chloride 0.9 % bolus infusion 1,000 mL    lactated ringers bolus infusion 1,000 mL    ondansetron (ZOFRAN) injection 4 mg    HYDROmorphone (PF) (DILAUDID) injection 1 mg       Medications   lactated ringers bolus infusion 1,000 mL (1,000 mL IntraVENous New Bag 5/19/20 1343)   sodium chloride 0.9 % bolus infusion 1,000 mL (0 mL IntraVENous IV Completed 5/19/20 1344)   ondansetron (ZOFRAN) injection 4 mg (4 mg IntraVENous Given 5/19/20 1212)   HYDROmorphone (PF) (DILAUDID) injection 1 mg (1 mg IntraVENous Given 5/19/20 1212)          Consult Note:  Svetlana Kan MD spoke with  hospitalist,   Discussed pt's hx, physical exam and available diagnostic and imaging results. Reviewed care plans. Agree with management and plan thus far. DISPOSITION: ADMIT  Patient is being admitted to the hospital.  Their test results and reasons for admission have been discussed. The patient and/or available family express agreement with and understanding of the need to be admitted and their admission diagnosis. Thank you for resuming the care of this patient. Please don't hesitate to contact me in the emergency department if you  have any additional questions. Svetlana Kan MD, MSc        Diagnosis     Clinical Impression:   1. Splenic infarction    2. Abdominal pain, LUQ (left upper quadrant)    3. SOB (shortness of breath)        Attestation:  I personally performed the services described in this documentation on this date 5/19/2020 for patient [address]. Svetlana Kan MD    Please note that this dictation was completed with [employer], the [employer] voice recognition software.  Quite often unanticipated grammatical, syntax, homophones, and other interpretive errors are inadvertently transcribed by the computer software. Please disregard these errors. Please excuse any errors that have escaped final proofreading.

## 2020-05-19 NOTE — ED NOTES
Pt contact numbers: Kathrin Ammy, [phone] (ex mother-in-law) and Reymundo Salinas, [phone] (partner).

## 2020-05-19 NOTE — ED NOTES
1528: Bedside and Verbal shift change report given to Joel Jimenez (oncoming nurse) by Vanda Chase (offgoing nurse). Report included the following information SBAR, Kardex, ED Summary, Intake/Output and Recent Results. 1550: Patient disconnected from monitor. Patient ambulated to the restroom unassisted with a steady gait. 1600: MD Hiren at bedside. 1717: Nurse at bedside. Patient denies any needs at this time. Patient is sitting up in bed talking on her cell bell. 1818: Nurse at bedside. Patient updated on room assignment. Patient requesting pain medication at this time.

## 2020-05-19 NOTE — ED NOTES
Pt arrived ambulatory to room #7 from triage. Pt with c/o of LUQ pain x last night. Pt reports that she was admitted over the last several days for what they thought was splenic infarcts and sepsis, with discharge yesterday around noon. Pt reports that she felt better upon discharge, but then started to experience pain last night. Pt reports hx of RA. Pt resting in position of comfort. Call bell within reach. Pt placed on monitor x 2.

## 2020-05-19 NOTE — H&P
Hospitalist Admission Note    NAME: Alea Patel   :  1980   MRN:  [id number]     Date/Time:  2020 4:05 PM    Patient PCP: Justus Sicard, MD  ______________________________________________________________________  Given the patient's current clinical presentation, I have a high level of concern for decompensation if discharged from the emergency department. Complex decision making was performed, which includes reviewing the patient's available past medical records, laboratory results, and x-ray films. My assessment of this patient's clinical condition and my plan of care is as follows. Assessment / Plan:    Abdominal Pain, POA  Possible splenic infarction versus inflammation/infection  Sepsis due to above  -Patient had a SARS-CoV-2 test negative about 2 weeks ago and one more negative last week  -she is presenting with left lower quadrant abdominal pain since last night, and since last week. hypodensities in the spleen consistent with infection, inflammation, neoplasia and infarction  -She was afebrile  -no SOB  -Consulted hematology last week due to suspicion of splenic infarction, input is appreciated. I discussed the case with Dr. Saúl Cooper. After hematology discussion with radiology there was less suspicion for infarct and they recommended to repeat imaging  -will consult Gen surgery    -SARS-CoV-2 testing negative last admission  -Lactic acid 2.6, FU/U Q 6 HR AND ivf  -UNKNOWN ETIOLOGY       History of rheumatoid arthritis  -cont methotrexate and Barkley Arthur  -On prednisone chronically at home so was started stress dose of steroids with solumedrol, will start weaning off to home dose     Diabetes mellitus type 2  -Hold home metformin.  Started insulin sliding scale with blood sugar checks.  Consider adding NPH to steroids if blood sugars go up     History of depression  History of GERD  Hypothyroidism  Hypertension  -Continue home Wellbutrin, Lexapro, levothyroxine and Protonix  -Continue home propranolol     History of hyperreactive airway disease  -Continue home albuterol and Symbicort.                    Code Status: Full code  Surrogate Decision Maker:      DVT Prophylaxis: Heparin      Subjective:   CHIEF COMPLAINT: abdominal pain    HISTORY OF PRESENT ILLNESS:     The patient is a 30years old woman with past medical history rheumatoid arthritis, diabetes mellitus type 2 presented emergency department due to left upper quadrant pain started last night. Patient was admitted to the hospital last week with similar presentation and his CT abdomen that time revealed splenic hypodensity which was suspicious for possible infarct however on repeating CT abdomen on the same admission hypodensity where improving. Hematology was consulted last admission and a repeat CT scan with radiology and the suspicion for infarct was very low and recommended just a repeat CT abdomen as an outpatient. During her last hospitalization COVID-19 test was negative   Other blood culture was negative for 4 days. Patient presenting today with the same thing left upper quadrant abdominal pain, recurrent, aggravated by respiration, not alleviated by anything, not associated with nausea or vomiting. She denies any fever or chills. In the emergency department, CT abdomen was done and it was unchanged from before however her lactic acid was 2.6 for which she was given IV fluids. We were asked to admit for work up and evaluation of the above problems.      Past Medical History:   Diagnosis Date    Acquired hypothyroidism     Adverse effect of anesthesia     labile BP during/after C section    Asthma     Broken bones     history of 9 broken bones    Chronic UTI (urinary tract infection)     \"extra valve right kidney causes UTI\"    Fibromyalgia     Gestational diabetes     GI bleed 2007,2011,2015,2016    lower GI last colonoscopy in 2016 normal     Huntingtons chorea ([address]     Hyperhydrosis disorder     Ill-defined condition     Chrsi/ tested genetically - daughter has [place]    Infertility     PCOS    PCOS (polycystic ovarian syndrome)     Precancerous skin lesion     removed from Right shoulder    Preeclampsia 2011    pregnancy    Reactive airway disease     Rheumatoid arthritis (HCC)     SVT (supraventricular tachycardia) ([address] 2011    occured during pregnancy when picc line inserted.         Past Surgical History:   Procedure Laterality Date    COLONOSCOPY N/A 6/13/2016    COLON[facility] performed [facility]arelis [facility] at [facility] ENDOSCOPY    HX HEENT      HX [address] [facility]  2004    UPPER GI ENDOSCOPY,BIOPSY  2/12/2019         UPPER GI ENDOSCOPY,DILATN W GUIDE  2/12/2019            Social History     Tobacco Use    Smoking status: Never Smoker    Smokeless tobacco: Never Used   Substance Use Topics    Alcohol use: Yes     Comment: few drinks per year        Family History   Problem Relation Age of Onset    Other Mother         [place]'s disease    Hypertension Mother     Dementia Mother     High Cholesterol Father     Heart Disease Father     Diabetes Father     Hypertension Father     Asthma Brother     Diabetes Brother     Other Brother         varicocele, hernias    Hypertension Brother     Alcohol abuse Brother     Hypertension Maternal Grandmother     Elevated Lipids Maternal Grandmother     Cancer Maternal Grandmother         breast    Other Maternal Grandmother         polymyalgia rheumatica    Glaucoma Maternal Grandmother     Cancer Maternal Grandfather         prostate    Huntingtons disease Maternal Grandfather     Cancer Paternal Grandmother         lung with mets    Cancer Paternal Grandfather         prostate, colon    Diabetes Paternal Grandfather     Heart Disease Paternal Grandfather     Alzheimer Paternal Grandfather     Dementia Paternal Grandfather      Allergies   Allergen Reactions    Latex Swelling    Rituxan [Rituximab] Anaphylaxis    Entex [Phenylephrine-Guaifenesin] Anaphylaxis, Hives and Palpitations    Mucinex [Guaifenesin] Anaphylaxis and Hives    Pepto-Bismol [Bismuth Subsalicylate] Nausea and Vomiting        Prior to Admission medications    Medication Sig Start Date End Date Taking? Authorizing Provider   metoclopramide (REGLAN) 5 mg/5 mL syrup Take 5 mL by mouth Before breakfast, lunch, and dinner for 14 days. 5/18/20 6/1/20  Renzo Nicholson MD   diphenhydrAMINE (BenadryL) 25 mg capsule Take 25 mg by mouth every six (6) hours as needed. Victoria Arana MD   BORIC ACID 600 mg by Does Not Apply route daily. Vaginal    Victoria Arana MD   predniSONE (DELTASONE) 20 mg tablet Take 20 mg by mouth daily. Victoria Arana MD   norethindrone-ethinyl estradiol (Junel FE 1/20, 28,) 1 mg-20 mcg (21)/75 mg (7) tab Take  by mouth. Victoria Arana MD   nitrofurantoin (MACRODANTIN) 50 mg capsule Take 1 Cap by mouth daily as needed (post coital). 4/27/20   Appkirsten Pineda MD   lisdexamfetamine (Vyvanse) 30 mg capsule Take 1 Cap by mouth every morning. Max Daily Amount: 30 mg. 4/27/20   Appa Vivienne Kerns MD   escitalopram oxalate (LEXAPRO) 20 mg tablet TAKE 1 TABLET BY MOUTH EVERY DAY 3/26/20   Appa Vivienne Gonzalez MD   tofacitinib citrate (XELJANZ XR PO) Take 30 mg by mouth daily. Provider, Historical   miconazole nitrate 200 mg/5 gram (4 %) vaginal cream Apply to affected area as needed once a day 3/14/20   Irma Farrell MD   PREVIDENT 5000 SENSITIVE 1.1-5 % pste USE AS DIRECTED TWICE A DAY SPIT OUT EXCESS AND DO NOT RINSE, EAT OR DRINK FOR 30 MINUTES 2/7/20   Provider, Historical   hydroxychloroquine (PLAQUENIL) 200 mg tablet 200 mg daily.  12/23/19   Provider, Historical   lidocaine-prilocaine (EMLA) topical cream For port flushing every 3 month 11/25/19   Provider, Historical   BD SYRINGE 1 mL 25 gauge x 5/8\" syrg 1 SYRINGE ONCE A WEEK 11/28/19   Provider, Historical   heparin sodium,porcine (HEPARIN LOCK FLUSH, PORCINE, IV) by IntraVENous route. For port flush every 3 months    Provider, Historical   omeprazole (PRILOSEC) 40 mg capsule Take 1 Cap by mouth daily. 9/28/19   Marie Holcomb MD   Cetirizine (ZYRTEC) 10 mg cap Take 10 mg by mouth daily. Indications: inflammation of the nose due to an allergy 9/28/19   Marie Holcomb MD   albuterol (PROVENTIL HFA, VENTOLIN HFA, PROAIR HFA) 90 mcg/actuation inhaler Take 2 Puffs by inhalation every four (4) hours as needed for Wheezing. 6/9/19   Marie Holcomb MD   omalizumab Claudia Colace SC) by SubCUTAneous route every thirty (30) days. Given by Dr Bianca Medina in his office    Provider, Historical   propranolol LA (INDERAL LA) 60 mg SR capsule TAKE 1 CAPSULE BY MOUTH EVERY DAY 5/17/19   Martha Blum DO   SYMBICORT 160-4.5 mcg/actuation HFAA TAKE 2 PUFFS BY MOUTH TWICE A DAY 3/28/19   Provider, Historical   OZEMPIC 0.25 mg or 0.5 mg(2 mg/1.5 mL) sub-q pen by SubCUTAneous route every seven (7) days. Patient states she takes 1 ml every friday 4/21/19   Provider, Historical   ondansetron hcl (ZOFRAN) 4 mg tablet Take 4 mg by mouth every eight (8) hours as needed for Nausea. Provider, Historical   naltrexone (DEPADE) 50 mg tablet Take  by mouth daily. Provider, Historical   EPIPEN 2-SANJAY 0.3 mg/0.3 mL injection 1 (ONE) INJECTION AS NEEDED 4/30/19   Provider, Historical   buPROPion XL (WELLBUTRIN XL) 150 mg tablet Take 1 Tab by mouth every morning. 5/16/19   Appa Navdeep Schmidt MD   montelukast (SINGULAIR) 10 mg tablet Take 1 Tab by mouth daily. 5/16/19   Appa Navdeep Schmidt MD   traMADol (ULTRAM) 50 mg tablet Take 50 mg by mouth every six (6) hours as needed for Pain. Provider, Historical   oxybutynin (DITROPAN) 5 mg tablet 5 mg two (2) times a day.  Patient states she takes 1 tablet bid 10/2/18   Provider, Historical   BD INSULIN SYRINGE 1 mL 25 gauge x 5/8\" syrg USE 1 SYRINGE ONCE A WEEK 8/24/18   Provider, Historical   cyanocobalamin (Vitamin B-12) 1,000 mcg tablet Take  by mouth daily. Provider, Historical   RESTASIS 0.05 % ophthalmic emulsion INSTILL 1 DROP INTO EACH EYE TWICE DAILY 10/6/17   Provider, Historical   SAFETY-ABDI TB SYR 1CC/25GX5/8\" 1 mL 25 gauge x 5/8\" syrg USE TO INJECT METHOTREXATE ONCE A WEEK 12/1/17   Provider, Historical   methotrexate 25 mg/mL chemo injection by SubCUTAneous route every seven (7) days. On friday 4/7/17   Provider, Historical   metFORMIN ER (GLUCOPHAGE XR) 500 mg tablet 500 mg two (2) times a day. 11/22/16   Provider, Historical   folic acid (FOLVITE) 1 mg tablet TAKE 1 TABLET ORALLY DAILY 11/29/16   Provider, Historical   desogestrel-ethinyl estradiol (DESOGEN) 0.15-0.03 mg tab Take 1 Tab by mouth nightly. Provider, Historical   Nebulizer & Compressor machine 1 Each by Does Not Apply route three (3) times daily as needed. 12/10/12   Brea Breaux MD   glycopyrrolate (ROBINUL) 1 mg tablet Take 2 mg by mouth two (2) times a day. Indications: hyperhydrosis    Provider, Historical   cholecalciferol (Vitamin D3) (1000 Units /25 mcg) tablet Take 1 Tab by mouth daily. Provider, Historical   levothyroxine (SYNTHROID) 75 mcg tablet Take 75 mcg by mouth Daily (before breakfast). Provider, Historical       REVIEW OF SYSTEMS:     I am not able to complete the review of systems because:    The patient is intubated and sedated    The patient has altered mental status due to his acute medical problems    The patient has baseline aphasia from prior stroke(s)    The patient has baseline dementia and is not reliable historian    The patient is in acute medical distress and unable to provide information           Total of 12 systems reviewed as follows:       POSITIVE= underlined text  Negative = text not underlined  General:  fever, chills, sweats, generalized weakness, weight loss/gain,      loss of appetite   Eyes:    blurred vision, eye pain, loss of vision, double vision  ENT:    rhinorrhea, pharyngitis Respiratory:   cough, sputum production, SOB, MENDOZA, wheezing, pleuritic pain   Cardiology:   chest pain, palpitations, orthopnea, PND, edema, syncope   Gastrointestinal:  abdominal pain , N/V, diarrhea, dysphagia, constipation, bleeding   Genitourinary:  frequency, urgency, dysuria, hematuria, incontinence   Muskuloskeletal :  arthralgia, myalgia, back pain  Hematology:  easy bruising, nose or gum bleeding, lymphadenopathy   Dermatological: rash, ulceration, pruritis, color change / jaundice  Endocrine:   hot flashes or polydipsia   Neurological:  headache, dizziness, confusion, focal weakness, paresthesia,     Speech difficulties, memory loss, gait difficulty  Psychological: Feelings of anxiety, depression, agitation    Objective:   VITALS:    Visit Vitals  /84 (BP 1 Location: Left arm, BP Patient Position: At rest)   Pulse 91   Temp 98.5 °F (36.9 °C)   Resp 18   Ht 5' 6\" (1.676 m)   Wt 94.4 kg (208 lb 1.8 oz)   SpO2 98%   BMI 33.59 kg/m²       PHYSICAL EXAM:    General:    Alert, cooperative, no distress, appears stated age. HEENT: Atraumatic, anicteric sclerae, pink conjunctivae     No oral ulcers, mucosa moist, throat clear, dentition fair  Neck:  Supple, symmetrical,  thyroid: non tender  Lungs:   Clear to auscultation bilaterally. No Wheezing or Rhonchi. No rales. Chest wall:  No tenderness  No Accessory muscle use. Heart:   Regular  rhythm,  No  murmur   No edema  Abdomen:   Soft, non-tender. Not distended. Bowel sounds normal  Extremities: No cyanosis. No clubbing,      Skin turgor normal, Capillary refill normal, Radial dial pulse 2+  Skin:     Not pale. Not Jaundiced  No rashes   Psych:  Good insight. Not depressed. Not anxious or agitated. Neurologic: EOMs intact. No facial asymmetry. No aphasia or slurred speech. Symmetrical strength, Sensation grossly intact.  Alert and oriented X 4.     _______________________________________________________________________  Care Plan discussed with:    Comments   Patient x    Family      RN x    Care Manager                    Consultant:      _______________________________________________________________________  Expected  Disposition:   Home with Family x   HH/PT/OT/RN    SNF/LTC    NADIA    ________________________________________________________________________  TOTAL TIME:  54 Minutes    Critical Care Provided     Minutes non procedure based      Comments    x Reviewed previous records   >50% of visit spent in counseling and coordination of care x Discussion with patient and/or family and questions answered       ________________________________________________________________________  Signed: Fahad Moreno MD    Procedures: see electronic medical records for all procedures/Xrays and details which were not copied into this note but were reviewed prior to creation of Plan. LAB DATA REVIEWED:    Recent Results (from the past 24 hour(s))   LACTIC ACID    Collection Time: 05/19/20 11:55 AM   Result Value Ref Range    Lactic acid 2.6 (HH) 0.4 - 2.0 MMOL/L   CBC WITH AUTOMATED DIFF    Collection Time: 05/19/20 12:05 PM   Result Value Ref Range    WBC 9.7 3.6 - 11.0 K/uL    RBC 3.82 3.80 - 5.20 M/uL    HGB 11.8 11.5 - 16.0 g/dL    HCT 35.5 35.0 - 47.0 %    MCV 92.9 80.0 - 99.0 FL    MCH 30.9 26.0 - 34.0 PG    MCHC 33.2 30.0 - 36.5 g/dL    RDW 13.7 11.5 - 14.5 %    PLATELET 090 808 - 927 K/uL    MPV 9.3 8.9 - 12.9 FL    NRBC 0.0 0  WBC    ABSOLUTE NRBC 0.00 0.00 - 0.01 K/uL    NEUTROPHILS 68 32 - 75 %    LYMPHOCYTES 23 12 - 49 %    MONOCYTES 7 5 - 13 %    EOSINOPHILS 0 0 - 7 %    BASOPHILS 0 0 - 1 %    IMMATURE GRANULOCYTES 2 (H) 0.0 - 0.5 %    ABS. NEUTROPHILS 6.6 1.8 - 8.0 K/UL    ABS. LYMPHOCYTES 2.2 0.8 - 3.5 K/UL    ABS. MONOCYTES 0.7 0.0 - 1.0 K/UL    ABS. EOSINOPHILS 0.0 0.0 - 0.4 K/UL    ABS. BASOPHILS 0.0 0.0 - 0.1 K/UL    ABS. IMM.  GRANS. 0.2 (H) 0.00 - 0.04 K/UL    DF AUTOMATED     METABOLIC PANEL, COMPREHENSIVE    Collection Time: 05/19/20 12:05 PM   Result Value Ref Range    Sodium 140 136 - 145 mmol/L    Potassium 3.0 (L) 3.5 - 5.1 mmol/L    Chloride 103 97 - 108 mmol/L    CO2 27 21 - 32 mmol/L    Anion gap 10 5 - 15 mmol/L    Glucose 96 65 - 100 mg/dL    BUN 12 6 - 20 MG/DL    Creatinine 0.98 0.55 - 1.02 MG/DL    BUN/Creatinine ratio 12 12 - 20      GFR est AA >60 >60 ml/min/1.73m2    GFR est non-AA >60 >60 ml/min/1.73m2    Calcium 8.5 8.5 - 10.1 MG/DL    Bilirubin, total 0.4 0.2 - 1.0 MG/DL    ALT (SGPT) 54 12 - 78 U/L    AST (SGOT) 31 15 - 37 U/L    Alk.  phosphatase 59 45 - 117 U/L    Protein, total 6.5 6.4 - 8.2 g/dL    Albumin 2.9 (L) 3.5 - 5.0 g/dL    Globulin 3.6 2.0 - 4.0 g/dL    A-G Ratio 0.8 (L) 1.1 - 2.2     URINALYSIS W/ REFLEX CULTURE    Collection Time: 05/19/20 12:05 PM   Result Value Ref Range    Color YELLOW/STRAW      Appearance CLEAR CLEAR      Specific gravity 1.020 1.003 - 1.030      pH (UA) 6.0 5.0 - 8.0      Protein Negative NEG mg/dL    Glucose Negative NEG mg/dL    Ketone Negative NEG mg/dL    Bilirubin Negative NEG      Blood MODERATE (A) NEG      Urobilinogen 0.2 0.2 - 1.0 EU/dL    Nitrites Negative NEG      Leukocyte Esterase TRACE (A) NEG      WBC 0-4 0 - 4 /hpf    RBC 10-20 0 - 5 /hpf    Epithelial cells MODERATE (A) FEW /lpf    Bacteria Negative NEG /hpf    UA:UC IF INDICATED CULTURE NOT INDICATED BY UA RESULT CNI      Hyaline cast 2-5 0 - 5 /lpf   HCG URINE, QL. - POC    Collection Time: 05/19/20 12:15 PM   Result Value Ref Range    Pregnancy test,urine (POC) Negative NEG     EKG, 12 LEAD, INITIAL    Collection Time: 05/19/20 12:34 PM   Result Value Ref Range    Ventricular Rate 83 BPM    Atrial Rate 83 BPM    P-R Interval 148 ms    QRS Duration 74 ms    Q-T Interval 370 ms    QTC Calculation (Bezet) 434 ms    Calculated P Axis 57 degrees    Calculated R Axis 65 degrees    Calculated T Axis 31 degrees    Diagnosis       Normal sinus rhythm  Nonspecific T wave abnormality  When compared with ECG of 14-MAY-2020 05:47,  Nonspecific T wave abnormality now evident in Anterior leads     PROCALCITONIN    Collection Time: 05/19/20  1:46 PM   Result Value Ref Range    Procalcitonin <0.05 ng/mL

## 2020-05-20 PROBLEM — R78.81 BACTEREMIA: Status: ACTIVE | Noted: 2020-05-20

## 2020-05-20 PROBLEM — K59.00 CONSTIPATION: Status: ACTIVE | Noted: 2020-05-20

## 2020-05-20 LAB
ANION GAP SERPL CALC-SCNC: 3 MMOL/L (ref 5–15)
ATRIAL RATE: 83 BPM
BACTERIA SPEC CULT: NORMAL
BUN SERPL-MCNC: 8 MG/DL (ref 6–20)
BUN/CREAT SERPL: 12 (ref 12–20)
CALCIUM SERPL-MCNC: 7.8 MG/DL (ref 8.5–10.1)
CALCULATED P AXIS, ECG09: 57 DEGREES
CALCULATED R AXIS, ECG10: 65 DEGREES
CALCULATED T AXIS, ECG11: 31 DEGREES
CHLORIDE SERPL-SCNC: 106 MMOL/L (ref 97–108)
CO2 SERPL-SCNC: 30 MMOL/L (ref 21–32)
CREAT SERPL-MCNC: 0.65 MG/DL (ref 0.55–1.02)
DIAGNOSIS, 93000: NORMAL
ERYTHROCYTE [DISTWIDTH] IN BLOOD BY AUTOMATED COUNT: 13.8 % (ref 11.5–14.5)
GLUCOSE SERPL-MCNC: 88 MG/DL (ref 65–100)
HCT VFR BLD AUTO: 30.4 % (ref 35–47)
HGB BLD-MCNC: 10.1 G/DL (ref 11.5–16)
LACTATE SERPL-SCNC: 2.5 MMOL/L (ref 0.4–2)
MCH RBC QN AUTO: 30.8 PG (ref 26–34)
MCHC RBC AUTO-ENTMCNC: 33.2 G/DL (ref 30–36.5)
MCV RBC AUTO: 92.7 FL (ref 80–99)
NRBC # BLD: 0 K/UL (ref 0–0.01)
NRBC BLD-RTO: 0 PER 100 WBC
P-R INTERVAL, ECG05: 148 MS
PLATELET # BLD AUTO: 274 K/UL (ref 150–400)
PMV BLD AUTO: 9 FL (ref 8.9–12.9)
POTASSIUM SERPL-SCNC: 3 MMOL/L (ref 3.5–5.1)
Q-T INTERVAL, ECG07: 370 MS
QRS DURATION, ECG06: 74 MS
QTC CALCULATION (BEZET), ECG08: 434 MS
RBC # BLD AUTO: 3.28 M/UL (ref 3.8–5.2)
SERVICE CMNT-IMP: NORMAL
SODIUM SERPL-SCNC: 139 MMOL/L (ref 136–145)
VENTRICULAR RATE, ECG03: 83 BPM
WBC # BLD AUTO: 8.6 K/UL (ref 3.6–11)

## 2020-05-20 PROCEDURE — 99218 HC RM OBSERVATION: CPT

## 2020-05-20 PROCEDURE — 83605 ASSAY OF LACTIC ACID: CPT

## 2020-05-20 PROCEDURE — 86235 NUCLEAR ANTIGEN ANTIBODY: CPT

## 2020-05-20 PROCEDURE — 85027 COMPLETE CBC AUTOMATED: CPT

## 2020-05-20 PROCEDURE — 74011636637 HC RX REV CODE- 636/637: Performed by: INTERNAL MEDICINE

## 2020-05-20 PROCEDURE — 87040 BLOOD CULTURE FOR BACTERIA: CPT

## 2020-05-20 PROCEDURE — 74011000250 HC RX REV CODE- 250: Performed by: INTERNAL MEDICINE

## 2020-05-20 PROCEDURE — 74011250636 HC RX REV CODE- 250/636: Performed by: INTERNAL MEDICINE

## 2020-05-20 PROCEDURE — 74011250637 HC RX REV CODE- 250/637: Performed by: INTERNAL MEDICINE

## 2020-05-20 PROCEDURE — 94760 N-INVAS EAR/PLS OXIMETRY 1: CPT

## 2020-05-20 PROCEDURE — 65270000029 HC RM PRIVATE

## 2020-05-20 PROCEDURE — 36415 COLL VENOUS BLD VENIPUNCTURE: CPT

## 2020-05-20 PROCEDURE — 94640 AIRWAY INHALATION TREATMENT: CPT

## 2020-05-20 PROCEDURE — 80048 BASIC METABOLIC PNL TOTAL CA: CPT

## 2020-05-20 RX ORDER — POTASSIUM CHLORIDE 7.45 MG/ML
10 INJECTION INTRAVENOUS ONCE
Status: COMPLETED | OUTPATIENT
Start: 2020-05-20 | End: 2020-05-20

## 2020-05-20 RX ORDER — NYSTATIN 100000 [USP'U]/ML
500000 SUSPENSION ORAL 4 TIMES DAILY
Status: DISCONTINUED | OUTPATIENT
Start: 2020-05-20 | End: 2020-05-28 | Stop reason: HOSPADM

## 2020-05-20 RX ORDER — SODIUM CHLORIDE 9 MG/ML
100 INJECTION, SOLUTION INTRAVENOUS CONTINUOUS
Status: DISPENSED | OUTPATIENT
Start: 2020-05-20 | End: 2020-05-21

## 2020-05-20 RX ORDER — VANCOMYCIN 2 GRAM/500 ML IN 0.9 % SODIUM CHLORIDE INTRAVENOUS
2000 ONCE
Status: COMPLETED | OUTPATIENT
Start: 2020-05-20 | End: 2020-05-20

## 2020-05-20 RX ADMIN — Medication 10 ML: at 06:12

## 2020-05-20 RX ADMIN — POTASSIUM BICARBONATE 40 MEQ: 782 TABLET, EFFERVESCENT ORAL at 08:44

## 2020-05-20 RX ADMIN — ARFORMOTEROL TARTRATE: 15 SOLUTION RESPIRATORY (INHALATION) at 20:46

## 2020-05-20 RX ADMIN — PREDNISONE 20 MG: 20 TABLET ORAL at 08:49

## 2020-05-20 RX ADMIN — FENTANYL CITRATE 50 MCG: 50 INJECTION INTRAMUSCULAR; INTRAVENOUS at 04:53

## 2020-05-20 RX ADMIN — PROPRANOLOL HYDROCHLORIDE 60 MG: 60 CAPSULE, EXTENDED RELEASE ORAL at 08:49

## 2020-05-20 RX ADMIN — POTASSIUM BICARBONATE 40 MEQ: 782 TABLET, EFFERVESCENT ORAL at 10:31

## 2020-05-20 RX ADMIN — Medication 10 ML: at 14:00

## 2020-05-20 RX ADMIN — TRAMADOL HYDROCHLORIDE 50 MG: 50 TABLET, FILM COATED ORAL at 10:32

## 2020-05-20 RX ADMIN — OXYBUTYNIN CHLORIDE 5 MG: 5 TABLET ORAL at 17:21

## 2020-05-20 RX ADMIN — LEVOTHYROXINE SODIUM 75 MCG: 0.07 TABLET ORAL at 08:50

## 2020-05-20 RX ADMIN — POTASSIUM CHLORIDE 10 MEQ: 7.46 INJECTION, SOLUTION INTRAVENOUS at 10:31

## 2020-05-20 RX ADMIN — ARFORMOTEROL TARTRATE: 15 SOLUTION RESPIRATORY (INHALATION) at 09:12

## 2020-05-20 RX ADMIN — OXYBUTYNIN CHLORIDE 5 MG: 5 TABLET ORAL at 08:50

## 2020-05-20 RX ADMIN — NYSTATIN 500000 UNITS: 100000 SUSPENSION ORAL at 21:56

## 2020-05-20 RX ADMIN — MONTELUKAST SODIUM 10 MG: 10 TABLET, FILM COATED ORAL at 08:49

## 2020-05-20 RX ADMIN — PANTOPRAZOLE SODIUM 40 MG: 40 TABLET, DELAYED RELEASE ORAL at 08:48

## 2020-05-20 RX ADMIN — GLYCOPYRROLATE 2 MG: 1 TABLET ORAL at 08:47

## 2020-05-20 RX ADMIN — CYCLOSPORINE 1 DROP: 0.5 EMULSION OPHTHALMIC at 17:22

## 2020-05-20 RX ADMIN — HYDROXYCHLOROQUINE SULFATE 200 MG: 200 TABLET ORAL at 08:48

## 2020-05-20 RX ADMIN — GLYCOPYRROLATE 2 MG: 1 TABLET ORAL at 21:55

## 2020-05-20 RX ADMIN — CYCLOSPORINE 1 DROP: 0.5 EMULSION OPHTHALMIC at 14:33

## 2020-05-20 RX ADMIN — NYSTATIN 500000 UNITS: 100000 SUSPENSION ORAL at 17:21

## 2020-05-20 RX ADMIN — ESCITALOPRAM OXALATE 5 MG: 10 TABLET ORAL at 08:48

## 2020-05-20 RX ADMIN — ENOXAPARIN SODIUM 40 MG: 40 INJECTION SUBCUTANEOUS at 21:56

## 2020-05-20 RX ADMIN — CYANOCOBALAMIN TAB 500 MCG 250 MCG: 500 TAB at 08:50

## 2020-05-20 RX ADMIN — VANCOMYCIN HYDROCHLORIDE 2000 MG: 10 INJECTION, POWDER, LYOPHILIZED, FOR SOLUTION INTRAVENOUS at 17:22

## 2020-05-20 RX ADMIN — FOLIC ACID 1 MG: 1 TABLET ORAL at 08:49

## 2020-05-20 RX ADMIN — SODIUM CHLORIDE 100 ML/HR: 900 INJECTION, SOLUTION INTRAVENOUS at 17:21

## 2020-05-20 RX ADMIN — DIPHENHYDRAMINE HYDROCHLORIDE 25 MG: 25 CAPSULE ORAL at 19:33

## 2020-05-20 RX ADMIN — Medication 10 ML: at 21:57

## 2020-05-20 RX ADMIN — SODIUM CHLORIDE 100 ML/HR: 900 INJECTION, SOLUTION INTRAVENOUS at 00:39

## 2020-05-20 RX ADMIN — MELATONIN 1 TABLET: at 08:50

## 2020-05-20 RX ADMIN — POTASSIUM CHLORIDE 10 MEQ: 7.46 INJECTION, SOLUTION INTRAVENOUS at 14:58

## 2020-05-20 RX ADMIN — TRAMADOL HYDROCHLORIDE 50 MG: 50 TABLET, FILM COATED ORAL at 17:21

## 2020-05-20 RX ADMIN — BUPROPION HYDROCHLORIDE 150 MG: 150 TABLET, EXTENDED RELEASE ORAL at 08:49

## 2020-05-20 NOTE — PROGRESS NOTES
Bedside and Verbal shift change report given to John Smith (oncoming nurse) by Fidelina Pineda  (offgoing nurse). Report included the following information SBAR, Kardex, Intake/Output, MAR and Recent Results.

## 2020-05-20 NOTE — PROGRESS NOTES
Pharmacy Automatic Renal Dosing Protocol - Antimicrobials    Indication for Antimicrobials: Sepsis unknown etiology    Current Regimen of Each Antimicrobial:  Vancomycin 2000 mg x 2, followed by 1000 mg IV Q8H (Start Date ; Day # 1)    Previous Antimicrobial Therapy:      Goal Level: VANCOMYCIN TROUGH GOAL RANGE    Vancomycin Trough Other:   (AUC: 400 - 600 mg/hr/Liter/day)     Date Dose & Interval Measured (mcg/mL) Extrapolated (mcg/mL)                       Date & time of next level: TBD    Significant Cultures:    Blood - GPC    Radiology / Imaging results: (X-ray, CT scan or MRI):     Paralysis, amputations, malnutrition: None noted    Labs:  Recent Labs     20  0052 20  1205   CREA 0.65 0.98   BUN 8 12   WBC 8.6 9.7     Temp (24hrs), Av.5 °F (36.9 °C), Min:98.3 °F (36.8 °C), Max:98.6 °F (37 °C)    Creatinine Clearance (mL/min) or Dialysis: >100 ml/min    Impression/Plan:   Vancomycin 1000 mg Q8H for trough of 14.93 mcg/ml  Antimicrobial stop date TBD     Pharmacy will follow daily and adjust medications as appropriate for renal function and/or serum levels. Thank you,  Cuauhtemoc Avila, PHARMD    Recommended duration of therapy  [email or web address]/Pharmacy/Clinical%20Companion/Duration%20of%20ABX%[email or web address]. docx    Renal Dosing  [email or web address]/[place]/[facility]/Pharmacy/Clinical%20Companion/Renal%[id number]. pdf

## 2020-05-20 NOTE — PROGRESS NOTES
ADULT PROTOCOL: JET AEROSOL ASSESSMENT    Patient  James Gama     42 y.o.   female     5/19/2020  10:20 PM    Breath Sounds Pre Procedure: Right Breath Sounds: Clear                               Left Breath Sounds: Clear    Breath Sounds Post Procedure: Right Breath Sounds: Clear                                 Left Breath Sounds: Clear    Breathing pattern: Pre procedure Breathing Pattern: Regular          Post procedure Breathing Pattern: Regular    Heart Rate: Pre procedure Pulse: 80           Post procedure Pulse: 82    Resp Rate: Pre procedure Respirations: 18           Post procedure Respirations: 18    Peak Flow: Pre bronchodilator   n/a          Post bronchodilator   n/a    Incentive Spirometry:   n/a      n/a    Cough: Pre procedure Cough: Non-productive               Post procedure Cough: Non-productive    Sputum: Pre procedure  n/a                 Post procedure  n/a    Oxygen: O2 Device: Room air   FiO2 (%) room air   21%     Changed: NO    SpO2: Pre procedure SpO2: 100 %   without oxygen              Post procedure SpO2: 100 %  without oxygen    Nebulizer Therapy: Current medications        Changed: NO    Problem List:   Patient Active Problem List   Diagnosis Code    Hypothyroidism (acquired) E03.9    Fibromyalgia M79.7    Hyperhidrosis R61    Environmental and seasonal allergies J30.89    Rectal bleeding K62.5    Family history of colonic polyps Z83.71    Encounter for screening colonoscopy Z12.11    Rheumatoid arthritis (HCC) M06.9    Asthma J45.909    Severe obesity (BMI 35.0-39. 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    Abdominal pain R10.9       Respiratory Therapist: Jasmina Encarnacion, RT

## 2020-05-20 NOTE — PROGRESS NOTES
Reason for Readmission:     Splenic Infraction          RUR Score/Risk Level:         PCP: First and Last name:  Kirill   Name of Practice: [facility]    Are you a current patient: Yes/No: Yes   Approximate date of last visit: April 27th     Is a Care Conference indicated:  Not at this time       Did you attend your follow up appointment (s): If not, why not:   No, due to pt returning back to  [facility] due to abdominal pain -pt d/c 12hrs ago. Resources/supports as identified by patient/family:   Fiance: Shaina Aleman and Lucio Phipps(ex mother in law)       Top Challenges facing patient (as identified by patient/family and CM): Finances/Medication cost?    Medical [place]   Transportation     Family will assist with transport    Support system or lack thereof? Family Support, and monthly office visits (please review additional note)   Living arrangements? Lives with family         Self-care/ADLs/Cognition? Independent with ADLs       Current Advanced Directive/Advance Care Plan:  FULL-Finance and [address] [place] for utilizing home health:   No recommendations at this time              Transition of Care Plan:    Based on readmission, the patient's previous Plan of Care   has been evaluated and/or modified. The current Transition of Care Plan is:           CM completed assessment with pt, via [facility] telephone (due to [address]. Pt reported that she resides with family in their one story home (4 steps into main entrance). Pt reported that she is active with PCP: seen April 27th, however, she wasn't able to attend follow up appointment (readmission). Pt uses [facility] ([address] Pt is known to be independent with ADls, and drives-additional family support. Pt will have transport home at the time of d/c. Pt reported that no HHC, SNF and DME. Pt reported that she has an permanent port and receives monthly infusions, from her rhumotologist: Dr. Julieta Yeh.   Pt disclosed that she also receives monthly injections from pulmonary MDt: Dr. Amber Cross. Pt reported that her medical decision makers are: finance: Chary Michel and ex mother in law: Tj Lima. Pt disclosed that her mother has dementia and her brother isnt apart of her life, currently and would wish to use the names selected to assist in her medical decision making. ACP would be highly recommended for this pt. Pt disclosed that she had been d/c from [facility] for 12hrs, when she started to feel abdominal pain and shortness of breath. Pt reported that she was informed to return back to [facility], due to current diagnosis. CM will continue to follow. Care Management Interventions  PCP Verified by CM: Yes  Mode of Transport at Discharge:  Other (see comment)  Transition of Care Consult (CM Consult): Discharge Planning  Discharge Durable Medical Equipment: No  Physical Therapy Consult: No  Occupational Therapy Consult: No  Speech Therapy Consult: No  Current Support Network: Relative's Home  Confirm Follow Up Transport: Family  Discharge Location  Discharge Placement: KRISTIN Francis 43, MSW, [facility]

## 2020-05-20 NOTE — PROGRESS NOTES
Bedside shift change report given to Ashley Ernst (oncoming nurse) by Odell Gomez (offgoing nurse). Report included the following information SBAR, Kardex, Intake/Output and MAR.

## 2020-05-20 NOTE — CONSULTS
Surgery Consult  Consulted by: Dr. Vanessa Murcia  PCP: Franki Arrington MD    Thank you for allowing me to participate in your patient's care. Please call me with any questions. Assessment:   Multiple subcentimeter splenic hypodensities in the setting of LUQ and pleuritic pain. Plan:   Nothing to do surgically at this time.     Need to eval for causes of septic emboli and thrombotic emboli and treat any underlying etiology. Splenectomy would be reserved for large abscess with refractory pain or rupture. Infarct is treated symptomatically. Unsure if there are rheumatalgic etiologies to consider. Subjective:      Debra Chang is a 42 y.o. female who is seen for LUQ and left chest pain with deep inspiration. Has been happening for about a week. CT revealed \"No significant change in numerous subtle subcentimeter hypodensities in the spleen. No splenomegaly. \"      Presently she denies fever but did have fever and chills last week. Has h/o chronic E.coli UTI's as well as RA. Has seen hematology in the past.  No prior suspicion for HUS, ITP etc.      Objective:   Blood pressure 143/78, pulse 94, temperature 98.1 °F (36.7 °C), resp. rate 22, height 5' 6\" (1.676 m), weight 94.4 kg (208 lb 1.8 oz), SpO2 98 %, not currently breastfeeding.   Temp (24hrs), Av.4 °F (36.9 °C), Min:98.1 °F (36.7 °C), Max:98.6 °F (37 °C)      Physical Exam:  PHYSICAL EXAM:  Gen:  [x]     A&O     [x]      No acute distress    [x]     non-toxic     []     ill apearing     []     Critical        HEENT:   [x]     anicteric    []      scleral icterus    [x]     moist mucosa     []     dry mucosa    RESP:   [x]     CTA bilaterally, no wheezing/rhonchi/rales/crackles    []     wheezing     []     rhonchi     []     crackles     []     use of accessory muscles    CARD:  [x]     regular rate and rhythm     [x]     No murmurs/rubs/gallops    []     irregular rhythm     []     Murmur     []     Rubs     [] Gallops    ABD:     [x]  soft  [x]     non distended  []     non tender  [x]      NABS    Mildly TTP in the LUQ. SKIN:   [x]     normal      []Rashes      []Ulcers     EXT:  [x]      No CCE     []2+ pulses throughout    []      Clubbing     []Cyanosis     []Edema     []diminished pulses    NEUR:  [x]     Strength normal     []weakness   []LUE     []RUE     []LLE     []RLE    [x]     follows commands          PSYCH:   insight []Poor   [x]good                      []     depressed     []     anxious     []     agitated    Past Medical History:   Diagnosis Date    Acquired hypothyroidism     Adverse effect of anesthesia     labile BP during/after C section    Asthma     Broken bones     history of 9 broken bones    Chronic UTI (urinary tract infection)     \"extra valve right kidney causes UTI\"    Fibromyalgia     Gestational diabetes     GI bleed 2007,2011,2015,2016    lower GI last colonoscopy in 2016 normal     Huntingtons chorea ([address]     Hyperhydrosis disorder     Ill-defined condition     Chris/ tested genetically - daughter has Owen    Infertility     PCOS    PCOS (polycystic ovarian syndrome)     Precancerous skin lesion     removed from Right shoulder    Preeclampsia 2011    pregnancy    Reactive airway disease     Rheumatoid arthritis ([address]     SVT (supraventricular tachycardia) ([address] 2011    occured during pregnancy when picc line inserted.      Past Surgical History:   Procedure Laterality Date    COLONOSCOPY N/A 6/13/2016    COLONOSCOPY performed by Yarelis Brady MD at \[facility]\"" ENDOSCOPY    HX HEENT      HX WISDOM TEETH EXTRACTION  2004    UPPER GI ENDOSCOPY,BIOPSY  2/12/2019         UPPER GI ENDOSCOPY,DILATN W GUIDE  2/12/2019           Family History   Problem Relation Age of Onset    Other Mother         Owen's disease    Hypertension Mother     Dementia Mother     High Cholesterol Father     Heart Disease Father     Diabetes Father     Hypertension Father     Asthma Brother     Diabetes Brother     Other Brother         varicocele, hernias    Hypertension Brother     Alcohol abuse Brother     Hypertension Maternal Grandmother     Elevated Lipids Maternal Grandmother     Cancer Maternal Grandmother         breast    Other Maternal Grandmother         polymyalgia rheumatica    Glaucoma Maternal Grandmother     Cancer Maternal Grandfather         prostate    Huntingtons disease Maternal Grandfather     Cancer Paternal Grandmother         lung with mets    Cancer Paternal Grandfather         prostate, colon    Diabetes Paternal Grandfather     Heart Disease Paternal Grandfather     Alzheimer Paternal Grandfather     Dementia Paternal Grandfather      Social History     Socioeconomic History    Marital status:      Spouse name: Not on file    Number of children: Not on file    Years of education: Not on file    Highest education level: Not on file   Tobacco Use    Smoking status: Never Smoker    Smokeless tobacco: Never Used   Substance and Sexual Activity    Alcohol use: Yes     Comment: few drinks per year    Drug use: No   Social History Narrative    ** Merged History Encounter **           Prior to Admission medications    Medication Sig Start Date End Date Taking? Authorizing Provider   metoclopramide (REGLAN) 5 mg/5 mL syrup Take 5 mL by mouth Before breakfast, lunch, and dinner for 14 days. 5/18/20 6/1/20  Charles Fields MD   diphenhydrAMINE (BenadryL) 25 mg capsule Take 25 mg by mouth every six (6) hours as needed. Victoria Arana MD   BORIC ACID 600 mg by Does Not Apply route daily. Vaginal    Victoria Arana MD   predniSONE (DELTASONE) 20 mg tablet Take 20 mg by mouth daily. Victoria Arana MD   norethindrone-ethinyl estradiol (Junel FE 1/20, 28,) 1 mg-20 mcg (21)/75 mg (7) tab Take  by mouth. Victoria Arana MD   nitrofurantoin (MACRODANTIN) 50 mg capsule Take 1 Cap by mouth daily as needed (post coital).  4/27/20   Appa Amna Cabral MD   lisdexamfetamine (Vyvanse) 30 mg capsule Take 1 Cap by mouth every morning. Max Daily Amount: 30 mg. 4/27/20   Appa Vivienne Tan MD   escitalopram oxalate (LEXAPRO) 20 mg tablet TAKE 1 TABLET BY MOUTH EVERY DAY 3/26/20   Appa Vivienne Gonzalez MD   tofacitinib citrate (XELJANZ XR PO) Take 30 mg by mouth daily. Provider, Historical   miconazole nitrate 200 mg/5 gram (4 %) vaginal cream Apply to affected area as needed once a day 3/14/20   Hiral Flores MD   PREVIDENT 5000 SENSITIVE 1.1-5 % pste USE AS DIRECTED TWICE A DAY SPIT OUT EXCESS AND DO NOT RINSE, EAT OR DRINK FOR 30 MINUTES 2/7/20   Provider, Historical   hydroxychloroquine (PLAQUENIL) 200 mg tablet 200 mg daily. 12/23/19   Provider, Historical   lidocaine-prilocaine (EMLA) topical cream For port flushing every 3 month 11/25/19   Provider, Historical   BD SYRINGE 1 mL 25 gauge x 5/8\" syrg 1 SYRINGE ONCE A WEEK 11/28/19   Provider, Historical   heparin sodium,porcine (HEPARIN LOCK FLUSH, PORCINE, IV) by IntraVENous route. For port flush every 3 months    Provider, Historical   omeprazole (PRILOSEC) 40 mg capsule Take 1 Cap by mouth daily. 9/28/19   Hiral Flores MD   Cetirizine (ZYRTEC) 10 mg cap Take 10 mg by mouth daily. Indications: inflammation of the nose due to an allergy 9/28/19   Hiral Flores MD   albuterol (PROVENTIL HFA, VENTOLIN HFA, PROAIR HFA) 90 mcg/actuation inhaler Take 2 Puffs by inhalation every four (4) hours as needed for Wheezing. 6/9/19   Hiral Flores MD   omalizumab Jeri Salvia SC) by SubCUTAneous route every thirty (30) days. Given by Dr Rochelle Alvarado in his office    Provider, Historical   propranolol LA (INDERAL LA) 60 mg SR capsule TAKE 1 CAPSULE BY MOUTH EVERY DAY 5/17/19   Kenna Perez DO   SYMBICORT 160-4.5 mcg/actuation HFAA TAKE 2 PUFFS BY MOUTH TWICE A DAY 3/28/19   Provider, Historical   OZEMPIC 0.25 mg or 0.5 mg(2 mg/1.5 mL) sub-q pen by SubCUTAneous route every seven (7) days.  Patient states she takes 1 ml every friday 4/21/19   Provider, Historical   ondansetron hcl (ZOFRAN) 4 mg tablet Take 4 mg by mouth every eight (8) hours as needed for Nausea. Provider, Historical   naltrexone (DEPADE) 50 mg tablet Take  by mouth daily. Provider, Historical   EPIPEN 2-SANJAY 0.3 mg/0.3 mL injection 1 (ONE) INJECTION AS NEEDED 4/30/19   Provider, Historical   buPROPion XL (WELLBUTRIN XL) 150 mg tablet Take 1 Tab by mouth every morning. 5/16/19   Appkirsten Hatrman MD   montelukast (SINGULAIR) 10 mg tablet Take 1 Tab by mouth daily. 5/16/19   Appkirsten Hartman MD   traMADol (ULTRAM) 50 mg tablet Take 50 mg by mouth every six (6) hours as needed for Pain. Provider, Historical   oxybutynin (DITROPAN) 5 mg tablet 5 mg two (2) times a day. Patient states she takes 1 tablet bid 10/2/18   Provider, Historical   BD INSULIN SYRINGE 1 mL 25 gauge x 5/8\" syrg USE 1 SYRINGE ONCE A WEEK 8/24/18   Provider, Historical   cyanocobalamin (Vitamin B-12) 1,000 mcg tablet Take  by mouth daily. Provider, Historical   RESTASIS 0.05 % ophthalmic emulsion INSTILL 1 DROP INTO EACH EYE TWICE DAILY 10/6/17   Provider, Historical   SAFETY-ABDI TB SYR 1CC/25GX5/8\" 1 mL 25 gauge x 5/8\" syrg USE TO INJECT METHOTREXATE ONCE A WEEK 12/1/17   Provider, Historical   methotrexate 25 mg/mL chemo injection by SubCUTAneous route every seven (7) days. On friday 4/7/17   Provider, Historical   metFORMIN ER (GLUCOPHAGE XR) 500 mg tablet 500 mg two (2) times a day. 11/22/16   Provider, Historical   folic acid (FOLVITE) 1 mg tablet TAKE 1 TABLET ORALLY DAILY 11/29/16   Provider, Historical   desogestrel-ethinyl estradiol (DESOGEN) 0.15-0.03 mg tab Take 1 Tab by mouth nightly. Provider, Historical   Nebulizer & Compressor machine 1 Each by Does Not Apply route three (3) times daily as needed. 12/10/12   Robbi Do MD   glycopyrrolate (ROBINUL) 1 mg tablet Take 2 mg by mouth two (2) times a day.  Indications: hyperhydrosis    Provider, Historical   cholecalciferol (Vitamin D3) (1000 Units /25 mcg) tablet Take 1 Tab by mouth daily. Provider, Historical   levothyroxine (SYNTHROID) 75 mcg tablet Take 75 mcg by mouth Daily (before breakfast).     Provider, Historical     ALLERGIES:    Allergies   Allergen Reactions    Latex Swelling    Rituxan [Rituximab] Anaphylaxis    Entex [Phenylephrine-Guaifenesin] Anaphylaxis, Hives and Palpitations    Mucinex [Guaifenesin] Anaphylaxis and Hives    Pepto-Bismol [Bismuth Subsalicylate] Nausea and Vomiting       Review of Systems:  (unchecked were asked but negative)  Constitutional: [x] Fever/chills   [] Sweats   [] Loss of appetite   [] Fatigue/weakness   [] Weight loss  Head & Neck:   [] Headaches   [] Visual loss   [] Hearing loss   [] Thyroid problems  Cardiovascular:   [] Stroke   [] Calf pain when walking   [] Chest pain   [] Rapid heart beat       [] Heart attack   [] Stents   [] Congestive heart failure   [] Leg swelling   [] Murmur  Respiratory:   [] Sleep apnea   [] Cough/congestion   [] Shortness of breath   [] Wheezing/asthma      [] COPD/emphysema  Gastrointestinal:   [] Frequent indigestion   [] Trouble swallowing   [] Nausea   [] Vomiting   [x] Bloating   [x] Abd pain       [] Diarrhea   [x] Constipation   [] Blood in stool   [] Ulcers   [] Intestinal disease   [] Hepatitis    Genitourinary:   [] Painful urination   [] Difficulty urinating   [x] Frequent urination       [] Enlarged prostate   [] Vasectomy   [] Blood in urine   [] Dialysis  Musculoskeletal:  [x] Muscle aches   [] Back pain   [x] Joint pain  Neurologic:    [] Seizures   [] Dizziness   [] Numbness  Hematologic:   [] Nosebleeds   [] Easy bruising   [] Anemia   [] Easy bleeding  Psychiatric:    [] Depression   [] Anxiety   [] Bipolar disorder   [] Schizophrenia                                  []     Unable to obtain  ROS due to  []     mental status change  []     sedated   []     intubated    LABS:  Recent Labs 05/20/20  0052 05/19/20  1205   WBC 8.6 9.7   HGB 10.1* 11.8   HCT 30.4* 35.5    354     Recent Labs     05/20/20  0052 05/19/20  1205    140   K 3.0* 3.0*    103   CO2 30 27   BUN 8 12   CREA 0.65 0.98   GLU 88 96   CA 7.8* 8.5     Recent Labs     05/19/20  1205   SGOT 31   AP 59   TP 6.5   ALB 2.9*   GLOB 3.6     No results for input(s): INR, PTP, APTT, INREXT in the last 72 hours.       Signed By: Anai Silverman MD     May 20, 2020

## 2020-05-21 ENCOUNTER — APPOINTMENT (OUTPATIENT)
Dept: NON INVASIVE DIAGNOSTICS | Age: 40
DRG: 815 | End: 2020-05-21
Attending: INTERNAL MEDICINE
Payer: COMMERCIAL

## 2020-05-21 ENCOUNTER — ANESTHESIA EVENT (OUTPATIENT)
Dept: ENDOSCOPY | Age: 40
DRG: 815 | End: 2020-05-21
Payer: COMMERCIAL

## 2020-05-21 LAB
ANION GAP SERPL CALC-SCNC: 8 MMOL/L (ref 5–15)
BUN SERPL-MCNC: 6 MG/DL (ref 6–20)
BUN/CREAT SERPL: 8 (ref 12–20)
CALCIUM SERPL-MCNC: 8.1 MG/DL (ref 8.5–10.1)
CHLORIDE SERPL-SCNC: 106 MMOL/L (ref 97–108)
CO2 SERPL-SCNC: 28 MMOL/L (ref 21–32)
CREAT SERPL-MCNC: 0.78 MG/DL (ref 0.55–1.02)
ENA SS-A AB SER-ACNC: <0.2 AI (ref 0–0.9)
ENA SS-B AB SER-ACNC: <0.2 AI (ref 0–0.9)
ERYTHROCYTE [DISTWIDTH] IN BLOOD BY AUTOMATED COUNT: 14 % (ref 11.5–14.5)
GLUCOSE SERPL-MCNC: 115 MG/DL (ref 65–100)
HCT VFR BLD AUTO: 32.7 % (ref 35–47)
HGB BLD-MCNC: 10.5 G/DL (ref 11.5–16)
LACTATE SERPL-SCNC: 1.3 MMOL/L (ref 0.4–2)
MCH RBC QN AUTO: 30.4 PG (ref 26–34)
MCHC RBC AUTO-ENTMCNC: 32.1 G/DL (ref 30–36.5)
MCV RBC AUTO: 94.8 FL (ref 80–99)
NRBC # BLD: 0 K/UL (ref 0–0.01)
NRBC BLD-RTO: 0 PER 100 WBC
PLATELET # BLD AUTO: 282 K/UL (ref 150–400)
PMV BLD AUTO: 9 FL (ref 8.9–12.9)
POTASSIUM SERPL-SCNC: 3.6 MMOL/L (ref 3.5–5.1)
RBC # BLD AUTO: 3.45 M/UL (ref 3.8–5.2)
SODIUM SERPL-SCNC: 142 MMOL/L (ref 136–145)
WBC # BLD AUTO: 6.9 K/UL (ref 3.6–11)

## 2020-05-21 PROCEDURE — 74011250636 HC RX REV CODE- 250/636: Performed by: INTERNAL MEDICINE

## 2020-05-21 PROCEDURE — 74011000250 HC RX REV CODE- 250: Performed by: FAMILY MEDICINE

## 2020-05-21 PROCEDURE — 74011250636 HC RX REV CODE- 250/636: Performed by: FAMILY MEDICINE

## 2020-05-21 PROCEDURE — 74011250637 HC RX REV CODE- 250/637: Performed by: INTERNAL MEDICINE

## 2020-05-21 PROCEDURE — 74011636637 HC RX REV CODE- 636/637: Performed by: INTERNAL MEDICINE

## 2020-05-21 PROCEDURE — 93306 TTE W/DOPPLER COMPLETE: CPT

## 2020-05-21 PROCEDURE — 36415 COLL VENOUS BLD VENIPUNCTURE: CPT

## 2020-05-21 PROCEDURE — 94640 AIRWAY INHALATION TREATMENT: CPT

## 2020-05-21 PROCEDURE — 74011000250 HC RX REV CODE- 250: Performed by: INTERNAL MEDICINE

## 2020-05-21 PROCEDURE — 80048 BASIC METABOLIC PNL TOTAL CA: CPT

## 2020-05-21 PROCEDURE — 83605 ASSAY OF LACTIC ACID: CPT

## 2020-05-21 PROCEDURE — 65270000029 HC RM PRIVATE

## 2020-05-21 PROCEDURE — 94760 N-INVAS EAR/PLS OXIMETRY 1: CPT

## 2020-05-21 PROCEDURE — 85027 COMPLETE CBC AUTOMATED: CPT

## 2020-05-21 RX ORDER — POLYETHYLENE GLYCOL 3350, SODIUM SULFATE ANHYDROUS, SODIUM BICARBONATE, SODIUM CHLORIDE, POTASSIUM CHLORIDE 236; 22.74; 6.74; 5.86; 2.97 G/4L; G/4L; G/4L; G/4L; G/4L
4000 POWDER, FOR SOLUTION ORAL ONCE
Status: COMPLETED | OUTPATIENT
Start: 2020-05-21 | End: 2020-05-22

## 2020-05-21 RX ORDER — DIPHENHYDRAMINE HYDROCHLORIDE 50 MG/ML
25 INJECTION, SOLUTION INTRAMUSCULAR; INTRAVENOUS ONCE
Status: COMPLETED | OUTPATIENT
Start: 2020-05-21 | End: 2020-05-21

## 2020-05-21 RX ORDER — BISACODYL 5 MG
10 TABLET, DELAYED RELEASE (ENTERIC COATED) ORAL
Status: COMPLETED | OUTPATIENT
Start: 2020-05-21 | End: 2020-05-21

## 2020-05-21 RX ORDER — BISACODYL 5 MG
10 TABLET, DELAYED RELEASE (ENTERIC COATED) ORAL ONCE
Status: ACTIVE | OUTPATIENT
Start: 2020-05-22 | End: 2020-05-22

## 2020-05-21 RX ADMIN — ENOXAPARIN SODIUM 40 MG: 40 INJECTION SUBCUTANEOUS at 20:59

## 2020-05-21 RX ADMIN — BISACODYL 10 MG: 5 TABLET, COATED ORAL at 17:15

## 2020-05-21 RX ADMIN — ONDANSETRON 4 MG: 2 INJECTION INTRAMUSCULAR; INTRAVENOUS at 01:16

## 2020-05-21 RX ADMIN — DIPHENHYDRAMINE HYDROCHLORIDE 25 MG: 25 CAPSULE ORAL at 17:15

## 2020-05-21 RX ADMIN — TRAMADOL HYDROCHLORIDE 50 MG: 50 TABLET, FILM COATED ORAL at 01:16

## 2020-05-21 RX ADMIN — CYCLOSPORINE 1 DROP: 0.5 EMULSION OPHTHALMIC at 17:17

## 2020-05-21 RX ADMIN — VANCOMYCIN HYDROCHLORIDE 1000 MG: 1 INJECTION, POWDER, LYOPHILIZED, FOR SOLUTION INTRAVENOUS at 09:17

## 2020-05-21 RX ADMIN — POTASSIUM BICARBONATE 40 MEQ: 782 TABLET, EFFERVESCENT ORAL at 08:26

## 2020-05-21 RX ADMIN — POLYETHYLENE GLYCOL 3350, SODIUM SULFATE ANHYDROUS, SODIUM BICARBONATE, SODIUM CHLORIDE, POTASSIUM CHLORIDE 4000 ML: 236; 22.74; 6.74; 5.86; 2.97 POWDER, FOR SOLUTION ORAL at 19:00

## 2020-05-21 RX ADMIN — ESCITALOPRAM OXALATE 5 MG: 10 TABLET ORAL at 08:26

## 2020-05-21 RX ADMIN — PREDNISONE 20 MG: 20 TABLET ORAL at 08:25

## 2020-05-21 RX ADMIN — NYSTATIN 500000 UNITS: 100000 SUSPENSION ORAL at 14:25

## 2020-05-21 RX ADMIN — GLYCOPYRROLATE 2 MG: 1 TABLET ORAL at 08:26

## 2020-05-21 RX ADMIN — CYANOCOBALAMIN TAB 500 MCG 250 MCG: 500 TAB at 08:27

## 2020-05-21 RX ADMIN — NYSTATIN 500000 UNITS: 100000 SUSPENSION ORAL at 17:15

## 2020-05-21 RX ADMIN — OXYBUTYNIN CHLORIDE 5 MG: 5 TABLET ORAL at 17:16

## 2020-05-21 RX ADMIN — ARFORMOTEROL TARTRATE: 15 SOLUTION RESPIRATORY (INHALATION) at 20:19

## 2020-05-21 RX ADMIN — MONTELUKAST SODIUM 10 MG: 10 TABLET, FILM COATED ORAL at 08:26

## 2020-05-21 RX ADMIN — VANCOMYCIN HYDROCHLORIDE 1000 MG: 1 INJECTION, POWDER, LYOPHILIZED, FOR SOLUTION INTRAVENOUS at 01:25

## 2020-05-21 RX ADMIN — FAMOTIDINE 20 MG: 10 INJECTION, SOLUTION INTRAVENOUS at 01:46

## 2020-05-21 RX ADMIN — NYSTATIN 500000 UNITS: 100000 SUSPENSION ORAL at 21:01

## 2020-05-21 RX ADMIN — HYDROXYCHLOROQUINE SULFATE 200 MG: 200 TABLET ORAL at 08:26

## 2020-05-21 RX ADMIN — ARFORMOTEROL TARTRATE: 15 SOLUTION RESPIRATORY (INHALATION) at 07:50

## 2020-05-21 RX ADMIN — LEVOTHYROXINE SODIUM 75 MCG: 0.07 TABLET ORAL at 07:30

## 2020-05-21 RX ADMIN — Medication 10 ML: at 14:00

## 2020-05-21 RX ADMIN — OXYBUTYNIN CHLORIDE 5 MG: 5 TABLET ORAL at 08:26

## 2020-05-21 RX ADMIN — TRAMADOL HYDROCHLORIDE 50 MG: 50 TABLET, FILM COATED ORAL at 21:00

## 2020-05-21 RX ADMIN — GLYCOPYRROLATE 2 MG: 1 TABLET ORAL at 21:00

## 2020-05-21 RX ADMIN — BUPROPION HYDROCHLORIDE 150 MG: 150 TABLET, EXTENDED RELEASE ORAL at 08:26

## 2020-05-21 RX ADMIN — ONDANSETRON 4 MG: 4 TABLET, ORALLY DISINTEGRATING ORAL at 17:26

## 2020-05-21 RX ADMIN — PANTOPRAZOLE SODIUM 40 MG: 40 TABLET, DELAYED RELEASE ORAL at 08:26

## 2020-05-21 RX ADMIN — TRAMADOL HYDROCHLORIDE 50 MG: 50 TABLET, FILM COATED ORAL at 09:13

## 2020-05-21 RX ADMIN — ACETAMINOPHEN 650 MG: 325 TABLET, FILM COATED ORAL at 08:26

## 2020-05-21 RX ADMIN — NYSTATIN 500000 UNITS: 100000 SUSPENSION ORAL at 08:26

## 2020-05-21 RX ADMIN — VANCOMYCIN HYDROCHLORIDE 1000 MG: 1 INJECTION, POWDER, LYOPHILIZED, FOR SOLUTION INTRAVENOUS at 18:09

## 2020-05-21 RX ADMIN — FOLIC ACID 1 MG: 1 TABLET ORAL at 09:00

## 2020-05-21 RX ADMIN — DIPHENHYDRAMINE HYDROCHLORIDE 25 MG: 50 INJECTION, SOLUTION INTRAMUSCULAR; INTRAVENOUS at 01:39

## 2020-05-21 RX ADMIN — CYCLOSPORINE 1 DROP: 0.5 EMULSION OPHTHALMIC at 09:17

## 2020-05-21 RX ADMIN — PROPRANOLOL HYDROCHLORIDE 60 MG: 60 CAPSULE, EXTENDED RELEASE ORAL at 08:26

## 2020-05-21 RX ADMIN — POLYETHYLENE GLYCOL-3350 AND ELECTROLYTES 2000 ML: 236; 6.74; 5.86; 2.97; 22.74 POWDER, FOR SOLUTION ORAL at 00:02

## 2020-05-21 RX ADMIN — DIPHENHYDRAMINE HYDROCHLORIDE 25 MG: 25 CAPSULE ORAL at 08:26

## 2020-05-21 RX ADMIN — Medication 10 ML: at 21:01

## 2020-05-21 RX ADMIN — ONDANSETRON 4 MG: 2 INJECTION INTRAMUSCULAR; INTRAVENOUS at 21:59

## 2020-05-21 RX ADMIN — Medication 10 ML: at 05:00

## 2020-05-21 RX ADMIN — MELATONIN 1 TABLET: at 08:27

## 2020-05-21 NOTE — CONSULTS
I spoke with patient and reviewed chart. Denisha has a history of seronegative rheumatoid arthritis. Her BRITTANY was low positive of 1:160, and her specific lupus antibodies and sjogrens antibodies had been negative in the past.   She initially presented with abdominal pain and fevers up t o 103. CT abdo showed splenic hypodensitities. Hematology was consulted and did not think the hypodensities were due to infarcts. . She did have 1/4 positive blood cultures, but this may have been a contaminant. She did have ARF, but her renal function has now improved  GI, and surgery are following as well. GI plans to perform and upper and lower scope given abd pain, and constipation, and she has a repeat abdominal CT scan ordered as well to follow the splenic lesions. A TTE is also pending to look for vegetations or clots since the splenic lesions may  Have been infarcts    We discussed that hopefully we will be able to get a better idea if the splenic lesions are infarcts. If so, we may need to stop Beti Bunkers since there is some data suggesting Beti Bunkers can cause a hypercoagulable state. She has nonspecific serologies (low positive BRITTANY). At this time, no clear diagnose of lupus, but it is always possible this could manifest more clearly in the future. I am not sure how exactly to use lupus or sjogrens to explain the splenic hypodensities. Any active connective tissue disease can cause a hypercoagulable state, and follow up treatment would still be focused at treating the connective tissue disease.      -- continue to hold mtx and xeljanz  -- cont  plaquenil  -- checking cpk, aldolase, ldh  -- her UA has RBCs in it, but she is menstrurating, we will check a prtn:cr ratio to look for evidence of nephritis  -- can repeat UA in a few days

## 2020-05-21 NOTE — PROGRESS NOTES
Bedside shift change report given to Vikram Nova RN (oncoming nurse) by Lovetta Shone, RN (offgoing nurse). Report included the following information SBAR, Kardex, ED Summary, [facility] and Recent Results.

## 2020-05-21 NOTE — CONSULTS
GI Consultation Note Anthonygreg Rogers)    NAME: Jayla Hoyt : 1980 MRN: [id number]   ATTG: Leatha Quinn MD PCP: Reese Barbour MD  Date/Time:  2020 11:58 AM  Subjective:   REASON FOR CONSULT:      Catheryn Oppenheim is a 42 y.o.  female with hx RA and DM, who I was asked to see for chronic abdominal pain and constipation. She was admitted 20 with LUQ abdominal pain and 2wk hx of constipation. She had presented similarly 1-2 wks earlier and had repeat CT this admission noting she was full of stool but without focal GI inflammation or GI mass. Prior COVID-19 testing was negative. She notes episodes of chronic constipation with eval a few yrs ago with  that was unrevealing. She notes that beginning 1 yr ago, she started making use of Dulcolax every other day and intermittent Miralax with BM every day-every other day. She also was changed to Lane wallace from a TNF-inhibitor agent around the same tame. This regimen and the addition of enemas did not provide relief over the last 2 wks. She notes hx of hemorrhoids and intermittently seeing blood per rectum. She notes her left side abdominal discomfort is exacerbated with deep inspiration but without N/V. She denies F/C/CP/SOB. Labs here are notable normocytic anemia with drop in Hgb from 12.8 to 10.1 over the last 5 months. Nl ESR and Ferritin noted here. Prior EGD 2019 noted normal esophageal, gastric, and duodenal mucosa.     Past Medical History:   Diagnosis Date    Acquired hypothyroidism     Adverse effect of anesthesia     labile BP during/after C section    Asthma     Broken bones     history of 9 broken bones    Chronic UTI (urinary tract infection)     \"extra valve right kidney causes UTI\"    Fibromyalgia     Gestational diabetes     GI bleed ,,,2016    lower GI last colonoscopy in 2016 normal     Huntingtons chorea (HCC)     Hyperhydrosis disorder     Ill-defined condition     Coryell/ tested genetically - daughter has Chris    Infertility     PCOS    PCOS (polycystic ovarian syndrome)     Precancerous skin lesion     removed from Right shoulder    Preeclampsia 2011    pregnancy    Reactive airway disease     Rheumatoid arthritis (HCC)     SVT (supraventricular tachycardia) ([address] 2011    occured during pregnancy when picc line inserted.       Past Surgical History:   Procedure Laterality Date    COLONOSCOPY N/A 6/13/2016    COLONOSCOPY performed by Belén [facility]field [facility] at [facility] ENDOSCOPY    HX HEENT      HX [id number] [facility]  2004    UPPER GI ENDOSCOPY,BIOPSY  2/12/2019         UPPER GI ENDOSCOPY,DILATN W GUIDE  2/12/2019          Social History     Tobacco Use    Smoking status: Never Smoker    Smokeless tobacco: Never Used   Substance Use Topics    Alcohol use: Yes     Comment: few drinks per year      Family History   Problem Relation Age of Onset    Other Mother         Chris's disease    Hypertension Mother     Dementia Mother     High Cholesterol Father     Heart Disease Father     Diabetes Father     Hypertension Father     Asthma Brother     Diabetes Brother     Other Brother         varicocele, hernias    Hypertension Brother     Alcohol abuse Brother     Hypertension Maternal Grandmother     Elevated Lipids Maternal Grandmother     Cancer Maternal Grandmother         breast    Other Maternal Grandmother         polymyalgia rheumatica    Glaucoma Maternal Grandmother     Cancer Maternal Grandfather         prostate    Huntingtons disease Maternal Grandfather     Cancer Paternal Grandmother         lung with mets    Cancer Paternal Grandfather         prostate, colon    Diabetes Paternal Grandfather     Heart Disease Paternal Grandfather     Alzheimer Paternal Grandfather     Dementia Paternal Grandfather       Allergies   Allergen Reactions    Latex Swelling    Rituxan [Rituximab] Anaphylaxis    Entex [Phenylephrine-Guaifenesin] Anaphylaxis, Hives and Palpitations    Mucinex [Guaifenesin] Anaphylaxis and Hives    Pepto-Bismol [Bismuth Subsalicylate] Nausea and Vomiting      Home Medications:  Prior to Admission Medications   Prescriptions Last Dose Informant Patient Reported? Taking? BD INSULIN SYRINGE 1 mL 25 gauge x 5/8\" syrg   Yes No   Sig: USE 1 SYRINGE ONCE A WEEK   BD SYRINGE 1 mL 25 gauge x 5/8\" syrg   Yes No   Si SYRINGE ONCE A WEEK   BORIC ACID   Yes No   Si mg by Does Not Apply route daily. Vaginal   Cetirizine (ZYRTEC) 10 mg cap   No No   Sig: Take 10 mg by mouth daily. Indications: inflammation of the nose due to an allergy   EPIPEN 2-SANJAY 0.3 mg/0.3 mL injection   Yes No   Si (ONE) INJECTION AS NEEDED   Nebulizer & Compressor machine   No No   Si Each by Does Not Apply route three (3) times daily as needed. OZEMPIC 0.25 mg or 0.5 mg(2 mg/1.5 mL) sub-q pen   Yes No   Sig: by SubCUTAneous route every seven (7) days. Patient states she takes 1 ml every friday   PREVIDENT 5000 SENSITIVE 1.1-5 % pste   Yes No   Sig: USE AS DIRECTED TWICE A DAY SPIT OUT EXCESS AND DO NOT RINSE, EAT OR DRINK FOR 30 MINUTES   RESTASIS 0.05 % ophthalmic emulsion   Yes No   Sig: INSTILL 1 DROP INTO EACH EYE TWICE DAILY   SAFETY-ABDI TB SYR 1CC/25GX5/8\" 1 mL 25 gauge x 5/8\" syrg   Yes No   Sig: USE TO INJECT METHOTREXATE ONCE A WEEK   SYMBICORT 160-4.5 mcg/actuation HFAA   Yes No   Sig: TAKE 2 PUFFS BY MOUTH TWICE A DAY   albuterol (PROVENTIL HFA, VENTOLIN HFA, PROAIR HFA) 90 mcg/actuation inhaler   No No   Sig: Take 2 Puffs by inhalation every four (4) hours as needed for Wheezing. buPROPion XL (WELLBUTRIN XL) 150 mg tablet   No No   Sig: Take 1 Tab by mouth every morning. cholecalciferol (Vitamin D3) (1000 Units /25 mcg) tablet   Yes No   Sig: Take 1 Tab by mouth daily. cyanocobalamin (Vitamin B-12) 1,000 mcg tablet   Yes No   Sig: Take  by mouth daily.    desogestrel-ethinyl estradiol (DESOGEN) 0.15-0.03 mg tab   Yes No Sig: Take 1 Tab by mouth nightly. diphenhydrAMINE (BenadryL) 25 mg capsule   Yes No   Sig: Take 25 mg by mouth every six (6) hours as needed. escitalopram oxalate (LEXAPRO) 20 mg tablet   No No   Sig: TAKE 1 TABLET BY MOUTH EVERY DAY   folic acid (FOLVITE) 1 mg tablet   Yes No   Sig: TAKE 1 TABLET ORALLY DAILY   glycopyrrolate (ROBINUL) 1 mg tablet   Yes No   Sig: Take 2 mg by mouth two (2) times a day. Indications: hyperhydrosis   heparin sodium,porcine (HEPARIN LOCK FLUSH, PORCINE, IV)   Yes No   Sig: by IntraVENous route. For port flush every 3 months   hydroxychloroquine (PLAQUENIL) 200 mg tablet   Yes No   Si mg daily. levothyroxine (SYNTHROID) 75 mcg tablet   Yes No   Sig: Take 75 mcg by mouth Daily (before breakfast). lidocaine-prilocaine (EMLA) topical cream   Yes No   Sig: For port flushing every 3 month   lisdexamfetamine (Vyvanse) 30 mg capsule   No No   Sig: Take 1 Cap by mouth every morning. Max Daily Amount: 30 mg.   metFORMIN ER (GLUCOPHAGE XR) 500 mg tablet   Yes No   Si mg two (2) times a day. methotrexate 25 mg/mL chemo injection   Yes No   Sig: by SubCUTAneous route every seven (7) days. On friday   metoclopramide (REGLAN) 5 mg/5 mL syrup   No No   Sig: Take 5 mL by mouth Before breakfast, lunch, and dinner for 14 days. miconazole nitrate 200 mg/5 gram (4 %) vaginal cream   No No   Sig: Apply to affected area as needed once a day   montelukast (SINGULAIR) 10 mg tablet   No No   Sig: Take 1 Tab by mouth daily. naltrexone (DEPADE) 50 mg tablet   Yes No   Sig: Take  by mouth daily. nitrofurantoin (MACRODANTIN) 50 mg capsule   No No   Sig: Take 1 Cap by mouth daily as needed (post coital). norethindrone-ethinyl estradiol (Junel FE 1/20, ,) 1 mg-20 mcg (21)/75 mg (7) tab   Yes No   Sig: Take  by mouth. omalizumab (XOLAIR SC)   Yes No   Sig: by SubCUTAneous route every thirty (30) days.  Given by Dr Verónica Swann in his office   omeprazole (PRILOSEC) 40 mg capsule   No No Sig: Take 1 Cap by mouth daily. ondansetron hcl (ZOFRAN) 4 mg tablet   Yes No   Sig: Take 4 mg by mouth every eight (8) hours as needed for Nausea. oxybutynin (DITROPAN) 5 mg tablet   Yes No   Si mg two (2) times a day. Patient states she takes 1 tablet bid   predniSONE (DELTASONE) 20 mg tablet   Yes No   Sig: Take 20 mg by mouth daily. propranolol LA (INDERAL LA) 60 mg SR capsule   No No   Sig: TAKE 1 CAPSULE BY MOUTH EVERY DAY   tofacitinib citrate (XELJANZ XR PO)   Yes No   Sig: Take 30 mg by mouth daily. traMADol (ULTRAM) 50 mg tablet   Yes No   Sig: Take 50 mg by mouth every six (6) hours as needed for Pain. Facility-Administered Medications: None     Hospital medications:  Current Facility-Administered Medications   Medication Dose Route Frequency    vancomycin (VANCOCIN) 1,000 mg in 0.9% sodium chloride (MBP/ADV) 250 mL  1,000 mg IntraVENous Q8H    nystatin (MYCOSTATIN) 100,000 unit/mL oral suspension 500,000 Units  500,000 Units Oral QID    acetaminophen (TYLENOL) tablet 650 mg  650 mg Oral Q6H PRN    ondansetron (ZOFRAN) injection 4 mg  4 mg IntraVENous Q4H PRN    potassium bicarb-citric acid (EFFER-K) tablet 40 mEq  40 mEq Oral DAILY    levothyroxine (SYNTHROID) tablet 75 mcg  75 mcg Oral ACB    glycopyrrolate (ROBINUL) tablet 2 mg  2 mg Oral BID    cholecalciferol (VITAMIN D3) (1000 Units /25 mcg) tablet 1 Tab  1 Tab Oral DAILY    folic acid (FOLVITE) tablet 1 mg  1 mg Oral DAILY    cycloSPORINE (RESTASIS) 0.05 % ophthalmic emulsion 1 Drop  1 Drop Both Eyes BID    cyanocobalamin (VITAMIN B12) tablet 250 mcg  250 mcg Oral DAILY    oxybutynin (DITROPAN) tablet 5 mg  5 mg Oral BID    traMADoL (ULTRAM) tablet 50 mg  50 mg Oral Q6H PRN    arformoterol 15 mcg/budesonide 0.5 mg neb solution   Nebulization BID    . PHARMACY TO SUBSTITUTE PER PROTOCOL (Reordered from: OZEMPIC 0.25 mg or 0.5 mg(2 mg/1.5 mL) sub-q pen)    Per Protocol    ondansetron (ZOFRAN ODT) tablet 4 mg  4 mg Oral Q8H PRN    buPROPion XL (WELLBUTRIN XL) tablet 150 mg  150 mg Oral 7am    montelukast (SINGULAIR) tablet 10 mg  10 mg Oral DAILY    propranolol LA (INDERAL LA) capsule 60 mg  60 mg Oral DAILY    albuterol-ipratropium (DUO-NEB) 2.5 MG-0.5 MG/3 ML  3 mL Nebulization Q4H PRN    pantoprazole (PROTONIX) tablet 40 mg  40 mg Oral ACB    cetirizine (ZYRTEC) tablet 10 mg  10 mg Oral DAILY PRN    hydroxychloroquine (PLAQUENIL) tablet 200 mg  200 mg Oral DAILY    . PHARMACY TO SUBSTITUTE PER PROTOCOL (Reordered from: tofacitinib citrate (XELJANZ XR PO))    Per Protocol    escitalopram oxalate (LEXAPRO) tablet 5 mg  5 mg Oral DAILY    diphenhydrAMINE (BENADRYL) capsule 25 mg  25 mg Oral Q6H PRN    predniSONE (DELTASONE) tablet 20 mg  20 mg Oral DAILY    sodium chloride (NS) flush 5-40 mL  5-40 mL IntraVENous Q8H    sodium chloride (NS) flush 5-40 mL  5-40 mL IntraVENous PRN    enoxaparin (LOVENOX) injection 40 mg  40 mg SubCUTAneous Q24H     REVIEW OF SYSTEMS:     [x]    Total of 11 systems reviewed as follows:  Const:  negative fever, negative chills, negative weight loss  Eyes:   negative diplopia or visual changes, negative eye pain  ENT:   negative coryza, negative sore throat  Resp:   negative cough, hemoptysis, dyspnea  Cards:  negative for chest pain, palpitations, lower extremity edema  :  negative for frequency, dysuria and hematuria  Skin:   negative for rash and pruritus  Heme:  negative for easy bruising and gum/nose bleeding  MS:  negative for myalgias, arthralgias, back pain and muscle weakness  Neurolo:  negative for headaches, dizziness, vertigo, memory problems   Psych:  negative for feelings of anxiety, depression     Pertinent Positives include :    Objective:   VITALS:    Visit Vitals  /81   Pulse 79   Temp 98.1 °F (36.7 °C)   Resp 16   Ht 5' 6\" (1.676 m)   Wt 94.4 kg (208 lb 1.8 oz)   SpO2 100%   Breastfeeding No   BMI 33.59 kg/m²     Temp (24hrs), Av °F (36.7 °C), Min:97.8 °F (36.6 °C), Max:98.1 °F (36.7 °C)    PHYSICAL EXAM:   General:    Alert, cooperative, no distress, appears stated age. +OW  Head:   Normocephalic, without obvious abnormality, atraumatic. Eyes:   Conjunctivae clear, anicteric sclerae. Pupils are equal  Nose:  Nares normal. No drainage or sinus tenderness. Throat:    Lips, mucosa, and tongue normal.  No Thrush  Neck:  Supple, symmetrical,  no adenopathy, thyroid: non tender  Back:    Symmetric,  No CVA tenderness. Lungs:   CTA bilaterally. No wheezing/rhonchi/rales. Chest wall:  No tenderness or deformity. No Accessory muscle use. Heart:   Regular rate and rhythm,  no murmur, rub or gallop. Abdomen:   Soft, +Left side tenderness. ND. Bowel sounds normal. No masses  Extremities: Atraumatic, No cyanosis. No edema. No clubbing  Skin:     Texture, turgor normal. No rashes/lesions/jaundice  Lymph: Cervical, supraclavicular normal.  Psych:  Good insight. Not depressed. Not anxious or agitated. Neurologic: EOMs intact. No facial asymmetry/aphasia/slurred speech. Normal strength, A/O X 3. LAB DATA REVIEWED:    Recent Results (from the past 48 hour(s))   CBC WITH AUTOMATED DIFF    Collection Time: 05/19/20 12:05 PM   Result Value Ref Range    WBC 9.7 3.6 - 11.0 K/uL    RBC 3.82 3.80 - 5.20 M/uL    HGB 11.8 11.5 - 16.0 g/dL    HCT 35.5 35.0 - 47.0 %    MCV 92.9 80.0 - 99.0 FL    MCH 30.9 26.0 - 34.0 PG    MCHC 33.2 30.0 - 36.5 g/dL    RDW 13.7 11.5 - 14.5 %    PLATELET 231 070 - 818 K/uL    MPV 9.3 8.9 - 12.9 FL    NRBC 0.0 0  WBC    ABSOLUTE NRBC 0.00 0.00 - 0.01 K/uL    NEUTROPHILS 68 32 - 75 %    LYMPHOCYTES 23 12 - 49 %    MONOCYTES 7 5 - 13 %    EOSINOPHILS 0 0 - 7 %    BASOPHILS 0 0 - 1 %    IMMATURE GRANULOCYTES 2 (H) 0.0 - 0.5 %    ABS. NEUTROPHILS 6.6 1.8 - 8.0 K/UL    ABS. LYMPHOCYTES 2.2 0.8 - 3.5 K/UL    ABS. MONOCYTES 0.7 0.0 - 1.0 K/UL    ABS. EOSINOPHILS 0.0 0.0 - 0.4 K/UL    ABS. BASOPHILS 0.0 0.0 - 0.1 K/UL    ABS. IMM.  GRANS. 0.2 (H) 0.00 - 0.04 K/UL    DF AUTOMATED     METABOLIC PANEL, COMPREHENSIVE    Collection Time: 05/19/20 12:05 PM   Result Value Ref Range    Sodium 140 136 - 145 mmol/L    Potassium 3.0 (L) 3.5 - 5.1 mmol/L    Chloride 103 97 - 108 mmol/L    CO2 27 21 - 32 mmol/L    Anion gap 10 5 - 15 mmol/L    Glucose 96 65 - 100 mg/dL    BUN 12 6 - 20 MG/DL    Creatinine 0.98 0.55 - 1.02 MG/DL    BUN/Creatinine ratio 12 12 - 20      GFR est AA >60 >60 ml/min/1.73m2    GFR est non-AA >60 >60 ml/min/1.73m2    Calcium 8.5 8.5 - 10.1 MG/DL    Bilirubin, total 0.4 0.2 - 1.0 MG/DL    ALT (SGPT) 54 12 - 78 U/L    AST (SGOT) 31 15 - 37 U/L    Alk.  phosphatase 59 45 - 117 U/L    Protein, total 6.5 6.4 - 8.2 g/dL    Albumin 2.9 (L) 3.5 - 5.0 g/dL    Globulin 3.6 2.0 - 4.0 g/dL    A-G Ratio 0.8 (L) 1.1 - 2.2     URINALYSIS W/ REFLEX CULTURE    Collection Time: 05/19/20 12:05 PM   Result Value Ref Range    Color YELLOW/STRAW      Appearance CLEAR CLEAR      Specific gravity 1.020 1.003 - 1.030      pH (UA) 6.0 5.0 - 8.0      Protein Negative NEG mg/dL    Glucose Negative NEG mg/dL    Ketone Negative NEG mg/dL    Bilirubin Negative NEG      Blood MODERATE (A) NEG      Urobilinogen 0.2 0.2 - 1.0 EU/dL    Nitrites Negative NEG      Leukocyte Esterase TRACE (A) NEG      WBC 0-4 0 - 4 /hpf    RBC 10-20 0 - 5 /hpf    Epithelial cells MODERATE (A) FEW /lpf    Bacteria Negative NEG /hpf    UA:UC IF INDICATED CULTURE NOT INDICATED BY UA RESULT CNI      Hyaline cast 2-5 0 - 5 /lpf   HCG URINE, QL. - POC    Collection Time: 05/19/20 12:15 PM   Result Value Ref Range    Pregnancy test,urine (POC) Negative NEG     EKG, 12 LEAD, INITIAL    Collection Time: 05/19/20 12:34 PM   Result Value Ref Range    Ventricular Rate 83 BPM    Atrial Rate 83 BPM    P-R Interval 148 ms    QRS Duration 74 ms    Q-T Interval 370 ms    QTC Calculation (Bezet) 434 ms    Calculated P Axis 57 degrees    Calculated R Axis 65 degrees    Calculated T Axis 31 degrees    Diagnosis Normal sinus rhythm  Nonspecific T wave abnormality  When compared with ECG of 14-MAY-2020 05:47,  Nonspecific T wave abnormality now evident in Anterior leads  Confirmed by Markel Montalvo ([id number]) on 5/20/2020 9:05:57 AM     PROCALCITONIN    Collection Time: 05/19/20  1:46 PM   Result Value Ref Range    Procalcitonin <0.05 ng/mL   LACTIC ACID    Collection Time: 05/19/20  4:17 PM   Result Value Ref Range    Lactic acid 1.5 0.4 - 2.0 MMOL/L   CBC W/O DIFF    Collection Time: 05/20/20 12:52 AM   Result Value Ref Range    WBC 8.6 3.6 - 11.0 K/uL    RBC 3.28 (L) 3.80 - 5.20 M/uL    HGB 10.1 (L) 11.5 - 16.0 g/dL    HCT 30.4 (L) 35.0 - 47.0 %    MCV 92.7 80.0 - 99.0 FL    MCH 30.8 26.0 - 34.0 PG    MCHC 33.2 30.0 - 36.5 g/dL    RDW 13.8 11.5 - 14.5 %    PLATELET 506 729 - 098 K/uL    MPV 9.0 8.9 - 12.9 FL    NRBC 0.0 0  WBC    ABSOLUTE NRBC 0.00 0.00 - 9.14 K/uL   METABOLIC PANEL, BASIC    Collection Time: 05/20/20 12:52 AM   Result Value Ref Range    Sodium 139 136 - 145 mmol/L    Potassium 3.0 (L) 3.5 - 5.1 mmol/L    Chloride 106 97 - 108 mmol/L    CO2 30 21 - 32 mmol/L    Anion gap 3 (L) 5 - 15 mmol/L    Glucose 88 65 - 100 mg/dL    BUN 8 6 - 20 MG/DL    Creatinine 0.65 0.55 - 1.02 MG/DL    BUN/Creatinine ratio 12 12 - 20      GFR est AA >60 >60 ml/min/1.73m2    GFR est non-AA >60 >60 ml/min/1.73m2    Calcium 7.8 (L) 8.5 - 10.1 MG/DL   LACTIC ACID    Collection Time: 05/20/20  2:37 PM   Result Value Ref Range    Lactic acid 2.5 (HH) 0.4 - 2.0 MMOL/L   CULTURE, BLOOD, PAIRED    Collection Time: 05/20/20  4:22 PM   Result Value Ref Range    Special Requests: NO SPECIAL REQUESTS      Culture result: NO GROWTH AFTER 14 HOURS     LACTIC ACID    Collection Time: 05/21/20  3:53 AM   Result Value Ref Range    Lactic acid 1.3 0.4 - 2.0 MMOL/L   METABOLIC PANEL, BASIC    Collection Time: 05/21/20  3:53 AM   Result Value Ref Range    Sodium 142 136 - 145 mmol/L    Potassium 3.6 3.5 - 5.1 mmol/L    Chloride 106 97 - 108 mmol/L    CO2 28 21 - 32 mmol/L    Anion gap 8 5 - 15 mmol/L    Glucose 115 (H) 65 - 100 mg/dL    BUN 6 6 - 20 MG/DL    Creatinine 0.78 0.55 - 1.02 MG/DL    BUN/Creatinine ratio 8 (L) 12 - 20      GFR est AA >60 >60 ml/min/1.73m2    GFR est non-AA >60 >60 ml/min/1.73m2    Calcium 8.1 (L) 8.5 - 10.1 MG/DL   CBC W/O DIFF    Collection Time: 05/21/20  3:53 AM   Result Value Ref Range    WBC 6.9 3.6 - 11.0 K/uL    RBC 3.45 (L) 3.80 - 5.20 M/uL    HGB 10.5 (L) 11.5 - 16.0 g/dL    HCT 32.7 (L) 35.0 - 47.0 %    MCV 94.8 80.0 - 99.0 FL    MCH 30.4 26.0 - 34.0 PG    MCHC 32.1 30.0 - 36.5 g/dL    RDW 14.0 11.5 - 14.5 %    PLATELET 731 646 - 564 K/uL    MPV 9.0 8.9 - 12.9 FL    NRBC 0.0 0  WBC    ABSOLUTE NRBC 0.00 0.00 - 0.01 K/uL     IMAGING RESULTS:   [x]      I have personally reviewed the actual   []    CXR  [x]    CT  []     US  CT ABD/PELV with contrast 5/21/20   FINDINGS:   LUNG BASES: No abnormality. LIVER: No mass or biliary dilatation. GALLBLADDER: Unremarkable. SPLEEN: No splenomegaly. Multiple subcentimeter, subtle hypodensities throughout  the spleen without significant interval change. PANCREAS: No mass or ductal dilatation. ADRENALS: No mass. KIDNEYS: No mass, calculus, or hydronephrosis. GI TRACT: No bowel obstruction or wall thickening  PERITONEUM: No free air or free fluid. APPENDIX: Unremarkable. RETROPERITONEUM: No aortic aneurysm. LYMPH NODES: None enlarged. URINARY BLADDER: Unremarkable. REPRODUCTIVE ORGANS: Round hypodensity in the region of the cervix measures 2.3  x 2.2 cm. LYMPH NODES: None enlarged. FREE FLUID: None. BONES: No destructive bone lesion. IMPRESSION:  1. No significant change in numerous subtle subcentimeter hypodensities in the  spleen. No splenomegaly.   2. No definite change in round cervical hypodensity 2.3 x 2.2 cm which could  represent polyp, fibroid, mass, etc. Correlate clinically     Recommendations/Plan:      Active Problems:    Abdominal pain (5/19/2020)      Bacteremia (5/20/2020)      Constipation (5/20/2020)       ___________________________________________________  RECOMMENDATIONS:    46yo F with persistent LUQ and LLQ abdominal pain with worsening constipation, noted BRBPR, and chronic anemia. Exclusion of GI source for anemia needed as well as exclusion of mass lesion or inflammation as source of sx. She will need alternative modality to relieve her constipation as little benefit with partial Golytely prep here.   Plan:   1) Complete Golytely prep  2) Dulcolax now  3) CLD, then NPO after MN  4) EGD and colonoscopy tomorrow    Discussed Code Status:    [x]    Full Code      []    DNR    ___________________________________________________  Care Plan discussed with:    [x]    Patient   []    Family   [x]    Nursing   [x]    Attending  Total Time :  50   minutes   ___________________________________________________  GI: Anusha Rae MD

## 2020-05-21 NOTE — PROGRESS NOTES
Problem: Falls - Risk of  Goal: *Absence of Falls  Description: Document Starleen Freeze Fall Risk and appropriate interventions in the flowsheet.   Outcome: Progressing Towards Goal  Note: Fall Risk Interventions:            Medication Interventions: Patient to call before getting OOB, Teach patient to arise slowly

## 2020-05-21 NOTE — PROGRESS NOTES
ADULT PROTOCOL: JET AEROSOL  REASSESSMENT    Patient  Alea Patel     42 y.o.   female     5/20/2020  9:24 PM    Breath Sounds Pre Procedure: Right Breath Sounds: Clear                               Left Breath Sounds: Clear    Breath Sounds Post Procedure: Right Breath Sounds: Clear                                 Left Breath Sounds: Clear    Breathing pattern: Pre procedure Breathing Pattern: Regular          Post procedure Breathing Pattern: Regular    Heart Rate: Pre procedure Pulse: 88           Post procedure Pulse: 88    Resp Rate: Pre procedure Respirations: 16           Post procedure Respirations: 16    Peak Flow: Pre bronchodilator   n/a          Post bronchodilator   n/a  Incentive Spirometry:   n/a      n/a    Cough: Pre procedure Cough: Non-productive               Post procedure Cough: Non-productive    Sputum: Pre procedure  n/a                 Post procedure  n/a    Oxygen: O2 Device: Room air   FiO2 (%) room air  21%     Changed: NO    SpO2: Pre procedure SpO2: 99 %   without oxygen              Post procedure SpO2: 100 %  without oxygen    Nebulizer Therapy: Current medications Aerosolized Medications: Brovana, Pulmicort      Changed: NO      Problem List:   Patient Active Problem List   Diagnosis Code    Hypothyroidism (acquired) E03.9    Fibromyalgia M79.7    Hyperhidrosis R61    Environmental and seasonal allergies J30.89    Rectal bleeding K62.5    Family history of colonic polyps Z83.71    Encounter for screening colonoscopy Z12.11    Rheumatoid arthritis (HCC) M06.9    Asthma J45.909    Severe obesity (BMI 35.0-39. 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

## 2020-05-21 NOTE — ADT AUTH CERT NOTES
Abdominal Pain, Undiagnosed - Care Day 1 (5/19/2020) by Payam Prado         Review Status Review Entered   Completed 5/20/2020 09:14       Criteria Review      Care Day: 1 Care Date: 5/19/2020 Level of Care: Inpatient Floor    Guideline Day 1    Level Of Care    (X) Floor    5/20/2020 09:08:48 EDT by Fallon Castillo status May 19    Clinical Status    (X) * Clinical Indications met [D]    5/20/2020 09:08:48 EDT by Autumn Opitz ED ABD EXAM; Tenderness: There is abdominal tenderness in the periumbilical area, left upper quadrant and left lower quadrant. There is guarding and rebound. There is no right CVA tenderness or left CVA tenderness. Activity    (X) Activity as tolerated    5/20/2020 09:14:53 EDT by Lian Fernandez      AMBULATORY    Routes    (X) IV fluids, medications    5/20/2020 09:08:48 EDT by Lian Fernandez      meds; lovenox 40 mg sc q 24h, fentanyl 50 mcg iv at 1800,   dilaudid iv 1 at mg noon,   LR iv 1000 ml & NS iv 1000 ml , NS iv @ 100/h,   Reglan iv 10 mg , zofran 4 mg iv at noon,    Interventions    (X) CBC, chemistries, urinalysis, pregnancy test    5/20/2020 09:08:48 EDT by Lina Fernandez      wbc 9.7,  K 3.0, albumin 2.9,   lactic acid 2.6 and > 4 hours later 1.5.   UA negative for bacteria. ( ) Abdominal x-rays    5/20/2020 09:14:53 EDT by Lian Fernandez      CXR NEGATIVE    (X) Surgical consultation    5/20/2020 09:14:53 EDT by Prabhakar Appiah    (X) Possible endoscopy, ultrasound, CT scan, MRI, repeat ECG    Medications    (X) Oral or parenteral analgesia    5/20/2020 09:08:48 EDT by Lian Fernandez      see meds    * Milestone   Additional Notes   ------ 98.5 °F (36.9 °C) 91 123/84 9- At rest 18 98 % Room air 208 LBS.       ---------CT ABD PELVIS   SPLEEN: No splenomegaly. Multiple subcentimeter, subtle hypodensities throughout   the spleen without significant interval change. PANCREAS: No mass or ductal dilatation. ADRENALS: No mass.    KIDNEYS: No mass, calculus, or hydronephrosis. GI TRACT: No bowel obstruction or wall thickening   PERITONEUM: No free air or free fluid. APPENDIX: Unremarkable. RETROPERITONEUM: No aortic aneurysm. LYMPH NODES: None enlarged. ADDITIONAL COMMENTS: N/A.       URINARY BLADDER: Unremarkable. REPRODUCTIVE ORGANS: Round hypodensity in the region of the cervix measures 2.3   x 2.2 cm. LYMPH NODES: None enlarged. FREE FLUID: None. BONES: No destructive bone lesion. ADDITIONAL COMMENTS: N/A.       IMPRESSION-IMPRESSION:    1. No significant change in numerous subtle subcentimeter hypodensities in the   spleen. No splenomegaly. 2. No definite change in round cervical hypodensity 2.3 x 2.2 cm which could   represent polyp, fibroid, mass, etc. Correlate clinically. -------ED DECISION MAKING   Provider Notes (Medical Decision Making):     The patient is a 42-year-old immunocompromised female who presents with a recurrence of shortness of breath and severe abdominal pain.       She has had quite an extensive evaluation of the same symptoms during her last hospitalization.  I will rescan her here today.  Of recent her labs, lactate is slightly elevated but I will repeat this after she received some fluids.  I am adding a procalcitonin to further risk stratify for potential bacterial infection at this point.  Holding antibiotics at this time and there is no clear bacterial infectious source.       Need to be readmitted given bounce back within 24 hours of last admission with same symptoms. ED EXAM    Physical Exam   Vitals signs and nursing note reviewed. Constitutional:        General: She is in acute distress. HENT:       Mouth/Throat:       Mouth: Mucous membranes are moist.    Eyes:       General: No scleral icterus.      Extraocular Movements: Extraocular movements intact.       Conjunctiva/sclera: Conjunctivae normal.       Pupils: Pupils are equal, round, and reactive to light.     Neck:       Musculoskeletal: Normal range of motion and neck supple.       Vascular: No JVD. Cardiovascular:       Rate and Rhythm: Regular rhythm. Tachycardia present.       Pulses: Normal pulses.       Heart sounds: Normal heart sounds. Pulmonary:       Effort: Pulmonary effort is normal.       Breath sounds: Normal breath sounds. No wheezing. Chest:       Chest wall: No tenderness. Abdominal:       General: Bowel sounds are normal. There is no distension.       Palpations: Abdomen is soft.       Tenderness: There is abdominal tenderness in the periumbilical area, left upper quadrant and left lower quadrant. There is guarding and rebound. There is no right CVA tenderness or left CVA tenderness.       Hernia: No hernia is present. Musculoskeletal: Normal range of motion.          General: No swelling or tenderness.       Right lower leg: No edema.       Left lower leg: No edema. Skin:      General: Skin is warm and dry.       Capillary Refill: Capillary refill takes less than 2 seconds.       Findings: No erythema or rash. Neurological:       General: No focal deficit present.       Mental Status: She is alert. Mental status is at baseline.       Cranial Nerves: Cranial nerves are intact.       Motor: Motor function is intact.     Psychiatric:          Mood and Affect: Mood normal.          Behavior: Behavior normal.       --------------H&P   Abdominal Pain, POA   Possible splenic infarction versus inflammation/infection   Sepsis due to above   -Patient had a SARS-CoV-2 test negative about 2 weeks ago and one more negative last week   -she is presenting with left lower quadrant abdominal pain since last night, and since last week. hypodensities in the spleen consistent with infection, inflammation, neoplasia and infarction   -She was afebrile   -no SOB   -Consulted hematology last week due to suspicion of splenic infarction, input is appreciated.  I discussed the case with Dr. Arnulfo Mobley. After hematology discussion with radiology there was less suspicion for infarct and they recommended to repeat imaging   -will consult Gen surgery     -SARS-CoV-2 testing negative last admission   -Lactic acid 2.6, FU/U Q 6 HR AND ivf   -UNKNOWN ETIOLOGY         History of rheumatoid arthritis   -cont methotrexate and Xeljanz   -On prednisone chronically at home so was started stress dose of steroids with solumedrol, will start weaning off to home dose       Diabetes mellitus type 2   -Hold home metformin.  Started insulin sliding scale with blood sugar checks. Consider adding NPH to steroids if blood sugars go up       History of depression   History of GERD   Hypothyroidism   Hypertension   -Continue home Wellbutrin, Lexapro, levothyroxine and Protonix   -Continue home propranolol       History of hyperreactive airway disease   -Continue home albuterol and Symbicort.       Code Status: Full code   Surrogate Decision Maker:     DVT Prophylaxis: Heparin               Subjective:CHIEF COMPLAINT: abdominal pain       HISTORY OF PRESENT ILLNESS:      The patient is a 30years old woman with past medical history rheumatoid arthritis, diabetes mellitus type 2 presented emergency department due to left upper quadrant pain started last night.  Patient was admitted to the hospital last week with similar presentation and his CT abdomen that time revealed splenic hypodensity which was suspicious for possible infarct however on repeating CT abdomen on the same admission hypodensity where improving.  Hematology was consulted last admission and a repeat CT scan with radiology and the suspicion for infarct was very low and recommended just a repeat CT abdomen as an outpatient.  During her last hospitalization COVID-19 test was negative   Other blood culture was negative for 4 days.    Patient presenting today with the same thing left upper quadrant abdominal pain, recurrent, aggravated by respiration, not alleviated by anything, not associated with nausea or vomiting.  She denies any fever or chills.       In the emergency department, CT abdomen was done and it was unchanged from before however her lactic acid was 2.6 for which she was given IV fluids.          Abdominal Pain, Undiagnosed - Clinical Indications for Admission to Inpatient Care by Alessandro Snyder         Review Status Review Entered   Completed 5/20/2020 09:01       Criteria Review      Clinical Indications for Admission to Inpatient Care    Most Recent : Della Jose Most Recent Date: 5/20/2020 09:01:03 EDT    (X) Admission is indicated for  1 or more  of the following  (1) (2) (3) (4):       (X) Peritoneal signs       5/20/2020 09:01:03 EDT by Darrell Campo  ED MD EXAM ; Tenderness: There is abdominal tenderness in the periumbilical area, left upper quadrant and left lower quadrant. There is guarding and rebound. There is no right CVA tenderness or left CVA tenderness. Additional Notes   ED TEAM   Chief Complaint   Patient presents with   · Abdominal Pain       pt states that she was recently admitted and discharged from this facility for spleen infarcts. Pt states she is still having L sided pain causing her to become SOB. Pt states she was tested for covid19 when admitted and was negative            History Provided By: Patient       HPI: Jennifer Herrera, is a 42 y.o. female whose past medical history includes diabetes and rheumatoid arthritis, presents a day after she was discharged from this hospital with shortness of breath and abdominal pain. Alison Cagle was admitted on 5/14 with similar symptoms to what she describes having today.  Namely, she has not exertional dyspnea, no chest pain, fevers or chills.  She also has left-sided abdominal and left flank pain.  The left-sided pain is mainly mid to lower abdomen.  She has had normal bowel movements.    During her last presentation to the hospital on 5/14, she was found initially to be tachycardic and febrile.  She was treated with broad-spectrum antibiotics for several days and these were finally discontinued once her blood cultures came back negative and no other bacterial source of infection was found.  As part of her work-up that last hospitalization, she had a CTA of her abdomen pelvis which showed multiple hypodensities in her spleen that were initially thought to be from either infection, inflammation, neoplasia or infarction.  The patient clinically improved after a few days in the hospital, several consultants including hematology discussed the case together with reading radiologist and thought that the likelihood of infarction was low.  Because the patient was feeling much better, she was discharged home yesterday with a plan to repeat the CT in about a week.  Unfortunately, this morning she started experiencing very similar pain to what she had during her last admission and it was severe enough for her to not be able to tolerate it at home. In addition to her abdominal pain, she also has shortness of breath.  Non-positional or pleuritic.  Very similar to what she also had during her last hospitalization.  As part of her evaluation during the last hospitalization, she had a CTA of her chest which ruled out pneumonia or PE.  She had a COVID-19 test a few weeks ago as well as a few days ago, both of these have been negative. Pt denies any other alleviating or exacerbating factors. There are no other complaints, changes or physical findings at this time.

## 2020-05-21 NOTE — ADT AUTH CERT NOTES
Abdominal Pain, Undiagnosed - Care Day  (5/20/2020) by Peggy Avila         Review Status Review Entered   Completed 5/20/2020 16:48       Criteria Review      Care Day: 2 Care Date: 5/20/2020 Level of Care: Inpatient Floor    Guideline Day 2    Clinical Status    (X) * Hemodynamic stability    ( ) * Pain absent or managed    5/20/2020 16:48:35 EDT by Marycarmen William      PAIN TO 6/10    (X) * Surgery not indicated    ( ) * Liquid or advanced diet tolerated    ( ) * Discharge plans and education understood    Activity    (X) * Ambulatory [E]    Routes    (X) * Oral hydration, medications, and diet    5/20/2020 16:48:35 EDT by Marycarmen William      kcl iv 10 meq x 2, and other po meds    5/20/2020 16:44:28 EDT by Marycarmen William      meds; NS iv @ 100/h,  lovenox 40 mg sc q 24h, robinul po 2 mg bid, plaquenil po 200 mg daily, protonix 40 mg po daily, effer K 40 meq po x 2 today, prednisone 20 mg po daily, ultram 50 mg po ,  * VANC  iv 2 G & 1 G IV Q 8H , Fentanyl 50 mcg iv    * Milestone   Additional Notes   ------- 98.6 °F (37 °C) 91 129/73- At rest 18 100 % RA   ----k 3.0, lactic acid 2.5   -------ORDERS ;bowel prep, ECHO.     PER MD Note;  After hematology discussion with radiology there was less suspicion for infarct and they recommended to repeat imaging.   ------------GEN SURGEON ;    Nothing to do surgically for very tiny splenic hypodensities.         Need to eval for causes of septic emboli and thrombotic emboli and treat any underlying etiology.     Splenectomy would be reserved for large abscess with refractory pain or rupture.  Infarct is treated symptomatically.         Will be by to see patient later today, but I do not expect recommendations to change.        --------MEDICINE   GI:                   Soft, Non distended, Non tender.  +Bowel sounds   Neurologic:      Alert and oriented X 3, normal speech,    Psych:             Good insight. Not anxious nor agitated   Skin:                No rashes.  No jaundice. Abdominal Pain, POA   Possible splenic infarction versus inflammation/infection   -Patient had a SARS-CoV-2 test negative about 2 weeks ago and one more negative last week   -she is presenting with left lower quadrant abdominal pain since last night, and since last week. hypodensities in the spleen consistent with infection, inflammation, neoplasia and infarction   -She was afebrile   -no SOB   -Consulted hematology last week due to suspicion of splenic infarction, input is appreciated.  I discussed the case with Dr. Fabiana Vazquez. After hematology discussion with radiology there was less suspicion for infarct and they recommended to repeat imaging   -Gen surgery --> no surgical intervention   -blood Cx +ve for GPC 1/2 bottles. Likely contaminate. Repeat blood culture and obtain ECHO. Will give one dose of Vancomycin    -SARS-CoV-2 testing negative last admission   -Lactic acid 2.6, resolved now with IVF   -UNKNOWN ETIOLOGY         Hypokalemia   -replace   -BMP daily       History of rheumatoid arthritis   -cont methotrexate and Nicole Austin   -On prednisone chronically at home    -will check SSA/SSB       Diabetes mellitus type 2   -Hold home metformin.  Started insulin sliding scale with blood sugar checks.  Consider adding NPH to steroids if blood sugars go up       History of depression   History of GERD   Hypothyroidism   Hypertension   -Continue home Wellbutrin, Lexapro, levothyroxine and Protonix   -Continue home propranolol       History of hyperreactive airway disease   -Continue home albuterol and Symbicort.                          Code Status: Full code   Surrogate Decision Maker:        DVT Prophylaxis:lovenox        Subjective:    Chief Complaint / Reason for Physician Visit   Abdominal Pain, POA   Possible splenic infarction versus inflammation/infection   Discussed with RN events overnight.        \"I am ok but my pain is still there\"

## 2020-05-21 NOTE — PROGRESS NOTES
1900 - During shift change report, patient advised myself and Jarome Kocher that she started experiencing itching on her scalp, around her port, and redness and itching of her ears - since beginning initial dose of Vancomysin. First dose started at 61 54 78 and ran at 250 mL/hr. Vanc was turned off after patient reported possible symptoms of allergic reaction, and benadryl was given at 1933.      2100 - Patient reported that her itching was improving somewhat after receiving benadryl. Restarted Vanc at a slower rate - 100 mL/hr and advised patient to notify myself or another nurse immediately if any allergic reaction type symptoms begin again. Closely monitoring patient for new or worsening symptoms. Additionally, bowel prep was ordered for pt but not no medication was ordered. Message sent to tele-hospitalist - order put in for Colyte 2000 mL. Noticed IV potassium dose from the afternoon had not finished running and was disconnected. Jolavellea Hayward and ran the remaining amount (approximately 50 mL) after Vanc finished. 2330 - Patient reported small area on arm where a few hives were developing, and dry cough began (new symptom). Sent message to DR via Tele-hospitalist to report symptoms and ask instructions about next scheduled dose of Vanc. Dr. Aram Ozuna called and advised to give 25mg IV benadryl and 20 mg IV Pepcid to patient and then administer next round of Vanc at slower rate of 100 mL/hr.      0715 - Bedside and Verbal shift change report given to Lillie Perez (oncoming nurse) by Nikita Singh (offgoing nurse). Report included the following information SBAR, Kardex, Intake/Output and MAR.

## 2020-05-21 NOTE — PROGRESS NOTES
Hospitalist Progress Note    NAME: Shann Buerger   :  1980   MRN:  [id number]       Assessment / Plan:        Abdominal Pain, POA  Possible splenic infarction versus inflammation/infection  -Patient had a SARS-CoV-2 test negative about 2 weeks ago and one more negative last week  -she is presenting with left lower quadrant abdominal pain since last night, and since last week. hypodensities in the spleen consistent with infection, inflammation, neoplasia and infarction  -She was afebrile  -no SOB  -Consulted hematology last week due to suspicion of splenic infarction, input is appreciated.  I discussed the case with Dr. Misty Vargas. After hematology discussion with radiology there was less suspicion for infarct and they recommended to repeat imaging  -Gen surgery --> no surgical intervention  -Rheumatology consult  -blood Cx +ve for GPC 1/2 bottles. Likely contaminate. Repeated blood culture has been negative for 14 hr,ECHO pending. one dose of Vancomycin was given . Hold on Abx till Cx are finalized    -SARS-CoV-2 testing negative last admission  -Lactic acid 2.6, resolved now with IVF  -UNKNOWN ETIOLOGY      Hypokalemia  -replaced  -BMP daily    Chronic Constipation   Normocytic anemia  - GI are following now-->EGD and colonoscopy      History of rheumatoid arthritis  -methotrexate and [place] [facility], cont holding  -On prednisone chronically at home   -will check SSA/SSB  -rheumatology consult     Diabetes mellitus type 2  -Hold home metformin.  Started insulin sliding scale with blood sugar checks.  Consider adding NPH to steroids if blood sugars go up     History of depression  History of GERD  Hypothyroidism  Hypertension  -Continue home Wellbutrin, Lexapro, levothyroxine and Protonix  -Continue home propranolol     History of hyperreactive airway disease  -Continue home albuterol and Symbicort.                    Code Status: Full code  Surrogate Decision Maker:      DVT Prophylaxis:lovenox      Subjective:     Chief Complaint / Reason for Physician Visit  Abdominal Pain, POA  Possible splenic infarction versus inflammation/infection  Discussed with RN events overnight. Patient was seen and examined. No acute events overnight. She was crying in tears  She was reassured that we are doing everything we can. \"Thanks doc\"          Review of Systems:  Symptom Y/N Comments  Symptom Y/N Comments   Fever/Chills n   Chest Pain n    Poor Appetite n   Edema     Cough    Abdominal Pain y    Sputum    Joint Pain     SOB/MENDOZA    Pruritis/Rash     Nausea/vomit n   Tolerating PT/OT     Diarrhea n   Tolerating Diet y    Constipation y   Other       Could NOT obtain due to:      Objective:     VITALS:   Last 24hrs VS reviewed since prior progress note. Most recent are:  Patient Vitals for the past 24 hrs:   Temp Pulse Resp BP SpO2   05/21/20 0752 -- -- -- -- 100 %   05/21/20 0746 98.1 °F (36.7 °C) 79 16 120/81 100 %   05/20/20 2253 97.8 °F (36.6 °C) 86 16 130/67 97 %   05/20/20 2049 -- -- -- -- 99 %   05/20/20 1508 98.1 °F (36.7 °C) 94 22 143/78 98 %       Intake/Output Summary (Last 24 hours) at 5/21/2020 1427  Last data filed at 5/21/2020 0700  Gross per 24 hour   Intake 860 ml   Output --   Net 860 ml        PHYSICAL EXAM:  General: WD, WN. Alert, cooperative, no acute distress    EENT:  EOMI. Anicteric sclerae. MMM  Resp:  CTA bilaterally, no wheezing or rales. No accessory muscle use  CV:  Regular  rhythm,  No edema  GI:  Soft, Non distended, Non tender.  +Bowel sounds  Neurologic:  Alert and oriented X 3, normal speech,   Psych:   Good insight. Not anxious nor agitated  Skin:  No rashes.   No jaundice    Reviewed most current lab test results and cultures  YES  Reviewed most current radiology test results   YES  Review and summation of old records today    NO  Reviewed patient's current orders and MAR    YES  PMH/SH reviewed - no change compared to H&P  ________________________________________________________________________  Care Plan discussed with:    Comments   Patient x    Family      RN x    Care Manager     Consultant  x GI                     Multidiciplinary team rounds were held today with , nursing, pharmacist and clinical coordinator. Patient's plan of care was discussed; medications were reviewed and discharge planning was addressed. ________________________________________________________________________  Total NON critical care TIME:  50   Minutes    Total CRITICAL CARE TIME Spent:   Minutes non procedure based      Comments   >50% of visit spent in counseling and coordination of care     ________________________________________________________________________  Paulino Mead MD     Procedures: see electronic medical records for all procedures/Xrays and details which were not copied into this note but were reviewed prior to creation of Plan. LABS:  I reviewed today's most current labs and imaging studies.   Pertinent labs include:  Recent Labs     05/21/20 0353 05/20/20 0052 05/19/20  1205   WBC 6.9 8.6 9.7   HGB 10.5* 10.1* 11.8   HCT 32.7* 30.4* 35.5    274 354     Recent Labs     05/21/20 0353 05/20/20 0052 05/19/20  1205    139 140   K 3.6 3.0* 3.0*    106 103   CO2 28 30 27   * 88 96   BUN 6 8 12   CREA 0.78 0.65 0.98   CA 8.1* 7.8* 8.5   ALB  --   --  2.9*   TBILI  --   --  0.4   SGOT  --   --  31   ALT  --   --  54       Signed: Paulino Mead MD

## 2020-05-22 ENCOUNTER — APPOINTMENT (OUTPATIENT)
Dept: INFUSION THERAPY | Age: 40
End: 2020-05-22

## 2020-05-22 ENCOUNTER — ANESTHESIA (OUTPATIENT)
Dept: ENDOSCOPY | Age: 40
DRG: 815 | End: 2020-05-22
Payer: COMMERCIAL

## 2020-05-22 LAB
ALBUMIN SERPL-MCNC: 2.9 G/DL (ref 3.5–5)
ALBUMIN/GLOB SERPL: 0.9 {RATIO} (ref 1.1–2.2)
ALP SERPL-CCNC: 56 U/L (ref 45–117)
ALT SERPL-CCNC: 53 U/L (ref 12–78)
ANION GAP SERPL CALC-SCNC: 3 MMOL/L (ref 5–15)
ANION GAP SERPL CALC-SCNC: 7 MMOL/L (ref 5–15)
AST SERPL-CCNC: 23 U/L (ref 15–37)
BILIRUB SERPL-MCNC: 0.5 MG/DL (ref 0.2–1)
BUN SERPL-MCNC: 3 MG/DL (ref 6–20)
BUN SERPL-MCNC: 4 MG/DL (ref 6–20)
BUN/CREAT SERPL: 3 (ref 12–20)
BUN/CREAT SERPL: 6 (ref 12–20)
CALCIUM SERPL-MCNC: 8 MG/DL (ref 8.5–10.1)
CALCIUM SERPL-MCNC: 8.4 MG/DL (ref 8.5–10.1)
CHLORIDE SERPL-SCNC: 105 MMOL/L (ref 97–108)
CHLORIDE SERPL-SCNC: 106 MMOL/L (ref 97–108)
CK SERPL-CCNC: 34 U/L (ref 26–192)
CO2 SERPL-SCNC: 27 MMOL/L (ref 21–32)
CO2 SERPL-SCNC: 31 MMOL/L (ref 21–32)
CREAT SERPL-MCNC: 0.68 MG/DL (ref 0.55–1.02)
CREAT SERPL-MCNC: 0.9 MG/DL (ref 0.55–1.02)
CREAT UR-MCNC: 44.8 MG/DL
DATE LAST DOSE: ABNORMAL
ERYTHROCYTE [DISTWIDTH] IN BLOOD BY AUTOMATED COUNT: 14.1 % (ref 11.5–14.5)
GLOBULIN SER CALC-MCNC: 3.3 G/DL (ref 2–4)
GLUCOSE SERPL-MCNC: 189 MG/DL (ref 65–100)
GLUCOSE SERPL-MCNC: 91 MG/DL (ref 65–100)
HCT VFR BLD AUTO: 31.6 % (ref 35–47)
HGB BLD-MCNC: 10.1 G/DL (ref 11.5–16)
MCH RBC QN AUTO: 30.8 PG (ref 26–34)
MCHC RBC AUTO-ENTMCNC: 32 G/DL (ref 30–36.5)
MCV RBC AUTO: 96.3 FL (ref 80–99)
NRBC # BLD: 0 K/UL (ref 0–0.01)
NRBC BLD-RTO: 0 PER 100 WBC
PLATELET # BLD AUTO: 301 K/UL (ref 150–400)
PMV BLD AUTO: 8.9 FL (ref 8.9–12.9)
POTASSIUM SERPL-SCNC: 3.3 MMOL/L (ref 3.5–5.1)
POTASSIUM SERPL-SCNC: 4.2 MMOL/L (ref 3.5–5.1)
PROT SERPL-MCNC: 6.2 G/DL (ref 6.4–8.2)
PROT UR-MCNC: 12 MG/DL (ref 0–11.9)
PROT/CREAT UR-RTO: 0.3
RBC # BLD AUTO: 3.28 M/UL (ref 3.8–5.2)
REPORTED DOSE,DOSE: ABNORMAL UNITS
REPORTED DOSE/TIME,TMG: ABNORMAL
SODIUM SERPL-SCNC: 139 MMOL/L (ref 136–145)
SODIUM SERPL-SCNC: 140 MMOL/L (ref 136–145)
VANCOMYCIN TROUGH SERPL-MCNC: 32.6 UG/ML (ref 5–10)
WBC # BLD AUTO: 6.6 K/UL (ref 3.6–11)

## 2020-05-22 PROCEDURE — 87385 HISTOPLASMA CAPSUL AG IA: CPT

## 2020-05-22 PROCEDURE — 76060000032 HC ANESTHESIA 0.5 TO 1 HR: Performed by: INTERNAL MEDICINE

## 2020-05-22 PROCEDURE — 77030021593 HC FCPS BIOP ENDOSC BSC -A: Performed by: INTERNAL MEDICINE

## 2020-05-22 PROCEDURE — 85027 COMPLETE CBC AUTOMATED: CPT

## 2020-05-22 PROCEDURE — 88305 TISSUE EXAM BY PATHOLOGIST: CPT

## 2020-05-22 PROCEDURE — 0DB98ZX EXCISION OF DUODENUM, VIA NATURAL OR ARTIFICIAL OPENING ENDOSCOPIC, DIAGNOSTIC: ICD-10-PCS | Performed by: INTERNAL MEDICINE

## 2020-05-22 PROCEDURE — 80202 ASSAY OF VANCOMYCIN: CPT

## 2020-05-22 PROCEDURE — 94640 AIRWAY INHALATION TREATMENT: CPT

## 2020-05-22 PROCEDURE — 74011250637 HC RX REV CODE- 250/637: Performed by: INTERNAL MEDICINE

## 2020-05-22 PROCEDURE — 74011250636 HC RX REV CODE- 250/636: Performed by: ANESTHESIOLOGY

## 2020-05-22 PROCEDURE — 76040000007: Performed by: INTERNAL MEDICINE

## 2020-05-22 PROCEDURE — 94760 N-INVAS EAR/PLS OXIMETRY 1: CPT

## 2020-05-22 PROCEDURE — 74011000250 HC RX REV CODE- 250: Performed by: INTERNAL MEDICINE

## 2020-05-22 PROCEDURE — 74011250636 HC RX REV CODE- 250/636: Performed by: INTERNAL MEDICINE

## 2020-05-22 PROCEDURE — 84156 ASSAY OF PROTEIN URINE: CPT

## 2020-05-22 PROCEDURE — 80053 COMPREHEN METABOLIC PANEL: CPT

## 2020-05-22 PROCEDURE — 0DB48ZX EXCISION OF ESOPHAGOGASTRIC JUNCTION, VIA NATURAL OR ARTIFICIAL OPENING ENDOSCOPIC, DIAGNOSTIC: ICD-10-PCS | Performed by: INTERNAL MEDICINE

## 2020-05-22 PROCEDURE — 0DB68ZX EXCISION OF STOMACH, VIA NATURAL OR ARTIFICIAL OPENING ENDOSCOPIC, DIAGNOSTIC: ICD-10-PCS | Performed by: INTERNAL MEDICINE

## 2020-05-22 PROCEDURE — 74011000250 HC RX REV CODE- 250: Performed by: ANESTHESIOLOGY

## 2020-05-22 PROCEDURE — 82610 CYSTATIN C: CPT

## 2020-05-22 PROCEDURE — 82550 ASSAY OF CK (CPK): CPT

## 2020-05-22 PROCEDURE — 82085 ASSAY OF ALDOLASE: CPT

## 2020-05-22 PROCEDURE — 0DBE8ZX EXCISION OF LARGE INTESTINE, VIA NATURAL OR ARTIFICIAL OPENING ENDOSCOPIC, DIAGNOSTIC: ICD-10-PCS | Performed by: INTERNAL MEDICINE

## 2020-05-22 PROCEDURE — 0DBB8ZX EXCISION OF ILEUM, VIA NATURAL OR ARTIFICIAL OPENING ENDOSCOPIC, DIAGNOSTIC: ICD-10-PCS | Performed by: INTERNAL MEDICINE

## 2020-05-22 PROCEDURE — 0DB78ZX EXCISION OF STOMACH, PYLORUS, VIA NATURAL OR ARTIFICIAL OPENING ENDOSCOPIC, DIAGNOSTIC: ICD-10-PCS | Performed by: INTERNAL MEDICINE

## 2020-05-22 PROCEDURE — 74011636637 HC RX REV CODE- 636/637: Performed by: INTERNAL MEDICINE

## 2020-05-22 PROCEDURE — 36415 COLL VENOUS BLD VENIPUNCTURE: CPT

## 2020-05-22 PROCEDURE — 65270000029 HC RM PRIVATE

## 2020-05-22 PROCEDURE — 0DB18ZX EXCISION OF UPPER ESOPHAGUS, VIA NATURAL OR ARTIFICIAL OPENING ENDOSCOPIC, DIAGNOSTIC: ICD-10-PCS | Performed by: INTERNAL MEDICINE

## 2020-05-22 RX ORDER — SODIUM CHLORIDE 0.9 % (FLUSH) 0.9 %
5-40 SYRINGE (ML) INJECTION AS NEEDED
Status: DISCONTINUED | OUTPATIENT
Start: 2020-05-22 | End: 2020-05-28 | Stop reason: HOSPADM

## 2020-05-22 RX ORDER — SODIUM CHLORIDE 9 MG/ML
75 INJECTION, SOLUTION INTRAVENOUS CONTINUOUS
Status: DISPENSED | OUTPATIENT
Start: 2020-05-22 | End: 2020-05-22

## 2020-05-22 RX ORDER — FLUMAZENIL 0.1 MG/ML
0.2 INJECTION INTRAVENOUS
Status: DISCONTINUED | OUTPATIENT
Start: 2020-05-22 | End: 2020-05-22 | Stop reason: HOSPADM

## 2020-05-22 RX ORDER — POLYETHYLENE GLYCOL 3350 17 G/17G
17 POWDER, FOR SOLUTION ORAL DAILY
Status: DISCONTINUED | OUTPATIENT
Start: 2020-05-22 | End: 2020-05-28 | Stop reason: HOSPADM

## 2020-05-22 RX ORDER — LIDOCAINE HYDROCHLORIDE 20 MG/ML
INJECTION, SOLUTION EPIDURAL; INFILTRATION; INTRACAUDAL; PERINEURAL AS NEEDED
Status: DISCONTINUED | OUTPATIENT
Start: 2020-05-22 | End: 2020-05-22 | Stop reason: HOSPADM

## 2020-05-22 RX ORDER — NALOXONE HYDROCHLORIDE 0.4 MG/ML
0.4 INJECTION, SOLUTION INTRAMUSCULAR; INTRAVENOUS; SUBCUTANEOUS
Status: DISCONTINUED | OUTPATIENT
Start: 2020-05-22 | End: 2020-05-22 | Stop reason: HOSPADM

## 2020-05-22 RX ORDER — PROPOFOL 10 MG/ML
INJECTION, EMULSION INTRAVENOUS AS NEEDED
Status: DISCONTINUED | OUTPATIENT
Start: 2020-05-22 | End: 2020-05-22 | Stop reason: HOSPADM

## 2020-05-22 RX ORDER — ATROPINE SULFATE 0.1 MG/ML
0.5 INJECTION INTRAVENOUS
Status: DISCONTINUED | OUTPATIENT
Start: 2020-05-22 | End: 2020-05-22 | Stop reason: HOSPADM

## 2020-05-22 RX ORDER — FENTANYL CITRATE 50 UG/ML
25 INJECTION, SOLUTION INTRAMUSCULAR; INTRAVENOUS
Status: DISCONTINUED | OUTPATIENT
Start: 2020-05-22 | End: 2020-05-22 | Stop reason: HOSPADM

## 2020-05-22 RX ORDER — MIDAZOLAM HYDROCHLORIDE 1 MG/ML
.25-5 INJECTION, SOLUTION INTRAMUSCULAR; INTRAVENOUS
Status: DISCONTINUED | OUTPATIENT
Start: 2020-05-22 | End: 2020-05-22 | Stop reason: HOSPADM

## 2020-05-22 RX ORDER — DEXTROMETHORPHAN/PSEUDOEPHED 2.5-7.5/.8
1.2 DROPS ORAL
Status: DISCONTINUED | OUTPATIENT
Start: 2020-05-22 | End: 2020-05-22 | Stop reason: HOSPADM

## 2020-05-22 RX ORDER — SODIUM CHLORIDE 0.9 % (FLUSH) 0.9 %
5-40 SYRINGE (ML) INJECTION EVERY 8 HOURS
Status: DISCONTINUED | OUTPATIENT
Start: 2020-05-22 | End: 2020-05-28 | Stop reason: HOSPADM

## 2020-05-22 RX ORDER — EPINEPHRINE 0.1 MG/ML
1 INJECTION INTRACARDIAC; INTRAVENOUS
Status: DISCONTINUED | OUTPATIENT
Start: 2020-05-22 | End: 2020-05-22 | Stop reason: HOSPADM

## 2020-05-22 RX ADMIN — NYSTATIN 500000 UNITS: 100000 SUSPENSION ORAL at 21:41

## 2020-05-22 RX ADMIN — SODIUM CHLORIDE 75 ML/HR: 900 INJECTION, SOLUTION INTRAVENOUS at 11:47

## 2020-05-22 RX ADMIN — ARFORMOTEROL TARTRATE: 15 SOLUTION RESPIRATORY (INHALATION) at 08:13

## 2020-05-22 RX ADMIN — ONDANSETRON 4 MG: 2 INJECTION INTRAMUSCULAR; INTRAVENOUS at 15:19

## 2020-05-22 RX ADMIN — VANCOMYCIN HYDROCHLORIDE 1000 MG: 1 INJECTION, POWDER, LYOPHILIZED, FOR SOLUTION INTRAVENOUS at 08:47

## 2020-05-22 RX ADMIN — Medication 10 ML: at 21:42

## 2020-05-22 RX ADMIN — NYSTATIN 500000 UNITS: 100000 SUSPENSION ORAL at 17:20

## 2020-05-22 RX ADMIN — ENOXAPARIN SODIUM 40 MG: 40 INJECTION SUBCUTANEOUS at 20:19

## 2020-05-22 RX ADMIN — GLYCOPYRROLATE 2 MG: 1 TABLET ORAL at 20:19

## 2020-05-22 RX ADMIN — LIDOCAINE HYDROCHLORIDE 100 MG: 20 INJECTION, SOLUTION EPIDURAL; INFILTRATION; INTRACAUDAL; PERINEURAL at 12:43

## 2020-05-22 RX ADMIN — PROPOFOL 480 MG: 10 INJECTION, EMULSION INTRAVENOUS at 13:06

## 2020-05-22 RX ADMIN — Medication 10 ML: at 06:32

## 2020-05-22 RX ADMIN — ARFORMOTEROL TARTRATE: 15 SOLUTION RESPIRATORY (INHALATION) at 20:11

## 2020-05-22 RX ADMIN — DIPHENHYDRAMINE HYDROCHLORIDE 25 MG: 25 CAPSULE ORAL at 08:47

## 2020-05-22 RX ADMIN — CYCLOSPORINE 1 DROP: 0.5 EMULSION OPHTHALMIC at 15:44

## 2020-05-22 RX ADMIN — POTASSIUM BICARBONATE 40 MEQ: 782 TABLET, EFFERVESCENT ORAL at 15:12

## 2020-05-22 RX ADMIN — DIPHENHYDRAMINE HYDROCHLORIDE 25 MG: 25 CAPSULE ORAL at 00:15

## 2020-05-22 RX ADMIN — OXYBUTYNIN CHLORIDE 5 MG: 5 TABLET ORAL at 17:20

## 2020-05-22 RX ADMIN — LEVOTHYROXINE SODIUM 75 MCG: 0.07 TABLET ORAL at 15:12

## 2020-05-22 RX ADMIN — PREDNISONE 20 MG: 20 TABLET ORAL at 15:12

## 2020-05-22 RX ADMIN — MONTELUKAST SODIUM 10 MG: 10 TABLET, FILM COATED ORAL at 15:12

## 2020-05-22 RX ADMIN — VANCOMYCIN HYDROCHLORIDE 1000 MG: 1 INJECTION, POWDER, LYOPHILIZED, FOR SOLUTION INTRAVENOUS at 01:50

## 2020-05-22 RX ADMIN — PANTOPRAZOLE SODIUM 40 MG: 40 TABLET, DELAYED RELEASE ORAL at 15:12

## 2020-05-22 RX ADMIN — PROPRANOLOL HYDROCHLORIDE 60 MG: 60 CAPSULE, EXTENDED RELEASE ORAL at 15:12

## 2020-05-22 RX ADMIN — BUPROPION HYDROCHLORIDE 150 MG: 150 TABLET, EXTENDED RELEASE ORAL at 15:11

## 2020-05-22 RX ADMIN — TRAMADOL HYDROCHLORIDE 50 MG: 50 TABLET, FILM COATED ORAL at 03:28

## 2020-05-22 NOTE — PROGRESS NOTES
Aaron Cage  1980  [id number]    Situation:  Verbal report received from: Luisa Toure rn  Procedure: Procedure(s):  COLONOSCOPY  ESOPHAGOGASTRODUODENOSCOPY (EGD)  ESOPHAGOGASTRODUODENAL (EGD) BIOPSY  COLON BIOPSY    Background:    Preoperative diagnosis: Left lower quadrant pain  Postoperative diagnosis: EGD- LUQ Pain. Colon-LLQ Pain, Hemorrhoids and Change in bowel habit. :  Dr. Josh Dobbins  Assistant(s): Endoscopy Technician-1: Reynaldo Dick  Endoscopy RN-1: Prasanna ARNOLD    Specimens:   ID Type Source Tests Collected by Time Destination   1 : Duodenum Biopsies Preservative Duodenum  Chavo Chanlder MD 5/22/2020 1257 Pathology   2 : Gastric Antrum Biopsies Preservative   Chavo Chandler MD 5/22/2020 1258 Pathology   3 : Gastric Fundus Polyp Biopsy Preservative   Chavo Chandler MD 5/22/2020 1258 Pathology   4 : [address] Preservative   Chavo Chandler MD 5/22/2020 1302 Pathology   5 : Proximal Esophagus Biopsies Preservative Esophagus, Proximal  Chavo Chandler MD 5/22/2020 1302 Pathology   6 : Ileum Biopsies Preservative Ileum  Chavo Chandler MD 5/22/2020 1307 Pathology   7 : Random Colon Biopsies Preservative   Chavo Chandler MD 5/22/2020 1308 Pathology     H. Pylori  no    Assessment:    Anesthesia gave intra-procedure sedation and medications, see anesthesia flow sheet yes  Intravenous fluids: NS@ KVO     Vital signs stable yes    Abdominal assessment: round and soft yes    Recommendation:  Discharge patient per MD orderFeder.   Return to [place] [id number]

## 2020-05-22 NOTE — PROCEDURES
NAME:  Shann Buerger   :   1980   MRN:   [id number]     Date/Time:  2020 1:25 PM    Colonoscopy Operative Report    Procedure Type:   Colonoscopy with biopsy     Indications:     Abdominal pain, LLQ, Lower rectal bleeding  Pre-operative Diagnosis: see indication above  Post-operative Diagnosis:  See findings below  :  Sara Regan MD  Referring Provider: -Jesi Shirley MD;-Watson Trujillo MD    Exam:  Airway: clear, no airway problems anticipated  Heart: RRR, without gallops or rubs  Lungs: clear bilaterally without wheezes, crackles, or rhonchi  Abdomen: soft, nontender, nondistended, bowel sounds present  Mental Status: awake, alert and oriented to person, place and time    Sedation:  MAC anesthesia Propofol 480mg IV  Procedure Details:  After informed consent was obtained with all risks and benefits of procedure explained and preoperative exam completed, the patient was taken to the endoscopy suite and placed in the left lateral decubitus position. Upon sequential sedation as per above, a digital rectal exam was performed demonstrating internal hemorrhoids. The Olympus videocolonoscope  was inserted in the rectum and carefully advanced to the cecum, which was identified by the ileocecal valve and appendiceal orifice. The distal terminal ileum was evaluated. The quality of preparation was adequate. The colonoscope was slowly withdrawn with careful evaluation between folds. Retroflexion in the rectum was completed demonstrating internal hemorrhoids. Findings:     -Normal terminal ileum; biopsied to exclude inflammation  -Normal colonic mucosa; random biopsies obtained to exclude inflammation  -Small grade 1 internal hemorrhoids    Specimen Removed:  #6 ileum; #7 random colon  Complications: None. EBL:  None.     Impression:    -Normal terminal ileum; biopsied to exclude inflammation  -Normal colonic mucosa; random biopsies obtained to exclude inflammation  -Small grade 1 internal hemorrhoids    Recommendations: -  -Await pathology. ,   -For colon cancer screening in this average-risk patient, colonoscopy may be repeated in 10 years. ,   -Follow up with primary care physician.   -High fiber diet.    -Resume normal medication(s). -Continue daily Miralax   -Make outpatient appointment with my office to coordinate further treatment of chronic idiopathic constipation over the last year  -You will receive a letter about the biopsy results in about 10 days.    -You may be asked to call your doctor's office for the results. Discharge Disposition:  To room following recovery in Endoscopy.       Reva Lopez MD

## 2020-05-22 NOTE — PROGRESS NOTES
Hospitalist Progress Note    NAME: Aaron Cage   :  1980   MRN:  [id number]       Assessment / Plan:    Splenic inflammation/infection POA multiple hypodensities  Recent sepsis  with temp 102.3, , normal WBC  LUQ abdominal Pain, POA  Immunocompromised POA on Abatacept then Tofacitinib past 4 months  Weight loss 50 lbs per patient(20 lb documented since 2019)  - Recent admit for LUQ, sepsis, splenic hypodensities  - BC and urine culture negative  - no QUINCY in chest and abdomen  - Seen By Heme, felt lesions not consistent with infarcts  - SARS-CoV-2 test negative  - She was afebrile in hospital, discharged  - no SOB  - Gen surgery --> no surgical intervention  - Rheumatology consult, Dr Parminder Vargas spoke with them  - blood culture 1/2 sets with Staph hominus  - Procalcitonin  - Lactic acid 2.6, resolved now with IVF  - Echo TTE LVEF 55-60%, normal RV function            Normal AV with no AR or AS            Normal mitral valve, mild MR            Pericardium with no effusion  - UNKNOWN ETIOLOGY,back with LUQ abdominal tenderness         ? infectious                   Bacteremia likely contaminant, CNS                  No clear IE                  ? OI given the immunosuppression          ? Malignancy          ? hypercoaguable  - On vancomycin, will stop and follow  - will ask heme onc to check on          Hypokalemia K 3.3  -PO replacement  -BMP daily    Chronic Constipation   - Normocytic anemia  - GI are following now-->EGD and colonoscopy      History of rheumatoid arthritis  - methotrexate and Xeljanz, cont holding, prior on abatacept  - Continue hydroxychloroquine  - On prednisone chronically at home   - will check SSA/SSB  - rheumatology consult     Diabetes mellitus type 2  -Hold home metformin.  S  - tarted insulin sliding scale with blood sugar checks.    Consider adding NPH to steroids if blood sugars go up     History of depression  History of GERD  Hypothyroidism  Hypertension  -Continue home Wellbutrin, Lexapro, levothyroxine and Protonix  -Continue home propranolol     History of hyperreactive airway disease  -Continue home albuterol and Symbicort.        Code Status: Full code  Surrogate Decision Maker:      DVT Prophylaxis:lovenox      Subjective:     Chief Complaint / Reason for Physician Visit  Abdominal Pain, POA  Possible splenic infarction versus inflammation/infection  Discussed with RN events overnight. \"my left upper abdomen still hurts\"  LUQ mod sore desean with taking a deep breath  No N/V, SOB, cough  No fevers this admit    Review of Systems:  Symptom Y/N Comments  Symptom Y/N Comments   Fever/Chills n   Chest Pain n    Poor Appetite    Edema     Cough n   Abdominal Pain y    Sputum    Joint Pain     SOB/MENDOZA n   Pruritis/Rash     Nausea/vomit n   Tolerating PT/OT     Diarrhea n   Tolerating Diet y    Constipation y   Other       Could NOT obtain due to:      Objective:     VITALS:   Last 24hrs VS reviewed since prior progress note. Most recent are:  Patient Vitals for the past 24 hrs:   Temp Pulse Resp BP SpO2   05/22/20 0817 98.1 °F (36.7 °C) 70 16 131/79 100 %   05/22/20 0813 -- -- -- -- 100 %   05/21/20 2240 98.1 °F (36.7 °C) 80 16 131/61 100 %   05/21/20 2019 -- -- -- -- 95 %   05/21/20 1646 -- -- -- 117/71 --   05/21/20 1433 98.1 °F (36.7 °C) 94 16 117/71 97 %     No intake or output data in the 24 hours ending 05/22/20 1112     Wt Readings from Last 12 Encounters:   05/22/20 94.3 kg (208 lb)   05/14/20 94 kg (207 lb 3.7 oz)   04/18/20 95.9 kg (211 lb 6.7 oz)   03/14/20 98.4 kg (217 lb)   03/02/20 98.4 kg (217 lb)   02/13/20 97.5 kg (215 lb)   01/31/20 97.8 kg (215 lb 8 oz)   01/23/20 54.9 kg (121 lb)   12/05/19 99.3 kg (219 lb)   09/28/19 101.7 kg (224 lb 1.6 oz)   09/24/19 100.7 kg (222 lb)   08/01/19 101.6 kg (224 lb)         PHYSICAL EXAM:  General: WD, WN. Alert, cooperative, no acute distress    EENT:  EOMI.  Anicteric sclerae. MMM  Resp:  CTA bilaterally, no wheezing or rales. No accessory muscle use  CV:  Regular  rhythm,  No edema  GI:  Soft, Non distended, tender in LUQ.  +Bowel sounds  Neurologic:  Alert and oriented X 3, normal speech,   Psych:   Good insight. Not anxious nor agitated  Skin:  No rashes. No jaundice    Reviewed most current lab test results and cultures  YES  Reviewed most current radiology test results   YES  Review and summation of old records today    NO  Reviewed patient's current orders and MAR    YES  PMH/SH reviewed - no change compared to H&P  ________________________________________________________________________  Care Plan discussed with:    Comments   Patient x    Family      RN x    Care Manager     Consultant  x GI                     Multidiciplinary team rounds were held today with , nursing, pharmacist and clinical coordinator. Patient's plan of care was discussed; medications were reviewed and discharge planning was addressed. ________________________________________________________________________  Total NON critical care TIME:  25   Minutes    Total CRITICAL CARE TIME Spent:   Minutes non procedure based      Comments   >50% of visit spent in counseling and coordination of care     ________________________________________________________________________  Macy Mckeon MD     Procedures: see electronic medical records for all procedures/Xrays and details which were not copied into this note but were reviewed prior to creation of Plan. LABS:  I reviewed today's most current labs and imaging studies.   Pertinent labs include:  Recent Labs     05/22/20  0159 05/21/20  0353 05/20/20  0052   WBC 6.6 6.9 8.6   HGB 10.1* 10.5* 10.1*   HCT 31.6* 32.7* 30.4*    282 274     Recent Labs     05/22/20  0159 05/21/20  0353 05/20/20  0052 05/19/20  1205    142 139 140   K 3.3* 3.6 3.0* 3.0*    106 106 103   CO2 31 28 30 27   GLU 91 115* 88 96   BUN 4* 6 8 12   CREA 0.68 0.78 0.65 0.98   CA 8.0* 8.1* 7.8* 8.5   ALB  --   --   --  2.9*   TBILI  --   --   --  0.4   SGOT  --   --   --  31   ALT  --   --   --  54       Signed: Sol Montague MD

## 2020-05-22 NOTE — PROGRESS NOTES
ADULT PROTOCOL: JET AEROSOL  REASSESSMENT    Patient  Juan Junior     42 y.o.   female     5/21/2020  9:59 PM    Breath Sounds Pre Procedure: Right Breath Sounds: Clear                               Left Breath Sounds: Clear    Breath Sounds Post Procedure: Right Breath Sounds: Clear                                 Left Breath Sounds: Clear    Breathing pattern: Pre procedure Breathing Pattern: Regular          Post procedure Breathing Pattern: Regular    Heart Rate: Pre procedure Pulse: 90           Post procedure Pulse: 92    Resp Rate: Pre procedure Respirations: 15           Post procedure Respirations: 14      Oxygen: O2 Device: Room air        Changed: NO    SpO2: Pre procedure SpO2: 95 %   without oxygen              Post procedure SpO2: 97 %  without oxygen    Nebulizer Therapy: Current medications Aerosolized Medications: Brovana, Pulmicort      Changed: NO BID RESP    Smoking History: never    Problem List:   Patient Active Problem List   Diagnosis Code    Hypothyroidism (acquired) E03.9    Fibromyalgia M79.7    Hyperhidrosis R61    Environmental and seasonal allergies J30.89    Rectal bleeding K62.5    Family history of colonic polyps Z83.71    Encounter for screening colonoscopy Z12.11    Rheumatoid arthritis (HCC) M06.9    Asthma J45.909    Severe obesity (BMI 35.0-39. 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

## 2020-05-22 NOTE — ROUTINE PROCESS

## 2020-05-22 NOTE — ANESTHESIA POSTPROCEDURE EVALUATION
Procedure(s):  COLONOSCOPY  ESOPHAGOGASTRODUODENOSCOPY (EGD)  ESOPHAGOGASTRODUODENAL (EGD) BIOPSY  COLON BIOPSY. total IV anesthesia    Anesthesia Post Evaluation        Patient location during evaluation: PACU  Note status: Adequate. Level of consciousness: responsive to verbal stimuli and sleepy but conscious  Pain management: satisfactory to patient  Airway patency: patent  Anesthetic complications: no  Cardiovascular status: acceptable  Respiratory status: acceptable  Hydration status: acceptable  Comments: +Post-Anesthesia Evaluation and Assessment    Patient: Debra Chang MRN: [id number]  SSN: [id number]   YOB: 1980  Age: 42 y.o. Sex: female      Cardiovascular Function/Vital Signs    /76 (BP Patient Position: At rest)   Pulse 76   Temp 36.9 °C (98.5 °F)   Resp 18   Ht 5' 6\" (1.676 m)   Wt 94.3 kg (208 lb)   SpO2 99%   Breastfeeding No   BMI 33.57 kg/m²     Patient is status post Procedure(s):  COLONOSCOPY  ESOPHAGOGASTRODUODENOSCOPY (EGD)  ESOPHAGOGASTRODUODENAL (EGD) BIOPSY  COLON BIOPSY. Nausea/Vomiting: Controlled. Postoperative hydration reviewed and adequate. Pain:  Pain Scale 1: Numeric (0 - 10) (05/22/20 1335)  Pain Intensity 1: 4 (05/22/20 1335)   Managed. Neurological Status: At baseline. Mental Status and Level of Consciousness: Arousable. Pulmonary Status:   O2 Device: Room air (05/22/20 1325)   Adequate oxygenation and airway patent. Complications related to anesthesia: None    Post-anesthesia assessment completed. No concerns.     Signed By: Aleksandr Cullen DO    5/22/2020  Post anesthesia nausea and vomiting:  controlled      Vitals Value Taken Time   /76 5/22/2020  2:01 PM   Temp 36.9 °C (98.5 °F) 5/22/2020  2:01 PM   Pulse 76 5/22/2020  2:01 PM   Resp 18 5/22/2020  2:01 PM   SpO2 99 % 5/22/2020  2:01 PM

## 2020-05-22 NOTE — ADT AUTH CERT NOTES
Abdominal Pain, Undiagnosed - Care Day 3 (5/21/2020) by Markos Stringer         Review Status Review Entered   Completed 5/22/2020 08:54       Criteria Review      Care Day: 3 Care Date: 5/21/2020 Level of Care: Inpatient Floor    Guideline Day 2    Clinical Status    (X) * Hemodynamic stability    5/22/2020 08:54:07 EDT by Kevin Barron      vs below    ( ) * Pain absent or managed    5/22/2020 08:54:07 EDT by Kevin Barron      sharp abd pain to 6/10    (X) * Surgery not indicated    ( ) * Liquid or advanced diet tolerated    ( ) * Discharge plans and education understood    Activity    (X) * Ambulatory [E]    5/22/2020 08:54:07 EDT by Mira Jung ambulated in room    Routes    (X) * Oral hydration, medications, and diet    5/22/2020 08:54:07 EDT by Kevin Barron      prednisone 20 mg po daily, ultram 50 mg po x 3, dulcolax tab 10 mg po ,  and other po meds    5/22/2020 08:50:01 EDT by Kevin Barron      -zofran 4 mg iv x 2 , GO lytely prep, VANC iv 1000 m iv q 8h, benadryl 25 mg iv , pepcid 20 mg iv  , lovenox sc 40 mg q 24h, robinul 2 mg po bid, plaquenil 200 mg po daily,   mycostatin po qid 500,000 u, protonix po daily, effer k 40 meq po daily    (X) Liquid or advanced diet    5/22/2020 08:54:07 EDT by Vinod Harris liquids to ;NPO after MN    * Milestone   Additional Notes    98.1 °F (36.7 °C) 94 117/71 - 16 97 % 208 lb.     Please also see Note Review titled ; 'MD note MAY 24'   Which has data for review May 21.        MD note MAY 24. by Markos Stringer         Review Status Review Entered   In Primary 5/21/2020 17:08       Criteria Review   MD NOTE May 21  Abdominal Pain, POA  Possible splenic infarction versus inflammation/infection  -Patient had a SARS-CoV-2 test negative about 2 weeks ago and one more negative last week  -she is presenting with left lower quadrant abdominal pain since last night, and since last week. hypodensities in the spleen consistent with infection, inflammation, neoplasia and infarction  -She was afebrile  -no SOB  -Consulted hematology last week due to suspicion of splenic infarction, input is appreciated.  I discussed the case with Dr. Carlos Grimes. After hematology discussion with radiology there was less suspicion for infarct and they recommended to repeat imaging  -Gen surgery --> no surgical intervention  -Rheumatology consult  -blood Cx +ve for GPC 1/2 bottles. Likely contaminate. Repeated blood culture has been negative for 14 hr,ECHO pending. one dose of Vancomycin was given 05/20. Hold on Abx till Cx are finalized    -SARS-CoV-2 testing negative last admission  -Lactic acid 2.6, resolved now with IVF  -UNKNOWN ETIOLOGY       Hypokalemia  -replaced  -BMP daily     Chronic Constipation   Normocytic anemia  - GI are following now-->EGD and colonoscopy 05/22     History of rheumatoid arthritis  -methotrexate and Tedra Moni, cont holding  -On prednisone chronically at home   -will check SSA/SSB  -rheumatology consult     Diabetes mellitus type 2  -Hold home metformin.  Started insulin sliding scale with blood sugar checks. Consider adding NPH to steroids if blood sugars go up     History of depression  History of GERD  Hypothyroidism  Hypertension  -Continue home Wellbutrin, Lexapro, levothyroxine and Protonix  -Continue home propranolol     History of hyperreactive airway disease  -Continue home albuterol and Symbicort.                    Code Status: Full code  Surrogate Decision Maker:      DVT Prophylaxis:lovenox       Subjective:      Chief Complaint / Reason for Physician Visit  Abdominal Pain, POA  Possible splenic infarction versus inflammation/infection  Discussed with RN events overnight.      Patient was seen and examined. No acute events overnight.   She was crying in tears  She was reassured that we are doing everything we can.     \"Thanks doc\"   Additional Notes   ------zofran 4 mg iv , GO lytely prep, VANC iv 1000 m iv q 8h, benadryl 25 mg iv , pepcid 20 mg iv     --------Rheumatology note May 21   Denisha has a history of seronegative rheumatoid arthritis. Her BRITTANY was low positive of 1:160, and her specific lupus antibodies and sjogrens antibodies had been negative in the past.    She initially presented with abdominal pain and fevers up t o 103. CT abdo showed splenic hypodensitities. Hematology was consulted and did not think the hypodensities were due to infarcts. . She did have 1/4 positive blood cultures, but this may have been a contaminant. She did have ARF, but her renal function has now improved   GI, and surgery are following as well. GI plans to perform and upper and lower scope given abd pain, and constipation, and she has a repeat abdominal CT scan ordered as well to follow the splenic lesions. A TTE is also pending to look for vegetations or clots since the splenic lesions may  Have been infarcts       We discussed that hopefully we will be able to get a better idea if the splenic lesions are infarcts. If so, we may need to stop Hayes Vietnamese since there is some data suggesting Hayes Vietnamese can cause a hypercoagulable state. She has nonspecific serologies (low positive BRITTANY). At this time, no clear diagnose of lupus, but it is always possible this could manifest more clearly in the future. I am not sure how exactly to use lupus or sjogrens to explain the splenic hypodensities.  Any active connective tissue disease can cause a hypercoagulable state, and follow up treatment would still be focused at treating the connective tissue disease.        -- continue to hold mtx and xeljanz   -- cont  plaquenil   -- checking cpk, aldolase, ldh   -- her UA has RBCs in it, but she is menstrurating, we will check a prtn:cr ratio to look for evidence of nephritis   -- can repeat UA in a few days      ---------GI consult May 21   42 y.o. [facility] female with hx RA and DM, who I was asked to see for chronic abdominal pain and constipation. Uche Roman was admitted 5/19/20 with LUQ abdominal pain and 2wk hx of constipation.  She had presented similarly 1-2 wks earlier and had repeat CT this admission noting she was full of stool but without focal GI inflammation or GI mass.  Prior COVID-19 testing was negative.  She notes episodes of chronic constipation with eval a few yrs ago with  that was unrevealing.  She notes that beginning 1 yr ago, she started making use of Dulcolax every other day and intermittent Miralax with BM every day-every other day.  She also was changed to Brock Makenzie from a TNF-inhibitor agent around the same tame.  This regimen and the addition of enemas did not provide relief over the last 2 wks.  She notes hx of hemorrhoids and intermittently seeing blood per rectum.  She notes her left side abdominal discomfort is exacerbated with deep inspiration but without N/V.  She denies F/C/CP/SOB.  Labs here are notable normocytic anemia with drop in Hgb from 12.8 to 10.1 over the last 5 months.  Nl ESR and Ferritin noted here.       Prior EGD 2/2019 noted normal esophageal, gastric, and duodenal mucosa.       Past Medical History:   Diagnosis Date   · Acquired hypothyroidism     · Adverse effect of anesthesia       labile BP during/after C section   · Asthma     · Broken bones       history of 9 broken bones   · Chronic UTI (urinary tract infection)       \"extra valve right kidney causes UTI\"   · Fibromyalgia     · Gestational diabetes     · GI bleed 2007,2011,2015,2016     lower GI last colonoscopy in 2016 normal    · Huntingtons chorea (HCC)     · Hyperhydrosis disorder     · Ill-defined condition       [place]/ tested genetically - daughter has Chris   · Infertility       PCOS   · PCOS (polycystic ovarian syndrome)     · Precancerous skin lesion       removed from Right shoulder   · Preeclampsia 2011     pregnancy   · Reactive airway disease     · Rheumatoid arthritis ([address]     · SVT (supraventricular tachycardia) ([address] 2011     occured during pregnancy when picc line inserted.       Past Surgical History:   Procedure Laterality Date   · COLONOSCOPY N/A 6/13/2016     COLONOSCOPY performed by Lg Agustin MD at [facility] ENDOSCOPY   · HX HEENT       · HX WISDOM TEETH EXTRACTION   2004   · UPPER GI ENDOSCOPY,BIOPSY   2/12/2019        · UPPER GI ENDOSCOPY,DILATN W GUIDE   2/12/2019         PHYSICAL EXAM:    General:          Alert, cooperative, no distress, appears stated age.   +OW   Head:               Normocephalic, without obvious abnormality, atraumatic. Eyes:               Conjunctivae clear, anicteric sclerae.  Pupils are equal   Nose:               Nares normal. No drainage or sinus tenderness. Throat:             Lips, mucosa, and tongue normal.  No Thrush   Neck:               Supple, symmetrical,  no adenopathy, thyroid: non tender   Back:               Symmetric,  No CVA tenderness. Lungs:             CTA bilaterally.  No wheezing/rhonchi/rales. Chest wall:      No tenderness or deformity. No Accessory muscle use. Heart:              Regular rate and rhythm,  no murmur, rub or gallop. Abdomen:        Soft, +Left side tenderness. ND.  Bowel sounds normal. No masses   Extremities:     Atraumatic, No cyanosis.  No edema. No clubbing   Skin:                Texture, turgor normal. No rashes/lesions/jaundice   Lymph:            Cervical, supraclavicular normal.   Psych:             Good insight.  Not depressed.  Not anxious or agitated. Neurologic:      EOMs intact. No facial asymmetry/aphasia/slurred speech. Normal strength, A/O X 3.    RECOMMENDATIONS:     40yo F with persistent LUQ and LLQ abdominal pain with worsening constipation, noted BRBPR, and chronic anemia.  Exclusion of GI source for anemia needed as well as exclusion of mass lesion or inflammation as source of sx.  She will need alternative modality to relieve her constipation as little benefit with partial Golytely prep here.    Plan:    1) Complete Golytely prep 2) Dulcolax now   3) CLD, then NPO after MN   4) EGD and colonoscopy tomorrow

## 2020-05-22 NOTE — PROCEDURES
NAME:  Kelley Taylor   :   1980   MRN:   [id number]     Date/Time:  2020 1:19 PM    Esophagogastroduodenoscopy (EGD) Procedure Note    Procedure: Esophagogastroduodenoscopy with biopsy    Indication:  Abdominal pain, LUQ  Pre-operative Diagnosis: see indication above  Post-operative Diagnosis: see findings below  :  Jessica Rabago MD  Referring Provider:   Lillie Edge MD;-Watson Drummond MD    Exam:  Airway: clear, no airway problems anticipated  Heart: RRR, without gallops or rubs  Lungs: clear bilaterally without wheezes, crackles, or rhonchi  Abdomen: soft, nontender, nondistended, bowel sounds present  Mental Status: awake, alert and oriented to person, place and time     Anethesia/Sedation:  MAC anesthesia Propofol as per colonoscopy  Procedure Details   After informed consent was obtained for the procedure, with all risks and benefits of procedure explained the patient was taken to the endoscopy suite and placed in the left lateral decubitus position. Following sequential administration of sedation as per above, the [id number] gastroscope was inserted into the mouth and advanced under direct vision to third portion of the duodenum. A careful inspection was made as the gastroscope was withdrawn, including a retroflexed view of the proximal stomach; findings and interventions are described below.                   Findings:    -Normal esophageal mucosa without ring, stricture, or obstruction; biopsies obtained and sent separately from gastroesophageal junction and proximal esophagus  -Scattered benign-appearing fundic gland polyps measuring 3-5mm noted in cardia, fundus, and body of stomach; the largest are sampled to document their benign nature  -Normal gastric antral mucosa without ulcer; biopsied  -Normal duoenal mucosa; biopsied    Therapies:  biopsy of esophagus( g-e jxn, proximal); biopsy of stomach (antrum, fundus); biopsy of duodenal   Specimens: #1 duod; #2 gastric antrum; #3 gastric fundus polyps; #4 g-e junction; #5 prox esoph  EBL:  None. Complications:   None; patient tolerated the procedure well. Impression:    -Normal esophageal mucosa without ring, stricture, or obstruction; biopsies obtained and sent separately from gastroesophageal junction and proximal esophagus  -Scattered benign-appearing fundic gland polyps measuring 3-5mm noted in cardia, fundus, and body of stomach; the largest are sampled to document their benign nature  -Normal gastric antral mucosa without ulcer; biopsied  -Normal duodenal mucosa; biopsied    Recommendations:  -Continue acid suppression. , -Await pathology.     Discharge disposition:  Proceed to colonoscopy    Ana Calderón MD

## 2020-05-22 NOTE — PROGRESS NOTES
Bedside and Verbal shift change report given to Edsel Paget (oncoming nurse) by Bette Blake (offgoing nurse).  Report included the following information SBAR, Kardex, Intake/Output, MAR and Cardiac Rhythm

## 2020-05-22 NOTE — ANESTHESIA PREPROCEDURE EVALUATION
Anesthetic History     PONV     Comments: \"labile BP during /after \"     Review of Systems / Medical History  Patient summary reviewed, nursing notes reviewed and pertinent labs reviewed    Pulmonary            Asthma (allergy induced)     Comments: Environmental and seasonal allergies   Neuro/Psych         Psychiatric history    Comments: [place] Chorea; presymptomatic Cardiovascular    Hypertension        Dysrhythmias (with PICC line insertion)         Comments:  EKG= SR, NSSTTW     ECHO: 55-60%EF, no AS   GI/Hepatic/Renal     GERD    Renal disease (Chronic UTIs bec of an \"extra valve\" in rt kidney)      Comments: Multiple GI bleeds--, , , 2016  Dysphagia   Abdominal pain  Splenomegaly with hypodensities seen on CT( infection vs infarction)  Nausea Endo/Other    Diabetes (pregnancy induced)  Hypothyroidism  Obesity, arthritis and anemia     Other Findings   Comments: Polycystic ovarian syndrome    Fibromyalgia    Hyperhidrosis         Physical Exam    Airway  Mallampati: I  TM Distance: 4 - 6 cm  Neck ROM: normal range of motion   Mouth opening: Normal     Cardiovascular    Rhythm: regular  Rate: normal         Dental  No notable dental hx       Pulmonary  Breath sounds clear to auscultation               Abdominal  GI exam deferred       Other Findings            Anesthetic Plan    ASA: 3  Anesthesia type: total IV anesthesia and MAC          Induction: Intravenous  Anesthetic plan and risks discussed with: Patient

## 2020-05-22 NOTE — PROGRESS NOTES
Problem: Falls - Risk of  Goal: *Absence of Falls  Description: Document Sol Stoll Fall Risk and appropriate interventions in the flowsheet.   Outcome: Progressing Towards Goal  Note: Fall Risk Interventions:            Medication Interventions: Patient to call before getting OOB, Teach patient to arise slowly

## 2020-05-22 NOTE — PERIOP NOTES
Anesthesia reports 480mg Propofol, 100mg Lidocaine and 250mL NS given during procedure. Received report from anesthesia staff on vital signs and status of patient. Endoscopes were pre-cleaned at the bedside immediately following procedure by Ky BOBO

## 2020-05-22 NOTE — PROGRESS NOTES
GI Note- Farshad Hinds)  Please refer to EGD and colonoscopy notes today. No findings other than small internal hemorrhoids that would account for her rectal bleeding. Anemia may be due to chronic disease or historical menses although only spotting as of late. She will need OP f/u with me in 1-2 wks to optimize treatment of chronic idiopathic constipation. Plan:  -Await pathology. ,   -For colon cancer screening in this average-risk patient, colonoscopy may be repeated in 10 years. ,   -Follow up with primary care physician.   -High fiber diet.    -Resume normal medication(s). -Continue daily Miralax   -Make outpatient appointment with my office to coordinate further treatment of chronic idiopathic constipation over the last year  -You will receive a letter about the biopsy results in about 10 days.    -Call our office for the results next Wednesday    Will see only on request over the weekend. No contraindication to discharge home from GI standpoint.   Abbie Holley MD

## 2020-05-22 NOTE — PROGRESS NOTES
TRANSFER - IN REPORT:    Verbal report received from [facility] on [employer]  being received from 3259(unit) for ordered procedure      Report consisted of patients Situation, Background, Assessment and   Recommendations(SBAR). Information from the following report(s) SBAR was reviewed with the receiving nurse. Opportunity for questions and clarification was provided.

## 2020-05-23 LAB
ANION GAP SERPL CALC-SCNC: 6 MMOL/L (ref 5–15)
BUN SERPL-MCNC: 5 MG/DL (ref 6–20)
BUN/CREAT SERPL: 7 (ref 12–20)
CALCIUM SERPL-MCNC: 8.2 MG/DL (ref 8.5–10.1)
CHLORIDE SERPL-SCNC: 104 MMOL/L (ref 97–108)
CO2 SERPL-SCNC: 29 MMOL/L (ref 21–32)
CREAT SERPL-MCNC: 0.74 MG/DL (ref 0.55–1.02)
ERYTHROCYTE [DISTWIDTH] IN BLOOD BY AUTOMATED COUNT: 13.9 % (ref 11.5–14.5)
GLUCOSE SERPL-MCNC: 103 MG/DL (ref 65–100)
HCT VFR BLD AUTO: 31.8 % (ref 35–47)
HGB BLD-MCNC: 10.4 G/DL (ref 11.5–16)
MCH RBC QN AUTO: 31 PG (ref 26–34)
MCHC RBC AUTO-ENTMCNC: 32.7 G/DL (ref 30–36.5)
MCV RBC AUTO: 94.6 FL (ref 80–99)
NRBC # BLD: 0 K/UL (ref 0–0.01)
NRBC BLD-RTO: 0 PER 100 WBC
PLATELET # BLD AUTO: 286 K/UL (ref 150–400)
PMV BLD AUTO: 9.2 FL (ref 8.9–12.9)
POTASSIUM SERPL-SCNC: 4 MMOL/L (ref 3.5–5.1)
RBC # BLD AUTO: 3.36 M/UL (ref 3.8–5.2)
SODIUM SERPL-SCNC: 139 MMOL/L (ref 136–145)
VANCOMYCIN SERPL-MCNC: 4 UG/ML
WBC # BLD AUTO: 6.4 K/UL (ref 3.6–11)

## 2020-05-23 PROCEDURE — 86146 BETA-2 GLYCOPROTEIN ANTIBODY: CPT

## 2020-05-23 PROCEDURE — 74011636637 HC RX REV CODE- 636/637: Performed by: INTERNAL MEDICINE

## 2020-05-23 PROCEDURE — 80202 ASSAY OF VANCOMYCIN: CPT

## 2020-05-23 PROCEDURE — 87103 BLOOD FUNGUS CULTURE: CPT

## 2020-05-23 PROCEDURE — 94640 AIRWAY INHALATION TREATMENT: CPT

## 2020-05-23 PROCEDURE — 85613 RUSSELL VIPER VENOM DILUTED: CPT

## 2020-05-23 PROCEDURE — 74011250637 HC RX REV CODE- 250/637: Performed by: INTERNAL MEDICINE

## 2020-05-23 PROCEDURE — 65270000029 HC RM PRIVATE

## 2020-05-23 PROCEDURE — 36415 COLL VENOUS BLD VENIPUNCTURE: CPT

## 2020-05-23 PROCEDURE — 94760 N-INVAS EAR/PLS OXIMETRY 1: CPT

## 2020-05-23 PROCEDURE — 86147 CARDIOLIPIN ANTIBODY EA IG: CPT

## 2020-05-23 PROCEDURE — 74011250636 HC RX REV CODE- 250/636: Performed by: INTERNAL MEDICINE

## 2020-05-23 PROCEDURE — 74011000250 HC RX REV CODE- 250: Performed by: INTERNAL MEDICINE

## 2020-05-23 PROCEDURE — 85291 CLOT FACTOR XIII FIBRIN SCRN: CPT

## 2020-05-23 PROCEDURE — 85027 COMPLETE CBC AUTOMATED: CPT

## 2020-05-23 PROCEDURE — 80048 BASIC METABOLIC PNL TOTAL CA: CPT

## 2020-05-23 RX ORDER — PREDNISONE 10 MG/1
10 TABLET ORAL DAILY
Status: DISCONTINUED | OUTPATIENT
Start: 2020-05-24 | End: 2020-05-28 | Stop reason: HOSPADM

## 2020-05-23 RX ADMIN — MELATONIN 1 TABLET: at 09:41

## 2020-05-23 RX ADMIN — Medication 10 ML: at 13:54

## 2020-05-23 RX ADMIN — NYSTATIN 500000 UNITS: 100000 SUSPENSION ORAL at 22:31

## 2020-05-23 RX ADMIN — BUPROPION HYDROCHLORIDE 150 MG: 150 TABLET, EXTENDED RELEASE ORAL at 06:34

## 2020-05-23 RX ADMIN — PANTOPRAZOLE SODIUM 40 MG: 40 TABLET, DELAYED RELEASE ORAL at 06:34

## 2020-05-23 RX ADMIN — TRAMADOL HYDROCHLORIDE 50 MG: 50 TABLET, FILM COATED ORAL at 12:30

## 2020-05-23 RX ADMIN — HYDROXYCHLOROQUINE SULFATE 200 MG: 200 TABLET ORAL at 09:41

## 2020-05-23 RX ADMIN — PREDNISONE 20 MG: 20 TABLET ORAL at 09:40

## 2020-05-23 RX ADMIN — ACETAMINOPHEN 650 MG: 325 TABLET, FILM COATED ORAL at 18:27

## 2020-05-23 RX ADMIN — GLYCOPYRROLATE 2 MG: 1 TABLET ORAL at 07:49

## 2020-05-23 RX ADMIN — DIPHENHYDRAMINE HYDROCHLORIDE 25 MG: 25 CAPSULE ORAL at 22:32

## 2020-05-23 RX ADMIN — Medication 10 ML: at 22:50

## 2020-05-23 RX ADMIN — NYSTATIN 500000 UNITS: 100000 SUSPENSION ORAL at 09:43

## 2020-05-23 RX ADMIN — TRAMADOL HYDROCHLORIDE 50 MG: 50 TABLET, FILM COATED ORAL at 07:49

## 2020-05-23 RX ADMIN — TRAMADOL HYDROCHLORIDE 50 MG: 50 TABLET, FILM COATED ORAL at 00:06

## 2020-05-23 RX ADMIN — ARFORMOTEROL TARTRATE: 15 SOLUTION RESPIRATORY (INHALATION) at 21:07

## 2020-05-23 RX ADMIN — NYSTATIN 500000 UNITS: 100000 SUSPENSION ORAL at 18:24

## 2020-05-23 RX ADMIN — ESCITALOPRAM OXALATE 5 MG: 10 TABLET ORAL at 09:43

## 2020-05-23 RX ADMIN — Medication 10 ML: at 22:49

## 2020-05-23 RX ADMIN — LEVOTHYROXINE SODIUM 75 MCG: 0.07 TABLET ORAL at 06:35

## 2020-05-23 RX ADMIN — GLYCOPYRROLATE 2 MG: 1 TABLET ORAL at 22:32

## 2020-05-23 RX ADMIN — Medication 10 ML: at 06:35

## 2020-05-23 RX ADMIN — MONTELUKAST SODIUM 10 MG: 10 TABLET, FILM COATED ORAL at 09:40

## 2020-05-23 RX ADMIN — NYSTATIN 500000 UNITS: 100000 SUSPENSION ORAL at 13:53

## 2020-05-23 RX ADMIN — CYANOCOBALAMIN TAB 500 MCG 250 MCG: 500 TAB at 09:41

## 2020-05-23 RX ADMIN — CYCLOSPORINE 1 DROP: 0.5 EMULSION OPHTHALMIC at 18:24

## 2020-05-23 RX ADMIN — OXYBUTYNIN CHLORIDE 5 MG: 5 TABLET ORAL at 09:41

## 2020-05-23 RX ADMIN — ARFORMOTEROL TARTRATE: 15 SOLUTION RESPIRATORY (INHALATION) at 08:50

## 2020-05-23 RX ADMIN — PROPRANOLOL HYDROCHLORIDE 60 MG: 60 CAPSULE, EXTENDED RELEASE ORAL at 09:41

## 2020-05-23 RX ADMIN — FOLIC ACID 1 MG: 1 TABLET ORAL at 09:43

## 2020-05-23 RX ADMIN — ENOXAPARIN SODIUM 40 MG: 40 INJECTION SUBCUTANEOUS at 22:31

## 2020-05-23 RX ADMIN — TRAMADOL HYDROCHLORIDE 50 MG: 50 TABLET, FILM COATED ORAL at 22:32

## 2020-05-23 RX ADMIN — OXYBUTYNIN CHLORIDE 5 MG: 5 TABLET ORAL at 18:24

## 2020-05-23 RX ADMIN — CYCLOSPORINE 1 DROP: 0.5 EMULSION OPHTHALMIC at 10:30

## 2020-05-23 RX ADMIN — POTASSIUM BICARBONATE 40 MEQ: 782 TABLET, EFFERVESCENT ORAL at 09:43

## 2020-05-23 NOTE — PROGRESS NOTES
Hospitalist Progress Note    NAME: Gloria Hurst   :  1980   MRN:  [id number]     Abatacept(Orencia) inhibits T-cell (T-lymphocyte) activation by binding to CD80 and CD86 on antigen presenting cells (APC)    Tofacitinib (xeljanz) inhibits Janus kinase (JANAY) enzymes, prevents cytokine- or growth factor-mediated gene expression and intracellular activity of immune cells, reduces circulating CD16/56+ natural killer cells, serum IgG, IgM, IgA, and C-reactive protein, and increases B cells.     Assessment / Plan:    Splenic inflammation/infection POA multiple hypodensities, suspect microabscesses  Recent sepsis  with temp 102.3, , normal WBC  LUQ abdominal Pain, POA  Immunocompromised POA on Abatacept then Tofacitinib for past 4 months  Weight loss 50 lbs per patient(20 lb documented since 2019)  Lactic acidosis lactate 2.6  Right portacath in place  Staph hominus bacteremia in one [facility] drawn from port(other cultures negative)  - new onset LUQ pain since before last admit, fevers  - [facility] admit   to 2020 for LUQ, sepsis, splenic hypodensities        No QUINCY in chest and abdomen        CTA chest no PE or ASD        Seen By Heme, felt lesions not consistent with infarcts on review with radiology        SARS-CoV-2 test negative  and also  as outpatient        Received 4 days of IV ceftriaxone and flagyl        She was afebrile in hospital, discharged off antibiotics with outpatient  - LUQ Pain better on discharge for a day, then worsening  - febrile at home before re admit > 101 per her report      Afebrile here  - No HA, cough or SOB, sore throat, N/V, diarrhea, dysuria, new joint symptoms  - Gen surgery --> no surgical intervention  - blood culture 1/2 sets with Staph hominus (appears to have been drawn from port)      Concerned this may be a contaminant  - Echo TTE LVEF 55-60%, normal RV function            Normal AV with no AR or AS            Normal mitral valve, mild MR Pericardium with no effusion  - ESR was 18, CRP > 9.5  - Procalcitonin repeated < 0.05  - Persistent LUQ tenderness, now with ? tender QUINCY or salivary glands under her jaw  - differential broad desean with the immunosuppressives         ? infectious                   Hematogenous seeding                        No clear signs of endocarditis on BC or TTE                               D/W patient, may need to consider                         ? Seeded from the port                         No clear evidence of any other focal infections                             UA negative                             CXR and chest CT negative                             No other clear infectious sources on CT scan abdomen                             EGD and colonoscopy negative                  ? Disseminated fungal or mycobacterial                         Lived in [facility], ? 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 Rheumatologic with prior RA,           ? Malignancy, no clear evidence of this          ? hypercoaguable                They do not look like splenic infarcts                 D/W hem-Onc, checking lupus anticoagulant and anticardiolipin antibodies           Significance of the tender LN in neck unclear  Stopped vanco 5/22, plan to watch off antibiotics       Check blood cultures with temp > 101(peripheral sticks)  Taper steroids  Fungal blood culture and histo antigen in lab  Check AFB blood culture and quantiferon (no suspicion for pulm TB)  Check cryptococcal antigen  Throat culture  CT neck of persistent tender QUINCY, ?  Something to aspirate for diagnosis  Check CCP and RF for signs of disease activity  Aspiration of the splenic lesions likely not safe/feasible(d/w radiology 5/22)  Ultimately may require a course of broad spectrum antibiotics and serial scans  Spoke with ID, they will see, I need other opinions and pt will require follow up over time    Hypokalemia K 3.3  -PO replacement  -BMP daily    Chronic Constipation   - Normocytic anemia  - GI are following now-->EGD and colonoscopy 05/22 unremarkable     History of rheumatoid arthritis  - methotrexate and Xeljanz, cont holding, prior on abatacept  - Continue hydroxychloroquine  - On prednisone, was off for 2 months, then resumed just prior to 5/14 x 2 days        Concerned she had a RA flair initially  - SSA/SSB negative, check CCP and RF  - rheumatology consult     Diabetes mellitus type 2  -Hold home metformin.   - Sliding scale with blood sugar checks. Consider adding NPH to steroids if blood sugars go up     History of depression  History of GERD  Hypothyroidism  Hypertension  -Continue home Wellbutrin, Lexapro, levothyroxine and Protonix  -Continue home propranolol     History of hyperreactive airway disease  -Continue home albuterol and Symbicort.        Code Status: Full code  Surrogate Decision Maker:      DVT Prophylaxis:lovenox      Subjective:     Chief Complaint / Reason for Physician Visit  Abdominal Pain, POA  Possible splenic infarction versus inflammation/infection  Discussed with RN events overnight. \"the front of my neck hurts\"  New onset moderate tenderness along anterior cervical chain to angle of jaw overnight  Still with LUQ tenderness and pain, worse with deep breath  No N/V, diarrhea, SOB, cough, headache, dysuria  No fevers this admit    Review of Systems:  Symptom Y/N Comments  Symptom Y/N Comments   Fever/Chills n   Chest Pain n    Poor Appetite    Edema     Cough n   Abdominal Pain y    Sputum    Joint Pain     SOB/MENDOZA n   Pruritis/Rash     Nausea/vomit n   Tolerating PT/OT     Diarrhea n   Tolerating Diet y    Constipation y   Other       Could NOT obtain due to:      Objective:     VITALS:   Last 24hrs VS reviewed since prior progress note.  Most recent are:  Patient Vitals for the past 24 hrs:   Temp Pulse Resp BP SpO2   05/23/20 1424 97.9 °F (36.6 °C) 94 16 113/66 97 %   05/23/20 0853 -- -- -- -- 98 %   05/23/20 0731 98.4 °F (36.9 °C) 64 18 119/59 98 %   05/22/20 2317 98.2 °F (36.8 °C) 89 19 121/58 98 %   05/22/20 2015 97.9 °F (36.6 °C) 81 20 122/59 98 %   05/22/20 2011 -- -- -- -- 98 %       Intake/Output Summary (Last 24 hours) at 5/23/2020 1503  Last data filed at 5/22/2020 2015  Gross per 24 hour   Intake 200 ml   Output --   Net 200 ml        Wt Readings from Last 12 Encounters:   05/22/20 94.3 kg (208 lb)   05/14/20 94 kg (207 lb 3.7 oz)   04/18/20 95.9 kg (211 lb 6.7 oz)   03/14/20 98.4 kg (217 lb)   03/02/20 98.4 kg (217 lb)   02/13/20 97.5 kg (215 lb)   01/31/20 97.8 kg (215 lb 8 oz)   01/23/20 54.9 kg (121 lb)   12/05/19 99.3 kg (219 lb)   09/28/19 101.7 kg (224 lb 1.6 oz)   09/24/19 100.7 kg (222 lb)   08/01/19 101.6 kg (224 lb)         PHYSICAL EXAM:  General: WD, WN. Alert, cooperative, no acute distress    EENT:  EOMI. Anicteric sclerae. MMM  Neck:  Tender up under angle of jaw bilaterally, shotty QUINCY  Resp:  CTA bilaterally, no wheezing or rales. No accessory muscle use  CV:  Regular  rhythm,  No edema  GI:  Soft, Non distended, tender in LUQ.  +Bowel sounds  Neurologic:  Alert and oriented X 3, normal speech,   Psych:   Good insight. Not anxious nor agitated  Skin:  No rashes. No jaundice    Reviewed most current lab test results and cultures  YES  Reviewed most current radiology test results   YES  Review and summation of old records today    NO  Reviewed patient's current orders and MAR    YES  PMH/SH reviewed - no change compared to H&P  ________________________________________________________________________  Care Plan discussed with:    Comments   Patient x    Family      RN x    Care Manager     Consultant  x ID                     Multidiciplinary team rounds were held today with , nursing, pharmacist and clinical coordinator. Patient's plan of care was discussed; medications were reviewed and discharge planning was addressed. ________________________________________________________________________        Comments   >50% of visit spent in counseling and coordination of care     ________________________________________________________________________  Jacqueline Deng MD     Procedures: see electronic medical records for all procedures/Xrays and details which were not copied into this note but were reviewed prior to creation of Plan. LABS:  I reviewed today's most current labs and imaging studies.   Pertinent labs include:  Recent Labs     05/23/20  0248 05/22/20  0159 05/21/20  0353   WBC 6.4 6.6 6.9   HGB 10.4* 10.1* 10.5*   HCT 31.8* 31.6* 32.7*    301 282     Recent Labs     05/23/20  0248 05/22/20  1736 05/22/20  0159    139 140   K 4.0 4.2 3.3*    105 106   CO2 29 27 31   * 189* 91   BUN 5* 3* 4*   CREA 0.74 0.90 0.68   CA 8.2* 8.4* 8.0*   ALB  --  2.9*  --    TBILI  --  0.5  --    SGOT  --  23  --    ALT  --  53  --        Signed: Jacqueline Deng MD

## 2020-05-23 NOTE — PROGRESS NOTES
1923 Bedside and Verbal shift change report given to Emir Martinez (oncoming nurse) by Nannette Hodgkins (offgoing nurse). Report included the following information SBAR, Procedure Summary, Intake/Output, MAR and Recent Results. 2015 assessment completed, pt is alert and oriented x 4, pt does complain of pain but refuses PRN pain medication at this time. Refer to the complex assessment flow sheet for further details. 6987 reassessment completed, labs drawn from right chest port central line, prior to drawing ordered labs end caps changed and 10 ml of blood drawn back and wasted. 0700 updated pt on am lab values    0720 Verbal shift change report given to Nannette Hodgkins (oncoming nurse) by Emir Martinez (offgoing nurse). Report included the following information SBAR, MAR and Recent Results. CARE PLAN    Problem: Falls - Risk of  Goal: *Absence of Falls  Description: Document Samra Montoya Fall Risk and appropriate interventions in the flowsheet.   Outcome: Progressing Towards Goal  Note: Fall Risk Interventions:  Medication Interventions: Teach patient to arise slowly    Problem: Sepsis: Day 4  Goal: Activity/Safety  Outcome: Progressing Towards Goal  Goal: Diagnostic Test/Procedures  Outcome: Progressing Towards Goal  Goal: Nutrition/Diet  Outcome: Progressing Towards Goal  Goal: Medications  Outcome: Progressing Towards Goal  Goal: Respiratory  Outcome: Progressing Towards Goal  Goal: Psychosocial  Outcome: Progressing Towards Goal  Goal: *Oxygen saturation within defined limits  Outcome: Progressing Towards Goal  Goal: *Hemodynamically stable  Outcome: Progressing Towards Goal  Goal: *Vital signs within defined limits  Outcome: Progressing Towards Goal  Goal: *Tolerating diet  Outcome: Progressing Towards Goal  Goal: *Demonstrates progressive activity  Outcome: Progressing Towards Goal  Goal: *Fluid volume maintenance  Outcome: Progressing Towards Goal

## 2020-05-23 NOTE — PROGRESS NOTES
ADULT PROTOCOL: JET AEROSOL ASSESSMENT    Patient  Chris Mcghee     42 y.o.   female     5/23/2020  8:54 AM    Breath Sounds Pre Procedure: Right Breath Sounds: Clear                               Left Breath Sounds: Clear    Breath Sounds Post Procedure: Right Breath Sounds: Diminished, Clear                                 Left Breath Sounds: Diminished, Clear    Breathing pattern: Pre procedure Breathing Pattern: Regular          Post procedure Breathing Pattern: Regular    Heart Rate: Pre procedure Pulse: 84           Post procedure Pulse: 78    Resp Rate: Pre procedure Respirations: 20           Post procedure Respirations: 20        Cough: Pre procedure Cough: Non-productive               Post procedure Cough: Non-productive      Oxygen: Room Air  Changed:NO    SpO2: Pre procedure SpO2: 98 %               Post procedure SpO2: 98 %      Nebulizer Therapy: Current medications Aerosolized Medications: Brovana, Pulmicort      Changed: No      Problem List:   Patient Active Problem List   Diagnosis Code    Hypothyroidism (acquired) E03.9    Fibromyalgia M79.7    Hyperhidrosis R61    Environmental and seasonal allergies J30.89    Rectal bleeding K62.5    Family history of colonic polyps Z83.71    Encounter for screening colonoscopy Z12.11    Rheumatoid arthritis (HCC) M06.9    Asthma J45.909    Severe obesity (BMI 35.0-39. 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

## 2020-05-24 LAB
ANION GAP SERPL CALC-SCNC: 7 MMOL/L (ref 5–15)
BUN SERPL-MCNC: 7 MG/DL (ref 6–20)
BUN/CREAT SERPL: 8 (ref 12–20)
CALCIUM SERPL-MCNC: 8.7 MG/DL (ref 8.5–10.1)
CHLORIDE SERPL-SCNC: 104 MMOL/L (ref 97–108)
CO2 SERPL-SCNC: 27 MMOL/L (ref 21–32)
CREAT SERPL-MCNC: 0.92 MG/DL (ref 0.55–1.02)
CRYPTOC AG SER QL IA: NEGATIVE
GLUCOSE BLD STRIP.AUTO-MCNC: 87 MG/DL (ref 65–100)
GLUCOSE BLD STRIP.AUTO-MCNC: 90 MG/DL (ref 65–100)
GLUCOSE SERPL-MCNC: 141 MG/DL (ref 65–100)
INTERPRETATION, 117893: NORMAL
POTASSIUM SERPL-SCNC: 4 MMOL/L (ref 3.5–5.1)
RHEUMATOID FACT SERPL-ACNC: <10 IU/ML
SCREEN APTT: 49.6 SEC (ref 0–51.9)
SCREEN DRVVT: 42.8 SEC (ref 0–47)
SERVICE CMNT-IMP: NORMAL
SERVICE CMNT-IMP: NORMAL
SODIUM SERPL-SCNC: 138 MMOL/L (ref 136–145)

## 2020-05-24 PROCEDURE — 86664 EPSTEIN-BARR NUCLEAR ANTIGEN: CPT

## 2020-05-24 PROCEDURE — 80048 BASIC METABOLIC PNL TOTAL CA: CPT

## 2020-05-24 PROCEDURE — 36415 COLL VENOUS BLD VENIPUNCTURE: CPT

## 2020-05-24 PROCEDURE — 86431 RHEUMATOID FACTOR QUANT: CPT

## 2020-05-24 PROCEDURE — 87327 CRYPTOCOCCUS NEOFORM AG IA: CPT

## 2020-05-24 PROCEDURE — 74011250636 HC RX REV CODE- 250/636: Performed by: HOSPITALIST

## 2020-05-24 PROCEDURE — 87040 BLOOD CULTURE FOR BACTERIA: CPT

## 2020-05-24 PROCEDURE — 74011250636 HC RX REV CODE- 250/636: Performed by: INTERNAL MEDICINE

## 2020-05-24 PROCEDURE — 94640 AIRWAY INHALATION TREATMENT: CPT

## 2020-05-24 PROCEDURE — 86480 TB TEST CELL IMMUN MEASURE: CPT

## 2020-05-24 PROCEDURE — 87116 MYCOBACTERIA CULTURE: CPT

## 2020-05-24 PROCEDURE — 74011250637 HC RX REV CODE- 250/637: Performed by: INTERNAL MEDICINE

## 2020-05-24 PROCEDURE — 74011000250 HC RX REV CODE- 250: Performed by: INTERNAL MEDICINE

## 2020-05-24 PROCEDURE — 86200 CCP ANTIBODY: CPT

## 2020-05-24 PROCEDURE — 65270000029 HC RM PRIVATE

## 2020-05-24 PROCEDURE — 82962 GLUCOSE BLOOD TEST: CPT

## 2020-05-24 PROCEDURE — 87070 CULTURE OTHR SPECIMN AEROBIC: CPT

## 2020-05-24 PROCEDURE — 94760 N-INVAS EAR/PLS OXIMETRY 1: CPT

## 2020-05-24 RX ORDER — DEXTROSE 50 % IN WATER (D50W) INTRAVENOUS SYRINGE
12.5-25 AS NEEDED
Status: DISCONTINUED | OUTPATIENT
Start: 2020-05-24 | End: 2020-05-28 | Stop reason: HOSPADM

## 2020-05-24 RX ORDER — MAGNESIUM SULFATE 100 %
4 CRYSTALS MISCELLANEOUS AS NEEDED
Status: DISCONTINUED | OUTPATIENT
Start: 2020-05-24 | End: 2020-05-28 | Stop reason: HOSPADM

## 2020-05-24 RX ORDER — INSULIN LISPRO 100 [IU]/ML
INJECTION, SOLUTION INTRAVENOUS; SUBCUTANEOUS
Status: DISCONTINUED | OUTPATIENT
Start: 2020-05-24 | End: 2020-05-28 | Stop reason: HOSPADM

## 2020-05-24 RX ADMIN — ONDANSETRON 4 MG: 2 INJECTION INTRAMUSCULAR; INTRAVENOUS at 05:45

## 2020-05-24 RX ADMIN — OXYBUTYNIN CHLORIDE 5 MG: 5 TABLET ORAL at 17:34

## 2020-05-24 RX ADMIN — ESCITALOPRAM OXALATE 5 MG: 10 TABLET ORAL at 09:16

## 2020-05-24 RX ADMIN — OXYBUTYNIN CHLORIDE 5 MG: 5 TABLET ORAL at 09:14

## 2020-05-24 RX ADMIN — ENOXAPARIN SODIUM 40 MG: 40 INJECTION SUBCUTANEOUS at 21:40

## 2020-05-24 RX ADMIN — NYSTATIN 500000 UNITS: 100000 SUSPENSION ORAL at 13:09

## 2020-05-24 RX ADMIN — CYANOCOBALAMIN TAB 500 MCG 250 MCG: 500 TAB at 09:15

## 2020-05-24 RX ADMIN — PROPRANOLOL HYDROCHLORIDE 60 MG: 60 CAPSULE, EXTENDED RELEASE ORAL at 09:14

## 2020-05-24 RX ADMIN — GLYCOPYRROLATE 2 MG: 1 TABLET ORAL at 21:39

## 2020-05-24 RX ADMIN — TRAMADOL HYDROCHLORIDE 50 MG: 50 TABLET, FILM COATED ORAL at 13:18

## 2020-05-24 RX ADMIN — SODIUM CHLORIDE 500 ML: 900 INJECTION, SOLUTION INTRAVENOUS at 23:25

## 2020-05-24 RX ADMIN — CYCLOSPORINE 1 DROP: 0.5 EMULSION OPHTHALMIC at 17:34

## 2020-05-24 RX ADMIN — Medication 10 ML: at 21:41

## 2020-05-24 RX ADMIN — GLYCOPYRROLATE 2 MG: 1 TABLET ORAL at 09:15

## 2020-05-24 RX ADMIN — ARFORMOTEROL TARTRATE: 15 SOLUTION RESPIRATORY (INHALATION) at 20:10

## 2020-05-24 RX ADMIN — FOLIC ACID 1 MG: 1 TABLET ORAL at 09:16

## 2020-05-24 RX ADMIN — CETIRIZINE HYDROCHLORIDE 10 MG: 10 TABLET, FILM COATED ORAL at 09:27

## 2020-05-24 RX ADMIN — HYDROXYCHLOROQUINE SULFATE 200 MG: 200 TABLET ORAL at 09:14

## 2020-05-24 RX ADMIN — BUPROPION HYDROCHLORIDE 150 MG: 150 TABLET, EXTENDED RELEASE ORAL at 07:13

## 2020-05-24 RX ADMIN — MELATONIN 1 TABLET: at 09:15

## 2020-05-24 RX ADMIN — LEVOTHYROXINE SODIUM 75 MCG: 0.07 TABLET ORAL at 09:15

## 2020-05-24 RX ADMIN — NYSTATIN 500000 UNITS: 100000 SUSPENSION ORAL at 17:34

## 2020-05-24 RX ADMIN — Medication 10 ML: at 15:12

## 2020-05-24 RX ADMIN — NYSTATIN 500000 UNITS: 100000 SUSPENSION ORAL at 09:16

## 2020-05-24 RX ADMIN — TRAMADOL HYDROCHLORIDE 50 MG: 50 TABLET, FILM COATED ORAL at 05:45

## 2020-05-24 RX ADMIN — Medication 10 ML: at 06:00

## 2020-05-24 RX ADMIN — Medication 10 ML: at 05:45

## 2020-05-24 RX ADMIN — Medication 10 ML: at 15:11

## 2020-05-24 RX ADMIN — MONTELUKAST SODIUM 10 MG: 10 TABLET, FILM COATED ORAL at 09:15

## 2020-05-24 RX ADMIN — PANTOPRAZOLE SODIUM 40 MG: 40 TABLET, DELAYED RELEASE ORAL at 09:15

## 2020-05-24 RX ADMIN — ARFORMOTEROL TARTRATE: 15 SOLUTION RESPIRATORY (INHALATION) at 08:41

## 2020-05-24 RX ADMIN — NYSTATIN 500000 UNITS: 100000 SUSPENSION ORAL at 21:40

## 2020-05-24 RX ADMIN — POTASSIUM BICARBONATE 40 MEQ: 782 TABLET, EFFERVESCENT ORAL at 09:16

## 2020-05-24 RX ADMIN — POLYETHYLENE GLYCOL 3350 17 G: 17 POWDER, FOR SOLUTION ORAL at 09:17

## 2020-05-24 NOTE — PROGRESS NOTES
ADULT PROTOCOL: JET AEROSOL  REASSESSMENT    Patient  Christia Schirmer     42 y.o.   female     5/24/2020  8:45 AM    Breath Sounds Pre Procedure: Right Breath Sounds: Clear                               Left Breath Sounds: Clear    Breath Sounds Post Procedure: Right Breath Sounds: Diminished, Clear                                 Left Breath Sounds: Diminished, Clear    Breathing pattern: Pre procedure Breathing Pattern: Regular          Post procedure Breathing Pattern: Regular    Heart Rate: Pre procedure Pulse: 85           Post procedure Pulse: 89    Resp Rate: Pre procedure Respirations: 20           Post procedure Respirations: 20      Cough: Pre procedure Cough: Non-productive               Post procedure Cough: Non-productive      Oxygen: Room Air  Changed: NO    SpO2: Pre procedure SpO2: 97 %               Post procedure SpO2: 97 %      Nebulizer Therapy: Current medications Aerosolized Medications: Brovana, Pulmicort      Changed: NO      Problem List:   Patient Active Problem List   Diagnosis Code    Hypothyroidism (acquired) E03.9    Fibromyalgia M79.7    Hyperhidrosis R61    Environmental and seasonal allergies J30.89    Rectal bleeding K62.5    Family history of colonic polyps Z83.71    Encounter for screening colonoscopy Z12.11    Rheumatoid arthritis (HCC) M06.9    Asthma J45.909    Severe obesity (BMI 35.0-39. 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

## 2020-05-24 NOTE — CONSULTS
Infectious Disease Consult    Impression/Plan   · Splenic inflammation/infection POA with multiple hypodensities seen on CT, suspect microabscesses. Less likely thromboses. Differential diagnosis is broad with the patient's immunosuppression. She is at risk for bloodstream infections with recurrent cystitis as well as port related bacteremia. As she improved on ceftriaxone and metronidazole at the last admission this makes opportunistic infection less likely. Recommend monitoring the patient off antibiotics over the next several days. She will need 3 sets of blood cultures. If blood cultures remain sterile consider ANUSHKA and consultation to IR for consideration of CT-guided aspiration and culture of the splenic lesions. If the above is unremarkable consider empiric course of therapy with ceftriaxone and metronidazole with repeat imaging of the spleen. Opportunistic infection is felt to be less likely at this time as she responded to ctx/metro however fungal and AFB blood cultures have been sent. We will also check fungal antibodies. If no improvement will jesus to consider autoimmune or malignancy etiologies as well. · Staphylococcus hominis isolated from the blood. This was only isolated in 1 out 2 bottles drawn through the port. This likely represents contaminant. We will repeat blood cultures to the port for completeness. This organism is oxacillin resistant so I would not expect it to respond to ceftriaxone she was given at the last admission   · Immunosuppressed host.  She is on biologic agents and prednisone as above for  rheumatoid arthritis   · Cervical adenopathy? Doubt this is related to above. Follow     · Rheumatoid arthritis         Anti-infectives:   1.      Subjective:   Date of Consultation:  May 24, 2020  Date of Admission: 5/19/2020   Referring Physician:   The patient is a 30years old woman with past medical history significant for rheumatoid arthritis, diabetes mellitus, and right chest wall port who presented to the emergency department team 5/19 due to left upper quadrant pain and fevers. The patient was in her usual state of health up until around May 12, 2020. At that time she developed cough, body aches, and chest pain which she attributed to allergies and a flareup of her rheumatoid arthritis. She started prednisone 10 mg daily with no improvement. On 5/20 she began to develop left upper quadrant pain. She initially thought this was due to constipation but there is no relief despite laxatives. On the May 21 she developed night sweats, fever up to 103 3 °F, shortness of breath, pleuritic chest pain, and severe left upper quadrant pain leading her to present to the emergency department where she was admitted with \"sepsis\" Work-up revealed an abnormal CT scan of the spleen. Concern was raised for septic emboli versus thrombosis. Oncology was consulted who felt that the lesions were not consistent with a thrombotic event. She was started on ceftriaxone and metronidazole which were continued for 4 days. .  Her fever resolved and she felt better and was discharged without any further antibiotics with plans for repeat CT scan of the spleen in the next week. Placida Hilt She was readmitted approximately 12 hours later with the same symptoms including fever, pleuritic chest pain, and severe left upper quadrant pain. She was started on vancomycin. Blood cultures taken at the last admission grew Staphylococcus hominis in 1 out of 2 bottles from the port. Cultures at this admission are sterile. All other cultures have been negative. She is not currently on any antibiotics. The infectious diseases service is been asked to assist with work-up of the splenic lesions     Of note, patient has a longstanding history of rheumatoid arthritis. She has been on biologic agents for the past 4 years, most recently abatacept and tofacitinib.   She has also been on steroids on and off over the last 6 years.  A right chest wall port which was placed 2 years ago for the biologic agents. She is a [place] resident and has lived here all her life. She visits [place] yearly. Never been out of the country. There are 2 cats and a dog in the house. She is engaged and has a 3year-old daughter and takes care of her mother in the home. No recent dental work.         Patient Active Problem List   Diagnosis Code    Hypothyroidism (acquired) E03.9    Fibromyalgia M79.7    Hyperhidrosis R61    Environmental and seasonal allergies J30.89    Rectal bleeding K62.5    Family history of colonic polyps Z83.71    Encounter for screening colonoscopy Z12.11    Rheumatoid arthritis ([address] M06.9    Asthma J45.909    Severe obesity (BMI 35.0-39. 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    Abdominal pain R10.9    Bacteremia R78.81    Constipation K59.00     Past Medical History:   Diagnosis Date    Acquired hypothyroidism     Adverse effect of anesthesia     labile BP during/after C section    Asthma     Broken bones     history of 9 broken bones    Chronic UTI (urinary tract infection)     \"extra valve right kidney causes UTI\"    Fibromyalgia     Gestational diabetes     GI bleed 2007,2011,2015,2016    lower GI last colonoscopy in 2016 normal     Huntingtons chorea (HCC)     Hyperhydrosis disorder     Ill-defined condition     Pitt/ tested genetically - daughter has Chris    Infertility     PCOS    PCOS (polycystic ovarian syndrome)     Precancerous skin lesion     removed from Right shoulder    Preeclampsia 2011    pregnancy    Reactive airway disease     Rheumatoid arthritis (HCC)     SVT (supraventricular tachycardia) ([address] 2011    occured during pregnancy when picc line inserted.       Family History   Problem Relation Age of Onset    Other Mother         Pitt's disease    Hypertension Mother     Dementia Mother     High Cholesterol Father     Heart Disease Father     Diabetes Father     Hypertension Father     Asthma Brother     Diabetes Brother     Other Brother         varicocele, hernias    Hypertension Brother     Alcohol abuse Brother     Hypertension Maternal Grandmother     Elevated Lipids Maternal Grandmother     Cancer Maternal Grandmother         breast    Other Maternal Grandmother         polymyalgia rheumatica    Glaucoma Maternal Grandmother     Cancer Maternal Grandfather         prostate    Huntingtons disease Maternal Grandfather     Cancer Paternal Grandmother         lung with mets    Cancer Paternal Grandfather         prostate, colon    Diabetes Paternal Grandfather     Heart Disease Paternal Grandfather     Alzheimer Paternal Grandfather     Dementia Paternal Grandfather       Social History     Tobacco Use    Smoking status: Never Smoker    Smokeless tobacco: Never Used   Substance Use Topics    Alcohol use: Yes     Comment: few drinks per year     Past Surgical History:   Procedure Laterality Date    COLONOSCOPY N/A 6/13/2016    COLONOSCOPY performed by [facility] at [facility]    COLONOSCOPY,DIAGNOSTIC  5/22/2020         HX HEENT      HX WISDOM TEETH EXTRACTION  2004    UPPER GI ENDOSCOPY,BIOPSY  2/12/2019         UPPER GI ENDOSCOPY,BIOPSY  5/22/2020         UPPER GI ENDOSCOPY,DILATN W GUIDE  2/12/2019           Prior to Admission medications    Medication Sig Start Date End Date Taking? Authorizing Provider   metoclopramide (REGLAN) 5 mg/5 mL syrup Take 5 mL by mouth Before breakfast, lunch, and dinner for 14 days. 5/18/20 6/1/20  Tomás Islas MD   diphenhydrAMINE (BenadryL) 25 mg capsule Take 25 mg by mouth every six (6) hours as needed. Victoria Arana MD   BORIC ACID 600 mg by Does Not Apply route daily. Vaginal    Victoria Arana MD   predniSONE (DELTASONE) 20 mg tablet Take 20 mg by mouth daily.     Victoria Arana MD   norethindrone-ethinyl estradiol (Junel FE 1/20, 28,) 1 mg-20 mcg (21)/75 mg (7) tab Take  by mouth. Other, MD Victoria   nitrofurantoin (MACRODANTIN) 50 mg capsule Take 1 Cap by mouth daily as needed (post coital). 4/27/20   Appa Rachid Ruth MD   lisdexamfetamine (Vyvanse) 30 mg capsule Take 1 Cap by mouth every morning. Max Daily Amount: 30 mg. 4/27/20   Appa Vivienne Gama MD   escitalopram oxalate (LEXAPRO) 20 mg tablet TAKE 1 TABLET BY MOUTH EVERY DAY 3/26/20   Appa Vivienne Gonzalez MD   tofacitinib citrate (XELJANZ XR PO) Take 30 mg by mouth daily. Provider, Historical   miconazole nitrate 200 mg/5 gram (4 %) vaginal cream Apply to affected area as needed once a day 3/14/20   Beny Covarrubias MD   PREVIDENT 5000 SENSITIVE 1.1-5 % pste USE AS DIRECTED TWICE A DAY SPIT OUT EXCESS AND DO NOT RINSE, EAT OR DRINK FOR 30 MINUTES 2/7/20   Provider, Historical   hydroxychloroquine (PLAQUENIL) 200 mg tablet 200 mg daily. 12/23/19   Provider, Historical   lidocaine-prilocaine (EMLA) topical cream For port flushing every 3 month 11/25/19   Provider, Historical   BD SYRINGE 1 mL 25 gauge x 5/8\" syrg 1 SYRINGE ONCE A WEEK 11/28/19   Provider, Historical   heparin sodium,porcine (HEPARIN LOCK FLUSH, PORCINE, IV) by IntraVENous route. For port flush every 3 months    Provider, Historical   omeprazole (PRILOSEC) 40 mg capsule Take 1 Cap by mouth daily. 9/28/19   Beny Covarrubias MD   Cetirizine (ZYRTEC) 10 mg cap Take 10 mg by mouth daily. Indications: inflammation of the nose due to an allergy 9/28/19   Beny Covarrubias MD   albuterol (PROVENTIL HFA, VENTOLIN HFA, PROAIR HFA) 90 mcg/actuation inhaler Take 2 Puffs by inhalation every four (4) hours as needed for Wheezing. 6/9/19   Beny Covarrubias MD   omalizumab Chalmer Bimler SC) by SubCUTAneous route every thirty (30) days.  Given by Dr Va Mccabe in his office    Provider, Historical   propranolol LA (INDERAL LA) 60 mg SR capsule TAKE 1 CAPSULE BY MOUTH EVERY DAY 5/17/19   Brittaney Niño DO   SYMBICORT 160-4.5 mcg/actuation HFAA TAKE 2 PUFFS BY MOUTH TWICE A DAY 3/28/19   Provider, Historical   OZEMPIC 0.25 mg or 0.5 mg(2 mg/1.5 mL) sub-q pen by SubCUTAneous route every seven (7) days. Patient states she takes 1 ml every friday 4/21/19   Provider, Historical   ondansetron hcl (ZOFRAN) 4 mg tablet Take 4 mg by mouth every eight (8) hours as needed for Nausea. Provider, Historical   naltrexone (DEPADE) 50 mg tablet Take  by mouth daily. Provider, Historical   EPIPEN 2-SANJAY 0.3 mg/0.3 mL injection 1 (ONE) INJECTION AS NEEDED 4/30/19   Provider, Historical   buPROPion XL (WELLBUTRIN XL) 150 mg tablet Take 1 Tab by mouth every morning. 5/16/19   Appkirsten Shane MD   montelukast (SINGULAIR) 10 mg tablet Take 1 Tab by mouth daily. 5/16/19   Watson Shane MD   traMADol (ULTRAM) 50 mg tablet Take 50 mg by mouth every six (6) hours as needed for Pain. Provider, Historical   oxybutynin (DITROPAN) 5 mg tablet 5 mg two (2) times a day. Patient states she takes 1 tablet bid 10/2/18   Provider, Historical   BD INSULIN SYRINGE 1 mL 25 gauge x 5/8\" syrg USE 1 SYRINGE ONCE A WEEK 8/24/18   Provider, Historical   cyanocobalamin (Vitamin B-12) 1,000 mcg tablet Take  by mouth daily. Provider, Historical   RESTASIS 0.05 % ophthalmic emulsion INSTILL 1 DROP INTO EACH EYE TWICE DAILY 10/6/17   Provider, Historical   SAFETY-ABDI TB SYR 1CC/25GX5/8\" 1 mL 25 gauge x 5/8\" syrg USE TO INJECT METHOTREXATE ONCE A WEEK 12/1/17   Provider, Historical   methotrexate 25 mg/mL chemo injection by SubCUTAneous route every seven (7) days. On friday 4/7/17   Provider, Historical   metFORMIN ER (GLUCOPHAGE XR) 500 mg tablet 500 mg two (2) times a day. 11/22/16   Provider, Historical   folic acid (FOLVITE) 1 mg tablet TAKE 1 TABLET ORALLY DAILY 11/29/16   Provider, Historical   desogestrel-ethinyl estradiol (DESOGEN) 0.15-0.03 mg tab Take 1 Tab by mouth nightly.     Provider, Historical   Nebulizer & Compressor machine 1 Each by Does Not Apply route three (3) times daily as needed. 12/10/12   Sheyla Corbin MD   glycopyrrolate (ROBINUL) 1 mg tablet Take 2 mg by mouth two (2) times a day. Indications: hyperhydrosis    Provider, Historical   cholecalciferol (Vitamin D3) (1000 Units /25 mcg) tablet Take 1 Tab by mouth daily. Provider, Historical   levothyroxine (SYNTHROID) 75 mcg tablet Take 75 mcg by mouth Daily (before breakfast). Provider, Historical     Allergies   Allergen Reactions    Latex Swelling    Rituxan [Rituximab] Anaphylaxis    Entex [Phenylephrine-Guaifenesin] Anaphylaxis, Hives and Palpitations    Mucinex [Guaifenesin] Anaphylaxis and Hives    Pepto-Bismol [Bismuth Subsalicylate] Nausea and Vomiting        Review of Systems:  A comprehensive review of systems was negative except for that written in the History of Present Illness. Objective:   Blood pressure 122/72, pulse 77, temperature 98.9 °F (37.2 °C), resp. rate 17, height 5' 6\" (1.676 m), weight 208 lb (94.3 kg), SpO2 97 %, not currently breastfeeding. Temp (24hrs), Av.4 °F (36.9 °C), Min:97.9 °F (36.6 °C), Max:98.9 °F (37.2 °C)       Exam:    General:  Alert, cooperative, well noursished, well developed, appears stated age   Eyes:  Sclera anicteric. Darlynn Magic Mouth/Throat: Mucous membranes moist   Neck: Supple   Lungs:   Clear to auscultation bilaterally, good effort   CV:  Regular rate and rhythm   Abdomen:   Soft, non-tender. bowel sounds normal. non-distended   Extremities: No edema   Skin: No rash   Lymph nodes: Mid cervical QUINCY?  Tender   Musculoskeletal: No swelling or deformity   Lines/Devices:  Intact, no erythema, drainage or tenderness   Psych: Alert and oriented, normal mood affect given the setting       Data Review:   Recent Results (from the past 24 hour(s))   METABOLIC PANEL, BASIC    Collection Time: 20  6:30 AM   Result Value Ref Range    Sodium 138 136 - 145 mmol/L    Potassium 4.0 3.5 - 5.1 mmol/L    Chloride 104 97 - 108 mmol/L    CO2 27 21 - 32 mmol/L Anion gap 7 5 - 15 mmol/L    Glucose 141 (H) 65 - 100 mg/dL    BUN 7 6 - 20 MG/DL    Creatinine 0.92 0.55 - 1.02 MG/DL    BUN/Creatinine ratio 8 (L) 12 - 20      GFR est AA >60 >60 ml/min/1.73m2    GFR est non-AA >60 >60 ml/min/1.73m2    Calcium 8.7 8.5 - 10.1 MG/DL   RHEUMATOID FACTOR, QT    Collection Time: 05/24/20  6:30 AM   Result Value Ref Range    Rheumatoid factor <10 <15 IU/mL   CRYPTOCOCCUS AG W/REFLEX TITER    Collection Time: 05/24/20  6:30 AM   Result Value Ref Range    Cryptococcus Ag Negative NEG          Microbiology:      Studies:      Signed By: Majo Carranza DO     May 24, 2020

## 2020-05-24 NOTE — PROGRESS NOTES
Bedside shift change report given to JEREMI Hopkins (oncoming nurse) by Kb Perez (offgoing nurse). Report included the following information SBAR, Kardex, Intake/Output, Accordion and Recent Results. Attempted AM blood draw for blood culture 1x unsuccessful. JEREMI Jimenez attempted 1x unsuccessful, called ED they are unable to send someone up at this time, informed JEREMI Hopkins. AFB culture was not obtained unsure if Robb Castaneda wanted blood or sputum cx lab reported it is usually a sputum cx, Dayshift RN saw Robb Castaneda wants blood for AFB in his notes, Leonardo Alexis aware and will get labs.

## 2020-05-24 NOTE — PROGRESS NOTES
Hospitalist Progress Note    NAME: Juan Junior   :  1980   MRN:  [id number]     Abatacept(Orencia) inhibits T-cell (T-lymphocyte) activation by binding to CD80 and CD86 on antigen presenting cells (APC)    Tofacitinib (xeljanz) inhibits Janus kinase (JANAY) enzymes, prevents cytokine- or growth factor-mediated gene expression and intracellular activity of immune cells, reduces circulating CD16/56+ natural killer cells, serum IgG, IgM, IgA, and C-reactive protein, and increases B cells. Assessment / Plan:    Splenic inflammation/infection POA multiple hypodensities, ?  abscesses  Recent sepsis  with temp 102.3, , normal WBC  LUQ abdominal Pain, POA  Immunocompromised POA on Abatacept then Tofacitinib for past 4 months  Weight loss 50 lbs per patient(20 lb documented since 2019)  Lactic acidosis lactate 2.6  Right portacath in place  Staph hominus bacteremia in one [facility] drawn from port(other cultures negative)  - new onset LUQ pain since before last admit, fevers  - [facility] admit   to 2020 for LUQ, sepsis, splenic hypodensities        No QUINCY in chest and abdomen        CTA chest no PE or ASD        Seen By Heme, felt lesions not consistent with infarcts on review with radiology        SARS-CoV-2 test negative  and also  as outpatient        Received 4 days of IV ceftriaxone and flagyl        She was afebrile in hospital, discharged off antibiotics with outpatient f/u               No clear diagnosis  - LUQ Pain better on discharge for a day, then worsening  - febrile at home before re admit > 101 per her report      Afebrile here  - No HA, cough or SOB, sore throat, N/V, diarrhea, dysuria, new joint symptoms  - Gen surgery --> no surgical intervention  - blood culture 1/2 sets with Staph hominus (appears to have been drawn from port)      Suspect this is contaminant  - Echo TTE LVEF 55-60%, normal RV function            Normal AV with no AR or AS            Normal mitral valve, mild MR            Pericardium with no effusion  - ESR was 18, CRP > 9.5  - Procalcitonin repeatedly < 0.05  - Persistent LUQ tenderness, now with ? tender QUINCY or salivary glands under her jaw  - differential broad desean with the immunosuppressives         ? infectious                   Hematogenous seeding                        No clear signs of endocarditis on BC or TTE                               D/W patient, may need to consider ANUSHKA                        ? Seeded from the port                         No clear evidence of any other focal infections                             UA negative                             CXR and chest CT negative                             No other clear infectious sources on CT scan abdomen                             EGD and colonoscopy negative                  ? Disseminated fungal or mycobacterial                         Lived in [facility], ? Histo                         Nurse so TB a risk                  Rule out EBV with the tender LN          ? Rheumatologic with prior RA,           ? Malignancy, no clear evidence of this, d/w heme onc          ? hypercoaguable                They do not look like splenic infarcts                 D/W hem-Onc, checking lupus anticoagulant and anticardiolipin antibodies           Significance of the tender LN in neck unclear  Stopped vanco 5/22, plan to watch off antibiotics       Check blood cultures with temp > 101(peripheral sticks)  Taper steroids  Fungal blood culture and histo antigen in lab  Check AFB blood culture and quantiferon in lab  Cryptococcal antigen negative  Throat culture  CT neck of persistent tender QUINCY, ?  Something to aspirate for diagnosis  Check CCP and RF for signs of disease activity  Aspiration of the splenic lesions risky vs empiric antibiotics and serial scans  Ultimately suspect she require a course of broad spectrum antibiotics and serial scans  Appreciate Dr Shani Lenz input/help, will need ID follow up over time    Hypokalemia K 3.3  -PO replacement  -BMP daily    Chronic Constipation   - Normocytic anemia  - GI are following now-->EGD and colonoscopy 05/22 unremarkable     History of rheumatoid arthritis  - methotrexate and Xeljanz, cont holding, prior on abatacept  - Continue hydroxychloroquine  - On prednisone, was off for 2 months, then resumed just prior to 5/14 x 2 days        Concerned she had a RA flair initially  - SSA/SSB negative, RF < 10, check CCP  - rheumatology consult     Diabetes mellitus type 2  -Hold home metformin.   - Sliding scale with blood sugar checks.   -Consider adding NPH to steroids if blood sugars go up  - SSI, check HgBa1c     History of depression  History of GERD  Hypothyroidism  Hypertension  -Continue home Wellbutrin, Lexapro, levothyroxine and Protonix  -Continue home propranolol     History of hyperreactive airway disease  -Continue home albuterol and Symbicort.        Code Status: Full code  Surrogate Decision Maker:      DVT Prophylaxis:lovenox      Subjective:     Chief Complaint / Reason for Physician Visit  Abdominal Pain, POA  Possible splenic infarction versus inflammation/infection  Discussed with RN events overnight. \"no change in the abdominal pain\"  Severe HA this AM, now resolved  Still with moderate tenderness at angle of jaw bilaterally  Same LUQ tenderness and pain, worse with deep breath, no change  No N/V, diarrhea, SOB, cough, dysuria  Remains off antibiotics    Review of Systems:  Symptom Y/N Comments  Symptom Y/N Comments   Fever/Chills n   Chest Pain n    Poor Appetite    Edema     Cough n   Abdominal Pain y    Sputum    Joint Pain     SOB/MENDOZA n   Pruritis/Rash     Nausea/vomit n   Tolerating PT/OT     Diarrhea n   Tolerating Diet y    Constipation y   Other       Could NOT obtain due to:      Objective:     VITALS:   Last 24hrs VS reviewed since prior progress note.  Most recent are:  Patient Vitals for the past 24 hrs:   Temp Pulse Resp BP SpO2 05/24/20 1514 98.2 °F (36.8 °C) 66 18 115/68 98 %   05/24/20 0843 -- -- -- -- 97 %   05/24/20 0820 98.9 °F (37.2 °C) 77 17 122/72 97 %   05/24/20 0554 98.3 °F (36.8 °C) 82 18 126/78 99 %   05/23/20 2300 98.6 °F (37 °C) 78 16 121/65 98 %   05/23/20 2106 -- -- -- -- 99 %       Intake/Output Summary (Last 24 hours) at 5/24/2020 1523  Last data filed at 5/24/2020 1012  Gross per 24 hour   Intake 240 ml   Output --   Net 240 ml        Wt Readings from Last 12 Encounters:   05/22/20 94.3 kg (208 lb)   05/14/20 94 kg (207 lb 3.7 oz)   04/18/20 95.9 kg (211 lb 6.7 oz)   03/14/20 98.4 kg (217 lb)   03/02/20 98.4 kg (217 lb)   02/13/20 97.5 kg (215 lb)   01/31/20 97.8 kg (215 lb 8 oz)   01/23/20 54.9 kg (121 lb)   12/05/19 99.3 kg (219 lb)   09/28/19 101.7 kg (224 lb 1.6 oz)   09/24/19 100.7 kg (222 lb)   08/01/19 101.6 kg (224 lb)         PHYSICAL EXAM:  General: WD, WN. Alert, cooperative, no acute distress    EENT:  EOMI. Anicteric sclerae. MMM  Neck:  Tender up under angle of jaw bilaterally, shotty QUINCY  Resp:  CTA bilaterally, no wheezing or rales. No accessory muscle use  CV:  Regular  rhythm,  No edema  GI:  Soft, Non distended, tender in LUQ.  +Bowel sounds  Neurologic:  Alert and oriented X 3, normal speech,   Psych:   Good insight. Not anxious nor agitated  Skin:  No rashes. No jaundice    Reviewed most current lab test results and cultures  YES  Reviewed most current radiology test results   YES  Review and summation of old records today    NO  Reviewed patient's current orders and MAR    YES  PMH/SH reviewed - no change compared to H&P  ________________________________________________________________________  Care Plan discussed with:    Comments   Patient x    Family      RN x    Care Manager     Consultant                        Multidiciplinary team rounds were held today with , nursing, pharmacist and clinical coordinator.   Patient's plan of care was discussed; medications were reviewed and discharge planning was addressed. ________________________________________________________________________        Comments   >50% of visit spent in counseling and coordination of care     ________________________________________________________________________  Lizette Hussein MD     Procedures: see electronic medical records for all procedures/Xrays and details which were not copied into this note but were reviewed prior to creation of Plan. LABS:  I reviewed today's most current labs and imaging studies.   Pertinent labs include:  Recent Labs     05/23/20  0248 05/22/20  0159   WBC 6.4 6.6   HGB 10.4* 10.1*   HCT 31.8* 31.6*    301     Recent Labs     05/24/20  0630 05/23/20 0248 05/22/20  1736    139 139   K 4.0 4.0 4.2    104 105   CO2 27 29 27   * 103* 189*   BUN 7 5* 3*   CREA 0.92 0.74 0.90   CA 8.7 8.2* 8.4*   ALB  --   --  2.9*   TBILI  --   --  0.5   SGOT  --   --  23   ALT  --   --  53       Signed: Lizette Hussein MD

## 2020-05-25 ENCOUNTER — APPOINTMENT (OUTPATIENT)
Dept: CT IMAGING | Age: 40
DRG: 815 | End: 2020-05-25
Attending: INTERNAL MEDICINE
Payer: COMMERCIAL

## 2020-05-25 LAB
B2 GLYCOPROT1 IGG SER-ACNC: 70 GPI IGG UNITS (ref 0–20)
B2 GLYCOPROT1 IGM SER-ACNC: 102 GPI IGM UNITS (ref 0–32)
BACTERIA SPEC CULT: NORMAL
DISCLAIMER:, 130261: NORMAL
FACT XIII CLOT DIS 24H PPP QL: NORMAL
GLUCOSE BLD STRIP.AUTO-MCNC: 129 MG/DL (ref 65–100)
GLUCOSE BLD STRIP.AUTO-MCNC: 133 MG/DL (ref 65–100)
GLUCOSE BLD STRIP.AUTO-MCNC: 77 MG/DL (ref 65–100)
GLUCOSE BLD STRIP.AUTO-MCNC: 86 MG/DL (ref 65–100)
HISTOPLASMA AG, UR,HAGU: <0.5
SERVICE CMNT-IMP: ABNORMAL
SERVICE CMNT-IMP: ABNORMAL
SERVICE CMNT-IMP: NORMAL

## 2020-05-25 PROCEDURE — 70491 CT SOFT TISSUE NECK W/DYE: CPT

## 2020-05-25 PROCEDURE — 94760 N-INVAS EAR/PLS OXIMETRY 1: CPT

## 2020-05-25 PROCEDURE — 74011250636 HC RX REV CODE- 250/636: Performed by: INTERNAL MEDICINE

## 2020-05-25 PROCEDURE — 94640 AIRWAY INHALATION TREATMENT: CPT

## 2020-05-25 PROCEDURE — 74011000250 HC RX REV CODE- 250: Performed by: INTERNAL MEDICINE

## 2020-05-25 PROCEDURE — 74011250637 HC RX REV CODE- 250/637: Performed by: INTERNAL MEDICINE

## 2020-05-25 PROCEDURE — 74011636320 HC RX REV CODE- 636/320: Performed by: INTERNAL MEDICINE

## 2020-05-25 PROCEDURE — 65270000029 HC RM PRIVATE

## 2020-05-25 PROCEDURE — 82962 GLUCOSE BLOOD TEST: CPT

## 2020-05-25 PROCEDURE — B24BZZ4 ULTRASONOGRAPHY OF HEART WITH AORTA, TRANSESOPHAGEAL: ICD-10-PCS | Performed by: INTERNAL MEDICINE

## 2020-05-25 RX ORDER — SODIUM CHLORIDE 0.9 % (FLUSH) 0.9 %
10 SYRINGE (ML) INJECTION
Status: COMPLETED | OUTPATIENT
Start: 2020-05-25 | End: 2020-05-25

## 2020-05-25 RX ADMIN — ENOXAPARIN SODIUM 40 MG: 40 INJECTION SUBCUTANEOUS at 21:00

## 2020-05-25 RX ADMIN — POLYETHYLENE GLYCOL 3350 17 G: 17 POWDER, FOR SOLUTION ORAL at 09:50

## 2020-05-25 RX ADMIN — Medication 10 ML: at 06:26

## 2020-05-25 RX ADMIN — OXYBUTYNIN CHLORIDE 5 MG: 5 TABLET ORAL at 09:43

## 2020-05-25 RX ADMIN — NYSTATIN 500000 UNITS: 100000 SUSPENSION ORAL at 22:10

## 2020-05-25 RX ADMIN — POTASSIUM BICARBONATE 40 MEQ: 782 TABLET, EFFERVESCENT ORAL at 09:43

## 2020-05-25 RX ADMIN — Medication 10 ML: at 18:42

## 2020-05-25 RX ADMIN — NYSTATIN 500000 UNITS: 100000 SUSPENSION ORAL at 09:43

## 2020-05-25 RX ADMIN — GLYCOPYRROLATE 2 MG: 1 TABLET ORAL at 09:42

## 2020-05-25 RX ADMIN — HYDROXYCHLOROQUINE SULFATE 200 MG: 200 TABLET ORAL at 09:43

## 2020-05-25 RX ADMIN — ESCITALOPRAM OXALATE 5 MG: 10 TABLET ORAL at 09:42

## 2020-05-25 RX ADMIN — PANTOPRAZOLE SODIUM 40 MG: 40 TABLET, DELAYED RELEASE ORAL at 09:43

## 2020-05-25 RX ADMIN — NYSTATIN 500000 UNITS: 100000 SUSPENSION ORAL at 14:38

## 2020-05-25 RX ADMIN — DIPHENHYDRAMINE HYDROCHLORIDE 25 MG: 25 CAPSULE ORAL at 22:10

## 2020-05-25 RX ADMIN — MELATONIN 1 TABLET: at 09:43

## 2020-05-25 RX ADMIN — ARFORMOTEROL TARTRATE: 15 SOLUTION RESPIRATORY (INHALATION) at 21:15

## 2020-05-25 RX ADMIN — OXYBUTYNIN CHLORIDE 5 MG: 5 TABLET ORAL at 18:05

## 2020-05-25 RX ADMIN — IOPAMIDOL 100 ML: 755 INJECTION, SOLUTION INTRAVENOUS at 18:42

## 2020-05-25 RX ADMIN — CYCLOSPORINE 1 DROP: 0.5 EMULSION OPHTHALMIC at 11:07

## 2020-05-25 RX ADMIN — CETIRIZINE HYDROCHLORIDE 10 MG: 10 TABLET, FILM COATED ORAL at 09:42

## 2020-05-25 RX ADMIN — Medication 1 SPRAY: at 04:56

## 2020-05-25 RX ADMIN — CYCLOSPORINE 1 DROP: 0.5 EMULSION OPHTHALMIC at 18:00

## 2020-05-25 RX ADMIN — TRAMADOL HYDROCHLORIDE 50 MG: 50 TABLET, FILM COATED ORAL at 16:01

## 2020-05-25 RX ADMIN — LEVOTHYROXINE SODIUM 75 MCG: 0.07 TABLET ORAL at 09:43

## 2020-05-25 RX ADMIN — Medication 10 ML: at 14:38

## 2020-05-25 RX ADMIN — NYSTATIN 500000 UNITS: 100000 SUSPENSION ORAL at 18:05

## 2020-05-25 RX ADMIN — Medication 10 ML: at 22:13

## 2020-05-25 RX ADMIN — ARFORMOTEROL TARTRATE: 15 SOLUTION RESPIRATORY (INHALATION) at 08:00

## 2020-05-25 RX ADMIN — GLYCOPYRROLATE 2 MG: 1 TABLET ORAL at 22:10

## 2020-05-25 RX ADMIN — MONTELUKAST SODIUM 10 MG: 10 TABLET, FILM COATED ORAL at 09:42

## 2020-05-25 RX ADMIN — CYANOCOBALAMIN TAB 500 MCG 250 MCG: 500 TAB at 09:44

## 2020-05-25 RX ADMIN — BUPROPION HYDROCHLORIDE 150 MG: 150 TABLET, EXTENDED RELEASE ORAL at 09:43

## 2020-05-25 RX ADMIN — FOLIC ACID 1 MG: 1 TABLET ORAL at 09:43

## 2020-05-25 RX ADMIN — ACETAMINOPHEN 650 MG: 325 TABLET, FILM COATED ORAL at 21:00

## 2020-05-25 RX ADMIN — TRAMADOL HYDROCHLORIDE 50 MG: 50 TABLET, FILM COATED ORAL at 22:10

## 2020-05-25 NOTE — PROGRESS NOTES
Hospitalist Progress Note    NAME: Aaron Cage   :  1980   MRN:  [id number]     Abatacept(Orencia) inhibits T-cell (T-lymphocyte) activation by binding to CD80 and CD86 on antigen presenting cells (APC)    Tofacitinib (xeljanz) inhibits Janus kinase (JANAY) enzymes, prevents cytokine- or growth factor-mediated gene expression and intracellular activity of immune cells, reduces circulating CD16/56+ natural killer cells, serum IgG, IgM, IgA, and C-reactive protein, and increases B cells. Assessment / Plan:    Splenic inflammation/infection POA multiple hypodensities, ?  abscesses  Recent sepsis  with temp 102.3, , normal WBC  LUQ abdominal Pain, POA  Immunocompromised POA on Abatacept then Tofacitinib for past 4 months  Weight loss 50 lbs per patient(20 lb documented since 2019)  Lactic acidosis lactate 2.6  Right portacath in place  Staph hominus bacteremia in one [facility] drawn from port(other cultures negative)  - new onset LUQ pain since before last admit, fevers  - [id number] admit   to 2020 for LUQ, sepsis, splenic hypodensities        No QUINCY in chest and abdomen        CTA chest no PE or ASD        BC x 1 negative, urine culture negative        Seen By Heme, felt lesions not consistent with infarcts on review with radiology                  No AC        SARS-CoV-2 test negative  and also  as outpatient        Received 4 days of IV ceftriaxone and flagyl        She was afebrile in hospital, discharged off antibiotics with outpatient f/u               No clear diagnosis  - LUQ Pain better on discharge for a day, then worsening  - febrile at home before re admit > 101 per her report      Afebrile here      Empirically started on vanco this AM, stopped  as unsure what we are treating  - No HA, cough or SOB, sore throat, N/V, diarrhea, dysuria, new joint symptoms  - Gen surgery --> no surgical intervention  - Admit blood culture 1/2 sets with Staph hominus (appears to have been drawn from port)      Suspect this is contaminant(CNS)  - Echo TTE LVEF 55-60%, normal RV function            Normal AV with no AR or AS            Normal mitral valve, mild MR            Pericardium with no effusion  - ESR was 18, CRP > 9.5  - Procalcitonin repeatedly < 0.05  - Persistent LUQ tenderness, now with ? tender QUINCY or salivary glands under her jaw  - differential broad desean with the immunosuppressives         ? infectious                   Hematogenous seeding of spleen                        No clear signs of endocarditis on [facility] or TTE                            Ask cardiology to evaluate toTEE                        ? Seeded from the port                         No clear evidence of any other focal infections                             UA negative                             CXR and chest CT negative                             No other clear infectious sources on CT scan abdomen                             EGD and colonoscopy negative                  ? Disseminated fungal or mycobacterial                         Lived in mid [address], histo antigen negative, culture pending                         Crypto antigen negative                         Nurse so TB a risk, quantiferon and AFB blood culture pending                  Rule out EBV with the tender LN          ? Rheumatologic with prior RA, RF < 10, ESR 35          ?  Malignancy, no clear evidence of this, d/w heme onc          ? hypercoaguable                They do not look like splenic infarcts                Negative lupus anticoagulant, pending anticardiolipin antibodies  Stopped vanco 5/22, plan to watch off antibiotics  Taper steroids  Fungal blood culture pending  AFB blood culture and quantiferon in lab, results pending  Cryptococcal antigen negative  Histoplasmosis urinary antigen negative  Repeat BC  No fevers off antibiotics  Throat culture negative so far  CT neck with the persistent neck tenderness  RF < 10, CCP pending  Aspiration of the splenic lesions risky vs empiric antibiotics and serial scans  Ultimately suspect she require a course of broad spectrum antibiotics and serial scans      Seemed to respond to IV ceftriaxone last admit  Ask cardiology to see in Am re feasibility of ANUSHKA, NPO after midnight  Ask hem-onc to follow up in AM  If remains afebrile and cultures negative, home in 1-2 days with outpatient    Hypokalemia K 3.3  -PO replacement  -BMP daily    Chronic Constipation x 1 year  - Normocytic anemia  - GI are following now-->EGD and colonoscopy 05/22 unremarkable  - ? Dom Mercado, will D/w Dr Blu Robertson in AM     History of rheumatoid arthritis  - methotrexate and Su Harkins, cont holding, prior on abatacept  - Continue hydroxychloroquine  - On prednisone, was off for 2 months, then resumed just prior to 5/14 x 2 days        Concerned she had a RA flair initially         Start to wean, dropped from 20 to 10 mg on 5/24  - SSA/SSB negative, RF < 10, check CCP  - rheumatology consult     Diabetes mellitus type 2  -Hold home metformin.   - Sliding scale with blood sugar checks.   -Consider adding NPH to steroids if blood sugars go up  - SSI, check HgBa1c     History of depression  History of GERD  Hypothyroidism  Hypertension  -Continue home Wellbutrin, Lexapro, levothyroxine and Protonix  - Propranolol     History of hyperreactive airway disease  -Continue home albuterol and Symbicort.        Code Status: Full code  Surrogate Decision Maker:      DVT Prophylaxis:lovenox      Subjective:     Chief Complaint / Reason for Physician Visit  Abdominal Pain, POA  Possible splenic infarction versus inflammation/infection  Discussed with RN events overnight.    \"the pain in my neck is now 6/10\"  Persistent LUQ pain, 4/10, \"always there\"  Pain at the angle of the jaws is a bit worse, 6/10  No HA  No N/V, diarrhea, SOB, cough, dysuria  Remains off antibiotics, no fevers    Review of Systems:  Symptom Y/N Comments  Symptom Y/N Comments   Fever/Chills n Chest Pain n    Poor Appetite    Edema     Cough n   Abdominal Pain y    Sputum    Joint Pain     SOB/MENDOZA n   Pruritis/Rash     Nausea/vomit n   Tolerating PT/OT     Diarrhea n   Tolerating Diet y    Constipation y   Other       Could NOT obtain due to:      Objective:     VITALS:   Last 24hrs VS reviewed since prior progress note. Most recent are:  Patient Vitals for the past 24 hrs:   Temp Pulse Resp BP SpO2   05/25/20 1501 97.1 °F (36.2 °C) (!) 102 16 127/75 98 %   05/25/20 0800 -- -- -- -- 96 %   05/25/20 0726 98.6 °F (37 °C) 78 18 116/60 96 %   05/25/20 0417 -- -- -- 119/66 --   05/25/20 0038 -- -- -- 102/68 --   05/24/20 2309 98.1 °F (36.7 °C) 66 17 95/52 97 %   05/24/20 2013 -- -- -- -- 97 %     No intake or output data in the 24 hours ending 05/25/20 1637     Wt Readings from Last 12 Encounters:   05/22/20 94.3 kg (208 lb)   05/14/20 94 kg (207 lb 3.7 oz)   04/18/20 95.9 kg (211 lb 6.7 oz)   03/14/20 98.4 kg (217 lb)   03/02/20 98.4 kg (217 lb)   02/13/20 97.5 kg (215 lb)   01/31/20 97.8 kg (215 lb 8 oz)   01/23/20 54.9 kg (121 lb)   12/05/19 99.3 kg (219 lb)   09/28/19 101.7 kg (224 lb 1.6 oz)   09/24/19 100.7 kg (222 lb)   08/01/19 101.6 kg (224 lb)         PHYSICAL EXAM:  General: WD, WN. Alert, cooperative, no acute distress    EENT:  EOMI. Anicteric sclerae. MMM  Neck:  Tender up under angle of jaw bilaterally, shotty QUINCY  Resp:  CTA bilaterally, no wheezing or rales. No accessory muscle use  CV:  Regular  rhythm,  No edema  GI:  Soft, Non distended, tender in LUQ.  +Bowel sounds  Neurologic:  Alert and oriented X 3, normal speech,   Psych:   Good insight. Not anxious nor agitated  Skin:  No rashes.   No jaundice    Reviewed most current lab test results and cultures  YES  Reviewed most current radiology test results   YES  Review and summation of old records today    NO  Reviewed patient's current orders and MAR    YES  PMH/SH reviewed - no change compared to H&P  ________________________________________________________________________  Care Plan discussed with:    Comments   Patient x    Family      RN x    Care Manager     Consultant                        Multidiciplinary team rounds were held today with , nursing, pharmacist and clinical coordinator. Patient's plan of care was discussed; medications were reviewed and discharge planning was addressed. ________________________________________________________________________        Comments   >50% of visit spent in counseling and coordination of care     ________________________________________________________________________  Amada Asif MD     Procedures: see electronic medical records for all procedures/Xrays and details which were not copied into this note but were reviewed prior to creation of Plan. LABS:  I reviewed today's most current labs and imaging studies.   Pertinent labs include:  Recent Labs     05/23/20  0248   WBC 6.4   HGB 10.4*   HCT 31.8*        Recent Labs     05/24/20  0630 05/23/20  0248 05/22/20  1736    139 139   K 4.0 4.0 4.2    104 105   CO2 27 29 27   * 103* 189*   BUN 7 5* 3*   CREA 0.92 0.74 0.90   CA 8.7 8.2* 8.4*   ALB  --   --  2.9*   TBILI  --   --  0.5   SGOT  --   --  23   ALT  --   --  53       Signed: Amada Asif MD

## 2020-05-25 NOTE — PROGRESS NOTES
Bedside shift change report given to [facility] (oncoming nurse) by Sujatha Ibrahim (offgoing nurse). Report included the following information SBAR, Kardex, Intake/Output, MAR and Recent Results. No blood return from prot, replaced needle this am and still no blood return. Order for cathflo received but no staff available to instill it. Will leave  for PICC team. No pain meds given.

## 2020-05-26 LAB
ALBUMIN SERPL-MCNC: 3.1 G/DL (ref 3.5–5)
ALBUMIN/GLOB SERPL: 0.8 {RATIO} (ref 1.1–2.2)
ALDOLASE SERPL-CCNC: 5.3 U/L (ref 3.3–10.3)
ALP SERPL-CCNC: 78 U/L (ref 45–117)
ALT SERPL-CCNC: 53 U/L (ref 12–78)
ANION GAP SERPL CALC-SCNC: 3 MMOL/L (ref 5–15)
AST SERPL-CCNC: 20 U/L (ref 15–37)
BACTERIA SPEC CULT: ABNORMAL
BACTERIA SPEC CULT: ABNORMAL
BACTERIA SPEC CULT: NORMAL
BASOPHILS # BLD: 0 K/UL (ref 0–0.1)
BASOPHILS NFR BLD: 0 % (ref 0–1)
BILIRUB SERPL-MCNC: 0.4 MG/DL (ref 0.2–1)
BUN SERPL-MCNC: 12 MG/DL (ref 6–20)
BUN/CREAT SERPL: 15 (ref 12–20)
CALCIUM SERPL-MCNC: 9.1 MG/DL (ref 8.5–10.1)
CARDIOLIPIN IGA SER IA-ACNC: <9 APL U/ML (ref 0–11)
CARDIOLIPIN IGG SER IA-ACNC: <9 GPL U/ML (ref 0–14)
CARDIOLIPIN IGM SER IA-ACNC: <9 MPL U/ML (ref 0–12)
CCP IGA+IGG SERPL IA-ACNC: 10 UNITS (ref 0–19)
CHLORIDE SERPL-SCNC: 104 MMOL/L (ref 97–108)
CK SERPL-CCNC: 23 U/L (ref 26–192)
CO2 SERPL-SCNC: 30 MMOL/L (ref 21–32)
CREAT SERPL-MCNC: 0.82 MG/DL (ref 0.55–1.02)
CYSTATIN C SERPL-MCNC: 0.82 MG/L (ref 0.62–1.16)
DIFFERENTIAL METHOD BLD: ABNORMAL
EBV NA IGG SER-ACNC: 167 U/ML (ref 0–17.9)
EBV VCA IGG SER-ACNC: >600 U/ML (ref 0–17.9)
EBV VCA IGM SER-ACNC: <36 U/ML (ref 0–35.9)
EOSINOPHIL # BLD: 0.1 K/UL (ref 0–0.4)
EOSINOPHIL NFR BLD: 1 % (ref 0–7)
ERYTHROCYTE [DISTWIDTH] IN BLOOD BY AUTOMATED COUNT: 13.6 % (ref 11.5–14.5)
EST. AVERAGE GLUCOSE BLD GHB EST-MCNC: 117 MG/DL
GLOBULIN SER CALC-MCNC: 3.8 G/DL (ref 2–4)
GLUCOSE BLD STRIP.AUTO-MCNC: 101 MG/DL (ref 65–100)
GLUCOSE BLD STRIP.AUTO-MCNC: 103 MG/DL (ref 65–100)
GLUCOSE BLD STRIP.AUTO-MCNC: 104 MG/DL (ref 65–100)
GLUCOSE BLD STRIP.AUTO-MCNC: 97 MG/DL (ref 65–100)
GLUCOSE SERPL-MCNC: 95 MG/DL (ref 65–100)
HBA1C MFR BLD: 5.7 % (ref 4–5.6)
HCT VFR BLD AUTO: 37.2 % (ref 35–47)
HGB BLD-MCNC: 12 G/DL (ref 11.5–16)
IMM GRANULOCYTES # BLD AUTO: 0.1 K/UL (ref 0–0.04)
IMM GRANULOCYTES NFR BLD AUTO: 2 % (ref 0–0.5)
INTERPRETATION, 169995: ABNORMAL
LYMPHOCYTES # BLD: 1.9 K/UL (ref 0.8–3.5)
LYMPHOCYTES NFR BLD: 28 % (ref 12–49)
MCH RBC QN AUTO: 30.5 PG (ref 26–34)
MCHC RBC AUTO-ENTMCNC: 32.3 G/DL (ref 30–36.5)
MCV RBC AUTO: 94.7 FL (ref 80–99)
MONOCYTES # BLD: 0.7 K/UL (ref 0–1)
MONOCYTES NFR BLD: 10 % (ref 5–13)
NEUTS SEG # BLD: 4 K/UL (ref 1.8–8)
NEUTS SEG NFR BLD: 59 % (ref 32–75)
NRBC # BLD: 0 K/UL (ref 0–0.01)
NRBC BLD-RTO: 0 PER 100 WBC
PLATELET # BLD AUTO: 314 K/UL (ref 150–400)
PMV BLD AUTO: 9.2 FL (ref 8.9–12.9)
POTASSIUM SERPL-SCNC: 4.1 MMOL/L (ref 3.5–5.1)
PROT SERPL-MCNC: 6.9 G/DL (ref 6.4–8.2)
RBC # BLD AUTO: 3.93 M/UL (ref 3.8–5.2)
SERVICE CMNT-IMP: ABNORMAL
SERVICE CMNT-IMP: NORMAL
SERVICE CMNT-IMP: NORMAL
SODIUM SERPL-SCNC: 137 MMOL/L (ref 136–145)
WBC # BLD AUTO: 6.7 K/UL (ref 3.6–11)

## 2020-05-26 PROCEDURE — 74011000250 HC RX REV CODE- 250: Performed by: INTERNAL MEDICINE

## 2020-05-26 PROCEDURE — 80053 COMPREHEN METABOLIC PANEL: CPT

## 2020-05-26 PROCEDURE — 74011250636 HC RX REV CODE- 250/636: Performed by: HOSPITALIST

## 2020-05-26 PROCEDURE — 65270000029 HC RM PRIVATE

## 2020-05-26 PROCEDURE — 74011250636 HC RX REV CODE- 250/636: Performed by: INTERNAL MEDICINE

## 2020-05-26 PROCEDURE — 83036 HEMOGLOBIN GLYCOSYLATED A1C: CPT

## 2020-05-26 PROCEDURE — 36415 COLL VENOUS BLD VENIPUNCTURE: CPT

## 2020-05-26 PROCEDURE — 74011250637 HC RX REV CODE- 250/637: Performed by: INTERNAL MEDICINE

## 2020-05-26 PROCEDURE — 94760 N-INVAS EAR/PLS OXIMETRY 1: CPT

## 2020-05-26 PROCEDURE — 74011000250 HC RX REV CODE- 250: Performed by: HOSPITALIST

## 2020-05-26 PROCEDURE — 74011000258 HC RX REV CODE- 258: Performed by: INTERNAL MEDICINE

## 2020-05-26 PROCEDURE — 87040 BLOOD CULTURE FOR BACTERIA: CPT

## 2020-05-26 PROCEDURE — 85025 COMPLETE CBC W/AUTO DIFF WBC: CPT

## 2020-05-26 PROCEDURE — 82962 GLUCOSE BLOOD TEST: CPT

## 2020-05-26 PROCEDURE — 94640 AIRWAY INHALATION TREATMENT: CPT

## 2020-05-26 PROCEDURE — 82550 ASSAY OF CK (CPK): CPT

## 2020-05-26 RX ADMIN — Medication 10 ML: at 17:24

## 2020-05-26 RX ADMIN — MELATONIN 1 TABLET: at 12:43

## 2020-05-26 RX ADMIN — PANTOPRAZOLE SODIUM 40 MG: 40 TABLET, DELAYED RELEASE ORAL at 12:41

## 2020-05-26 RX ADMIN — CEFTRIAXONE 1 G: 1 INJECTION, POWDER, FOR SOLUTION INTRAMUSCULAR; INTRAVENOUS at 20:19

## 2020-05-26 RX ADMIN — CYANOCOBALAMIN TAB 500 MCG 250 MCG: 500 TAB at 12:43

## 2020-05-26 RX ADMIN — HYDROXYCHLOROQUINE SULFATE 200 MG: 200 TABLET ORAL at 12:39

## 2020-05-26 RX ADMIN — OXYBUTYNIN CHLORIDE 5 MG: 5 TABLET ORAL at 17:23

## 2020-05-26 RX ADMIN — GLYCOPYRROLATE 2 MG: 1 TABLET ORAL at 20:20

## 2020-05-26 RX ADMIN — NYSTATIN 500000 UNITS: 100000 SUSPENSION ORAL at 17:23

## 2020-05-26 RX ADMIN — ARFORMOTEROL TARTRATE: 15 SOLUTION RESPIRATORY (INHALATION) at 20:47

## 2020-05-26 RX ADMIN — Medication 10 ML: at 06:08

## 2020-05-26 RX ADMIN — NYSTATIN 500000 UNITS: 100000 SUSPENSION ORAL at 21:20

## 2020-05-26 RX ADMIN — MONTELUKAST SODIUM 10 MG: 10 TABLET, FILM COATED ORAL at 12:40

## 2020-05-26 RX ADMIN — ENOXAPARIN SODIUM 40 MG: 40 INJECTION SUBCUTANEOUS at 20:20

## 2020-05-26 RX ADMIN — ACETAMINOPHEN 650 MG: 325 TABLET, FILM COATED ORAL at 12:22

## 2020-05-26 RX ADMIN — GLYCOPYRROLATE 2 MG: 1 TABLET ORAL at 12:50

## 2020-05-26 RX ADMIN — PROPRANOLOL HYDROCHLORIDE 60 MG: 60 CAPSULE, EXTENDED RELEASE ORAL at 12:42

## 2020-05-26 RX ADMIN — NYSTATIN 500000 UNITS: 100000 SUSPENSION ORAL at 12:40

## 2020-05-26 RX ADMIN — BUPROPION HYDROCHLORIDE 150 MG: 150 TABLET, EXTENDED RELEASE ORAL at 12:36

## 2020-05-26 RX ADMIN — ARFORMOTEROL TARTRATE: 15 SOLUTION RESPIRATORY (INHALATION) at 08:52

## 2020-05-26 RX ADMIN — Medication 10 ML: at 21:20

## 2020-05-26 RX ADMIN — LEVOTHYROXINE SODIUM 75 MCG: 0.07 TABLET ORAL at 12:39

## 2020-05-26 RX ADMIN — POTASSIUM BICARBONATE 40 MEQ: 782 TABLET, EFFERVESCENT ORAL at 12:41

## 2020-05-26 RX ADMIN — DAPTOMYCIN 600 MG: 500 INJECTION, POWDER, LYOPHILIZED, FOR SOLUTION INTRAVENOUS at 21:19

## 2020-05-26 RX ADMIN — WATER 1 MG: 1 INJECTION INTRAMUSCULAR; INTRAVENOUS; SUBCUTANEOUS at 08:15

## 2020-05-26 RX ADMIN — CYCLOSPORINE 1 DROP: 0.5 EMULSION OPHTHALMIC at 17:23

## 2020-05-26 RX ADMIN — FOLIC ACID 1 MG: 1 TABLET ORAL at 12:38

## 2020-05-26 RX ADMIN — CYCLOSPORINE 1 DROP: 0.5 EMULSION OPHTHALMIC at 09:07

## 2020-05-26 RX ADMIN — OXYBUTYNIN CHLORIDE 5 MG: 5 TABLET ORAL at 12:40

## 2020-05-26 RX ADMIN — POLYETHYLENE GLYCOL 3350 17 G: 17 POWDER, FOR SOLUTION ORAL at 12:41

## 2020-05-26 RX ADMIN — ESCITALOPRAM OXALATE 5 MG: 10 TABLET ORAL at 12:38

## 2020-05-26 NOTE — PROGRESS NOTES
Bedside shift change report given to Thom Manzano (oncoming nurse) by Caterina Sotelo (offgoing nurse). Report included the following information SBAR, Kardex, MAR, Accordion and Recent Results.

## 2020-05-26 NOTE — PROGRESS NOTES
Spiritual Care Assessment/Progress Note  [facility]      NAME: Ashwin Navarro      MRN: [id number]  AGE: 42 y.o.  SEX: female  Taoist Affiliation: Mormonism   Language: English     5/26/2020     Total Time (in minutes): 29     Spiritual Assessment begun in [facility] 3 MED TELE through conversation with:         [x]Patient        [] Family    [] Friend(s)        Reason for Consult: Initial/Spiritual assessment, patient floor     Spiritual beliefs: (Please include comment if needed)     [x] Identifies with a grant tradition:    Mormonism     [x] Supported by a grant community:  [employer]          [] Claims no spiritual orientation:           [] Seeking spiritual identity:                [] Adheres to an individual form of spirituality:           [] Not able to assess:                           Identified resources for coping:      [x] Prayer                               [] Music                  [] Guided Imagery     [x] Family/friends                 [] Pet visits     [] Devotional reading                         [] Unknown     [] Other:                                            Interventions offered during this visit: (See comments for more details)    Patient Interventions: Affirmation of grant, Affirmation of emotions/emotional suffering, Catharsis/review of pertinent events in supportive environment, Iconic (affirming the presence of God/Higher Power), Initial/Spiritual assessment, patient floor, Normalization of emotional/spiritual concerns, Taoist beliefs/image of God discussed, Prayer (assurance of)           Plan of Care:     [x] Support spiritual and/or cultural needs    [] Support AMD and/or advance care planning process      [] Support grieving process   [] Coordinate Rites and/or Rituals    [] Coordination with community clergy   [] No spiritual needs identified at this time   [] Detailed Plan of Care below (See Comments)  [] Make referral to Music Therapy  [] Make referral to Pet Therapy     [] Make referral to Addiction services  [] Make referral to [facility]  [] Make referral to Spiritual Care Partner  [] No future visits requested        [x] Follow up visits as needed     Comments:  Initial visit on [facility] for spiritual assessment. No family/friends present. Patient was seated in recliner and appeared to be in good spirits. Provided pastoral presence and empathic listening as patient shared about her current medical challenges. 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 Her support system seems limited to her 80year old grandmother and her daughter's paternal grandmother. Ms. Chiquita Mayo is strong; she expressed several times accepting all that has come her way as part of God's plan. She is a member of [place] [id number]; they used to be regular attendees but have not been able to attend in about a year. She was receptive to assurance of prayer. She is aware of  availability for further pastoral support.      JOSE Saravia, [facility], Staff [address] [place] [address] Paging Service  [phone]

## 2020-05-26 NOTE — PROGRESS NOTES
SHIN:  1. Home with family  2. OP f/u appts  3. Advance Medical Directive  4. Family to transport at d/c    Chart reviewed. Plan for ANUSHKA after evaluated by Cardiology. D/C plan remains for pt to d/c home with OP f/u appts and family support. CM will continue to follow and remain available for transitional care planning.      Jesse Leigh MSW  Care Manager  [phone]

## 2020-05-26 NOTE — PROGRESS NOTES
KellieRN reported no blood return from portacath, followed protocol and had oncology RN troubleshoot, still no blood return, Nursing supervisor not comfortable with instilling cathflo to declot. 0615:Left message for PICC team to come up to instill cathflo and troubleshoot as pt is NPO for ANUSHKA. Unable to get labs pt is a hardstick, left VM for PICC team.   Bedside shift change report given to 0700: JEREMI Marquez (oncoming nurse) by Kb Perez (offgoing nurse). Report included the following information SBAR, Kardex, Intake/Output, MAR and Recent Results.

## 2020-05-26 NOTE — PROGRESS NOTES
Hospitalist Progress Note    NAME: Angel Nickerson   :  1980   MRN:  [id number]     Abatacept(Orencia) inhibits T-cell (T-lymphocyte) activation by binding to CD80 and CD86 on antigen presenting cells (APC)    Tofacitinib (xeljanz) inhibits Janus kinase (JANAY) enzymes, prevents cytokine- or growth factor-mediated gene expression and intracellular activity of immune cells, reduces circulating CD16/56+ natural killer cells, serum IgG, IgM, IgA, and C-reactive protein, and increases B cells. Assessment / Plan:  Splenic inflammation/infection POA multiple hypodensities, ?  abscesses  Recent sepsis  with temp 102.3, , normal WBC  LUQ abdominal Pain POA  Immunocompromised POA on Abatacept then Tofacitinib for past 4 months  Weight loss 50 lbs per patient (20 lb documented since 2019)  Lactic acidosis lactate 2.6  Right portacath in place  Staph hominus bacteremia in one [facility] drawn from port (other cultures negative)  New onset LUQ pain since before last admit, fevers  [facility] admit   to 2020 for LUQ, sepsis, splenic hypodensities  No QUINCY in chest and abdomen  CTA chest no PE or ASD  BC x 1 negative, urine culture negative  Seen By Heme, felt lesions not consistent with infarcts on review with radiology > No Anticoagulation  SARS-CoV-2 test negative  and also  as outpatient  Received 4 days of IV ceftriaxone and flagyl  She was afebrile in hospital, discharged off antibiotics with outpatient f/u  Plan for possible ANUSHKA today, I have requested cardiology consult  COVID19 x2 negative recently  LUQ Pain better on discharge for a day, then worsening  Febrile at home before re admit > 101 per her report  Afebrile here  Empirically started on vanco this admission, stopped  as unsure what we are treating  No HA, cough or SOB, sore throat, N/V, diarrhea, dysuria, new joint symptoms  Gen surgery --> no surgical intervention  Admit blood culture 1/2 sets with Staph hominus (appears to have been drawn from port)  Suspect this is contaminant (CNS)  ESR was 18, CRP > 9.5  Procalcitonin repeatedly < 0.05  Persistent LUQ tenderness  ? tender QUINCY or salivary glands under her jaw, CT negative  Differential broad desean with the immunosuppressives         ? infectious                   Hematogenous seeding of spleen                        No clear signs of endocarditis on [facility] or TTE                            Ask cardiology to evaluate toTEE                        ? Seeded from the port                         No clear evidence of any other focal infections                             UA negative                             CXR and chest CT negative                             No other clear infectious sources on CT scan abdomen                             EGD and colonoscopy negative                  ? Disseminated fungal or mycobacterial                         Lived in mid [address], histo antigen negative, culture pending                         Crypto antigen negative                         Nurse so TB a risk, quantiferon and AFB blood culture pending                  Rule out EBV with the tender LN          ? Rheumatologic with prior RA, RF < 10, ESR 35          ?  Malignancy, no clear evidence of this, d/w heme onc          ? hypercoaguable                They do not look like splenic infarcts                Negative lupus anticoagulant, pending anticardiolipin antibodies  Stopped vanco 5/22, plan to watch off antibiotics  Taper steroids  Fungal blood culture pending  AFB blood culture and quantiferon in lab, results pending  Cryptococcal antigen negative  Histoplasmosis urinary antigen negative  Repeat BC  No fevers off antibiotics  Throat culture negative so far  CT neck with the persistent neck tenderness  RF < 10, CCP pending  Aspiration of the splenic lesions risky vs empiric antibiotics and serial scans  Ultimately suspect she require a course of broad spectrum antibiotics and serial scans      Seemed to respond to IV ceftriaxone last admit  Ask cardiology to see in Am re feasibility of ANUSHKA, NPO after midnight  Ask hem-onc to follow up in AM  If remains afebrile and cultures negative, home in 1-2 days with outpatient    Hypokalemia  repleted    Chronic Constipation x 1 year  Normocytic anemia  GI are following now --> EGD and colonoscopy 05/22 unremarkable  ? [facility], Dr Danica Hardy was consulted     History of rheumatoid arthritis  Methotrexate and Bricelyn Moraes, cont holding, prior on abatacept  Continue hydroxychloroquine  On prednisone, was off for 2 months, then resumed just prior to 5/14 x 2 days        Concerned she had a RA flair initially         Start to wean, dropped from 20 to 10 mg on 5/24  SSA/SSB negative, RF < 10, check CCP  Rheumatology consult appreciated     Diabetes mellitus type 2  Holding home metformin. Sliding scale with blood sugar checks.      History of depression  History of GERD  Hypothyroidism  Hypertension  Continue home Wellbutrin, Lexapro, levothyroxine and Protonix  Propranolol     History of hyperreactive airway disease  Continue home albuterol and Symbicort.        Code Status: Full code  Surrogate Decision Maker:      DVT Prophylaxis:lovenox      Subjective: Pt seen and examined at bedside. Continue to have left upper quadrant abdominal pain. Overnight events d/w RN     Chief Complaint / Reason for Physician Visit: f/u: \"fever at home, immunocompromised, splenic infarcts\"    Review of Systems:  Symptom Y/N Comments  Symptom Y/N Comments   Fever/Chills n   Chest Pain n    Poor Appetite    Edema     Cough n   Abdominal Pain y    Sputum    Joint Pain     SOB/MENDOZA n   Pruritis/Rash     Nausea/vomit n   Tolerating PT/OT     Diarrhea n   Tolerating Diet y    Constipation y   Other       Could NOT obtain due to:      Objective:     VITALS:   Last 24hrs VS reviewed since prior progress note.  Most recent are:  Patient Vitals for the past 24 hrs:   Temp Pulse Resp BP SpO2   05/26/20 0852 -- -- -- -- 98 %   05/26/20 0821 98.1 °F (36.7 °C) 96 18 113/58 99 %   05/26/20 0756 98.1 °F (36.7 °C) 96 18 -- 99 %   05/25/20 2338 98.3 °F (36.8 °C) 98 17 116/76 100 %   05/25/20 2113 -- -- -- -- 99 %   05/25/20 1501 97.1 °F (36.2 °C) (!) 102 16 127/75 98 %     No intake or output data in the 24 hours ending 05/26/20 1041     Wt Readings from Last 12 Encounters:   05/22/20 94.3 kg (208 lb)   05/14/20 94 kg (207 lb 3.7 oz)   04/18/20 95.9 kg (211 lb 6.7 oz)   03/14/20 98.4 kg (217 lb)   03/02/20 98.4 kg (217 lb)   02/13/20 97.5 kg (215 lb)   01/31/20 97.8 kg (215 lb 8 oz)   01/23/20 54.9 kg (121 lb)   12/05/19 99.3 kg (219 lb)   09/28/19 101.7 kg (224 lb 1.6 oz)   09/24/19 100.7 kg (222 lb)   08/01/19 101.6 kg (224 lb)         PHYSICAL EXAM:  General: WD, WN. Alert, cooperative, no acute distress    EENT:  EOMI. Anicteric sclerae. MMM  Neck:  Tender up under angle of jaw bilaterally, shotty QUINCY  Resp:  CTA bilaterally, no wheezing or rales. No accessory muscle use  CV:  Regular  rhythm,  No edema  GI:  Soft, Non distended, tender in LUQ.  +Bowel sounds  Neurologic:  Alert and oriented X 3, normal speech,   Psych:   Good insight. Not anxious nor agitated  Skin:  No rashes. No jaundice    Reviewed most current lab test results and cultures  YES  Reviewed most current radiology test results   YES  Review and summation of old records today    NO  Reviewed patient's current orders and MAR    YES  PMH/SH reviewed - no change compared to H&P  ________________________________________________________________________  Care Plan discussed with:    Comments   Patient x    Family      RN x    Care Manager     Consultant                        Multidiciplinary team rounds were held today with , nursing, pharmacist and clinical coordinator. Patient's plan of care was discussed; medications were reviewed and discharge planning was addressed. ________________________________________________________________________    Time Spent (Non critical care): 35 minutes      Comments   >50% of visit spent in counseling and coordination of care x Chart review   ________________________________________________________________________  Vibha Blake MD     Procedures: see electronic medical records for all procedures/Xrays and details which were not copied into this note but were reviewed prior to creation of Plan. LABS:  I reviewed today's most current labs and imaging studies. Pertinent labs include:  No results for input(s): WBC, HGB, HCT, PLT, HGBEXT, HCTEXT, PLTEXT, HGBEXT, HCTEXT, PLTEXT in the last 72 hours.   Recent Labs     05/24/20  0630      K 4.0      CO2 27   *   BUN 7   CREA 0.92   CA 8.7       Signed: Vibha Blake MD

## 2020-05-27 ENCOUNTER — APPOINTMENT (OUTPATIENT)
Dept: NON INVASIVE DIAGNOSTICS | Age: 40
DRG: 815 | End: 2020-05-27
Attending: INTERNAL MEDICINE
Payer: COMMERCIAL

## 2020-05-27 LAB
GLUCOSE BLD STRIP.AUTO-MCNC: 114 MG/DL (ref 65–100)
GLUCOSE BLD STRIP.AUTO-MCNC: 166 MG/DL (ref 65–100)
GLUCOSE BLD STRIP.AUTO-MCNC: 86 MG/DL (ref 65–100)
GLUCOSE BLD STRIP.AUTO-MCNC: 94 MG/DL (ref 65–100)
HAV IGM SER QL: NONREACTIVE
HBV CORE IGM SER QL: NONREACTIVE
HBV SURFACE AG SER QL: <0.1 INDEX
HBV SURFACE AG SER QL: NEGATIVE
HCV AB SERPL QL IA: NONREACTIVE
HCV COMMENT,HCGAC: NORMAL
SERVICE CMNT-IMP: ABNORMAL
SERVICE CMNT-IMP: ABNORMAL
SERVICE CMNT-IMP: NORMAL
SERVICE CMNT-IMP: NORMAL
SP1: NORMAL
SP2: NORMAL
SP3: NORMAL

## 2020-05-27 PROCEDURE — 65270000029 HC RM PRIVATE

## 2020-05-27 PROCEDURE — 94640 AIRWAY INHALATION TREATMENT: CPT

## 2020-05-27 PROCEDURE — 93312 ECHO TRANSESOPHAGEAL: CPT

## 2020-05-27 PROCEDURE — 74011250637 HC RX REV CODE- 250/637: Performed by: INTERNAL MEDICINE

## 2020-05-27 PROCEDURE — 74011636637 HC RX REV CODE- 636/637: Performed by: INTERNAL MEDICINE

## 2020-05-27 PROCEDURE — 74011250636 HC RX REV CODE- 250/636: Performed by: INTERNAL MEDICINE

## 2020-05-27 PROCEDURE — 99152 MOD SED SAME PHYS/QHP 5/>YRS: CPT

## 2020-05-27 PROCEDURE — 87798 DETECT AGENT NOS DNA AMP: CPT

## 2020-05-27 PROCEDURE — 80074 ACUTE HEPATITIS PANEL: CPT

## 2020-05-27 PROCEDURE — 74011000250 HC RX REV CODE- 250: Performed by: INTERNAL MEDICINE

## 2020-05-27 PROCEDURE — 36415 COLL VENOUS BLD VENIPUNCTURE: CPT

## 2020-05-27 PROCEDURE — 87449 NOS EACH ORGANISM AG IA: CPT

## 2020-05-27 PROCEDURE — 74011000258 HC RX REV CODE- 258: Performed by: INTERNAL MEDICINE

## 2020-05-27 PROCEDURE — 94760 N-INVAS EAR/PLS OXIMETRY 1: CPT

## 2020-05-27 PROCEDURE — 99153 MOD SED SAME PHYS/QHP EA: CPT

## 2020-05-27 PROCEDURE — 82962 GLUCOSE BLOOD TEST: CPT

## 2020-05-27 RX ORDER — MIDAZOLAM HYDROCHLORIDE 5 MG/ML
.5-2 INJECTION, SOLUTION INTRAMUSCULAR; INTRAVENOUS
Status: DISCONTINUED | OUTPATIENT
Start: 2020-05-27 | End: 2020-05-27

## 2020-05-27 RX ORDER — FENTANYL CITRATE 50 UG/ML
25-50 INJECTION, SOLUTION INTRAMUSCULAR; INTRAVENOUS
Status: DISCONTINUED | OUTPATIENT
Start: 2020-05-27 | End: 2020-05-27

## 2020-05-27 RX ORDER — SORBITOL SOLUTION 70 %
60 SOLUTION, ORAL MISCELLANEOUS ONCE
Status: COMPLETED | OUTPATIENT
Start: 2020-05-27 | End: 2020-05-27

## 2020-05-27 RX ORDER — LIDOCAINE HYDROCHLORIDE 20 MG/ML
15 SOLUTION OROPHARYNGEAL ONCE
Status: COMPLETED | OUTPATIENT
Start: 2020-05-27 | End: 2020-05-27

## 2020-05-27 RX ADMIN — NYSTATIN 500000 UNITS: 100000 SUSPENSION ORAL at 17:15

## 2020-05-27 RX ADMIN — Medication 10 ML: at 05:29

## 2020-05-27 RX ADMIN — ARFORMOTEROL TARTRATE: 15 SOLUTION RESPIRATORY (INHALATION) at 07:34

## 2020-05-27 RX ADMIN — MIDAZOLAM HYDROCHLORIDE 1 MG: 5 INJECTION, SOLUTION INTRAMUSCULAR; INTRAVENOUS at 13:57

## 2020-05-27 RX ADMIN — Medication 10 ML: at 21:32

## 2020-05-27 RX ADMIN — TRAMADOL HYDROCHLORIDE 50 MG: 50 TABLET, FILM COATED ORAL at 17:15

## 2020-05-27 RX ADMIN — BUPROPION HYDROCHLORIDE 150 MG: 150 TABLET, EXTENDED RELEASE ORAL at 15:40

## 2020-05-27 RX ADMIN — Medication 10 ML: at 15:45

## 2020-05-27 RX ADMIN — PREDNISONE 10 MG: 10 TABLET ORAL at 15:40

## 2020-05-27 RX ADMIN — POTASSIUM BICARBONATE 40 MEQ: 782 TABLET, EFFERVESCENT ORAL at 15:39

## 2020-05-27 RX ADMIN — OXYBUTYNIN CHLORIDE 5 MG: 5 TABLET ORAL at 17:15

## 2020-05-27 RX ADMIN — MELATONIN 1 TABLET: at 15:40

## 2020-05-27 RX ADMIN — HYDROXYCHLOROQUINE SULFATE 200 MG: 200 TABLET ORAL at 15:39

## 2020-05-27 RX ADMIN — TRAMADOL HYDROCHLORIDE 50 MG: 50 TABLET, FILM COATED ORAL at 01:21

## 2020-05-27 RX ADMIN — PROPRANOLOL HYDROCHLORIDE 60 MG: 60 CAPSULE, EXTENDED RELEASE ORAL at 15:39

## 2020-05-27 RX ADMIN — CEFTRIAXONE 1 G: 1 INJECTION, POWDER, FOR SOLUTION INTRAMUSCULAR; INTRAVENOUS at 19:57

## 2020-05-27 RX ADMIN — ENOXAPARIN SODIUM 40 MG: 40 INJECTION SUBCUTANEOUS at 21:29

## 2020-05-27 RX ADMIN — GLYCOPYRROLATE 2 MG: 1 TABLET ORAL at 21:28

## 2020-05-27 RX ADMIN — MONTELUKAST SODIUM 10 MG: 10 TABLET, FILM COATED ORAL at 15:40

## 2020-05-27 RX ADMIN — ARFORMOTEROL TARTRATE: 15 SOLUTION RESPIRATORY (INHALATION) at 20:20

## 2020-05-27 RX ADMIN — MIDAZOLAM HYDROCHLORIDE 1 MG: 5 INJECTION, SOLUTION INTRAMUSCULAR; INTRAVENOUS at 13:44

## 2020-05-27 RX ADMIN — ESCITALOPRAM OXALATE 5 MG: 10 TABLET ORAL at 15:40

## 2020-05-27 RX ADMIN — NYSTATIN 500000 UNITS: 100000 SUSPENSION ORAL at 21:28

## 2020-05-27 RX ADMIN — TRAMADOL HYDROCHLORIDE 50 MG: 50 TABLET, FILM COATED ORAL at 10:56

## 2020-05-27 RX ADMIN — CYCLOSPORINE 1 DROP: 0.5 EMULSION OPHTHALMIC at 17:15

## 2020-05-27 RX ADMIN — SORBITOL SOLUTION (BULK) 60 ML: 70 SOLUTION at 15:41

## 2020-05-27 RX ADMIN — MIDAZOLAM HYDROCHLORIDE 1 MG: 5 INJECTION, SOLUTION INTRAMUSCULAR; INTRAVENOUS at 13:54

## 2020-05-27 RX ADMIN — FENTANYL CITRATE 25 MCG: 50 INJECTION INTRAMUSCULAR; INTRAVENOUS at 13:30

## 2020-05-27 RX ADMIN — MIDAZOLAM HYDROCHLORIDE 1 MG: 5 INJECTION, SOLUTION INTRAMUSCULAR; INTRAVENOUS at 14:00

## 2020-05-27 RX ADMIN — LIDOCAINE HYDROCHLORIDE 15 ML: 20 SOLUTION ORAL; TOPICAL at 13:25

## 2020-05-27 RX ADMIN — FENTANYL CITRATE 25 MCG: 50 INJECTION INTRAMUSCULAR; INTRAVENOUS at 13:46

## 2020-05-27 RX ADMIN — Medication 10 ML: at 15:44

## 2020-05-27 RX ADMIN — FOLIC ACID 1 MG: 1 TABLET ORAL at 15:40

## 2020-05-27 RX ADMIN — DAPTOMYCIN 600 MG: 500 INJECTION, POWDER, LYOPHILIZED, FOR SOLUTION INTRAVENOUS at 21:28

## 2020-05-27 RX ADMIN — MIDAZOLAM HYDROCHLORIDE 1 MG: 5 INJECTION, SOLUTION INTRAMUSCULAR; INTRAVENOUS at 13:33

## 2020-05-27 RX ADMIN — PANTOPRAZOLE SODIUM 40 MG: 40 TABLET, DELAYED RELEASE ORAL at 15:40

## 2020-05-27 RX ADMIN — POLYETHYLENE GLYCOL 3350 17 G: 17 POWDER, FOR SOLUTION ORAL at 15:39

## 2020-05-27 RX ADMIN — ACETAMINOPHEN 650 MG: 325 TABLET, FILM COATED ORAL at 00:13

## 2020-05-27 RX ADMIN — BENZOCAINE, BUTAMBEN, AND TETRACAINE HYDROCHLORIDE 1 SPRAY: .028; .004; .004 AEROSOL, SPRAY TOPICAL at 13:26

## 2020-05-27 RX ADMIN — DIPHENHYDRAMINE HYDROCHLORIDE 25 MG: 25 CAPSULE ORAL at 01:26

## 2020-05-27 NOTE — PROGRESS NOTES
Re: Lilly Jj    Patient does not have her own here, has been maintained on SSI. Therapy from home dcd, may be resumed on discharge.

## 2020-05-27 NOTE — PROGRESS NOTES
ADULT PROTOCOL: JET AEROSOL  REASSESSMENT    Patient  Edilson Bazzi     42 y.o.   female     5/26/2020  9:05 PM    Breath Sounds Pre Procedure: Right Breath Sounds: Clear                               Left Breath Sounds: Clear    Breath Sounds Post Procedure: Right Breath Sounds: Clear                                 Left Breath Sounds: Clear    Breathing pattern: Pre procedure Breathing Pattern: Regular          Post procedure Breathing Pattern: Regular    Heart Rate: Pre procedure Pulse: 88           Post procedure Pulse: 90    Resp Rate: Pre procedure Respirations: 16           Post procedure Respirations: 15      Oxygen: O2 Device: Room air        Changed: NO    SpO2: Pre procedure SpO2: 98 %   without oxygen              Post procedure SpO2: 100 %  without oxygen    Nebulizer Therapy: Current medications Aerosolized Medications: Brovana, Pulmicort      Changed: NO BID RESP    Smoking History: never    Problem List:   Patient Active Problem List   Diagnosis Code    Hypothyroidism (acquired) E03.9    Fibromyalgia M79.7    Hyperhidrosis R61    Environmental and seasonal allergies J30.89    Rectal bleeding K62.5    Family history of colonic polyps Z83.71    Encounter for screening colonoscopy Z12.11    Rheumatoid arthritis (HCC) M06.9    Asthma J45.909    Severe obesity (BMI 35.0-39. 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

## 2020-05-27 NOTE — PROGRESS NOTES
TRANSFER - OUT REPORT:    Verbal report given to Elidia BLOOD (name) on [facility]  being transferred to [facility](unit) for routine progression of care       Report consisted of patients Situation, Background, Assessment and   Recommendations(SBAR). Information from the following report(s) SBAR, Kardex, Procedure Summary, Intake/Output, MAR and Cardiac Rhythm NSR was reviewed with the receiving nurse. Lines:   Venous Access Device 05/19/20 Upper chest (subclavicular area, right (Active)   Central Line Being Utilized Yes 5/26/2020  3:00 PM   Criteria for Appropriate Use Limited/no vessel suitable for conventional peripheral access 5/26/2020  3:00 PM   Site Assessment Clean, dry, & intact 5/26/2020  3:00 PM   Date of Last Dressing Change 05/25/20 5/26/2020  8:25 AM   Dressing Status Clean, dry, & intact 5/26/2020  3:00 PM   Dressing Type Disk with Chlorhexadine gluconate (CHG); Transparent 5/26/2020  3:00 PM   Action Taken Other (comment) 5/26/2020  8:25 AM   Date Accessed (Medial Site) 05/25/20 5/26/2020  3:00 PM   Access Time (Medial Site) 0430 5/25/2020  4:17 AM   Access Needle Size (Site #1) 20 G 5/25/2020  4:17 AM   Access Needle Length (Medial Site) 1 inch 5/25/2020  4:17 AM   Positive Blood Return (Medial Site) Yes 5/26/2020  3:00 PM   Action Taken (Medial Site) Flushed 5/26/2020  3:00 PM   Positive Blood Return (Lateral Site) No 5/24/2020  8:19 PM   Action Taken (Lateral Site) Flushed 5/24/2020  8:19 PM   Alcohol Cap Used Yes 5/26/2020  3:00 PM       Venous Access Device port-a-cath 05/25/20 Upper chest (subclavicular area, right (Active)   Central Line Being Utilized Yes 5/27/2020 11:00 AM   Criteria for Appropriate Use Limited/no vessel suitable for conventional peripheral access 5/27/2020 11:00 AM   Site Assessment Clean, dry, & intact 5/27/2020 11:00 AM   Date of Last Dressing Change 05/27/20 5/27/2020 11:00 AM   Dressing Status Clean, dry, & intact 5/27/2020 11:00 AM   Dressing Type Transparent 5/27/2020 11:00 AM   Positive Blood Return (Lateral Site) Yes 5/27/2020 11:00 AM   Action Taken (Lateral Site) Flushed 5/27/2020 11:00 AM        Opportunity for questions and clarification was provided.       Patient transported with:   Transport

## 2020-05-27 NOTE — PROGRESS NOTES
0715: Bedside shift change report given to JEREMI Brenner (oncoming nurse) by Scott Perry (offgoing nurse). Report included the following information SBAR, Kardex, Intake/Output, MAR and Recent Results. Pt NPO at midnight for ANUSHKA, pt still hasn't seen cardiologist, pt did take benadryl and tramadol at 0100 with a sip of water for RA pain and itching at port cath site due to adhesive. AM labs and urine collected, pt given tylenol 1x for spleen pain. CHG wiped port a cath, no significant changes in pt status.

## 2020-05-27 NOTE — ROUTINE PROCESS
The following appointments have been successfully scheduled:    Date/time Thursday, June 04, 2020 09:00 AM  Patient  Linn Avendano 1980 (26PH F) #[id number] [id number]  Department [facility]-MAIN OFFICE-[facility]  Appointment type Virtual Visit Special Case 30  Provider [facility]  Appointment type notes Virtual Visit Special Case 30  for visits related to COVID

## 2020-05-27 NOTE — ACP (ADVANCE CARE PLANNING)
Advance Care Planning     Advance Care Planning Clinical Specialist  Conversation Note      Date of ACP Conversation: 5/19/2020    Conversation Conducted with: Patient with Decision Making Capacity     ACP Clinical Specialist: [address] [place] [address] Decision Maker:    Current Designated Health Care Decision Maker:   Primary Decision Maker: [facility] - Friend - [phone]    Secondary Decision Maker: Rosalie Paige ([address]) - Life Partner - [phone]    Care Preferences    Hospitalization: \"If your health worsens and it becomes clear that your chance of recovery is unlikely, what would your preference be regarding hospitalization? \"    Choice:  [x]  The patient wants hospitalization  []  The patient prefers comfort-focused treatment without hospitalization. Ventilation: \"If you were in your present state of health and suddenly became very ill and were unable to breathe on your own, what would your preference be about the use of a ventilator (breathing machine) if it were available to you? \"      If patient would desire the use of a ventilator (breathing machine), answer \"yes\", if not \"no\": NO     \"If your health worsens and it becomes clear that your chance of recovery is unlikely, what would your preference be about the use of a ventilator (breathing machine) if it were available to you? \"     If patient would desire the use of a ventilator (breathing machine), answer \"yes\", if not \"no\" NO       Resuscitation  \"CPR works best to restart the heart when there is a sudden event, like a heart attack, in someone who is otherwise healthy. Unfortunately, CPR does not typically restart the heart for people who have serious health conditions or who are very sick. \"    \"In the event your heart stopped as a result of an underlying serious health condition, would you want attempts to be made to restart your heart (answer \"yes\" for attempt to resuscitate) or would you prefer a natural death (answer \"no\" for do not attempt to resuscitate)? \"  No     NOTE: If the patient has a valid advance directive AND now provides care preference(s) that are inconsistent with that prior directive, advise the patient to consider either: creating a new advance directive that complies with state-specific requirements; or, if that is not possible, orally revoking that prior directive in accordance with state-specific requirements, which must be documented in the EHR. [x] Yes  [] No   Educated Patient / Daniel Public regarding differences between Advance Directives and portable DNR orders.     Length of ACP Conversation in minutes: 40 minutes     Conversation Outcomes:  [x] ACP discussion completed  [] Existing advance directive reviewed with patient; no changes to patient's previously recorded wishes   [x] New Advance Directive completed   [] Portable Do Not Rescitate prepared for Provider review and signature  [] POLST/POST/MOLST/MOST prepared for Provider review and signature      Follow-up plan:    [x] Schedule follow-up conversation to continue planning  [x] Referred individual to Provider for additional questions/concerns   [x] Advised patient/agent/surrogate to review completed ACP document and update if needed with changes in condition, patient preferences or care setting   [x] This note routed to one or more involved healthcare providers      FROYLAN Nguyen, [address] [facility] Manager   [phone]

## 2020-05-27 NOTE — PROGRESS NOTES
Patient arrived to Non-Invasive Cardiology Lab for Inpatient ANUSHKA Procedure. Staff introduced to patient. Patient identifiers verified with Name and Date of Birth. Procedure verified with patient. Consent forms reviewed and signed by patient or authorized representative and verified. Allergies verified. Patient informed of procedure and plan of care. Questions answered with review. Patient on cardiac monitor, non-invasive blood pressure, SPO2 monitor. On room air. Patient is A&Ox3. Patient reports no complaints. Patient on stretcher, in low position, with side rails up. Patient instructed to call for assistance as needed.

## 2020-05-27 NOTE — PROGRESS NOTES
Bedside and Verbal shift change report given to Celi HERNANDEZ (oncoming nurse) by Harper Campo (offgoing nurse). Report included the following information SBAR, Kardex, Procedure Summary, Intake/Output, MAR and Recent Results.

## 2020-05-27 NOTE — PROGRESS NOTES
Initial Nutrition Assessment:    INTERVENTIONS/RECOMMENDATIONS:   · Consistent carb diet     ASSESSMENT:   Patient medically noted for abdominal pain, constipation, and bacteremia. PMH DM. Chart reviewed for length of stay. Patient has been NPO all day for ANUSHKA. No recent documented PO. Gradual weight loss noted per chart review but no significant weight changes. Resume CCD s/p procedure. Encourage intake of meals. Diet Order: NPO  % Eaten:    Patient Vitals for the past 72 hrs:   % Diet Eaten   05/27/20 1057 0 %       Pertinent Medications: [x]Reviewed []Other: Wellbutrin, Vitamin D, Vitamin V41, Lexapro, Folic Acid, Humalog, Protonix, Miralax, Effer-K, Prednisone, Sorbitol   Pertinent Labs: [x]Reviewed []Other:   Food Allergies: [x]None []Yes:    Last BM: 5/20  []Active     []Hyperactive  []Hypoactive       [] Absent BS  Skin:    [x] Intact   [] Incision  [] Breakdown: [] Edema []Other:    Anthropometrics:   Height: 5' 6\" (167.6 cm) Weight: 94.3 kg (207 lb 14.3 oz)   IBW (%IBW):   ( ) UBW (%UBW):   (  %)   Last Weight Metrics:  Weight Loss Metrics 5/27/2020 5/14/2020 4/18/2020 3/14/2020 3/2/2020 2/13/2020 1/31/2020   Today's Wt 207 lb 14.3 oz 207 lb 3.7 oz 211 lb 6.7 oz 217 lb 217 lb 215 lb 215 lb 8 oz   BMI 33.55 kg/m2 32.95 kg/m2 33.61 kg/m2 35.02 kg/m2 35.02 kg/m2 34.7 kg/m2 34.78 kg/m2       BMI: Body mass index is 33.55 kg/m². This BMI is indicative of:   []Underweight    []Normal    []Overweight    [x] Obesity   [] Extreme Obesity (BMI>40)     Estimated Nutrition Needs (Based on):   8495 Kcals/day(BMR (1642) x 1. 3AF) , 75 g(-94g (0.8-1.0 g/kg bw)) Protein  Carbohydrate:  At Least 130 g/day  Fluids: 1800 mL/day (1ml/kcal)    Pt expected to meet estimated nutrient needs: [x]Yes []No    NUTRITION DIAGNOSES:   Problem:  No nutritional diagnosis at this time      Etiology: related to       Signs/Symptoms: as evidenced by        NUTRITION INTERVENTIONS:  Meals/Snacks: General/healthful diet GOAL:   PO intake >70% of meals next 5-7 days    LEARNING NEEDS (Diet, Food/Nutrient-Drug Interaction):    [x] None Identified   [] Identified and Education Provided/Documented   [] Identified and Pt declined/was not appropriate     Cultural, Methodist, OR Ethnic Dietary Needs:    [x] None Identified   [] Identified and Addressed     [x] Interdisciplinary Care Plan Reviewed/Documented    [x] Discharge Planning:   CCD    MONITORING /EVALUATION:   Food/Nutrient Intake Outcomes:  Total energy intake  Physical Signs/Symptoms Outcomes: Weight/weight change, Glucose profile    NUTRITION RISK:    [] Patient At Nutritional Risk              [x] Patient Not at Nutritional Risk    PT SEEN FOR:    []  MD Consult: []Calorie Count      []Diabetic Diet Education        []Diet Education     []Electrolyte Management     []General Nutrition Management and Supplements     []Management of Tube Feeding     []TPN Recommendations    []  RN Referral:  []MST score >=2     []Enteral/Parenteral Nutrition PTA     []Pregnant: Gestational DM or Multigestation     []Pressure Ulcer/Wound Care needs        []  Low BMI  [x]  HIMANSHU [id number]  Pager [phone]    Weekend Pager [phone]

## 2020-05-27 NOTE — PROGRESS NOTES
Bedside shift change report given to [address] [place] [address] (oncoming nurse) by Eryn Shah (offgoing nurse). Report included the following information Kardex, OR Summary, Intake/Output, MAR and Recent Results.

## 2020-05-27 NOTE — PROGRESS NOTES
SHIN:   1) Home with IV ABX   2) Home with f.u appts   3) Home with new AMD     9:48 AM- CM met with pt regarding AMD. Pt completed AMD- wished to remain a full code in the current state of health that she is in but if her heart stops due to a serious health condition and there is no quality of life pt wishes to not have CPR. See ACP note for more details. Pt did inquire about additional resources for her Mom whom she cares for. Pt stated with her health getting worse and her Mom's getting worse she will likely not be able to care for her in the future as pt's dementia gets worse. CM provided pt with [facility] information as well as [facility] contact information. Pt will need IV ABX. CM sent off to [facility] regarding pricing. CM also checking to see if they can provide pt with the nursing, if they cannot pt will need [address] as she has [employer].     Lane Warren, MSW, [address] [facility]   [phone]

## 2020-05-27 NOTE — PROGRESS NOTES
ADULT PROTOCOL: JET AEROSOL  REASSESSMENT    Patient  Debra Chang     42 y.o.   female     5/27/2020  6:58 PM    Breath Sounds Pre Procedure: Right Breath Sounds: Clear                               Left Breath Sounds: Clear    Breath Sounds Post Procedure: Right Breath Sounds: Clear                                 Left Breath Sounds: Clear    Breathing pattern: Pre procedure Breathing Pattern: Regular          Post procedure Breathing Pattern: Regular    Heart Rate: Pre procedure Pulse: 74           Post procedure Pulse: 74    Resp Rate: Pre procedure Respirations: 20           Post procedure Respirations: 20    Cough: Pre procedure Cough: Non-productive               Post procedure Cough: Non-productive    Oxygen: O2 Device: Room air        Changed: no    SpO2: Pre procedure SpO2: 96 %                 Post procedure SpO2: 96 %      Nebulizer Therapy: Current medications Aerosolized Medications: Brovana, Pulmicort      Changed:no    Problem List:   Patient Active Problem List   Diagnosis Code    Hypothyroidism (acquired) E03.9    Fibromyalgia M79.7    Hyperhidrosis R61    Environmental and seasonal allergies J30.89    Rectal bleeding K62.5    Family history of colonic polyps Z83.71    Encounter for screening colonoscopy Z12.11    Rheumatoid arthritis (HCC) M06.9    Asthma J45.909    Severe obesity (BMI 35.0-39. 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

## 2020-05-27 NOTE — PROGRESS NOTES
SHIN:  1. Home with IV ABX  2. OP f/u appts - PCP, ID, GI    Cm received pricing from [facility] for IV infusion. Pt's copay will be $78.54 - CM informed pt of this of which she stated she cannot afford. CM informed Dr. Narda Alcaraz and Dr. Kylee Albright. CM will follow-up on this tomorrow morning.      Karla Lucas MSW  Care Manager  [phone]

## 2020-05-27 NOTE — PROGRESS NOTES
Hospitalist Progress Note    NAME: Jayla Hoyt   :  1980   MRN:  [id number]     Abatacept(Orencia) inhibits T-cell (T-lymphocyte) activation by binding to CD80 and CD86 on antigen presenting cells (APC)    Tofacitinib (xeljanz) inhibits Janus kinase (JANAY) enzymes, prevents cytokine- or growth factor-mediated gene expression and intracellular activity of immune cells, reduces circulating CD16/56+ natural killer cells, serum IgG, IgM, IgA, and C-reactive protein, and increases B cells. Assessment / Plan:  Splenic inflammation/infection POA multiple hypodensities, ?  abscesses  Recent sepsis  with temp 102.3, , normal WBC  LUQ abdominal Pain POA  Immunocompromised POA on Abatacept then Tofacitinib for past 4 months  Weight loss 50 lbs per patient (20 lb documented since 2019)  Lactic acidosis lactate 2.6  Right portacath in place  Staph hominus bacteremia in one BC drawn from port (other cultures negative)  New onset LUQ pain since before last admit, fevers  [facility] admit   to 2020 for LUQ, sepsis, splenic hypodensities  No QUINCY in chest and abdomen  CTA chest no PE or ASD  BC x 1 negative, urine culture negative  F/u repeat cultures  Seen By Heme, felt lesions not consistent with infarcts on review with radiology > No Anticoagulation  SARS-CoV-2 test negative  and also  as outpatient  She was afebrile in hospital, discharged off antibiotics with outpatient f/u  Plan for possible ANUSHKA today  Febrile at home before re admit > 101 per her report  Afebrile here  Did had fever of 102.2 last admission  Gen surgery --> no surgical intervention  Admit blood culture 1/2 sets with Staph hominis  Appreciate ID (Dr Fermin Wilton input), recommended 6 weeks of IV Daptomycin and IV Rocephin in settings of bacteremia with port / immunocompromised and recurrent UTIs   Taper steroids  Fungal blood culture pending  AFB blood culture and quantiferon in lab, results pending  Cryptococcal antigen negative  Histoplasmosis urinary antigen negative  followup repeat blood cultures  No fevers off antibiotics  Throat culture negative so far  CT neck with the persistent neck tenderness  RF < 10, CCP pending  Aspiration of the splenic lesions can risky so plan discussed with ID, they will follow as an OP and see how pt will do with abx    Hypokalemia  repleted    Chronic Constipation x 1 year  Normocytic anemia  GI are following now --> EGD and colonoscopy 05/22 unremarkable  ? Renetta Wallace, Dr Angele Aschoff was consulted     History of rheumatoid arthritis  Methotrexate and Hedda Candido, cont holding, prior on abatacept  Continue hydroxychloroquine  On prednisone, was off for 2 months, then resumed just prior to 5/14 x 2 days        Concerned she had a RA flair initially         Start to wean, dropped from 20 to 10 mg on 5/24  SSA/SSB negative, RF < 10, check CCP  Rheumatology consult appreciated     Diabetes mellitus type 2  Holding home metformin. Sliding scale with blood sugar checks.      History of depression  History of GERD  Hypothyroidism  Hypertension  Continue home Wellbutrin, Lexapro, levothyroxine and Protonix  Propranolol     History of hyperreactive airway disease  Continue home albuterol and Symbicort.        Code Status: Full code  Surrogate Decision Maker:      DVT Prophylaxis:lovenox      Subjective: Pt seen and examined at bedside. Continue to have left upper quadrant abdominal pain but better then yesterday.  Overnight events d/w RN     Chief Complaint / Reason for Physician Visit: f/u: \"fever at home, immunocompromised, splenic infarcts\"    Review of Systems:  Symptom Y/N Comments  Symptom Y/N Comments   Fever/Chills n   Chest Pain n    Poor Appetite    Edema     Cough n   Abdominal Pain y    Sputum    Joint Pain     SOB/MENDOZA n   Pruritis/Rash     Nausea/vomit n   Tolerating PT/OT     Diarrhea n   Tolerating Diet y    Constipation y   Other       Could NOT obtain due to:      Objective: VITALS:   Last 24hrs VS reviewed since prior progress note. Most recent are:  Patient Vitals for the past 24 hrs:   Temp Pulse Resp BP SpO2   05/27/20 0802 98.1 °F (36.7 °C) 90 16 108/65 99 %   05/27/20 0735 -- -- -- -- 96 %   05/26/20 2253 98.9 °F (37.2 °C) 97 16 114/62 100 %   05/26/20 2048 -- -- -- -- 98 %   05/26/20 1510 98.1 °F (36.7 °C) 88 18 102/53 97 %     No intake or output data in the 24 hours ending 05/27/20 1148     Wt Readings from Last 12 Encounters:   05/22/20 94.3 kg (208 lb)   05/14/20 94 kg (207 lb 3.7 oz)   04/18/20 95.9 kg (211 lb 6.7 oz)   03/14/20 98.4 kg (217 lb)   03/02/20 98.4 kg (217 lb)   02/13/20 97.5 kg (215 lb)   01/31/20 97.8 kg (215 lb 8 oz)   01/23/20 54.9 kg (121 lb)   12/05/19 99.3 kg (219 lb)   09/28/19 101.7 kg (224 lb 1.6 oz)   09/24/19 100.7 kg (222 lb)   08/01/19 101.6 kg (224 lb)         PHYSICAL EXAM:  General: WD, WN. Alert, cooperative, no acute distress    EENT:  EOMI. Anicteric sclerae. MMM  Neck:  Tender up under angle of jaw bilaterally, shotty QUINCY  Resp:  CTA bilaterally, no wheezing or rales. No accessory muscle use  CV:  Regular  rhythm,  No edema  GI:  Soft, Non distended, tender in LUQ.  +Bowel sounds  Neurologic:  Alert and oriented X 3, normal speech,   Psych:   Good insight. Not anxious nor agitated  Skin:  No rashes. No jaundice    Reviewed most current lab test results and cultures  YES  Reviewed most current radiology test results   YES  Review and summation of old records today    NO  Reviewed patient's current orders and MAR    YES  PMH/SH reviewed - no change compared to H&P  ________________________________________________________________________  Care Plan discussed with:    Comments   Patient x    Family      RN x    Care Manager     Consultant                        Multidiciplinary team rounds were held today with , nursing, pharmacist and clinical coordinator.   Patient's plan of care was discussed; medications were reviewed and discharge planning was addressed. ________________________________________________________________________    Time Spent (Non critical care): 25 minutes      Comments   >50% of visit spent in counseling and coordination of care     ________________________________________________________________________  Frances Billings MD     Procedures: see electronic medical records for all procedures/Xrays and details which were not copied into this note but were reviewed prior to creation of Plan. LABS:  I reviewed today's most current labs and imaging studies.   Pertinent labs include:  Recent Labs     05/26/20  1131   WBC 6.7   HGB 12.0   HCT 37.2        Recent Labs     05/26/20  1131      K 4.1      CO2 30   GLU 95   BUN 12   CREA 0.82   CA 9.1   ALB 3.1*   TBILI 0.4   ALT 53       Signed: Frances Billings MD

## 2020-05-27 NOTE — PROGRESS NOTES
Infectious Disease Progress Note    IMPRESSION:     - H/o fevers, splenic lesions on CT scan , s/p initial improvement on Ceftriaxone / Flagyl on last admission    CT scan - splenic hypo densities, no splenomegaly    Pt s/p CT scans on , ,    - Presence of portocath     BC - - Staph hominis 1/2 ? Contaminant    BC- 520, - NG  - H/o recurrent UTI with pan sensitive E.coli     UC + on , , 20 & 19  - Elevated EBV IgG, for EBV by PCR  - No lymphadenopathy - cervical, axillary , inguinal on clinical exam  - RA on Xeljanz, Orencia , methotrexate , plaquenil causing immunosuppression  - Diabetes Mellitus A1c 5.7  - Vancomycin allergy         PLAN:        - BC x 2 now  & with fevers  - Start on Daptomycin IV, Ceftriaxone . Isolated splenic lesions are not common  . Malignancy , infarction unlikely per Hematology / Oncology . Will give trial of antibiotics . Pt does have a portocath , is immune suppressed. If infectious etiology is causing splenic lesions , coverage for Staph , MRSA is essential. Pt has had recurrent UTIs with pan sensitive E.coli , also felt clinically better on Ceftriaxone , even if it was for 3 days . Will add Ceftriaxone IV .   - Recommend EBV PCR since IgG was very high , also hepatitis & HIV testing   - Monitor Cr closely ,pt has CT abdomen x 3 , also CT neck , concern for nephrotoixicity. Recommend holding metformin , trying alternative hypoglycemic agent         Subjective:     Pt seen . Complains of pain in left side of abdomen . No fevers during hospitalization . Review of Systems:  A comprehensive review of systems was negative. 10 point ROS obtained . All other systems negative . Objective:     Blood pressure 102/53, pulse 88, temperature 98.1 °F (36.7 °C), resp. rate 18, height 5' 6\" (1.676 m), weight 208 lb (94.3 kg), SpO2 98 %, not currently breastfeeding.   Temp (24hrs), Av.2 °F (36.8 °C), Min:98.1 °F (36.7 °C), Max:98.3 °F (36.8 °C)      Patient Vitals for the past 24 hrs:   Temp Pulse Resp BP SpO2   05/26/20 2048 -- -- -- -- 98 %   05/26/20 1510 98.1 °F (36.7 °C) 88 18 102/53 97 %   05/26/20 0852 -- -- -- -- 98 %   05/26/20 0821 98.1 °F (36.7 °C) 96 18 113/58 99 %   05/26/20 0756 98.1 °F (36.7 °C) 96 18 -- 99 %   05/25/20 2338 98.3 °F (36.8 °C) 98 17 116/76 100 %         Lines:  Peripheral IV:       Physical Exam:   General:  Awake  cooperative,   Eyes:  Sclera anicteric. Pupils equally round and reactive to light. Mouth/Throat: Mucous membranes normal, oral pharynx clear, no erythema   Neck: Supple   Lungs:   Clear to auscultation bilaterally, good effort   CV:  Regular rate and rhythm,no murmur, click, rub or gallop   Abdomen:   Soft, Mildly -tender. LUQ bowel sounds normal. non-distended   Extremities: No  edema   Skin: Skin color, texture, turgor normal. no acute rash or lesions   Lymph nodes: Cervical and supraclavicular normal   Musculoskeletal: No swelling or deformity   Lines/Devices:  Intact, no erythema, drainage or tenderness   Psych: Alert and oriented, normal mood affect       Data Review:   CBC:   Recent Labs     05/26/20  1131   WBC 6.7   RBC 3.93   HGB 12.0   HCT 37.2      GRANS 59   LYMPH 28   EOS 1     CMP:   Recent Labs     05/26/20  1131 05/24/20  0630   GLU 95 141*    138   K 4.1 4.0    104   CO2 30 27   BUN 12 7   CREA 0.82 0.92   CA 9.1 8.7   AGAP 3* 7   BUCR 15 8*   AP 78  --    TP 6.9  --    ALB 3.1*  --    GLOB 3.8  --    AGRAT 0.8*  --        Studies:      Lab Results   Component Value Date/Time    Culture result: NO GROWTH 2 DAYS 05/24/2020 02:39 PM    Culture result: NORMAL RESPIRATORY VERNON/NO BETA STREP ISOLATED 05/24/2020 06:30 AM    Culture result: NO GROWTH 3 DAYS 05/23/2020 02:48 AM    Culture result: NO GROWTH 5 DAYS 05/20/2020 04:22 PM    Culture result: (A) 05/19/2020 11:55 AM     STAPHYLOCOCCUS HOMINIS (OXACILLIN RESISTANT) GROWING IN 1 OF 2 BOTTLES DRAWN (SITE = RT CHEST)    Culture result: REMAINING BOTTLE(S) HAS/HAVE NO GROWTH IN 5 DAYS 05/19/2020 11:55 AM        XR Results (most recent):  Results from [place] encounter on 05/19/20   XR CHEST PORT    Narrative EXAM: Portable CXR. 1243 hours. COMPARISON: 5/14/2020. INDICATION: sob    FINDINGS:  The lungs appear clear. Heart is normal in size. There is no pulmonary edema. There is no evident pneumothorax, adenopathy or pleural effusion. Port catheter  is present. No change. Impression IMPRESSION: No Acute Disease. Patient Active Problem List   Diagnosis Code    Hypothyroidism (acquired) E03.9    Fibromyalgia M79.7    Hyperhidrosis R61    Environmental and seasonal allergies J30.89    Rectal bleeding K62.5    Family history of colonic polyps Z83.71    Encounter for screening colonoscopy Z12.11    Rheumatoid arthritis ([facility] [address] M06.9    Asthma J45.909    Severe obesity (BMI 35.0-39. 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 Abdominal pain, LUQ (left upper quadrant) R10.12 789.02    3. SOB (shortness of breath) R06.02 786.05        I have discussed the diagnosis with the patient and the intended plan as seen in the above orders. I have discussed medication side effects and warnings with the patient as well.     Reviewed test results at length with patient    Anti-infectives:      Start Daptomycin/ Ceftriaxone     Nimesh Hamilton MD [address]

## 2020-05-27 NOTE — PROGRESS NOTES
Infectious Disease Progress Note    IMPRESSION:     - H/o fevers, splenic lesions on CT scan , s/p initial improvement on Ceftriaxone / Flagyl on last admission    CT scan - splenic hypo densities, no splenomegaly    Pt s/p CT scans on 5/14, 5/17, 5/19   - Presence of portocath     BC - 5/19- Staph hominis 1/2 ? Contaminant    BC- 520, 5/24- NG  - H/o recurrent UTI with pan sensitive E.coli     UC + on 4/18, 2/13, 1/23/20 & 8/01/19  - Elevated EBV IgG, for EBV by PCR  - No lymphadenopathy - cervical, axillary , inguinal on clinical exam  - RA on Xeljanz, Orencia , methotrexate , plaquenil causing immunosuppression  - Diabetes Mellitus A1c 5.7  - Vancomycin allergy   - Negative Hepatitis profile       PLAN:        - BC x 2 now  & with fevers  -  Daptomycin IV, Ceftriaxone . Isolated splenic lesions are not common  . Malignancy , infarction unlikely per Hematology / Oncology . Will give trial of antibiotics . Pt does have a portocath , is immune suppressed. If infectious etiology is causing splenic lesions , coverage for Staph , MRSA is essential. Pt has had recurrent UTIs with pan sensitive E.coli , also felt clinically better on Ceftriaxone , even if it was for 3 days . Will add Ceftriaxone IV .   -  HIV testing   - ANUSHKA today    - Monitor Cr closely ,pt has CT abdomen x 3 , also CT neck , concern for nephrotoixicity. Recommend holding metformin , trying alternative hypoglycemic agent  Orders for DC  - Daptomycin 600mg IV daily & Ceftriaxone 1 G IV daily end date 7/7/20- pull picc at end of therapy  - Weekly CBC, CMP, CK & ESR/ CRP every other week fax reports to [phone], call with abnormal results at [phone]  - No statin while on Daptomycin  - Out pt follow up with ID , call for Virtual  appointment in 3 weeks  - Encourage probiotic, yogurt            Subjective:     Pt seen . Complains of pain in left side of abdomen . No fevers during hospitalization .     Review of Systems:  A comprehensive review of systems was negative. 10 point ROS obtained . All other systems negative . Objective:     Blood pressure 108/65, pulse 90, temperature 98.1 °F (36.7 °C), resp. rate 16, height 5' 6\" (1.676 m), weight 208 lb (94.3 kg), SpO2 99 %, not currently breastfeeding. Temp (24hrs), Av.4 °F (36.9 °C), Min:98.1 °F (36.7 °C), Max:98.9 °F (37.2 °C)      Patient Vitals for the past 24 hrs:   Temp Pulse Resp BP SpO2   20 0802 98.1 °F (36.7 °C) 90 16 108/65 99 %   20 0735 -- -- -- -- 96 %   20 2253 98.9 °F (37.2 °C) 97 16 114/62 100 %   20 2048 -- -- -- -- 98 %   20 1510 98.1 °F (36.7 °C) 88 18 102/53 97 %         Lines:  Peripheral IV:       Physical Exam:   General:  Awake  cooperative,   Eyes:  Sclera anicteric. Pupils equally round and reactive to light. Mouth/Throat: Mucous membranes normal, oral pharynx clear, no erythema   Neck: Supple   Lungs:   Clear to auscultation bilaterally, good effort   CV:  Regular rate and rhythm,no murmur, click, rub or gallop   Abdomen:   Soft, Mildly -tender. LUQ bowel sounds normal. non-distended   Extremities: No  edema   Skin: Skin color, texture, turgor normal. no acute rash or lesions   Lymph nodes: Cervical and supraclavicular normal   Musculoskeletal: No swelling or deformity   Lines/Devices:  Intact, no erythema, drainage or tenderness   Psych: Alert and oriented, normal mood affect       Data Review:   CBC:   Recent Labs     20  1131   WBC 6.7   RBC 3.93   HGB 12.0   HCT 37.2      GRANS 59   LYMPH 28   EOS 1     CMP:   Recent Labs     20  1131   GLU 95      K 4.1      CO2 30   BUN 12   CREA 0.82   CA 9.1   AGAP 3*   BUCR 15   AP 78   TP 6.9   ALB 3.1*   GLOB 3.8   AGRAT 0.8*       Studies:      Lab Results   Component Value Date/Time    Culture result: NO GROWTH AFTER 12 HOURS 2020 07:15 PM    Culture result: NO GROWTH 3 DAYS 2020 02:39 PM    Culture result: NORMAL RESPIRATORY VERNON/NO BETA STREP ISOLATED 2020 06:30 AM    Culture result: NO GROWTH 3 DAYS 05/23/2020 02:48 AM    Culture result: NO GROWTH 5 DAYS 05/20/2020 04:22 PM        XR Results (most recent):  Results from [place] encounter on 05/19/20   XR CHEST PORT    Narrative EXAM: Portable CXR. 1243 hours. COMPARISON: 5/14/2020. INDICATION: sob    FINDINGS:  The lungs appear clear. Heart is normal in size. There is no pulmonary edema. There is no evident pneumothorax, adenopathy or pleural effusion. Port catheter  is present. No change. Impression IMPRESSION: No Acute Disease. Patient Active Problem List   Diagnosis Code    Hypothyroidism (acquired) E03.9    Fibromyalgia M79.7    Hyperhidrosis R61    Environmental and seasonal allergies J30.89    Rectal bleeding K62.5    Family history of colonic polyps Z83.71    Encounter for screening colonoscopy Z12.11    Rheumatoid arthritis ([facility] M06.9    Asthma J45.909    Severe obesity (BMI 35.0-39. 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 Abdominal pain, LUQ (left upper quadrant) R10.12 789.02    3. SOB (shortness of breath) R06.02 786.05        I have discussed the diagnosis with the patient and the intended plan as seen in the above orders. I have discussed medication side effects and warnings with the patient as well.     Reviewed test results at length with patient    Anti-infectives:      Start Daptomycin/ Ceftriaxone     Mali Leong MD [address]

## 2020-05-28 VITALS
SYSTOLIC BLOOD PRESSURE: 97 MMHG | HEIGHT: 66 IN | BODY MASS INDEX: 33.41 KG/M2 | OXYGEN SATURATION: 100 % | WEIGHT: 207.89 LBS | DIASTOLIC BLOOD PRESSURE: 55 MMHG | HEART RATE: 89 BPM | TEMPERATURE: 98.8 F | RESPIRATION RATE: 16 BRPM

## 2020-05-28 LAB
ALBUMIN SERPL-MCNC: 3.2 G/DL (ref 3.5–5)
ALBUMIN/GLOB SERPL: 0.8 {RATIO} (ref 1.1–2.2)
ALP SERPL-CCNC: 82 U/L (ref 45–117)
ALT SERPL-CCNC: 49 U/L (ref 12–78)
ANION GAP SERPL CALC-SCNC: 3 MMOL/L (ref 5–15)
AST SERPL-CCNC: 20 U/L (ref 15–37)
BILIRUB SERPL-MCNC: 0.5 MG/DL (ref 0.2–1)
BUN SERPL-MCNC: 8 MG/DL (ref 6–20)
BUN/CREAT SERPL: 10 (ref 12–20)
CALCIUM SERPL-MCNC: 9.2 MG/DL (ref 8.5–10.1)
CHLORIDE SERPL-SCNC: 103 MMOL/L (ref 97–108)
CK SERPL-CCNC: 22 U/L (ref 26–192)
CO2 SERPL-SCNC: 30 MMOL/L (ref 21–32)
COMMENT, HOLDF: NORMAL
CREAT SERPL-MCNC: 0.81 MG/DL (ref 0.55–1.02)
CRP SERPL HS-MCNC: >9.5 MG/L
ERYTHROCYTE [SEDIMENTATION RATE] IN BLOOD: 15 MM/HR (ref 0–20)
GLOBULIN SER CALC-MCNC: 4 G/DL (ref 2–4)
GLUCOSE BLD STRIP.AUTO-MCNC: 152 MG/DL (ref 65–100)
GLUCOSE BLD STRIP.AUTO-MCNC: 163 MG/DL (ref 65–100)
GLUCOSE SERPL-MCNC: 137 MG/DL (ref 65–100)
POTASSIUM SERPL-SCNC: 4.3 MMOL/L (ref 3.5–5.1)
PROT SERPL-MCNC: 7.2 G/DL (ref 6.4–8.2)
SAMPLES BEING HELD,HOLD: NORMAL
SERVICE CMNT-IMP: ABNORMAL
SERVICE CMNT-IMP: ABNORMAL
SODIUM SERPL-SCNC: 136 MMOL/L (ref 136–145)

## 2020-05-28 PROCEDURE — 74011250637 HC RX REV CODE- 250/637: Performed by: INTERNAL MEDICINE

## 2020-05-28 PROCEDURE — 94760 N-INVAS EAR/PLS OXIMETRY 1: CPT

## 2020-05-28 PROCEDURE — 74011636637 HC RX REV CODE- 636/637: Performed by: INTERNAL MEDICINE

## 2020-05-28 PROCEDURE — 94640 AIRWAY INHALATION TREATMENT: CPT

## 2020-05-28 PROCEDURE — 80053 COMPREHEN METABOLIC PANEL: CPT

## 2020-05-28 PROCEDURE — 82962 GLUCOSE BLOOD TEST: CPT

## 2020-05-28 PROCEDURE — 82550 ASSAY OF CK (CPK): CPT

## 2020-05-28 PROCEDURE — 74011000250 HC RX REV CODE- 250: Performed by: INTERNAL MEDICINE

## 2020-05-28 PROCEDURE — 86141 C-REACTIVE PROTEIN HS: CPT

## 2020-05-28 PROCEDURE — 85652 RBC SED RATE AUTOMATED: CPT

## 2020-05-28 PROCEDURE — 36415 COLL VENOUS BLD VENIPUNCTURE: CPT

## 2020-05-28 RX ORDER — FLUCONAZOLE 100 MG/1
200 TABLET ORAL
Status: COMPLETED | OUTPATIENT
Start: 2020-05-28 | End: 2020-05-28

## 2020-05-28 RX ORDER — PEN NEEDLE, DIABETIC 29 G X1/2"
NEEDLE, DISPOSABLE MISCELLANEOUS
Qty: 100 PEN NEEDLE | Refills: 0 | Status: SHIPPED | OUTPATIENT
Start: 2020-05-28 | End: 2021-07-21

## 2020-05-28 RX ORDER — INSULIN LISPRO 100 [IU]/ML
INJECTION, SOLUTION INTRAVENOUS; SUBCUTANEOUS
Qty: 1 PACKAGE | Refills: 0 | Status: SHIPPED | OUTPATIENT
Start: 2020-05-28 | End: 2021-07-21

## 2020-05-28 RX ORDER — PREDNISONE 10 MG/1
10 TABLET ORAL DAILY
Qty: 30 TAB | Refills: 0 | Status: SHIPPED
Start: 2020-05-28 | End: 2022-03-24 | Stop reason: ALTCHOICE

## 2020-05-28 RX ADMIN — CYCLOSPORINE 1 DROP: 0.5 EMULSION OPHTHALMIC at 09:09

## 2020-05-28 RX ADMIN — POLYETHYLENE GLYCOL 3350 17 G: 17 POWDER, FOR SOLUTION ORAL at 08:59

## 2020-05-28 RX ADMIN — OXYBUTYNIN CHLORIDE 5 MG: 5 TABLET ORAL at 08:58

## 2020-05-28 RX ADMIN — MELATONIN 1 TABLET: at 08:58

## 2020-05-28 RX ADMIN — ESCITALOPRAM OXALATE 5 MG: 10 TABLET ORAL at 08:58

## 2020-05-28 RX ADMIN — CYANOCOBALAMIN TAB 500 MCG 250 MCG: 500 TAB at 08:59

## 2020-05-28 RX ADMIN — TRAMADOL HYDROCHLORIDE 50 MG: 50 TABLET, FILM COATED ORAL at 09:01

## 2020-05-28 RX ADMIN — BUPROPION HYDROCHLORIDE 150 MG: 150 TABLET, EXTENDED RELEASE ORAL at 08:59

## 2020-05-28 RX ADMIN — NYSTATIN 500000 UNITS: 100000 SUSPENSION ORAL at 08:57

## 2020-05-28 RX ADMIN — FLUCONAZOLE 200 MG: 100 TABLET ORAL at 12:37

## 2020-05-28 RX ADMIN — POTASSIUM BICARBONATE 40 MEQ: 782 TABLET, EFFERVESCENT ORAL at 08:58

## 2020-05-28 RX ADMIN — GLYCOPYRROLATE 2 MG: 1 TABLET ORAL at 08:58

## 2020-05-28 RX ADMIN — ARFORMOTEROL TARTRATE: 15 SOLUTION RESPIRATORY (INHALATION) at 07:21

## 2020-05-28 RX ADMIN — INSULIN LISPRO 2 UNITS: 100 INJECTION, SOLUTION INTRAVENOUS; SUBCUTANEOUS at 12:00

## 2020-05-28 RX ADMIN — INSULIN LISPRO 2 UNITS: 100 INJECTION, SOLUTION INTRAVENOUS; SUBCUTANEOUS at 08:59

## 2020-05-28 RX ADMIN — HYDROXYCHLOROQUINE SULFATE 200 MG: 200 TABLET ORAL at 08:59

## 2020-05-28 RX ADMIN — FOLIC ACID 1 MG: 1 TABLET ORAL at 08:58

## 2020-05-28 RX ADMIN — MONTELUKAST SODIUM 10 MG: 10 TABLET, FILM COATED ORAL at 08:57

## 2020-05-28 RX ADMIN — LEVOTHYROXINE SODIUM 75 MCG: 0.07 TABLET ORAL at 08:58

## 2020-05-28 RX ADMIN — PANTOPRAZOLE SODIUM 40 MG: 40 TABLET, DELAYED RELEASE ORAL at 08:59

## 2020-05-28 RX ADMIN — PREDNISONE 10 MG: 10 TABLET ORAL at 08:58

## 2020-05-28 NOTE — DISCHARGE INSTRUCTIONS
HOSPITALIST DISCHARGE INSTRUCTIONS    NAME: Angel Graves   :  1980   MRN:  [id number]     Date/Time:  2020 12:15 PM    ADMIT DATE: 2020     DISCHARGE DATE: 2020     DISCHARGE DIAGNOSIS:  Splenic inflammation/infection  Recent sepsis  LUQ abdominal Pain   Immunocompromised  on Abatacept then Tofacitinib for past 4 months  Weight loss 50 lbs per patient (20 lb documented since 2019)  Lactic acidosis lactate 2.6  Right portacath in place  Staph hominus bacteremia in one BC drawn from port (other cultures negative)  Hypokalemia  Chronic Constipation x 1 year  History of rheumatoid arthritis  Diabetes mellitus type 2  History of depression  History of GERD  Hypothyroidism  Hypertension  History of hyperreactive airway disease    MEDICATIONS:  · It is important that you take the medication exactly as they are prescribed. · Keep your medication in the bottles provided by the pharmacist and keep a list of the medication names, dosages, and times to be taken in your wallet. · Do not take other medications without consulting your doctor. Pain Management: per above medications    What to do at Home    Recommended diet:  Diabetic Diet    Recommended activity: Activity as tolerated    If you have questions regarding the hospital related prescriptions or hospital related issues please call [facility] eric Bajwa at [phone]. If you experience any of the following symptoms then please call your primary care physician or return to the emergency room if you cannot get hold of your doctor:  Fever, chills, nausea, vomiting, diarrhea, change in mentation, falling, bleeding, shortness of breath        Information obtained by :  I understand that if any problems occur once I am at home I am to contact my physician. I understand and acknowledge receipt of the instructions indicated above. Physician's or R.N.'s Signature                                                                  Date/Time                                                                                                                                              Patient or Representative Signature                                                          Date/Time

## 2020-05-28 NOTE — DISCHARGE SUMMARY
Hospitalist Discharge Summary     Patient ID:  Scar Ayala  [id number]  42 y.o.  1980 5/19/2020    PCP on record: Dedra Kahn MD    Admit date: 5/19/2020  Discharge date and time: 5/28/2020    DISCHARGE DIAGNOSIS:  Splenic inflammation/infection  Recent sepsis  LUQ abdominal Pain   Immunocompromised  on Abatacept then Tofacitinib for past 4 months  Weight loss 50 lbs per patient (20 lb documented since 8/2019)  Lactic acidosis lactate 2.6  Right portacath in place  Staph hominus bacteremia in one BC drawn from port (other cultures negative)  Hypokalemia  Chronic Constipation x 1 year  History of rheumatoid arthritis  Diabetes mellitus type 2  History of depression  History of GERD  Hypothyroidism  Hypertension  History of hyperreactive airway disease    CONSULTATIONS:  IP CONSULT TO GENERAL SURGERY  IP CONSULT TO GASTROENTEROLOGY  IP CONSULT TO RHEUMATOLOGY  IP CONSULT TO INFECTIOUS DISEASES  IP CONSULT TO CARDIOLOGY    Excerpted HPI from H&P of Bubba Garcia MD:  The patient is a 30years old woman with past medical history rheumatoid arthritis, diabetes mellitus type 2 presented emergency department due to left upper quadrant pain started last night. Patient was admitted to the hospital last week with similar presentation and his CT abdomen that time revealed splenic hypodensity which was suspicious for possible infarct however on repeating CT abdomen on the same admission hypodensity where improving. Hematology was consulted last admission and a repeat CT scan with radiology and the suspicion for infarct was very low and recommended just a repeat CT abdomen as an outpatient. During her last hospitalization COVID-19 test was negative   Other blood culture was negative for 4 days. Patient presenting today with the same thing left upper quadrant abdominal pain, recurrent, aggravated by respiration, not alleviated by anything, not associated with nausea or vomiting.   She denies any fever or chills.     In the emergency department, CT abdomen was done and it was unchanged from before however her lactic acid was 2.6 for which she was given IV fluids.     We were asked to admit for work up and evaluation of the above problems. ______________________________________________________________________  DISCHARGE SUMMARY/HOSPITAL COURSE:  for full details see H&P, daily progress notes, labs, consult notes. Splenic inflammation/infection POA multiple hypodensities, ?  abscesses  Recent sepsis 5/13 with temp 102.3, , normal WBC  LUQ abdominal Pain POA  Immunocompromised POA on Abatacept then Tofacitinib for past 4 months  Weight loss 50 lbs per patient (20 lb documented since 8/2019)  Lactic acidosis lactate 2.6  Right portacath in place  Staph hominus bacteremia in one BC drawn from port (other cultures negative)  New onset LUQ pain since before last admit, fevers  [facility] admit  5/14 to 5/18/2020 for LUQ, sepsis, splenic hypodensities  No QUINCY in chest and abdomen  CTA chest no PE or ASD  BC x 1 negative, urine culture negative  F/u repeat cultures  Seen By Heme, felt lesions not consistent with infarcts on review with radiology > No Anticoagulation  SARS-CoV-2 test negative 5/14 and also 4/20 as outpatient  She was afebrile in hospital, discharged off antibiotics with outpatient f/u  Plan for possible ANUSHKA today  Febrile at home before re admit > 101 per her report  Afebrile here  Did had fever of 102.2 last admission  Gen surgery --> no surgical intervention  Admit blood culture 1/2 sets with Staph hominis  Appreciate ID (Dr Kaelyn Alcala input), recommended 6 weeks of IV Daptomycin and IV Rocephin in settings of bacteremia with port / immunocompromised and recurrent UTIs   Taper steroids  Fungal blood culture pending  AFB blood culture and quantiferon in lab, results pending  Cryptococcal antigen negative  Histoplasmosis urinary antigen negative  followup repeat blood cultures  No fevers off antibiotics  Throat culture negative so far  CT neck with the persistent neck tenderness  RF < 10, CCP pending  Aspiration of the splenic lesions can risky so plan discussed with ID, they will follow as an OP and see how pt will do with abx     Hypokalemia  repleted     Chronic Constipation x 1 year  Normocytic anemia  GI are following now --> EGD and colonoscopy 05/22 unremarkable  ? Linn Nolasco, Dr Cheyenne Capellan was consulted     History of rheumatoid arthritis  Holding immunosuppressant for atleast 2 weeks, afterwards to be decided by rheumatology d/w patient  Continue hydroxychloroquine  On prednisone, was off for 2 months, then resumed just prior to 5/14 x 2 days        Concerned she had a RA flair initially         Start to wean, dropped from 20 to 10 mg on 5/24  SSA/SSB negative, RF < 10, check CCP  Rheumatology consult appreciated     Diabetes mellitus type 2  Holding home metformin. Sliding scale with blood sugar checks.      History of depression  History of GERD  Hypothyroidism  Hypertension  Continue home Wellbutrin, Lexapro, levothyroxine and Protonix  Propranolol     History of hyperreactive airway disease  Continue home albuterol and Symbicort.   _______________________________________________________________________  Patient seen and examined by me on discharge day. Pertinent Findings:  Gen:    Not in distress  Chest: Clear lungs  CVS:   Regular rhythm. No edema  Abd:  Soft, not distended, not tender  Neuro:  Alert, non focal  _______________________________________________________________________  DISCHARGE MEDICATIONS:   Current Discharge Medication List      START taking these medications    Details   miconazole (MICOTIN) 200 mg vaginal suppository Insert 1 Suppository into vagina daily for 7 days.   Qty: 7 Suppository, Refills: 0      insulin lispro (HumaLOG KwikPen Insulin) 100 unit/mL kwikpen Check Blood Sugar before each meal 3 times daily    INITIATE SLIDING SCALE INSULIN (DURGA):  For Blood Sugar (mg/dl) of :             150-200=2 units           201-250=4 units     251-300=6 units  301-350=8 units  351-400=10 units  Qty: 1 Package, Refills: 0      insulin needles, disposable, (Pen Needle) 29 gauge x 1/2\" ndle To dispense insulin as directed  Qty: 100 Pen Needle, Refills: 0      cefTRIAXone 1 gram 1 g, ADDaptor 1 Device IVPB 1 g by IntraVENous route every twenty-four (24) hours for 40 days. Qty: 40 Dose, Refills: 0      DAPTOmycin (CUBICIN) IVPB 600 mg by IntraVENous route every twenty-four (24) hours for 40 days. Qty: 40 Dose, Refills: 0         CONTINUE these medications which have CHANGED    Details   predniSONE (DELTASONE) 10 mg tablet Take 10 mg by mouth daily. Qty: 30 Tab, Refills: 0         CONTINUE these medications which have NOT CHANGED    Details   metoclopramide (REGLAN) 5 mg/5 mL syrup Take 5 mL by mouth Before breakfast, lunch, and dinner for 14 days. Qty: 210 mL, Refills: 0      diphenhydrAMINE (BenadryL) 25 mg capsule Take 25 mg by mouth every six (6) hours as needed. BORIC ACID 600 mg by Does Not Apply route daily. Vaginal      nitrofurantoin (MACRODANTIN) 50 mg capsule Take 1 Cap by mouth daily as needed (post coital). Qty: 30 Cap, Refills: 1    Associated Diagnoses: Recurrent UTI      lisdexamfetamine (Vyvanse) 30 mg capsule Take 1 Cap by mouth every morning. Max Daily Amount: 30 mg.  Qty: 30 Cap, Refills: 0    Associated Diagnoses: Attention deficit hyperactivity disorder (ADHD), predominantly inattentive type      escitalopram oxalate (LEXAPRO) 20 mg tablet TAKE 1 TABLET BY MOUTH EVERY DAY  Qty: 30 Tab, Refills: 11    Associated Diagnoses: Recurrent major depressive disorder, in partial remission (HCC)      PREVIDENT 5000 SENSITIVE 1.1-5 % pste USE AS DIRECTED TWICE A DAY SPIT OUT EXCESS AND DO NOT RINSE, EAT OR DRINK FOR 30 MINUTES      hydroxychloroquine (PLAQUENIL) 200 mg tablet 200 mg daily.       lidocaine-prilocaine (EMLA) topical cream For port flushing every 3 month  Refills: 2      BD SYRINGE 1 mL 25 gauge x 5/8\" syrg 1 SYRINGE ONCE A WEEK  Refills: 4      heparin sodium,porcine (HEPARIN LOCK FLUSH, PORCINE, IV) by IntraVENous route. For port flush every 3 months      omeprazole (PRILOSEC) 40 mg capsule Take 1 Cap by mouth daily. Qty: 30 Cap, Refills: 0      Cetirizine (ZYRTEC) 10 mg cap Take 10 mg by mouth daily. Indications: inflammation of the nose due to an allergy  Qty: 30 Cap, Refills: 0      albuterol (PROVENTIL HFA, VENTOLIN HFA, PROAIR HFA) 90 mcg/actuation inhaler Take 2 Puffs by inhalation every four (4) hours as needed for Wheezing. Qty: 1 Inhaler, Refills: 0    Associated Diagnoses: Mild intermittent asthma without complication      omalizumab (XOLAIR SC) by SubCUTAneous route every thirty (30) days. Given by Dr Amber Cross in his office      propranolol LA (INDERAL LA) 60 mg SR capsule TAKE 1 CAPSULE BY MOUTH EVERY DAY  Qty: 90 Cap, Refills: 3      SYMBICORT 160-4.5 mcg/actuation HFAA TAKE 2 PUFFS BY MOUTH TWICE A DAY  Refills: 12      OZEMPIC 0.25 mg or 0.5 mg(2 mg/1.5 mL) sub-q pen by SubCUTAneous route every seven (7) days. Patient states she takes 1 ml every friday  Refills: 1      ondansetron hcl (ZOFRAN) 4 mg tablet Take 4 mg by mouth every eight (8) hours as needed for Nausea. naltrexone (DEPADE) 50 mg tablet Take  by mouth daily. EPIPEN 2-SANJAY 0.3 mg/0.3 mL injection 1 (ONE) INJECTION AS NEEDED  Refills: 0      buPROPion XL (WELLBUTRIN XL) 150 mg tablet Take 1 Tab by mouth every morning. Qty: 90 Tab, Refills: 3    Associated Diagnoses: Depression, unspecified depression type      montelukast (SINGULAIR) 10 mg tablet Take 1 Tab by mouth daily. Qty: 90 Tab, Refills: 4    Associated Diagnoses: Mild persistent asthma without complication      traMADol (ULTRAM) 50 mg tablet Take 50 mg by mouth every six (6) hours as needed for Pain. oxybutynin (DITROPAN) 5 mg tablet 5 mg two (2) times a day.  Patient states she takes 1 tablet bid  Refills: 3      BD INSULIN SYRINGE 1 mL 25 gauge x 5/8\" syrg USE 1 SYRINGE ONCE A WEEK  Refills: 11      cyanocobalamin (Vitamin B-12) 1,000 mcg tablet Take  by mouth daily. RESTASIS 0.05 % ophthalmic emulsion INSTILL 1 DROP INTO EACH EYE TWICE DAILY  Refills: 4      SAFETY-ABDI TB SYR 1CC/25GX5/8\" 1 mL 25 gauge x 5/8\" syrg USE TO INJECT METHOTREXATE ONCE A WEEK  Refills: 2      folic acid (FOLVITE) 1 mg tablet TAKE 1 TABLET ORALLY DAILY  Refills: 11      desogestrel-ethinyl estradiol (DESOGEN) 0.15-0.03 mg tab Take 1 Tab by mouth nightly. Nebulizer & Compressor machine 1 Each by Does Not Apply route three (3) times daily as needed. Qty: 1 Each, Refills: 0      glycopyrrolate (ROBINUL) 1 mg tablet Take 2 mg by mouth two (2) times a day. Indications: hyperhydrosis      cholecalciferol (Vitamin D3) (1000 Units /25 mcg) tablet Take 1 Tab by mouth daily. levothyroxine (SYNTHROID) 75 mcg tablet Take 75 mcg by mouth Daily (before breakfast). STOP taking these medications       norethindrone-ethinyl estradiol (Junel FE 1/20, 28,) 1 mg-20 mcg (21)/75 mg (7) tab Comments:   Reason for Stopping:         tofacitinib citrate (XELJANZ XR PO) Comments:   Reason for Stopping:         miconazole nitrate 200 mg/5 gram (4 %) vaginal cream Comments:   Reason for Stopping:         methotrexate 25 mg/mL chemo injection Comments:   Reason for Stopping:         metFORMIN ER (GLUCOPHAGE XR) 500 mg tablet Comments:   Reason for Stopping:                  Follow-up Information     Follow up With Specialties Details Why Chelsi Colin MD Internal Medicine Go on 6/4/2020 For [facility] follow up appointment at [facility] [address]  [address] [place] [address]      Sea Bob MD Gastroenterology Schedule an appointment as soon as possible for a visit to coordinate further treatment of chronic idiopathic constipation  [address] [facility]  [address] [facility] [address]  [phone]      Gertrude Gore MD Infectious Diseases, Internal Medicine In 3 weeks  [address]  [address], Infusion Therapy  IV infusion [address]  [phone]    Zonia Sin MD Rheumatology  as you already have an appointment [address] [place] [address]  [phone]          ________________________________________________________________    Risk of deterioration: Moderate    Condition at Discharge:  Stable  __________________________________________________________________    Disposition  Home with family and home health services    ____________________________________________________________________    Code Status: Full Code  ___________________________________________________________________      Total time in minutes spent coordinating this discharge (includes going over instructions, follow-up, prescriptions, and preparing report for sign off to her PCP) :  35 minutes    Signed:  Iram Rhodes MD

## 2020-05-28 NOTE — PROGRESS NOTES
SHIN plan:  -  Home with IV ABX and nursing through [facility]. ABX will be delivered to pt's home today. Pt stated she is able to afford the copayment. -  OP f/u: PCP on 6/4/20 for virtual visit, pt to call and schedule GI, ID, and Rheumatology appts  -  Fiance to transport pt home once final labs have been received and attending has reviewed. No further concerns indicated at this time. AVS updated. Patient is ready for discharge from a Care Management standpoint. Care Management Interventions  PCP Verified by CM: Yes  Mode of Transport at Discharge:  Other (see comment)(fiance)  Transition of Care Consult (CM Consult): (home IV infusion through [facility])  [address] AndreElida Jimenez: No  Discharge Durable Medical Equipment: No  Physical Therapy Consult: No  Occupational Therapy Consult: No  Speech Therapy Consult: No  Current Support Network: Relative's Home  Confirm Follow Up Transport: Family  Discharge Location  Discharge Placement: Home with infusion therapy    FROYLAN Linton  Care Manager  [phone]

## 2020-05-28 NOTE — PROGRESS NOTES
SHIN:   1) Home with HH and IV ABX   2) Home with f.u appts   3) Home with new AMD's     12:00 PM- CM notified by attending pt is ready for d/c. CM met with pt at bedside, pt states she can now afford the co-payment per day, a family member is going to assist with payment. Waiting on response from Wil[facility] [phone] to see if they are any cheaper.       Vega Dexter, MSCARSON, [address] [place] [address]   [phone]

## 2020-05-28 NOTE — PROGRESS NOTES
Patient discharged home with family. Patient will pick the prescribed medication from Edwar Vuong. Patient is going home with the port cath accessed to get home IV antibiotics under the supervision Home Health. Patient escorted to discharge lobby via wheelchair. All cabinet checked for patient belongings.

## 2020-05-28 NOTE — FACE TO FACE
Home Health Care Discharge Planning: [facility]  Face to Face Encounter      NAME: Enrique Knox   :  1980   MRN:  [id number]     Primary Diagnosis: Splenic Abscesses    Date of Face to Face:  2020 12:23 PM                                  Face to Face Encounter findings are related to primary reason for home care:   YES    1. I certify that the patient needs intermittent skilled nursing care, physical therapy and/or speech therapy. I will not be following this patient in the Community and Dr. Bijan George MD will be responsible for signing the [address] of Care. 2. Initial Orders for Care: Skilled Nursing    3. I certify that this patient is homebound because of illness or injury, need the aid of supportive devices such as crutches, canes, wheelchairs, and walkers; the use of special transportation; or the assistance of another person in order to leave their place of residence. There exists a normal inability to leave home and leaving home requires a considerable and taxing effort. 4. I certify that this patient is under my care and that I had a Face-to-Face Encounter that meets the physician Face-to-Face Encounter requirements.   Document the physical findings from the Face-to-Face Encounter that support the need for skilled services: Has new diagnosis that requires skilled nursing teaching and intervention  and Has new medications that requires skilled nursing teaching and monitoring for understanding and compliance     Bharath Wallace MD  Discharging Physician  Office: [phone]  Fax:   [phone]

## 2020-05-28 NOTE — ROUTINE PROCESS
[address] [facility] [facility]    Fax: [phone]        To Whom may it concern      This letter is to certify that  Nelsy Ceja   was admitted under our care on 5/19/2020 and discharged on 5/28/2020                  . Nelsy Ceja  may return to work/school on Wednesday 06/03/2020    If you have any questions, please do not hesitate to contact me.         Dr. Melani Parr  [phone]

## 2020-05-28 NOTE — PROGRESS NOTES
ADULT PROTOCOL: JET AEROSOL  REASSESSMENT    Patient  Nurys Garcia     42 y.o.   female     5/28/2020  9:05 AM    Breath Sounds Pre Procedure: Right Breath Sounds: Clear                               Left Breath Sounds: Clear    Breath Sounds Post Procedure: Right Breath Sounds: Clear                                 Left Breath Sounds: Clear    Breathing pattern: Pre procedure Breathing Pattern: Regular          Post procedure Breathing Pattern: Regular    Heart Rate: Pre procedure Pulse: 51           Post procedure Pulse: 51    Resp Rate: Pre procedure Respirations: 20           Post procedure Respirations: 20    Cough: Pre procedure Cough: Non-productive               Post procedure Cough: Non-productive    Oxygen: O2 Device: Room air        Changed: no    SpO2: Pre procedure SpO2: 99 %                 Post procedure SpO2: 99 %      Nebulizer Therapy: Current medications Aerosolized Medications: Brovana, Pulmicort      Changed: no    Problem List:   Patient Active Problem List   Diagnosis Code    Hypothyroidism (acquired) E03.9    Fibromyalgia M79.7    Hyperhidrosis R61    Environmental and seasonal allergies J30.89    Rectal bleeding K62.5    Family history of colonic polyps Z83.71    Encounter for screening colonoscopy Z12.11    Rheumatoid arthritis (HCC) M06.9    Asthma J45.909    Severe obesity (BMI 35.0-39. 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

## 2020-05-29 ENCOUNTER — PATIENT OUTREACH (OUTPATIENT)
Dept: OTHER | Age: 40
End: 2020-05-29

## 2020-05-29 LAB
L PNEUMO1 AG UR QL IA: NEGATIVE
SPECIMEN SOURCE: NORMAL

## 2020-05-29 NOTE — PROGRESS NOTES
Patient was re-admitted to [facility] on  and discharged on  for sepsis, splenic inflammation, worsening LUQ pain. Patient was contacted within <1 business days of discharge. Top Discharge Challenges to be reviewed by the provider   Additional needs identified to be addressed with provider FU appts scheduled;  medications and DME, patient discharged home on IV antibiotics; Discussed COVID-19 related testing which was available at this time. Test results were negative. Patient informed of results, if available? Already aware;     Method of communication with provider : none       Advance Care Planning:   Does patient have an Advance Directive:  not on file; education provided     Inpatient Readmission Risk score:   63%  Was this a readmission? yes   Patient stated reason for the admission:  Worsening LUQ pain  Patients top risk factors for readmission:   medical condition:    immunocompromised state, RA;  visual petechiae, purple in color, multiple areas of her body - states she is in workup for clotting disorder or possible Sjogrens; Interventions to address risk factors:     · Scheduled her Hematology FU appt;  · Scheduled her GYN FU appt with new finding on CT;   · Reviewed care and use of port, IV antibiotics at home;  · Provided CM contact information;   · Infection control review/prevention sick contacts; Care Transition Nurse (CTN) contacted the patient by telephone to perform post hospital discharge assessment. Verified name and  with patient as identifiers. Provided introduction to self, and explanation of the CTN role. CTN reviewed discharge instructions, medical action plan and red flags with patient who verbalized understanding. Patient given an opportunity to ask questions and does not have any further questions or concerns at this time. The patient agrees to contact the PCP office for questions related to their healthcare.      Medication reconciliation was performed with patient, who verbalizes understanding of administration of home medications. Advised obtaining a 90-day supply of all daily and as-needed medications. Referral to Pharm D needed: was addressed inpatient, prior to DC; No new needs; Home Health/Outpatient orders at discharge: medications  [address]: home IV infusion through [address]  Date of initial visit: Antibiotics and pump delivered 05/28/2020 before inpatient DC from hospital;     [address] ordered at [facility] received: yes    Covid Risk Education    Patient has following risk factors of: sepsis. Education provided regarding infection prevention, and signs and symptoms of COVID-19 and when to seek medical attention with patient who verbalized understanding. Discussed exposure protocols and quarantine From CDC: Are you at higher risk for severe illness?  and given an opportunity for questions and concerns. The patient agrees to contact the COVID-19 hotline [phone] or PCP office for questions related to COVID-19. Patient COVID test results were negative;    Discussed follow-up appointments.  If no appointment was previously scheduled, appointment scheduling offered: yes     [facility] follow up appointment(s):      Future Appointments   Date Time Provider Екатерина Kern   6/4/2020  9:00 AM Appa Delsie Harada, MD [address]   7/6/2020 11:45 AM Appa Delsie Harada, MD [address]     Non-[facility] follow up appointment(s):   FU appt with Hematology, Dr Richard Mcgill:     Spoke with office, they will call back with appt time;  FU appt with Gynecology, [facility],   Dr. Luis Antonio Hung 06/03/20 at 2:15pm;  FU appt with Rheumatology:     Monday June 1st at 2:00pm  FU appt with Gastroenterology:     Monday June 8th at 10:30am    Plan for follow-up call in 3-5 days based on severity of symptoms and risk factors. CTN provided contact information for future needs. Initial Plan Of Care  Risk of recurrent infection  Goal:  Demonstrates behaviors to self-manage /prevent infection     Risk for Fatigue     Goal:  Performs desired life activities at level of ability; Risk for Bleeding       Goal:  Demonstrate adherence behavior to treatment regimen and Hematology FU testing and consult;  · Awaiting call back from Hem office for FU appt;    Goals Addressed                 This Visit's Progress       Chronic Disease     Demonstrate adherence behavior to treatment regimen and Hematology FU testing and consult; Post ED     Demonstrates behaviors to self-manage /prevent infection         Establish PCP relationships and regularly scheduled appointments.    On track       Post Hospitalization     Performs desired life activities at level of ability;

## 2020-05-30 LAB
BACTERIA SPEC CULT: NORMAL
SERVICE CMNT-IMP: NORMAL

## 2020-05-31 LAB
BACTERIA SPEC CULT: NORMAL
SERVICE CMNT-IMP: NORMAL

## 2020-06-03 ENCOUNTER — TELEPHONE (OUTPATIENT)
Dept: FAMILY MEDICINE CLINIC | Age: 40
End: 2020-06-03

## 2020-06-03 DIAGNOSIS — F32.A DEPRESSION, UNSPECIFIED DEPRESSION TYPE: ICD-10-CM

## 2020-06-03 DIAGNOSIS — J45.30 MILD PERSISTENT ASTHMA WITHOUT COMPLICATION: ICD-10-CM

## 2020-06-03 DIAGNOSIS — F33.41 RECURRENT MAJOR DEPRESSIVE DISORDER, IN PARTIAL REMISSION (HCC): ICD-10-CM

## 2020-06-03 DIAGNOSIS — F90.0 ATTENTION DEFICIT HYPERACTIVITY DISORDER (ADHD), PREDOMINANTLY INATTENTIVE TYPE: ICD-10-CM

## 2020-06-03 NOTE — TELEPHONE ENCOUNTER
Two pt identifiers confirmed. Informed pt per protocol will route to provider prior to scheduling. Informed pt will be a billable service due to insurance policy. Informed pt due to pandemic is seeing pt's virtually only. Pt verbalized understanding of information discussed w/ no further questions at this time.

## 2020-06-03 NOTE — TELEPHONE ENCOUNTER
PCP: Manav Jackson MD    Last appt: 4/27/2020  Future Appointments   Date Time Provider Екатерина Kern   6/4/2020  9:00 AM AppMD Erlin Galvin 87   7/6/2020 11:45 AM AppMD Erlin Galvin 87       Requested Prescriptions     Pending Prescriptions Disp Refills    buPROPion XL (WELLBUTRIN XL) 150 mg tablet 90 Tab 3     Sig: Take 1 Tab by mouth every morning.  montelukast (SINGULAIR) 10 mg tablet 90 Tab 4     Sig: Take 1 Tab by mouth daily.  escitalopram oxalate (LEXAPRO) 20 mg tablet 30 Tab 11    lisdexamfetamine (Vyvanse) 30 mg capsule 30 Cap 0     Sig: Take 1 Cap by mouth every morning. Max Daily Amount: 30 mg.

## 2020-06-03 NOTE — TELEPHONE ENCOUNTER
----- Message from Giana Mcclendon sent at 6/3/2020  3:19 PM EDT -----  Regarding: Marcella Durham MD  Appointment not available    Caller's first and last name and relationship to patient (if not the patient):      Best contact number: [phone]      Preferred date and time: N/A      Scheduled appointment date and time: N/A      Reason for appointment: NP Est Care/Follow Up      Details to clarify the request: pt prefers in office       Giana Mcclendon

## 2020-06-04 ENCOUNTER — PATIENT OUTREACH (OUTPATIENT)
Dept: OTHER | Age: 40
End: 2020-06-04

## 2020-06-04 ENCOUNTER — VIRTUAL VISIT (OUTPATIENT)
Dept: INTERNAL MEDICINE CLINIC | Age: 40
End: 2020-06-04

## 2020-06-04 DIAGNOSIS — F90.0 ATTENTION DEFICIT HYPERACTIVITY DISORDER (ADHD), PREDOMINANTLY INATTENTIVE TYPE: ICD-10-CM

## 2020-06-04 DIAGNOSIS — F32.A DEPRESSION, UNSPECIFIED DEPRESSION TYPE: ICD-10-CM

## 2020-06-04 DIAGNOSIS — B37.9 YEAST INFECTION: ICD-10-CM

## 2020-06-04 DIAGNOSIS — M79.7 FIBROMYALGIA: ICD-10-CM

## 2020-06-04 DIAGNOSIS — D73.3 SPLENIC ABSCESS: ICD-10-CM

## 2020-06-04 DIAGNOSIS — Z09 HOSPITAL DISCHARGE FOLLOW-UP: Primary | ICD-10-CM

## 2020-06-04 DIAGNOSIS — E11.9 CONTROLLED TYPE 2 DIABETES MELLITUS WITHOUT COMPLICATION, WITHOUT LONG-TERM CURRENT USE OF INSULIN (HCC): ICD-10-CM

## 2020-06-04 RX ORDER — ESCITALOPRAM OXALATE 20 MG/1
20 TABLET ORAL DAILY
Qty: 90 TAB | Refills: 1 | Status: SHIPPED | OUTPATIENT
Start: 2020-06-04 | End: 2020-08-07

## 2020-06-04 NOTE — TELEPHONE ENCOUNTER
Called, left vm in regards to appt for pt to return call to office.     MD Kaylie Salazar   Caller: Unspecified Melanie Anju,  4:15 PM)             6/16 at 230 pm

## 2020-06-04 NOTE — PROGRESS NOTES
CC: Hospital Follow Up      HPI:    Admit date: 5/19/2020  Discharge date and time: 5/28/2020    She is a 42 y.o. female who presents for evaluation of hospital follow   She presented with fevers splenic lesions CT scan showed splenic hypo densities without splenomegaly . Dr Herve Rose evaluated patient and recommended daptomycin and ceftriaxone with concern for possible source of infection ( seeding) and patient had ANUSHKA which was negative. She was discharged on 6 weeks of daptomycin and ceftriaxone. End date is 7/7/2020    One BC with with staph hominis and likely contaminant  Plan to repeat CT and if hypodensities still  present will need biopsy    Immunocompromised   currently holding erxcept for plaquenil and prednisone    Holding birth control and metformin     Sent home on insulin sliding scale       On imaging also has    No definite change in round cervical hypodensity 2.3 x 2.2 cm which could  represent polyp, fibroid, mass, etc. Correlate clinically. Will see gynecology    LIVER: 1 or 2 Low density in the center of the right lobe once again seen and  Nonspecific. She will see endocrine today for diabetes      Reviewed labs and noted B2 glycoprotein to be elevated     Since discharge, fever resolved, patient in general feels run down   Feels that her RA and fibromyalgia are not well controlled since stopping biologic medications due to infection    Having frequent headaches /migraines/    Using boric acid capsules and monistat for yeast infections and oral thrush. She is on lexapro and plaquenil discussed I cant prescribe fluconazole due to drug interactions      This is an established visit conducted via telemedicine with video. The patient has been instructed that this meets HIPAA criteria and acknowledges and agrees to this method of visitation.      Pursuant to the emergency declaration under the [facility]5 waiver authority and the Coronavirus Preparedness and Response Supplemental Appropriations Act, this Virtual Visit was conducted, with patient's consent, to reduce the patient's risk of exposure to COVID-19 and provide continuity of care for an established patient. Services were provided through a video synchronous discussion virtually to substitute for in-person clinic visit. ROS:  Constitutional: negative for fevers, chills, anorexia and weight loss  10 systems reviewed and negative other than HPI     Past Medical History:   Diagnosis Date    Acquired hypothyroidism     Adverse effect of anesthesia     labile BP during/after C section    Asthma     Broken bones     history of 9 broken bones    Chronic UTI (urinary tract infection)     \"extra valve right kidney causes UTI\"    Fibromyalgia     Gestational diabetes     GI bleed 2007,2011,2015,2016    lower GI last colonoscopy in 2016 normal     Huntingtons chorea (HCC)     Hyperhydrosis disorder     Ill-defined condition     [facility]/ tested genetically - daughter has [facility]    Infertility     PCOS    PCOS (polycystic ovarian syndrome)     Precancerous skin lesion     removed from Right shoulder    Preeclampsia 2011    pregnancy    Reactive airway disease     Rheumatoid arthritis (HCC)     SVT (supraventricular tachycardia) ([facility] [address] 2011    occured during pregnancy when picc line inserted. Current Outpatient Medications on File Prior to Visit   Medication Sig Dispense Refill    predniSONE (DELTASONE) 10 mg tablet Take 10 mg by mouth daily. 30 Tab 0    miconazole (MICOTIN) 200 mg vaginal suppository Insert 1 Suppository into vagina daily for 7 days. 7 Suppository 0    insulin lispro (HumaLOG KwikPen Insulin) 100 unit/mL kwikpen Check Blood Sugar before each meal 3 times daily    INITIATE SLIDING SCALE INSULIN (DURGA):   For Blood Sugar (mg/dl) of :             150-200=2 units           201-250=4 units     251-300=6 units  301-350=8 units  351-400=10 units 1 Package 0  insulin needles, disposable, (Pen Needle) 29 gauge x 1/2\" ndle To dispense insulin as directed 100 Pen Needle 0    cefTRIAXone 1 gram 1 g, ADDaptor 1 Device IVPB 1 g by IntraVENous route every twenty-four (24) hours for 40 days. 40 Dose 0    DAPTOmycin (CUBICIN) IVPB 600 mg by IntraVENous route every twenty-four (24) hours for 40 days. 40 Dose 0    diphenhydrAMINE (BenadryL) 25 mg capsule Take 25 mg by mouth every six (6) hours as needed.  BORIC ACID 600 mg by Does Not Apply route daily. Vaginal      nitrofurantoin (MACRODANTIN) 50 mg capsule Take 1 Cap by mouth daily as needed (post coital). 30 Cap 1    escitalopram oxalate (LEXAPRO) 20 mg tablet TAKE 1 TABLET BY MOUTH EVERY DAY 30 Tab 11    PREVIDENT 5000 SENSITIVE 1.1-5 % pste USE AS DIRECTED TWICE A DAY SPIT OUT EXCESS AND DO NOT RINSE, EAT OR DRINK FOR 30 MINUTES      hydroxychloroquine (PLAQUENIL) 200 mg tablet 200 mg daily.  lidocaine-prilocaine (EMLA) topical cream For port flushing every 3 month  2    BD SYRINGE 1 mL 25 gauge x 5/8\" syrg 1 SYRINGE ONCE A WEEK  4    heparin sodium,porcine (HEPARIN LOCK FLUSH, PORCINE, IV) by IntraVENous route. For port flush every 3 months      omeprazole (PRILOSEC) 40 mg capsule Take 1 Cap by mouth daily. 30 Cap 0    Cetirizine (ZYRTEC) 10 mg cap Take 10 mg by mouth daily. Indications: inflammation of the nose due to an allergy 30 Cap 0    albuterol (PROVENTIL HFA, VENTOLIN HFA, PROAIR HFA) 90 mcg/actuation inhaler Take 2 Puffs by inhalation every four (4) hours as needed for Wheezing. 1 Inhaler 0    omalizumab (XOLAIR SC) by SubCUTAneous route every thirty (30) days. Given by Dr Felicita Keene in his office      propranolol LA (INDERAL LA) 60 mg SR capsule TAKE 1 CAPSULE BY MOUTH EVERY DAY 90 Cap 3    SYMBICORT 160-4.5 mcg/actuation HFAA TAKE 2 PUFFS BY MOUTH TWICE A DAY  12    OZEMPIC 0.25 mg or 0.5 mg(2 mg/1.5 mL) sub-q pen by SubCUTAneous route every seven (7) days.  Patient states she takes 1 ml every friday  1    ondansetron hcl (ZOFRAN) 4 mg tablet Take 4 mg by mouth every eight (8) hours as needed for Nausea.  naltrexone (DEPADE) 50 mg tablet Take  by mouth daily.  EPIPEN 2-SANJAY 0.3 mg/0.3 mL injection 1 (ONE) INJECTION AS NEEDED  0    buPROPion XL (WELLBUTRIN XL) 150 mg tablet Take 1 Tab by mouth every morning. 90 Tab 3    montelukast (SINGULAIR) 10 mg tablet Take 1 Tab by mouth daily. 90 Tab 4    traMADol (ULTRAM) 50 mg tablet Take 50 mg by mouth every six (6) hours as needed for Pain.  oxybutynin (DITROPAN) 5 mg tablet 5 mg two (2) times a day. Patient states she takes 1 tablet bid  3    BD INSULIN SYRINGE 1 mL 25 gauge x 5/8\" syrg USE 1 SYRINGE ONCE A WEEK  11    cyanocobalamin (Vitamin B-12) 1,000 mcg tablet Take  by mouth daily.  RESTASIS 0.05 % ophthalmic emulsion INSTILL 1 DROP INTO EACH EYE TWICE DAILY  4    SAFETY-ABDI TB SYR 1CC/25GX5/8\" 1 mL 25 gauge x 5/8\" syrg USE TO INJECT METHOTREXATE ONCE A WEEK  2    folic acid (FOLVITE) 1 mg tablet TAKE 1 TABLET ORALLY DAILY  11    desogestrel-ethinyl estradiol (DESOGEN) 0.15-0.03 mg tab Take 1 Tab by mouth nightly.  Nebulizer & Compressor machine 1 Each by Does Not Apply route three (3) times daily as needed. 1 Each 0    glycopyrrolate (ROBINUL) 1 mg tablet Take 2 mg by mouth two (2) times a day. Indications: hyperhydrosis      cholecalciferol (Vitamin D3) (1000 Units /25 mcg) tablet Take 1 Tab by mouth daily.  levothyroxine (SYNTHROID) 75 mcg tablet Take 75 mcg by mouth Daily (before breakfast). No current facility-administered medications on file prior to visit.         Past Surgical History:   Procedure Laterality Date    COLONOSCOPY N/A 6/13/2016    COLONOSCOPY performed [facility]lee Tracy [facility]    COLONOSCOPY N/A 5/22/2020    [facility] at [facility]  5/22/2020         [address]  [address]    UPPER GI ENDOSCOPY,BIOPSY  2/12/2019         UPPER GI ENDOSCOPY,BIOPSY  5/22/2020         UPPER GI ENDOSCOPY,DILATN W GUIDE  2/12/2019            Family History   Problem Relation Age of Onset    Other Mother         Fillmore's disease    Hypertension Mother     Dementia Mother     High Cholesterol Father     Heart Disease Father     Diabetes Father     Hypertension Father     Asthma Brother     Diabetes Brother     Other Brother         varicocele, hernias    Hypertension Brother     Alcohol abuse Brother     Hypertension Maternal Grandmother     Elevated Lipids Maternal Grandmother     Cancer Maternal Grandmother         breast    Other Maternal Grandmother         polymyalgia rheumatica    Glaucoma Maternal Grandmother     Cancer Maternal Grandfather         prostate    Huntingtons disease Maternal Grandfather     Cancer Paternal Grandmother         lung with mets    Cancer Paternal Grandfather         prostate, colon    Diabetes Paternal Grandfather     Heart Disease Paternal Grandfather     Alzheimer Paternal Grandfather     Dementia Paternal Grandfather      Reviewed and no changes     Social History     Socioeconomic History    Marital status:      Spouse name: Not on file    Number of children: Not on file    Years of education: Not on file    Highest education level: Not on file   Occupational History    Not on file   Social Needs    Financial resource strain: Not on file    Food insecurity     Worry: Not on file     Inability: Not on file   [employer] needs     Medical: Not on file     Non-medical: Not on file   Tobacco Use    Smoking status: Never Smoker    Smokeless tobacco: Never Used   Substance and Sexual Activity    Alcohol use: Yes     Comment: few drinks per year    Drug use: No    Sexual activity: Not on file   Lifestyle    Physical activity     Days per week: Not on file     Minutes per session: Not on file    Stress: Not on file Relationships    Social connections     Talks on phone: Not on file     Gets together: Not on file     Attends Rastafari service: Not on file     Active member of club or organization: Not on file     Attends meetings of clubs or organizations: Not on file     Relationship status: Not on file    Intimate partner violence     Fear of current or ex partner: Not on file     Emotionally abused: Not on file     Physically abused: Not on file     Forced sexual activity: Not on file   Other Topics Concern    Not on file   Social History Narrative    ** Merged History Encounter **               There were no vitals taken for this visit. Physical Examination:   Gen: well appearing female  HEENT: normal conjunctiva, no audible congestion, patient does not see oral erythema, has MMM  Neck: patient does not feel enlarged or tender LAD or masses  Resp: normal respiratory effort, no audible wheezing. Port a cath visualized appears clean dry intact   CV: patient does not feel palpitations or heart irregularity  Abd: patient does not feel abdominal tenderness or mass, patient does not notice distension  Extrem: patient does not see swelling in ankles or joints.    Neuro: Alert and oriented, able to answer questions without difficulty, able to move all extremities and walk normally          Lab Results   Component Value Date/Time    WBC 6.7 05/26/2020 11:31 AM    HGB 12.0 05/26/2020 11:31 AM    HCT 37.2 05/26/2020 11:31 AM    PLATELET 895 71/50/7124 11:31 AM    MCV 94.7 05/26/2020 11:31 AM     Lab Results   Component Value Date/Time    Sodium 136 05/28/2020 12:12 PM    Potassium 4.3 05/28/2020 12:12 PM    Chloride 103 05/28/2020 12:12 PM    CO2 30 05/28/2020 12:12 PM    Anion gap 3 (L) 05/28/2020 12:12 PM    Glucose 137 (H) 05/28/2020 12:12 PM    BUN 8 05/28/2020 12:12 PM    Creatinine 0.81 05/28/2020 12:12 PM    BUN/Creatinine ratio 10 (L) 05/28/2020 12:12 PM    GFR est AA >60 05/28/2020 12:12 PM    GFR est non-AA >60 05/28/2020 12:12 PM    Calcium 9.2 05/28/2020 12:12 PM     No results found for: CHOL, CHOLPOCT, CHOLX, CHLST, CHOLV, HDL, HDLPOC, HDLP, LDL, LDLCPOC, LDLC, DLDLP, VLDLC, VLDL, TGLX, TRIGL, TRIGP, TGLPOCT, CHHD, CHHDX  Lab Results   Component Value Date/Time    TSH 1.48 10/30/2015 05:11 PM     No results found for: PSA, Lima Bethanie, PSAR3, [id number], [id number], PSALT  Lab Results   Component Value Date/Time    Hemoglobin A1c 5.7 (H) 05/26/2020 11:31 AM     No results found for: VITD3, XQVID2, XQVID3, Lis Ran, VD3RIA    Lab Results   Component Value Date/Time    ALT (SGPT) 49 05/28/2020 12:12 PM    Alk. phosphatase 82 05/28/2020 12:12 PM    Bilirubin, total 0.5 05/28/2020 12:12 PM           Assessment/Plan:    1. Hospital discharge follow-up  Patient had complicated hospitalization for sepsis and found to have splenic hypo densities without clear source of infection, negative ANUSHKA  She is on 6 weeks of daptomycin and ceftriaxone will need repeat CT scan at end of treatment and if still present will likely need a biopsy   Follow up with ID   Weekly labs per ID    2. Attention deficit hyperactivity disorder (ADHD), predominantly inattentive type  - lisdexamfetamine (Vyvanse) 30 mg capsule; Take 1 Cap by mouth every morning. Max Daily Amount: 30 mg. Dispense: 30 Cap; Refill: 0    3. Depression, unspecified depression type  - feels down due to current issues she is currently on lexapro and wellbutrin     4. Splenic hypo densities   Patient had complicated hospitalization for sepsis and found to have splenic hypo densities without clear source of infection, negative ANUSHKA  She is on 6 weeks of daptomycin and ceftriaxone will need repeat CT scan at end of treatment and if still present will likely need a biopsy     5. Controlled type 2 diabetes mellitus without complication, without long-term current use of insulin ([address]  Following up with endo, metformin stopped during hospitalization     6. Patti Cuellar   currently off of biologics, on plaquenil and prednisone 10mg followed by Dr Kumar Talbert    7. Yeast infection  Thrush and vaginal due to prednisone and antibiotics, advised to take probiotics, eat yogurt, continue with wish and swallow nystatin and monistat   Cannot prescribe fluconazole due to drug drug interactions     8. cervical hypodensity 2.3 x 2.2 cm which could  represent polyp, fibroid, mass, etc. Correlate clinically. Will see gynecology    Follow up in 3 months         Marin Conte MD    This is an established visit conducted via real time video and audio telemedicine. The patient has been instructed that this meets HIPAA criteria and acknowledges and agrees to this method of visitation.

## 2020-06-04 NOTE — PROGRESS NOTES
Reviewed record in preparation for visit and have obtained necessary documentation. Identified pt with two pt identifiers(name and ). Chief Complaint   Patient presents with   [facility] Follow Up       Health Maintenance Due   Topic Date Due    Pap Test  2012    Pneumococcal Vaccine (2 of 3 - PPSV23) 2017       Ms. Adam Jackson has a reminder for a \"due or due soon\" health maintenance. I have asked that she discuss this further with her primary care provider for follow-up on this health maintenance. Coordination of Care Questionnaire:  :     1) Have you been to an emergency room, urgent care clinic since your last visit? no   Hospitalized since your last visit? no             2) Have you seen or consulted any other health care providers outside of [address] [place] [address] since your last visit? no  (Include any pap smears or colon screenings in this section.)    3) In the event something were to happen to you and you were unable to speak on your behalf, do you have an Advance Directive/ Living Will in place stating your wishes? NO    Do you have an Advance Directive on file? no    4) Are you interested in receiving information on Advance Directives? NO    Patient is accompanied by self I have received verbal consent from [employer] to discuss any/all medical information while they are present in the room.

## 2020-06-05 ENCOUNTER — PATIENT OUTREACH (OUTPATIENT)
Dept: OTHER | Age: 40
End: 2020-06-05

## 2020-06-05 RX ORDER — BUPROPION HYDROCHLORIDE 150 MG/1
150 TABLET ORAL
Qty: 90 TAB | Refills: 3 | Status: SHIPPED | OUTPATIENT
Start: 2020-06-05 | End: 2020-06-15

## 2020-06-05 RX ORDER — MONTELUKAST SODIUM 10 MG/1
10 TABLET ORAL DAILY
Qty: 90 TAB | Refills: 4 | Status: SHIPPED | OUTPATIENT
Start: 2020-06-05 | End: 2020-08-04

## 2020-06-05 NOTE — ADT AUTH CERT NOTES
May 26 review by Cyndie Willams         Review Status Review Entered   In Primary 6/4/2020 12:40       Criteria Review   May 26 review;  --------PER MEDICINE  Abatacept(Orencia) inhibits T-cell (T-lymphocyte) activation by binding to CD80 and CD86 on antigen presenting cells (APC)     Tofacitinib (xeljanz) inhibits Janus kinase (JANAY) enzymes, prevents cytokine- or growth factor-mediated gene expression and intracellular activity of immune cells, reduces circulating CD16/56+ natural killer cells, serum IgG, IgM, IgA, and C-reactive protein, and increases B cells.     Assessment / Plan:  Splenic inflammation/infection POA multiple hypodensities, ?  abscesses  Recent sepsis 5/13 with temp 102.3, , normal WBC  LUQ abdominal Pain POA  Immunocompromised POA on Abatacept then Tofacitinib for past 4 months  Weight loss 50 lbs per patient (20 lb documented since 8/2019)  Lactic acidosis lactate 2.6  Right portacath in place  Staph hominus bacteremia in one BC drawn from port (other cultures negative)  New onset LUQ pain since before last admit, fevers  [facility] admit  5/14 to 5/18/2020 for LUQ, sepsis, splenic hypodensities  No QUINCY in chest and abdomen  CTA chest no PE or ASD  BC x 1 negative, urine culture negative  Seen By Heme, felt lesions not consistent with infarcts on review with radiology > No Anticoagulation  SARS-CoV-2 test negative 5/14 and also 4/20 as outpatient  Received 4 days of IV ceftriaxone and flagyl  She was afebrile in hospital, discharged off antibiotics with outpatient f/u  Plan for possible ANUSHKA today, I have requested cardiology consult  COVID19 x2 negative recently  LUQ Pain better on discharge for a day, then worsening  Febrile at home before re admit > 101 per her report  Afebrile here  Empirically started on vanco this admission, stopped 5/22 as unsure what we are treating  No HA, cough or SOB, sore throat, N/V, diarrhea, dysuria, new joint symptoms  Gen surgery --> no surgical intervention  Admit blood culture 1/2 sets with Staph hominus (appears to have been drawn from port)  Suspect this is contaminant (CNS)  ESR was 18, CRP > 9.5  Procalcitonin repeatedly < 0.05  Persistent LUQ tenderness  ?  tender QUINCY or salivary glands under her jaw, CT negative  Differential broad desean with the immunosuppressives         ? infectious                   Hematogenous seeding of spleen                        No clear signs of endocarditis on BC or TTE  [facility] cardiology to evaluate toTEE                        ? Seeded from the port                         No clear evidence of any other focal infections                             UA negative                             CXR and chest CT negative                             No other clear infectious sources on CT scan abdomen                             EGD and colonoscopy negative                  ? Disseminated fungal or mycobacterial                         Lived in mid [address], histo antigen negative, culture pending                         Crypto antigen negative                         Nurse so TB a risk, quantiferon and AFB blood culture pending                  Rule out EBV with the tender LN          ? Rheumatologic with prior RA, RF < 10, ESR 35          ? Malignancy, no clear evidence of this, d/w heme onc          ? hypercoaguable                They do not look like splenic infarcts                Negative lupus anticoagulant, pending anticardiolipin antibodies  Stopped vanco 5/22, plan to watch off antibiotics  Taper steroids  Fungal blood culture pending  AFB blood culture and quantiferon in lab, results pending  Cryptococcal antigen negative  Histoplasmosis urinary antigen negative  Repeat BC  No fevers off antibiotics  Throat culture negative so far  CT neck with the persistent neck tenderness  RF < 10, CCP pending  Aspiration of the splenic lesions risky vs empiric antibiotics and serial scans  Ultimately suspect she require a course of broad spectrum antibiotics and serial scans      Seemed to respond to IV ceftriaxone last admit  Ask cardiology to see in Am re feasibility of ANUSHKA, NPO after midnight  Ask hem-onc to follow up in AM  If remains afebrile and cultures negative, home in 1-2 days with outpatient     Hypokalemia  repleted     Chronic Constipation x 1 year  Normocytic anemia  GI are following now --> EGD and colonoscopy 05/22 unremarkable  ? Arlen Melara was consulted     History of rheumatoid arthritis  Methotrexate and Ghanshyam Nugent, cont holding, prior on abatacept  Continue hydroxychloroquine  On prednisone, was off for 2 months, then resumed just prior to 5/14 x 2 days        Concerned she had a RA flair initially         Start to wean, dropped from 20 to 10 mg on 5/24  SSA/SSB negative, RF < 10, check CCP  Rheumatology consult appreciated     Diabetes mellitus type 2  Holding home metformin. Sliding scale with blood sugar checks.      History of depression  History of GERD  Hypothyroidism  Hypertension  Continue home Wellbutrin, Lexapro, levothyroxine and Protonix  Propranolol     History of hyperreactive airway disease  Continue home albuterol and Symbicort.        Code Status: Full code  Surrogate Decision Maker:      DVT Prophylaxis:lovenox       Subjective: Pt seen and examined at bedside. Continue to have left upper quadrant abdominal pain. Overnight events d/w RN      Chief Complaint / Reason for Physician Visit: f/u: \"fever at home, immunocompromised, splenic infarcts\"   PHYSICAL EXAM:  General:          WD, WN. Alert, cooperative, no acute distress    EENT:              EOMI. Anicteric sclerae.  MMM  Neck:               Tender up under angle of jaw bilaterally, shotty QUINCY  Resp:               CTA bilaterally, no wheezing or rales.  No accessory muscle use  CV:                  Regular  rhythm,  No edema  GI:                   Soft, Non distended, tender in LUQ.  +Bowel sounds  Neurologic:      Alert and oriented X 3, normal speech,   Psych:             Good insight. Not anxious nor agitated  Skin:                No rashes.  No jaundice     ------------INF DISEASE   Progress Note     IMPRESSION:      - H/o fevers, splenic lesions on CT scan , s/p initial improvement on Ceftriaxone / Flagyl on last admission    CT scan - splenic hypo densities, no splenomegaly    Pt s/p CT scans on 5/14, 5/17, 5/19   - Presence of portocath     BC - 5/19- Staph hominis 1/2 ? Contaminant    BC- 520, 5/24- NG  - H/o recurrent UTI with pan sensitive E.coli     UC + on 4/18, 2/13, 1/23/20 & 8/01/19  - Elevated EBV IgG, for EBV by PCR  - No lymphadenopathy - cervical, axillary , inguinal on clinical exam  - RA on Xeljanz, Orencia , methotrexate , plaquenil causing immunosuppression  - Diabetes Mellitus A1c 5.7  - Vancomycin allergy         PLAN:         - BC x 2 now  & with fevers  - Start on Daptomycin IV, Ceftriaxone . Isolated splenic lesions are not common  .Malignancy , infarction unlikely per Hematology / Oncology . Will give trial of antibiotics . Pt does have a portocath , is immune suppressed. If infectious etiology is causing splenic lesions , coverage for Staph , MRSA is essential. Pt has had recurrent UTIs with pan sensitive E.coli , also felt clinically better on Ceftriaxone , even if it was for 3 days . Will add Ceftriaxone IV .   - Recommend EBV PCR since IgG was very high , also hepatitis & HIV testing   - Monitor Cr closely ,pt has CT abdomen x 3 , also CT neck , concern for nephrotoixicity. Recommend holding metformin , trying alternative hypoglycemic agent      Subjective:      Pt seen . Complains of pain in left side of abdomen . No fevers during hospitalization . Additional Notes   ----VS;  98.1 °F (36.7 °C) 88 102/53 -MAP 69    Lying left side 18 SAT 97 % RA   ----LABS; ALBUMIN 3.1,  HG a 1 c is 5.7, CK 23.     Bgluc 101.    ----Cardiology RN Note; once pt is seen by cardiologist we can then schedule the ANUSHKA ordered. ---No imaging. --- MEDS   tylenol 650 mg po , arformoterol /budesonide neb bid,    Rocephin 1 g iv q 24h,    vit d 3 1000 u po daily,    vit B 12 328 mcg po daily,folic acid 1 mg po daily,     CUBICIN 600 mg iv q 24h,    robinul 2 mg po bid, Plaquenil 200 mg po daily, mycostatin 500,000 u po qid, effer K 40 meq po daily,  inderal la 60 mg po daiy,    Lovenox 40 mg sc q 24h,    cathfow inj 1 mg IK ,    And other po meds.       Abdominal Pain, Undiagnosed - Care Day 9 (5/27/2020) by Rafaela Reyes         Review Status Review Entered   Completed 5/28/2020 15:18       Criteria Review      Care Day: 9 Care Date: 5/27/2020 Level of Care: Inpatient Floor    Guideline Day 2    Clinical Status    (X) * Hemodynamic stability    5/28/2020 15:17:51 EDT by Eric Sloan      98. 6   101   17  bp 110/67, sat 97 RA.    (X) * Pain absent or managed    5/28/2020 15:17:51 EDT by Dilshad Unger 4/10 abdomen    (X) * Surgery not indicated    ( ) * Liquid or advanced diet tolerated    5/28/2020 15:17:51 EDT by Geovanni Oliver Patient has been NPO all day for ANUSHKA.  No recent documented PO    ( ) * Discharge plans and education understood    5/28/2020 15:17:51 EDT by Richie Solares IV abx are being arranged    Activity    (X) * Ambulatory [E]    Routes    (X) * Oral hydration, medications, and diet    5/28/2020 15:17:51 EDT by Eric Sloan      meds; Rocephin 1 g iv q 24h, Cubicin 600 mg iv q 24h,   lovenox sc 40 mg q 24h, effer k po 40 meq q d, prednisone 10 mg po daily, ultram po 50 mg x 3, sorbitol 60 ml po ,   fentanyl 25 mcg x 2 iv , plus other po meds    (X) Liquid or advanced diet    5/28/2020 15:17:51 EDT by Eric Sloan      diab diet    * Milestone   Additional Notes   ---- 98.6 °F (37 °C) 100 102/65 - At rest 18 99 % ra   207#      -----------ANUSHKA   Result status: Final result   · Normal cavity size, wall thickness and systolic function (ejection fraction normal). Estimated left ventricular ejection fraction is 55 - 60%. · No evidence of endocarditis with the valves well-visualized. --------MEDICINE   Abatacept(Orencia) inhibits T-cell (T-lymphocyte) activation by binding to CD80 and CD86 on antigen presenting cells (APC)       Tofacitinib (xeljanz) inhibits Janus kinase (JANAY) enzymes, prevents cytokine- or growth factor-mediated gene expression and intracellular activity of immune cells, reduces circulating CD16/56+ natural killer cells, serum IgG, IgM, IgA, and C-reactive protein, and increases B cells.       Assessment / Plan:   Splenic inflammation/infection POA multiple hypodensities, ?  abscesses   Recent sepsis 5/13 with temp 102.3, , normal WBC   LUQ abdominal Pain POA   Immunocompromised POA on Abatacept then Tofacitinib for past 4 months   Weight loss 50 lbs per patient (20 lb documented since 8/2019)   Lactic acidosis lactate 2.6   Right portacath in place   Staph hominus bacteremia in one BC drawn from port (other cultures negative)   New onset LUQ pain since before last admit, fevers   [facility] admit  5/14 to 5/18/2020 for LUQ, sepsis, splenic hypodensities   No QUINCY in chest and abdomen   CTA chest no PE or ASD   BC x 1 negative, urine culture negative   F/u repeat cultures   Seen By Heme, felt lesions not consistent with infarcts on review with radiology > No Anticoagulation   SARS-CoV-2 test negative 5/14 and also 4/20 as outpatient   She was afebrile in hospital, discharged off antibiotics with outpatient f/u   Plan for possible ANUSHKA today   Febrile at home before re admit > 101 per her report   Afebrile here   Did had fever of 102.2 last admission   Gen surgery --> no surgical intervention   Admit blood culture 1/2 sets with Staph hominis   Appreciate ID (Dr Armstrong Prudent input), recommended 6 weeks of IV Daptomycin and IV Rocephin in settings of bacteremia with port / immunocompromised and recurrent UTIs    Taper steroids   Fungal blood culture pending   AFB blood culture and quantiferon in lab, results pending   Cryptococcal antigen negative   Histoplasmosis urinary antigen negative   followup repeat blood cultures   No fevers off antibiotics   Throat culture negative so far   CT neck with the persistent neck tenderness   RF < 10, CCP pending   Aspiration of the splenic lesions can risky so plan discussed with ID, they will follow as an OP and see how pt will do with abx       Hypokalemia   repleted       Chronic Constipation x 1 year   Normocytic anemia   GI are following now --> EGD and colonoscopy 05/22 unremarkable   ? Dr Milena Howard was consulted       History of rheumatoid arthritis   Methotrexate and Henry Lisa, cont holding, prior on abatacept   Continue hydroxychloroquine   On prednisone, was off for 2 months, then resumed just prior to 5/14 x 2 days         Concerned she had a RA flair initially          Start to wean, dropped from 20 to 10 mg on 5/24   SSA/SSB negative, RF < 10, check CCP   Rheumatology consult appreciated       Diabetes mellitus type 2   Holding home metformin. Sliding scale with blood sugar checks.        History of depression   History of GERD   Hypothyroidism   Hypertension   Continue home Wellbutrin, Lexapro, levothyroxine and Protonix   Propranolol       History of hyperreactive airway disease   Continue home albuterol and Symbicort.          Code Status: Full code   Surrogate Decision Maker:        DVT Prophylaxis:lovenox        Subjective: Pt seen and examined at bedside. Continue to have left upper quadrant abdominal pain but better then yesterday.       EXAM   Resp:               CTA bilaterally, no wheezing or rales.  No accessory muscle use   CV:                  Regular  rhythm,  No edema   GI:                   Soft, Non distended, tender in LUQ.  +Bowel sounds   Neurologic:      Alert and oriented X 3, normal speech,       ----------Infectious Disease Progress Note       IMPRESSION:    - H/o fevers, splenic lesions on CT scan , s/p initial improvement on Ceftriaxone / Flagyl on last admission     CT scan - splenic hypo densities, no splenomegaly     Pt s/p CT scans on 5/14, 5/17, 5/19    - Presence of portocath      BC - 5/19- Staph hominis 1/2 ? Contaminant     BC- 520, 5/24- NG   - H/o recurrent UTI with pan sensitive E.coli      UC + on 4/18, 2/13, 1/23/20 & 8/01/19   - Elevated EBV IgG, for EBV by PCR   - No lymphadenopathy - cervical, axillary , inguinal on clinical exam   - RA on Xeljanz, Orencia , methotrexate , plaquenil causing immunosuppression   - Diabetes Mellitus A1c 5.7   - Vancomycin allergy    - Negative Hepatitis profile        PLAN:    - BC x 2 now  & with fevers   -  Daptomycin IV, Ceftriaxone . Isolated splenic lesions are not common  .Malignancy , infarction unlikely per Hematology / Oncology . Will give trial of antibiotics . Pt does have a portocath , is immune suppressed. If infectious etiology is causing splenic lesions , coverage for Staph , MRSA is essential. Pt has had recurrent UTIs with pan sensitive E.coli , also felt clinically better on Ceftriaxone , even if it was for 3 days . Will add Ceftriaxone IV .    -  HIV testing    - ANUSHKA today     - Monitor Cr closely ,pt has CT abdomen x 3 , also CT neck , concern for nephrotoixicity. Recommend holding metformin , trying alternative hypoglycemic agent   Orders for DC   - Daptomycin 600mg IV daily & Ceftriaxone 1 G IV daily end date 7/7/20- pull picc at end of therapy   - Weekly CBC, CMP, CK & ESR/ CRP every other week fax reports to [phone], call with abnormal results at [phone]   - No statin while on Daptomycin   - Out pt follow up with ID , call for Virtual  appointment in 3 weeks   - Encourage probiotic, yogurt         Subjective:Pt seen . Complains of pain in left side of abdomen . No fevers during hospitalization .      ------DC planning; Pt will need IV ABX.     CM sent off to [facility] regarding pricing. CM also checking to see if they can provide pt with the nursing. Update   Cm received pricing from [facility] for IV infusion. Pt's copay will be $78.54 - CM informed pt of this of which she stated she cannot afford. CM informed MDs.  CM will follow-up on this tomorrow morning

## 2020-06-05 NOTE — PROGRESS NOTES
SHIN follow up. Covering for Reese Chawla, RN care manager. Patient with IP stay at [facility]  -  for sepsis, splenic inflammation, worsening LUQ pain. * Noted with migraine HA, DM (on sliding scale), and depression. * DC on home IV abx - provider [facility]. Projected end date     Chart review:  PCP Kareen Sales FU visit      Attempted to contact patient for transitions of care services. Verified  and address for HIPAA security. * Drove to 3 MD.    - Endo; pulmonary;     - Tired her out and she slept for 3 hours when she got home. - CM asks if she has any social support/  She does not. Hesitant to ask for help. - CM encourages her to reach out to friends/ co-workers for help during this stressful time. * Lives with mom - trying to set up palliative care for her. - Dementia/  Tama's Raul Alter last night/ mattress on the floor.     - Confused and wanders.     - Followed by [facility].   - Due to her mother's behaviors, she sleeps with many interruptions/ and little prolonged sleep time. Mostly 1-2 hours at a time. Discussed how this is can delay healing. * IV abx going well. - Reports was hanging on her bra and irritating the site. - Central line Left chest site / tagederm removed/ cleaned and reapplied by herself. - CM reminds patient that a RN visit can be arranged through [facility] -danger of changing dressing alone. - Again reinforced that patient is worthy of support and can reach out to friends, neighbors, and services for help. - If needs arise to call me and I can make calls, find resources or refer to [facility] SW.    * CM and pt acknowledge that nurses are hard to care for/ independent and self care. Hard to ask for help. - She is ordering groceries on line and having them delivered.      - CM asks if she would be willing to call one friend and just talk to them about her situation - see what is offered. * Medication changes:  None. CM encourages foods with active probiotic cultures while on abx. * FU physician visits:   Multiple visits in place with hematology,  ID, GYN. * Changes in treatment. - continued IV abx. * Patient education / resources  * Patient concerns  - Concerned re: hypodensity on liver; spleen and cervix - apts with ID and GYN.     -  Expects biopsy - r/o sarcoid/  Clotting factor disorder  * CM and patient discussed that COVID sequale seen has been anecdotally reported with clotting factor problems.      - She has tested negative twice/ but has not been screened for antibodies. May approach MDs about this. * CM concerns:  Stressors and readmisson risks. Lack of support at home; Will follow for [facility] services. Call in one week. Chart Review:   FU PCP virtual visit 6/4    She is a 42 y.o. female who presents for evaluation of hospital follow   She presented with fevers splenic lesions CT scan showed splenic hypo densities without splenomegaly . Dr Magaly Parra evaluated patient and recommended daptomycin and ceftriaxone with concern for possible source of infection ( seeding) and patient had ANUSHKA which was negative. She was discharged on 6 weeks of daptomycin and ceftriaxone. End date is 7/7/2020    One BC with with staph hominis and likely contaminant  Plan to repeat CT and if hypodensities still  present will need biopsy     Immunocompromised   currently holding erxcept for plaquenil and prednisone     Holding birth control and metformin      Sent home on insulin sliding scale     Having frequent headaches /migraines/     Using boric acid capsules and monistat for yeast infections and oral thrush.      She is on lexapro and plaquenil discussed I cant prescribe fluconazole due to drug interactions    Assessment/Plan:     1.  Hospital discharge follow-up  Patient had complicated hospitalization for sepsis and found to have splenic hypo densities without clear source of infection, negative ANUSHKA  She is on 6 weeks of daptomycin and ceftriaxone will need repeat CT scan at end of treatment and if still present will likely need a biopsy   Follow up with ID   Weekly labs per ID     2. Attention deficit hyperactivity disorder (ADHD), predominantly inattentive type  - lisdexamfetamine (Vyvanse) 30 mg capsule; Take 1 Cap by mouth every morning. Max Daily Amount: 30 mg. Dispense: 30 Cap; Refill: 0     3. Depression, unspecified depression type  - feels down due to current issues she is currently on lexapro and wellbutrin      4. Splenic hypo densities   Patient had complicated hospitalization for sepsis and found to have splenic hypo densities without clear source of infection, negative ANUSHKA  She is on 6 weeks of daptomycin and ceftriaxone will need repeat CT scan at end of treatment and if still present will likely need a biopsy      5. Controlled type 2 diabetes mellitus without complication, without long-term current use of insulin ([address]  Following up with endo, metformin stopped during hospitalization      6. Rebecca Medel   currently off of biologics, on plaquenil and prednisone 10mg followed by Dr Katrin Gonzalez     7. Yeast infection  Thrush and vaginal due to prednisone and antibiotics, advised to take probiotics, eat yogurt, continue with wish and swallow nystatin and monistat   Cannot prescribe fluconazole due to drug drug interactions      8. cervical hypodensity 2.3 x 2.2 cm which could  represent polyp, fibroid, mass, etc. Correlate clinically.   Will see gynecology     Follow up in 3 months

## 2020-06-07 LAB
EBV DNA SPEC QL NAA+PROBE: POSITIVE
SPECIMEN SOURCE: ABNORMAL

## 2020-06-12 ENCOUNTER — PATIENT OUTREACH (OUTPATIENT)
Dept: OTHER | Age: 40
End: 2020-06-12

## 2020-06-12 ENCOUNTER — TELEPHONE (OUTPATIENT)
Dept: FAMILY MEDICINE CLINIC | Age: 40
End: 2020-06-12

## 2020-06-12 NOTE — TELEPHONE ENCOUNTER
Please inform pt that EBV PCR was + .    recommend - rest , dink lots of fluids , avoid contact sports

## 2020-06-12 NOTE — TELEPHONE ENCOUNTER
Two pt identifiers confirmed. Informed pt of results per Dr. Se Dawkins: EBV PCR was +. Recommends rest, drink a lots of fluids, and avoid contact sports. Pt verbalized understanding of information discussed w/ no further questions at this time.

## 2020-06-13 DIAGNOSIS — F32.A DEPRESSION, UNSPECIFIED DEPRESSION TYPE: ICD-10-CM

## 2020-06-13 LAB
BACTERIA SPEC CULT: NORMAL
SERVICE CMNT-IMP: NORMAL

## 2020-06-15 RX ORDER — BUPROPION HYDROCHLORIDE 150 MG/1
TABLET ORAL
Qty: 90 TAB | Refills: 3 | Status: SHIPPED | OUTPATIENT
Start: 2020-06-15 | End: 2020-12-20

## 2020-06-16 ENCOUNTER — TELEPHONE (OUTPATIENT)
Dept: FAMILY MEDICINE CLINIC | Age: 40
End: 2020-06-16

## 2020-06-16 NOTE — TELEPHONE ENCOUNTER
Two pt identifiers confirmed. Informed pt due to MD emergency. Provider is currently out of the office for the remaining of the week. Due to protocol LPN is waiting on provider to give reschedule appt date and time. Will return call once responded. No refills at this time. No concerns at this time. Pt verbalized understanding of information discussed w/ no further questions at this time.

## 2020-06-16 NOTE — TELEPHONE ENCOUNTER
----- Message from Nuvia Galaviz sent at 2020 11:48 AM EDT -----  Regarding: MD RIZVI/Telephone  Appointment not available    Patient's first and last name: Lety Cox  : 1980  ID numbers: #[id number]    Caller's first and last name and relationship to patient (if not the patient): pt  Best contact number: [phone]  Preferred date and time: Any day Anytime  Scheduled appointment date and time: n/a   Reason for appointment:  Routine care  Details to clarify the request: Pt appointment on 2020 was cancelled due to provide emergency. Pt would like to reschedule as soon as possible.

## 2020-06-19 ENCOUNTER — TELEPHONE (OUTPATIENT)
Dept: INTERNAL MEDICINE CLINIC | Age: 40
End: 2020-06-19

## 2020-06-19 NOTE — TELEPHONE ENCOUNTER
#[phone] pt is asking if you received short term disability forms on 6-18-20? Please call pt to let her know that you received those.

## 2020-06-19 NOTE — TELEPHONE ENCOUNTER
Spoke with patient. Two pt identifiers confirmed. Patient advised that her paperwork was received and is in Dr. Felix Record office to be completed. Patient advised that we will contact her if there are any issues or once her paperwork has been completed. Pt verbalized understanding of information discussed w/ no further questions at this time.

## 2020-06-23 NOTE — TELEPHONE ENCOUNTER
Two pt identifiers confirmed. Informed pt per Dr. Toyin Sunshine we can schedule for 06/24 @1400. Pt accepts and is aware of billable VV appt depending on insurance plan. PCP: Bijan George MD    Last appt: Visit date not found  Future Appointments   Date Time Provider Екатерина Kern   6/24/2020  2:00 PM Flavia Sierra MD [address]   7/6/2020 11:45 AM App[facility]eferino Lopez [facility]     Pt verbalized understanding of information discussed w/ no further questions at this time.

## 2020-06-24 ENCOUNTER — VIRTUAL VISIT (OUTPATIENT)
Dept: FAMILY MEDICINE CLINIC | Age: 40
End: 2020-06-24

## 2020-06-24 ENCOUNTER — TELEPHONE (OUTPATIENT)
Dept: FAMILY MEDICINE CLINIC | Age: 40
End: 2020-06-24

## 2020-06-24 DIAGNOSIS — R78.81 BACTEREMIA DUE TO STAPHYLOCOCCUS: ICD-10-CM

## 2020-06-24 DIAGNOSIS — D73.9 SPLENIC DISORDER: Primary | ICD-10-CM

## 2020-06-24 DIAGNOSIS — E11.8 CONTROLLED TYPE 2 DIABETES MELLITUS WITH COMPLICATION, WITHOUT LONG-TERM CURRENT USE OF INSULIN (HCC): ICD-10-CM

## 2020-06-24 DIAGNOSIS — D73.5 SPLENIC INFARCTION: ICD-10-CM

## 2020-06-24 DIAGNOSIS — R10.12 LEFT UPPER QUADRANT ABDOMINAL PAIN: ICD-10-CM

## 2020-06-24 DIAGNOSIS — B27.90 EBV INFECTION: ICD-10-CM

## 2020-06-24 DIAGNOSIS — D84.9 IMMUNOSUPPRESSED STATUS (HCC): ICD-10-CM

## 2020-06-24 DIAGNOSIS — E66.9 OBESITY (BMI 30.0-34.9): ICD-10-CM

## 2020-06-24 DIAGNOSIS — N39.0 RECURRENT UTI (URINARY TRACT INFECTION): ICD-10-CM

## 2020-06-24 DIAGNOSIS — E87.6 HYPOKALEMIA: ICD-10-CM

## 2020-06-24 DIAGNOSIS — B95.8 BACTEREMIA DUE TO STAPHYLOCOCCUS: ICD-10-CM

## 2020-06-24 NOTE — PROGRESS NOTES
Infectious Disease Clinic    IMPRESSION:     - H/o fevers, splenic lesions on CT scan , s/p initial improvement on Ceftriaxone / Flagyl on last admission    CT scan - splenic hypo densities, no splenomegaly    Pt s/p CT scans on 5/14, 5/17, 5/19   - Presence of portocath     BC - 5/19- Staph hominis 1/2 ? Contaminant    BC- 520, 5/24- NG  - H/o recurrent UTI with pan sensitive E.coli     UC + on 4/18, 2/13, 1/23/20 & 8/01/19  - Elevated EBV IgG,  EBV by PCR also +  - No lymphadenopathy - cervical, axillary , inguinal on clinical exam  - RA on Xeljanz, Orencia , methotrexate , plaquenil causing immunosuppression  - Diabetes Mellitus A1c 5.7  - Low potassium 3.4, pt advised to consume potassium containing foods - banamas , OJ  - Vancomycin allergy   - Negative Hepatitis profile       PLAN:          -  Daptomycin IV, Ceftriaxone . Isolated splenic lesions are not common  . Malignancy , infarction unlikely per Hematology / Oncology . Will give trial of antibiotics . Pt does have a portocath, is immune suppressed. Removal of luis cath discussed , pt denies any redness , pain , swelling around luis cath. If infectious etiology is causing splenic lesions , coverage for Staph , MRSA is essential. Pt has had recurrent UTIs with pan sensitive E.coli , also felt clinically better on Ceftriaxone , even if it was for 3 days . Will add Ceftriaxone IV .    - Monitor Cr closely ,pt has CT abdomen x 3 , also CT neck , concern for nephrotoixicity. Recommend holding metformin , trying alternative hypoglycemic agent    - Daptomycin 600mg IV daily & Ceftriaxone 1 G IV daily end date 7/7/20- - Weekly CBC, CMP, CK & ESR/ CRP every other week fax reports to [phone], call with abnormal results at [phone]  - No statin while on Daptomycin  - Low potassium for oral potassium, to be called in to Pharmacy  - Pt encouraged to take  probiotic, yogurt   - For follow up CT scan of abdomen  -Plan of care D/w pt , she is agreeable. Subjective:     Pt seen via Virtual technology . Feels better than previously . Fatigued , but better than before  No fever, chills  Pursuant to the emergency declaration under the [facility] waiver authority and the [employer] Act, this Virtual Visit was conducted, with patient's consent, to reduce the patient's risk of exposure to COVID-19 and provide continuity of care for an established patient. Services were provided through a video synchronous discussion virtually to substitute for in-person visit. Pt is  aware that this Telehealth encounter is a billable service, with coverage as determined by her insurance carrier. She is aware that she may receive a bill and has provided verbal consent to proceed: Yes. Review of Systems:  A comprehensive review of systems was negative. 10 point ROS obtained . All other systems negative . Objective: There were no vitals taken for this visit. Lines: port       Physical exam:  Limited   GEN: NAD  NECK- supple  RESP: no distress  NEURO:non focal  No  LE edema     Data Review:   6/17- wbc - 8.6, Hb 11.4  BUN/ Cr- 7/0.85    Studies:      Lab Results   Component Value Date/Time    Culture result: NO GROWTH 5 DAYS 05/26/2020 07:15 PM    Culture result: NO GROWTH 6 DAYS 05/24/2020 02:39 PM    Culture result: NORMAL RESPIRATORY VERNON/NO BETA STREP ISOLATED 05/24/2020 06:30 AM    Culture result: NO GROWTH 21 DAYS 05/23/2020 02:48 AM    Culture result: NO GROWTH 5 DAYS 05/20/2020 04:22 PM        XR Results (most recent):  Results from Hospital Encounter encounter on 05/19/20   XR CHEST PORT    Narrative EXAM: Portable CXR. 1243 hours. COMPARISON: 5/14/2020. INDICATION: sob    FINDINGS:  The lungs appear clear. Heart is normal in size. There is no pulmonary edema. There is no evident pneumothorax, adenopathy or pleural effusion. Port catheter  is present.  No change. Impression IMPRESSION: No Acute Disease. Patient Active Problem List   Diagnosis Code    Hypothyroidism (acquired) E03.9    Fibromyalgia M79.7    Hyperhidrosis R61    Environmental and seasonal allergies J30.89    Rectal bleeding K62.5    Family history of colonic polyps Z83.71    Encounter for screening colonoscopy Z12.11    Rheumatoid arthritis ([facility] M06.9    Asthma J45.909    Severe obesity (BMI 35.0-39. 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 I have discussed medication side effects and warnings with the patient as well.     Reviewed test results at length with patient    Anti-infectives:       Daptomycin/ Ceftriaxone     Judson Machuca MD [address]

## 2020-06-24 NOTE — TELEPHONE ENCOUNTER
Called, left vm in regards to recommendations for pt to return call to office. Two pt identifiers confirmed. Informed Regan Workman (Renzo Russ) per Dr. Se Dawkins that IV antibiotics should be completed on 7/7 as planned. Regan Workman verbalized understanding of information discussed w/ no further questions at this time.

## 2020-06-24 NOTE — PATIENT INSTRUCTIONS
Danal d/b/a [employer] Activation Thank you for requesting access to [employer]. Please follow the instructions below to securely access and download your online medical record. [employer] allows you to send messages to your doctor, view your test results, renew your prescriptions, schedule appointments, and more. How Do I Sign Up? 1. In your internet browser, go to [email or web address]. 2. Click on the First Time User? Click Here link in the Sign In box. You will see the New Member Sign Up page. 3. Enter your [employer] Access Code exactly as it appears below. You will not need to use this code after youve completed the sign-up process. If you do not sign up before the expiration date, you must request a new code. [employer] Access Code: Activation code not generated Current [employer] Status: Active (This is the date your [employer] access code will ) 4. Enter the last four digits of your Social Security Number (xxxx) and Date of Birth (mm/dd/yyyy) as indicated and click Submit. You will be taken to the next sign-up page. 5. Create a [employer] ID. This will be your [employer] login ID and cannot be changed, so think of one that is secure and easy to remember. 6. Create a [employer] password. You can change your password at any time. 7. Enter your Password Reset Question and Answer. This can be used at a later time if you forget your password. 8. Enter your e-mail address. You will receive e-mail notification when new information is available in [address] 9. Click Sign Up. You can now view and download portions of your medical record. 10. Click the Download Summary menu link to download a portable copy of your medical information. Additional Information If you have questions, please visit the Frequently Asked Questions section of the [employer] website at [email or web address] Remember, [employer] is NOT to be used for urgent needs. For medical emergencies, dial 911.

## 2020-06-24 NOTE — PROGRESS NOTES
VV -Pt is aware of billable appt depending on insurance plan. Preferred phone #[phone]  Pt verbally agrees to labs, orders, and others to be mailed to confirmed home address. Identified pt with two pt identifiers(name and ). Reviewed record in preparation for visit and have obtained necessary documentation. All patient medications has been reviewed. Chief Complaint   Patient presents with    Follow Up Chronic Condition     H/o fevers, splenic lesions on CT scan     Labs     EBV concerns       Health Maintenance Due   Topic    Foot Exam Q1     MICROALBUMIN Q1     Eye Exam Retinal or Dilated     Lipid Screen     PAP AKA CERVICAL CYTOLOGY     Pneumococcal 0-64 years (2 of 3 - PPSV23)     Pt states, pain level 5  There were no vitals filed for this visit. Wt Readings from Last 3 Encounters:   20 207 lb 14.3 oz (94.3 kg)   20 207 lb 3.7 oz (94 kg)   20 211 lb 6.7 oz (95.9 kg)     Temp Readings from Last 3 Encounters:   20 98.8 °F (37.1 °C)   20 98.2 °F (36.8 °C)   20 99 °F (37.2 °C)     BP Readings from Last 3 Encounters:   20 97/55   20 147/87   20 103/72     Pulse Readings from Last 3 Encounters:   20 89   20 66   20 98       Lab Results   Component Value Date/Time    Hemoglobin A1c 5.7 (H) 2020 11:31 AM       Coordination of Care Questionnaire:   1) Have you been to an emergency room, urgent care, or hospitalized since your last visit?   no       2. Have seen or consulted any other health care provider since your last visit? YES  2020  [address], [place]   Internal Medicine   Hospital discharge follow-up       3) Do you have an Advanced Directive/ Living Will in place?  YES  If yes, do we have a copy on file YES  If no, would you like information NO    Patient is accompanied by self I have received verbal consent from Nelsy Ceja to discuss any/all medical information while they are present in the room.

## 2020-06-24 NOTE — TELEPHONE ENCOUNTER
Two pt identifiers confirmed. Informed pt per Dr. Rosen Outlaw that she tried to do potassium pills , but they wre interacting with some of her other medications . Please ask her to eat bananas, drink GatoradeOJ ,discuss with PCP . CT abd/pelvis ordered. They should call her . Provided number if they do not call pt. Pt verbalized understanding of information discussed w/ no further questions at this time.

## 2020-06-29 ENCOUNTER — HOSPITAL ENCOUNTER (OUTPATIENT)
Dept: CT IMAGING | Age: 40
Discharge: HOME | End: 2020-06-29
Attending: INTERNAL MEDICINE
Payer: COMMERCIAL

## 2020-06-29 PROCEDURE — 74177 CT ABD & PELVIS W/CONTRAST: CPT

## 2020-06-29 PROCEDURE — 74011636320 HC RX REV CODE- 636/320: Performed by: INTERNAL MEDICINE

## 2020-06-29 PROCEDURE — 74011000255 HC RX REV CODE- 255: Performed by: INTERNAL MEDICINE

## 2020-06-29 RX ORDER — SODIUM CHLORIDE 0.9 % (FLUSH) 0.9 %
10 SYRINGE (ML) INJECTION
Status: COMPLETED | OUTPATIENT
Start: 2020-06-29 | End: 2020-06-29

## 2020-06-29 RX ORDER — BARIUM SULFATE 20 MG/ML
900 SUSPENSION ORAL
Status: COMPLETED | OUTPATIENT
Start: 2020-06-29 | End: 2020-06-29

## 2020-06-29 RX ADMIN — Medication 10 ML: at 16:05

## 2020-06-29 RX ADMIN — IOPAMIDOL 100 ML: 755 INJECTION, SOLUTION INTRAVENOUS at 16:04

## 2020-06-29 RX ADMIN — BARIUM SULFATE 450 ML: 21 SUSPENSION ORAL at 16:05

## 2020-06-30 ENCOUNTER — TELEPHONE (OUTPATIENT)
Dept: INTERNAL MEDICINE CLINIC | Age: 40
End: 2020-06-30

## 2020-06-30 NOTE — TELEPHONE ENCOUNTER
#[phone]   Pt states she needs to hear from you to see if forms are completed for short term disability. Please call as soon as possible pt ask. Thanks.

## 2020-07-01 NOTE — TELEPHONE ENCOUNTER
Spoke with patient. Two pt identifiers confirmed. Patient advised that we have not received short term disability forms for her. Patient advised to have them refaxed to [phone]. Pt verbalized understanding of information discussed w/ no further questions at this time.

## 2020-07-06 ENCOUNTER — VIRTUAL VISIT (OUTPATIENT)
Dept: INTERNAL MEDICINE CLINIC | Age: 40
End: 2020-07-06

## 2020-07-06 DIAGNOSIS — F33.41 RECURRENT MAJOR DEPRESSIVE DISORDER, IN PARTIAL REMISSION (HCC): ICD-10-CM

## 2020-07-06 DIAGNOSIS — F90.0 ATTENTION DEFICIT HYPERACTIVITY DISORDER (ADHD), PREDOMINANTLY INATTENTIVE TYPE: ICD-10-CM

## 2020-07-06 DIAGNOSIS — D73.3 SPLENIC ABSCESS: Primary | ICD-10-CM

## 2020-07-06 DIAGNOSIS — F32.A DEPRESSION, UNSPECIFIED DEPRESSION TYPE: ICD-10-CM

## 2020-07-06 NOTE — PROGRESS NOTES
Reviewed record in preparation for visit and have obtained necessary documentation. Identified pt with two pt identifiers(name and ). Chief Complaint   [facility] with   [facility]       Health Maintenance Due   Topic Date Due    Diabetic Foot Care  1992    Albumin Urine Test  1992    Eye Exam  1992    Cholesterol Test   1992    Pap Test  2012    Pneumococcal Vaccine (2 of 3 - PPSV23) 2017       Ms. Jaime Pérez has a reminder for a \"due or due soon\" health maintenance. I have asked that she discuss this further with her primary care provider for follow-up on this health maintenance. Coordination of Care Questionnaire:  :     1) Have you been to an emergency room, urgent care clinic since your last visit? no   Hospitalized since your last visit? no             2) Have you seen or consulted any other health care providers outside of [address] [place] [address] since your last visit? no  (Include any pap smears or colon screenings in this section.)    3) In the event something were to happen to you and you were unable to speak on your behalf, do you have an Advance Directive/ Living Will in place stating your wishes? NO    Do you have an Advance Directive on file? no    4) Are you interested in receiving information on Advance Directives? NO    Patient is accompanied by self I have received verbal consent from [address] [place] [address] to discuss any/all medical information while they are present in the room.

## 2020-07-06 NOTE — PROGRESS NOTES
CC: splenic abscess, paper work       HPI:      She is a 47 y.o. female who presents for evaluation of hospital follow   She presented with fevers splenic lesions CT scan showed splenic hypo densities without splenomegaly . Dr Noris Childs evaluated patient and recommended daptomycin and ceftriaxone with concern for possible source of infection ( seeding) and patient had ANUSHKA which was negative. She was discharged on 6 weeks of daptomycin and ceftriaxone. End date is 7/7/2020  She is slowly feeling better   Plan is to return to work part time July 11th     [facility] with with staph hominis and likely contaminant  Plan to repeat CT and if hypodensities still  present will need biopsy    Immunocompromised   currently holding erxcept for plaquenil and prednisone    Holding birth control and metformin     Since discharge, fever resolved, patient in general feels run down   Feels that her RA and fibromyalgia are not well controlled since stopping biologic medications due to infection    Having frequent headaches /migraines        This is an established visit conducted via telemedicine with video. The patient has been instructed that this meets HIPAA criteria and acknowledges and agrees to this method of visitation. Pursuant to the emergency declaration under the [facility], 1135 waiver authority and the [employer] Act, this Virtual Visit was conducted, with patient's consent, to reduce the patient's risk of exposure to COVID-19 and provide continuity of care for an established patient. Services were provided through a video synchronous discussion virtually to substitute for in-person clinic visit.        ROS:  Constitutional: negative for fevers, chills, anorexia and weight loss  10 systems reviewed and negative other than HPI     Past Medical History:   Diagnosis Date    Acquired hypothyroidism     Adverse effect of anesthesia     labile BP during/after C section    Asthma     Broken bones     history of 9 broken bones    Chronic UTI (urinary tract infection)     \"extra valve right kidney causes UTI\"    Fibromyalgia     Gestational diabetes     GI bleed 2007,2011,2015,2016    lower GI last colonoscopy in 2016 normal     Huntingtons chorea (HCC)     Hyperhydrosis disorder     Ill-defined condition     Chris/ tested genetically - daughter has Gordon    Infertility     PCOS    PCOS (polycystic ovarian syndrome)     Precancerous skin lesion     removed from Right shoulder    Preeclampsia 2011    pregnancy    Reactive airway disease     Rheumatoid arthritis ([address]     SVT (supraventricular tachycardia) ([facility] [address] [facility] 2011    occured during pregnancy when picc line inserted. Current Outpatient Medications on File Prior to Visit   Medication Sig Dispense Refill    buPROPion XL (WELLBUTRIN XL) 150 mg tablet TAKE 1 TABLET BY MOUTH EVERY DAY IN THE MORNING 90 Tab 3    montelukast (SINGULAIR) 10 mg tablet Take 1 Tab by mouth daily. (Patient taking differently: Take 10 mg by mouth daily. Pt states, prn) 90 Tab 4    escitalopram oxalate (LEXAPRO) 20 mg tablet Take 1 Tab by mouth daily. 90 Tab 1    lisdexamfetamine (Vyvanse) 30 mg capsule Take 1 Cap by mouth every morning. Max Daily Amount: 30 mg. 30 Cap 0    predniSONE (DELTASONE) 10 mg tablet Take 10 mg by mouth daily. 30 Tab 0    insulin lispro (HumaLOG KwikPen Insulin) 100 unit/mL kwikpen Check Blood Sugar before each meal 3 times daily    INITIATE SLIDING SCALE INSULIN (DURGA): For Blood Sugar (mg/dl) of :             150-200=2 units           201-250=4 units     251-300=6 units  301-350=8 units  351-400=10 units 1 Package 0    insulin needles, disposable, (Pen Needle) 29 gauge x 1/2\" ndle To dispense insulin as directed 100 Pen Needle 0    cefTRIAXone 1 gram 1 g, ADDaptor 1 Device IVPB 1 g by IntraVENous route every twenty-four (24) hours for 40 days.  36 Dose 0    DAPTOmycin (CUBICIN) IVPB 600 mg by IntraVENous route every twenty-four (24) hours for 40 days. 40 Dose 0    diphenhydrAMINE (BenadryL) 25 mg capsule Take 25 mg by mouth every six (6) hours as needed.  BORIC ACID 600 mg by Does Not Apply route daily. Vaginal      nitrofurantoin (MACRODANTIN) 50 mg capsule Take 1 Cap by mouth daily as needed (post coital). 30 Cap 1    PREVIDENT 5000 SENSITIVE 1.1-5 % pste USE AS DIRECTED TWICE A DAY SPIT OUT EXCESS AND DO NOT RINSE, EAT OR DRINK FOR 30 MINUTES      hydroxychloroquine (PLAQUENIL) 200 mg tablet 200 mg daily.  lidocaine-prilocaine (EMLA) topical cream For port flushing every 3 month  2    BD SYRINGE 1 mL 25 gauge x 5/8\" syrg 1 SYRINGE ONCE A WEEK  4    heparin sodium,porcine (HEPARIN LOCK FLUSH, PORCINE, IV) by IntraVENous route. For port flush every 3 months      omeprazole (PRILOSEC) 40 mg capsule Take 1 Cap by mouth daily. 30 Cap 0    Cetirizine (ZYRTEC) 10 mg cap Take 10 mg by mouth daily. Indications: inflammation of the nose due to an allergy 30 Cap 0    albuterol (PROVENTIL HFA, VENTOLIN HFA, PROAIR HFA) 90 mcg/actuation inhaler Take 2 Puffs by inhalation every four (4) hours as needed for Wheezing. 1 Inhaler 0    omalizumab (XOLAIR SC) by SubCUTAneous route every thirty (30) days. Given by Dr Johnathan Fox in his office      propranolol LA (INDERAL LA) 60 mg SR capsule TAKE 1 CAPSULE BY MOUTH EVERY DAY 90 Cap 3    SYMBICORT 160-4.5 mcg/actuation HFAA TAKE 2 PUFFS BY MOUTH TWICE A DAY  12    OZEMPIC 0.25 mg or 0.5 mg(2 mg/1.5 mL) sub-q pen by SubCUTAneous route every seven (7) days. Patient states she takes 1 ml every friday  1    ondansetron hcl (ZOFRAN) 4 mg tablet Take 4 mg by mouth every eight (8) hours as needed for Nausea.  naltrexone (DEPADE) 50 mg tablet Take  by mouth daily.       EPIPEN 2-SANJAY 0.3 mg/0.3 mL injection 1 (ONE) INJECTION AS NEEDED  0    traMADol (ULTRAM) 50 mg tablet Take 50 mg by mouth every six (6) hours as needed for Pain.  oxybutynin (DITROPAN) 5 mg tablet 5 mg two (2) times a day. Patient states she takes 1 tablet bid  3    BD INSULIN SYRINGE 1 mL 25 gauge x 5/8\" syrg USE 1 SYRINGE ONCE A WEEK  11    cyanocobalamin (Vitamin B-12) 1,000 mcg tablet Take  by mouth daily.  RESTASIS 0.05 % ophthalmic emulsion INSTILL 1 DROP INTO EACH EYE TWICE DAILY  4    SAFETY-ABDI TB SYR 1CC/25GX5/8\" 1 mL 25 gauge x 5/8\" syrg USE TO INJECT METHOTREXATE ONCE A WEEK  2    folic acid (FOLVITE) 1 mg tablet TAKE 1 TABLET ORALLY DAILY  11    desogestrel-ethinyl estradiol (DESOGEN) 0.15-0.03 mg tab Take 1 Tab by mouth nightly.  Nebulizer & Compressor machine 1 Each by Does Not Apply route three (3) times daily as needed. 1 Each 0    glycopyrrolate (ROBINUL) 1 mg tablet Take 2 mg by mouth two (2) times a day. Indications: hyperhydrosis      cholecalciferol (Vitamin D3) (1000 Units /25 mcg) tablet Take 1 Tab by mouth daily.  levothyroxine (SYNTHROID) 75 mcg tablet Take 75 mcg by mouth Daily (before breakfast). No current facility-administered medications on file prior to visit.         Past Surgical History:   Procedure Laterality Date    COLONOSCOPY N/A 6/13/2016    [facility]    COLONOSCOPY N/A 5/22/2020    COL[facility] Slime [facility] at [facility]  5/22/2020         HX HEENT      HX WISDOM TEETH EXTRACTION  2004    UPPER GI ENDOSCOPY,BIOPSY  2/12/2019         UPPER GI ENDOSCOPY,BIOPSY  5/22/2020         UPPER GI ENDOSCOPY,DILATN W GUIDE  2/12/2019            Family History   Problem Relation Age of Onset    Other Mother         [place]'s disease    Hypertension Mother     Dementia Mother     High Cholesterol Father     Heart Disease Father     Diabetes Father     Hypertension Father     Asthma Brother     Diabetes Brother     Other Brother         varicocele, hernias    Hypertension Brother  Alcohol abuse Brother     Hypertension Maternal Grandmother     Elevated Lipids Maternal Grandmother     Cancer Maternal Grandmother         breast    Other Maternal Grandmother         polymyalgia rheumatica    Glaucoma Maternal Grandmother     Cancer Maternal Grandfather         prostate    Huntingtons disease Maternal Grandfather     Cancer Paternal Grandmother         lung with mets    Cancer Paternal Grandfather         prostate, colon    Diabetes Paternal Grandfather     Heart Disease Paternal Grandfather     Alzheimer Paternal Grandfather     Dementia Paternal Grandfather      Reviewed and no changes     Social History     Socioeconomic History    Marital status:      Spouse name: Not on file    Number of children: Not on file    Years of education: Not on file    Highest education level: Not on file   Occupational History    Not on file   Social Needs    Financial resource strain: Not on file    Food insecurity     Worry: Not on file     Inability: Not on file   [employer] needs     Medical: Not on file     Non-medical: Not on file   Tobacco Use    Smoking status: Never Smoker    Smokeless tobacco: Never Used   Substance and Sexual Activity    Alcohol use: Yes     Comment: few drinks per year    Drug use: No    Sexual activity: Not on file   Lifestyle    Physical activity     Days per week: Not on file     Minutes per session: Not on file    Stress: Not on file   Relationships    Social connections     Talks on phone: Not on file     Gets together: Not on file     Attends Taoist service: Not on file     Active member of club or organization: Not on file     Attends meetings of clubs or organizations: Not on file     Relationship status: Not on file    Intimate partner violence     Fear of current or ex partner: Not on file     Emotionally abused: Not on file     Physically abused: Not on file     Forced sexual activity: Not on file   Other Topics Concern    Not on file   Social History Narrative    ** Merged History Encounter **               There were no vitals taken for this visit. Physical Examination:   Gen: well appearing female  HEENT: normal conjunctiva, no audible congestion, patient does not see oral erythema, has MMM  Neck: patient does not feel enlarged or tender LAD or masses  Resp: normal respiratory effort, no audible wheezing. Port a cath visualized appears clean dry intact   CV: patient does not feel palpitations or heart irregularity  Abd: patient does not feel abdominal tenderness or mass, patient does not notice distension  Extrem: patient does not see swelling in ankles or joints.    Neuro: Alert and oriented, able to answer questions without difficulty, able to move all extremities and walk normally          Lab Results   Component Value Date/Time    WBC 6.7 05/26/2020 11:31 AM    HGB 12.0 05/26/2020 11:31 AM    HCT 37.2 05/26/2020 11:31 AM    PLATELET 331 20/63/1315 11:31 AM    MCV 94.7 05/26/2020 11:31 AM     Lab Results   Component Value Date/Time    Sodium 136 05/28/2020 12:12 PM    Potassium 4.3 05/28/2020 12:12 PM    Chloride 103 05/28/2020 12:12 PM    CO2 30 05/28/2020 12:12 PM    Anion gap 3 (L) 05/28/2020 12:12 PM    Glucose 137 (H) 05/28/2020 12:12 PM    BUN 8 05/28/2020 12:12 PM    Creatinine 0.81 05/28/2020 12:12 PM    BUN/Creatinine ratio 10 (L) 05/28/2020 12:12 PM    GFR est AA >60 05/28/2020 12:12 PM    GFR est non-AA >60 05/28/2020 12:12 PM    Calcium 9.2 05/28/2020 12:12 PM     No results found for: CHOL, CHOLPOCT, CHOLX, CHLST, CHOLV, HDL, HDLPOC, HDLP, LDL, LDLCPOC, LDLC, DLDLP, VLDLC, VLDL, TGLX, TRIGL, TRIGP, TGLPOCT, CHHD, CHHDX  Lab Results   Component Value Date/Time    TSH 1.48 10/30/2015 05:11 PM     No results found for: PSA, Emir Alcaraz, PSAR3, [id number], [id number], PSALT  Lab Results   Component Value Date/Time    Hemoglobin A1c 5.7 (H) 05/26/2020 11:31 AM     No results found for: Cecelia Lindsay, Marge Henry [id number]    Lab Results   Component Value Date/Time    ALT (SGPT) 49 05/28/2020 12:12 PM    Alk. phosphatase 82 05/28/2020 12:12 PM    Bilirubin, total 0.5 05/28/2020 12:12 PM           Assessment/Plan:    1. Hospital discharge follow-up  Patient had complicated hospitalization for sepsis and found to have splenic hypo densities without clear source of infection, negative ANUSHKA  She is on 6 weeks of daptomycin and ceftriaxone will need repeat CT scan at end of treatment and if still present will likely need a biopsy   Follow up with ID   Weekly labs per ID  Will finish antibiotics on July 7th   Plans to return to work part time on July 11th - highly recommend working from home due to [place] risk and immunocompromised       2. Attention deficit hyperactivity disorder (ADHD), predominantly inattentive type  -on vyvanse and needs it refilled   Orders Placed This Encounter    lisdexamfetamine (Vyvanse) 30 mg capsule     Sig: Take 1 Cap by mouth every morning. Max Daily Amount: 30 mg. Dispense:  30 Cap     Refill:  0       3. Depression, unspecified depression type  - feels down due to current issues she is currently on lexapro and wellbutrin     4. Splenic hypo densities   Patient had complicated hospitalization for sepsis and found to have splenic hypo densities without clear source of infection, negative ANUSHKA  She is on 6 weeks of daptomycin and ceftriaxone will need repeat CT scan at end of treatment and if still present will likely need a biopsy     5. Controlled type 2 diabetes mellitus without complication, without long-term current use of insulin ([address]  Following up with endo, metformin stopped during hospitalization     6. Jannie Jayla   currently off of biologics, on plaquenil and prednisone 10mg followed by Dr Omar Rivera       7. cervical hypodensity 2.3 x 2.2 cm which could  represent polyp, fibroid, mass, etc. Correlate clinically.  US looked like calcifcations  Had pap smear which was abnormal and had cervical and endocervical scraping with HPV and then had colposcopy and waiting on results      Follow up in 3 months         Margarita Ramirez MD    This is an established visit conducted via real time video and audio telemedicine. The patient has been instructed that this meets HIPAA criteria and acknowledges and agrees to this method of visitation.

## 2020-07-07 LAB
ACID FAST STN SPEC: NORMAL
MYCOBACTERIUM SPEC QL CULT: NEGATIVE
SPECIMEN PREPARATION: NORMAL
SPECIMEN SOURCE: NORMAL

## 2020-07-10 ENCOUNTER — TELEPHONE (OUTPATIENT)
Dept: FAMILY MEDICINE CLINIC | Age: 40
End: 2020-07-10

## 2020-07-10 ENCOUNTER — PATIENT OUTREACH (OUTPATIENT)
Dept: OTHER | Age: 40
End: 2020-07-10

## 2020-07-10 NOTE — TELEPHONE ENCOUNTER
Hollie Arias from [facility], stating orders for antibiotic ended on 7-7-20    Can the port line be deaccessed    Please call [phone]

## 2020-07-10 NOTE — PROGRESS NOTES
Care Manager contacted the patient by telephone in follow up. Verified  and zip code with patient as identifiers. 48 yo female with recent inpatient stay for sepsis, splenic inflammation and worsening LUQ pain;    PMH:  Rheumatoid Arthritis, Fibromyalgia, IDDM, Migraine HAs, depression;    Assessment of clinical changes and knowledge demonstrated since last call:   Ongoing Plan of Care:     Goals        Chronic Disease     Demonstrate adherence behavior to treatment regimen and Hematology FU testing and consult;       Completed FU appt and labs; Post ED     Demonstrates behaviors to self-manage /prevent infection        Denies red flags or infection since DC;   Continues on Immunosuppressive therapy by Rheumatology;        Establish PCP relationships and regularly scheduled appointments.      Continues to work with PCP, Dr Audi Shelton;.  Infectious Disease:  Completed IV antibiotics home infusion;   GYN:   Endometrial biopsies negative;   Liver and Splenic lesions have resolved;    Rheum:  Continues with Immunosuppression;          Post Hospitalization     Performs desired life activities at level of ability;       Reports feeling better;   Reports wanting to return to work at first part time;   Performing daily tasks without difficulty;   Wants to continue progressing a little at a time; Review and discussion of plan of care with patient, who has provided input to plan, verbalized understanding and agrees with current goals. Any recurrence Red Flags or continued symptoms: None;     Medication Regimen Change:  No changes  Completed a review of medications with patient, who verbalized understanding of how and when to take medications. Barriers / Adherence with medications:      Upcoming Appointments:  Just completed both PCP and ID appts; Patient asked questions appropriately and denied any additional needs at this time.   Patient verbalized understanding of all information discussed. Patient has my name and contact information for any follow up needs or questions. Plan next call: Will FU on continued progress to reach goals of return to PLOF and return to work; This note will not be viewable in [address]

## 2020-07-10 NOTE — TELEPHONE ENCOUNTER
Two pt identifiers confirmed. Yas Souza is concerned about order clarification in regards to removing port. IV antibiotic per Dr. Renzo Rosales to complete 07/07. Informed Evelyn per Dr. Alen Marcum order will have to clarify due to not specifically having order saying remove port. Yas Souza verbalized understanding of information discussed w/ no further questions at this time.

## 2020-07-15 NOTE — TELEPHONE ENCOUNTER
Two pt identifiers confirmed. Spoke to [facility] and informed her per Dr. Chente Galaviz, Yes, remove port . ty     [facility] verbalized understanding of information discussed w/ no further questions at this time.

## 2020-07-17 ENCOUNTER — PATIENT OUTREACH (OUTPATIENT)
Dept: OTHER | Age: 40
End: 2020-07-17

## 2020-07-24 ENCOUNTER — TELEPHONE (OUTPATIENT)
Dept: INTERNAL MEDICINE CLINIC | Age: 40
End: 2020-07-24

## 2020-07-24 NOTE — TELEPHONE ENCOUNTER
ppa MD Campbell Lopez LPN    Caller: Unspecified (Today,  2:21 PM)               I will call her this PM to finish it      NOted

## 2020-08-04 DIAGNOSIS — J45.30 MILD PERSISTENT ASTHMA WITHOUT COMPLICATION: ICD-10-CM

## 2020-08-04 RX ORDER — MONTELUKAST SODIUM 10 MG/1
TABLET ORAL
Qty: 90 TAB | Refills: 4 | Status: SHIPPED | OUTPATIENT
Start: 2020-08-04 | End: 2021-01-18 | Stop reason: SDUPTHER

## 2020-08-07 ENCOUNTER — VIRTUAL VISIT (OUTPATIENT)
Dept: INTERNAL MEDICINE CLINIC | Age: 40
End: 2020-08-07
Payer: COMMERCIAL

## 2020-08-07 DIAGNOSIS — F33.41 RECURRENT MAJOR DEPRESSIVE DISORDER, IN PARTIAL REMISSION (HCC): ICD-10-CM

## 2020-08-07 DIAGNOSIS — R39.9 URINARY TRACT INFECTION SYMPTOMS: Primary | ICD-10-CM

## 2020-08-07 PROCEDURE — 99442 PR PHYS/QHP TELEPHONE EVALUATION 11-20 MIN: CPT | Performed by: INTERNAL MEDICINE

## 2020-08-07 RX ORDER — ESCITALOPRAM OXALATE 20 MG/1
TABLET ORAL
Qty: 90 TAB | Refills: 1 | Status: SHIPPED | OUTPATIENT
Start: 2020-08-07 | End: 2020-12-20

## 2020-08-07 RX ORDER — CEPHALEXIN 500 MG/1
500 CAPSULE ORAL 3 TIMES DAILY
Qty: 21 CAP | Refills: 0 | Status: SHIPPED | OUTPATIENT
Start: 2020-08-07 | End: 2020-09-26

## 2020-08-07 NOTE — PROGRESS NOTES
Reviewed record in preparation for visit and have obtained necessary documentation. Identified pt with two pt identifiers(name and ). Chief Complaint   Patient presents with    Bladder Infection       Health Maintenance Due   Topic Date Due    Diabetic Foot Care  1992    Albumin Urine Test  1992    Eye Exam  1992    Cholesterol Test   1992    Pap Test  2012    Pneumococcal Vaccine (2 of 3 - PPSV23) 2017    Flu Vaccine  2020       Ms. Kirsten Christianson has a reminder for a \"due or due soon\" health maintenance. I have asked that she discuss this further with her primary care provider for follow-up on this health maintenance. Coordination of Care Questionnaire:  :     1) Have you been to an emergency room, urgent care clinic since your last visit? no   Hospitalized since your last visit? no             2) Have you seen or consulted any other health care providers outside of [address] [place] [address] since your last visit? no  (Include any pap smears or colon screenings in this section.)    3) In the event something were to happen to you and you were unable to speak on your behalf, do you have an Advance Directive/ Living Will in place stating your wishes? NO    Do you have an Advance Directive on file? no    4) Are you interested in receiving information on Advance Directives? NO    Patient is accompanied by self I have received verbal consent from [facility] to discuss any/all medical information while they are present in the room.

## 2020-08-07 NOTE — PROGRESS NOTES
CC: Bladder Infection      HPI:    She is a 47 y.o. female with a hx of RA, splenic abscess ( recently treated), ADHD, depressionpresents for evaluation of possible UTI    Feels urinary pain and foul smelling urine, denies urgency. No fever     She is on macrobid post coital prophylaxis    No chills     She was laid off today from work      This is an established visit conducted vi phone . The patient has been instructed that this meets HIPAA criteria and acknowledges and agrees to this method of visitation. Pursuant to the emergency declaration under the [facility], [facility] waiver authority and the [employer] Act, this Virtual Visit was conducted, with patient's consent, to reduce the patient's risk of exposure to COVID-19 and provide continuity of care for an established patient. ROS:  10 systems reviewed and negative other than HPI     Past Medical History:   Diagnosis Date    Acquired hypothyroidism     Adverse effect of anesthesia     labile BP during/after C section    Asthma     Broken bones     history of 9 broken bones    Chronic UTI (urinary tract infection)     \"extra valve right kidney causes UTI\"    Fibromyalgia     Gestational diabetes     GI bleed 2007,2011,2015,2016    lower GI last colonoscopy in 2016 normal     Huntingtons chorea (HCC)     Hyperhydrosis disorder     Ill-defined condition     [place]/ tested genetically - daughter has [place]    Infertility     PCOS    PCOS (polycystic ovarian syndrome)     Precancerous skin lesion     removed from Right shoulder    Preeclampsia 2011    pregnancy    Reactive airway disease     Rheumatoid arthritis (HCC)     SVT (supraventricular tachycardia) ([facility] [address] 2011    occured during pregnancy when picc line inserted.        Current Outpatient Medications on File Prior to Visit   Medication Sig Dispense Refill    montelukast (SINGULAIR) 10 mg tablet TAKE 1 TABLET BY MOUTH EVERY DAY 90 Tab 4    lisdexamfetamine (Vyvanse) 30 mg capsule Take 1 Cap by mouth every morning. Max Daily Amount: 30 mg. 30 Cap 0    buPROPion XL (WELLBUTRIN XL) 150 mg tablet TAKE 1 TABLET BY MOUTH EVERY DAY IN THE MORNING 90 Tab 3    escitalopram oxalate (LEXAPRO) 20 mg tablet Take 1 Tab by mouth daily. 90 Tab 1    predniSONE (DELTASONE) 10 mg tablet Take 10 mg by mouth daily. 30 Tab 0    insulin lispro (HumaLOG KwikPen Insulin) 100 unit/mL kwikpen Check Blood Sugar before each meal 3 times daily    INITIATE SLIDING SCALE INSULIN (DURGA): For Blood Sugar (mg/dl) of :             150-200=2 units           201-250=4 units     251-300=6 units  301-350=8 units  351-400=10 units 1 Package 0    insulin needles, disposable, (Pen Needle) 29 gauge x 1/2\" ndle To dispense insulin as directed 100 Pen Needle 0    diphenhydrAMINE (BenadryL) 25 mg capsule Take 25 mg by mouth every six (6) hours as needed.  BORIC ACID 600 mg by Does Not Apply route daily. Vaginal      nitrofurantoin (MACRODANTIN) 50 mg capsule Take 1 Cap by mouth daily as needed (post coital). 30 Cap 1    PREVIDENT 5000 SENSITIVE 1.1-5 % pste USE AS DIRECTED TWICE A DAY SPIT OUT EXCESS AND DO NOT RINSE, EAT OR DRINK FOR 30 MINUTES      hydroxychloroquine (PLAQUENIL) 200 mg tablet 200 mg daily.  lidocaine-prilocaine (EMLA) topical cream For port flushing every 3 month  2    heparin sodium,porcine (HEPARIN LOCK FLUSH, PORCINE, IV) by IntraVENous route. For port flush every 3 months      omeprazole (PRILOSEC) 40 mg capsule Take 1 Cap by mouth daily. 30 Cap 0    Cetirizine (ZYRTEC) 10 mg cap Take 10 mg by mouth daily. Indications: inflammation of the nose due to an allergy 30 Cap 0    albuterol (PROVENTIL HFA, VENTOLIN HFA, PROAIR HFA) 90 mcg/actuation inhaler Take 2 Puffs by inhalation every four (4) hours as needed for Wheezing.  1 Inhaler 0    omalizumab (XOLAIR SC) by SubCUTAneous route every thirty (30) days. Given by Dr Jumana Castro in his office      propranolol LA (INDERAL LA) 60 mg SR capsule TAKE 1 CAPSULE BY MOUTH EVERY DAY 90 Cap 3    SYMBICORT 160-4.5 mcg/actuation HFAA TAKE 2 PUFFS BY MOUTH TWICE A DAY  12    OZEMPIC 0.25 mg or 0.5 mg(2 mg/1.5 mL) sub-q pen by SubCUTAneous route every seven (7) days. Patient states she takes 1 ml every friday  1    ondansetron hcl (ZOFRAN) 4 mg tablet Take 4 mg by mouth every eight (8) hours as needed for Nausea.  naltrexone (DEPADE) 50 mg tablet Take  by mouth daily.  EPIPEN 2-SANJAY 0.3 mg/0.3 mL injection 1 (ONE) INJECTION AS NEEDED  0    traMADol (ULTRAM) 50 mg tablet Take 50 mg by mouth every six (6) hours as needed for Pain.  oxybutynin (DITROPAN) 5 mg tablet 5 mg two (2) times a day. Patient states she takes 1 tablet bid  3    BD INSULIN SYRINGE 1 mL 25 gauge x 5/8\" syrg USE 1 SYRINGE ONCE A WEEK  11    cyanocobalamin (Vitamin B-12) 1,000 mcg tablet Take  by mouth daily.  RESTASIS 0.05 % ophthalmic emulsion INSTILL 1 DROP INTO EACH EYE TWICE DAILY  4    SAFETY-ABDI TB SYR 1CC/25GX5/8\" 1 mL 25 gauge x 5/8\" syrg USE TO INJECT METHOTREXATE ONCE A WEEK  2    folic acid (FOLVITE) 1 mg tablet TAKE 1 TABLET ORALLY DAILY  11    desogestrel-ethinyl estradiol (DESOGEN) 0.15-0.03 mg tab Take 1 Tab by mouth nightly.  Nebulizer & Compressor machine 1 Each by Does Not Apply route three (3) times daily as needed. 1 Each 0    glycopyrrolate (ROBINUL) 1 mg tablet Take 2 mg by mouth two (2) times a day. Indications: hyperhydrosis      cholecalciferol (Vitamin D3) (1000 Units /25 mcg) tablet Take 1 Tab by mouth daily.  levothyroxine (SYNTHROID) 75 mcg tablet Take 75 mcg by mouth Daily (before breakfast). No current facility-administered medications on file prior to visit.         Past Surgical History:   Procedure Laterality Date    COLONOSCOPY N/A 6/13/2016    COL[facility] ENDOSCOPY    COLONOSCOPY N/A 5/22/2020    [facility]  5/22/2020         HX HEENT      HX WISDOM TEETH EXTRACTION  2004    UPPER GI ENDOSCOPY,BIOPSY  2/12/2019         UPPER GI ENDOSCOPY,BIOPSY  5/22/2020         UPPER GI ENDOSCOPY,DILATN W GUIDE  2/12/2019            Family History   Problem Relation Age of Onset    Other Mother         McDonough's disease    Hypertension Mother     Dementia Mother     High Cholesterol Father     Heart Disease Father     Diabetes Father     Hypertension Father     Asthma Brother     Diabetes Brother     Other Brother         varicocele, hernias    Hypertension Brother     Alcohol abuse Brother     Hypertension Maternal Grandmother     Elevated Lipids Maternal Grandmother     Cancer Maternal Grandmother         breast    Other Maternal Grandmother         polymyalgia rheumatica    Glaucoma Maternal Grandmother     Cancer Maternal Grandfather         prostate    Huntingtons disease Maternal Grandfather     Cancer Paternal Grandmother         lung with mets    Cancer Paternal Grandfather         prostate, colon    Diabetes Paternal Grandfather     Heart Disease Paternal Grandfather     Alzheimer Paternal Grandfather     Dementia Paternal Grandfather      Reviewed and no changes     Social History     Socioeconomic History    Marital status:      Spouse name: Not on file    Number of children: Not on file    Years of education: Not on file    Highest education level: Not on file   Occupational History    Not on file   Social Needs    Financial resource strain: Not on file    Food insecurity     Worry: Not on file     Inability: Not on file    Transportation needs     Medical: Not on file     Non-medical: Not on file   Tobacco Use    Smoking status: Never Smoker    Smokeless tobacco: Never Used   Substance and Sexual Activity    Alcohol use: Yes     Comment: few drinks per year    Drug use:  No  Sexual activity: Not on file   Lifestyle    Physical activity     Days per week: Not on file     Minutes per session: Not on file    Stress: Not on file   Relationships    Social connections     Talks on phone: Not on file     Gets together: Not on file     Attends Sabianist service: Not on file     Active member of club or organization: Not on file     Attends meetings of clubs or organizations: Not on file     Relationship status: Not on file    Intimate partner violence     Fear of current or ex partner: Not on file     Emotionally abused: Not on file     Physically abused: Not on file     Forced sexual activity: Not on file   Other Topics Concern    Not on file   Social History Narrative    ** Merged History Encounter **               There were no vitals taken for this visit. Physical Examination:     Resp: normal respiratory effort, no audible wheezing. CV: patient does not feel palpitations or heart irregularity  Abd: patient does not feel abdominal tenderness or mass, patient does not notice distension  Extrem: patient does not see swelling in ankles or joints.    Neuro: Alert and oriented, able to answer questions without difficulty        Lab Results   Component Value Date/Time    WBC 6.7 05/26/2020 11:31 AM    HGB 12.0 05/26/2020 11:31 AM    HCT 37.2 05/26/2020 11:31 AM    PLATELET 660 99/86/2815 11:31 AM    MCV 94.7 05/26/2020 11:31 AM     Lab Results   Component Value Date/Time    Sodium 136 05/28/2020 12:12 PM    Potassium 4.3 05/28/2020 12:12 PM    Chloride 103 05/28/2020 12:12 PM    CO2 30 05/28/2020 12:12 PM    Anion gap 3 (L) 05/28/2020 12:12 PM    Glucose 137 (H) 05/28/2020 12:12 PM    BUN 8 05/28/2020 12:12 PM    Creatinine 0.81 05/28/2020 12:12 PM    BUN/Creatinine ratio 10 (L) 05/28/2020 12:12 PM    GFR est AA >60 05/28/2020 12:12 PM    GFR est non-AA >60 05/28/2020 12:12 PM    Calcium 9.2 05/28/2020 12:12 PM     No results found for: CHOL, CHOLPOCT, CHOLX, CHLST, CHOLV, HDL, [address], HDLP, LDL, LDLCPOC, LDLC, DLDLP, VLDLC, VLDL, TGLX, TRIGL, TRIGP, TGLPOCT, CHHD, CHHDX  Lab Results   Component Value Date/Time    TSH 1.48 10/30/2015 05:11 PM     No results found for: PSA, Allean Amna, PSAR3, [id number], [id number], PSALT  Lab Results   Component Value Date/Time    Hemoglobin A1c 5.7 (H) 05/26/2020 11:31 AM     No results found for: Blake Nate, VD3RIA    Lab Results   Component Value Date/Time    ALT (SGPT) 49 05/28/2020 12:12 PM    Alk. phosphatase 82 05/28/2020 12:12 PM    Bilirubin, total 0.5 05/28/2020 12:12 PM           Assessment/Plan:    She is a 47 y.o. female with a hx of RA, splenic abscess ( recently treated), ADHD, depressionpresents for evaluation of possible UTI  1. Urinary tract infection symptoms  - URINALYSIS W/ RFLX MICROSCOPIC  - CULTURE, URINE  - prescribed keflex to wait till culture is back but if symptoms worsen can start over the weekend ( after labs are collected)             Cheryl Guo MD    This is an established visit conducted via phone SPENT 15 minutes telemedicine. The patient has been instructed that this meets HIPAA criteria and acknowledges and agrees to this method of visitation.

## 2020-08-11 ENCOUNTER — PATIENT MESSAGE (OUTPATIENT)
Dept: INTERNAL MEDICINE CLINIC | Age: 40
End: 2020-08-11

## 2020-08-11 DIAGNOSIS — N39.0 RECURRENT UTI: Primary | ICD-10-CM

## 2020-08-11 LAB
BACTERIA UR CULT: ABNORMAL
GLUCOSE UR QL: NORMAL
KETONES UR QL STRIP: NORMAL
M TB IFN-G BLD-IMP: ABNORMAL
PH UR STRIP: NORMAL [PH]
PROT UR QL STRIP: NORMAL
QUANTIFERON CRITERIA, QFI1T: ABNORMAL
QUANTIFERON MITOGEN VALUE: 0.15 IU/ML
QUANTIFERON NIL VALUE: 0.01 IU/ML
QUANTIFERON TB1 AG: 0.01 IU/ML
QUANTIFERON TB2 AG: 0.01 IU/ML
SP GR UR: NORMAL

## 2020-08-11 RX ORDER — LANCETS 28 GAUGE
EACH MISCELLANEOUS
Qty: 100 LANCET | Refills: 0 | Status: SHIPPED | OUTPATIENT
Start: 2020-08-11

## 2020-08-11 RX ORDER — BLOOD SUGAR DIAGNOSTIC
STRIP MISCELLANEOUS
Qty: 100 STRIP | Refills: 5 | Status: SHIPPED | OUTPATIENT
Start: 2020-08-11

## 2020-08-11 NOTE — PROGRESS NOTES
Unfortunately lab [facility] could not run urine sample \" not enough urine\"   Are symptoms better?    If patient on antibiotic then no point in repeating UA

## 2020-08-12 ENCOUNTER — PATIENT OUTREACH (OUTPATIENT)
Dept: OTHER | Age: 40
End: 2020-08-12

## 2020-08-12 ENCOUNTER — TELEPHONE (OUTPATIENT)
Dept: INTERNAL MEDICINE CLINIC | Age: 40
End: 2020-08-12

## 2020-08-12 DIAGNOSIS — F90.0 ATTENTION DEFICIT HYPERACTIVITY DISORDER (ADHD), PREDOMINANTLY INATTENTIVE TYPE: Primary | ICD-10-CM

## 2020-08-12 NOTE — TELEPHONE ENCOUNTER
MD Cece Del Toro, MARK    Caller: Unspecified (Today, 11:33 AM)               I resent it     Pharmacy notified

## 2020-08-12 NOTE — TELEPHONE ENCOUNTER
[facility] would like to know if the patient can get a 90 day supply of her Vyvanse.   Patient is not currently working, but has met her deductible, so currently her copy is zero

## 2020-08-12 NOTE — PROGRESS NOTES
Care Manager contacted the patient in follow up outreach. No response and left a brief, discreet voicemail message with my contact information, asking for return call. Will await FU call response.   FU on urine sample and if having continued symptoms;

## 2020-08-14 DIAGNOSIS — N39.0 RECURRENT UTI: ICD-10-CM

## 2020-08-14 NOTE — TELEPHONE ENCOUNTER
Meka Tellez 8/14/2020 7:49 AM EDT    Please see the note below. Labs are on your desk.      Lamar Altamirano  ----- Message -----  From: Mandie Callahan  Sent: 8/13/2020 11:04 PM EDT  To: Deysi Mathews Nurse Pool  Subject: RE: Test Results Question     Please see attached u/a results from lab [place].  [employer]

## 2020-08-17 LAB
APPEARANCE UR: ABNORMAL
BACTERIA #/AREA URNS HPF: ABNORMAL /[HPF]
BACTERIA UR CULT: ABNORMAL
BILIRUB UR QL STRIP: NEGATIVE
CASTS URNS QL MICRO: ABNORMAL /LPF
COLOR UR: YELLOW
EPI CELLS #/AREA URNS HPF: ABNORMAL /HPF (ref 0–10)
GLUCOSE UR QL: NEGATIVE
HGB UR QL STRIP: NEGATIVE
KETONES UR QL STRIP: NEGATIVE
LEUKOCYTE ESTERASE UR QL STRIP: ABNORMAL
MICRO URNS: ABNORMAL
MUCOUS THREADS URNS QL MICRO: PRESENT
NITRITE UR QL STRIP: POSITIVE
PH UR STRIP: 5.5 [PH] (ref 5–7.5)
PROT UR QL STRIP: ABNORMAL
RBC #/AREA URNS HPF: ABNORMAL /HPF (ref 0–2)
SP GR UR: 1.02 (ref 1–1.03)
UROBILINOGEN UR STRIP-MCNC: 0.2 MG/DL (ref 0.2–1)
WBC #/AREA URNS HPF: ABNORMAL /HPF (ref 0–5)

## 2020-08-18 ENCOUNTER — HOSPITAL ENCOUNTER (OUTPATIENT)
Dept: ULTRASOUND IMAGING | Age: 40
Discharge: HOME | End: 2020-08-18
Attending: INTERNAL MEDICINE
Payer: COMMERCIAL

## 2020-08-18 PROCEDURE — 76770 US EXAM ABDO BACK WALL COMP: CPT

## 2020-08-24 ENCOUNTER — TELEPHONE (OUTPATIENT)
Dept: INTERNAL MEDICINE CLINIC | Age: 40
End: 2020-08-24

## 2020-08-24 NOTE — TELEPHONE ENCOUNTER
----- Message from Akanksha Gould sent at 8/24/2020 11:56 AM EDT -----  Regarding: Non-Urgent Medical Question  Contact: [phone]  Hi Dr Sabrina Chavez   the leave of absence team asked me to call / contact your office to request the [employer] papers that state I will need to work remotely for life due to high risk immune compromised state. They said that they had gotten one form that said no restrictions. And another form that said restrictions but didnt have a signature. I found out that because of the short and long term policies that I have paid for while an employee there (16 years), I am not technically laid off, they will have to provide me with my short term disability coverage until November.    And then they may be able to transition into my long term disability policy which is a HUGE blessing!!!!     I also need to schedule a follow up Tele visit for my Vyvance refill, etc.

## 2020-08-24 NOTE — TELEPHONE ENCOUNTER
MD Rupert Metzger, MARK; Jp Paige    Caller: Unspecified (Today, 12:06 PM)               Can you get a copy of the paper work and verify with her what the needed change is and Wednesday morning I will come by the office to sign papers   Thanks      [employer] message sent to patient with the above message from Dr. Neisha Corrales

## 2020-09-02 ENCOUNTER — PATIENT OUTREACH (OUTPATIENT)
Dept: OTHER | Age: 40
End: 2020-09-02

## 2020-09-02 NOTE — PROGRESS NOTES
Resolving current episode lost to follow up for past month;  (Transitions of care complete). No further ED/UC or hospital admissions within 30 days post discharge. Patient attended follow-up appointments as directed. No outreach from patient to [address] [place] [address].

## 2020-09-26 ENCOUNTER — APPOINTMENT (OUTPATIENT)
Dept: GENERAL RADIOLOGY | Age: 40
DRG: 690 | End: 2020-09-26
Attending: EMERGENCY MEDICINE
Payer: COMMERCIAL

## 2020-09-26 ENCOUNTER — APPOINTMENT (OUTPATIENT)
Dept: CT IMAGING | Age: 40
DRG: 690 | End: 2020-09-26
Attending: EMERGENCY MEDICINE
Payer: COMMERCIAL

## 2020-09-26 ENCOUNTER — HOSPITAL ENCOUNTER (INPATIENT)
Age: 40
LOS: 1 days | Discharge: HOME | DRG: 690 | End: 2020-09-26
Attending: EMERGENCY MEDICINE | Admitting: HOSPITALIST
Payer: COMMERCIAL

## 2020-09-26 VITALS
HEIGHT: 65 IN | HEART RATE: 93 BPM | BODY MASS INDEX: 35.41 KG/M2 | TEMPERATURE: 98.1 F | RESPIRATION RATE: 18 BRPM | WEIGHT: 212.52 LBS | DIASTOLIC BLOOD PRESSURE: 76 MMHG | OXYGEN SATURATION: 98 % | SYSTOLIC BLOOD PRESSURE: 127 MMHG

## 2020-09-26 DIAGNOSIS — N30.01 ACUTE CYSTITIS WITH HEMATURIA: Primary | ICD-10-CM

## 2020-09-26 DIAGNOSIS — R50.9 FEVER, UNSPECIFIED FEVER CAUSE: ICD-10-CM

## 2020-09-26 DIAGNOSIS — R10.9 ACUTE ABDOMINAL PAIN: ICD-10-CM

## 2020-09-26 PROBLEM — N39.0 UTI (URINARY TRACT INFECTION): Status: ACTIVE | Noted: 2020-09-26

## 2020-09-26 PROBLEM — Z20.822 SUSPECTED COVID-19 VIRUS INFECTION: Status: ACTIVE | Noted: 2020-09-26

## 2020-09-26 PROBLEM — D84.9 IMMUNOCOMPROMISED (HCC): Status: ACTIVE | Noted: 2020-09-26

## 2020-09-26 LAB
ALBUMIN SERPL-MCNC: 3.2 G/DL (ref 3.5–5)
ALBUMIN/GLOB SERPL: 0.9 {RATIO} (ref 1.1–2.2)
ALP SERPL-CCNC: 93 U/L (ref 45–117)
ALT SERPL-CCNC: 22 U/L (ref 12–78)
ANION GAP SERPL CALC-SCNC: 3 MMOL/L (ref 5–15)
APPEARANCE UR: CLEAR
AST SERPL-CCNC: 11 U/L (ref 15–37)
ATRIAL RATE: 112 BPM
B PERT DNA SPEC QL NAA+PROBE: NOT DETECTED
BACTERIA URNS QL MICRO: NEGATIVE /HPF
BASOPHILS # BLD: 0 K/UL (ref 0–0.1)
BASOPHILS NFR BLD: 0 % (ref 0–1)
BILIRUB SERPL-MCNC: 0.7 MG/DL (ref 0.2–1)
BILIRUB UR QL: NEGATIVE
BORDETELLA PARAPERTUSSIS PCR, BORPAR: NOT DETECTED
BUN SERPL-MCNC: 11 MG/DL (ref 6–20)
BUN/CREAT SERPL: 13 (ref 12–20)
C PNEUM DNA SPEC QL NAA+PROBE: NOT DETECTED
CALCIUM SERPL-MCNC: 8.6 MG/DL (ref 8.5–10.1)
CALCULATED P AXIS, ECG09: 47 DEGREES
CALCULATED R AXIS, ECG10: 60 DEGREES
CALCULATED T AXIS, ECG11: 12 DEGREES
CHLORIDE SERPL-SCNC: 103 MMOL/L (ref 97–108)
CO2 SERPL-SCNC: 30 MMOL/L (ref 21–32)
COLOR UR: ABNORMAL
COVID-19 RAPID TEST, COVR: NOT DETECTED
CREAT SERPL-MCNC: 0.88 MG/DL (ref 0.55–1.02)
DIAGNOSIS, 93000: NORMAL
DIFFERENTIAL METHOD BLD: ABNORMAL
EOSINOPHIL # BLD: 0.1 K/UL (ref 0–0.4)
EOSINOPHIL NFR BLD: 0 % (ref 0–7)
EPITH CASTS URNS QL MICRO: ABNORMAL /LPF
ERYTHROCYTE [DISTWIDTH] IN BLOOD BY AUTOMATED COUNT: 14.6 % (ref 11.5–14.5)
EST. AVERAGE GLUCOSE BLD GHB EST-MCNC: 117 MG/DL
FLUAV H1 2009 PAND RNA SPEC QL NAA+PROBE: NOT DETECTED
FLUAV H1 RNA SPEC QL NAA+PROBE: NOT DETECTED
FLUAV H3 RNA SPEC QL NAA+PROBE: NOT DETECTED
FLUAV SUBTYP SPEC NAA+PROBE: NOT DETECTED
FLUBV RNA SPEC QL NAA+PROBE: NOT DETECTED
GLOBULIN SER CALC-MCNC: 3.6 G/DL (ref 2–4)
GLUCOSE BLD STRIP.AUTO-MCNC: 86 MG/DL (ref 65–100)
GLUCOSE SERPL-MCNC: 99 MG/DL (ref 65–100)
GLUCOSE UR STRIP.AUTO-MCNC: NEGATIVE MG/DL
HADV DNA SPEC QL NAA+PROBE: NOT DETECTED
HBA1C MFR BLD: 5.7 % (ref 4–5.6)
HCG SERPL QL: NEGATIVE
HCOV 229E RNA SPEC QL NAA+PROBE: NOT DETECTED
HCOV HKU1 RNA SPEC QL NAA+PROBE: NOT DETECTED
HCOV NL63 RNA SPEC QL NAA+PROBE: NOT DETECTED
HCOV OC43 RNA SPEC QL NAA+PROBE: NOT DETECTED
HCT VFR BLD AUTO: 32.6 % (ref 35–47)
HEALTH STATUS, XMCV2T: NORMAL
HGB BLD-MCNC: 10.6 G/DL (ref 11.5–16)
HGB UR QL STRIP: ABNORMAL
HMPV RNA SPEC QL NAA+PROBE: NOT DETECTED
HPIV1 RNA SPEC QL NAA+PROBE: NOT DETECTED
HPIV2 RNA SPEC QL NAA+PROBE: NOT DETECTED
HPIV3 RNA SPEC QL NAA+PROBE: NOT DETECTED
HPIV4 RNA SPEC QL NAA+PROBE: NOT DETECTED
HYALINE CASTS URNS QL MICRO: ABNORMAL /LPF (ref 0–5)
IMM GRANULOCYTES # BLD AUTO: 0.1 K/UL (ref 0–0.04)
IMM GRANULOCYTES NFR BLD AUTO: 1 % (ref 0–0.5)
KETONES UR QL STRIP.AUTO: NEGATIVE MG/DL
LACTATE BLD-SCNC: 1.08 MMOL/L (ref 0.4–2)
LEUKOCYTE ESTERASE UR QL STRIP.AUTO: ABNORMAL
LYMPHOCYTES # BLD: 2 K/UL (ref 0.8–3.5)
LYMPHOCYTES NFR BLD: 15 % (ref 12–49)
M PNEUMO DNA SPEC QL NAA+PROBE: NOT DETECTED
MCH RBC QN AUTO: 29.1 PG (ref 26–34)
MCHC RBC AUTO-ENTMCNC: 32.5 G/DL (ref 30–36.5)
MCV RBC AUTO: 89.6 FL (ref 80–99)
MONOCYTES # BLD: 1.2 K/UL (ref 0–1)
MONOCYTES NFR BLD: 9 % (ref 5–13)
NEUTS SEG # BLD: 10.5 K/UL (ref 1.8–8)
NEUTS SEG NFR BLD: 75 % (ref 32–75)
NITRITE UR QL STRIP.AUTO: NEGATIVE
NRBC # BLD: 0 K/UL (ref 0–0.01)
NRBC BLD-RTO: 0 PER 100 WBC
P-R INTERVAL, ECG05: 162 MS
PH UR STRIP: 5.5 [PH] (ref 5–8)
PLATELET # BLD AUTO: 295 K/UL (ref 150–400)
PMV BLD AUTO: 9.5 FL (ref 8.9–12.9)
POTASSIUM SERPL-SCNC: 4 MMOL/L (ref 3.5–5.1)
PROT SERPL-MCNC: 6.8 G/DL (ref 6.4–8.2)
PROT UR STRIP-MCNC: NEGATIVE MG/DL
Q-T INTERVAL, ECG07: 324 MS
QRS DURATION, ECG06: 84 MS
QTC CALCULATION (BEZET), ECG08: 442 MS
RBC # BLD AUTO: 3.64 M/UL (ref 3.8–5.2)
RBC #/AREA URNS HPF: ABNORMAL /HPF (ref 0–5)
RSV RNA SPEC QL NAA+PROBE: NOT DETECTED
RV+EV RNA SPEC QL NAA+PROBE: NOT DETECTED
SERVICE CMNT-IMP: NORMAL
SODIUM SERPL-SCNC: 136 MMOL/L (ref 136–145)
SOURCE, COVRS: NORMAL
SP GR UR REFRACTOMETRY: 1.02 (ref 1–1.03)
SPECIMEN SOURCE, FCOV2M: NORMAL
SPECIMEN TYPE, XMCV1T: NORMAL
UROBILINOGEN UR QL STRIP.AUTO: 0.2 EU/DL (ref 0.2–1)
VENTRICULAR RATE, ECG03: 112 BPM
WBC # BLD AUTO: 13.9 K/UL (ref 3.6–11)
WBC URNS QL MICRO: ABNORMAL /HPF (ref 0–4)

## 2020-09-26 PROCEDURE — 96375 TX/PRO/DX INJ NEW DRUG ADDON: CPT

## 2020-09-26 PROCEDURE — 74011250636 HC RX REV CODE- 250/636: Performed by: EMERGENCY MEDICINE

## 2020-09-26 PROCEDURE — 74011250637 HC RX REV CODE- 250/637: Performed by: HOSPITALIST

## 2020-09-26 PROCEDURE — 80053 COMPREHEN METABOLIC PANEL: CPT

## 2020-09-26 PROCEDURE — 71045 X-RAY EXAM CHEST 1 VIEW: CPT

## 2020-09-26 PROCEDURE — 65270000029 HC RM PRIVATE

## 2020-09-26 PROCEDURE — 36415 COLL VENOUS BLD VENIPUNCTURE: CPT

## 2020-09-26 PROCEDURE — 96365 THER/PROPH/DIAG IV INF INIT: CPT

## 2020-09-26 PROCEDURE — 74011250636 HC RX REV CODE- 250/636: Performed by: NURSE PRACTITIONER

## 2020-09-26 PROCEDURE — 84703 CHORIONIC GONADOTROPIN ASSAY: CPT

## 2020-09-26 PROCEDURE — 74177 CT ABD & PELVIS W/CONTRAST: CPT

## 2020-09-26 PROCEDURE — 99285 EMERGENCY DEPT VISIT HI MDM: CPT

## 2020-09-26 PROCEDURE — 87147 CULTURE TYPE IMMUNOLOGIC: CPT

## 2020-09-26 PROCEDURE — 74011250636 HC RX REV CODE- 250/636: Performed by: HOSPITALIST

## 2020-09-26 PROCEDURE — 81001 URINALYSIS AUTO W/SCOPE: CPT

## 2020-09-26 PROCEDURE — 74011000636 HC RX REV CODE- 636: Performed by: EMERGENCY MEDICINE

## 2020-09-26 PROCEDURE — 87077 CULTURE AEROBIC IDENTIFY: CPT

## 2020-09-26 PROCEDURE — 87040 BLOOD CULTURE FOR BACTERIA: CPT

## 2020-09-26 PROCEDURE — 87086 URINE CULTURE/COLONY COUNT: CPT

## 2020-09-26 PROCEDURE — 74011000258 HC RX REV CODE- 258: Performed by: EMERGENCY MEDICINE

## 2020-09-26 PROCEDURE — 93005 ELECTROCARDIOGRAM TRACING: CPT

## 2020-09-26 PROCEDURE — 87635 SARS-COV-2 COVID-19 AMP PRB: CPT

## 2020-09-26 PROCEDURE — 82962 GLUCOSE BLOOD TEST: CPT

## 2020-09-26 PROCEDURE — 0100U RESPIRATORY PANEL,PCR,NASOPHARYNGEAL: CPT

## 2020-09-26 PROCEDURE — 70450 CT HEAD/BRAIN W/O DYE: CPT

## 2020-09-26 PROCEDURE — 87186 SC STD MICRODIL/AGAR DIL: CPT

## 2020-09-26 PROCEDURE — 74011250637 HC RX REV CODE- 250/637: Performed by: EMERGENCY MEDICINE

## 2020-09-26 PROCEDURE — 85025 COMPLETE CBC W/AUTO DIFF WBC: CPT

## 2020-09-26 PROCEDURE — 83036 HEMOGLOBIN GLYCOSYLATED A1C: CPT

## 2020-09-26 PROCEDURE — 83605 ASSAY OF LACTIC ACID: CPT

## 2020-09-26 PROCEDURE — 74011000250 HC RX REV CODE- 250: Performed by: EMERGENCY MEDICINE

## 2020-09-26 RX ORDER — PREDNISONE 10 MG/1
10 TABLET ORAL DAILY
Status: DISCONTINUED | OUTPATIENT
Start: 2020-09-26 | End: 2020-09-26 | Stop reason: HOSPADM

## 2020-09-26 RX ORDER — TRAMADOL HYDROCHLORIDE 50 MG/1
50 TABLET ORAL
Status: DISCONTINUED | OUTPATIENT
Start: 2020-09-26 | End: 2020-09-26 | Stop reason: HOSPADM

## 2020-09-26 RX ORDER — SULFAMETHOXAZOLE AND TRIMETHOPRIM 800; 160 MG/1; MG/1
1 TABLET ORAL 2 TIMES DAILY
Qty: 14 TAB | Refills: 0 | Status: SHIPPED | OUTPATIENT
Start: 2020-09-26 | End: 2020-10-03

## 2020-09-26 RX ORDER — KETOROLAC TROMETHAMINE 30 MG/ML
30 INJECTION, SOLUTION INTRAMUSCULAR; INTRAVENOUS
Status: COMPLETED | OUTPATIENT
Start: 2020-09-26 | End: 2020-09-26

## 2020-09-26 RX ORDER — SODIUM CHLORIDE 0.9 % (FLUSH) 0.9 %
5-40 SYRINGE (ML) INJECTION EVERY 8 HOURS
Status: DISCONTINUED | OUTPATIENT
Start: 2020-09-26 | End: 2020-09-26 | Stop reason: HOSPADM

## 2020-09-26 RX ORDER — ACETAMINOPHEN 650 MG/1
650 SUPPOSITORY RECTAL
Status: DISCONTINUED | OUTPATIENT
Start: 2020-09-26 | End: 2020-09-26 | Stop reason: HOSPADM

## 2020-09-26 RX ORDER — BENZONATATE 100 MG/1
100 CAPSULE ORAL
Qty: 21 CAP | Refills: 0 | Status: SHIPPED | OUTPATIENT
Start: 2020-09-26 | End: 2020-10-03

## 2020-09-26 RX ORDER — HEPARIN 100 UNIT/ML
300 SYRINGE INTRAVENOUS AS NEEDED
Status: DISCONTINUED | OUTPATIENT
Start: 2020-09-26 | End: 2020-09-26 | Stop reason: HOSPADM

## 2020-09-26 RX ORDER — DIPHENHYDRAMINE HCL 25 MG
25 CAPSULE ORAL
Status: DISCONTINUED | OUTPATIENT
Start: 2020-09-26 | End: 2020-09-26 | Stop reason: HOSPADM

## 2020-09-26 RX ORDER — MONTELUKAST SODIUM 10 MG/1
10 TABLET ORAL
Status: DISCONTINUED | OUTPATIENT
Start: 2020-09-26 | End: 2020-09-26 | Stop reason: HOSPADM

## 2020-09-26 RX ORDER — SODIUM CHLORIDE 9 MG/ML
100 INJECTION, SOLUTION INTRAVENOUS CONTINUOUS
Status: DISCONTINUED | OUTPATIENT
Start: 2020-09-26 | End: 2020-09-26 | Stop reason: HOSPADM

## 2020-09-26 RX ORDER — ONDANSETRON 2 MG/ML
4 INJECTION INTRAMUSCULAR; INTRAVENOUS
Status: COMPLETED | OUTPATIENT
Start: 2020-09-26 | End: 2020-09-26

## 2020-09-26 RX ORDER — SODIUM CHLORIDE 0.9 % (FLUSH) 0.9 %
5-40 SYRINGE (ML) INJECTION AS NEEDED
Status: DISCONTINUED | OUTPATIENT
Start: 2020-09-26 | End: 2020-09-26 | Stop reason: HOSPADM

## 2020-09-26 RX ORDER — ALBUTEROL SULFATE 90 UG/1
2 AEROSOL, METERED RESPIRATORY (INHALATION)
Status: DISCONTINUED | OUTPATIENT
Start: 2020-09-26 | End: 2020-09-26 | Stop reason: HOSPADM

## 2020-09-26 RX ORDER — ACETAMINOPHEN 500 MG
1000 TABLET ORAL ONCE
Status: COMPLETED | OUTPATIENT
Start: 2020-09-26 | End: 2020-09-26

## 2020-09-26 RX ORDER — BUPROPION HYDROCHLORIDE 150 MG/1
150 TABLET ORAL DAILY
Status: DISCONTINUED | OUTPATIENT
Start: 2020-09-26 | End: 2020-09-26 | Stop reason: HOSPADM

## 2020-09-26 RX ORDER — PROPRANOLOL HYDROCHLORIDE 60 MG/1
60 CAPSULE, EXTENDED RELEASE ORAL DAILY
Status: DISCONTINUED | OUTPATIENT
Start: 2020-09-26 | End: 2020-09-26 | Stop reason: HOSPADM

## 2020-09-26 RX ORDER — DEXTROSE 50 % IN WATER (D50W) INTRAVENOUS SYRINGE
12.5-25 AS NEEDED
Status: DISCONTINUED | OUTPATIENT
Start: 2020-09-26 | End: 2020-09-26 | Stop reason: HOSPADM

## 2020-09-26 RX ORDER — ESCITALOPRAM OXALATE 10 MG/1
20 TABLET ORAL DAILY
Status: DISCONTINUED | OUTPATIENT
Start: 2020-09-26 | End: 2020-09-26 | Stop reason: HOSPADM

## 2020-09-26 RX ORDER — PANTOPRAZOLE SODIUM 40 MG/1
40 TABLET, DELAYED RELEASE ORAL
Status: DISCONTINUED | OUTPATIENT
Start: 2020-09-26 | End: 2020-09-26 | Stop reason: HOSPADM

## 2020-09-26 RX ORDER — BUDESONIDE 0.5 MG/2ML
1000 INHALANT ORAL
Status: DISCONTINUED | OUTPATIENT
Start: 2020-09-26 | End: 2020-09-26

## 2020-09-26 RX ORDER — CYCLOSPORINE 0.5 MG/ML
1 EMULSION OPHTHALMIC 2 TIMES DAILY
Status: DISCONTINUED | OUTPATIENT
Start: 2020-09-26 | End: 2020-09-26 | Stop reason: HOSPADM

## 2020-09-26 RX ORDER — ONDANSETRON 4 MG/1
4 TABLET, ORALLY DISINTEGRATING ORAL
Qty: 28 TAB | Refills: 0 | Status: SHIPPED | OUTPATIENT
Start: 2020-09-26 | End: 2020-10-06

## 2020-09-26 RX ORDER — HYDROXYCHLOROQUINE SULFATE 200 MG/1
100 TABLET, FILM COATED ORAL 2 TIMES DAILY WITH MEALS
Status: DISCONTINUED | OUTPATIENT
Start: 2020-09-26 | End: 2020-09-26 | Stop reason: HOSPADM

## 2020-09-26 RX ORDER — ACETAMINOPHEN 325 MG/1
650 TABLET ORAL
Status: DISCONTINUED | OUTPATIENT
Start: 2020-09-26 | End: 2020-09-26 | Stop reason: HOSPADM

## 2020-09-26 RX ORDER — PROMETHAZINE HYDROCHLORIDE 25 MG/1
12.5 TABLET ORAL
Status: DISCONTINUED | OUTPATIENT
Start: 2020-09-26 | End: 2020-09-26 | Stop reason: HOSPADM

## 2020-09-26 RX ORDER — BENZONATATE 100 MG/1
100 CAPSULE ORAL
Qty: 21 CAP | Refills: 0 | Status: SHIPPED | OUTPATIENT
Start: 2020-09-26 | End: 2020-09-26

## 2020-09-26 RX ORDER — BENZONATATE 100 MG/1
100 CAPSULE ORAL
Status: DISCONTINUED | OUTPATIENT
Start: 2020-09-26 | End: 2020-09-26 | Stop reason: HOSPADM

## 2020-09-26 RX ORDER — POLYETHYLENE GLYCOL 3350 17 G/17G
17 POWDER, FOR SOLUTION ORAL DAILY PRN
Status: DISCONTINUED | OUTPATIENT
Start: 2020-09-26 | End: 2020-09-26 | Stop reason: HOSPADM

## 2020-09-26 RX ORDER — ONDANSETRON 4 MG/1
4 TABLET, ORALLY DISINTEGRATING ORAL
Qty: 28 TAB | Refills: 0 | Status: SHIPPED | OUTPATIENT
Start: 2020-09-26 | End: 2020-09-26 | Stop reason: SDUPTHER

## 2020-09-26 RX ORDER — FOLIC ACID 1 MG/1
1 TABLET ORAL DAILY
Status: DISCONTINUED | OUTPATIENT
Start: 2020-09-26 | End: 2020-09-26 | Stop reason: HOSPADM

## 2020-09-26 RX ORDER — GLYCOPYRROLATE 1 MG/1
2 TABLET ORAL 2 TIMES DAILY
Status: DISCONTINUED | OUTPATIENT
Start: 2020-09-26 | End: 2020-09-26 | Stop reason: HOSPADM

## 2020-09-26 RX ORDER — ONDANSETRON 2 MG/ML
4 INJECTION INTRAMUSCULAR; INTRAVENOUS
Status: DISCONTINUED | OUTPATIENT
Start: 2020-09-26 | End: 2020-09-26 | Stop reason: HOSPADM

## 2020-09-26 RX ORDER — LEVOTHYROXINE SODIUM 75 UG/1
75 TABLET ORAL
Status: DISCONTINUED | OUTPATIENT
Start: 2020-09-26 | End: 2020-09-26 | Stop reason: HOSPADM

## 2020-09-26 RX ORDER — SODIUM CHLORIDE 0.9 % (FLUSH) 0.9 %
5-10 SYRINGE (ML) INJECTION AS NEEDED
Status: DISCONTINUED | OUTPATIENT
Start: 2020-09-26 | End: 2020-09-26 | Stop reason: HOSPADM

## 2020-09-26 RX ORDER — INSULIN LISPRO 100 [IU]/ML
INJECTION, SOLUTION INTRAVENOUS; SUBCUTANEOUS
Status: DISCONTINUED | OUTPATIENT
Start: 2020-09-26 | End: 2020-09-26 | Stop reason: HOSPADM

## 2020-09-26 RX ORDER — ENOXAPARIN SODIUM 100 MG/ML
40 INJECTION SUBCUTANEOUS DAILY
Status: DISCONTINUED | OUTPATIENT
Start: 2020-09-26 | End: 2020-09-26 | Stop reason: HOSPADM

## 2020-09-26 RX ORDER — SULFAMETHOXAZOLE AND TRIMETHOPRIM 800; 160 MG/1; MG/1
1 TABLET ORAL 2 TIMES DAILY
Qty: 14 TAB | Refills: 0 | Status: SHIPPED | OUTPATIENT
Start: 2020-09-26 | End: 2020-09-26 | Stop reason: SDUPTHER

## 2020-09-26 RX ORDER — BUDESONIDE AND FORMOTEROL FUMARATE DIHYDRATE 160; 4.5 UG/1; UG/1
2 AEROSOL RESPIRATORY (INHALATION)
Status: DISCONTINUED | OUTPATIENT
Start: 2020-09-26 | End: 2020-09-26 | Stop reason: HOSPADM

## 2020-09-26 RX ORDER — SODIUM CHLORIDE 0.9 % (FLUSH) 0.9 %
10 SYRINGE (ML) INJECTION
Status: COMPLETED | OUTPATIENT
Start: 2020-09-26 | End: 2020-09-26

## 2020-09-26 RX ORDER — OXYBUTYNIN CHLORIDE 5 MG/1
5 TABLET ORAL 2 TIMES DAILY
Status: DISCONTINUED | OUTPATIENT
Start: 2020-09-26 | End: 2020-09-26

## 2020-09-26 RX ORDER — OXYBUTYNIN CHLORIDE 5 MG/1
10 TABLET, EXTENDED RELEASE ORAL DAILY
Status: DISCONTINUED | OUTPATIENT
Start: 2020-09-26 | End: 2020-09-26 | Stop reason: HOSPADM

## 2020-09-26 RX ORDER — MAGNESIUM SULFATE 100 %
4 CRYSTALS MISCELLANEOUS AS NEEDED
Status: DISCONTINUED | OUTPATIENT
Start: 2020-09-26 | End: 2020-09-26 | Stop reason: HOSPADM

## 2020-09-26 RX ORDER — NALTREXONE HYDROCHLORIDE 50 MG/1
12.5 TABLET, FILM COATED ORAL DAILY
Status: DISCONTINUED | OUTPATIENT
Start: 2020-09-26 | End: 2020-09-26

## 2020-09-26 RX ADMIN — SODIUM CHLORIDE 100 ML/HR: 900 INJECTION, SOLUTION INTRAVENOUS at 06:00

## 2020-09-26 RX ADMIN — SODIUM CHLORIDE 892 ML: 900 INJECTION, SOLUTION INTRAVENOUS at 05:02

## 2020-09-26 RX ADMIN — CEFTRIAXONE 1 G: 1 INJECTION, POWDER, FOR SOLUTION INTRAMUSCULAR; INTRAVENOUS at 03:35

## 2020-09-26 RX ADMIN — HYDROXYCHLOROQUINE SULFATE 100 MG: 200 TABLET ORAL at 07:32

## 2020-09-26 RX ADMIN — KETOROLAC TROMETHAMINE 30 MG: 30 INJECTION, SOLUTION INTRAMUSCULAR at 04:02

## 2020-09-26 RX ADMIN — SODIUM CHLORIDE 1000 ML: 900 INJECTION, SOLUTION INTRAVENOUS at 03:34

## 2020-09-26 RX ADMIN — HEPARIN 300 UNITS: 100 SYRINGE at 09:38

## 2020-09-26 RX ADMIN — PANTOPRAZOLE SODIUM 40 MG: 40 TABLET, DELAYED RELEASE ORAL at 07:32

## 2020-09-26 RX ADMIN — PROCHLORPERAZINE EDISYLATE 10 MG: 5 INJECTION INTRAMUSCULAR; INTRAVENOUS at 02:01

## 2020-09-26 RX ADMIN — SODIUM CHLORIDE 1000 ML: 900 INJECTION, SOLUTION INTRAVENOUS at 04:07

## 2020-09-26 RX ADMIN — Medication 10 ML: at 02:34

## 2020-09-26 RX ADMIN — ONDANSETRON 4 MG: 2 INJECTION INTRAMUSCULAR; INTRAVENOUS at 04:02

## 2020-09-26 RX ADMIN — IOPAMIDOL 100 ML: 755 INJECTION, SOLUTION INTRAVENOUS at 02:34

## 2020-09-26 RX ADMIN — ACETAMINOPHEN 1000 MG: 500 TABLET ORAL at 03:35

## 2020-09-26 NOTE — REMOTE MONITORING
Contacted RN in regards to three hour sepsis bundle.     Tami Rouse RN, BSN  [facility]  [phone]

## 2020-09-26 NOTE — H&P
Hospitalist Admission Note    NAME: Valentin Mejia   :  1980   MRN:  [id number]     Date/Time:  2020 4:31 AM    Patient PCP: Kelli Rader MD  _____________________________________________________________________  Given the patient's current clinical presentation, I have a high level of concern for decompensation if discharged from the emergency department. Complex decision making was performed, which includes reviewing the patient's available past medical records, laboratory results, and x-ray films. My assessment of this patient's clinical condition and my plan of care is as follows. Assessment / Plan:    Fever Likely 2/2 UTI failed out Pt. Microbid rx  To r/o COVID (Rapid covid test pending ) in  A immunocompromised pt. (history of rheumatoid arthritis on methotrexate and chronic prednisone therapy)  Headach resolved  likely 2/2 Febrile illness cont. Tylenol prn  CT ABD PELV W CONT/  HEAD WO CONT / XR CHEST PORT   ALL NEGATIVE    Cont. IV ceftriaxone   F/u c/s  IVF  Follow-up rapid coag tests and continue COVID isolation for now pending test results. Asthma. Stable. Continue Symbicort and inhaler as needed  History of rheumatoid arthritis on chronic methotrexate and prednisone 10 mg daily  Fibromyalgia/polycystic ovarian syndrome/[place]'s amanda/hyperhidrosis disorder continue pain control and other home medication  History of diabetes in the past.  Check A1c and use sliding scale as needed    Code Status: Full  Surrogate Decision Maker:  Sharon Ramesh [facility]  [facility]  [phone]          DVT Prophylaxis: sq lovenox  GI Prophylaxis: not indicated    Baseline: independent        Subjective:   CHIEF COMPLAINT:  Fever/Bodyaches/Headach    HISTORY OF PRESENT ILLNESS:     Valentin Mejia, 47 y.o. female presents to the ED with cc of fever.   Patient states that she was diagnosed with a urinary tract infection 3 days ago, and has been on Macrobid. She developed a temperature one 1.5 at home associated with body aches. She has abdominal pain which is a 7 out of 10 in severity. She has had a headache off and on for 2 days. The headache became constant yesterday afternoon and is a 6 out of 10 in severity. She denies cough, shortness of breath or chest pain. She has nausea and vomited once last night. There is also a constipation and a decreased appetite. She has been tested for Covid  19 twice this year and was negative both times. There are no other complaints, changes, or physical findings at this time. PCP: Poncho Del Cid MD     No current facility-administered medications on file prior to encounter. We were asked to admit for work up and evaluation of the above problems. Vital Signs-Reviewed the patient's vital signs. Patient Vitals for the past 12 hrs:    Temp Pulse Resp BP SpO2   09/26/20 0113 -- -- -- -- 100 %   09/26/20 0107 (!) 102.1 °F (38.9 °C) (!) 119 18 124/72 100 %   09/26/20 0057 (!) 101.1 °F (38.4 °C) (!) 120 18 132/70 97 %     sinus tachycardia; and regular .  Rate (approx.): 112; Axis: normal; VT interval: normal; QRS interval: normal ; ST/T wave: normal       Past Medical History:   Diagnosis Date    Acquired hypothyroidism     Adverse effect of anesthesia     labile BP during/after C section    Asthma     Broken bones     history of 9 broken bones    Chronic UTI (urinary tract infection)     \"extra valve right kidney causes UTI\"    Fibromyalgia     Gestational diabetes     GI bleed 2007,2011,2015,2016    lower GI last colonoscopy in 2016 normal     Huntingtons chorea (HCC)     Hyperhydrosis disorder     Ill-defined condition     Cocke/ tested genetically - daughter has Cocke    Infertility     PCOS    PCOS (polycystic ovarian syndrome)     Precancerous skin lesion     removed from Right shoulder    Preeclampsia 2011    pregnancy    Reactive airway disease     [facility]oid arthritis ([facility]     SVT (supraventricular tachycardia) ([facility] 2011    occured during pregnancy when picc line inserted.         Past Surgical History:   Procedure Laterality Date    COLONOSCOPY N/A 6/13/2016    COL[facility]COPY performed by Fran Phoenix, [facility] at [facility] ENDOSCOPY    COLONOSCOPY N/A 5/22/2020    COLONOSCOPY performed by Rachelle King MD at [facility]  5/22/2020         HX HEENT      HX WISDOM TEETH EXTRACTION  2004    UPPER GI ENDOSCOPY,BIOPSY  2/12/2019         UPPER GI ENDOSCOPY,BIOPSY  5/22/2020         UPPER GI ENDOSCOPY,DILATN W GUIDE  2/12/2019            Social History     Tobacco Use    Smoking status: Never Smoker    Smokeless tobacco: Never Used   Substance Use Topics    Alcohol use: Yes     Comment: few drinks per year        Family History   Problem Relation Age of Onset    Other Mother         Chris's disease    Hypertension Mother     Dementia Mother     High Cholesterol Father     Heart Disease Father     Diabetes Father     Hypertension Father     Asthma Brother     Diabetes Brother     Other Brother         varicocele, hernias    Hypertension Brother     Alcohol abuse Brother     Hypertension Maternal Grandmother     Elevated Lipids Maternal Grandmother     Cancer Maternal Grandmother         breast    Other Maternal Grandmother         polymyalgia rheumatica    Glaucoma Maternal Grandmother     Cancer Maternal Grandfather         prostate    Huntingtons disease Maternal Grandfather     Cancer Paternal Grandmother         lung with mets    Cancer Paternal Grandfather         prostate, colon    Diabetes Paternal Grandfather     Heart Disease Paternal Grandfather     Alzheimer Paternal Grandfather     Dementia Paternal Grandfather      Allergies   Allergen Reactions    Latex Swelling    Entex [Phenylephrine-Guaifenesin] Anaphylaxis, Hives and Palpitations    Remicade [Infliximab] Anaphylaxis    Rituxan [Rituximab] Anaphylaxis    Mucinex [Guaifenesin] Anaphylaxis and Hives    Pepto-Bismol [Bismuth Subsalicylate] Nausea and Vomiting    Vancomycin Hives        Prior to Admission medications    Medication Sig Start Date End Date Taking? Authorizing Provider   lisdexamfetamine (Vyvanse) 30 mg capsule Take 1 Cap by mouth every morning for 90 days. Max Daily Amount: 30 mg. 8/12/20 11/10/20  MD Sammie Putnam No Coding strip AS DIRECTED 8/11/20   MD Sammie Kirk Twist Top Lancet 28 gauge misc AS DIRECTED 8/11/20   Watson Bai MD   cephALEXin (KEFLEX) 500 mg capsule Take 1 Cap by mouth three (3) times daily. 8/7/20   Watson Bai MD   escitalopram oxalate (LEXAPRO) 20 mg tablet TAKE 1 TABLET BY MOUTH ONE TIME A DAY 8/7/20   Vivienne Negron MD   montelukast (SINGULAIR) 10 mg tablet TAKE 1 TABLET BY MOUTH EVERY DAY 8/4/20   Watson Bai MD   buPROPion XL (WELLBUTRIN XL) 150 mg tablet TAKE 1 TABLET BY MOUTH EVERY DAY IN THE MORNING 6/15/20   Watson Bai MD   predniSONE (DELTASONE) 10 mg tablet Take 10 mg by mouth daily. 5/28/20   Ermias Palm MD   insulin lispro (HumaLOG KwikPen Insulin) 100 unit/mL kwikpen Check Blood Sugar before each meal 3 times daily    INITIATE SLIDING SCALE INSULIN (DURGA): For Blood Sugar (mg/dl) of :             150-200=2 units           201-250=4 units     251-300=6 units  301-350=8 units  351-400=10 units 5/28/20   Ermias Palm MD   insulin needles, disposable, (Pen Needle) 29 gauge x 1/2\" ndle To dispense insulin as directed 5/28/20   Ermias Palm MD   diphenhydrAMINE (BenadryL) 25 mg capsule Take 25 mg by mouth every six (6) hours as needed. Yasmeen, MD Victoria   BORIC ACID 600 mg by Does Not Apply route daily. Vaginal    OtherVictoria MD   nitrofurantoin (MACRODANTIN) 50 mg capsule Take 1 Cap by mouth daily as needed (post coital).  4/27/20   Appa Bertha Bai MD   PREVIDENT 5000 SENSITIVE 1.1-5 % pste USE AS DIRECTED TWICE A DAY SPIT OUT EXCESS AND DO NOT RINSE, EAT OR DRINK FOR 30 MINUTES 2/7/20   Provider, Historical   hydroxychloroquine (PLAQUENIL) 200 mg tablet 200 mg daily. 12/23/19   Provider, Historical   lidocaine-prilocaine (EMLA) topical cream For port flushing every 3 month 11/25/19   Provider, Historical   heparin sodium,porcine (HEPARIN LOCK FLUSH, PORCINE, IV) by IntraVENous route. For port flush every 3 months    Provider, Historical   omeprazole (PRILOSEC) 40 mg capsule Take 1 Cap by mouth daily. 9/28/19   Derril Duverney, MD   Cetirizine (ZYRTEC) 10 mg cap Take 10 mg by mouth daily. Indications: inflammation of the nose due to an allergy 9/28/19   Derril Duverney, MD   albuterol (PROVENTIL HFA, VENTOLIN HFA, PROAIR HFA) 90 mcg/actuation inhaler Take 2 Puffs by inhalation every four (4) hours as needed for Wheezing. 6/9/19   Derril Duverney, MD   omalizumab Lyndsey Dawkins SC) by SubCUTAneous route every thirty (30) days. Given by Dr Pily Connell in his office    Provider, Historical   propranolol LA (INDERAL LA) 60 mg SR capsule TAKE 1 CAPSULE BY MOUTH EVERY DAY 5/17/19   Jordan Patel DO   SYMBICORT 160-4.5 mcg/actuation HFAA TAKE 2 PUFFS BY MOUTH TWICE A DAY 3/28/19   Provider, Historical   OZEMPIC 0.25 mg or 0.5 mg(2 mg/1.5 mL) sub-q pen by SubCUTAneous route every seven (7) days. Patient states she takes 1 ml every friday 4/21/19   Provider, Historical   ondansetron hcl (ZOFRAN) 4 mg tablet Take 4 mg by mouth every eight (8) hours as needed for Nausea. Provider, Historical   naltrexone (DEPADE) 50 mg tablet Take  by mouth daily. Provider, Historical   EPIPEN 2-SANJAY 0.3 mg/0.3 mL injection 1 (ONE) INJECTION AS NEEDED 4/30/19   Provider, Historical   traMADol (ULTRAM) 50 mg tablet Take 50 mg by mouth every six (6) hours as needed for Pain. Provider, Historical   oxybutynin (DITROPAN) 5 mg tablet 5 mg two (2) times a day.  Patient states she takes 1 tablet bid 10/2/18   Provider, Historical   BD INSULIN SYRINGE 1 mL 25 gauge x 5/8\" syrg USE 1 SYRINGE ONCE A WEEK 8/24/18   Provider, Historical   cyanocobalamin (Vitamin B-12) 1,000 mcg tablet Take  by mouth daily. Provider, Historical   RESTASIS 0.05 % ophthalmic emulsion INSTILL 1 DROP INTO EACH EYE TWICE DAILY 10/6/17   Provider, Historical   SAFETY-ABDI TB SYR 1CC/25GX5/8\" 1 mL 25 gauge x 5/8\" syrg USE TO INJECT METHOTREXATE ONCE A WEEK 12/1/17   Provider, Historical   folic acid (FOLVITE) 1 mg tablet TAKE 1 TABLET ORALLY DAILY 11/29/16   Provider, Historical   desogestrel-ethinyl estradiol (DESOGEN) 0.15-0.03 mg tab Take 1 Tab by mouth nightly. Provider, Historical   Nebulizer & Compressor machine 1 Each by Does Not Apply route three (3) times daily as needed. 12/10/12   Shelia uMnoz MD   glycopyrrolate (ROBINUL) 1 mg tablet Take 2 mg by mouth two (2) times a day. Indications: hyperhydrosis    Provider, Historical   cholecalciferol (Vitamin D3) (1000 Units /25 mcg) tablet Take 1 Tab by mouth daily. Provider, Historical   levothyroxine (SYNTHROID) 75 mcg tablet Take 75 mcg by mouth Daily (before breakfast). Provider, Historical       REVIEW OF SYSTEMS:     I am not able to complete the review of systems because: The patient is intubated and sedated    The patient has altered mental status due to his acute medical problems    The patient has baseline aphasia from prior stroke(s)    The patient has baseline dementia and is not reliable historian    The patient is in acute medical distress and unable to provide information           Constitutional: Positive for chills and fever. HENT: Negative for congestion. Eyes: Negative. Respiratory: Negative for cough and shortness of breath. Cardiovascular: Negative for chest pain. Gastrointestinal: Positive for abdominal pain, nausea and vomiting. Endocrine: Negative for heat intolerance. Genitourinary: Negative. Musculoskeletal: Positive for myalgias. Skin: Negative for rash.    Allergic/Immunologic: Negative for food allergies. Neurological: Positive for headaches. Hematological: Does not bruise/bleed easily. Psychiatric/Behavioral: Negative. All other systems reviewed and are negative. Objective:   VITALS:    Visit Vitals  /60   Pulse (!) 111   Temp (!) 100.5 °F (38.1 °C)   Resp 18   Ht 5' 5\" (1.651 m)   Wt 96.4 kg (212 lb 8.4 oz)   SpO2 99%   BMI 35.37 kg/m²       PHYSICAL EXAM:    Constitutional:       General: She is not in acute distress. Appearance: She is well-developed. HENT:      Head: Normocephalic. Eyes:      Extraocular Movements: Extraocular movements intact. Neck:      Musculoskeletal: Normal range of motion and neck supple. Muscular tenderness present. Comments: Left neck tenderness  Cardiovascular:      Rate and Rhythm: Normal rate and regular rhythm. Heart sounds: Normal heart sounds. Pulmonary:      Effort: Pulmonary effort is normal.      Breath sounds: Normal breath sounds. Abdominal:      General: Bowel sounds are normal.      Palpations: Abdomen is soft. Tenderness: There is abdominal tenderness. Comments: Diffuse abdominal tenderness   Musculoskeletal: Normal range of motion. Skin:     General: Skin is warm and dry. Neurological:      General: No focal deficit present. Mental Status: She is alert and oriented to person, place, and time.    Psychiatric:         Mood and Affect: Mood normal.         Behavior: Behavior normal.        _______________________________________________________________________  Care Plan discussed with:    Comments   Patient y    Family      RN y    Care Manager                    Consultant:  jarek Llamas md   _______________________________________________________________________  Expected  Disposition:   Home with Family y   HH/PT/OT/RN    SNF/LTC    NADIA    ________________________________________________________________________  TOTAL TIME:  61  Minutes    Critical Care Provided     Minutes non procedure based      Comments    y Reviewed previous records   >50% of visit spent in counseling and coordination of care y Discussion with patient and/or family and questions answered       Given the patient's current clinical presentation, I have a high level of concern for decompensation if discharged from the ED. Complex decision making was performed which includes reviewing the patient's available past medical records, laboratory results, and Xray films. I have also directly communicated my plan and discussed this case with the involved ED physician.     ____________________________________________________________________  Karan Hussein MD    Procedures: see electronic medical records for all procedures/Xrays and details which were not copied into this note but were reviewed prior to creation of Plan. LAB DATA REVIEWED:    Recent Results (from the past 24 hour(s))   CBC WITH AUTOMATED DIFF    Collection Time: 09/26/20  1:56 AM   Result Value Ref Range    WBC 13.9 (H) 3.6 - 11.0 K/uL    RBC 3.64 (L) 3.80 - 5.20 M/uL    HGB 10.6 (L) 11.5 - 16.0 g/dL    HCT 32.6 (L) 35.0 - 47.0 %    MCV 89.6 80.0 - 99.0 FL    MCH 29.1 26.0 - 34.0 PG    MCHC 32.5 30.0 - 36.5 g/dL    RDW 14.6 (H) 11.5 - 14.5 %    PLATELET 271 201 - 891 K/uL    MPV 9.5 8.9 - 12.9 FL    NRBC 0.0 0  WBC    ABSOLUTE NRBC 0.00 0.00 - 0.01 K/uL    NEUTROPHILS 75 32 - 75 %    LYMPHOCYTES 15 12 - 49 %    MONOCYTES 9 5 - 13 %    EOSINOPHILS 0 0 - 7 %    BASOPHILS 0 0 - 1 %    IMMATURE GRANULOCYTES 1 (H) 0.0 - 0.5 %    ABS. NEUTROPHILS 10.5 (H) 1.8 - 8.0 K/UL    ABS. LYMPHOCYTES 2.0 0.8 - 3.5 K/UL    ABS. MONOCYTES 1.2 (H) 0.0 - 1.0 K/UL    ABS. EOSINOPHILS 0.1 0.0 - 0.4 K/UL    ABS. BASOPHILS 0.0 0.0 - 0.1 K/UL    ABS. IMM.  GRANS. 0.1 (H) 0.00 - 0.04 K/UL    DF AUTOMATED     METABOLIC PANEL, COMPREHENSIVE    Collection Time: 09/26/20  1:56 AM   Result Value Ref Range    Sodium 136 136 - 145 mmol/L    Potassium 4.0 3.5 - 5.1 mmol/L    Chloride 103 97 - 108 mmol/L    CO2 30 21 - 32 mmol/L    Anion gap 3 (L) 5 - 15 mmol/L    Glucose 99 65 - 100 mg/dL    BUN 11 6 - 20 MG/DL    Creatinine 0.88 0.55 - 1.02 MG/DL    BUN/Creatinine ratio 13 12 - 20      GFR est AA >60 >60 ml/min/1.73m2    GFR est non-AA >60 >60 ml/min/1.73m2    Calcium 8.6 8.5 - 10.1 MG/DL    Bilirubin, total 0.7 0.2 - 1.0 MG/DL    ALT (SGPT) 22 12 - 78 U/L    AST (SGOT) 11 (L) 15 - 37 U/L    Alk.  phosphatase 93 45 - 117 U/L    Protein, total 6.8 6.4 - 8.2 g/dL    Albumin 3.2 (L) 3.5 - 5.0 g/dL    Globulin 3.6 2.0 - 4.0 g/dL    A-G Ratio 0.9 (L) 1.1 - 2.2     HCG QL SERUM    Collection Time: 09/26/20  1:56 AM   Result Value Ref Range    HCG, Ql. Negative NEG     POC LACTIC ACID    Collection Time: 09/26/20  2:03 AM   Result Value Ref Range    Lactic Acid (POC) 1.08 0.40 - 2.00 mmol/L   EKG, 12 LEAD, INITIAL    Collection Time: 09/26/20  3:36 AM   Result Value Ref Range    Ventricular Rate 112 BPM    Atrial Rate 112 BPM    P-R Interval 162 ms    QRS Duration 84 ms    Q-T Interval 324 ms    QTC Calculation (Bezet) 442 ms    Calculated P Axis 47 degrees    Calculated R Axis 60 degrees    Calculated T Axis 12 degrees    Diagnosis       Sinus tachycardia  When compared with ECG of 19-MAY-2020 12:34,  Nonspecific T wave abnormality no longer evident in Anterior leads     URINALYSIS W/ RFLX MICROSCOPIC    Collection Time: 09/26/20  3:52 AM   Result Value Ref Range    Color YELLOW/STRAW      Appearance CLEAR CLEAR      Specific gravity 1.020 1.003 - 1.030      pH (UA) 5.5 5.0 - 8.0      Protein Negative NEG mg/dL    Glucose Negative NEG mg/dL    Ketone Negative NEG mg/dL    Bilirubin Negative NEG      Blood MODERATE (A) NEG      Urobilinogen 0.2 0.2 - 1.0 EU/dL    Nitrites Negative NEG      Leukocyte Esterase SMALL (A) NEG      WBC 20-50 0 - 4 /hpf    RBC  0 - 5 /hpf    Epithelial cells FEW FEW /lpf    Bacteria Negative NEG /hpf    Hyaline cast 0-2 0 - 5 /lpf

## 2020-09-26 NOTE — ED PROVIDER NOTES
EMERGENCY DEPARTMENT HISTORY AND PHYSICAL EXAM      Date: 9/26/2020  Patient Name: Lovely Easley    History of Presenting Illness     Chief Complaint   Patient presents with    Fever     Ambulatory to triage c/o fever at home tonight (101.5),  body aching. Currently being treated for uti on abt since wednesday. h/o RA. Also c/o constipation, vomiting, nausea, and decreased appetite. History Provided By: Patient    HPI: Lovely Easley, 47 y.o. female presents to the ED with cc of fever. Patient states that she was diagnosed with a urinary tract infection 3 days ago, and has been on Macrobid. She developed a temperature 101.5 at home associated with body aches. She has abdominal pain which is a 7 out of 10 in severity. She has had a headache off and on for 2 days. The headache became constant yesterday afternoon and is a 6 out of 10 in severity. She denies cough, shortness of breath or chest pain. She has nausea and vomited once last night. There is also a constipation and a decreased appetite. She has been tested for Covid  19 twice this year and was negative both times. There are no other complaints, changes, or physical findings at this time. PCP: Topher Oscar MD    No current facility-administered medications on file prior to encounter. Current Outpatient Medications on File Prior to Encounter   Medication Sig Dispense Refill    lisdexamfetamine (Vyvanse) 30 mg capsule Take 1 Cap by mouth every morning for 90 days. Max Daily Amount: 30 mg. 90 Cap 0    Prodigy No Coding strip AS DIRECTED 100 Strip 5    Prodigy Twist Top Lancet 28 gauge misc AS DIRECTED 100 Lancet 0    cephALEXin (KEFLEX) 500 mg capsule Take 1 Cap by mouth three (3) times daily.  21 Cap 0    escitalopram oxalate (LEXAPRO) 20 mg tablet TAKE 1 TABLET BY MOUTH ONE TIME A DAY 90 Tab 1    montelukast (SINGULAIR) 10 mg tablet TAKE 1 TABLET BY MOUTH EVERY DAY 90 Tab 4    buPROPion XL (WELLBUTRIN XL) 150 mg tablet TAKE 1 TABLET BY MOUTH EVERY DAY IN THE MORNING 90 Tab 3    predniSONE (DELTASONE) 10 mg tablet Take 10 mg by mouth daily. 30 Tab 0    insulin lispro (HumaLOG KwikPen Insulin) 100 unit/mL kwikpen Check Blood Sugar before each meal 3 times daily    INITIATE SLIDING SCALE INSULIN (DURGA): For Blood Sugar (mg/dl) of :             150-200=2 units           201-250=4 units     251-300=6 units  301-350=8 units  351-400=10 units 1 Package 0    insulin needles, disposable, (Pen Needle) 29 gauge x 1/2\" ndle To dispense insulin as directed 100 Pen Needle 0    diphenhydrAMINE (BenadryL) 25 mg capsule Take 25 mg by mouth every six (6) hours as needed.  BORIC ACID 600 mg by Does Not Apply route daily. Vaginal      nitrofurantoin (MACRODANTIN) 50 mg capsule Take 1 Cap by mouth daily as needed (post coital). 30 Cap 1    PREVIDENT 5000 SENSITIVE 1.1-5 % pste USE AS DIRECTED TWICE A DAY SPIT OUT EXCESS AND DO NOT RINSE, EAT OR DRINK FOR 30 MINUTES      hydroxychloroquine (PLAQUENIL) 200 mg tablet 200 mg daily.  lidocaine-prilocaine (EMLA) topical cream For port flushing every 3 month  2    heparin sodium,porcine (HEPARIN LOCK FLUSH, PORCINE, IV) by IntraVENous route. For port flush every 3 months      omeprazole (PRILOSEC) 40 mg capsule Take 1 Cap by mouth daily. 30 Cap 0    Cetirizine (ZYRTEC) 10 mg cap Take 10 mg by mouth daily. Indications: inflammation of the nose due to an allergy 30 Cap 0    albuterol (PROVENTIL HFA, VENTOLIN HFA, PROAIR HFA) 90 mcg/actuation inhaler Take 2 Puffs by inhalation every four (4) hours as needed for Wheezing. 1 Inhaler 0    omalizumab (XOLAIR SC) by SubCUTAneous route every thirty (30) days.  Given by Dr Hayley Vazquez in his office      propranolol LA (INDERAL LA) 60 mg SR capsule TAKE 1 CAPSULE BY MOUTH EVERY DAY 90 Cap 3    SYMBICORT 160-4.5 mcg/actuation HFAA TAKE 2 PUFFS BY MOUTH TWICE A DAY  12    OZEMPIC 0.25 mg or 0.5 mg(2 mg/1.5 mL) sub-q pen by SubCUTAneous route every seven (7) days. Patient states she takes 1 ml every friday  1    ondansetron hcl (ZOFRAN) 4 mg tablet Take 4 mg by mouth every eight (8) hours as needed for Nausea.  naltrexone (DEPADE) 50 mg tablet Take  by mouth daily.  EPIPEN 2-SANJAY 0.3 mg/0.3 mL injection 1 (ONE) INJECTION AS NEEDED  0    traMADol (ULTRAM) 50 mg tablet Take 50 mg by mouth every six (6) hours as needed for Pain.  oxybutynin (DITROPAN) 5 mg tablet 5 mg two (2) times a day. Patient states she takes 1 tablet bid  3    BD INSULIN SYRINGE 1 mL 25 gauge x 5/8\" syrg USE 1 SYRINGE ONCE A WEEK  11    cyanocobalamin (Vitamin B-12) 1,000 mcg tablet Take  by mouth daily.  RESTASIS 0.05 % ophthalmic emulsion INSTILL 1 DROP INTO EACH EYE TWICE DAILY  4    SAFETY-ABDI TB SYR 1CC/25GX5/8\" 1 mL 25 gauge x 5/8\" syrg USE TO INJECT METHOTREXATE ONCE A WEEK  2    folic acid (FOLVITE) 1 mg tablet TAKE 1 TABLET ORALLY DAILY  11    desogestrel-ethinyl estradiol (DESOGEN) 0.15-0.03 mg tab Take 1 Tab by mouth nightly.  Nebulizer & Compressor machine 1 Each by Does Not Apply route three (3) times daily as needed. 1 Each 0    glycopyrrolate (ROBINUL) 1 mg tablet Take 2 mg by mouth two (2) times a day. Indications: hyperhydrosis      cholecalciferol (Vitamin D3) (1000 Units /25 mcg) tablet Take 1 Tab by mouth daily.  levothyroxine (SYNTHROID) 75 mcg tablet Take 75 mcg by mouth Daily (before breakfast).          Past History     Past Medical History:  Past Medical History:   Diagnosis Date    Acquired hypothyroidism     Adverse effect of anesthesia     labile BP during/after C section    Asthma     Broken bones     history of 9 broken bones    Chronic UTI (urinary tract infection)     \"extra valve right kidney causes UTI\"    Fibromyalgia     Gestational diabetes     GI bleed 2007,2011,2015,2016    lower GI last colonoscopy in 2016 normal     Huntingtons chorea (HCC)     Hyperhydrosis disorder     Ill-defined condition Hand/ tested genetically - daughter has Hand    Infertility     PCOS    PCOS (polycystic ovarian syndrome)     Precancerous skin lesion     removed from Right shoulder    Preeclampsia 2011    pregnancy    Reactive airway disease     Rheumatoid arthritis (HCC)     SVT (supraventricular tachycardia) ([facility] [address] 2011    occured during pregnancy when picc line inserted.        Past Surgical History:  Past Surgical History:   Procedure Laterality Date    COLONOSCOPY N/A 6/13/2016    COL[facility] performed [facility] at [facility] END[facility]COPY    COLONOSCOPY N/A 5/22/2020    COLONOSCOPY performed by Veronica Mccarthy MD at [address] [place] [address]  5/22/2020         HX HEENT      HX WISDOM TEETH EXTRACTION  2004    UPPER GI ENDOSCOPY,BIOPSY  2/12/2019         UPPER GI ENDOSCOPY,BIOPSY  5/22/2020         UPPER GI ENDOSCOPY,DILATN W GUIDE  2/12/2019            Family History:  Family History   Problem Relation Age of Onset    Other Mother         Chris's disease    Hypertension Mother     Dementia Mother     High Cholesterol Father     Heart Disease Father     Diabetes Father     Hypertension Father     Asthma Brother     Diabetes Brother     Other Brother         varicocele, hernias    Hypertension Brother     Alcohol abuse Brother     Hypertension Maternal Grandmother     Elevated Lipids Maternal Grandmother     Cancer Maternal Grandmother         breast    Other Maternal Grandmother         polymyalgia rheumatica    Glaucoma Maternal Grandmother     Cancer Maternal Grandfather         prostate    Huntingtons disease Maternal Grandfather     Cancer Paternal Grandmother         lung with mets    Cancer Paternal Grandfather         prostate, colon    Diabetes Paternal Grandfather     Heart Disease Paternal Grandfather     Alzheimer Paternal Grandfather     Dementia Paternal Grandfather        Social History:  Social History     Tobacco Use    Smoking status: Never Smoker    Smokeless tobacco: Never Used   Substance Use Topics    Alcohol use: Yes     Comment: few drinks per year    Drug use: No       Allergies: Allergies   Allergen Reactions    Latex Swelling    Entex [Phenylephrine-Guaifenesin] Anaphylaxis, Hives and Palpitations    Remicade [Infliximab] Anaphylaxis    Rituxan [Rituximab] Anaphylaxis    Mucinex [Guaifenesin] Anaphylaxis and Hives    Pepto-Bismol [Bismuth Subsalicylate] Nausea and Vomiting    Vancomycin Hives         Review of Systems   Review of Systems   Constitutional: Positive for chills and fever. HENT: Negative for congestion. Eyes: Negative. Respiratory: Negative for cough and shortness of breath. Cardiovascular: Negative for chest pain. Gastrointestinal: Positive for abdominal pain, nausea and vomiting. Endocrine: Negative for heat intolerance. Genitourinary: Negative. Musculoskeletal: Positive for myalgias. Skin: Negative for rash. Allergic/Immunologic: Negative for food allergies. Neurological: Positive for headaches. Hematological: Does not bruise/bleed easily. Psychiatric/Behavioral: Negative. All other systems reviewed and are negative. Physical Exam   Physical Exam  Vitals signs and nursing note reviewed. Constitutional:       General: She is not in acute distress. Appearance: She is well-developed. HENT:      Head: Normocephalic. Eyes:      Extraocular Movements: Extraocular movements intact. Neck:      Musculoskeletal: Normal range of motion and neck supple. Muscular tenderness present. Comments: Left neck tenderness  Cardiovascular:      Rate and Rhythm: Normal rate and regular rhythm. Heart sounds: Normal heart sounds. Pulmonary:      Effort: Pulmonary effort is normal.      Breath sounds: Normal breath sounds. Abdominal:      General: Bowel sounds are normal.      Palpations: Abdomen is soft. Tenderness: There is abdominal tenderness.       Comments: Diffuse abdominal tenderness   Musculoskeletal: Normal range of motion. Skin:     General: Skin is warm and dry. Neurological:      General: No focal deficit present. Mental Status: She is alert and oriented to person, place, and time. Psychiatric:         Mood and Affect: Mood normal.         Behavior: Behavior normal.         Diagnostic Study Results     Labs -     Recent Results (from the past 12 hour(s))   CBC WITH AUTOMATED DIFF    Collection Time: 09/26/20  1:56 AM   Result Value Ref Range    WBC 13.9 (H) 3.6 - 11.0 K/uL    RBC 3.64 (L) 3.80 - 5.20 M/uL    HGB 10.6 (L) 11.5 - 16.0 g/dL    HCT 32.6 (L) 35.0 - 47.0 %    MCV 89.6 80.0 - 99.0 FL    MCH 29.1 26.0 - 34.0 PG    MCHC 32.5 30.0 - 36.5 g/dL    RDW 14.6 (H) 11.5 - 14.5 %    PLATELET 109 467 - 566 K/uL    MPV 9.5 8.9 - 12.9 FL    NRBC 0.0 0  WBC    ABSOLUTE NRBC 0.00 0.00 - 0.01 K/uL    NEUTROPHILS 75 32 - 75 %    LYMPHOCYTES 15 12 - 49 %    MONOCYTES 9 5 - 13 %    EOSINOPHILS 0 0 - 7 %    BASOPHILS 0 0 - 1 %    IMMATURE GRANULOCYTES 1 (H) 0.0 - 0.5 %    ABS. NEUTROPHILS 10.5 (H) 1.8 - 8.0 K/UL    ABS. LYMPHOCYTES 2.0 0.8 - 3.5 K/UL    ABS. MONOCYTES 1.2 (H) 0.0 - 1.0 K/UL    ABS. EOSINOPHILS 0.1 0.0 - 0.4 K/UL    ABS. BASOPHILS 0.0 0.0 - 0.1 K/UL    ABS. IMM. GRANS. 0.1 (H) 0.00 - 0.04 K/UL    DF AUTOMATED     METABOLIC PANEL, COMPREHENSIVE    Collection Time: 09/26/20  1:56 AM   Result Value Ref Range    Sodium 136 136 - 145 mmol/L    Potassium 4.0 3.5 - 5.1 mmol/L    Chloride 103 97 - 108 mmol/L    CO2 30 21 - 32 mmol/L    Anion gap 3 (L) 5 - 15 mmol/L    Glucose 99 65 - 100 mg/dL    BUN 11 6 - 20 MG/DL    Creatinine 0.88 0.55 - 1.02 MG/DL    BUN/Creatinine ratio 13 12 - 20      GFR est AA >60 >60 ml/min/1.73m2    GFR est non-AA >60 >60 ml/min/1.73m2    Calcium 8.6 8.5 - 10.1 MG/DL    Bilirubin, total 0.7 0.2 - 1.0 MG/DL    ALT (SGPT) 22 12 - 78 U/L    AST (SGOT) 11 (L) 15 - 37 U/L    Alk.  phosphatase 93 45 - 117 U/L    Protein, total 6.8 6.4 - 8.2 g/dL    Albumin 3.2 (L) 3.5 - 5.0 g/dL    Globulin 3.6 2.0 - 4.0 g/dL    A-G Ratio 0.9 (L) 1.1 - 2.2     HCG QL SERUM    Collection Time: 09/26/20  1:56 AM   Result Value Ref Range    HCG, Ql. Negative NEG     POC LACTIC ACID    Collection Time: 09/26/20  2:03 AM   Result Value Ref Range    Lactic Acid (POC) 1.08 0.40 - 2.00 mmol/L   EKG, 12 LEAD, INITIAL    Collection Time: 09/26/20  3:36 AM   Result Value Ref Range    Ventricular Rate 112 BPM    Atrial Rate 112 BPM    P-R Interval 162 ms    QRS Duration 84 ms    Q-T Interval 324 ms    QTC Calculation (Bezet) 442 ms    Calculated P Axis 47 degrees    Calculated R Axis 60 degrees    Calculated T Axis 12 degrees    Diagnosis       Sinus tachycardia  When compared with ECG of 19-MAY-2020 12:34,  Nonspecific T wave abnormality no longer evident in Anterior leads     URINALYSIS W/ RFLX MICROSCOPIC    Collection Time: 09/26/20  3:52 AM   Result Value Ref Range    Color YELLOW/STRAW      Appearance CLEAR CLEAR      Specific gravity 1.020 1.003 - 1.030      pH (UA) 5.5 5.0 - 8.0      Protein Negative NEG mg/dL    Glucose Negative NEG mg/dL    Ketone Negative NEG mg/dL    Bilirubin Negative NEG      Blood MODERATE (A) NEG      Urobilinogen 0.2 0.2 - 1.0 EU/dL    Nitrites Negative NEG      Leukocyte Esterase SMALL (A) NEG      WBC 20-50 0 - 4 /hpf    RBC  0 - 5 /hpf    Epithelial cells FEW FEW /lpf    Bacteria Negative NEG /hpf    Hyaline cast 0-2 0 - 5 /lpf       Radiologic Studies -   CT ABD PELV W CONT   Final Result   IMPRESSION:    No acute abnormality in the abdomen or pelvis. CT HEAD WO CONT   Final Result   IMPRESSION:       No acute intracranial abnormality. XR CHEST PORT   Final Result   IMPRESSION:      No acute process. CT Results  (Last 48 hours)    None        CXR Results  (Last 48 hours)    None          Medical Decision Making   I am the first provider for this patient.     I reviewed the vital signs, available nursing notes, past medical history, past surgical history, family history and social history. Vital Signs-Reviewed the patient's vital signs. Patient Vitals for the past 12 hrs:   Temp Pulse Resp BP SpO2   09/26/20 0113 -- -- -- -- 100 %   09/26/20 0107 (!) 102.1 °F (38.9 °C) (!) 119 18 124/72 100 %   09/26/20 0057 (!) 101.1 °F (38.4 °C) (!) 120 18 132/70 97 %       EKG interpretation: (Preliminary)  Rhythm: sinus tachycardia; and regular . Rate (approx.): 112; Axis: normal; KS interval: normal; QRS interval: normal ; ST/T wave: normal; Other findings: .    Records Reviewed: Nursing Notes, Old Medical Records, Previous Radiology Studies and Previous Laboratory Studies    Provider Notes (Medical Decision Making):   Sepsis, UTI, dehydration, kidney stone, COVID-19, tension headache    ED Course:   Initial assessment performed. The patients presenting problems have been discussed, and they are in agreement with the care plan formulated and outlined with them. I have encouraged them to ask questions as they arise throughout their visit. Consult note:    Patient is being admitted by Dr. Juanjose Bergman, hospitalist                   Critical Care Time:   CRITICAL CARE NOTE :    2:26 AM    IMPENDING DETERIORATION -Metabolic  ASSOCIATED RISK FACTORS - Hypotension, Metabolic changes and Dehydration  MANAGEMENT- Bedside Assessment and Supervision of Care  INTERPRETATION -  Xrays, CT Scan, ECG and Blood Pressure  INTERVENTIONS - hemodynamic mngmt and Metobolic interventions  CASE REVIEW - Hospitalist and Nursing  TREATMENT RESPONSE -Unchanged   PERFORMED BY - Self    NOTES   :  I have spent 30 minutes of critical care time involved in lab review, consultations with specialist, family decision- making, bedside attention and documentation. This time excludes time spent in any separate billed procedures. During this entire length of time I was immediately available to the patient .     Shaw Thomason MD      Disposition:  admit    DISCHARGE PLAN:  1. Current Discharge Medication List        2. Follow-up Information    None       3. Return to ED if worse     Diagnosis     Clinical Impression:   1. Acute cystitis with hematuria    2. Fever, unspecified fever cause    3. Acute abdominal pain        Attestations:    Bear Yu MD    Please note that this dictation was completed with [employer], the computer voice recognition software. Quite often unanticipated grammatical, syntax, homophones, and other interpretive errors are inadvertently transcribed by the computer software. Please disregard these errors. Please excuse any errors that have escaped final proofreading. Thank you.

## 2020-09-26 NOTE — ED NOTES
TRANSFER - IN REPORT:    Verbal and bedside report received from Hilda Walter (name) on [place]. Report consisted of patients Situation, Background, Assessment and   Recommendations(SBAR). Information from the following report(s) SBAR and ED Summary was reviewed with the receiving nurse. Opportunity for questions and clarification was provided. 0945: Pt discharged by NP Osman. Pt provided with discharge instructions Rx and instructions on follow up care. Pt out of ED ambulatory accompanied by . 1010: Spoke with NP. Pt meds to be sent to [facility] on [facility].

## 2020-09-26 NOTE — DISCHARGE SUMMARY
I reviewed pertinent labs and imaging, and discussed /agreed on the plan of care with Dr. Daljit Lyon. Hospitalist Discharge Summary     Patient ID:  Soheila Nair  [id number]  75 y.o.  1980 9/26/2020    PCP on record: Thompson Brown MD    Admit date: 9/26/2020  Discharge date and time: 9/26/2020    DISCHARGE DIAGNOSIS:    Active Hospital Problems    Diagnosis Date Noted    UTI (urinary tract infection) 09/26/2020    Immunocompromised ([facility] [address] 09/26/2020    Febrile illness 09/26/2020    Suspected COVID-19 virus infection 09/26/2020    Asthma 12/23/2016    Rheumatoid arthritis ([facility] [address] 12/23/2016    Fibromyalgia 05/03/2016    Hyperhidrosis 05/03/2016       CONSULTATIONS:  None    Excerpted HPI from H&P of Jatinder Hollingsworth MD:  Nelli Tobin y. o. female presents to the ED with cc of fever.  Patient states that she was diagnosed with a urinary tract infection 3 days ago, and has been on Macrobid.  She developed a temperature one 1.5 at home associated with body aches.  She has abdominal pain which is a 7 out of 10 in severity.  She has had a headache off and on for 2 days.  The headache became constant yesterday afternoon and is a 6 out of 10 in severity.  She denies cough, shortness of breath or chest pain.  She has nausea and vomited once last night. Oral Arjun is also a constipation and a decreased appetite. Car Law has been tested for Covid  19 twice this year and was negative both times.     There are no other complaints, changes, or physical findings at this time. ______________________________________________________________________  DISCHARGE SUMMARY/HOSPITAL COURSE:  for full details see H&P, daily progress notes, labs, consult notes. Fever Likely 2/2 UTI failed out Pt. Microbid rx  To r/o COVID (Rapid covid test pending ) in  A immunocompromised pt.  (history of rheumatoid arthritis on methotrexate and chronic prednisone therapy)  Headach resolved  likely 2/2 Febrile illness cont. Tylenol prn  CT ABD PELV W CONT/  HEAD WO CONT / XR CHEST PORT   ALL NEGATIVE     Cont. IV ceftriaxone   F/u c/s  IVF  Follow-up rapid coag tests and continue COVID result negative    - afebrile   - back pain resolved         Asthma. Stable. Continue Symbicort and inhaler as needed  History of rheumatoid arthritis on chronic methotrexate and prednisone 10 mg daily  Fibromyalgia/polycystic ovarian syndrome/Montour's amanda/hyperhidrosis disorder continue pain control and other home medication  History of diabetes in the past.  Check A1c 5.6  and use sliding scale as needed       Called to see pt who is requesting to be discharged. Pt states today is her mothers  . She is currently afebrile , denies flank or back pain . Labs reviewed . No Urine cx noted - will obtain and follow . Will notify pt if abx needs to be adjusted . Pt aware to return to ED if fevers greater than 100.5       Code Status: Full  Surrogate Decision Maker:  Toshia Larose [facility]  Life Partner  [phone]              _______________________________________________________________________  Patient seen and examined by me on discharge day. Pertinent Findings:  Gen:    Not in distress  Chest: Clear lungs  CVS:   S1S2. No edema  Abd:  Soft, not distended, not tender  Neuro:  Alert, Ox4 _______________________________________________________________________  DISCHARGE MEDICATIONS:   Current Discharge Medication List      START taking these medications    Details   benzonatate (TESSALON) 100 mg capsule Take 1 Cap by mouth three (3) times daily as needed for Cough for up to 7 days. Qty: 21 Cap, Refills: 0      trimethoprim-sulfamethoxazole (Bactrim DS) 160-800 mg per tablet Take 1 Tab by mouth two (2) times a day for 7 days.  Indications: bacterial urinary tract infection  Qty: 14 Tab, Refills: 0         CONTINUE these medications which have NOT CHANGED    Details   lisdexamfetamine (Vyvanse) 30 mg capsule Take 1 Cap by mouth every morning for 90 days. Max Daily Amount: 30 mg.  Qty: 90 Cap, Refills: 0    Associated Diagnoses: Attention deficit hyperactivity disorder (ADHD), predominantly inattentive type      Prodigy No Coding strip AS DIRECTED  Qty: 100 Strip, Refills: 5      Prodigy Twist Top Lancet 28 gauge misc AS DIRECTED  Qty: 100 Lancet, Refills: 0      escitalopram oxalate (LEXAPRO) 20 mg tablet TAKE 1 TABLET BY MOUTH ONE TIME A DAY  Qty: 90 Tab, Refills: 1    Associated Diagnoses: Recurrent major depressive disorder, in partial remission (HCC)      montelukast (SINGULAIR) 10 mg tablet TAKE 1 TABLET BY MOUTH EVERY DAY  Qty: 90 Tab, Refills: 4    Associated Diagnoses: Mild persistent asthma without complication      buPROPion XL (WELLBUTRIN XL) 150 mg tablet TAKE 1 TABLET BY MOUTH EVERY DAY IN THE MORNING  Qty: 90 Tab, Refills: 3    Associated Diagnoses: Depression, unspecified depression type      predniSONE (DELTASONE) 10 mg tablet Take 10 mg by mouth daily. Qty: 30 Tab, Refills: 0      insulin lispro (HumaLOG KwikPen Insulin) 100 unit/mL kwikpen Check Blood Sugar before each meal 3 times daily    INITIATE SLIDING SCALE INSULIN (DURGA): For Blood Sugar (mg/dl) of :             150-200=2 units           201-250=4 units     251-300=6 units  301-350=8 units  351-400=10 units  Qty: 1 Package, Refills: 0      insulin needles, disposable, (Pen Needle) 29 gauge x 1/2\" ndle To dispense insulin as directed  Qty: 100 Pen Needle, Refills: 0      diphenhydrAMINE (BenadryL) 25 mg capsule Take 25 mg by mouth every six (6) hours as needed. PREVIDENT 5000 SENSITIVE 1.1-5 % pste USE AS DIRECTED TWICE A DAY SPIT OUT EXCESS AND DO NOT RINSE, EAT OR DRINK FOR 30 MINUTES      hydroxychloroquine (PLAQUENIL) 200 mg tablet 200 mg daily. lidocaine-prilocaine (EMLA) topical cream For port flushing every 3 month  Refills: 2      omeprazole (PRILOSEC) 40 mg capsule Take 1 Cap by mouth daily.   Qty: 30 Cap, Refills: 0      Cetirizine (ZYRTEC) 10 mg cap Take 10 mg by mouth daily. Indications: inflammation of the nose due to an allergy  Qty: 30 Cap, Refills: 0      albuterol (PROVENTIL HFA, VENTOLIN HFA, PROAIR HFA) 90 mcg/actuation inhaler Take 2 Puffs by inhalation every four (4) hours as needed for Wheezing. Qty: 1 Inhaler, Refills: 0    Associated Diagnoses: Mild intermittent asthma without complication      omalizumab (XOLAIR SC) by SubCUTAneous route every thirty (30) days. Given by Dr Charito Rosado in his office      propranolol LA (INDERAL LA) 60 mg SR capsule TAKE 1 CAPSULE BY MOUTH EVERY DAY  Qty: 90 Cap, Refills: 3      SYMBICORT 160-4.5 mcg/actuation HFAA TAKE 2 PUFFS BY MOUTH TWICE A DAY  Refills: 12      OZEMPIC 0.25 mg or 0.5 mg(2 mg/1.5 mL) sub-q pen by SubCUTAneous route every seven (7) days. Patient states she takes 1 ml every friday  Refills: 1      ondansetron hcl (ZOFRAN) 4 mg tablet Take 4 mg by mouth every eight (8) hours as needed for Nausea. EPIPEN 2-SANJAY 0.3 mg/0.3 mL injection 1 (ONE) INJECTION AS NEEDED  Refills: 0      traMADol (ULTRAM) 50 mg tablet Take 50 mg by mouth every six (6) hours as needed for Pain. oxybutynin (DITROPAN) 5 mg tablet 5 mg two (2) times a day. Patient states she takes 1 tablet bid  Refills: 3      cyanocobalamin (Vitamin B-12) 1,000 mcg tablet Take  by mouth daily. RESTASIS 0.05 % ophthalmic emulsion INSTILL 1 DROP INTO EACH EYE TWICE DAILY  Refills: 4      SAFETY-ABDI TB SYR 1CC/25GX5/8\" 1 mL 25 gauge x 5/8\" syrg USE TO INJECT METHOTREXATE ONCE A WEEK  Refills: 2      folic acid (FOLVITE) 1 mg tablet TAKE 1 TABLET ORALLY DAILY  Refills: 11      Nebulizer & Compressor machine 1 Each by Does Not Apply route three (3) times daily as needed. Qty: 1 Each, Refills: 0      glycopyrrolate (ROBINUL) 1 mg tablet Take 2 mg by mouth two (2) times a day. Indications: hyperhydrosis      cholecalciferol (Vitamin D3) (1000 Units /25 mcg) tablet Take 1 Tab by mouth daily.       levothyroxine (SYNTHROID) 75 mcg tablet Take 75 mcg by mouth Daily (before breakfast). STOP taking these medications       BORIC ACID Comments:   Reason for Stopping:         nitrofurantoin (MACRODANTIN) 50 mg capsule Comments:   Reason for Stopping:         naltrexone (DEPADE) 50 mg tablet Comments:   Reason for Stopping:                 Patient Follow Up Instructions: Activity: Activity as tolerated  Diet: Diabetic Diet  Wound Care: As directed    Follow-up with PCP in 1 week. Follow-up tests/labs Urine     Follow-up Information     Follow up With Specialties Details Why Contact Lucius Pitts MD Internal Medicine In 1 week Urine test  [address]  [phone]          ________________________________________________________________    Risk of deterioration: Moderate    Condition at Discharge:  Stable  __________________________________________________________________    Disposition  Home with family, no needs    ____________________________________________________________________    Code Status: Full Code  ___________________________________________________________________      Total time in minutes spent coordinating this discharge (includes going over instructions, follow-up, prescriptions, and preparing report for sign off to her PCP) :  >30 minutes    Signed:  Saniya Wall NP

## 2020-09-26 NOTE — DISCHARGE INSTRUCTIONS
Patient Education        Learning About Fever  What is a fever? A fever is a high body temperature. It's one way your body fights being sick. A fever shows that the body is responding to infection or other illnesses, both minor and severe. A fever is a symptom, not an illness by itself. A fever can be a sign that you are ill, but most fevers are not caused by a serious problem. You may have a fever with a minor illness, such as a cold. But sometimes a very serious infection may cause little or no fever. It is important to look at other symptoms, other conditions you have, and how you feel in general. In children, notice how they act and see what symptoms they complain of. What is a normal body temperature? A normal body temperature is about 98. 6ºF. Some people have a normal temperature that is a little higher or a little lower than this. Your temperature may be a little lower in the morning than it is later in the day. It may go up during hot weather or when you exercise, wear heavy clothes, or take a hot bath. Your temperature may also be different depending on how you take it. A temperature taken in the mouth (oral) or under the arm may be a little lower than your core temperature (rectal). What is a fever temperature? A core temperature of 100.4°F or above is considered a fever. What can cause a fever? A fever may be caused by:  · Infections. This is the most common cause of a fever. Examples of infections that can cause a fever include the flu, a kidney infection, or pneumonia. · Some medicines. · Severe trauma or injury, such as a heart attack, stroke, heatstroke, or burns. · Other medical conditions, such as arthritis and some cancers. How can you treat a fever at home? · Ask your doctor if you can take an over-the-counter pain medicine, such as acetaminophen (Tylenol), ibuprofen (Advil, Motrin), or naproxen (Aleve). Be safe with medicines.  Read and follow all instructions on the label.  · To prevent dehydration, drink plenty of fluids. Choose water and other caffeine-free clear liquids until you feel better. If you have kidney, heart, or liver disease and have to limit fluids, talk with your doctor before you increase the amount of fluids you drink. Follow-up care is a key part of your treatment and safety. Be sure to make and go to all appointments, and call your doctor if you are having problems. It's also a good idea to know your test results and keep a list of the medicines you take. Where can you learn more? Go to http://amanda-laz.info/  Enter G732 in the search box to learn more about \"Learning About Fever. \"  Current as of: June 26, 2019               Content Version: 12.6  © 6552-4493 [employer]. Care instructions adapted under license by [employer] (which disclaims liability or warranty for this information). If you have questions about a medical condition or this instruction, always ask your healthcare professional. [address] any warranty or liability for your use of this information.

## 2020-09-27 LAB
BACTERIA SPEC CULT: ABNORMAL
BACTERIA SPEC CULT: ABNORMAL
CC UR VC: ABNORMAL
SERVICE CMNT-IMP: ABNORMAL

## 2020-09-27 NOTE — PROGRESS NOTES
Reviewed Ucx results the patient called to inform + for Beta Strep will adjust antibiotic to Cefuroxime 250mg Po BID x 7 day

## 2020-09-28 ENCOUNTER — PATIENT OUTREACH (OUTPATIENT)
Dept: OTHER | Age: 40
End: 2020-09-28

## 2020-09-28 NOTE — PROGRESS NOTES
Patient on report as eligible for Case Management. Left discreet message on voicemail with this CM contact information. Will attempt to contact again to offer [address] [place] [address] Management services. Also, need to ensure patient is aware of Urine Culture results and possible need to change treatment from Bactrim as needed with her PCP; On EHR Review:    Urine Culture 09/27/2020 results:  Culture result: Abnormal        Final    STREPTOCOCCI, BETA HEMOLYTIC GROUP B (8,000 COLONIES/ML) Penicillin and ampicillin are drugs of choice for treatment of beta-hemolytic streptococcal infections.

## 2020-09-29 ENCOUNTER — PATIENT OUTREACH (OUTPATIENT)
Dept: OTHER | Age: 40
End: 2020-09-29

## 2020-09-29 ENCOUNTER — TELEPHONE (OUTPATIENT)
Dept: INTERNAL MEDICINE CLINIC | Age: 40
End: 2020-09-29

## 2020-09-29 NOTE — LETTER
Ms. Imelda Coyle [address] [place] [id number] Dear Ms. Prem, My name is Chris Hussein RN, Employee Care Manager for [facility] and I have been trying to reach you following you ER visit on September 26, 2020. The Employee Care Management [facility]) program is a free-of-charge confidential service provided to our employees and their family members covered by the [facility]. The program will provide an employee and his/her family with the [facility] expertise to assist in navigating the health care delivery system, provider services, and their overall care needsso as to assure and improve health care interactions and enhance the quality of life. This program is designed to provide you with the opportunity to have a [facility] care manager partner with you for the following services: 
 
 1) when you come home from the hospital or emergency room 2) when help is needed to manage your disease 3) when you need assistance coordinating services or appointments [facility] is dedicated to empowering the good health of its community and improving the quality of care and care experiences for employees and their families. We are committed to safeguarding patient confidentiality and privacy, assuring that every employee has the respect he or she deserves in managing their health. The information shared with your care manager will not be shared with anyone else aside from those you identify as part of your care team and will only be used to assist you with any identified care needs. Please contact me if you would like this service provided to you. Sincerely, Chris Hussein RN, BSN  Employee Care Manager [email or web address]

## 2020-09-29 NOTE — PROGRESS NOTES
Patient identified as eligible for [address] [place] [address] services. Second telephone outreach attempted. Left discreet voicemail with this CM confidential contact information. Will send UTR letter. Outreach call placed to PCP office, just to ensure FU on UTI culture results with patient currently on Bactrim;  UTI shows Beta Hemolytic streptococci;   Was able to leave messge with front office staff regard this for Dr. Sharon Roblero who will be seeing patient on VV this week;

## 2020-09-29 NOTE — TELEPHONE ENCOUNTER
#[phone] pt states she was seen in the ED and they wanted to admit her, but she couldn't due to her mom's .    Pt was diagnosed with strep, UTI and e coli     Pt needs a f/u and will see another physician. Please call to schedule.

## 2020-09-29 NOTE — TELEPHONE ENCOUNTER
----- Message from Elisabeth Donato sent at 9/29/2020  3:43 PM EDT -----  Regarding: Dr. Nilda Green telephone  Caller's first and last name: RN from [address] [place] [address]   Reason for call: ER wanted to make sure Dr. Judy Verma saw the PT lab results. Pt has a UTI, Pt urine tested positive for Beta Strep. Pt has a follow up appt with Dr. Judy Verma on 10/2/20.   Callback required yes/no and why: No  Best contact number(s): [phone]  Details to clarify the request: Jose Angel Wilder patient\"

## 2020-09-30 RX ORDER — CEPHALEXIN 500 MG/1
500 CAPSULE ORAL 2 TIMES DAILY
Qty: 14 CAP | Refills: 0 | Status: SHIPPED | OUTPATIENT
Start: 2020-09-30 | End: 2021-02-09 | Stop reason: ALTCHOICE

## 2020-10-01 LAB
BACTERIA SPEC CULT: ABNORMAL
SERVICE CMNT-IMP: ABNORMAL

## 2020-10-02 ENCOUNTER — VIRTUAL VISIT (OUTPATIENT)
Dept: INTERNAL MEDICINE CLINIC | Age: 40
End: 2020-10-02
Payer: COMMERCIAL

## 2020-10-02 DIAGNOSIS — F90.0 ATTENTION DEFICIT HYPERACTIVITY DISORDER (ADHD), PREDOMINANTLY INATTENTIVE TYPE: ICD-10-CM

## 2020-10-02 DIAGNOSIS — N39.0 RECURRENT UTI: Primary | ICD-10-CM

## 2020-10-02 PROCEDURE — 99213 OFFICE O/P EST LOW 20 MIN: CPT | Performed by: FAMILY MEDICINE

## 2020-10-02 NOTE — PROGRESS NOTES
Gwen Almaguer is a 47 y.o. female who presents for follow up after ED visit on 9/26 for UTI. Given bactrim DS. Urine culture Beta Strep. Changed to keflex, started for 4 days. She reports no fever yesterday and today. Feels tired. Eating ok. States she can still feel her bladder. Saw urologist, Dr Roseann Villa, tested for ureaplasma. Added zpak. Drinking plenty of water. To have a recheck urine on 10/20. Taking vyvanse 30mg once a day for ADD. The medication improves her function. This is an established visit conducted via telemedicine with video. The patient has been instructed that this meets HIPAA criteria and acknowledges and agrees to this method of visitation. Pursuant to the emergency declaration under the [facility] waiver authority and the [address] [employer] and [employer] Act, this Virtual Visit was conducted, with patient's consent, to reduce the patient's risk of exposure to COVID-19 and provide continuity of care for an established patient. Services were provided through a video synchronous discussion virtually to substitute for in-person clinic visit.         Past Medical History:   Diagnosis Date    Acquired hypothyroidism     Adverse effect of anesthesia     labile BP during/after C section    Asthma     Broken bones     history of 9 broken bones    Chronic UTI (urinary tract infection)     \"extra valve right kidney causes UTI\"    Fibromyalgia     Gestational diabetes     GI bleed 2007,2011,2015,2016    lower GI last colonoscopy in 2016 normal     Huntingtons chorea (HCC)     Hyperhydrosis disorder     Ill-defined condition     [place]/ tested genetically - daughter has [place]    Infertility     PCOS    PCOS (polycystic ovarian syndrome)     Precancerous skin lesion     removed from Right shoulder    Preeclampsia 2011    pregnancy    Reactive airway disease     Rheumatoid arthritis ([facility]     SVT (supraventricular tachycardia) ([facility] 2011    occured during pregnancy when picc line inserted.        Family History   Problem Relation Age of Onset    Other Mother         Chris's disease    Hypertension Mother     Dementia Mother     High Cholesterol Father     Heart Disease Father     Diabetes Father     Hypertension Father     Asthma Brother     Diabetes Brother     Other Brother         varicocele, hernias    Hypertension Brother     Alcohol abuse Brother     Hypertension Maternal Grandmother     Elevated Lipids Maternal Grandmother     Cancer Maternal Grandmother         breast    Other Maternal Grandmother         polymyalgia rheumatica    Glaucoma Maternal Grandmother     Cancer Maternal Grandfather         prostate    Huntingtons disease Maternal Grandfather     Cancer Paternal Grandmother         lung with mets    Cancer Paternal Grandfather         prostate, colon    Diabetes Paternal Grandfather     Heart Disease Paternal Grandfather     Alzheimer Paternal Grandfather     Dementia Paternal Grandfather        Social History     Socioeconomic History    Marital status:      Spouse name: Not on file    Number of children: Not on file    Years of education: Not on file    Highest education level: Not on file   Occupational History    Not on file   Social Needs    Financial resource strain: Not on file    Food insecurity     Worry: Not on file     Inability: Not on file   [employer] needs     Medical: Not on file     Non-medical: Not on file   Tobacco Use    Smoking status: Never Smoker    Smokeless tobacco: Never Used   Substance and Sexual Activity    Alcohol use: Yes     Comment: few drinks per year    Drug use: No    Sexual activity: Not on file   Lifestyle    Physical activity     Days per week: Not on file     Minutes per session: Not on file    Stress: Not on file   Relationships    Social connections     Talks on phone: Not on file     Gets together: Not on file     Attends Taoism service: Not on file     Active member of club or organization: Not on file     Attends meetings of clubs or organizations: Not on file     Relationship status: Not on file    Intimate partner violence     Fear of current or ex partner: Not on file     Emotionally abused: Not on file     Physically abused: Not on file     Forced sexual activity: Not on file   Other Topics Concern    Not on file   Social History Narrative    ** Merged History Encounter **            Current Outpatient Medications on File Prior to Visit   Medication Sig Dispense Refill    cephALEXin (KEFLEX) 500 mg capsule Take 1 Cap by mouth two (2) times a day. 14 Cap 0    benzonatate (TESSALON) 100 mg capsule Take 1 Cap by mouth three (3) times daily as needed for Cough for up to 7 days. 21 Cap 0    ondansetron (ZOFRAN ODT) 4 mg disintegrating tablet Take 1 Tab by mouth every eight (8) hours as needed for Nausea or Nausea or Vomiting for up to 10 days. 28 Tab 0    trimethoprim-sulfamethoxazole (Bactrim DS) 160-800 mg per tablet Take 1 Tab by mouth two (2) times a day for 7 days. Indications: bacterial urinary tract infection 14 Tab 0    [DISCONTINUED] lisdexamfetamine (Vyvanse) 30 mg capsule Take 1 Cap by mouth every morning for 90 days. Max Daily Amount: 30 mg. 90 Cap 0    Prodigy No Coding strip AS DIRECTED 100 Strip 5    Prodigy Twist Top Lancet 28 gauge misc AS DIRECTED 100 Lancet 0    escitalopram oxalate (LEXAPRO) 20 mg tablet TAKE 1 TABLET BY MOUTH ONE TIME A DAY 90 Tab 1    montelukast (SINGULAIR) 10 mg tablet TAKE 1 TABLET BY MOUTH EVERY DAY 90 Tab 4    buPROPion XL (WELLBUTRIN XL) 150 mg tablet TAKE 1 TABLET BY MOUTH EVERY DAY IN THE MORNING 90 Tab 3    predniSONE (DELTASONE) 10 mg tablet Take 10 mg by mouth daily.  30 Tab 0    insulin lispro (HumaLOG KwikPen Insulin) 100 unit/mL kwikpen Check Blood Sugar before each meal 3 times daily    INITIATE SLIDING SCALE INSULIN (DURGA): For Blood Sugar (mg/dl) of :             150-200=2 units           201-250=4 units     251-300=6 units  301-350=8 units  351-400=10 units 1 Package 0    insulin needles, disposable, (Pen Needle) 29 gauge x 1/2\" ndle To dispense insulin as directed 100 Pen Needle 0    diphenhydrAMINE (BenadryL) 25 mg capsule Take 25 mg by mouth every six (6) hours as needed.  PREVIDENT 5000 SENSITIVE 1.1-5 % pste USE AS DIRECTED TWICE A DAY SPIT OUT EXCESS AND DO NOT RINSE, EAT OR DRINK FOR 30 MINUTES      hydroxychloroquine (PLAQUENIL) 200 mg tablet 200 mg daily.  lidocaine-prilocaine (EMLA) topical cream For port flushing every 3 month  2    omeprazole (PRILOSEC) 40 mg capsule Take 1 Cap by mouth daily. 30 Cap 0    Cetirizine (ZYRTEC) 10 mg cap Take 10 mg by mouth daily. Indications: inflammation of the nose due to an allergy 30 Cap 0    albuterol (PROVENTIL HFA, VENTOLIN HFA, PROAIR HFA) 90 mcg/actuation inhaler Take 2 Puffs by inhalation every four (4) hours as needed for Wheezing. 1 Inhaler 0    omalizumab (XOLAIR SC) by SubCUTAneous route every thirty (30) days. Given by Dr Brien Nuñez in his office      propranolol LA (INDERAL LA) 60 mg SR capsule TAKE 1 CAPSULE BY MOUTH EVERY DAY 90 Cap 3    SYMBICORT 160-4.5 mcg/actuation HFAA TAKE 2 PUFFS BY MOUTH TWICE A DAY  12    OZEMPIC 0.25 mg or 0.5 mg(2 mg/1.5 mL) sub-q pen by SubCUTAneous route every seven (7) days. Patient states she takes 1 ml every friday  1    ondansetron hcl (ZOFRAN) 4 mg tablet Take 4 mg by mouth every eight (8) hours as needed for Nausea.  EPIPEN 2-SANJAY 0.3 mg/0.3 mL injection 1 (ONE) INJECTION AS NEEDED  0    traMADol (ULTRAM) 50 mg tablet Take 50 mg by mouth every six (6) hours as needed for Pain.  oxybutynin (DITROPAN) 5 mg tablet 5 mg two (2) times a day. Patient states she takes 1 tablet bid  3    cyanocobalamin (Vitamin B-12) 1,000 mcg tablet Take  by mouth daily.       RESTASIS 0.05 % ophthalmic emulsion INSTILL 1 DROP INTO EACH EYE TWICE DAILY  4    SAFETY-ABDI TB SYR 1CC/25GX5/8\" 1 mL 25 gauge x 5/8\" syrg USE TO INJECT METHOTREXATE ONCE A WEEK  2    folic acid (FOLVITE) 1 mg tablet TAKE 1 TABLET ORALLY DAILY  11    Nebulizer & Compressor machine 1 Each by Does Not Apply route three (3) times daily as needed. 1 Each 0    glycopyrrolate (ROBINUL) 1 mg tablet Take 2 mg by mouth two (2) times a day. Indications: hyperhydrosis      cholecalciferol (Vitamin D3) (1000 Units /25 mcg) tablet Take 1 Tab by mouth daily.  levothyroxine (SYNTHROID) 75 mcg tablet Take 75 mcg by mouth Daily (before breakfast). No current facility-administered medications on file prior to visit. Review of Systems  Pertinent items are noted in HPI. Objective:     Gen: well appearing female  HEENT: normal conjunctiva, no audible congestion, patient does not see oral erythema, has MMM  Neck: patient does not feel enlarged or tender LAD or masses  Resp: normal respiratory effort, no audible wheezing. CV: patient does not feel palpitations or heart irregularity  Abd: patient does not feel abdominal tenderness or mass, patient does not notice distension  Extrem: patient does not see swelling in ankles or joints. Neuro: Alert and oriented, able to answer questions without difficulty, able to move all extremities and walk normally        Assessment/Plan:       ICD-10-CM ICD-9-CM    1. Recurrent UTI  N39.0 599.0    2. Attention deficit hyperactivity disorder (ADHD), predominantly inattentive type  F90.0 314.00 lisdexamfetamine (Vyvanse) 30 mg capsule   complete course of keflex and azithromycin. This was a telemedicine visit with video.         Irving Galarza MD

## 2020-10-28 ENCOUNTER — OFFICE VISIT (OUTPATIENT)
Dept: NEUROLOGY | Facility: CLINIC | Age: 40
End: 2020-10-28
Payer: COMMERCIAL

## 2020-10-28 VITALS
RESPIRATION RATE: 18 BRPM | OXYGEN SATURATION: 98 % | HEART RATE: 78 BPM | SYSTOLIC BLOOD PRESSURE: 112 MMHG | DIASTOLIC BLOOD PRESSURE: 70 MMHG

## 2020-10-28 DIAGNOSIS — G25.1 MEDICATION-INDUCED POSTURAL TREMOR: ICD-10-CM

## 2020-10-28 DIAGNOSIS — R25.1 EXCESSIVE PHYSIOLOGIC TREMOR: Primary | ICD-10-CM

## 2020-10-28 DIAGNOSIS — E03.9 HYPOTHYROIDISM (ACQUIRED): ICD-10-CM

## 2020-10-28 PROCEDURE — 99214 OFFICE O/P EST MOD 30 MIN: CPT | Performed by: PSYCHIATRY & NEUROLOGY

## 2020-10-28 RX ORDER — PROPRANOLOL HYDROCHLORIDE 60 MG/1
CAPSULE, EXTENDED RELEASE ORAL
Qty: 90 CAP | Refills: 3 | Status: SHIPPED | OUTPATIENT
Start: 2020-10-28 | End: 2021-07-30 | Stop reason: ALTCHOICE
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
## 2020-11-05 ENCOUNTER — PATIENT OUTREACH (OUTPATIENT)
Dept: OTHER | Age: 40
End: 2020-11-05

## 2020-11-05 NOTE — LETTER
Ms. Neela Beasley [address] [facility] [address] Dear Ms. Prem, My name is Cherryl Galeazzi, RN, [facility], Associate Care Manager [facility] (ACM) program.  I have been trying to reach you for a few weeks to offer an opportunity to enroll in the [facility] Health program. 
 
 ACM is a new model of care designed to improve the coordination of your health care with an emphasis on your overall well-being. This confidential program is offered free of charge to [facility] participants and their covered family members. As a nurse care manager, my role is to partner with you to help you achieve your health goals and support you in optimizing your health. As healthcare providers, we know that patients do better when they have close follow up with a primary care provider (PCP). I can help you find one that is convenient to you and covered by your insurance. I can also help you understand any after visit instructions, such as what symptoms to watch out for, or any new or changed medications. We can work together using your preferred communication -- telephone, email, [email or web address]. If you do not have a [email or web address] account, I can help you request access. Our program is designed to provide you with the opportunity to have a nurse care manager partner with you for your healthcare needs. Since I was unable to reach you over the past few weeks, I am closing out this current episode of care, but will remain available to you should you have any questions. Please contact me at the below number if I can provide you with assistance for any of the above services. Sincerely, Cherryl Galeazzi, RN, BSN, [address] [place] [email or web address]

## 2020-11-20 ENCOUNTER — VIRTUAL VISIT (OUTPATIENT)
Dept: INTERNAL MEDICINE CLINIC | Age: 40
End: 2020-11-20
Payer: COMMERCIAL

## 2020-11-20 DIAGNOSIS — B37.0 THRUSH: ICD-10-CM

## 2020-11-20 DIAGNOSIS — M06.9 RHEUMATOID ARTHRITIS, INVOLVING UNSPECIFIED SITE, UNSPECIFIED WHETHER RHEUMATOID FACTOR PRESENT (HCC): ICD-10-CM

## 2020-11-20 DIAGNOSIS — R25.1 TREMOR: ICD-10-CM

## 2020-11-20 DIAGNOSIS — M79.7 FIBROMYALGIA: ICD-10-CM

## 2020-11-20 DIAGNOSIS — F32.A DEPRESSION, UNSPECIFIED DEPRESSION TYPE: ICD-10-CM

## 2020-11-20 DIAGNOSIS — F41.9 ANXIETY: ICD-10-CM

## 2020-11-20 DIAGNOSIS — R53.1 WEAKNESS GENERALIZED: Primary | ICD-10-CM

## 2020-11-20 DIAGNOSIS — R13.10 DYSPHAGIA, UNSPECIFIED TYPE: ICD-10-CM

## 2020-11-20 DIAGNOSIS — F90.0 ATTENTION DEFICIT HYPERACTIVITY DISORDER (ADHD), PREDOMINANTLY INATTENTIVE TYPE: ICD-10-CM

## 2020-11-20 PROCEDURE — 99214 OFFICE O/P EST MOD 30 MIN: CPT | Performed by: FAMILY MEDICINE

## 2020-11-20 RX ORDER — NYSTATIN 100000 [USP'U]/ML
1 SUSPENSION ORAL 4 TIMES DAILY
Qty: 473 ML | Refills: 1 | Status: SHIPPED | OUTPATIENT
Start: 2020-11-20

## 2020-11-20 NOTE — PROGRESS NOTES
Jose Houston is a 47 y.o. female patient of Dr Miquel Ritchie who presents for follow up. Seen by me in October after treatment for UTI. Patient has been on ST disability. Hospitalized 5/14-5/18. With splenic infarct/inflammation and sepsis. Hospitalized again 5/19-5/28 with sepsis    She has not been able to return to work. Patient is requesting completion of LT disabilty paperwork. Patient reports feeling very weak, she states she is in bed 18 hours a day. Reports diffuse muscle fatigue. Reports trouble swallowing. Feels like solid food gets stuck. Prior EGD March 2019, Dr Shahriar Casey, prior dilation. She reports significant problems with tremor. Followed by neurology, Dr Zenaida Cobian, for tremor. Propranolol increased. Per Dr Villalobos Certain notes, negative work up so far for cause of tremor. Reports cannot type, will double tap letters. Reports handwriting is getting worse. Reports dropping items often. Reports having problems even washing her hair. Patient reports at risk for Huntingtons chorea. Is to see a specialist in HD. Sees rheumatology, Dr Nasim Lynn, for RA and FM. Has been on xeljanz, MTX, plaquenil, and prednisone. Recent elevated inflammatory markers. At most recent visit, [place] Gin stopped and benlysta requested,not started yet. Prednisone increased. Started on flexeril. Underwent neuropsych evaluation with Dr. Lucio Camacho. Demonstrating mild and Selected Cognitive Difficulties, moderate anxiety with somatic features, mild depression, Chronic ADHD- Inattentive                                                    Saw dermatology in October, for oral fungal infection. Given mycelex troches, it does not melt due to dry mouth. Using topical nystatin and gentamicin for rash. This is an established visit conducted via telemedicine with video. The patient has been instructed that this meets HIPAA criteria and acknowledges and agrees to this method of visitation.     Pursuant to the emergency declaration under the [facility], [facility] waiver authority and the [employer] Act, this Virtual Visit was conducted, with patient's consent, to reduce the patient's risk of exposure to COVID-19 and provide continuity of care for an established patient. Services were provided through a video synchronous discussion virtually to substitute for in-person clinic visit. Past Medical History:   Diagnosis Date    Acquired hypothyroidism     Adverse effect of anesthesia     labile BP during/after C section    Asthma     Broken bones     history of 9 broken bones    Chronic UTI (urinary tract infection)     \"extra valve right kidney causes UTI\"    Fibromyalgia     Gestational diabetes     GI bleed 2007,2011,2015,2016    lower GI last colonoscopy in 2016 normal     Huntingtons chorea (HCC)     Hyperhydrosis disorder     Ill-defined condition     Ballard/ tested genetically - daughter has Ballard    Infertility     PCOS    PCOS (polycystic ovarian syndrome)     Precancerous skin lesion     removed from Right shoulder    Preeclampsia 2011    pregnancy    Reactive airway disease     Rheumatoid arthritis (HCC)     SVT (supraventricular tachycardia) ([address] 2011    occured during pregnancy when picc line inserted.        Family History   Problem Relation Age of Onset    Other Mother         Ballard's disease    Hypertension Mother     Dementia Mother     High Cholesterol Father     Heart Disease Father     Diabetes Father     Hypertension Father     Asthma Brother     Diabetes Brother     Other Brother         varicocele, hernias    Hypertension Brother     Alcohol abuse Brother     Hypertension Maternal Grandmother     Elevated Lipids Maternal Grandmother     Cancer Maternal Grandmother         breast    Other Maternal Grandmother         polymyalgia rheumatica    Glaucoma Maternal Grandmother     Cancer Maternal Grandfather         prostate    Huntingtons disease Maternal Grandfather     Cancer Paternal Grandmother         lung with mets    Cancer Paternal Grandfather         prostate, colon    Diabetes Paternal Grandfather     Heart Disease Paternal Grandfather     Alzheimer Paternal Grandfather     Dementia Paternal Grandfather        Social History     Socioeconomic History    Marital status:      Spouse name: Not on file    Number of children: Not on file    Years of education: Not on file    Highest education level: Not on file   Occupational History    Not on file   Social Needs    Financial resource strain: Not on file    Food insecurity     Worry: Not on file     Inability: Not on file   [employer] needs     Medical: Not on file     Non-medical: Not on file   Tobacco Use    Smoking status: Never Smoker    Smokeless tobacco: Never Used   Substance and Sexual Activity    Alcohol use: Yes     Comment: few drinks per year    Drug use: No    Sexual activity: Not on file   Lifestyle    Physical activity     Days per week: Not on file     Minutes per session: Not on file    Stress: Not on file   Relationships    Social connections     Talks on phone: Not on file     Gets together: Not on file     Attends [employer] service: Not on file     Active member of club or organization: Not on file     Attends meetings of clubs or organizations: Not on file     Relationship status: Not on file    Intimate partner violence     Fear of current or ex partner: Not on file     Emotionally abused: Not on file     Physically abused: Not on file     Forced sexual activity: Not on file   Other Topics Concern    Not on file   Social History Narrative    ** Merged History Encounter **            Current Outpatient Medications on File Prior to Visit   Medication Sig Dispense Refill    propranolol LA (INDERAL LA) 60 mg SR capsule TAKE 1 CAPSULE BY MOUTH EVERY DAY 90 Cap 3  [DISCONTINUED] lisdexamfetamine (Vyvanse) 30 mg capsule Take 1 Cap by mouth every morning. Max Daily Amount: 30 mg. 30 Cap 0    cephALEXin (KEFLEX) 500 mg capsule Take 1 Cap by mouth two (2) times a day. 14 Cap 0    Prodigy No Coding strip AS DIRECTED 100 Strip 5    Prodigy Twist Top Lancet 28 gauge misc AS DIRECTED 100 Lancet 0    escitalopram oxalate (LEXAPRO) 20 mg tablet TAKE 1 TABLET BY MOUTH ONE TIME A DAY 90 Tab 1    montelukast (SINGULAIR) 10 mg tablet TAKE 1 TABLET BY MOUTH EVERY DAY 90 Tab 4    buPROPion XL (WELLBUTRIN XL) 150 mg tablet TAKE 1 TABLET BY MOUTH EVERY DAY IN THE MORNING 90 Tab 3    predniSONE (DELTASONE) 10 mg tablet Take 10 mg by mouth daily. 30 Tab 0    insulin lispro (HumaLOG KwikPen Insulin) 100 unit/mL kwikpen Check Blood Sugar before each meal 3 times daily    INITIATE SLIDING SCALE INSULIN (DURGA): For Blood Sugar (mg/dl) of :             150-200=2 units           201-250=4 units     251-300=6 units  301-350=8 units  351-400=10 units 1 Package 0    insulin needles, disposable, (Pen Needle) 29 gauge x 1/2\" ndle To dispense insulin as directed 100 Pen Needle 0    diphenhydrAMINE (BenadryL) 25 mg capsule Take 25 mg by mouth every six (6) hours as needed.  PREVIDENT 5000 SENSITIVE 1.1-5 % pste USE AS DIRECTED TWICE A DAY SPIT OUT EXCESS AND DO NOT RINSE, EAT OR DRINK FOR 30 MINUTES      hydroxychloroquine (PLAQUENIL) 200 mg tablet 200 mg daily.  lidocaine-prilocaine (EMLA) topical cream For port flushing every 3 month  2    omeprazole (PRILOSEC) 40 mg capsule Take 1 Cap by mouth daily. 30 Cap 0    Cetirizine (ZYRTEC) 10 mg cap Take 10 mg by mouth daily. Indications: inflammation of the nose due to an allergy 30 Cap 0    albuterol (PROVENTIL HFA, VENTOLIN HFA, PROAIR HFA) 90 mcg/actuation inhaler Take 2 Puffs by inhalation every four (4) hours as needed for Wheezing. 1 Inhaler 0    omalizumab (XOLAIR SC) by SubCUTAneous route every thirty (30) days.  Given by  Jose [place] in his office      SYMBICORT 160-4.5 mcg/actuation HFAA TAKE 2 PUFFS BY MOUTH TWICE A DAY  12    OZEMPIC 0.25 mg or 0.5 mg(2 mg/1.5 mL) sub-q pen by SubCUTAneous route every seven (7) days. Patient states she takes 1 ml every friday  1    ondansetron hcl (ZOFRAN) 4 mg tablet Take 4 mg by mouth every eight (8) hours as needed for Nausea.  EPIPEN 2-SANJAY 0.3 mg/0.3 mL injection 1 (ONE) INJECTION AS NEEDED  0    traMADol (ULTRAM) 50 mg tablet Take 50 mg by mouth every six (6) hours as needed for Pain.  oxybutynin (DITROPAN) 5 mg tablet 5 mg two (2) times a day. Patient states she takes 1 tablet bid  3    cyanocobalamin (Vitamin B-12) 1,000 mcg tablet Take  by mouth daily.  RESTASIS 0.05 % ophthalmic emulsion INSTILL 1 DROP INTO EACH EYE TWICE DAILY  4    SAFETY-ABDI TB SYR 1CC/25GX5/8\" 1 mL 25 gauge x 5/8\" syrg USE TO INJECT METHOTREXATE ONCE A WEEK  2    folic acid (FOLVITE) 1 mg tablet TAKE 1 TABLET ORALLY DAILY  11    Nebulizer & Compressor machine 1 Each by Does Not Apply route three (3) times daily as needed. 1 Each 0    glycopyrrolate (ROBINUL) 1 mg tablet Take 2 mg by mouth two (2) times a day. Indications: hyperhydrosis      cholecalciferol (Vitamin D3) (1000 Units /25 mcg) tablet Take 1 Tab by mouth daily.  levothyroxine (SYNTHROID) 75 mcg tablet Take 75 mcg by mouth Daily (before breakfast). No current facility-administered medications on file prior to visit. Review of Systems  Pertinent items are noted in HPI. Objective:     Gen: well appearing female  HEENT: normal conjunctiva, no audible congestion, patient does not see oral erythema, has MMM  Neck: patient does not feel enlarged or tender LAD or masses  Resp: normal respiratory effort, no audible wheezing.    CV: patient does not feel palpitations or heart irregularity  Abd: patient does not feel abdominal tenderness or mass, patient does not notice distension  Extrem: patient does not see swelling in ankles or joints. Neuro: Alert and oriented, able to answer questions without difficulty, able to move all extremities and walk normally        Assessment/Plan:       ICD-10-CM ICD-9-CM    1. Weakness generalized  R53.1 780.79    2. Thrush  B37.0 112.0 nystatin (MYCOSTATIN) 100,000 unit/mL suspension   3. Dysphagia, unspecified type  R13.10 787.20 XR BA SWALLOW ESOPHOGRAM   4. Attention deficit hyperactivity disorder (ADHD), predominantly inattentive type  F90.0 314.00 lisdexamfetamine (Vyvanse) 30 mg capsule   5. Anxiety  F41.9 300.00    6. Depression, unspecified depression type  F32.9 311    7. Fibromyalgia  M79.7 729.1    8. Rheumatoid arthritis, involving unspecified site, unspecified whether rheumatoid factor present ([facility] [address]  M06.9 714.0    9. Tremor  R25.1 781.0    Patient states she will be dropping off paperwork for completion for long-term disability. I have explained to the patient that she will require objective proof of her physical impairment and inability to work. I have given her the number to [facility] rehab program for a functional capacity evaluation. This was a telemedicine visit with video.         Mireille Gore MD

## 2020-11-22 ENCOUNTER — TELEPHONE (OUTPATIENT)
Dept: INTERNAL MEDICINE CLINIC | Age: 40
End: 2020-11-22

## 2020-11-25 ENCOUNTER — HOSPITAL ENCOUNTER (OUTPATIENT)
Dept: GENERAL RADIOLOGY | Age: 40
Discharge: HOME | End: 2020-11-25
Attending: FAMILY MEDICINE
Payer: COMMERCIAL

## 2020-11-25 ENCOUNTER — TELEPHONE (OUTPATIENT)
Dept: INTERNAL MEDICINE CLINIC | Age: 40
End: 2020-11-25

## 2020-11-25 ENCOUNTER — PATIENT MESSAGE (OUTPATIENT)
Dept: INTERNAL MEDICINE CLINIC | Age: 40
End: 2020-11-25

## 2020-11-25 DIAGNOSIS — R13.10 DYSPHAGIA, UNSPECIFIED TYPE: ICD-10-CM

## 2020-11-25 PROCEDURE — 74220 X-RAY XM ESOPHAGUS 1CNTRST: CPT

## 2020-11-25 NOTE — TELEPHONE ENCOUNTER
----- Message from Emely Bowman sent at 11/25/2020  3:12 PM EST -----  Regarding: Dr Abigail Garza first and last name: [facility]      Reason for call: confirmation for referral, 11/24/2020      Callback required yes/no and why: yes      Best contact number(s): [phone]    Message from [facility]

## 2020-12-08 ENCOUNTER — TELEPHONE (OUTPATIENT)
Dept: INTERNAL MEDICINE CLINIC | Age: 40
End: 2020-12-08

## 2020-12-08 NOTE — TELEPHONE ENCOUNTER
General Message/Vendor Calls     Caller's first and last name:  [employer] [facility]       Reason for call: Confirming Fax for Patient       Callback required yes/no and why: yes       Best contact number(s): [phone]       Details to clarify the request: Confirming a fax for this patient was received by the office.        Betty Camarillo

## 2020-12-17 DIAGNOSIS — F90.0 ATTENTION DEFICIT HYPERACTIVITY DISORDER (ADHD), PREDOMINANTLY INATTENTIVE TYPE: ICD-10-CM

## 2020-12-17 DIAGNOSIS — F32.A DEPRESSION, UNSPECIFIED DEPRESSION TYPE: ICD-10-CM

## 2020-12-17 DIAGNOSIS — F33.41 RECURRENT MAJOR DEPRESSIVE DISORDER, IN PARTIAL REMISSION (HCC): ICD-10-CM

## 2020-12-18 RX ORDER — LISDEXAMFETAMINE DIMESYLATE 30 MG/1
CAPSULE ORAL
Qty: 30 CAP | Refills: 0 | Status: SHIPPED | OUTPATIENT
Start: 2020-12-18 | End: 2021-01-18 | Stop reason: SDUPTHER

## 2020-12-20 RX ORDER — ESCITALOPRAM OXALATE 20 MG/1
TABLET ORAL
Qty: 90 TAB | Refills: 1 | Status: SHIPPED | OUTPATIENT
Start: 2020-12-20 | End: 2021-01-18 | Stop reason: SDUPTHER

## 2020-12-20 RX ORDER — BUPROPION HYDROCHLORIDE 150 MG/1
TABLET ORAL
Qty: 90 TAB | Refills: 3 | Status: SHIPPED | OUTPATIENT
Start: 2020-12-20 | End: 2021-01-18 | Stop reason: SDUPTHER

## 2021-01-05 ENCOUNTER — TELEPHONE (OUTPATIENT)
Dept: INTERNAL MEDICINE CLINIC | Age: 41
End: 2021-01-05

## 2021-01-05 NOTE — TELEPHONE ENCOUNTER
General Message/Vendor Calls     Caller's first and last name: Rajan Lo       Reason for call: Medical records request denied by [employer]. Callback required yes/no and why: Yes, medical records needed. Best contact number(s): [phone] ext [phone]       Details to clarify the request: Caller called yesterday, was transferred to [employer], [employer] denied request for records, caller was informed office could send them anyway.        Meredith Dee

## 2021-01-08 NOTE — TELEPHONE ENCOUNTER
Called [facility] and spoke to Kota she stated the request was denied because it had Dr. Reece Veliz on it and they are requesting office notes from [address] 10/1/2020 to present, patient has been seeing Dr. Marty Zavala since Dr. Matthew Rios was out of the office on maternity leave.   Left message from Raul at [facility] to refax request with facility name on it and add Dr Marty Zavala name to request.

## 2021-01-11 ENCOUNTER — DOCUMENTATION ONLY (OUTPATIENT)
Dept: INTERNAL MEDICINE CLINIC | Age: 41
End: 2021-01-11

## 2021-01-11 NOTE — PROGRESS NOTES
Received medical record request on 1/4/21 from [employer]  Faxed request to Corey Rodriguez on 1/11/21 and received confirmation/ scanned in chart

## 2021-01-18 ENCOUNTER — VIRTUAL VISIT (OUTPATIENT)
Dept: INTERNAL MEDICINE CLINIC | Age: 41
End: 2021-01-18
Payer: COMMERCIAL

## 2021-01-18 DIAGNOSIS — F33.41 RECURRENT MAJOR DEPRESSIVE DISORDER, IN PARTIAL REMISSION (HCC): ICD-10-CM

## 2021-01-18 DIAGNOSIS — F41.9 ANXIETY: ICD-10-CM

## 2021-01-18 DIAGNOSIS — F90.0 ATTENTION DEFICIT HYPERACTIVITY DISORDER (ADHD), PREDOMINANTLY INATTENTIVE TYPE: Primary | ICD-10-CM

## 2021-01-18 DIAGNOSIS — E55.9 VITAMIN D DEFICIENCY: ICD-10-CM

## 2021-01-18 DIAGNOSIS — F32.A DEPRESSION, UNSPECIFIED DEPRESSION TYPE: ICD-10-CM

## 2021-01-18 DIAGNOSIS — M06.9 RHEUMATOID ARTHRITIS, INVOLVING UNSPECIFIED SITE, UNSPECIFIED WHETHER RHEUMATOID FACTOR PRESENT (HCC): ICD-10-CM

## 2021-01-18 DIAGNOSIS — E11.9 CONTROLLED TYPE 2 DIABETES MELLITUS WITHOUT COMPLICATION, WITHOUT LONG-TERM CURRENT USE OF INSULIN (HCC): ICD-10-CM

## 2021-01-18 DIAGNOSIS — J45.30 MILD PERSISTENT ASTHMA WITHOUT COMPLICATION: ICD-10-CM

## 2021-01-18 DIAGNOSIS — E03.9 ACQUIRED HYPOTHYROIDISM: ICD-10-CM

## 2021-01-18 PROCEDURE — 99214 OFFICE O/P EST MOD 30 MIN: CPT | Performed by: INTERNAL MEDICINE

## 2021-01-18 RX ORDER — MONTELUKAST SODIUM 10 MG/1
10 TABLET ORAL DAILY
Qty: 90 TAB | Refills: 3 | Status: SHIPPED | OUTPATIENT
Start: 2021-01-18 | End: 2021-08-11

## 2021-01-18 RX ORDER — ESCITALOPRAM OXALATE 20 MG/1
20 TABLET ORAL DAILY
Qty: 90 TAB | Refills: 3 | Status: SHIPPED | OUTPATIENT
Start: 2021-01-18 | End: 2021-04-13

## 2021-01-18 RX ORDER — BUPROPION HYDROCHLORIDE 150 MG/1
150 TABLET ORAL
Qty: 90 TAB | Refills: 3 | Status: SHIPPED | OUTPATIENT
Start: 2021-01-18 | End: 2021-10-28 | Stop reason: SDUPTHER

## 2021-01-18 NOTE — PROGRESS NOTES
CC:       HPI:       She is a 47 y.o. female with a hx of RA, splenic abscess ( recently treated), ADHD, depression    Currently doing PT and occupational therapy to improve endurance and help cope with tremor and pain. Has muscle pain and joint pain ( neck, hand and foot pain) depends on the day     Tramadol helps with the pain    For RA on methotrexate with folic acid,  benlysta injections, plaquenil 200mg BID and 30 mg of prednisone a day  Has port     Taking B12 and D3     Taking zyrtec and singulair and symbicort for allergies    Taking vyvanse most days for ADHD - helps    Taking wellbutrin and lexapro for depression which is stable     Stopped oral contraception due to antiphospholipid syndrome    Ozempic working well for diabetes last HA1C below 6     Tremor most pronounced in the hands  Patient went to neurologist - 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 Dr Brenda Escobedo filled out initial work. Applying for long term disability through [employer]    PT causes exhaustion  Has tremors that are intense  debilitating    Currently has UTI and taking macrobid and monistat    This is an established visit conducted via telemedicine with video. The patient has been instructed that this meets HIPAA criteria and acknowledges and agrees to this method of visitation. Pursuant to the emergency declaration under the [facility], [facility]5 waiver authority and the [employer] and [employer] Act, this Virtual Visit was conducted, with patient's consent, to reduce the patient's risk of exposure to COVID-19 and provide continuity of care for an established patient. Services were provided through a video synchronous discussion virtually to substitute for in-person clinic visit. ROS:  10 systems reviewed and negative other than HPI     Past Medical History:   Diagnosis Date    Acquired hypothyroidism     Adverse effect of anesthesia     labile BP during/after C section    Asthma     Broken bones     history of 9 broken bones    Chronic UTI (urinary tract infection)     \"extra valve right kidney causes UTI\"    Fibromyalgia     Gestational diabetes     GI bleed 2007,2011,2015,2016    lower GI last colonoscopy in 2016 normal     Huntingtons chorea (HCC)     Hyperhydrosis disorder     Ill-defined condition     [place]/ tested genetically - daughter has Chris    Infertility     PCOS    PCOS (polycystic ovarian syndrome)     Precancerous skin lesion     removed from Right shoulder    Preeclampsia 2011    pregnancy    Reactive airway disease     Rheumatoid arthritis (HCC)     SVT (supraventricular tachycardia) ([facility] [address] 2011    occured during pregnancy when picc line inserted. Current Outpatient Medications on File Prior to Visit   Medication Sig Dispense Refill    buPROPion XL (WELLBUTRIN XL) 150 mg tablet TAKE ONE TABLET BY MOUTH EVERY MORNING 90 Tab 3    escitalopram oxalate (LEXAPRO) 20 mg tablet TAKE 1 TABLET BY MOUTH ONE TIME A DAY 90 Tab 1    Vyvanse 30 mg capsule TAKE ONE CAPSULE BY MOUTH EVERY MORNING 30 Cap 0    nystatin (MYCOSTATIN) 100,000 unit/mL suspension Take 5 mL by mouth four (4) times daily. swish and spit 473 mL 1    propranolol LA (INDERAL LA) 60 mg SR capsule TAKE 1 CAPSULE BY MOUTH EVERY DAY 90 Cap 3    cephALEXin (KEFLEX) 500 mg capsule Take 1 Cap by mouth two (2) times a day.  14 Cap 0    Prodigy No Coding strip AS DIRECTED 100 Strip 5    Prodigy Twist Top Lancet 28 gauge misc AS DIRECTED 100 Lancet 0    montelukast (SINGULAIR) 10 mg tablet TAKE 1 TABLET BY MOUTH EVERY DAY 90 Tab 4    predniSONE (DELTASONE) 10 mg tablet Take 10 mg by mouth daily. 30 Tab 0    insulin lispro (HumaLOG KwikPen Insulin) 100 unit/mL kwikpen Check Blood Sugar before each meal 3 times daily    INITIATE SLIDING SCALE INSULIN (DURGA): For Blood Sugar (mg/dl) of :             150-200=2 units           201-250=4 units     251-300=6 units  301-350=8 units  351-400=10 units 1 Package 0    insulin needles, disposable, (Pen Needle) 29 gauge x 1/2\" ndle To dispense insulin as directed 100 Pen Needle 0    diphenhydrAMINE (BenadryL) 25 mg capsule Take 25 mg by mouth every six (6) hours as needed.  PREVIDENT 5000 SENSITIVE 1.1-5 % pste USE AS DIRECTED TWICE A DAY SPIT OUT EXCESS AND DO NOT RINSE, EAT OR DRINK FOR 30 MINUTES      hydroxychloroquine (PLAQUENIL) 200 mg tablet 200 mg daily.  lidocaine-prilocaine (EMLA) topical cream For port flushing every 3 month  2    omeprazole (PRILOSEC) 40 mg capsule Take 1 Cap by mouth daily. 30 Cap 0    Cetirizine (ZYRTEC) 10 mg cap Take 10 mg by mouth daily. Indications: inflammation of the nose due to an allergy 30 Cap 0    albuterol (PROVENTIL HFA, VENTOLIN HFA, PROAIR HFA) 90 mcg/actuation inhaler Take 2 Puffs by inhalation every four (4) hours as needed for Wheezing. 1 Inhaler 0    omalizumab (XOLAIR SC) by SubCUTAneous route every thirty (30) days. Given by Dr Shereen Luo in his office      SYMBICORT 160-4.5 mcg/actuation HFAA TAKE 2 PUFFS BY MOUTH TWICE A DAY  12    OZEMPIC 0.25 mg or 0.5 mg(2 mg/1.5 mL) sub-q pen by SubCUTAneous route every seven (7) days. Patient states she takes 1 ml every friday  1    ondansetron hcl (ZOFRAN) 4 mg tablet Take 4 mg by mouth every eight (8) hours as needed for Nausea.  EPIPEN 2-SANJAY 0.3 mg/0.3 mL injection 1 (ONE) INJECTION AS NEEDED  0    traMADol (ULTRAM) 50 mg tablet Take 50 mg by mouth every six (6) hours as needed for Pain.  oxybutynin (DITROPAN) 5 mg tablet 5 mg two (2) times a day.  Patient states she takes 1 tablet bid  3    cyanocobalamin (Vitamin B-12) 1,000 mcg tablet Take  by mouth daily.  RESTASIS 0.05 % ophthalmic emulsion INSTILL 1 DROP INTO EACH EYE TWICE DAILY  4    SAFETY-ABDI TB SYR 1CC/25GX5/8\" 1 mL 25 gauge x 5/8\" syrg USE TO INJECT METHOTREXATE ONCE A WEEK  2    folic acid (FOLVITE) 1 mg tablet TAKE 1 TABLET ORALLY DAILY  11    Nebulizer & Compressor machine 1 Each by Does Not Apply route three (3) times daily as needed. 1 Each 0    glycopyrrolate (ROBINUL) 1 mg tablet Take 2 mg by mouth two (2) times a day. Indications: hyperhydrosis      cholecalciferol (Vitamin D3) (1000 Units /25 mcg) tablet Take 1 Tab by mouth daily.  levothyroxine (SYNTHROID) 75 mcg tablet Take 75 mcg by mouth Daily (before breakfast). No current facility-administered medications on file prior to visit.         Past Surgical History:   Procedure Laterality Date    COLONOSCOPY N/A 6/13/2016    COLONOS[facility] performed [facility] at [facility]OSCOPY    COLONOSCOPY N/A 5/22/2020    [facility]  5/22/2020         HX HEENT      HX WISDOM TEETH EXTRACTION  2004    UPPER GI ENDOSCOPY,BIOPSY  2/12/2019         UPPER GI ENDOSCOPY,BIOPSY  5/22/2020         UPPER GI ENDOSCOPY,DILATN W GUIDE  2/12/2019            Family History   Problem Relation Age of Onset    Other Mother         Chris's disease    Hypertension Mother     Dementia Mother     High Cholesterol Father     Heart Disease Father     Diabetes Father     Hypertension Father     Asthma Brother     Diabetes Brother     Other Brother         varicocele, hernias    Hypertension Brother     Alcohol abuse Brother     Hypertension Maternal Grandmother     Elevated Lipids Maternal Grandmother     Cancer Maternal Grandmother         breast    Other Maternal Grandmother         polymyalgia rheumatica    Glaucoma Maternal Grandmother     Cancer Maternal Grandfather         prostate    Huntingtons disease Maternal Grandfather     Cancer Paternal Grandmother         lung with mets    Cancer Paternal Grandfather         prostate, colon    Diabetes Paternal Grandfather     Heart Disease Paternal Grandfather     Alzheimer Paternal Grandfather     Dementia Paternal Grandfather      Reviewed and no changes     Social History     Socioeconomic History    Marital status:      Spouse name: Not on file    Number of children: Not on file    Years of education: Not on file    Highest education level: Not on file   Occupational History    Not on file   Social Needs    Financial resource strain: Not on file    Food insecurity     Worry: Not on file     Inability: Not on file   [employer] needs     Medical: Not on file     Non-medical: Not on file   Tobacco Use    Smoking status: Never Smoker    Smokeless tobacco: Never Used   Substance and Sexual Activity    Alcohol use: Yes     Comment: few drinks per year    Drug use: No    Sexual activity: Not on file   Lifestyle    Physical activity     Days per week: Not on file     Minutes per session: Not on file    Stress: Not on file   Relationships    Social connections     Talks on phone: Not on file     Gets together: Not on file     Attends [employer] service: Not on file     Active member of club or organization: Not on file     Attends meetings of clubs or organizations: Not on file     Relationship status: Not on file    Intimate partner violence     Fear of current or ex partner: Not on file     Emotionally abused: Not on file     Physically abused: Not on file     Forced sexual activity: Not on file   Other Topics Concern    Not on file   Social History Narrative    ** Merged History Encounter **               There were no vitals taken for this visit.     Physical Examination:   Gen: well appearing female  HEENT: normal conjunctiva, no audible congestion, patient does not see oral erythema, has MMM  Neck: patient does not feel enlarged or tender LAD or masses  Resp: normal respiratory effort, no audible wheezing. CV: patient does not feel palpitations or heart irregularity  Abd: patient does not feel abdominal tenderness or mass, patient does not notice distension  Extrem: patient does not see swelling in ankles or joints. Neuro: Alert and oriented, able to answer questions without difficulty, able to move all extremities and walk normally          Lab Results   Component Value Date/Time    WBC 13.9 (H) 09/26/2020 01:56 AM    HGB 10.6 (L) 09/26/2020 01:56 AM    HCT 32.6 (L) 09/26/2020 01:56 AM    PLATELET 212 48/85/7509 01:56 AM    MCV 89.6 09/26/2020 01:56 AM     Lab Results   Component Value Date/Time    Sodium 136 09/26/2020 01:56 AM    Potassium 4.0 09/26/2020 01:56 AM    Chloride 103 09/26/2020 01:56 AM    CO2 30 09/26/2020 01:56 AM    Anion gap 3 (L) 09/26/2020 01:56 AM    Glucose 99 09/26/2020 01:56 AM    BUN 11 09/26/2020 01:56 AM    Creatinine 0.88 09/26/2020 01:56 AM    BUN/Creatinine ratio 13 09/26/2020 01:56 AM    GFR est AA >60 09/26/2020 01:56 AM    GFR est non-AA >60 09/26/2020 01:56 AM    Calcium 8.6 09/26/2020 01:56 AM     No results found for: CHOL, CHOLPOCT, CHOLX, CHLST, CHOLV, HDL, HDLPOC, HDLP, LDL, LDLCPOC, LDLC, DLDLP, VLDLC, VLDL, TGLX, TRIGL, TRIGP, TGLPOCT, CHHD, CHHDX  Lab Results   Component Value Date/Time    TSH 1.48 10/30/2015 05:11 PM     No results found for: PSA, Edson Antoni, [id number], [id number]  Lab Results   Component Value Date/Time    Hemoglobin A1c 5.7 (H) 09/26/2020 01:56 AM     No results found for: VITD3, XQVID2, XQVID3, XQVID, VD3RIA    Lab Results   Component Value Date/Time    ALT (SGPT) 22 09/26/2020 01:56 AM    Alk. phosphatase 93 09/26/2020 01:56 AM    Bilirubin, total 0.7 09/26/2020 01:56 AM           Assessment/Plan:    1. Attention deficit hyperactivity disorder (ADHD), predominantly inattentive type  Stable on vyvanse 30mg daily     2.  Depression, unspecified depression type  Stable on lexapro and wellbutrin      4. Rheumatoid arthritis, involving unspecified site, unspecified whether rheumatoid factor present (HCC) debilitating   Difficult to control followed by Dr Marcos Villarreal and on immunosuppression, recent GI issues raises suspicion for overlap syndrome    5. Acquired hypothyroidism  Euthyroid on exam  - TSH AND FREE T4  - CBC WITH AUTOMATED DIFF  - METABOLIC PANEL, COMPREHENSIVE    6. Controlled type 2 diabetes mellitus without complication, without long-term current use of insulin (HCC)  On ozempic   - LIPID PANEL  - HEMOGLOBIN A1C W/O EAG  - CBC WITH AUTOMATED DIFF  - METABOLIC PANEL, COMPREHENSIVE    7. Vitamin D deficiency  - VITAMIN D, 25 HYDROXY      Has functional capacity scheduled for next week for disability and Dr Marcos Villarreal will send a statement as well. Dr Bibi Joseph filled out initial work. Applying for long term disability through [facility] has intense fatigue, tremors that are debilitating since admissions in 2020 for splenic abscesses   After functional capacity I will be filling out more forms   Follow up in 3 months           Meghan Tobar MD    This is an established visit conducted via real time video and audio telemedicine. The patient has been instructed that this meets HIPAA criteria and acknowledges and agrees to this method of visitation.

## 2021-01-26 ENCOUNTER — DOCUMENTATION ONLY (OUTPATIENT)
Dept: INTERNAL MEDICINE CLINIC | Age: 41
End: 2021-01-26

## 2021-01-26 NOTE — PROGRESS NOTES
[facility] on 1/20/21.   Faxed record request to Corey Rodriguez on 1/26/21

## 2021-02-08 ENCOUNTER — VIRTUAL VISIT (OUTPATIENT)
Dept: INTERNAL MEDICINE CLINIC | Age: 41
End: 2021-02-08
Payer: COMMERCIAL

## 2021-02-08 DIAGNOSIS — M32.9 SYSTEMIC LUPUS ERYTHEMATOSUS, UNSPECIFIED SLE TYPE, UNSPECIFIED ORGAN INVOLVEMENT STATUS (HCC): ICD-10-CM

## 2021-02-08 DIAGNOSIS — F32.1 MODERATE MAJOR DEPRESSION (HCC): ICD-10-CM

## 2021-02-08 DIAGNOSIS — D84.9 IMMUNOSUPPRESSED STATUS (HCC): Primary | ICD-10-CM

## 2021-02-08 DIAGNOSIS — M06.9 RHEUMATOID ARTHRITIS, INVOLVING UNSPECIFIED SITE, UNSPECIFIED WHETHER RHEUMATOID FACTOR PRESENT (HCC): ICD-10-CM

## 2021-02-08 DIAGNOSIS — R13.10 PROBLEMS WITH SWALLOWING AND MASTICATION: ICD-10-CM

## 2021-02-08 DIAGNOSIS — R25.1 FUNCTIONAL TREMOR: ICD-10-CM

## 2021-02-08 DIAGNOSIS — R65.10 SYSTEMIC INFLAMMATORY RESPONSE SYNDROME DUE TO NON-INFECTIOUS PROCESS WITHOUT ACUTE ORGAN DYSFUNCTION (HCC): ICD-10-CM

## 2021-02-08 PROCEDURE — 99214 OFFICE O/P EST MOD 30 MIN: CPT | Performed by: INTERNAL MEDICINE

## 2021-02-08 RX ORDER — BELIMUMAB 200 MG/ML
SOLUTION SUBCUTANEOUS
COMMUNITY
Start: 2021-01-26 | End: 2021-07-21

## 2021-02-08 NOTE — PROGRESS NOTES
CC: Form Completion      HPI:    She is a 47 y.o. female who presents for evaluation of disability form. Ms Sherry Araujo has a hx of RA, lupus ( versus connective tissue disease?) , hypothyroidism,  antiphospholipid syndrome and being worked for possible mixed connective tissue. Last May 2020 she was admitted for severe sepsis and found to have hypodensities in the spleen and was treated with IV daptomycin and ceftriaxone ( Hospitalized May 19 to May 28th)   She has head a steady decline in her health. She tires easily, has functional tremor, has difficulty with gross and fine motor activities. Completed 5 hour functional capacity test   Can push herself for 1.5  hours and then becomes difficulty to complete any activity. 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 Recent dysphagia saw GI and hand barium swallow showing mild dysmotylity and then endoscopy showing barretts esophagus, pre cancerous cells and will need repeat biopsy       Seen Dr Carmen Harris for rheumatologic condition   She is currently on prednisone 30mg daily   benlysta 200 subcutaneous      For asthma issues she is currently on xolair    Depression: stable on wellbutrin and lexapro    ADHD: stable on vyvanse 30mg daily       Ozempic working well for diabetes last HA1C below 6     Tremor most pronounced in the hands  Patient went to neurologist - Dr Higginbotham Me diagnosed with functional physiologic tremor and referred to PT   Currently doing PT and occupational therapy to improve endurance and help cope with tremor and pain.      PT causes exhaustion  Has tremors that are intense  debilitating    Has muscle pain and joint pain ( neck, hand and foot pain) depends on the day     Tramadol helps with the pain ( prescribed by Dr Carmen Harris)       Taking B12 and D3     Taking zyrtec and singulair and symbicort for allergies    Stopped oral contraception due to antiphospholipid syndrome        This is an established visit conducted via telemedicine with video. The patient has been instructed that this meets HIPAA criteria and acknowledges and agrees to this method of visitation. Pursuant to the emergency declaration under the [facility], [facility] waiver authority and the [employer] Act, this Virtual Visit was conducted, with patient's consent, to reduce the patient's risk of exposure to COVID-19 and provide continuity of care for an established patient. Services were provided through a video synchronous discussion virtually to substitute for in-person clinic visit. ROS:  10 systems reviewed and negative other than HPI     Past Medical History:   Diagnosis Date    Acquired hypothyroidism     Adverse effect of anesthesia     labile BP during/after C section    Antiphospholipid antibody syndrome (HCC)     Asthma     Broken bones     history of 9 broken bones    Chronic UTI (urinary tract infection)     \"extra valve right kidney causes UTI\"    Fibromyalgia     Functional tremor     Gestational diabetes     GI bleed 2007,2011,2015,2016    lower GI last colonoscopy in 2016 normal     Huntingtons chorea (HCC)     Hyperhydrosis disorder     Ill-defined condition     Clarence/ tested genetically - daughter has Clarence    Infertility     PCOS    PCOS (polycystic ovarian syndrome)     Precancerous skin lesion     removed from Right shoulder    Preeclampsia 2011    pregnancy    Reactive airway disease     Rheumatoid arthritis (HCC)     SVT (supraventricular tachycardia) ([facility] 2011    occured during pregnancy when picc line inserted.        Current Outpatient Medications on File Prior to Visit   Medication Sig Dispense Refill    Benlysta 200 mg/mL atIn       mirabegron ER (Myrbetriq) 25 mg ER tablet Take 1 Tab by mouth daily. 30 Tab 0    buPROPion XL (WELLBUTRIN XL) 150 mg tablet Take 1 Tab by mouth every morning. 90 Tab 3    escitalopram oxalate (LEXAPRO) 20 mg tablet Take 1 Tab by mouth daily. 90 Tab 3    lisdexamfetamine (Vyvanse) 30 mg capsule Take 1 Cap by mouth every morning. Max Daily Amount: 30 mg. 30 Cap 0    montelukast (SINGULAIR) 10 mg tablet Take 1 Tab by mouth daily. 90 Tab 3    nystatin (MYCOSTATIN) 100,000 unit/mL suspension Take 5 mL by mouth four (4) times daily. swish and spit 473 mL 1    propranolol LA (INDERAL LA) 60 mg SR capsule TAKE 1 CAPSULE BY MOUTH EVERY DAY 90 Cap 3    Prodigy No Coding strip AS DIRECTED 100 Strip 5    Prodigy Twist Top Lancet 28 gauge misc AS DIRECTED 100 Lancet 0    predniSONE (DELTASONE) 10 mg tablet Take 10 mg by mouth daily. 30 Tab 0    insulin lispro (HumaLOG KwikPen Insulin) 100 unit/mL kwikpen Check Blood Sugar before each meal 3 times daily    INITIATE SLIDING SCALE INSULIN (DURGA): For Blood Sugar (mg/dl) of :             150-200=2 units           201-250=4 units     251-300=6 units  301-350=8 units  351-400=10 units 1 Package 0    insulin needles, disposable, (Pen Needle) 29 gauge x 1/2\" ndle To dispense insulin as directed 100 Pen Needle 0    diphenhydrAMINE (BenadryL) 25 mg capsule Take 25 mg by mouth every six (6) hours as needed.  PREVIDENT 5000 SENSITIVE 1.1-5 % pste USE AS DIRECTED TWICE A DAY SPIT OUT EXCESS AND DO NOT RINSE, EAT OR DRINK FOR 30 MINUTES      hydroxychloroquine (PLAQUENIL) 200 mg tablet 200 mg daily.  lidocaine-prilocaine (EMLA) topical cream For port flushing every 3 month  2    omeprazole (PRILOSEC) 40 mg capsule Take 1 Cap by mouth daily. 30 Cap 0    Cetirizine (ZYRTEC) 10 mg cap Take 10 mg by mouth daily. Indications: inflammation of the nose due to an allergy 30 Cap 0    albuterol (PROVENTIL HFA, VENTOLIN HFA, PROAIR HFA) 90 mcg/actuation inhaler Take 2 Puffs by inhalation every four (4) hours as needed for Wheezing. 1 Inhaler 0    omalizumab (XOLAIR SC) by SubCUTAneous route every thirty (30) days. Given by Dr Kelly Lemons in his office      SYMBICORT 160-4.5 mcg/actuation HFAA TAKE 2 PUFFS BY MOUTH TWICE A DAY  12    OZEMPIC 0.25 mg or 0.5 mg(2 mg/1.5 mL) sub-q pen by SubCUTAneous route every seven (7) days. Patient states she takes 1 ml every friday  1    ondansetron hcl (ZOFRAN) 4 mg tablet Take 4 mg by mouth every eight (8) hours as needed for Nausea.  EPIPEN 2-SANJAY 0.3 mg/0.3 mL injection 1 (ONE) INJECTION AS NEEDED  0    traMADol (ULTRAM) 50 mg tablet Take 50 mg by mouth every six (6) hours as needed for Pain.  oxybutynin (DITROPAN) 5 mg tablet 5 mg two (2) times a day. Patient states she takes 1 tablet bid  3    cyanocobalamin (Vitamin B-12) 1,000 mcg tablet Take  by mouth daily.  RESTASIS 0.05 % ophthalmic emulsion INSTILL 1 DROP INTO EACH EYE TWICE DAILY  4    SAFETY-ABDI TB SYR 1CC/25GX5/8\" 1 mL 25 gauge x 5/8\" syrg USE TO INJECT METHOTREXATE ONCE A WEEK  2    folic acid (FOLVITE) 1 mg tablet TAKE 1 TABLET ORALLY DAILY  11    Nebulizer & Compressor machine 1 Each by Does Not Apply route three (3) times daily as needed. 1 Each 0    glycopyrrolate (ROBINUL) 1 mg tablet Take 2 mg by mouth two (2) times a day. Indications: hyperhydrosis      cholecalciferol (Vitamin D3) (1000 Units /25 mcg) tablet Take 1 Tab by mouth daily.  levothyroxine (SYNTHROID) 75 mcg tablet Take 75 mcg by mouth Daily (before breakfast). No current facility-administered medications on file prior to visit.         Past Surgical History:   Procedure Laterality Date    COLONOSCOPY N/A 6/13/2016    COLONOSCOPY performed by Rashawn Ziegler MD at [facility] ENDOSCOPY    COLONOSCOPY N/A 5/22/2020    COLONOS[facility] at [facility]  5/22/2020         HX HEENT      HX WISDOM TEETH EXTRACTION  2004    UPPER GI ENDOSCOPY,BIOPSY  2/12/2019         UPPER GI ENDOSCOPY,BIOPSY 5/22/2020         UPPER GI ENDOSCOPY,CARLOTA BYRD GUIDE  2/12/2019            Family History   Problem Relation Age of Onset    Other Mother         [place]'s disease    Hypertension Mother     Dementia Mother     High Cholesterol Father     Heart Disease Father     Diabetes Father     Hypertension Father     Asthma Brother     Diabetes Brother     Other Brother         varicocele, hernias    Hypertension Brother     Alcohol abuse Brother     Hypertension Maternal Grandmother     Elevated Lipids Maternal Grandmother     Cancer Maternal Grandmother         breast    Other Maternal Grandmother         polymyalgia rheumatica    Glaucoma Maternal Grandmother     Cancer Maternal Grandfather         prostate    Huntingtons disease Maternal Grandfather     Cancer Paternal Grandmother         lung with mets    Cancer Paternal Grandfather         prostate, colon    Diabetes Paternal Grandfather     Heart Disease Paternal Grandfather     Alzheimer Paternal Grandfather     Dementia Paternal Grandfather      Reviewed and no changes     Social History     Socioeconomic History    Marital status:      Spouse name: Not on file    Number of children: Not on file    Years of education: Not on file    Highest education level: Not on file   Occupational History    Not on file   Social Needs    Financial resource strain: Not on file    Food insecurity     Worry: Not on file     Inability: Not on file   [employer] needs     Medical: Not on file     Non-medical: Not on file   Tobacco Use    Smoking status: Never Smoker    Smokeless tobacco: Never Used   Substance and Sexual Activity    Alcohol use: Yes     Comment: few drinks per year    Drug use: No    Sexual activity: Not on file   Lifestyle    Physical activity     Days per week: Not on file     Minutes per session: Not on file    Stress: Not on file   Relationships    Social connections     Talks on phone: Not on file     Gets together: Not on file     Attends Jew service: Not on file     Active member of club or organization: Not on file     Attends meetings of clubs or organizations: Not on file     Relationship status: Not on file    Intimate partner violence     Fear of current or ex partner: Not on file     Emotionally abused: Not on file     Physically abused: Not on file     Forced sexual activity: Not on file   Other Topics Concern    Not on file   Social History Narrative    ** Merged History Encounter **               There were no vitals taken for this visit. Physical Examination:   Gen: well appearing female  HEENT: normal conjunctiva, no audible congestion, patient does not see oral erythema, has MMM  Neck: patient does not feel enlarged or tender LAD or masses  Resp: normal respiratory effort, no audible wheezing. CV: patient does not feel palpitations or heart irregularity  Abd: patient does not feel abdominal tenderness or mass, patient does not notice distension  Extrem: patient does not see swelling in ankles or joints.   But has chronic pain   Neuro: Alert and oriented, able to answer questions without difficulty          Lab Results   Component Value Date/Time    WBC 13.9 (H) 09/26/2020 01:56 AM    HGB 10.6 (L) 09/26/2020 01:56 AM    HCT 32.6 (L) 09/26/2020 01:56 AM    PLATELET 984 28/11/2611 01:56 AM    MCV 89.6 09/26/2020 01:56 AM     Lab Results   Component Value Date/Time    Sodium 136 09/26/2020 01:56 AM    Potassium 4.0 09/26/2020 01:56 AM    Chloride 103 09/26/2020 01:56 AM    CO2 30 09/26/2020 01:56 AM    Anion gap 3 (L) 09/26/2020 01:56 AM    Glucose 99 09/26/2020 01:56 AM    BUN 11 09/26/2020 01:56 AM    Creatinine 0.88 09/26/2020 01:56 AM    BUN/Creatinine ratio 13 09/26/2020 01:56 AM    GFR est AA >60 09/26/2020 01:56 AM    GFR est non-AA >60 09/26/2020 01:56 AM    Calcium 8.6 09/26/2020 01:56 AM     No results found for: CHOL, CHOLPOCT, CHOLX, CHLST, CHOLV, HDL, HDLPOC, HDLP, LDL, LDLCPOC, LDLC, DLDLP, VLDLC, VLDL, TGLX, TRIGL, TRIGP, TGLPOCT, CHHD, CHHDX  Lab Results   Component Value Date/Time    TSH 1.48 10/30/2015 05:11 PM     No results found for: PSAVal, [id number], [id number]  Lab Results   Component Value Date/Time    Hemoglobin A1c 5.7 (H) 09/26/2020 01:56 AM     No results found for: Lety [id number]    Lab Results   Component Value Date/Time    ALT (SGPT) 22 09/26/2020 01:56 AM    Alk. phosphatase 93 09/26/2020 01:56 AM    Bilirubin, total 0.7 09/26/2020 01:56 AM           Assessment/Plan:      Dysphagia: being worked by GI reports recent endoscopy need report - reviewed barium swallow and Minimal dysmotility/intermittent tertiary contractions. Being worked up for possible scleroderma/ mixed connective tissue/ overlap syndrome? Functional tremor since sepsis May 2020 ( splenic abscesses) : debilitating - has difficulty with fine motor skills such as typing. 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 I have filled out forms for disability.       Acquired hypothyroidism  Euthyroid on exam  - TSH AND FREE T4 pending      Controlled type 2 diabetes mellitus without complication, without long-term current use of insulin ([address]  On ozempic   - HA1C pending     Vitamin D deficiency  On vitamin D      Attention deficit hyperactivity disorder (ADHD), predominantly inattentive type  Stable on vyvanse 30mg daily      Depression, unspecified depression type  Stable on lexapro and benji Oquendo MD    This is an established visit conducted via real time video and audio telemedicine. The patient has been instructed that this meets HIPAA criteria and acknowledges and agrees to this method of visitation.

## 2021-02-08 NOTE — PROGRESS NOTES
Reviewed record in preparation for visit and have obtained necessary documentation. Identified pt with two pt identifiers(name and ). Chief Complaint   Patient presents with   Jesus Garcia Form Completion       Health Maintenance Due   Topic Date Due    Diabetic Foot Care  1992    Albumin Urine Test  1992    Eye Exam  1992    Cholesterol Test   1992    COVID-19 Vaccine (1 of 2) 1998    Pap Test  2012    Pneumococcal Vaccine (2 of 3 - PPSV23) 2017    Yearly Flu Vaccine (1) 2020       Ms. Robert Hensley has a reminder for a \"due or due soon\" health maintenance. I have asked that she discuss this further with her primary care provider for follow-up on this health maintenance. Coordination of Care Questionnaire:  :     1) Have you been to an emergency room, urgent care clinic since your last visit? no   Hospitalized since your last visit? no             2) Have you seen or consulted any other health care providers outside of [address] [place] [address] since your last visit? no  (Include any pap smears or colon screenings in this section.)    3) In the event something were to happen to you and you were unable to speak on your behalf, do you have an Advance Directive/ Living Will in place stating your wishes? NO    Do you have an Advance Directive on file? no    4) Are you interested in receiving information on Advance Directives? NO    Patient is accompanied by self I have received verbal consent from [address] [place] [address] to discuss any/all medical information while they are present in the room.

## 2021-03-16 NOTE — PROGRESS NOTES
Hospitalist Progress Note    NAME: Kenya Patricia   :  1980   MRN:  [id number]       Assessment / Plan:        Abdominal Pain, POA  Possible splenic infarction versus inflammation/infection  -Patient had a SARS-CoV-2 test negative about 2 weeks ago and one more negative last week  -she is presenting with left lower quadrant abdominal pain since last night, and since last week. hypodensities in the spleen consistent with infection, inflammation, neoplasia and infarction  -She was afebrile  -no SOB  -Consulted hematology last week due to suspicion of splenic infarction, input is appreciated.  I discussed the case with Dr. Gwen Dick. After hematology discussion with radiology there was less suspicion for infarct and they recommended to repeat imaging  -Gen surgery --> no surgical intervention  -blood Cx +ve for GPC 1/2 bottles. Likely contaminate. Repeat blood culture and obtain ECHO. Will give one dose of Vancomycin   -SARS-CoV-2 testing negative last admission  -Lactic acid 2.6, resolved now with IVF  -UNKNOWN ETIOLOGY      Hypokalemia  -replace  -BMP daily     History of rheumatoid arthritis  -cont methotrexate and Eneida Ghee  -On prednisone chronically at home   -will check SSA/SSB     Diabetes mellitus type 2  -Hold home metformin.  Started insulin sliding scale with blood sugar checks. Consider adding NPH to steroids if blood sugars go up     History of depression  History of GERD  Hypothyroidism  Hypertension  -Continue home Wellbutrin, Lexapro, levothyroxine and Protonix  -Continue home propranolol     History of hyperreactive airway disease  -Continue home albuterol and Symbicort.                    Code Status: Full code  Surrogate Decision Maker:      DVT Prophylaxis:lovenox      Subjective:     Chief Complaint / Reason for Physician Visit  Abdominal Pain, POA  Possible splenic infarction versus inflammation/infection  Discussed with RN events overnight.      Patient was seen and Mom was advised of Dr Crum's message below, mom is okay with going ahead with trying Adderall XR, mom is not sure if this will be covered by insurance or the cost of the medication in the end but is willing to try running it through with patient's insurance and see what happens.  Will route to Dr Crum for dose and prescription, mom will use [facility].   examined. No acute events overnight. \"I am ok but my pain is still there\"      Review of Systems:  Symptom Y/N Comments  Symptom Y/N Comments   Fever/Chills n   Chest Pain n    Poor Appetite n   Edema     Cough    Abdominal Pain y    Sputum    Joint Pain     SOB/MENDOZA    Pruritis/Rash     Nausea/vomit n   Tolerating PT/OT     Diarrhea n   Tolerating Diet y    Constipation y   Other       Could NOT obtain due to:      Objective:     VITALS:   Last 24hrs VS reviewed since prior progress note. Most recent are:  Patient Vitals for the past 24 hrs:   Temp Pulse Resp BP SpO2   05/20/20 0913 -- -- -- -- 98 %   05/20/20 0725 98.6 °F (37 °C) 91 18 129/73 100 %   05/20/20 0431 98.6 °F (37 °C) 91 16 118/65 97 %   05/19/20 1958 -- -- -- -- 100 %   05/19/20 1902 98.3 °F (36.8 °C) 88 18 125/67 98 %   05/19/20 1800 -- -- -- 122/72 98 %   05/19/20 1730 -- -- -- 113/75 98 %   05/19/20 1600 98.5 °F (36.9 °C) 91 18 123/84 98 %   05/19/20 1530 -- -- -- 133/86 100 %   05/19/20 1513 -- -- -- 139/85 98 %   05/19/20 1415 -- -- -- 123/62 96 %       Intake/Output Summary (Last 24 hours) at 5/20/2020 1411  Last data filed at 5/20/2020 0039  Gross per 24 hour   Intake 2353.33 ml   Output --   Net 2353.33 ml        PHYSICAL EXAM:  General: WD, WN. Alert, cooperative, no acute distress    EENT:  EOMI. Anicteric sclerae. MMM  Resp:  CTA bilaterally, no wheezing or rales. No accessory muscle use  CV:  Regular  rhythm,  No edema  GI:  Soft, Non distended, Non tender.  +Bowel sounds  Neurologic:  Alert and oriented X 3, normal speech,   Psych:   Good insight. Not anxious nor agitated  Skin:  No rashes.   No jaundice    Reviewed most current lab test results and cultures  YES  Reviewed most current radiology test results   YES  Review and summation of old records today    NO  Reviewed patient's current orders and MAR    YES  PMH/SH reviewed - no change compared to H&P  ________________________________________________________________________  Care Plan discussed with:    Comments   Patient x    Family      RN x    Care Manager     Consultant                        Multidiciplinary team rounds were held today with , nursing, pharmacist and clinical coordinator. Patient's plan of care was discussed; medications were reviewed and discharge planning was addressed. ________________________________________________________________________  Total NON critical care TIME:  50   Minutes    Total CRITICAL CARE TIME Spent:   Minutes non procedure based      Comments   >50% of visit spent in counseling and coordination of care     ________________________________________________________________________  Hiwot Godoy MD     Procedures: see electronic medical records for all procedures/Xrays and details which were not copied into this note but were reviewed prior to creation of Plan. LABS:  I reviewed today's most current labs and imaging studies.   Pertinent labs include:  Recent Labs     05/20/20  0052 05/19/20  1205   WBC 8.6 9.7   HGB 10.1* 11.8   HCT 30.4* 35.5    354     Recent Labs     05/20/20  0052 05/19/20  1205    140   K 3.0* 3.0*    103   CO2 30 27   GLU 88 96   BUN 8 12   CREA 0.65 0.98   CA 7.8* 8.5   ALB  --  2.9*   TBILI  --  0.4   SGOT  --  31   ALT  --  54       Signed: Hiwot Godoy MD

## 2021-04-13 DIAGNOSIS — F33.41 RECURRENT MAJOR DEPRESSIVE DISORDER, IN PARTIAL REMISSION (HCC): ICD-10-CM

## 2021-04-13 RX ORDER — ESCITALOPRAM OXALATE 20 MG/1
TABLET ORAL
Qty: 30 TAB | Refills: 11 | Status: SHIPPED | OUTPATIENT
Start: 2021-04-13 | End: 2021-07-30 | Stop reason: ALTCHOICE

## 2021-05-14 ENCOUNTER — TELEPHONE (OUTPATIENT)
Dept: INTERNAL MEDICINE CLINIC | Age: 41
End: 2021-05-14

## 2021-05-14 NOTE — TELEPHONE ENCOUNTER
Advised pharmacist per Dr. Frank Has Reviewed chart and ok to over ride , QTC on EKG was normal done when patient was already on this mediccations.

## 2021-06-08 ENCOUNTER — PATIENT MESSAGE (OUTPATIENT)
Dept: INTERNAL MEDICINE CLINIC | Age: 41
End: 2021-06-08

## 2021-06-08 DIAGNOSIS — F90.0 ATTENTION DEFICIT HYPERACTIVITY DISORDER (ADHD), PREDOMINANTLY INATTENTIVE TYPE: ICD-10-CM

## 2021-06-09 NOTE — TELEPHONE ENCOUNTER
Future Appointments:  No future appointments. Last Appointment With Me:  2/8/2021     Requested Prescriptions     Pending Prescriptions Disp Refills    lisdexamfetamine (Vyvanse) 30 mg capsule 30 Capsule 0     Sig: Take 1 Capsule by mouth every morning. Max Daily Amount: 30 mg.

## 2021-06-09 NOTE — TELEPHONE ENCOUNTER
From: Fabi Winchester  To: Corine Avelar MD  Sent: 6/8/2021 11:10 PM EDT  Subject: Prescription Question    Hi Dr Gloria Myers,  I am writing to request a. Vyvance refill.    I have moved pharmacies and am back at [facility] and [place] [facility] the [facility].   Thanks very much,  Denisha

## 2021-06-24 LAB — HBA1C MFR BLD HPLC: 6.2 %

## 2021-06-29 ENCOUNTER — HOSPITAL ENCOUNTER (EMERGENCY)
Age: 41
Discharge: HOME | End: 2021-06-30
Attending: EMERGENCY MEDICINE
Payer: COMMERCIAL

## 2021-06-29 DIAGNOSIS — R06.09 DYSPNEA ON EXERTION: Primary | ICD-10-CM

## 2021-06-29 DIAGNOSIS — R23.8 SKIN IRRITATION: ICD-10-CM

## 2021-06-30 ENCOUNTER — APPOINTMENT (OUTPATIENT)
Dept: GENERAL RADIOLOGY | Age: 41
End: 2021-06-30
Attending: EMERGENCY MEDICINE
Payer: COMMERCIAL

## 2021-06-30 VITALS
OXYGEN SATURATION: 98 % | WEIGHT: 236.77 LBS | HEIGHT: 66 IN | BODY MASS INDEX: 38.05 KG/M2 | SYSTOLIC BLOOD PRESSURE: 125 MMHG | RESPIRATION RATE: 21 BRPM | TEMPERATURE: 98.7 F | DIASTOLIC BLOOD PRESSURE: 84 MMHG | HEART RATE: 107 BPM

## 2021-06-30 LAB
ALBUMIN SERPL-MCNC: 3.8 G/DL (ref 3.5–5)
ALBUMIN/GLOB SERPL: 1.2 {RATIO} (ref 1.1–2.2)
ALP SERPL-CCNC: 87 U/L (ref 45–117)
ALT SERPL-CCNC: 29 U/L (ref 12–78)
ANION GAP SERPL CALC-SCNC: 9 MMOL/L (ref 5–15)
APPEARANCE UR: ABNORMAL
AST SERPL-CCNC: 13 U/L (ref 15–37)
ATRIAL RATE: 108 BPM
BACTERIA URNS QL MICRO: ABNORMAL /HPF
BASOPHILS # BLD: 0.1 K/UL (ref 0–0.1)
BASOPHILS NFR BLD: 0 % (ref 0–1)
BILIRUB SERPL-MCNC: 0.5 MG/DL (ref 0.2–1)
BILIRUB UR QL CFM: NEGATIVE
BNP SERPL-MCNC: 7 PG/ML
BUN SERPL-MCNC: 14 MG/DL (ref 6–20)
BUN/CREAT SERPL: 15 (ref 12–20)
CALCIUM SERPL-MCNC: 8.9 MG/DL (ref 8.5–10.1)
CALCULATED P AXIS, ECG09: 15 DEGREES
CALCULATED R AXIS, ECG10: 48 DEGREES
CALCULATED T AXIS, ECG11: 0 DEGREES
CHLORIDE SERPL-SCNC: 102 MMOL/L (ref 97–108)
CK MB CFR SERPL CALC: NORMAL % (ref 0–2.5)
CK MB SERPL-MCNC: <1 NG/ML (ref 5–25)
CK SERPL-CCNC: 90 U/L (ref 26–192)
CO2 SERPL-SCNC: 24 MMOL/L (ref 21–32)
COLOR UR: ABNORMAL
CREAT SERPL-MCNC: 0.92 MG/DL (ref 0.55–1.02)
D DIMER PPP FEU-MCNC: 0.34 MG/L FEU (ref 0–0.65)
DIAGNOSIS, 93000: NORMAL
DIFFERENTIAL METHOD BLD: ABNORMAL
EOSINOPHIL # BLD: 0.1 K/UL (ref 0–0.4)
EOSINOPHIL NFR BLD: 1 % (ref 0–7)
EPITH CASTS URNS QL MICRO: ABNORMAL /LPF
ERYTHROCYTE [DISTWIDTH] IN BLOOD BY AUTOMATED COUNT: 13.5 % (ref 11.5–14.5)
GLOBULIN SER CALC-MCNC: 3.3 G/DL (ref 2–4)
GLUCOSE SERPL-MCNC: 93 MG/DL (ref 65–100)
GLUCOSE UR STRIP.AUTO-MCNC: NEGATIVE MG/DL
HCT VFR BLD AUTO: 38 % (ref 35–47)
HGB BLD-MCNC: 12.7 G/DL (ref 11.5–16)
HGB UR QL STRIP: NEGATIVE
IMM GRANULOCYTES # BLD AUTO: 0.1 K/UL (ref 0–0.04)
IMM GRANULOCYTES NFR BLD AUTO: 1 % (ref 0–0.5)
KETONES UR QL STRIP.AUTO: 40 MG/DL
LEUKOCYTE ESTERASE UR QL STRIP.AUTO: ABNORMAL
LYMPHOCYTES # BLD: 3.9 K/UL (ref 0.8–3.5)
LYMPHOCYTES NFR BLD: 27 % (ref 12–49)
MCH RBC QN AUTO: 30.2 PG (ref 26–34)
MCHC RBC AUTO-ENTMCNC: 33.4 G/DL (ref 30–36.5)
MCV RBC AUTO: 90.5 FL (ref 80–99)
MONOCYTES # BLD: 1 K/UL (ref 0–1)
MONOCYTES NFR BLD: 7 % (ref 5–13)
NEUTS SEG # BLD: 9.2 K/UL (ref 1.8–8)
NEUTS SEG NFR BLD: 64 % (ref 32–75)
NITRITE UR QL STRIP.AUTO: NEGATIVE
NRBC # BLD: 0 K/UL (ref 0–0.01)
NRBC BLD-RTO: 0 PER 100 WBC
P-R INTERVAL, ECG05: 148 MS
PH UR STRIP: 5.5 [PH] (ref 5–8)
PLATELET # BLD AUTO: 354 K/UL (ref 150–400)
PMV BLD AUTO: 8.8 FL (ref 8.9–12.9)
POTASSIUM SERPL-SCNC: 3.6 MMOL/L (ref 3.5–5.1)
PROT SERPL-MCNC: 7.1 G/DL (ref 6.4–8.2)
PROT UR STRIP-MCNC: ABNORMAL MG/DL
Q-T INTERVAL, ECG07: 344 MS
QRS DURATION, ECG06: 76 MS
QTC CALCULATION (BEZET), ECG08: 460 MS
RBC # BLD AUTO: 4.2 M/UL (ref 3.8–5.2)
RBC #/AREA URNS HPF: ABNORMAL /HPF (ref 0–5)
SODIUM SERPL-SCNC: 135 MMOL/L (ref 136–145)
SP GR UR REFRACTOMETRY: 1.03 (ref 1–1.03)
TROPONIN I SERPL-MCNC: <0.05 NG/ML
UA: UC IF INDICATED,UAUC: ABNORMAL
UROBILINOGEN UR QL STRIP.AUTO: 1 EU/DL (ref 0.2–1)
VENTRICULAR RATE, ECG03: 108 BPM
WBC # BLD AUTO: 14.4 K/UL (ref 3.6–11)
WBC URNS QL MICRO: ABNORMAL /HPF (ref 0–4)

## 2021-06-30 PROCEDURE — 74011250636 HC RX REV CODE- 250/636: Performed by: EMERGENCY MEDICINE

## 2021-06-30 PROCEDURE — 85379 FIBRIN DEGRADATION QUANT: CPT

## 2021-06-30 PROCEDURE — 84484 ASSAY OF TROPONIN QUANT: CPT

## 2021-06-30 PROCEDURE — 2709999900 HC NON-CHARGEABLE SUPPLY

## 2021-06-30 PROCEDURE — 74011250637 HC RX REV CODE- 250/637: Performed by: EMERGENCY MEDICINE

## 2021-06-30 PROCEDURE — 85025 COMPLETE CBC W/AUTO DIFF WBC: CPT

## 2021-06-30 PROCEDURE — 96374 THER/PROPH/DIAG INJ IV PUSH: CPT

## 2021-06-30 PROCEDURE — 71045 X-RAY EXAM CHEST 1 VIEW: CPT

## 2021-06-30 PROCEDURE — 82550 ASSAY OF CK (CPK): CPT

## 2021-06-30 PROCEDURE — 81001 URINALYSIS AUTO W/SCOPE: CPT

## 2021-06-30 PROCEDURE — 93005 ELECTROCARDIOGRAM TRACING: CPT

## 2021-06-30 PROCEDURE — 80053 COMPREHEN METABOLIC PANEL: CPT

## 2021-06-30 PROCEDURE — 99285 EMERGENCY DEPT VISIT HI MDM: CPT

## 2021-06-30 PROCEDURE — 87086 URINE CULTURE/COLONY COUNT: CPT

## 2021-06-30 PROCEDURE — 36415 COLL VENOUS BLD VENIPUNCTURE: CPT

## 2021-06-30 PROCEDURE — 83880 ASSAY OF NATRIURETIC PEPTIDE: CPT

## 2021-06-30 RX ORDER — TRAMADOL HYDROCHLORIDE 50 MG/1
50 TABLET ORAL
Status: COMPLETED | OUTPATIENT
Start: 2021-06-30 | End: 2021-06-30

## 2021-06-30 RX ORDER — HEPARIN 100 UNIT/ML
300 SYRINGE INTRAVENOUS
Status: COMPLETED | OUTPATIENT
Start: 2021-06-30 | End: 2021-06-30

## 2021-06-30 RX ORDER — CEPHALEXIN 500 MG/1
500 CAPSULE ORAL 3 TIMES DAILY
Qty: 21 CAPSULE | Refills: 0 | Status: SHIPPED | OUTPATIENT
Start: 2021-06-30 | End: 2021-07-07

## 2021-06-30 RX ORDER — KETOROLAC TROMETHAMINE 30 MG/ML
15 INJECTION, SOLUTION INTRAMUSCULAR; INTRAVENOUS
Status: COMPLETED | OUTPATIENT
Start: 2021-06-30 | End: 2021-06-30

## 2021-06-30 RX ADMIN — TRAMADOL HYDROCHLORIDE 50 MG: 50 TABLET, FILM COATED ORAL at 04:02

## 2021-06-30 RX ADMIN — KETOROLAC TROMETHAMINE 15 MG: 30 INJECTION, SOLUTION INTRAMUSCULAR; INTRAVENOUS at 04:03

## 2021-06-30 RX ADMIN — Medication 300 UNITS: at 04:03

## 2021-06-30 NOTE — ED NOTES
Bedside and Verbal shift change report given to Cristina Crook (oncoming nurse) by Sally Mane (offgoing nurse).  Report included the following information SBAR, Intake/Output, MAR, Recent Results, Med Rec Status and Cardiac Rhythm ST.

## 2021-06-30 NOTE — ED NOTES
Blas THOMAS reviewed discharge instructions with the patient. The patient verbalized understanding. All questions and concerns were addressed. The patient is discharged ambulatory with instructions and prescriptions in hand. Pt is alert and oriented x 4. Respirations are clear and unlabored.

## 2021-06-30 NOTE — ED PROVIDER NOTES
EMERGENCY DEPARTMENT HISTORY AND PHYSICAL EXAM      Date: 6/29/2021  Patient Name: Vani Mcgregor    History of Presenting Illness     Chief Complaint   Patient presents with    Illness     Patient stated that she is having exertional dyspnea, joint stiffness, thrush, fast heart rate, fevers of 100-101.3 F, low energy, tremors, dry mouth, dry eyes, trouble swallowing, \"overheating for the last few days\", muscle weakness and fatigue since her UTI last Thursday. Urine culture came back positive for E. Coli per patient. Currently taking a Sulfa med for the UTI. Took 1 Tramadol around 1700 for the pain and Tylenol 1000mg around 1700. History Provided By: Patient    HPI: Vani Mcgregor, 47 y.o. female with PMHx significant for Meza's esophagus with GERD, antiphospholipid syndrome, hypothyroidism, and type 2 diabetes rheumatoid arthritis and fibromyalgia, currently on prednisone 20 mg daily, PCOS, ADD on Vyvanse who is currently being treated for urinary tract infection diagnosed about 5 days ago and taking Bactrim presents to the ED with chief complaint of shortness of breath with exertion has been going on for the past several weeks but has gotten progressively worse. She also reports skin flushing and redness, joint stiffness, low-grade temperatures, tremors, dry mouth, dry eyes, and difficulty swallowing. She reports decreased energy and fatigue recently as well. Took tramadol and Tylenol at 5 PM for her symptoms. Patient reports that many of the symptoms have been going on for weeks to months, but the thing that brought her in today was skin flushing and redness and the fact  that she has been having shortness of breath with exertion that started a few weeks ago and got progressively worse over the last 2 to 3 days. States that tonight she became very short of breath and flushed and her home monitor read that her heart rate went up into the 150s.   She has multiple specialists including  [facility] for heme-onc, Dr. Patsy Freitas for GI, Dr. Jenni Claire for rheumatology. PCP: Guy Lilly MD    No current facility-administered medications on file prior to encounter. Current Outpatient Medications on File Prior to Encounter   Medication Sig Dispense Refill    lisdexamfetamine (Vyvanse) 30 mg capsule Take 1 Capsule by mouth every morning. Max Daily Amount: 30 mg. 30 Capsule 0    escitalopram oxalate (LEXAPRO) 20 mg tablet TAKE 1 TABLET BY MOUTH EVERY DAY 30 Tab 11    Benlysta 200 mg/mL atIn       mirabegron ER (Myrbetriq) 25 mg ER tablet Take 1 Tab by mouth daily. 30 Tab 0    buPROPion XL (WELLBUTRIN XL) 150 mg tablet Take 1 Tab by mouth every morning. 90 Tab 3    montelukast (SINGULAIR) 10 mg tablet Take 1 Tab by mouth daily. 90 Tab 3    nystatin (MYCOSTATIN) 100,000 unit/mL suspension Take 5 mL by mouth four (4) times daily. swish and spit 473 mL 1    propranolol LA (INDERAL LA) 60 mg SR capsule TAKE 1 CAPSULE BY MOUTH EVERY DAY 90 Cap 3    Prodigy No Coding strip AS DIRECTED 100 Strip 5    Prodigy Twist Top Lancet 28 gauge misc AS DIRECTED 100 Lancet 0    predniSONE (DELTASONE) 10 mg tablet Take 10 mg by mouth daily. 30 Tab 0    insulin lispro (HumaLOG KwikPen Insulin) 100 unit/mL kwikpen Check Blood Sugar before each meal 3 times daily    INITIATE SLIDING SCALE INSULIN (DURGA): For Blood Sugar (mg/dl) of :             150-200=2 units           201-250=4 units     251-300=6 units  301-350=8 units  351-400=10 units 1 Package 0    insulin needles, disposable, (Pen Needle) 29 gauge x 1/2\" ndle To dispense insulin as directed 100 Pen Needle 0    diphenhydrAMINE (BenadryL) 25 mg capsule Take 25 mg by mouth every six (6) hours as needed.  PREVIDENT 5000 SENSITIVE 1.1-5 % pste USE AS DIRECTED TWICE A DAY SPIT OUT EXCESS AND DO NOT RINSE, EAT OR DRINK FOR 30 MINUTES      hydroxychloroquine (PLAQUENIL) 200 mg tablet 200 mg daily.       lidocaine-prilocaine (EMLA) topical cream For port flushing every 3 month  2    omeprazole (PRILOSEC) 40 mg capsule Take 1 Cap by mouth daily. 30 Cap 0    Cetirizine (ZYRTEC) 10 mg cap Take 10 mg by mouth daily. Indications: inflammation of the nose due to an allergy 30 Cap 0    albuterol (PROVENTIL HFA, VENTOLIN HFA, PROAIR HFA) 90 mcg/actuation inhaler Take 2 Puffs by inhalation every four (4) hours as needed for Wheezing. 1 Inhaler 0    omalizumab (XOLAIR SC) by SubCUTAneous route every thirty (30) days. Given by Dr Puneet Jj in his office      SYMBICORT 160-4.5 mcg/actuation HFAA TAKE 2 PUFFS BY MOUTH TWICE A DAY  12    OZEMPIC 0.25 mg or 0.5 mg(2 mg/1.5 mL) sub-q pen by SubCUTAneous route every seven (7) days. Patient states she takes 1 ml every friday  1    ondansetron hcl (ZOFRAN) 4 mg tablet Take 4 mg by mouth every eight (8) hours as needed for Nausea.  EPIPEN 2-SANJAY 0.3 mg/0.3 mL injection 1 (ONE) INJECTION AS NEEDED  0    traMADol (ULTRAM) 50 mg tablet Take 50 mg by mouth every six (6) hours as needed for Pain.  oxybutynin (DITROPAN) 5 mg tablet 5 mg two (2) times a day. Patient states she takes 1 tablet bid  3    cyanocobalamin (Vitamin B-12) 1,000 mcg tablet Take  by mouth daily.  RESTASIS 0.05 % ophthalmic emulsion INSTILL 1 DROP INTO EACH EYE TWICE DAILY  4    SAFETY-ABDI TB SYR 1CC/25GX5/8\" 1 mL 25 gauge x 5/8\" syrg USE TO INJECT METHOTREXATE ONCE A WEEK  2    folic acid (FOLVITE) 1 mg tablet TAKE 1 TABLET ORALLY DAILY  11    Nebulizer & Compressor machine 1 Each by Does Not Apply route three (3) times daily as needed. 1 Each 0    glycopyrrolate (ROBINUL) 1 mg tablet Take 2 mg by mouth two (2) times a day. Indications: hyperhydrosis      cholecalciferol (Vitamin D3) (1000 Units /25 mcg) tablet Take 1 Tab by mouth daily.  levothyroxine (SYNTHROID) 75 mcg tablet Take 75 mcg by mouth Daily (before breakfast).          Past History     Past Medical History:  Past Medical History:   Diagnosis Date    Acquired hypothyroidism     Adverse effect of anesthesia     labile BP during/after C section    Antiphospholipid antibody syndrome (HCC)     Asthma     Broken bones     history of 9 broken bones    Chronic UTI (urinary tract infection)     \"extra valve right kidney causes UTI\"    Clotting disorder (HCC)     Fibromyalgia     Functional tremor     Gestational diabetes     GI bleed 2007,2011,2015,2016    lower GI last colonoscopy in 2016 normal     Huntingtons chorea (HCC)     Hyperhydrosis disorder     Ill-defined condition     Chris/ tested genetically - daughter has Chris    Infertility     PCOS    PCOS (polycystic ovarian syndrome)     Precancerous skin lesion     removed from Right shoulder    Preeclampsia 2011    pregnancy    Reactive airway disease     Rheumatoid arthritis ([address]     SVT (supraventricular tachycardia) ([address] 2011    occured during pregnancy when picc line inserted.        Past Surgical History:  Past Surgical History:   Procedure Laterality Date    COLONOSCOPY N/A 6/13/2016    [facility]COPY    COLONOSCOPY N/A 5/22/2020    COL[facility]ue [facility] at [facility]  5/22/2020         HX HEENT      HX WISDOM TEETH EXTRACTION  2004    UPPER GI ENDOSCOPY,BIOPSY  2/12/2019         UPPER GI ENDOSCOPY,BIOPSY  5/22/2020         UPPER GI ENDOSCOPY,DILATN W GUIDE  2/12/2019            Family History:  Family History   Problem Relation Age of Onset    Other Mother         Marengo's disease    Hypertension Mother     Dementia Mother     High Cholesterol Father     Heart Disease Father     Diabetes Father     Hypertension Father     Asthma Brother     Diabetes Brother     Other Brother         varicocele, hernias    Hypertension Brother     Alcohol abuse Brother     Hypertension Maternal Grandmother     Elevated Lipids Maternal Grandmother     Cancer Maternal Grandmother         breast    Other Maternal Grandmother         polymyalgia rheumatica    Glaucoma Maternal Grandmother     Cancer Maternal Grandfather         prostate    Huntingtons disease Maternal Grandfather     Cancer Paternal Grandmother         lung with mets    Cancer Paternal Grandfather         prostate, colon    Diabetes Paternal Grandfather     Heart Disease Paternal Grandfather     Alzheimer Paternal Grandfather     Dementia Paternal Grandfather        Social History:  Social History     Tobacco Use    Smoking status: Never Smoker    Smokeless tobacco: Never Used   Substance Use Topics    Alcohol use: Yes     Comment: few drinks per year    Drug use: No       Allergies: Allergies   Allergen Reactions    Latex Swelling    Entex [Phenylephrine-Guaifenesin] Anaphylaxis, Hives and Palpitations    Remicade [Infliximab] Anaphylaxis    Rituxan [Rituximab] Anaphylaxis    Mucinex [Guaifenesin] Anaphylaxis and Hives    Pepto-Bismol [Bismuth Subsalicylate] Nausea and Vomiting    Vancomycin Hives         Review of Systems   Review of Systems   Constitutional: Positive for appetite change, fatigue and fever. HENT: Positive for mouth sores ( Thrush). Negative for congestion, ear pain and sinus pressure. Eyes: Positive for pain. Respiratory: Positive for shortness of breath. Negative for wheezing. Cardiovascular: Positive for palpitations. Negative for leg swelling. Gastrointestinal: Negative for constipation and vomiting. Genitourinary: Positive for dysuria. Negative for hematuria. Musculoskeletal: Positive for back pain, myalgias and neck pain. Skin: Positive for color change and rash. Neurological: Positive for headaches. Negative for seizures and syncope. Psychiatric/Behavioral: The patient is nervous/anxious. All other systems reviewed and are negative.         Physical Exam    General appearance -overweight, anxious well appearing, and in no distress  Eyes - pupils equal and reactive, extraocular eye movements intact  ENT - mucous membranes dry, no thrush visible at the time of my exam, pharynx normal without lesions  Neck - supple, no significant adenopathy; tender to palpation across upper back, lower paracervical musculature  Chest - clear to auscultation, no wheezes, rales or rhonchi; non-tender to palpation  Heart -mildly tachycardic, regular rhythm, S1 and S2 normal, no murmurs noted  Abdomen - soft, nontender, nondistended, no masses or organomegaly  Musculoskeletal - no joint tenderness, deformity or swelling; normal ROM  Extremities - peripheral pulses normal, 1+ bilateral pedal edema  Skin -mildly erythema of upper chest and arms (appears to be in sun exposed areas entheses, turgor, no rashes  Neurological - alert, oriented x3, normal speech, no focal findings or movement disorder noted    Diagnostic Study Results     Labs -     Recent Results (from the past 12 hour(s))   CBC WITH AUTOMATED DIFF    Collection Time: 06/30/21 12:54 AM   Result Value Ref Range    WBC 14.4 (H) 3.6 - 11.0 K/uL    RBC 4.20 3.80 - 5.20 M/uL    HGB 12.7 11.5 - 16.0 g/dL    HCT 38.0 35.0 - 47.0 %    MCV 90.5 80.0 - 99.0 FL    MCH 30.2 26.0 - 34.0 PG    MCHC 33.4 30.0 - 36.5 g/dL    RDW 13.5 11.5 - 14.5 %    PLATELET 442 973 - 880 K/uL    MPV 8.8 (L) 8.9 - 12.9 FL    NRBC 0.0 0  WBC    ABSOLUTE NRBC 0.00 0.00 - 0.01 K/uL    NEUTROPHILS 64 32 - 75 %    LYMPHOCYTES 27 12 - 49 %    MONOCYTES 7 5 - 13 %    EOSINOPHILS 1 0 - 7 %    BASOPHILS 0 0 - 1 %    IMMATURE GRANULOCYTES 1 (H) 0.0 - 0.5 %    ABS. NEUTROPHILS 9.2 (H) 1.8 - 8.0 K/UL    ABS. LYMPHOCYTES 3.9 (H) 0.8 - 3.5 K/UL    ABS. MONOCYTES 1.0 0.0 - 1.0 K/UL    ABS. EOSINOPHILS 0.1 0.0 - 0.4 K/UL    ABS. BASOPHILS 0.1 0.0 - 0.1 K/UL    ABS. IMM.  GRANS. 0.1 (H) 0.00 - 0.04 K/UL    DF AUTOMATED     METABOLIC PANEL, COMPREHENSIVE    Collection Time: 06/30/21 12:54 AM   Result Value Ref Range    Sodium 135 (L) 136 - 145 mmol/L    Potassium 3.6 3.5 - 5.1 mmol/L    Chloride 102 97 - 108 mmol/L    CO2 24 21 - 32 mmol/L    Anion gap 9 5 - 15 mmol/L    Glucose 93 65 - 100 mg/dL    BUN 14 6 - 20 MG/DL    Creatinine 0.92 0.55 - 1.02 MG/DL    BUN/Creatinine ratio 15 12 - 20      GFR est AA >60 >60 ml/min/1.73m2    GFR est non-AA >60 >60 ml/min/1.73m2    Calcium 8.9 8.5 - 10.1 MG/DL    Bilirubin, total 0.5 0.2 - 1.0 MG/DL    ALT (SGPT) 29 12 - 78 U/L    AST (SGOT) 13 (L) 15 - 37 U/L    Alk.  phosphatase 87 45 - 117 U/L    Protein, total 7.1 6.4 - 8.2 g/dL    Albumin 3.8 3.5 - 5.0 g/dL    Globulin 3.3 2.0 - 4.0 g/dL    A-G Ratio 1.2 1.1 - 2.2     URINALYSIS W/ REFLEX CULTURE    Collection Time: 06/30/21 12:54 AM    Specimen: Miscellaneous sample; Urine    Urine specimen   Result Value Ref Range    Color YELLOW/STRAW      Appearance CLOUDY (A) CLEAR      Specific gravity 1.028 1.003 - 1.030      pH (UA) 5.5 5.0 - 8.0      Protein TRACE (A) NEG mg/dL    Glucose Negative NEG mg/dL    Ketone 40 (A) NEG mg/dL    Blood Negative NEG      Urobilinogen 1.0 0.2 - 1.0 EU/dL    Nitrites Negative NEG      Leukocyte Esterase SMALL (A) NEG      WBC 10-20 0 - 4 /hpf    RBC 0-5 0 - 5 /hpf    Epithelial cells MANY (A) FEW /lpf    Bacteria 1+ (A) NEG /hpf    UA:UC IF INDICATED URINE CULTURE ORDERED (A) CNI     CK W/ CKMB & INDEX    Collection Time: 06/30/21 12:54 AM   Result Value Ref Range    CK - MB <1.0 <3.6 NG/ML    CK-MB Index Cannot be calculated 0.0 - 2.5      CK 90 26 - 192 U/L   NT-PRO BNP    Collection Time: 06/30/21 12:54 AM   Result Value Ref Range    NT pro-BNP 7 <125 PG/ML   TROPONIN I    Collection Time: 06/30/21 12:54 AM   Result Value Ref Range    Troponin-I, Qt. <0.05 <0.05 ng/mL   BILIRUBIN, CONFIRM    Collection Time: 06/30/21 12:54 AM   Result Value Ref Range    Bilirubin UA, confirm Negative NEG     EKG, 12 LEAD, INITIAL    Collection Time: 06/30/21  1:06 AM   Result Value Ref Range    Ventricular Rate 108 BPM    Atrial Rate 108 BPM    P-R Interval 148 ms    QRS Duration 76 ms    Q-T Interval 344 ms    QTC Calculation (Bezet) 460 ms    Calculated P Axis 15 degrees    Calculated R Axis 48 degrees    Calculated T Axis 0 degrees    Diagnosis       Sinus tachycardia  When compared with ECG of 26-SEP-2020 03:36,  No significant change was found     D DIMER    Collection Time: 06/30/21  2:12 AM   Result Value Ref Range    D-dimer 0.34 0.00 - 0.65 mg/L FEU       Radiologic Studies -   XR CHEST PORT   Final Result      No acute process. CT Results  (Last 48 hours)    None        CXR Results  (Last 48 hours)               06/30/21 0103  XR CHEST PORT Final result    Impression:      No acute process. Narrative:  EXAM:  XR CHEST PORT       INDICATION: Chest pain       COMPARISON: 9/26/2020       TECHNIQUE: Portable AP semiupright chest view at 0049 hours       FINDINGS: The right chest Port-A-Cath is stable. The cardiomediastinal contours   are stable. The pulmonary vasculature is within normal limits. The lungs and pleural spaces are clear. There is no pneumothorax. The bones and   upper abdomen are stable. Medical Decision Making   I am the first provider for this patient. I reviewed the vital signs, available nursing notes, past medical history, past surgical history, family history and social history. Vital Signs-Reviewed the patient's vital signs. Patient Vitals for the past 12 hrs:   Temp Pulse Resp BP SpO2   06/30/21 0230 -- (!) 106 25 126/81 98 %   06/30/21 0215 -- (!) 110 25 139/87 98 %   06/29/21 2227 98.7 °F (37.1 °C) (!) 117 22 (!) 131/93 99 %       EKG: Sinus tachycardia, 108 bpm, normal axis, normal, QRS, QTc intervals, no ischemic changes    Records Reviewed: Nursing Notes and Old Medical Records    Provider Notes (Medical Decision Making):   Differential diagnosis:  Anxiety, medication reaction to sulfa, dehydration, electrolyte abnormality, congestive heart failure, PE  We will check CBC, CMP, CPK, troponin, proBNP, chest x-ray, D-dimer, UA    ED Course:   Initial assessment performed. The patients presenting problems have been discussed, and they are in agreement with the care plan formulated and outlined with them. I have encouraged them to ask questions as they arise throughout their visit. Progress Notes:  ED Course as of Jun 30 0355 Wed Jun 30, 2021   [place] 1996 Lab work has been reviewed. Cardiac enzymes and proBNP are normal.  D-dimer is normal.  Urine shows probable UTI. Patient is mildly tachycardic with heart rate of 106. On reevaluation, she is hyperventilating and very anxious. States that her skin is very flushed and sensitive and, although her  reports that she was out in the sun at the pool all day on Saturday and Sunday, she does not think that it could be related to possible sunburn. Chest x-ray is clear. Discussed with patient that we have ruled out pneumonia, cardiac causes of shortness of breath, PE. She will need to follow-up with her primary care doctor. Will treat with Toradol and tramadol for her discomfort in the ED. suggested that patient may have some anxiety as a component to her symptoms, but she adamantly denies. Patient has been on Bactrim for UTI for the last several days. Discussed with her that her skin irritation may possibly be related to a reaction to sulfa. We will switch antibiotic to Keflex. [AO]      ED Course User Index  [AO] Mary Odonnell MD       Disposition:  Discharge home  PLAN:  1. Current Discharge Medication List      START taking these medications    Details   cephALEXin (Keflex) 500 mg capsule Take 1 Capsule by mouth three (3) times daily for 7 days. Qty: 21 Capsule, Refills: 0  Start date: 6/30/2021, End date: 7/7/2021           2.    Follow-up Information     Follow up With Specialties Details Why Contact Info    Watson Mccarthy MD Internal Medicine Call today  [address] [facility]  [phone]      [facility] EMERGENCY DEPT Emergency Medicine  If symptoms worsen [address]  [address] [facility]  [phone]        Return to ED if worse     Diagnosis     Clinical Impression:   1. Dyspnea on exertion    2.  Skin irritation

## 2021-07-01 LAB
BACTERIA SPEC CULT: NORMAL
SERVICE CMNT-IMP: NORMAL

## 2021-07-13 ENCOUNTER — PATIENT MESSAGE (OUTPATIENT)
Dept: INTERNAL MEDICINE CLINIC | Age: 41
End: 2021-07-13

## 2021-07-13 DIAGNOSIS — F90.0 ATTENTION DEFICIT HYPERACTIVITY DISORDER (ADHD), PREDOMINANTLY INATTENTIVE TYPE: ICD-10-CM

## 2021-07-13 NOTE — TELEPHONE ENCOUNTER
From: Anali Lanier  To: Tanvi Contreras MD  Sent: 7/13/2021 10:18 AM EDT  Subject: Non-Urgent Medical Question    Good morning :)  3 things   I need to schedule an appointment to go over all of my meds. Im on so many and am nauseous most of the time    It is also time for my Vyvance refill at [facility] at [facility] [address] and [place]. And last but not least, I have AWFUL thrush. My tongue is SO painful. Would you send in a script for that too? If not, I will need a sick appointment. Thanks!   Denisha

## 2021-07-15 ENCOUNTER — VIRTUAL VISIT (OUTPATIENT)
Dept: INTERNAL MEDICINE CLINIC | Age: 41
End: 2021-07-15
Payer: COMMERCIAL

## 2021-07-15 DIAGNOSIS — B37.9 MONILIA INFECTION: Primary | ICD-10-CM

## 2021-07-15 DIAGNOSIS — B37.0 THRUSH: ICD-10-CM

## 2021-07-15 PROCEDURE — 99213 OFFICE O/P EST LOW 20 MIN: CPT | Performed by: FAMILY MEDICINE

## 2021-07-15 RX ORDER — ASPIRIN 81 MG/1
81 TABLET ORAL DAILY
COMMUNITY
End: 2022-03-24 | Stop reason: ALTCHOICE

## 2021-07-15 RX ORDER — CHOLECALCIFEROL (VITAMIN D3) 50 MCG
1 CAPSULE ORAL 2 TIMES DAILY
COMMUNITY

## 2021-07-15 RX ORDER — FLUCONAZOLE 150 MG/1
150 TABLET ORAL DAILY
Qty: 7 TABLET | Refills: 0 | Status: SHIPPED | OUTPATIENT
Start: 2021-07-15 | End: 2021-07-22

## 2021-07-15 NOTE — PROGRESS NOTES
Nikki Leon is a 46 y.o. female who presents with irritation of the lining of mouth and lips. She had thrush last week, used clotrimazole 5 times a day and improved. Is also taking a probiotic. Has vaginal yeast infection for 1 1/2 weeks. Treated for UTI currently, took bactrim and had a severe systemic reaction. Recently saw specialist and is going to have another antibiotic for UTI called in, but not sure what that will be. The patient is chronically immunosuppressed. This is an established visit conducted via telemedicine with video. The patient has been instructed that this meets HIPAA criteria and acknowledges and agrees to this method of visitation. Pursuant to the emergency declaration under the [facility], 1135 waiver authority and the [employer] Act, this Virtual Visit was conducted, with patient's consent, to reduce the patient's risk of exposure to COVID-19 and provide continuity of care for an established patient. Services were provided through a video synchronous discussion virtually to substitute for in-person clinic visit.         Past Medical History:   Diagnosis Date    Acquired hypothyroidism     Adverse effect of anesthesia     labile BP during/after C section    Antiphospholipid antibody syndrome (HCC)     Asthma     Broken bones     history of 9 broken bones    Chronic UTI (urinary tract infection)     \"extra valve right kidney causes UTI\"    Clotting disorder (HCC)     Fibromyalgia     Functional tremor     Gestational diabetes     GI bleed 2007,2011,2015,2016    lower GI last colonoscopy in 2016 normal     Huntingtons chorea (HCC)     Hyperhydrosis disorder     Ill-defined condition     [place]/ tested genetically - daughter has [place]    Infertility     PCOS    PCOS (polycystic ovarian syndrome)     Precancerous skin lesion     removed from Right shoulder  Preeclampsia 2011    pregnancy    Reactive airway disease     Rheumatoid arthritis ([facility] [address]     SVT (supraventricular tachycardia) ([facility] [address] 2011    occured during pregnancy when picc line inserted.        Family History   Problem Relation Age of Onset    Other Mother         Rockwall's disease    Hypertension Mother     Dementia Mother     High Cholesterol Father     Heart Disease Father     Diabetes Father     Hypertension Father     Asthma Brother     Diabetes Brother     Other Brother         varicocele, hernias    Hypertension Brother     Alcohol abuse Brother     Hypertension Maternal Grandmother     Elevated Lipids Maternal Grandmother     Cancer Maternal Grandmother         breast    Other Maternal Grandmother         polymyalgia rheumatica    Glaucoma Maternal Grandmother     Cancer Maternal Grandfather         prostate    Huntingtons disease Maternal Grandfather     Cancer Paternal Grandmother         lung with mets    Cancer Paternal Grandfather         prostate, colon    Diabetes Paternal Grandfather     Heart Disease Paternal Grandfather     Alzheimer Paternal Grandfather     Dementia Paternal Grandfather        Social History     Socioeconomic History    Marital status:      Spouse name: Not on file    Number of children: Not on file    Years of education: Not on file    Highest education level: Not on file   Occupational History    Not on file   Tobacco Use    Smoking status: Never Smoker    Smokeless tobacco: Never Used   Substance and Sexual Activity    Alcohol use: Yes     Comment: few drinks per year    Drug use: No    Sexual activity: Not on file   Other Topics Concern    Not on file   Social History Narrative    ** Merged History Encounter **          Social Determinants of Health     Financial Resource Strain:     Difficulty of Paying Living Expenses:    Food Insecurity:     Worried About Running Out of Food in the Last Year:     [employer] in the Last Year:    Transportation Needs:     Lack of Transportation (Medical):  Lack of Transportation (Non-Medical):    Physical Activity:     Days of Exercise per Week:     Minutes of Exercise per Session:    Stress:     Feeling of Stress :    Social Connections:     Frequency of Communication with Friends and Family:     Frequency of Social Gatherings with Friends and Family:     Attends [employer] Services:     Active Member of Clubs or Organizations:     Attends Club or Organization Meetings:     Marital Status:    Intimate Partner Violence:     Fear of Current or Ex-Partner:     Emotionally Abused:     Physically Abused:     Sexually Abused:        Current Outpatient Medications on File Prior to Visit   Medication Sig Dispense Refill    B.infantis-B.ani-B.long-B.bifi (Probiotic 4X) 10-15 mg TbEC Take 1 Capsule by mouth two (2) times a day.  aspirin delayed-release 81 mg tablet Take 81 mg by mouth daily.  lisdexamfetamine (Vyvanse) 30 mg capsule Take 1 Capsule by mouth every morning. Max Daily Amount: 30 mg. 30 Capsule 0    escitalopram oxalate (LEXAPRO) 20 mg tablet TAKE 1 TABLET BY MOUTH EVERY DAY 30 Tab 11    Benlysta 200 mg/mL atIn       mirabegron ER (Myrbetriq) 25 mg ER tablet Take 1 Tab by mouth daily. 30 Tab 0    buPROPion XL (WELLBUTRIN XL) 150 mg tablet Take 1 Tab by mouth every morning. 90 Tab 3    montelukast (SINGULAIR) 10 mg tablet Take 1 Tab by mouth daily. 90 Tab 3    Prodigy No Coding strip AS DIRECTED 100 Strip 5    Prodigy Twist Top Lancet 28 gauge misc AS DIRECTED 100 Lancet 0    predniSONE (DELTASONE) 10 mg tablet Take 10 mg by mouth daily. 30 Tab 0    diphenhydrAMINE (BenadryL) 25 mg capsule Take 25 mg by mouth every six (6) hours as needed.  PREVIDENT 5000 SENSITIVE 1.1-5 % pste USE AS DIRECTED TWICE A DAY SPIT OUT EXCESS AND DO NOT RINSE, EAT OR DRINK FOR 30 MINUTES      hydroxychloroquine (PLAQUENIL) 200 mg tablet 200 mg daily.       lidocaine-prilocaine (EMLA) topical cream For port flushing every 3 month  2    omeprazole (PRILOSEC) 40 mg capsule Take 1 Cap by mouth daily. 30 Cap 0    Cetirizine (ZYRTEC) 10 mg cap Take 10 mg by mouth daily. Indications: inflammation of the nose due to an allergy 30 Cap 0    albuterol (PROVENTIL HFA, VENTOLIN HFA, PROAIR HFA) 90 mcg/actuation inhaler Take 2 Puffs by inhalation every four (4) hours as needed for Wheezing. 1 Inhaler 0    omalizumab (XOLAIR SC) by SubCUTAneous route every thirty (30) days. Given by Dr Sherita Sanchez in his office      SYMBICORT 160-4.5 mcg/actuation HFAA TAKE 2 PUFFS BY MOUTH TWICE A DAY  12    OZEMPIC 0.25 mg or 0.5 mg(2 mg/1.5 mL) sub-q pen by SubCUTAneous route every seven (7) days. Patient states she takes 1 ml every friday  1    ondansetron hcl (ZOFRAN) 4 mg tablet Take 4 mg by mouth every eight (8) hours as needed for Nausea.  EPIPEN 2-SANJAY 0.3 mg/0.3 mL injection 1 (ONE) INJECTION AS NEEDED  0    traMADol (ULTRAM) 50 mg tablet Take 50 mg by mouth every six (6) hours as needed for Pain.  oxybutynin (DITROPAN) 5 mg tablet 5 mg two (2) times a day. Patient states she takes 1 tablet bid  3    cyanocobalamin (Vitamin B-12) 1,000 mcg tablet Take  by mouth daily.  RESTASIS 0.05 % ophthalmic emulsion INSTILL 1 DROP INTO EACH EYE TWICE DAILY  4    SAFETY-ABDI TB SYR 1CC/25GX5/8\" 1 mL 25 gauge x 5/8\" syrg USE TO INJECT METHOTREXATE ONCE A WEEK  2    folic acid (FOLVITE) 1 mg tablet TAKE 1 TABLET ORALLY DAILY  11    Nebulizer & Compressor machine 1 Each by Does Not Apply route three (3) times daily as needed. 1 Each 0    glycopyrrolate (ROBINUL) 1 mg tablet Take 2 mg by mouth two (2) times a day. Indications: hyperhydrosis      cholecalciferol (Vitamin D3) (1000 Units /25 mcg) tablet Take 1 Tab by mouth daily.  levothyroxine (SYNTHROID) 75 mcg tablet Take 75 mcg by mouth Daily (before breakfast).       nystatin (MYCOSTATIN) 100,000 unit/mL suspension Take 5 mL by mouth four (4) times daily. swish and spit (Patient not taking: Reported on 7/15/2021) 473 mL 1    propranolol LA (INDERAL LA) 60 mg SR capsule TAKE 1 CAPSULE BY MOUTH EVERY DAY 90 Cap 3    insulin lispro (HumaLOG KwikPen Insulin) 100 unit/mL kwikpen Check Blood Sugar before each meal 3 times daily    INITIATE SLIDING SCALE INSULIN (DURGA): For Blood Sugar (mg/dl) of :             150-200=2 units           201-250=4 units     251-300=6 units  301-350=8 units  351-400=10 units (Patient not taking: Reported on 7/15/2021) 1 Package 0    insulin needles, disposable, (Pen Needle) 29 gauge x 1/2\" ndle To dispense insulin as directed (Patient not taking: Reported on 7/15/2021) 100 Pen Needle 0     No current facility-administered medications on file prior to visit. Review of Systems  Pertinent items are noted in HPI. Objective:     Gen: well appearing female  HEENT: normal conjunctiva, no audible congestion, patient reports irritation along the gumline and of tongue with burning sensation  Neck: patient does not feel enlarged or tender LAD or masses  Resp: normal respiratory effort, no audible wheezing. CV: patient does not feel palpitations or heart irregularity  Abd: patient does not feel abdominal tenderness or mass, patient does not notice distension  Extrem: patient does not see swelling in ankles or joints. : Patient reports vaginal irritation  Neuro: Alert and oriented, able to answer questions without difficulty      Assessment/Plan:       ICD-10-CM ICD-9-CM    1. Monilia infection  B37.9 112.9 fluconazole (DIFLUCAN) 150 mg tablet   2. Thrush  B37.0 112.0 fluconazole (DIFLUCAN) 150 mg tablet   to hold lexapro and plaquenil while on diflucan. Patient is advised to not take Cipro. This was a telemedicine visit with video. Aaliyah Biswas MD    Follow-up and Dispositions    · Return if symptoms worsen or fail to improve.        Hello there

## 2021-07-20 ENCOUNTER — TELEPHONE (OUTPATIENT)
Dept: NEUROLOGY | Age: 41
End: 2021-07-20

## 2021-07-20 NOTE — TELEPHONE ENCOUNTER
Spoke with patient, she states Sunday after a possible muscle cramp to left hip afterward noted with numbness to foot/calf up to knee. She does not have any numbness any where else, no other deficits. Has not improved in intensity. She saw Kim Edward, who suggested she follow up with neurology. She has an appointment with Manasa Sahu on 7/21. Informed her I spoke with  who stated she will see her tomorrow, but any further numbness with extremities or any stroke like symptoms to seek ED evaluation. She verbalized understanding.

## 2021-07-20 NOTE — TELEPHONE ENCOUNTER
Patient stated Sunday at 11pm her leg went numb. She went to go see her rheumatologist today, Dr. Wing Irby, and she told her to follow up with Dr. Selwyn Sanford immediately. She is scheduled for tomorrow but she is worried about waiting.  Please call back

## 2021-07-21 ENCOUNTER — APPOINTMENT (OUTPATIENT)
Dept: GENERAL RADIOLOGY | Age: 41
End: 2021-07-21
Attending: EMERGENCY MEDICINE
Payer: COMMERCIAL

## 2021-07-21 ENCOUNTER — HOSPITAL ENCOUNTER (EMERGENCY)
Age: 41
Discharge: HOME | End: 2021-07-21
Attending: EMERGENCY MEDICINE
Payer: COMMERCIAL

## 2021-07-21 ENCOUNTER — APPOINTMENT (OUTPATIENT)
Dept: ULTRASOUND IMAGING | Age: 41
End: 2021-07-21
Attending: EMERGENCY MEDICINE
Payer: COMMERCIAL

## 2021-07-21 ENCOUNTER — OFFICE VISIT (OUTPATIENT)
Dept: NEUROLOGY | Age: 41
End: 2021-07-21
Payer: COMMERCIAL

## 2021-07-21 VITALS
OXYGEN SATURATION: 98 % | DIASTOLIC BLOOD PRESSURE: 84 MMHG | SYSTOLIC BLOOD PRESSURE: 130 MMHG | RESPIRATION RATE: 18 BRPM

## 2021-07-21 VITALS
DIASTOLIC BLOOD PRESSURE: 87 MMHG | OXYGEN SATURATION: 100 % | SYSTOLIC BLOOD PRESSURE: 132 MMHG | TEMPERATURE: 98.3 F | WEIGHT: 236.55 LBS | HEIGHT: 66 IN | BODY MASS INDEX: 38.02 KG/M2 | RESPIRATION RATE: 16 BRPM | HEART RATE: 99 BPM

## 2021-07-21 DIAGNOSIS — R20.0 LEFT LEG NUMBNESS: Primary | ICD-10-CM

## 2021-07-21 DIAGNOSIS — M54.32 SCIATICA OF LEFT SIDE: ICD-10-CM

## 2021-07-21 DIAGNOSIS — M47.816 OSTEOARTHRITIS OF LUMBAR SPINE, UNSPECIFIED SPINAL OSTEOARTHRITIS COMPLICATION STATUS: Primary | ICD-10-CM

## 2021-07-21 PROCEDURE — 96372 THER/PROPH/DIAG INJ SC/IM: CPT

## 2021-07-21 PROCEDURE — 99214 OFFICE O/P EST MOD 30 MIN: CPT | Performed by: PSYCHIATRY & NEUROLOGY

## 2021-07-21 PROCEDURE — 93971 EXTREMITY STUDY: CPT

## 2021-07-21 PROCEDURE — 99283 EMERGENCY DEPT VISIT LOW MDM: CPT

## 2021-07-21 PROCEDURE — 74011250636 HC RX REV CODE- 250/636: Performed by: EMERGENCY MEDICINE

## 2021-07-21 PROCEDURE — 74011250637 HC RX REV CODE- 250/637: Performed by: EMERGENCY MEDICINE

## 2021-07-21 PROCEDURE — 72100 X-RAY EXAM L-S SPINE 2/3 VWS: CPT

## 2021-07-21 PROCEDURE — 74011636637 HC RX REV CODE- 636/637: Performed by: EMERGENCY MEDICINE

## 2021-07-21 RX ORDER — DIAZEPAM 5 MG/1
5 TABLET ORAL
Status: COMPLETED | OUTPATIENT
Start: 2021-07-21 | End: 2021-07-21

## 2021-07-21 RX ORDER — PREDNISONE 20 MG/1
60 TABLET ORAL
Status: COMPLETED | OUTPATIENT
Start: 2021-07-21 | End: 2021-07-21

## 2021-07-21 RX ORDER — NAPROXEN 500 MG/1
500 TABLET ORAL 2 TIMES DAILY WITH MEALS
Qty: 20 TABLET | Refills: 0 | Status: SHIPPED | OUTPATIENT
Start: 2021-07-21 | End: 2021-07-31

## 2021-07-21 RX ORDER — KETOROLAC TROMETHAMINE 30 MG/ML
30 INJECTION, SOLUTION INTRAMUSCULAR; INTRAVENOUS
Status: COMPLETED | OUTPATIENT
Start: 2021-07-21 | End: 2021-07-21

## 2021-07-21 RX ORDER — DIAZEPAM 5 MG/1
5 TABLET ORAL
Qty: 15 TABLET | Refills: 0 | Status: SHIPPED | OUTPATIENT
Start: 2021-07-21 | End: 2021-08-30 | Stop reason: ALTCHOICE

## 2021-07-21 RX ORDER — NALOXONE HYDROCHLORIDE 4 MG/.1ML
1 SPRAY NASAL
Qty: 1 EACH | Refills: 0 | Status: SHIPPED | OUTPATIENT
Start: 2021-07-21 | End: 2021-07-21

## 2021-07-21 RX ORDER — PREDNISONE 50 MG/1
50 TABLET ORAL DAILY
Qty: 3 TABLET | Refills: 0 | Status: SHIPPED | OUTPATIENT
Start: 2021-07-21 | End: 2021-07-24

## 2021-07-21 RX ADMIN — PREDNISONE 60 MG: 20 TABLET ORAL at 03:34

## 2021-07-21 RX ADMIN — KETOROLAC TROMETHAMINE 30 MG: 30 INJECTION, SOLUTION INTRAMUSCULAR; INTRAVENOUS at 03:35

## 2021-07-21 RX ADMIN — DIAZEPAM 5 MG: 5 TABLET ORAL at 03:34

## 2021-07-21 NOTE — ED NOTES
Patient came in to ED with c/o left foot numbness. Patient stated it started Sunday at her hips and migrated down to her toes. Stated it was a burning sensation. Patient is ambulatory, denies using any assistive devices. States she sees a neurologist and has an appointment but the pain was too much to wait.
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
## 2021-07-21 NOTE — ED PROVIDER NOTES
EMERGENCY DEPARTMENT HISTORY AND PHYSICAL EXAM      Date: 7/21/2021  Patient Name: Riley Bonner    History of Presenting Illness     Chief Complaint   Patient presents with    Leg Pain     Patient had a \"hip cramp\" in left hip this morning and her left leg went numb. She now is complaining of severe pain and numbness from knee down on left leg. She states that she has a blood clotting disorder and is worried that she has a blood clot.  Numbness       History Provided By: Patient    HPI: Riley Bonner, 46 y.o. female with PMHx significant for rheumatoid arthritis, lupus, asthma, DeWitt's disease, esophageal dysmotility, antiphospholipid syndrome, Raynaud's disease, and sciatica presents to the ED with left hip pain that radiates down to her left leg and toes that started at 11 PM 3 days ago. It lasted for 2-1/2 hours and then her leg went numb. She saw her rheumatologist the next day. Rheumatologist did not think there was any rheumatologic problem causing her symptoms and set patient up to see neurologist. She has an appointment tomorrow with her rheumatologist. She denies any known injury. States it is painful to lay down. She has taken Tylenol and Advil at 730 without significant relief. She took Flexeril 2 nights ago and one of her prescribed tramadol as with mild relief. Denies any bowel or bladder dysfunction. Denies dysuria, hematuria, urinary frequency. States her leg is not weak, but it is numb all the way down to her toes. She is concerned about a possible blood clot because she has had history of DVTs and PEs And has antiphospholipid syndrome    PCP: Delmar Duverney, MD    No current facility-administered medications on file prior to encounter. Current Outpatient Medications on File Prior to Encounter   Medication Sig Dispense Refill    B.infantis-B.ani-B.long-B.bifi (Probiotic 4X) 10-15 mg TbEC Take 1 Capsule by mouth two (2) times a day.       aspirin delayed-release 81 mg tablet Take 81 mg by mouth daily.  fluconazole (DIFLUCAN) 150 mg tablet Take 1 Tablet by mouth daily for 7 days. FDA advises cautious prescribing of oral fluconazole in pregnancy. 7 Tablet 0    lisdexamfetamine (Vyvanse) 30 mg capsule Take 1 Capsule by mouth every morning. Max Daily Amount: 30 mg. 30 Capsule 0    escitalopram oxalate (LEXAPRO) 20 mg tablet TAKE 1 TABLET BY MOUTH EVERY DAY 30 Tab 11    Benlysta 200 mg/mL atIn       mirabegron ER (Myrbetriq) 25 mg ER tablet Take 1 Tab by mouth daily. 30 Tab 0    buPROPion XL (WELLBUTRIN XL) 150 mg tablet Take 1 Tab by mouth every morning. 90 Tab 3    montelukast (SINGULAIR) 10 mg tablet Take 1 Tab by mouth daily. 90 Tab 3    nystatin (MYCOSTATIN) 100,000 unit/mL suspension Take 5 mL by mouth four (4) times daily. swish and spit (Patient not taking: Reported on 7/15/2021) 473 mL 1    propranolol LA (INDERAL LA) 60 mg SR capsule TAKE 1 CAPSULE BY MOUTH EVERY DAY 90 Cap 3    Prodigy No Coding strip AS DIRECTED 100 Strip 5    Prodigy Twist Top Lancet 28 gauge misc AS DIRECTED 100 Lancet 0    predniSONE (DELTASONE) 10 mg tablet Take 10 mg by mouth daily. 30 Tab 0    insulin lispro (HumaLOG KwikPen Insulin) 100 unit/mL kwikpen Check Blood Sugar before each meal 3 times daily    INITIATE SLIDING SCALE INSULIN (DURGA): For Blood Sugar (mg/dl) of :             150-200=2 units           201-250=4 units     251-300=6 units  301-350=8 units  351-400=10 units (Patient not taking: Reported on 7/15/2021) 1 Package 0    insulin needles, disposable, (Pen Needle) 29 gauge x 1/2\" ndle To dispense insulin as directed (Patient not taking: Reported on 7/15/2021) 100 Pen Needle 0    diphenhydrAMINE (BenadryL) 25 mg capsule Take 25 mg by mouth every six (6) hours as needed.  PREVIDENT 5000 SENSITIVE 1.1-5 % pste USE AS DIRECTED TWICE A DAY SPIT OUT EXCESS AND DO NOT RINSE, EAT OR DRINK FOR 30 MINUTES      hydroxychloroquine (PLAQUENIL) 200 mg tablet 200 mg daily.  lidocaine-prilocaine (EMLA) topical cream For port flushing every 3 month  2    omeprazole (PRILOSEC) 40 mg capsule Take 1 Cap by mouth daily. 30 Cap 0    Cetirizine (ZYRTEC) 10 mg cap Take 10 mg by mouth daily. Indications: inflammation of the nose due to an allergy 30 Cap 0    albuterol (PROVENTIL HFA, VENTOLIN HFA, PROAIR HFA) 90 mcg/actuation inhaler Take 2 Puffs by inhalation every four (4) hours as needed for Wheezing. 1 Inhaler 0    omalizumab (XOLAIR SC) by SubCUTAneous route every thirty (30) days. Given by Dr Alan Rivera in his office      SYMBICORT 160-4.5 mcg/actuation HFAA TAKE 2 PUFFS BY MOUTH TWICE A DAY  12    OZEMPIC 0.25 mg or 0.5 mg(2 mg/1.5 mL) sub-q pen by SubCUTAneous route every seven (7) days. Patient states she takes 1 ml every friday  1    ondansetron hcl (ZOFRAN) 4 mg tablet Take 4 mg by mouth every eight (8) hours as needed for Nausea.  EPIPEN 2-SANJAY 0.3 mg/0.3 mL injection 1 (ONE) INJECTION AS NEEDED  0    traMADol (ULTRAM) 50 mg tablet Take 50 mg by mouth every six (6) hours as needed for Pain.  oxybutynin (DITROPAN) 5 mg tablet 5 mg two (2) times a day. Patient states she takes 1 tablet bid  3    cyanocobalamin (Vitamin B-12) 1,000 mcg tablet Take  by mouth daily.  RESTASIS 0.05 % ophthalmic emulsion INSTILL 1 DROP INTO EACH EYE TWICE DAILY  4    SAFETY-ABDI TB SYR 1CC/25GX5/8\" 1 mL 25 gauge x 5/8\" syrg USE TO INJECT METHOTREXATE ONCE A WEEK  2    folic acid (FOLVITE) 1 mg tablet TAKE 1 TABLET ORALLY DAILY  11    Nebulizer & Compressor machine 1 Each by Does Not Apply route three (3) times daily as needed. 1 Each 0    glycopyrrolate (ROBINUL) 1 mg tablet Take 2 mg by mouth two (2) times a day. Indications: hyperhydrosis      cholecalciferol (Vitamin D3) (1000 Units /25 mcg) tablet Take 1 Tab by mouth daily.  levothyroxine (SYNTHROID) 75 mcg tablet Take 75 mcg by mouth Daily (before breakfast).          Past History     Past Medical History:  Past Medical History:   Diagnosis Date    Acquired hypothyroidism     Adverse effect of anesthesia     labile BP during/after C section    Antiphospholipid antibody syndrome (HCC)     Asthma     Broken bones     history of 9 broken bones    Chronic UTI (urinary tract infection)     \"extra valve right kidney causes UTI\"    Clotting disorder (HCC)     Fibromyalgia     Functional tremor     Gestational diabetes     GI bleed 2007,2011,2015,2016    lower GI last colonoscopy in 2016 normal     Huntingtons chorea (HCC)     Hyperhydrosis disorder     Ill-defined condition     Chris/ tested genetically - daughter has Bolivar    Infertility     PCOS    PCOS (polycystic ovarian syndrome)     Precancerous skin lesion     removed from Right shoulder    Preeclampsia 2011    pregnancy    Reactive airway disease     Rheumatoid arthritis (HCC)     SVT (supraventricular tachycardia) ([facility] [address] 2011    occured during pregnancy when picc line inserted.        Past Surgical History:  Past Surgical History:   Procedure Laterality Date    COLONOSCOPY N/A 6/13/2016    [facility]COPY    COLONOSCOPY N/A 5/22/2020    COLONOSCOPY performed by Ann Marie Wooten MD at [facility]  5/22/2020         HX HEENT      HX WISDOM TEETH EXTRACTION  2004    UPPER GI ENDOSCOPY,BIOPSY  2/12/2019         UPPER GI ENDOSCOPY,BIOPSY  5/22/2020         UPPER GI ENDOSCOPY,DILATN W GUIDE  2/12/2019            Family History:  Family History   Problem Relation Age of Onset    Other Mother         Bolivar's disease    Hypertension Mother     Dementia Mother     High Cholesterol Father     Heart Disease Father     Diabetes Father     Hypertension Father     Asthma Brother     Diabetes Brother     Other Brother         varicocele, hernias    Hypertension Brother     Alcohol abuse Brother     Hypertension Maternal Grandmother     Elevated Lipids Maternal Grandmother     Cancer Maternal Grandmother         breast    Other Maternal Grandmother         polymyalgia rheumatica    Glaucoma Maternal Grandmother     Cancer Maternal Grandfather         prostate    Huntingtons disease Maternal Grandfather     Cancer Paternal Grandmother         lung with mets    Cancer Paternal Grandfather         prostate, colon    Diabetes Paternal Grandfather     Heart Disease Paternal Grandfather     Alzheimer Paternal Grandfather     Dementia Paternal Grandfather        Social History:  Social History     Tobacco Use    Smoking status: Never Smoker    Smokeless tobacco: Never Used   Substance Use Topics    Alcohol use: Yes     Comment: few drinks per year    Drug use: No       Allergies: Allergies   Allergen Reactions    Latex Swelling    Entex [Phenylephrine-Guaifenesin] Anaphylaxis, Hives and Palpitations    Remicade [Infliximab] Anaphylaxis    Rituxan [Rituximab] Anaphylaxis     Stated not allergic 7/15/21    Sulfa (Sulfonamide Antibiotics) Other (comments)     Mouth irritation, elevated HR, SOB, flushing    Mucinex [Guaifenesin] Anaphylaxis and Hives    Pepto-Bismol [Bismuth Subsalicylate] Nausea and Vomiting    Vancomycin Hives         Review of Systems   Review of Systems   Constitutional: Negative for chills and fever. HENT: Negative for congestion, ear pain, rhinorrhea and sore throat. Respiratory: Negative for cough, chest tightness, shortness of breath and wheezing. Cardiovascular: Negative for chest pain, palpitations and leg swelling. Gastrointestinal: Negative for abdominal pain, diarrhea, nausea and vomiting. Genitourinary: Negative for dysuria, flank pain, hematuria and pelvic pain. Musculoskeletal: Positive for arthralgias, gait problem and myalgias. Negative for back pain and neck pain. Skin: Negative for rash and wound. Neurological: Positive for numbness. Negative for dizziness, syncope, weakness and headaches. Psychiatric/Behavioral: Negative for confusion. The patient is nervous/anxious. All other systems reviewed and are negative. Physical Exam    General appearance - well nourished, well appearing, and in no distress  Eyes - pupils equal and reactive, extraocular eye movements intact  ENT - mucous membranes moist, pharynx normal without lesions  Neck - supple, no significant adenopathy; non-tender to palpation  Chest - clear to auscultation, no wheezes, rales or rhonchi; non-tender to palpation  Heart - normal rate and regular rhythm, S1 and S2 normal, no murmurs noted  Abdomen - soft, nontender, nondistended, no masses or organomegaly  Musculoskeletal tender to palpation left SI joint, no deformity or swelling; normal ROM  Extremities - peripheral pulses normal, no pedal edema  Skin - normal coloration and turgor, no rashes  Neurological - alert, oriented x3, normal speech, subjective decreased sensation in left lower extremity to toes, no noticeable weakness     Diagnostic Study Results     Labs -   No results found for this or any previous visit (from the past 12 hour(s)). Radiologic Studies -   DUPLEX LOWER EXT VENOUS LEFT   Final Result   : No evidence of left lower extremity deep venous thrombosis      XR SPINE LUMB 2 OR 3 V   Final Result   Progressive degenerative disc disease at L5-S1                    CT Results  (Last 48 hours)    None        CXR Results  (Last 48 hours)    None            Medical Decision Making   I am the first provider for this patient. I reviewed the vital signs, available nursing notes, past medical history, past surgical history, family history and social history. Vital Signs-Reviewed the patient's vital signs.   Patient Vitals for the past 12 hrs:   Temp Pulse Resp BP SpO2   07/21/21 0107 98.3 °F (36.8 °C) 99 16 132/87 100 %           Records Reviewed: Nursing Notes and Old Medical Records    Provider Notes (Medical Decision Making):   Differential diagnosis: Sciatica, lumbar radiculopathy, muscle spasm, DVT  We will obtain x-rays of lumbar spine and duplex study of left lower extremity to rule out DVT    ED Course:   Initial assessment performed. The patients presenting problems have been discussed, and they are in agreement with the care plan formulated and outlined with them. I have encouraged them to ask questions as they arise throughout their visit. Progress Notes:   X-ray shows degenerative changes. DVT study is unremarkable. Will discharge with Valium, steroid burst, naproxen, tramadol, and Narcan spray    Disposition:  Discharge home    PLAN:  1. Current Discharge Medication List      START taking these medications    Details   diazePAM (Valium) 5 mg tablet Take 1 Tablet by mouth every eight (8) hours as needed (spasm). Max Daily Amount: 15 mg.  Qty: 15 Tablet, Refills: 0  Start date: 7/21/2021    Associated Diagnoses: Osteoarthritis of lumbar spine, unspecified spinal osteoarthritis complication status; Sciatica of left side      !! predniSONE (DELTASONE) 50 mg tablet Take 1 Tablet by mouth daily for 3 days. Qty: 3 Tablet, Refills: 0  Start date: 7/21/2021, End date: 7/24/2021      naproxen (Naprosyn) 500 mg tablet Take 1 Tablet by mouth two (2) times daily (with meals) for 10 days. Qty: 20 Tablet, Refills: 0  Start date: 7/21/2021, End date: 7/31/2021      naloxone [facility]) 4 mg/actuation nasal spray 1 [place] by IntraNASal route once as needed for Overdose for up to 1 dose. Use 1 spray intranasally, then discard. Repeat with new spray every 2 min as needed for opioid overdose symptoms, alternating nostrils. Qty: 1 Each, Refills: 0  Start date: 7/21/2021, End date: 7/21/2021       !! - Potential duplicate medications found. Please discuss with provider. CONTINUE these medications which have NOT CHANGED    Details   ! ! predniSONE (DELTASONE) 10 mg tablet Take 10 mg by mouth daily.   Qty: 30 Tab, Refills: 0      traMADol (ULTRAM) 50 mg tablet Take 50 mg by mouth every six (6) hours as needed for Pain. !! - Potential duplicate medications found. Please discuss with provider. 2.   Follow-up Information     Follow up With Specialties Details Why Contact Info    John Sanders MD Internal Medicine Schedule an appointment as soon as possible for a visit   [address] [facility]  [phone]      [facility] EMERGENCY DEPT Emergency Medicine  If symptoms worsen [address] [place] [address] Drive  [address] [facility]  [phone]        Return to ED if worse     Diagnosis     Clinical Impression:   1. Osteoarthritis of lumbar spine, unspecified spinal osteoarthritis complication status    2.  Sciatica of left side

## 2021-07-22 ENCOUNTER — TELEPHONE (OUTPATIENT)
Dept: NEUROLOGY | Age: 41
End: 2021-07-22

## 2021-07-22 RX ORDER — SENNOSIDES 25 MG/1
TABLET, FILM COATED ORAL
Qty: 1 TUBE | Refills: 0 | Status: SHIPPED | OUTPATIENT
Start: 2021-07-22

## 2021-07-22 NOTE — TELEPHONE ENCOUNTER
Spoke with patient, she states she has a burning sensation to her left lower leg/foot that comes and goes. She wanted to know if there was any medication that  could put in to help.

## 2021-07-22 NOTE — TELEPHONE ENCOUNTER
----- Message from Peyton Collazo sent at 7/22/2021 12:24 PM EDT -----  Regarding: Dr. Darlin Najjar: [phone]  General Message/Vendor Calls    Caller's first and last name:Pt      Reason for call:Pt is experiencing burning sensation in her L foot and leg and would like a call back to see if there is anything she can take to help and further discuss. Callback required yes/no and why:Yes, discuss symptoms.        Best contact number(s):[phone]        Details to clarify the request:n/a      Peyton Collazo

## 2021-07-30 ENCOUNTER — TELEPHONE (OUTPATIENT)
Dept: NEUROLOGY | Age: 41
End: 2021-07-30

## 2021-07-30 ENCOUNTER — VIRTUAL VISIT (OUTPATIENT)
Dept: INTERNAL MEDICINE CLINIC | Age: 41
End: 2021-07-30
Payer: COMMERCIAL

## 2021-07-30 DIAGNOSIS — M54.32 LEFT SCIATIC NERVE PAIN: ICD-10-CM

## 2021-07-30 DIAGNOSIS — R25.1 FUNCTIONAL TREMOR: ICD-10-CM

## 2021-07-30 DIAGNOSIS — M32.9 SYSTEMIC LUPUS ERYTHEMATOSUS, UNSPECIFIED SLE TYPE, UNSPECIFIED ORGAN INVOLVEMENT STATUS (HCC): ICD-10-CM

## 2021-07-30 DIAGNOSIS — D84.9 IMMUNOSUPPRESSED STATUS (HCC): Primary | ICD-10-CM

## 2021-07-30 DIAGNOSIS — F90.0 ATTENTION DEFICIT HYPERACTIVITY DISORDER (ADHD), PREDOMINANTLY INATTENTIVE TYPE: ICD-10-CM

## 2021-07-30 DIAGNOSIS — F33.41 RECURRENT MAJOR DEPRESSIVE DISORDER, IN PARTIAL REMISSION (HCC): ICD-10-CM

## 2021-07-30 PROCEDURE — 99214 OFFICE O/P EST MOD 30 MIN: CPT | Performed by: INTERNAL MEDICINE

## 2021-07-30 RX ORDER — DICYCLOMINE HYDROCHLORIDE 10 MG/1
10 CAPSULE ORAL 2 TIMES DAILY
Qty: 60 CAPSULE | Refills: 2
Start: 2021-07-30 | End: 2021-07-30 | Stop reason: ALTCHOICE

## 2021-07-30 RX ORDER — CYCLOBENZAPRINE HCL 5 MG
5 TABLET ORAL 2 TIMES DAILY
COMMUNITY
End: 2021-12-06 | Stop reason: ALTCHOICE

## 2021-07-30 RX ORDER — METHOTREXATE 25 MG/ML
INJECTION, SOLUTION INTRA-ARTERIAL; INTRAMUSCULAR; INTRAVENOUS
COMMUNITY
Start: 2021-07-12

## 2021-07-30 RX ORDER — CLOTRIMAZOLE 10 MG/1
10 LOZENGE ORAL; TOPICAL
COMMUNITY

## 2021-07-30 RX ORDER — SPIRONOLACTONE 25 MG/1
25 TABLET ORAL DAILY
COMMUNITY
End: 2021-07-30 | Stop reason: ALTCHOICE

## 2021-07-30 RX ORDER — CLOTRIMAZOLE
10 POWDER (GRAM) MISCELLANEOUS
COMMUNITY
End: 2021-07-30 | Stop reason: SDUPTHER

## 2021-07-30 NOTE — PROGRESS NOTES
CC: Nausea and ED Follow-up [facility] 06/29/2021 SOB/Trouble Swallowing and 07/21/2021 Leg Pain/Numbness - 8/10 Left Lower Extremity )      HPI:    She is a 46 y.o. female who presents for multiple issues    Patient had recurrent thrush and has tongue burning and saw periodontist and given referral to periodontist    Patient had UTI and had allergic reaction to bactrim with sob and overheating  Changed to keflex and doing well    Had a bad cramp on left hip   Cramps lasted 2.5 hours  This happened two weeks ago approximately  Since then has numbness on left lower extremity  Patient went to ER 07/21   No blood clot   Then had lumbar x ray   Progressive degenerative disc disease at L5-S1  Saw neurologist and will have EMG  Patient was given steroids in ER   Had higher dose of prednisone for 3 days then resumed 20mg daily  Also has flexeril takes as needed  Swimming    On vegan diet / gluten free and dairy free  Started B12 supplement 1000mg     Stopped fluoxetine, on wellbutrin \" I feel no different\"    Stopped propranolol causing dizziness      Stopped naltrexone     Recall  Ms Shelley Vitale has a hx of RA, lupus ( versus connective tissue disease?) , hypothyroidism,  antiphospholipid syndrome and being worked for possible mixed connective tissue. Last May 2020 she was admitted for severe sepsis and found to have hypodensities in the spleen and was treated with IV daptomycin and ceftriaxone ( Hospitalized May 19 to May 28th)   She has head a steady decline in her health. She tires easily, has functional tremor, has difficulty with gross and fine motor activities.        Recent dysphagia saw GI and hand barium swallow showing mild dysmotylity and then endoscopy showing barretts esophagus, pre cancerous cells and will need repeat biopsy       Seen Dr Jenna Lyon for rheumatologic condition   She is currently on prednisone 20 mg daily  benlysta 200 subcutaneous      For asthma issues she is currently on xolair    ADHD: stable on vyvanse 30mg daily       Ozempic working well for diabetes last HA1C below 6 . Stopped metformin and doing well. Tremor most pronounced in the hands  Patient went to neurologist - Dr Army Kaba diagnosed with functional physiologic tremor and referred to PT  Completed PT and occupational therapy to improve endurance and help cope with tremor and pain. Tramadol helps with the pain ( prescribed by Dr Janie Harrison)     Taking zyrtec and singulair and symbicort for allergies    Stopped oral contraception due to antiphospholipid syndrome    On glycopyrrolate lowered dose         This is an established visit conducted via telemedicine with video. The patient has been instructed that this meets HIPAA criteria and acknowledges and agrees to this method of visitation. Pursuant to the emergency declaration under the [facility] waiver authority and the [employer] Act, this Virtual Visit was conducted, with patient's consent, to reduce the patient's risk of exposure to COVID-19 and provide continuity of care for an established patient. Services were provided through a video synchronous discussion virtually to substitute for in-person clinic visit.        ROS:  10 systems reviewed and negative other than HPI     Past Medical History:   Diagnosis Date    Acquired hypothyroidism     Adverse effect of anesthesia     labile BP during/after C section    Antiphospholipid antibody syndrome (HCC)     Asthma     Broken bones     history of 9 broken bones    Chronic UTI (urinary tract infection)     \"extra valve right kidney causes UTI\"    Clotting disorder (HCC)     Fibromyalgia     Functional tremor     Gestational diabetes     GI bleed 2007,2011,2015,2016    lower GI last colonoscopy in 2016 normal     Huntingtons chorea (HCC)     Hyperhydrosis disorder     Ill-defined condition     [place]/ tested genetically - daughter has Taliaferro    Infertility     PCOS    PCOS (polycystic ovarian syndrome)     Precancerous skin lesion     removed from Right shoulder    Preeclampsia 2011    pregnancy    Reactive airway disease     Rheumatoid arthritis (HCC)     SVT (supraventricular tachycardia) ([facility] [address] 2011    occured during pregnancy when picc line inserted. Current Outpatient Medications on File Prior to Visit   Medication Sig Dispense Refill    cyclobenzaprine (FLEXERIL) 5 mg tablet Take 5 mg by mouth two (2) times a day.  methotrexate 25 mg/mL chemo injection 1 MILLILITER ONCE A WEEK      spironolactone (Aldactone) 25 mg tablet Take 25 mg by mouth daily.  clotrimazole (MYCELEX) 10 mg rasta Take 10 mg by mouth five (5) times daily.  lidocaine 5 % topical cream Apply  to affected area two (2) times daily as needed for Pain. 15g tube 1 Tube 0    B.infantis-B.ani-B.long-B.bifi (Probiotic 4X) 10-15 mg TbEC Take 1 Capsule by mouth two (2) times a day.  aspirin delayed-release 81 mg tablet Take 81 mg by mouth daily.  lisdexamfetamine (Vyvanse) 30 mg capsule Take 1 Capsule by mouth every morning. Max Daily Amount: 30 mg. 30 Capsule 0    escitalopram oxalate (LEXAPRO) 20 mg tablet TAKE 1 TABLET BY MOUTH EVERY DAY 30 Tab 11    mirabegron ER (Myrbetriq) 25 mg ER tablet Take 1 Tab by mouth daily. 30 Tab 0    buPROPion XL (WELLBUTRIN XL) 150 mg tablet Take 1 Tab by mouth every morning. 90 Tab 3    montelukast (SINGULAIR) 10 mg tablet Take 1 Tab by mouth daily. 90 Tab 3    nystatin (MYCOSTATIN) 100,000 unit/mL suspension Take 5 mL by mouth four (4) times daily.  swish and spit 473 mL 1    propranolol LA (INDERAL LA) 60 mg SR capsule TAKE 1 CAPSULE BY MOUTH EVERY DAY (Patient taking differently: TAKE 1 CAPSULE BY MOUTH EVERY DAY AS NEEDED) 90 Cap 3    Prodigy No Coding strip AS DIRECTED 100 Strip 5    Prodigy Twist Top Lancet 28 gauge misc AS DIRECTED 100 Lancet 0    predniSONE (DELTASONE) 10 mg tablet Take 10 mg by mouth daily. (Patient taking differently: Take 20 mg by mouth daily.) 30 Tab 0    diphenhydrAMINE (BenadryL) 25 mg capsule Take 25 mg by mouth every six (6) hours as needed.  PREVIDENT 5000 SENSITIVE 1.1-5 % pste USE AS DIRECTED TWICE A DAY SPIT OUT EXCESS AND DO NOT RINSE, EAT OR DRINK FOR 30 MINUTES      hydroxychloroquine (PLAQUENIL) 200 mg tablet 200 mg daily.  lidocaine-prilocaine (EMLA) topical cream For port flushing every 3 month  2    omeprazole (PRILOSEC) 40 mg capsule Take 1 Cap by mouth daily. 30 Cap 0    Cetirizine (ZYRTEC) 10 mg cap Take 10 mg by mouth daily. Indications: inflammation of the nose due to an allergy 30 Cap 0    albuterol (PROVENTIL HFA, VENTOLIN HFA, PROAIR HFA) 90 mcg/actuation inhaler Take 2 Puffs by inhalation every four (4) hours as needed for Wheezing. 1 Inhaler 0    omalizumab (XOLAIR SC) by SubCUTAneous route every thirty (30) days. Given by Dr Neha Rock in his office      SYMBICORT 160-4.5 mcg/actuation HFAA TAKE 2 PUFFS BY MOUTH TWICE A DAY  12    OZEMPIC 0.25 mg or 0.5 mg(2 mg/1.5 mL) sub-q pen by SubCUTAneous route every seven (7) days. Patient states she takes 1 ml every friday  1    ondansetron hcl (ZOFRAN) 4 mg tablet Take 4 mg by mouth every eight (8) hours as needed for Nausea.  EPIPEN 2-SANJAY 0.3 mg/0.3 mL injection 1 (ONE) INJECTION AS NEEDED  0    traMADol (ULTRAM) 50 mg tablet Take 50 mg by mouth every six (6) hours as needed for Pain.  oxybutynin (DITROPAN) 5 mg tablet 5 mg two (2) times a day. Patient states she takes 1 tablet bid  3    cyanocobalamin (Vitamin B-12) 1,000 mcg tablet Take  by mouth daily.       RESTASIS 0.05 % ophthalmic emulsion INSTILL 1 DROP INTO EACH EYE TWICE DAILY  4    SAFETY-ABDI TB SYR 1CC/25GX5/8\" 1 mL 25 gauge x 5/8\" syrg USE TO INJECT METHOTREXATE ONCE A WEEK  2    folic acid (FOLVITE) 1 mg tablet TAKE 1 TABLET ORALLY DAILY  11    Nebulizer & Compressor machine 1 Each by Does Not Apply route three (3) times daily as needed. 1 Each 0    glycopyrrolate (ROBINUL) 1 mg tablet Take 2 mg by mouth two (2) times a day. Indications: hyperhydrosis      cholecalciferol (Vitamin D3) (1000 Units /25 mcg) tablet Take 1 Tab by mouth daily.  levothyroxine (SYNTHROID) 75 mcg tablet Take 75 mcg by mouth Daily (before breakfast).  diazePAM (Valium) 5 mg tablet Take 1 Tablet by mouth every eight (8) hours as needed (spasm). Max Daily Amount: 15 mg. (Patient not taking: Reported on 7/30/2021) 15 Tablet 0    naproxen (Naprosyn) 500 mg tablet Take 1 Tablet by mouth two (2) times daily (with meals) for 10 days. (Patient not taking: Reported on 7/30/2021) 20 Tablet 0     No current facility-administered medications on file prior to visit.        Past Surgical History:   Procedure Laterality Date    COLONOSCOPY N/A 6/13/2016    COL[facility]COPY    COLONOSCOPY N/A 5/22/2020    COL[facility]  5/22/2020         HX HEENT      HX WISDOM TEETH EXTRACTION  2004    UPPER GI ENDOSCOPY,BIOPSY  2/12/2019         UPPER GI ENDOSCOPY,BIOPSY  5/22/2020         UPPER GI ENDOSCOPY,DILATN W GUIDE  2/12/2019            Family History   Problem Relation Age of Onset    Other Mother         Chris's disease    Hypertension Mother     Dementia Mother     Stroke Mother     High Cholesterol Father     Heart Disease Father     Diabetes Father     Hypertension Father     Asthma Brother     Diabetes Brother     Other Brother         varicocele, hernias    Hypertension Brother     Alcohol abuse Brother     Hypertension Maternal Grandmother     Elevated Lipids Maternal Grandmother     Cancer Maternal Grandmother         breast    Other Maternal Grandmother         polymyalgia rheumatica    Glaucoma Maternal Grandmother     Cancer Maternal Grandfather         prostate  Huntingtons disease Maternal Grandfather     Cancer Paternal Grandmother         lung with mets    Cancer Paternal Grandfather         prostate, colon    Diabetes Paternal Grandfather     Heart Disease Paternal Grandfather     Alzheimer Paternal Grandfather     Dementia Paternal Grandfather      Reviewed and no changes     Social History     Socioeconomic History    Marital status:      Spouse name: Not on file    Number of children: Not on file    Years of education: Not on file    Highest education level: Not on file   Occupational History    Not on file   Tobacco Use    Smoking status: Never Smoker    Smokeless tobacco: Never Used   Vaping Use    Vaping Use: Never used   Substance and Sexual Activity    Alcohol use: Yes     Comment: few drinks per year    Drug use: No    Sexual activity: Not on file   Other Topics Concern    Not on file   Social History Narrative    ** Merged History Encounter **          Social Determinants of Health     Financial Resource Strain:     Difficulty of Paying Living Expenses:    Food Insecurity:     Worried About Running Out of Food in the Last Year:     [address] in the Last Year:    Transportation Needs:     Lack of Transportation (Medical):  Lack of Transportation (Non-Medical):    Physical Activity:     Days of Exercise per Week:     Minutes of Exercise per Session:    Stress:     Feeling of Stress :    Social Connections:     Frequency of Communication with Friends and Family:     Frequency of Social Gatherings with Friends and Family:     Attends Faith Services:     Active Member of Clubs or Organizations:     Attends Club or Organization Meetings:     Marital Status:    Intimate Partner Violence:     Fear of Current or Ex-Partner:     Emotionally Abused:     Physically Abused:     Sexually Abused: There were no vitals taken for this visit.     Physical Examination:   Gen: well appearing female  HEENT: normal conjunctiva, no audible congestion, patient does not see oral erythema, has MMM  Neck: patient does not feel enlarged or tender LAD or masses  Resp: normal respiratory effort, no audible wheezing. CV: patient does not feel palpitations or heart irregularity  Abd: patient does not feel abdominal tenderness or mass, patient does not notice distension  Extrem: patient does not see swelling in ankles or joints. But has chronic pain   Neuro: Alert and oriented, able to answer questions without difficulty          Lab Results   Component Value Date/Time    WBC 14.4 (H) 06/30/2021 12:54 AM    HGB 12.7 06/30/2021 12:54 AM    HCT 38.0 06/30/2021 12:54 AM    PLATELET 433 13/15/7797 12:54 AM    MCV 90.5 06/30/2021 12:54 AM     Lab Results   Component Value Date/Time    Sodium 135 (L) 06/30/2021 12:54 AM    Potassium 3.6 06/30/2021 12:54 AM    Chloride 102 06/30/2021 12:54 AM    CO2 24 06/30/2021 12:54 AM    Anion gap 9 06/30/2021 12:54 AM    Glucose 93 06/30/2021 12:54 AM    BUN 14 06/30/2021 12:54 AM    Creatinine 0.92 06/30/2021 12:54 AM    BUN/Creatinine ratio 15 06/30/2021 12:54 AM    GFR est AA >60 06/30/2021 12:54 AM    GFR est non-AA >60 06/30/2021 12:54 AM    Calcium 8.9 06/30/2021 12:54 AM     No results found for: CHOL, CHOLPOCT, CHOLX, CHLST, CHOLV, HDL, HDLPOC, HDLP, LDL, LDLCPOC, LDLC, DLDLP, VLDLC, VLDL, TGLX, TRIGL, TRIGP, TGLPOCT, CHHD, CHHDX  Lab Results   Component Value Date/Time    TSH 1.48 10/30/2015 05:11 PM     No results found for: PSA, Rudie Seats, [id number], [id number]  Lab Results   Component Value Date/Time    Hemoglobin A1c 5.7 (H) 09/26/2020 01:56 AM     No results found for: VITD3, XQVID2, XQVID3, Gaylin Gaudy, VD3RIA    Lab Results   Component Value Date/Time    ALT (SGPT) 29 06/30/2021 12:54 AM    Alk.  phosphatase 87 06/30/2021 12:54 AM    Bilirubin, total 0.5 06/30/2021 12:54 AM           Assessment/Plan:          Functional tremor since sepsis May 2020 ( splenic abscesses) : debilitating - has difficulty with fine motor skills such as typing. Rheumatoid arthritis, involving unspecified site, unspecified whether rheumatoid factor present (HCC) debilitating   Lupus   Chronic pain   Difficult to control followed by Dr Shubham Esqueda and on immunosuppression, recent GI issues raises suspicion for overlap syndrome/ mixed connective tissue possibly scleroderma  On chronic prednisone     Acquired hypothyroidism  Euthyroid on exam     Controlled type 2 diabetes mellitus without complication, without long-term current use of insulin ([address]  On ozempic  Has come off of metformin     Vitamin D deficiency  On vitamin D      Attention deficit hyperactivity disorder (ADHD), predominantly inattentive type  Stable on vyvanse 30mg daily      Depression, unspecified depression type  Discontinue lexapro and continue wellbutrin    Excessive swelling: on lower dose of robinul     Barretts: on prilosec    Overactive bladder: advised to attempt coming off due to many medications with anticholingeric   Continue myrbetriq      Advised to stop bentyl and stop ditropan today hoping to decreased anticholigernic medications and decrease polypharmacy    Follow up in 3 months        Brian Pagan MD    This is an established visit conducted via real time video and audio telemedicine. The patient has been instructed that this meets HIPAA criteria and acknowledges and agrees to this method of visitation.

## 2021-08-06 ENCOUNTER — OFFICE VISIT (OUTPATIENT)
Dept: NEUROLOGY | Age: 41
End: 2021-08-06
Payer: COMMERCIAL

## 2021-08-06 DIAGNOSIS — M54.17 LUMBOSACRAL RADICULOPATHY AT L5: Primary | ICD-10-CM

## 2021-08-06 PROCEDURE — 95886 MUSC TEST DONE W/N TEST COMP: CPT | Performed by: PSYCHIATRY & NEUROLOGY

## 2021-08-06 PROCEDURE — 95908 NRV CNDJ TST 3-4 STUDIES: CPT | Performed by: PSYCHIATRY & NEUROLOGY

## 2021-08-06 NOTE — PROGRESS NOTES
[address] [facility] Neurology [facility]  [address] [place] [address]  Phone [phone] Fax [phone]  Test Date:  2021    Patient: Kait Moise : 1980 Physician: Ann Dash. Derrick Bertrand DO   Sex: Female Height: 5' 6\" Ref Phys: Ann Dash. Derrick Bertrand DO   ID#: [id number] Weight: 236 lbs. Technician: Cindy Nicholas     Patient Complaints:  Left lower extremity numbness/weakness    NCV & EMG Findings:  Evaluation of the left peroneal motor nerve showed prolonged distal onset latency (7.3 ms), reduced amplitude (2.3 mV), and decreased conduction velocity (Poplt-B Fib, 30 m/s). All remaining nerves  were within normal limits. Needle evaluation of the left extensor digitorum brevis muscle showed slightly increased spontaneous activity, increased motor unit amplitude, increased motor unit duration, rapid recruitment, and moderately decreased interference pattern. The left posterior tibialis muscle showed slightly increased spontaneous activity. The left anterior tibialis muscle showed moderately increased spontaneous activity. The left gluteus medius muscle showed moderately increased spontaneous activity, increased motor unit amplitude, and increased motor unit duration. All remaining muscles (as indicated in the following table) showed no evidence of electrical instability. Impression:  Extensive electrodiagnostic examination of the left lower reveals the followin. A left L5 motor radiculopathy with active on chronic denervation of L5-innervated myotomes of the left lower extremity, moderate in degree electrically. 2. No evidence of a generalized sensorimotor polyneuropathy. ___________________________  Jorje Bertrand DO        Nerve Conduction Studies  Anti Sensory Summary Table     Stim Site NR Peak (ms) Norm Peak (ms) P-T Amp (µV) Norm P-T Amp Onset (ms) Site1 Site2 Delta-P (ms) Dist (cm) Erick (m/s) Norm Erick (m/s)   Left Sup Peroneal Anti Sensory (Ant Lat Mall)     14 cm    2.8 <4.4 21.3 >5.0 2.3 14 cm Ant Lat Mall 2.8 14.0 50 >32   Site 2    2.7  20.1  2.2         Left Sural Anti Sensory (Lat Mall)    Calf    3.9 <4.0 14.7 >5.0 3.2 Calf Lat Mall 3.9 14.0 36 >35   Site 2    4.1  15.4  3.3           Motor Summary Table     Stim Site NR Onset (ms) Norm Onset (ms) O-P Amp (mV) Norm O-P Amp Site1 Site2 Delta-0 (ms) Dist (cm) Erick (m/s) Norm Erick (m/s)   Left Peroneal Motor (Ext Dig Brev)    Ankle    7.3 <6.1 2.3 >2.5 B Fib Ankle 5.3 32.0 60 >38   B Fib    12.6  0.2  Poplt B Fib 3.3 10.0 30 >40   Poplt    15.9  5.1          Left Tibial Motor (Abd Vaca Brev)     Ankle    5.5 <6.1 11.6 >3.0 Knee Ankle 7.5 36.0 48 >35   Knee    13.0  9.3            EMG     Side Muscle Nerve Root Ins Act Fibs Psw Amp Dur Poly Recrt Int Sheila Profit Comment   Left Ext Dig Brev Dp Br Peronel L5, S1 Nml Nml 1+ Incr >12ms 0 Rapid 75%    Left AbdHallucis MedPlantar S1-2 Nml Nml Nml Nml Nml 0 Nml Nml    Left PostTibialis Tibial L5, S1 Nml Nml 1+ Nml Nml 0 Nml Nml    Left Gastroc Tibial S1-2 Nml Nml Nml Nml Nml 0 Nml Nml    Left AntTibialis Dp Br Peronel L4-5 Nml 2+ Nml Nml Nml 0 Nml Nml    Left RectFemoris Femoral L2-4 Nml Nml Nml Nml Nml 0 Nml Nml    Left GluteusMed SupGluteal L5-S1 Nml 2+ Nml Incr >12ms 0 Nml Nml    Left Lumbo Parasp Low Rami L5-S1 Nml Nml Nml         Left Semitendinosus Sciatic L5-S2 Nml Nml Nml Nml Nml 0 Nml Nml          Waveforms:

## 2021-08-11 DIAGNOSIS — J45.30 MILD PERSISTENT ASTHMA WITHOUT COMPLICATION: ICD-10-CM

## 2021-08-11 RX ORDER — MONTELUKAST SODIUM 10 MG/1
TABLET ORAL
Qty: 30 TABLET | Refills: 14 | Status: SHIPPED | OUTPATIENT
Start: 2021-08-11 | End: 2022-03-24 | Stop reason: ALTCHOICE

## 2021-08-12 ENCOUNTER — HOSPITAL ENCOUNTER (OUTPATIENT)
Dept: MRI IMAGING | Age: 41
Discharge: HOME | End: 2021-08-12
Attending: PSYCHIATRY & NEUROLOGY
Payer: COMMERCIAL

## 2021-08-12 ENCOUNTER — TELEPHONE (OUTPATIENT)
Dept: NEUROLOGY | Age: 41
End: 2021-08-12

## 2021-08-12 DIAGNOSIS — M54.17 LUMBOSACRAL RADICULOPATHY AT L5: Primary | ICD-10-CM

## 2021-08-12 DIAGNOSIS — M54.17 LUMBOSACRAL RADICULOPATHY AT L5: ICD-10-CM

## 2021-08-12 PROCEDURE — 72148 MRI LUMBAR SPINE W/O DYE: CPT

## 2021-08-13 ENCOUNTER — TELEPHONE (OUTPATIENT)
Dept: NEUROLOGY | Age: 41
End: 2021-08-13

## 2021-08-13 NOTE — TELEPHONE ENCOUNTER
----- Message from Kathy Veliz sent at 8/13/2021 12:10 PM EDT -----  Regarding: /telephone  General Message/Vendor Calls    Caller's first and last name:      Reason for call: The pt wanted to let the office know that she was set up for surgery on 9/23. She stated she felt it should be sooner. She would like to know if this is ok with Dr. Brigitte Solomon. Callback required yes/no and why:Yes.       Best contact number(s):[phone]

## 2021-08-16 NOTE — TELEPHONE ENCOUNTER
Patient called back, verified, she states the appointment on 09/23/2021 is for the initial consultation.

## 2021-08-17 ENCOUNTER — APPOINTMENT (OUTPATIENT)
Dept: PHYSICAL THERAPY | Age: 41
End: 2021-08-17

## 2021-08-20 ENCOUNTER — TELEPHONE (OUTPATIENT)
Dept: NEUROLOGY | Age: 41
End: 2021-08-20

## 2021-08-20 NOTE — TELEPHONE ENCOUNTER
----- Message from Delores Hills sent at 8/19/2021  4:24 PM EDT -----  Regarding: Do Aguirre/telephone  General Message/Vendor Calls    Caller's first and last name: Patient       Reason for call: Would like to know the status of the Neuro Surgeon and also she has questions concerning the MRI      Callback required yes/no and why: Yes      Best contact number(s): [phone]      Details to clarify the request:      Delores Hills

## 2021-08-25 DIAGNOSIS — R20.2 PARESTHESIA OF BOTH HANDS: Primary | ICD-10-CM

## 2021-08-25 NOTE — TELEPHONE ENCOUNTER
Called patient, she states she has periodic numbness/tingling to arms L>R which has been going on since 2018, but now it's daily. Noted when waking up or driving. She wanted to know if  needed to order any additional imaging to provide . Informed her I left a message for 's office to try and get her in for a sooner appointment.

## 2021-08-26 NOTE — TELEPHONE ENCOUNTER
Called patient to inform her of new order for EMG to check for carpel tunnel syndrome. No answer, will send Amadixhart message.

## 2021-08-30 ENCOUNTER — VIRTUAL VISIT (OUTPATIENT)
Dept: INTERNAL MEDICINE CLINIC | Age: 41
End: 2021-08-30
Payer: COMMERCIAL

## 2021-08-30 DIAGNOSIS — F41.9 ANXIETY: ICD-10-CM

## 2021-08-30 DIAGNOSIS — R25.1 FUNCTIONAL TREMOR: ICD-10-CM

## 2021-08-30 DIAGNOSIS — F90.0 ATTENTION DEFICIT HYPERACTIVITY DISORDER (ADHD), PREDOMINANTLY INATTENTIVE TYPE: Primary | ICD-10-CM

## 2021-08-30 DIAGNOSIS — D84.9 IMMUNOSUPPRESSED STATUS (HCC): ICD-10-CM

## 2021-08-30 PROCEDURE — 99214 OFFICE O/P EST MOD 30 MIN: CPT | Performed by: INTERNAL MEDICINE

## 2021-08-30 RX ORDER — FLUOXETINE 10 MG/1
10 TABLET ORAL DAILY
Qty: 90 TABLET | Refills: 1 | Status: SHIPPED | OUTPATIENT
Start: 2021-08-30 | End: 2021-10-21 | Stop reason: ALTCHOICE

## 2021-08-30 NOTE — PROGRESS NOTES
Chief Complaint   Patient presents with    Back Pain     t11 degeneration  L4 through S2    Other     rheumatology issues     1. Have you been to the ER, urgent care clinic since your last visit? Hospitalized since your last visit? No    2. Have you seen or consulted any other health care providers outside of the [address] [place] [address] since your last visit? Include any pap smears or colon screening.  Neurologist [id number] Maintenance Due   Topic Date Due    Foot Exam Q1  Never done    MICROALBUMIN Q1  Never done    Eye Exam Retinal or Dilated  Never done    Lipid Screen  Never done    COVID-19 Vaccine (1) Never done    PAP AKA CERVICAL CYTOLOGY  07/06/2012    Pneumococcal 0-64 years (2 of 4 - PPSV23) 01/30/2017

## 2021-08-30 NOTE — PROGRESS NOTES
CC: Back Pain (t11 degeneration  L4 through S2) and Other (rheumatology issues)      HPI:    She is a 46 y.o. female who presents for multiple issues    Patient is having increased back pain    Then had lumbar x ray   Progressive degenerative disc disease at L5-S1  Effacement of left L5-S1 nerve roots with associated disc extrusion noted. Referral to NSGY placed. Dr Gregg Ritchie is neurologist  Having mostly hip pain   Having less back pain  Has left sided numbness posteriorly  Has weakness in left leg  I advised since having weakness to go to ER at [facility] to have a neurosurgeon evaluate if acute surgery is indicated     and having increased numbness in arms and will have EMG        On vegan diet / gluten free and dairy free  taking B12 supplement 1000mg     Stopped leapro , on wellbutrin having increased irritability     Stopped propranolol causing dizziness      Stopped naltrexone     Recall  Ms Reji Morse has a hx of RA, lupus ( versus connective tissue disease?) , hypothyroidism,  antiphospholipid syndrome and being worked for possible mixed connective tissue. Last May 2020 she was admitted for severe sepsis and found to have hypodensities in the spleen and was treated with IV daptomycin and ceftriaxone ( Hospitalized May 19 to May 28th)   She has head a steady decline in her health. She tires easily, has functional tremor, has difficulty with gross and fine motor activities. Recent dysphagia saw GI and hand barium swallow showing mild dysmotylity and then endoscopy showing barretts esophagus, pre cancerous cells and will need repeat biopsy       Sees Dr Roman Inman for rheumatologic condition   benlysta 200 subcutaneous      For asthma issues she is currently on xolair    ADHD: stable on vyvanse 30mg daily       Ozempic working well for diabetes last HA1C below 6 . Stopped metformin and doing well.      Tremor most pronounced in the hands  functional physiologic tremor and referred to PT  Completed PT and occupational therapy to improve endurance and help cope with tremor and pain. Tramadol helps with the pain ( prescribed by Dr Talita Lindsey)     Taking zyrtec and singulair and symbicort for allergies    Stopped oral contraception due to antiphospholipid syndrome      Having increased food allergies    This is an established visit conducted via telemedicine with video. The patient has been instructed that this meets HIPAA criteria and acknowledges and agrees to this method of visitation. Pursuant to the emergency declaration under the [address] [place] [address], [facility] waiver authority and the [employer] Act, this Virtual Visit was conducted, with patient's consent, to reduce the patient's risk of exposure to COVID-19 and provide continuity of care for an established patient. Services were provided through a video synchronous discussion virtually to substitute for in-person clinic visit.        ROS:  10 systems reviewed and negative other than HPI     Past Medical History:   Diagnosis Date    Acquired hypothyroidism     Adverse effect of anesthesia     labile BP during/after C section    Antiphospholipid antibody syndrome (HCC)     Asthma     Broken bones     history of 9 broken bones    Chronic UTI (urinary tract infection)     \"extra valve right kidney causes UTI\"    Clotting disorder (HCC)     Fibromyalgia     Functional tremor     Gestational diabetes     GI bleed 2007,2011,2015,2016    lower GI last colonoscopy in 2016 normal     Huntingtons chorea (HCC)     Hyperhydrosis disorder     Ill-defined condition     Lake/ tested genetically - daughter has Lake    Infertility     PCOS    PCOS (polycystic ovarian syndrome)     Precancerous skin lesion     removed from Right shoulder    Preeclampsia 2011    pregnancy    Reactive airway disease     Rheumatoid arthritis (HCC)     SVT (supraventricular tachycardia) ([facility] [address] 2011 occured during pregnancy when picc line inserted. Current Outpatient Medications on File Prior to Visit   Medication Sig Dispense Refill    montelukast (SINGULAIR) 10 mg tablet TAKE 1 TABLET BY MOUTH EVERY DAY 30 Tablet 14    cyclobenzaprine (FLEXERIL) 5 mg tablet Take 5 mg by mouth two (2) times a day.  methotrexate 25 mg/mL chemo injection 1 MILLILITER ONCE A WEEK      clotrimazole (MYCELEX) 10 mg rasta Take 10 mg by mouth five (5) times daily.  lidocaine 5 % topical cream Apply  to affected area two (2) times daily as needed for Pain. 15g tube 1 Tube 0    B.infantis-B.ani-B.long-B.bifi (Probiotic 4X) 10-15 mg TbEC Take 1 Capsule by mouth two (2) times a day.  aspirin delayed-release 81 mg tablet Take 81 mg by mouth daily.  lisdexamfetamine (Vyvanse) 30 mg capsule Take 1 Capsule by mouth every morning. Max Daily Amount: 30 mg. 30 Capsule 0    mirabegron ER (Myrbetriq) 25 mg ER tablet Take 1 Tab by mouth daily. 30 Tab 0    buPROPion XL (WELLBUTRIN XL) 150 mg tablet Take 1 Tab by mouth every morning. 90 Tab 3    nystatin (MYCOSTATIN) 100,000 unit/mL suspension Take 5 mL by mouth four (4) times daily. swish and spit 473 mL 1    predniSONE (DELTASONE) 10 mg tablet Take 10 mg by mouth daily. (Patient taking differently: Take 20 mg by mouth daily.) 30 Tab 0    PREVIDENT 5000 SENSITIVE 1.1-5 % pste USE AS DIRECTED TWICE A DAY SPIT OUT EXCESS AND DO NOT RINSE, EAT OR DRINK FOR 30 MINUTES      hydroxychloroquine (PLAQUENIL) 200 mg tablet 200 mg daily.  lidocaine-prilocaine (EMLA) topical cream For port flushing every 3 month  2    omeprazole (PRILOSEC) 40 mg capsule Take 1 Cap by mouth daily. 30 Cap 0    Cetirizine (ZYRTEC) 10 mg cap Take 10 mg by mouth daily. Indications: inflammation of the nose due to an allergy 30 Cap 0    albuterol (PROVENTIL HFA, VENTOLIN HFA, PROAIR HFA) 90 mcg/actuation inhaler Take 2 Puffs by inhalation every four (4) hours as needed for Wheezing.  1 Inhaler 0    omalizumab (XOLAIR SC) by SubCUTAneous route every thirty (30) days. Given by Dr Neha Rock in his office      SYMBICORT 160-4.5 mcg/actuation HFAA TAKE 2 PUFFS BY MOUTH TWICE A DAY  12    OZEMPIC 0.25 mg or 0.5 mg(2 mg/1.5 mL) sub-q pen by SubCUTAneous route every seven (7) days. Patient states she takes 1 ml every friday  1    EPIPEN 2-SANJAY 0.3 mg/0.3 mL injection 1 (ONE) INJECTION AS NEEDED  0    traMADol (ULTRAM) 50 mg tablet Take 50 mg by mouth every six (6) hours as needed for Pain.  cyanocobalamin (Vitamin B-12) 1,000 mcg tablet Take  by mouth daily.  RESTASIS 0.05 % ophthalmic emulsion INSTILL 1 DROP INTO EACH EYE TWICE DAILY  4    SAFETY-ABDI TB SYR 1CC/25GX5/8\" 1 mL 25 gauge x 5/8\" syrg USE TO INJECT METHOTREXATE ONCE A WEEK  2    folic acid (FOLVITE) 1 mg tablet TAKE 1 TABLET ORALLY DAILY  11    Nebulizer & Compressor machine 1 Each by Does Not Apply route three (3) times daily as needed. 1 Each 0    glycopyrrolate (ROBINUL) 1 mg tablet Take 2 mg by mouth two (2) times a day. Indications: hyperhydrosis      cholecalciferol (Vitamin D3) (1000 Units /25 mcg) tablet Take 1 Tab by mouth daily.  levothyroxine (SYNTHROID) 75 mcg tablet Take 75 mcg by mouth Daily (before breakfast).  diazePAM (Valium) 5 mg tablet Take 1 Tablet by mouth every eight (8) hours as needed (spasm). Max Daily Amount: 15 mg. (Patient not taking: Reported on 7/30/2021) 15 Tablet 0    Prodigy No Coding strip AS DIRECTED (Patient not taking: Reported on 8/30/2021) 100 Strip 5    Prodigy Twist Top Lancet 28 gauge misc AS DIRECTED (Patient not taking: Reported on 8/30/2021) 100 Lancet 0     No current facility-administered medications on file prior to visit.        Past Surgical History:   Procedure Laterality Date    COLONOSCOPY N/A 6/13/2016    COLONOSCOPY performed by [facility]ada [facility] at [facility] END[facility]COPY    COLONOSCOPY N/A 5/22/2020    COLONOSCOPY performed by Martín Fraser MD at [facility] ENDOSCOPY    COLONOSCOPY,DIAGNOSTIC  5/22/2020         HX HEENT      HX WISDOM TEETH EXTRACTION  2004    UPPER GI ENDOSCOPY,BIOPSY  2/12/2019         UPPER GI ENDOSCOPY,BIOPSY  5/22/2020         UPPER GI ENDOSCOPY,DILATN W GUIDE  2/12/2019            Family History   Problem Relation Age of Onset    Other Mother         Rosendale's disease    Hypertension Mother     Dementia Mother     Stroke Mother     High Cholesterol Father     Heart Disease Father     Diabetes Father     Hypertension Father     Asthma Brother     Diabetes Brother     Other Brother         varicocele, hernias    Hypertension Brother     Alcohol abuse Brother     Hypertension Maternal Grandmother     Elevated Lipids Maternal Grandmother     Cancer Maternal Grandmother         breast    Other Maternal Grandmother         polymyalgia rheumatica    Glaucoma Maternal Grandmother     Cancer Maternal Grandfather         prostate    Huntingtons disease Maternal Grandfather     Cancer Paternal Grandmother         lung with mets    Cancer Paternal Grandfather         prostate, colon    Diabetes Paternal Grandfather     Heart Disease Paternal Grandfather     Alzheimer Paternal Grandfather     Dementia Paternal Grandfather      Reviewed and no changes     Social History     Socioeconomic History    Marital status:      Spouse name: Not on file    Number of children: Not on file    Years of education: Not on file    Highest education level: Not on file   Occupational History    Not on file   Tobacco Use    Smoking status: Never Smoker    Smokeless tobacco: Never Used   Vaping Use    Vaping Use: Never used   Substance and Sexual Activity    Alcohol use: Yes     Comment: few drinks per year    Drug use: No    Sexual activity: Not on file   Other Topics Concern    Not on file   Social History Narrative    ** Merged History Encounter **          Social Determinants of Health     Financial Resource Strain:     Difficulty of Paying Living Expenses:    Food Insecurity:     Worried About Running Out of Food in the Last Year:     [address] in the Last Year:    Transportation Needs:     Lack of Transportation (Medical):  Lack of Transportation (Non-Medical):    Physical Activity:     Days of Exercise per Week:     Minutes of Exercise per Session:    Stress:     Feeling of Stress :    Social Connections:     Frequency of Communication with Friends and Family:     Frequency of Social Gatherings with Friends and Family:     Attends [employer] Services:     Active Member of Clubs or Organizations:     Attends Club or Organization Meetings:     Marital Status:    Intimate Partner Violence:     Fear of Current or Ex-Partner:     Emotionally Abused:     Physically Abused:     Sexually Abused: There were no vitals taken for this visit. Physical Examination:   Gen: well appearing female  HEENT: normal conjunctiva, no audible congestion, patient does not see oral erythema, has MMM  Neck: patient does not feel enlarged or tender LAD or masses  Resp: normal respiratory effort, no audible wheezing. CV: patient does not feel palpitations or heart irregularity  Abd: patient does not feel abdominal tenderness or mass, patient does not notice distension  Extrem: patient does not see swelling in ankles or joints.   But has chronic pain   Neuro: Alert and oriented, able to answer questions without difficulty  Reports lack of sensation on left leg and weakness in leg making walking challenging        Lab Results   Component Value Date/Time    WBC 14.4 (H) 06/30/2021 12:54 AM    HGB 12.7 06/30/2021 12:54 AM    HCT 38.0 06/30/2021 12:54 AM    PLATELET 301 19/49/7633 12:54 AM    MCV 90.5 06/30/2021 12:54 AM     Lab Results   Component Value Date/Time    Sodium 135 (L) 06/30/2021 12:54 AM    Potassium 3.6 06/30/2021 12:54 AM    Chloride 102 06/30/2021 12:54 AM    CO2 24 06/30/2021 12:54 AM    Anion gap 9 06/30/2021 12:54 AM Glucose 93 06/30/2021 12:54 AM    BUN 14 06/30/2021 12:54 AM    Creatinine 0.92 06/30/2021 12:54 AM    BUN/Creatinine ratio 15 06/30/2021 12:54 AM    GFR est AA >60 06/30/2021 12:54 AM    GFR est non-AA >60 06/30/2021 12:54 AM    Calcium 8.9 06/30/2021 12:54 AM     No results found for: CHOL, CHOLPOCT, CHOLX, CHLST, CHOLV, HDL, HDLPOC, HDLP, LDL, LDLCPOC, LDLC, DLDLP, VLDLC, VLDL, TGLX, TRIGL, TRIGP, TGLPOCT, CHHD, CHHDX  Lab Results   Component Value Date/Time    TSH 1.48 10/30/2015 05:11 PM     No results found for: PSA, Analisa Esha, [id number], [id number]  Lab Results   Component Value Date/Time    Hemoglobin A1c 5.7 (H) 09/26/2020 01:56 AM     No results found for: VITD3, XQVID2, XQVID3, Rony Grit, VD3RIA    Lab Results   Component Value Date/Time    ALT (SGPT) 29 06/30/2021 12:54 AM    Alk. phosphatase 87 06/30/2021 12:54 AM    Bilirubin, total 0.5 06/30/2021 12:54 AM           Assessment/Plan:      Reported weakness of left leg: Patient reports significant weakness of the left leg. She had an MRI done which showed Effacement of left L5-S1 nerve roots with associated disc extrusion noted. Referral to [address] [place] [address] - her appointment with Dr Kb Thompson at [facility] is not until late September - tehrefor she will go to ER at [facility] to have urgent evaluation and see if urgent surgery is indicated       Anxiety/depression  Having increased anxiety  Add   - FLUoxetine (PROzac) 10 mg tablet; Take 1 Tablet by mouth daily. Dispense: 90 Tablet; Refill: 1  Continue Wellbutrin    Functional tremor since sepsis May 2020 ( splenic abscesses) : debilitating - has difficulty with fine motor skills such as typing.       Rheumatoid arthritis, involving unspecified site, unspecified whether rheumatoid factor present (HCC) debilitating   Lupus   Chronic pain   Difficult to control followed by Dr Janie Harrison and on immunosuppression, recent GI issues raises suspicion for overlap syndrome/ mixed connective tissue possibly scleroderma  On chronic prednisone     Acquired hypothyroidism  Euthyroid on exam  Reports recent thyroid exam     Controlled type 2 diabetes mellitus without complication, without long-term current use of insulin (HCC)  On ozempic  Has come off of metformin     Vitamin D deficiency  On vitamin D      Attention deficit hyperactivity disorder (ADHD), predominantly inattentive type  Stable on vyvanse 30mg daily       Excessive swelling: on lower dose of robinul     Barretts: on prilosec    Overactive bladder  Continue myrbetriq      Follow up in 3 months        Mely Ling MD    This is an established visit conducted via real time video and audio telemedicine. The patient has been instructed that this meets HIPAA criteria and acknowledges and agrees to this method of visitation.

## 2021-09-08 ENCOUNTER — OFFICE VISIT (OUTPATIENT)
Dept: NEUROLOGY | Age: 41
End: 2021-09-08
Payer: COMMERCIAL

## 2021-09-08 DIAGNOSIS — G56.02 CARPAL TUNNEL SYNDROME OF LEFT WRIST: Primary | ICD-10-CM

## 2021-09-08 PROCEDURE — 95886 MUSC TEST DONE W/N TEST COMP: CPT | Performed by: PSYCHIATRY & NEUROLOGY

## 2021-09-08 PROCEDURE — 95911 NRV CNDJ TEST 9-10 STUDIES: CPT | Performed by: PSYCHIATRY & NEUROLOGY

## 2021-09-08 NOTE — PROGRESS NOTES
[facility]  [address] [place] [address], [address] [place] [address]  Phone [phone] Fax [phone]  Test Date:  2021    Patient: Caroline Castro : 1980 Physician: Dannie Corrales. Corene Standing, DO   Sex: Female Height:  Ref Phys: Dannie Antoni. Corene Standing, DO   ID#: [id number]  Weight: 236 lbs. Technician: Phil Fay     Patient Complaints:  Bilateral L>R upper extremity paresthesias    NCV & EMG Findings:  Evaluation of the left median/ulnar (palm) comparison nerve showed abnormal peak latency difference (Median Palm-Ulnar Palm, 0.4 ms). All remaining nerves  were within normal limits. All F Wave latencies were within normal limits. All examined muscles (as indicated in the following table) showed no evidence of electrical instability. Impression:  Extensive electrodiagnostic examination of the left upper extremity and additional nerve conduction studies of the right upper extremity reveals changes most consistent with the followin. A left median neuropathy at or distal to the wrist, consistent with a clinical diagnosis of carpal tunnel syndrome, very mild in degree electrically. 2. No evidence of a left cervical motor radiculopathy. ___________________________  Jody Dick  Corene Standing, DO        Nerve Conduction Studies  Anti Sensory Summary Table     Stim Site NR Peak (ms) Norm Peak (ms) P-T Amp (µV) Norm P-T Amp Onset (ms) Site1 Site2 Delta-P (ms) Dist (cm) Erick (m/s) Norm Erick (m/s)   Left Median Anti Sensory (2nd Digit)  32.2°C   Wrist    3.0 <3.6 81.8 >10 2.5 Wrist 2nd Digit 3.0 14.0 47 >39   Right Median Anti Sensory (2nd Digit)  33.9°C   Wrist    3.4 <3.6 48.2 >10 2.9 Wrist 2nd Digit 3.4 14.0 41 >39   Elbow    3.3  45.5  2.8 Elbow Wrist 0.1 0.0  >48   Left Radial Anti Sensory (Base 1st Digit)  32.9°C   Wrist    2.1 <3.1 52.7  0.9 Wrist Base 1st Digit 2.1 0.0     Site 2    2.1  54.6  0.9         Right Radial Anti Sensory (Base 1st Digit)  33.8°C   Wrist    1.9 <3.1 53.3  1.4 Wrist Base 1st Digit 1.9 0.0     Site 2    1.9  60.6  1.4         Left Ulnar Anti Sensory (5th Digit)  32°C   Wrist    3.3 <3.7 22.5 >15.0 2.7 Wrist 5th Digit 3.3 14.0 42 >38   B Elbow    3.3  39.3  2.6 B Elbow Wrist 0.0 0.0  >47   Right Ulnar Anti Sensory (5th Digit)  33.8°C   Wrist    3.1 <3.7 42.7 >15.0 2.5 Wrist 5th Digit 3.1 14.0 45 >38   B Elbow    3.1  43.0  2.5 B Elbow Wrist 0.0 0.0  >47     Motor Summary Table     Stim Site NR Onset (ms) Norm Onset (ms) O-P Amp (mV) Norm O-P Amp Site1 Site2 Delta-0 (ms) Dist (cm) Erick (m/s) Norm Erick (m/s)   Left Median Motor (Abd Poll Brev)  32.2°C   Wrist    3.6 <4.2 8.2 >5 Elbow Wrist 3.2 18.0 56 >50   Elbow    6.8  7.8          Right Median Motor (Abd Poll Brev)  33.7°C   Wrist    4.0 <4.2 13.8 >5 Elbow Wrist 2.3 19.0 83 >50   Elbow    6.3  10.1          Left Ulnar Motor (Abd Dig Minimi)  33.3°C   Wrist    2.9 <4.2 12.0 >3 B Elbow Wrist 3.4 18.0 53 >53   B Elbow    6.3  11.4  A Elbow B Elbow 1.6 10.0 62 >53   A Elbow    7.9  12.3          Right Ulnar Motor (Abd Dig Minimi)  34.2°C   Wrist    2.7 <4.2 11.6 >3 B Elbow Wrist 2.7 17.0 63 >53   B Elbow    5.4  11.5  A Elbow B Elbow 1.9 10.0 53 >53   A Elbow    7.3  11.1            Comparison Summary Table     Stim Site NR Peak (ms) Norm Peak (ms) P-T Amp (µV) Site1 Site2 Delta-P (ms) Norm Delta (ms)   Left Median/Ulnar Palm Comparison (Wrist - 8cm)  33.4°C   Median Palm    1.5 <2.5 31.5 Median Palm Ulnar Palm 0.4 <0.3   Ulnar Palm    1.1 <2.5 30.6       Right Median/Ulnar Palm Comparison (Wrist - 8cm)  32.9°C   Median Palm    1.6 <2.5 54.7 Median Palm Ulnar Palm 0.2 <0.3   Ulnar Palm    1.4 <2.5 17.3         F Wave Studies     NR F-Lat (ms) Lat Norm (ms) L-R F-Lat (ms) L-R Lat Norm   Left Ulnar (Mrkrs) (Abd Dig Min)  32.7°C      26.36 <36  <2.5     EMG     Side Muscle Nerve Root Ins Act Fibs Psw Amp Dur Poly Recrt Int Pat Comment   Left 1stDorInt Ulnar C8-T1 Nml Nml Nml Nml Nml 0 Nml Nml    Left Abd Poll Brev Median C8-T1 Nml Nml Nml Nml Nml 0 Nml Nml    Left FlexPolLong Median (Ant Int) C7-8 Nml Nml Nml Nml Nml 0 Nml Nml    Left Ext Indicis Radial (Post Int) C7-8 Nml Nml Nml Nml Nml 0 Nml Nml    Left Biceps Musculocut C5-6 Nml Nml Nml Nml Nml 0 Nml Nml    Left Triceps Radial C6-7-8 Nml Nml Nml Nml Nml 0 Nml Nml    Left Deltoid Axillary C5-6 Nml Nml Nml Nml Nml 0 Nml Nml          Waveforms:

## 2021-09-13 ENCOUNTER — DOCUMENTATION ONLY (OUTPATIENT)
Dept: INTERNAL MEDICINE CLINIC | Age: 41
End: 2021-09-13

## 2021-09-13 NOTE — PROGRESS NOTES
Received medical records request on 9/10/21 from [facility]. Faxed request to [facility] on 9/13/21 and scanned in chart.

## 2021-09-28 ENCOUNTER — DOCUMENTATION ONLY (OUTPATIENT)
Dept: INTERNAL MEDICINE CLINIC | Age: 41
End: 2021-09-28

## 2021-09-28 NOTE — PROGRESS NOTES
Received medical record request from Radha Bean  office on 9/24/21  Faxed request to [facility] on 9/28/21 and scanned in chart

## 2021-10-01 ENCOUNTER — PATIENT MESSAGE (OUTPATIENT)
Dept: INTERNAL MEDICINE CLINIC | Age: 41
End: 2021-10-01

## 2021-10-01 DIAGNOSIS — F90.0 ATTENTION DEFICIT HYPERACTIVITY DISORDER (ADHD), PREDOMINANTLY INATTENTIVE TYPE: ICD-10-CM

## 2021-10-01 NOTE — TELEPHONE ENCOUNTER
Future Appointments:  Future Appointments   Date Time Provider Екатерина Vergarai   10/28/2021  9:15 AM Appa Elbert Hunter MD [facility] BS AMB        Last Appointment With Me:  8/30/2021     Requested Prescriptions     Pending Prescriptions Disp Refills    lisdexamfetamine (Vyvanse) 30 mg capsule 30 Capsule 0     Sig: Take 1 Capsule by mouth every morning. Max Daily Amount: 30 mg.

## 2021-10-01 NOTE — TELEPHONE ENCOUNTER
From: Nelsy Ceja  To: Malcolm Ybarra MD  Sent: 10/1/2021 9:43 AM EDT  Subject: Appointment Request    Appointment Request From: Nelsy Ceja    With Provider: Malcolm Ybarra MD [address]    Preferred Date Range: 10/1/2021  10/25/2021    Preferred Times: Any Time    Reason for visit: Request an Appointment    Comments:  Post-op surgery at [facility]    Unsuccessful switching to Zoloft-increased irritability & nausea. Tried for a month but side effects didn't go away. Re-started the lexapro 20 mg. Continued Wellbutrin as ordered. Prior to spine surgery, immunologist at [facility] was consulted to evaluate my severe systemic allergies and inflammatory responses.  He ran some tests and I came back positive for alpha gal; beef, pork, & lamb need to be added to my allergy list.     I need refill for [facility] sent [facility]

## 2021-10-13 ENCOUNTER — TRANSCRIBE ORDER (OUTPATIENT)
Dept: SCHEDULING | Age: 41
End: 2021-10-13

## 2021-10-13 DIAGNOSIS — K59.04 CHRONIC IDIOPATHIC CONSTIPATION: Primary | ICD-10-CM

## 2021-10-13 DIAGNOSIS — R11.0 NAUSEA: ICD-10-CM

## 2021-10-18 ENCOUNTER — HOSPITAL ENCOUNTER (OUTPATIENT)
Dept: NUCLEAR MEDICINE | Age: 41
Discharge: HOME | End: 2021-10-18
Attending: NURSE PRACTITIONER
Payer: COMMERCIAL

## 2021-10-18 DIAGNOSIS — K59.04 CHRONIC IDIOPATHIC CONSTIPATION: ICD-10-CM

## 2021-10-18 DIAGNOSIS — R11.0 NAUSEA: ICD-10-CM

## 2021-10-18 PROCEDURE — A9541 TC99M SULFUR COLLOID: HCPCS

## 2021-10-18 RX ORDER — TECHNETIUM TC 99M SULFUR COLLOID 2 MG
0.32 KIT MISCELLANEOUS
Status: COMPLETED | OUTPATIENT
Start: 2021-10-18 | End: 2021-10-18

## 2021-10-18 RX ADMIN — TECHNETIUM TC 99M SULFUR COLLOID 0.32 MILLICURIE: KIT at 14:00

## 2021-10-21 ENCOUNTER — VIRTUAL VISIT (OUTPATIENT)
Dept: INTERNAL MEDICINE CLINIC | Age: 41
End: 2021-10-21
Payer: COMMERCIAL

## 2021-10-21 DIAGNOSIS — F33.41 RECURRENT MAJOR DEPRESSIVE DISORDER, IN PARTIAL REMISSION (HCC): Primary | ICD-10-CM

## 2021-10-21 DIAGNOSIS — F90.0 ATTENTION DEFICIT HYPERACTIVITY DISORDER (ADHD), PREDOMINANTLY INATTENTIVE TYPE: ICD-10-CM

## 2021-10-21 DIAGNOSIS — L24.81 IRRITANT CONTACT DERMATITIS DUE TO METALS: ICD-10-CM

## 2021-10-21 DIAGNOSIS — M32.9 SYSTEMIC LUPUS ERYTHEMATOSUS, UNSPECIFIED SLE TYPE, UNSPECIFIED ORGAN INVOLVEMENT STATUS (HCC): ICD-10-CM

## 2021-10-21 DIAGNOSIS — F41.9 ANXIETY: ICD-10-CM

## 2021-10-21 DIAGNOSIS — R25.1 FUNCTIONAL TREMOR: ICD-10-CM

## 2021-10-21 DIAGNOSIS — E53.8 VITAMIN B12 DEFICIENCY: ICD-10-CM

## 2021-10-21 PROCEDURE — 99214 OFFICE O/P EST MOD 30 MIN: CPT | Performed by: INTERNAL MEDICINE

## 2021-10-21 RX ORDER — APIXABAN 2.5 MG/1
2.5 TABLET, FILM COATED ORAL 2 TIMES DAILY
COMMUNITY
Start: 2021-10-02 | End: 2021-12-06 | Stop reason: ALTCHOICE

## 2021-10-21 RX ORDER — TRIAMCINOLONE ACETONIDE 5 MG/G
OINTMENT TOPICAL 2 TIMES DAILY
Qty: 30 G | Refills: 0 | Status: SHIPPED | OUTPATIENT
Start: 2021-10-21 | End: 2021-11-01

## 2021-10-21 RX ORDER — DICYCLOMINE HYDROCHLORIDE 10 MG/1
10 CAPSULE ORAL 2 TIMES DAILY
COMMUNITY
Start: 2021-10-03 | End: 2021-12-06 | Stop reason: ALTCHOICE

## 2021-10-21 RX ORDER — ESCITALOPRAM OXALATE 20 MG/1
20 TABLET ORAL DAILY
COMMUNITY
Start: 2021-08-11 | End: 2021-10-21 | Stop reason: SDUPTHER

## 2021-10-21 RX ORDER — ESCITALOPRAM OXALATE 20 MG/1
20 TABLET ORAL DAILY
Qty: 90 TABLET | Refills: 1 | Status: SHIPPED | OUTPATIENT
Start: 2021-10-21 | End: 2022-01-17 | Stop reason: SDUPTHER

## 2021-10-21 NOTE — PROGRESS NOTES
CC: Surgical Follow-up (elevated IGE levels, left ankle still rolling, tremors, no feeling/muscle control in left lower leg and foot), Other (low B12), and Allergic Reaction (Allergeric reaction to metal in bra strap. left upper arm itching for two months- cannot get rid of. Treating w hydrocort. 2-4x daily)      HPI:    She is a 46 y.o. female who presents for multiple issues  Surgical follow up   Recall   She had an MRI done which showed Effacement of left L5-S1 nerve roots with associated disc extrusion noted. With deficit on left leg and saw neurosurgery and had surgery at [facility] with Dr Rodger Fox   Has unfortunately not improved with feeling in his leg  Using ankle support   Ankle gives away   Using walker   On 10-15 lb restriction   Sending in appeal for disability    Prior to surgery tested positive for alpha gal     taking B12 supplement 1000mg     Recall  Ms Robert Hensley has a hx of RA, lupus ( versus connective tissue disease?) , hypothyroidism,  antiphospholipid syndrome and being worked for possible mixed connective tissue. Last May 2020 she was admitted for severe sepsis and found to have hypodensities in the spleen and was treated with IV daptomycin and ceftriaxone ( Hospitalized May 19 to May 28th)   She has head a steady decline in her health. She tires easily, has functional tremor, has difficulty with gross and fine motor activities. Recent dysphagia saw GI and hand barium swallow showing mild dysmotylity and then endoscopy showing barretts esophagus, pre cancerous cells and will need repeat biopsy       Sees Dr Khurram Connell for rheumatologic condition   benlysta 200 subcutaneous      For asthma issues she is currently on xolair    ADHD: stable on vyvanse 30mg daily       Ozempic working well for diabetes last HA1C below 6 . Stopped metformin and doing well.      Tremor most pronounced in the hands  functional physiologic tremor and referred to PT  Completed PT and occupational therapy to improve endurance and help cope with tremor and pain. Tramadol helps with the pain ( prescribed by Dr Patti De Luna)     Taking zyrtec and singulair and symbicort for allergies    Stopped oral contraception due to antiphospholipid syndrome      Having increased food allergies    This is an established visit conducted via telemedicine with video. The patient has been instructed that this meets HIPAA criteria and acknowledges and agrees to this method of visitation. Pursuant to the emergency declaration under the [facility], [facility] waiver authority and the [employer] Act, this Virtual Visit was conducted, with patient's consent, to reduce the patient's risk of exposure to COVID-19 and provide continuity of care for an established patient. Services were provided through a video synchronous discussion virtually to substitute for in-person clinic visit.        ROS:  10 systems reviewed and negative other than HPI     Past Medical History:   Diagnosis Date    Acquired hypothyroidism     Adverse effect of anesthesia     labile BP during/after C section    Antiphospholipid antibody syndrome (HCC)     Asthma     Broken bones     history of 9 broken bones    Chronic UTI (urinary tract infection)     \"extra valve right kidney causes UTI\"    Clotting disorder (HCC)     Fibromyalgia     Functional tremor     Gestational diabetes     GI bleed 2007,2011,2015,2016    lower GI last colonoscopy in 2016 normal     Huntingtons chorea (HCC)     Hyperhydrosis disorder     Ill-defined condition     [place]/ tested genetically - daughter has [place]    Infertility     PCOS    PCOS (polycystic ovarian syndrome)     Precancerous skin lesion     removed from Right shoulder    Preeclampsia 2011    pregnancy    Reactive airway disease     Rheumatoid arthritis (HCC)     SVT (supraventricular tachycardia) ([facility] [address] 2011    occured during pregnancy when picc line inserted. Current Outpatient Medications on File Prior to Visit   Medication Sig Dispense Refill    Eliquis 2.5 mg tablet Take 2.5 mg by mouth two (2) times a day.  dicyclomine (BENTYL) 10 mg capsule Take 10 mg by mouth two (2) times a day.  montelukast (SINGULAIR) 10 mg tablet TAKE 1 TABLET BY MOUTH EVERY DAY 30 Tablet 14    cyclobenzaprine (FLEXERIL) 5 mg tablet Take 5 mg by mouth two (2) times a day.  methotrexate 25 mg/mL chemo injection 1 MILLILITER ONCE A WEEK      lidocaine 5 % topical cream Apply  to affected area two (2) times daily as needed for Pain. 15g tube 1 Tube 0    B.infantis-B.ani-B.long-B.bifi (Probiotic 4X) 10-15 mg TbEC Take 1 Capsule by mouth two (2) times a day.  aspirin delayed-release 81 mg tablet Take 81 mg by mouth daily.  mirabegron ER (Myrbetriq) 25 mg ER tablet Take 1 Tab by mouth daily. 30 Tab 0    buPROPion XL (WELLBUTRIN XL) 150 mg tablet Take 1 Tab by mouth every morning. 90 Tab 3    nystatin (MYCOSTATIN) 100,000 unit/mL suspension Take 5 mL by mouth four (4) times daily. swish and spit 473 mL 1    predniSONE (DELTASONE) 10 mg tablet Take 10 mg by mouth daily. (Patient taking differently: Take 20 mg by mouth daily.) 30 Tab 0    PREVIDENT 5000 SENSITIVE 1.1-5 % pste USE AS DIRECTED TWICE A DAY SPIT OUT EXCESS AND DO NOT RINSE, EAT OR DRINK FOR 30 MINUTES      hydroxychloroquine (PLAQUENIL) 200 mg tablet 200 mg daily.  lidocaine-prilocaine (EMLA) topical cream For port flushing every 3 month  2    omeprazole (PRILOSEC) 40 mg capsule Take 1 Cap by mouth daily. 30 Cap 0    Cetirizine (ZYRTEC) 10 mg cap Take 10 mg by mouth daily. Indications: inflammation of the nose due to an allergy 30 Cap 0    albuterol (PROVENTIL HFA, VENTOLIN HFA, PROAIR HFA) 90 mcg/actuation inhaler Take 2 Puffs by inhalation every four (4) hours as needed for Wheezing. 1 Inhaler 0    omalizumab (XOLAIR SC) by SubCUTAneous route every thirty (30) days. Given by Dr Sandee Billy in his office      SYMBICORT 160-4.5 mcg/actuation HFAA TAKE 2 PUFFS BY MOUTH TWICE A DAY  12    OZEMPIC 0.25 mg or 0.5 mg(2 mg/1.5 mL) sub-q pen by SubCUTAneous route every seven (7) days. Patient states she takes 1 ml every friday  1    EPIPEN 2-SANJAY 0.3 mg/0.3 mL injection 1 (ONE) INJECTION AS NEEDED  0    traMADol (ULTRAM) 50 mg tablet Take 50 mg by mouth every six (6) hours as needed for Pain.  cyanocobalamin (Vitamin B-12) 1,000 mcg tablet Take  by mouth daily.  RESTASIS 0.05 % ophthalmic emulsion INSTILL 1 DROP INTO EACH EYE TWICE DAILY  4    SAFETY-ABDI TB SYR 1CC/25GX5/8\" 1 mL 25 gauge x 5/8\" syrg USE TO INJECT METHOTREXATE ONCE A WEEK  2    folic acid (FOLVITE) 1 mg tablet TAKE 1 TABLET ORALLY DAILY  11    Nebulizer & Compressor machine 1 Each by Does Not Apply route three (3) times daily as needed. 1 Each 0    glycopyrrolate (ROBINUL) 1 mg tablet Take 2 mg by mouth two (2) times a day. Indications: hyperhydrosis      cholecalciferol (Vitamin D3) (1000 Units /25 mcg) tablet Take 1 Tab by mouth daily.  levothyroxine (SYNTHROID) 75 mcg tablet Take 75 mcg by mouth Daily (before breakfast).  clotrimazole (MYCELEX) 10 mg rasta Take 10 mg by mouth five (5) times daily. (Patient not taking: Reported on 10/21/2021)      Prodigy No Coding strip AS DIRECTED (Patient not taking: Reported on 8/30/2021) 100 Strip 5    Prodigy Twist Top Lancet 28 gauge misc AS DIRECTED (Patient not taking: Reported on 8/30/2021) 100 Lancet 0     No current facility-administered medications on file prior to visit.        Past Surgical History:   Procedure Laterality Date    COLONOSCOPY N/A 6/13/2016    COLONOSCOPY performed by Tashia Stephens MD at [facility] ENDOSCOPY    COLONOSCOPY N/A 5/22/2020    COLONOSCOPY performed by [facility] at [facility]  5/22/2020         HX HEENT      HX LUMBAR DISKECTOMY  septemeber 14 2021    HX WISDOM TEETH EXTRACTION 2004    UPPER GI ENDOSCOPY,BIOPSY  2/12/2019         UPPER GI ENDOSCOPY,BIOPSY  5/22/2020         UPPER GI ENDOSCOPY,DILATN W GUIDE  2/12/2019            Family History   Problem Relation Age of Onset    Other Mother         Chris's disease    Hypertension Mother     Dementia Mother     Stroke Mother     High Cholesterol Father     Heart Disease Father     Diabetes Father     Hypertension Father     Asthma Brother     Diabetes Brother     Other Brother         varicocele, hernias    Hypertension Brother     Alcohol abuse Brother     Hypertension Maternal Grandmother     Elevated Lipids Maternal Grandmother     Cancer Maternal Grandmother         breast    Other Maternal Grandmother         polymyalgia rheumatica    Glaucoma Maternal Grandmother     Cancer Maternal Grandfather         prostate    Huntingtons disease Maternal Grandfather     Cancer Paternal Grandmother         lung with mets    Cancer Paternal Grandfather         prostate, colon    Diabetes Paternal Grandfather     Heart Disease Paternal Grandfather     Alzheimer Paternal Grandfather     Dementia Paternal Grandfather      Reviewed and no changes     Social History     Socioeconomic History    Marital status:      Spouse name: Not on file    Number of children: Not on file    Years of education: Not on file    Highest education level: Not on file   Occupational History    Not on file   Tobacco Use    Smoking status: Never Smoker    Smokeless tobacco: Never Used   Vaping Use    Vaping Use: Never used   Substance and Sexual Activity    Alcohol use: Yes     Comment: few drinks per year    Drug use: No    Sexual activity: Not on file   Other Topics Concern    Not on file   Social History Narrative    ** Merged History Encounter **          Social Determinants of Health     Financial Resource Strain:     Difficulty of Paying Living Expenses:    Food Insecurity:     Worried About Running Out of Food in the Last Year:    [address] in the Last Year:    Transportation Needs:     Lack of Transportation (Medical):  Lack of Transportation (Non-Medical):    Physical Activity:     Days of Exercise per Week:     Minutes of Exercise per Session:    Stress:     Feeling of Stress :    Social Connections:     Frequency of Communication with Friends and Family:     Frequency of Social Gatherings with Friends and Family:     Attends [employer] Services:     Active Member of Clubs or Organizations:     Attends Club or Organization Meetings:     Marital Status:    Intimate Partner Violence:     Fear of Current or Ex-Partner:     Emotionally Abused:     Physically Abused:     Sexually Abused: There were no vitals taken for this visit. Physical Examination:   Gen: well appearing female  HEENT: normal conjunctiva, no audible congestion, patient does not see oral erythema, has MMM  Neck: patient does not feel enlarged or tender LAD or masses  Resp: normal respiratory effort, no audible wheezing. CV: patient does not feel palpitations or heart irregularity  Abd: patient does not feel abdominal tenderness or mass, patient does not notice distension  Extrem: patient does not see swelling in ankles or joints.   But has chronic pain   Neuro: Alert and oriented, able to answer questions without difficulty  Reports lack of sensation on left leg and weakness in leg making walking challenging        Lab Results   Component Value Date/Time    WBC 14.4 (H) 06/30/2021 12:54 AM    HGB 12.7 06/30/2021 12:54 AM    HCT 38.0 06/30/2021 12:54 AM    PLATELET 825 71/14/2186 12:54 AM    MCV 90.5 06/30/2021 12:54 AM     Lab Results   Component Value Date/Time    Sodium 135 (L) 06/30/2021 12:54 AM    Potassium 3.6 06/30/2021 12:54 AM    Chloride 102 06/30/2021 12:54 AM    CO2 24 06/30/2021 12:54 AM    Anion gap 9 06/30/2021 12:54 AM    Glucose 93 06/30/2021 12:54 AM    BUN 14 06/30/2021 12:54 AM    Creatinine 0.92 06/30/2021 12:54 AM    BUN/Creatinine ratio 15 06/30/2021 12:54 AM    GFR est AA >60 06/30/2021 12:54 AM    GFR est non-AA >60 06/30/2021 12:54 AM    Calcium 8.9 06/30/2021 12:54 AM     No results found for: CHOL, CHOLPOCT, CHOLX, CHLST, CHOLV, HDL, HDLPOC, HDLP, LDL, LDLCPOC, LDLC, DLDLP, VLDLC, VLDL, TGLX, TRIGL, TRIGP, TGLPOCT, CHHD, CHHDX  Lab Results   Component Value Date/Time    TSH 1.48 10/30/2015 05:11 PM     No results found for: PSA, Edson Corrales, [id number], [id number]  Lab Results   Component Value Date/Time    Hemoglobin A1c 5.7 (H) 09/26/2020 01:56 AM    Hemoglobin A1c, External 6.2 06/24/2021 12:00 AM     No results found for: Michael Sylvester, XQVID3, Vargas Lopez, [id number]    Lab Results   Component Value Date/Time    ALT (SGPT) 29 06/30/2021 12:54 AM    Alk. phosphatase 87 06/30/2021 12:54 AM    Bilirubin, total 0.5 06/30/2021 12:54 AM           Assessment/Plan:      Effacement of left L5-S1 nerve roots with associated disc extrusion noted. Status post surgery ( neurosurgery) without recovery of strength and sensation in left leg     Anxiety/depression  On wellbutrin and lexapro. Did not tolerate SSRI   Continue Wellbutrin    Functional tremor since sepsis May 2020 ( splenic abscesses) : debilitating - has difficulty with fine motor skills such as typing.       Rheumatoid arthritis, involving unspecified site, unspecified whether rheumatoid factor present (HCC) debilitating   Lupus   Chronic pain   Difficult to control followed by Dr Marcos Villarreal and on immunosuppression, recent GI issues raises suspicion for overlap syndrome/ mixed connective tissue possibly scleroderma  On chronic prednisone     Acquired hypothyroidism  Euthyroid     Controlled type 2 diabetes mellitus without complication, without long-term current use of insulin ([address]  On ozempic  Has come off of metformin   Lost 30 lbs   5.8 HA1C    Vitamin D deficiency  On vitamin D      Attention deficit hyperactivity disorder (ADHD), predominantly inattentive type  Stable on vyvanse 30mg daily       Excessive swetting: on lower dose of robinul     Concepcion: on prilosec    Overactive bladder  Continue myrbetriq    Vitamin B12 deficiency: taking 2000 mcg daily   Check B12    Follow up in 3 months    Follow-up and Dispositions    · Return in about 3 months (around 1/21/2022) for ADHD. Seema Barba MD    This is an established visit conducted via real time video and audio telemedicine. The patient has been instructed that this meets HIPAA criteria and acknowledges and agrees to this method of visitation.

## 2021-10-28 ENCOUNTER — OFFICE VISIT (OUTPATIENT)
Dept: INTERNAL MEDICINE CLINIC | Age: 41
End: 2021-10-28
Payer: COMMERCIAL

## 2021-10-28 VITALS
SYSTOLIC BLOOD PRESSURE: 107 MMHG | DIASTOLIC BLOOD PRESSURE: 73 MMHG | BODY MASS INDEX: 34.9 KG/M2 | WEIGHT: 216.2 LBS | RESPIRATION RATE: 16 BRPM | OXYGEN SATURATION: 100 % | HEART RATE: 95 BPM | TEMPERATURE: 97.9 F

## 2021-10-28 DIAGNOSIS — R25.1 FUNCTIONAL TREMOR: ICD-10-CM

## 2021-10-28 DIAGNOSIS — N32.81 OVERACTIVE BLADDER: ICD-10-CM

## 2021-10-28 DIAGNOSIS — E11.9 CONTROLLED TYPE 2 DIABETES MELLITUS WITHOUT COMPLICATION, WITHOUT LONG-TERM CURRENT USE OF INSULIN (HCC): ICD-10-CM

## 2021-10-28 DIAGNOSIS — F32.A DEPRESSION, UNSPECIFIED DEPRESSION TYPE: ICD-10-CM

## 2021-10-28 DIAGNOSIS — R61 EXCESSIVE SWEATING: ICD-10-CM

## 2021-10-28 DIAGNOSIS — Z23 NEEDS FLU SHOT: Primary | ICD-10-CM

## 2021-10-28 DIAGNOSIS — F33.41 RECURRENT MAJOR DEPRESSIVE DISORDER, IN PARTIAL REMISSION (HCC): ICD-10-CM

## 2021-10-28 LAB
APPEARANCE UR: CLEAR
BILIRUB UR QL: NEGATIVE
COLOR UR: NORMAL
CREAT UR-MCNC: 265 MG/DL
GLUCOSE UR STRIP.AUTO-MCNC: NEGATIVE MG/DL
HGB UR QL STRIP: NEGATIVE
KETONES UR QL STRIP.AUTO: NEGATIVE MG/DL
LEUKOCYTE ESTERASE UR QL STRIP.AUTO: NEGATIVE
MICROALBUMIN UR-MCNC: 1.02 MG/DL
MICROALBUMIN/CREAT UR-RTO: 4 MG/G (ref 0–30)
NITRITE UR QL STRIP.AUTO: NEGATIVE
PH UR STRIP: 5 [PH] (ref 5–8)
PROT UR STRIP-MCNC: NEGATIVE MG/DL
SP GR UR REFRACTOMETRY: 1.03 (ref 1–1.03)
UROBILINOGEN UR QL STRIP.AUTO: 0.2 EU/DL (ref 0.2–1)
VIT B12 SERPL-MCNC: 638 PG/ML (ref 193–986)

## 2021-10-28 PROCEDURE — 99214 OFFICE O/P EST MOD 30 MIN: CPT | Performed by: INTERNAL MEDICINE

## 2021-10-28 PROCEDURE — 90686 IIV4 VACC NO PRSV 0.5 ML IM: CPT | Performed by: INTERNAL MEDICINE

## 2021-10-28 PROCEDURE — 90471 IMMUNIZATION ADMIN: CPT | Performed by: INTERNAL MEDICINE

## 2021-10-28 RX ORDER — BUPROPION HYDROCHLORIDE 150 MG/1
150 TABLET ORAL
Qty: 90 TABLET | Refills: 3 | Status: SHIPPED | OUTPATIENT
Start: 2021-10-28 | End: 2022-03-24 | Stop reason: ALTCHOICE

## 2021-10-28 RX ORDER — GLYCOPYRROLATE 1 MG/1
2 TABLET ORAL 2 TIMES DAILY
Qty: 120 TABLET | Refills: 2 | Status: SHIPPED | OUTPATIENT
Start: 2021-10-28 | End: 2022-01-28

## 2021-10-28 RX ORDER — SECUKINUMAB 150 MG/ML
INJECTION SUBCUTANEOUS
COMMUNITY
End: 2022-03-24 | Stop reason: ALTCHOICE

## 2021-10-28 RX ORDER — CEPHALEXIN 500 MG/1
TABLET ORAL
COMMUNITY
End: 2021-12-06 | Stop reason: ALTCHOICE

## 2021-10-28 NOTE — PROGRESS NOTES
Ms. Chris cMghee is presenting to follow up     CC:  Medication Management, Surgical Follow-up ( discuctomy ), and Immunization/Injection (flu shot )       HPI:    She is a 46 y.o. female who presents for multiple issues  Surgical follow up   Recall   She had an MRI done which showed Effacement of left L5-S1 nerve roots with associated disc extrusion noted. Patient underwent surgery with neurosurgery at [facility] with Dr Shelli Bobot   Has unfortunately not improved with feeling in his leg but neurosurgery is hoping for improvement a  Using ankle support   Ankle gives away   Using walker   On 10-15 lb restriction        taking B12 supplement 1000mg      Recall  Ms Lestine Dance has a hx of RA, lupus ( versus connective tissue disease?) , hypothyroidism,  antiphospholipid syndrome and being worked for possible mixed connective tissue. Last May 2020 she was admitted for severe sepsis and found to have hypodensities in the spleen and was treated with IV daptomycin and ceftriaxone ( Hospitalized May 19 to May 28th)   She has head a steady decline in her health. She tires easily, has functional tremor, has difficulty with gross and fine motor activities. And this summer developed weakness in left lower leg    Finished keflex for klesiella UTI symptoms improved but not resolved        Recent dysphagia saw GI and hand barium swallow showing mild dysmotylity        Sees Dr Nena Rodney for rheumatologic condition   Stopped benlysta subcutaneous  Started on cosentyx        For asthma issues she is currently on xolair     ADHD: stable on vyvanse 30mg daily . She is currently unable to work due to multiple health issues described above.        Ozempic working well for diabetes last HA1C below 6 .  Stopped metformin and doing well.      Tremor most pronounced in the hands  functional physiologic tremor and referred to PT  Completed PT and occupational therapy to improve endurance and help cope with tremor and pain.         Tramadol helps with the pain ( prescribed by Dr Fabiola Vides)      Taking zyrtec and singulair and symbicort for allergies     Stopped oral contraception due to antiphospholipid syndrome and had regent pap smear at [address]  Having increased food allergies    Prior to surgery tested positive for alpha gal   Review of systems:  Constitutional: negative for fever, chills, weight loss, night sweats   10 systems reviewed and negative other then HPI     Past Medical History:   Diagnosis Date    Acquired hypothyroidism     Adverse effect of anesthesia     labile BP during/after C section    Antiphospholipid antibody syndrome (HCC)     Asthma     Broken bones     history of 9 broken bones    Chronic UTI (urinary tract infection)     \"extra valve right kidney causes UTI\"    Clotting disorder (HCC)     Fibromyalgia     Functional tremor     Gestational diabetes     GI bleed 2007,2011,2015,2016    lower GI last colonoscopy in 2016 normal     Huntingtons chorea (HCC)     Hyperhydrosis disorder     Ill-defined condition     [place]/ tested genetically - daughter has [place]    Infertility     PCOS    PCOS (polycystic ovarian syndrome)     Precancerous skin lesion     removed from Right shoulder    Preeclampsia 2011    pregnancy    Reactive airway disease     Rheumatoid arthritis ([address]     SVT (supraventricular tachycardia) ([address] 2011    occured during pregnancy when picc line inserted.         Past Surgical History:   Procedure Laterality Date    COLONOSCOPY N/A 6/13/2016    COLONOSCOPY performed by Tabby Marlow MD at \[facility]"" ENDOSCOPY    COLONOSCOPY N/A 5/22/2020    COLONOSCOPY performed [facility] Elzbieta Parmar [facility] at [facility]  5/22/2020         HX HEENT      HX LUMBAR DISKECTOMY  septemeber 14 2021    HX WISDOM TEETH EXTRACTION  2004    UPPER GI ENDOSCOPY,BIOPSY  2/12/2019         UPPER GI ENDOSCOPY,BIOPSY  5/22/2020         UPPER GI ENDOSCOPY,DILATN W GUIDE  2/12/2019 Allergies   Allergen Reactions    Latex Swelling    Entex [Phenylephrine-Guaifenesin] Anaphylaxis, Hives and Palpitations    Remicade [Infliximab] Anaphylaxis    Rituxan [Rituximab] Anaphylaxis     Stated not allergic 7/15/21    Sulfa (Sulfonamide Antibiotics) Other (comments)     Mouth irritation, elevated HR, SOB, flushing    Beef Containing Products Other (comments)    Galactose-Alpha-1,3-Galactose (Alpha-Gal) Other (comments)    Mucinex [Guaifenesin] Anaphylaxis and Hives    Pepto-Bismol [Bismuth Subsalicylate] Nausea and Vomiting    Pork Derived (Porcine) Other (comments)    Vancomycin Hives       Current Outpatient Medications on File Prior to Visit   Medication Sig Dispense Refill    cephalexin 500 mg tab cephalexin 500 mg tablet   Take 1 tablet twice a day by oral route for 7 days.  Eliquis 2.5 mg tablet Take 2.5 mg by mouth two (2) times a day.  lisdexamfetamine (Vyvanse) 30 mg capsule Take 1 Capsule by mouth every morning. Max Daily Amount: 30 mg. 30 Capsule 0    escitalopram oxalate (LEXAPRO) 20 mg tablet Take 1 Tablet by mouth daily. 90 Tablet 1    triamcinolone acetonide (KENALOG) 0.5 % ointment Apply  to affected area two (2) times a day. use thin layer 30 g 0    montelukast (SINGULAIR) 10 mg tablet TAKE 1 TABLET BY MOUTH EVERY DAY 30 Tablet 14    cyclobenzaprine (FLEXERIL) 5 mg tablet Take 5 mg by mouth two (2) times a day.  methotrexate 25 mg/mL chemo injection 1 MILLILITER ONCE A WEEK      clotrimazole (MYCELEX) 10 mg rasta Take 10 mg by mouth five (5) times daily.  lidocaine 5 % topical cream Apply  to affected area two (2) times daily as needed for Pain. 15g tube 1 Tube 0    B.infantis-B.ani-B.long-B.bifi (Probiotic 4X) 10-15 mg TbEC Take 1 Capsule by mouth two (2) times a day.  aspirin delayed-release 81 mg tablet Take 81 mg by mouth daily.  mirabegron ER (Myrbetriq) 25 mg ER tablet Take 1 Tab by mouth daily.  30 Tab 0    buPROPion XL (WELLBUTRIN XL) 150 mg tablet Take 1 Tab by mouth every morning. 90 Tab 3    predniSONE (DELTASONE) 10 mg tablet Take 10 mg by mouth daily. (Patient taking differently: Take 20 mg by mouth daily.) 30 Tab 0    PREVIDENT 5000 SENSITIVE 1.1-5 % pste USE AS DIRECTED TWICE A DAY SPIT OUT EXCESS AND DO NOT RINSE, EAT OR DRINK FOR 30 MINUTES      hydroxychloroquine (PLAQUENIL) 200 mg tablet 200 mg daily.  lidocaine-prilocaine (EMLA) topical cream For port flushing every 3 month  2    omeprazole (PRILOSEC) 40 mg capsule Take 1 Cap by mouth daily. 30 Cap 0    Cetirizine (ZYRTEC) 10 mg cap Take 10 mg by mouth daily. Indications: inflammation of the nose due to an allergy 30 Cap 0    omalizumab (XOLAIR SC) by SubCUTAneous route every thirty (30) days. Given by Dr Sandee Billy in his office      SYMBICORT 160-4.5 mcg/actuation HFAA TAKE 2 PUFFS BY MOUTH TWICE A DAY  12    OZEMPIC 0.25 mg or 0.5 mg(2 mg/1.5 mL) sub-q pen by SubCUTAneous route every seven (7) days. Patient states she takes 1 ml every friday  1    EPIPEN 2-SANJAY 0.3 mg/0.3 mL injection 1 (ONE) INJECTION AS NEEDED  0    traMADol (ULTRAM) 50 mg tablet Take 50 mg by mouth every six (6) hours as needed for Pain.  cyanocobalamin (Vitamin B-12) 1,000 mcg tablet Take  by mouth daily.  RESTASIS 0.05 % ophthalmic emulsion INSTILL 1 DROP INTO EACH EYE TWICE DAILY  4    SAFETY-ABDI TB SYR 1CC/25GX5/8\" 1 mL 25 gauge x 5/8\" syrg USE TO INJECT METHOTREXATE ONCE A WEEK  2    folic acid (FOLVITE) 1 mg tablet TAKE 1 TABLET ORALLY DAILY  11    Nebulizer & Compressor machine 1 Each by Does Not Apply route three (3) times daily as needed. 1 Each 0    glycopyrrolate (ROBINUL) 1 mg tablet Take 2 mg by mouth two (2) times a day. Indications: hyperhydrosis      cholecalciferol (Vitamin D3) (1000 Units /25 mcg) tablet Take 1 Tab by mouth daily.  levothyroxine (SYNTHROID) 75 mcg tablet Take 75 mcg by mouth Daily (before breakfast).       secukinumab (Cosentyx, 2 Syringes,) 150 mg/mL syrg Cosentyx      dicyclomine (BENTYL) 10 mg capsule Take 10 mg by mouth two (2) times a day. (Patient not taking: Reported on 10/28/2021)      nystatin (MYCOSTATIN) 100,000 unit/mL suspension Take 5 mL by mouth four (4) times daily. swish and spit (Patient not taking: Reported on 10/28/2021) 473 mL 1    Prodigy No Coding strip AS DIRECTED (Patient not taking: Reported on 8/30/2021) 100 Strip 5    Prodigy Twist Top Lancet 28 gauge misc AS DIRECTED (Patient not taking: Reported on 8/30/2021) 100 Lancet 0    albuterol (PROVENTIL HFA, VENTOLIN HFA, PROAIR HFA) 90 mcg/actuation inhaler Take 2 Puffs by inhalation every four (4) hours as needed for Wheezing. 1 Inhaler 0     No current facility-administered medications on file prior to visit. family history includes Alcohol abuse in her brother; Alzheimer in her paternal grandfather; Asthma in her brother; Cancer in her maternal grandfather, maternal grandmother, paternal grandfather, and paternal grandmother; Dementia in her mother and paternal grandfather; Diabetes in her brother, father, and paternal grandfather; Elevated Lipids in her maternal grandmother; Glaucoma in her maternal grandmother; Heart Disease in her father and paternal grandfather; High Cholesterol in her father; Huntingtons disease in her maternal grandfather; Hypertension in her brother, father, maternal grandmother, and mother; Other in her brother, maternal grandmother, and mother; Stroke in her mother.     Social History     Socioeconomic History    Marital status:      Spouse name: Not on file    Number of children: Not on file    Years of education: Not on file    Highest education level: Not on file   Occupational History    Not on file   Tobacco Use    Smoking status: Never Smoker    Smokeless tobacco: Never Used   Vaping Use    Vaping Use: Never used   Substance and Sexual Activity    Alcohol use: Yes     Comment: few drinks per year    Drug use: No    Sexual activity: Not on file   Other Topics Concern    Not on file   Social History Narrative    ** Merged History Encounter **          Social Determinants of Health     Financial Resource Strain:     Difficulty of Paying Living Expenses:    Food Insecurity:     Worried About Running Out of Food in the Last Year:     Ran Out of Food in the Last Year:    Transportation Needs:     Lack of Transportation (Medical):  Lack of Transportation (Non-Medical):    Physical Activity:     Days of Exercise per Week:     Minutes of Exercise per Session:    Stress:     Feeling of Stress :    Social Connections:     Frequency of Communication with Friends and Family:     Frequency of Social Gatherings with Friends and Family:     Attends Tenri[employer] Services:     Active Member of Clubs or Organizations:     Attends Club or Organization Meetings:     Marital Status:    Intimate Partner Violence:     Fear of Current or Ex-Partner:     Emotionally Abused:     Physically Abused:     Sexually Abused:        Visit Vitals  /73 (BP 1 Location: Left upper arm, BP Patient Position: Sitting)   Pulse 95   Temp 97.9 °F (36.6 °C) (Temporal)   Resp 16   Wt 216 lb 3.2 oz (98.1 kg)   SpO2 100%   BMI 34.90 kg/m²     General Chronically ill appearing female no acute distress    Neck:  Supple. Thyroid normal size, nontender, without nodules. No carotid bruit. No masses or lymphadenopathy  Respiratory: no respiratory distress,  no wheezing, no rhonchi, no rales. No chest wall tenderness. Cardiovascular:  RRR, normal S1S2, no murmur. Gastrointestinal: normal bowel sounds, soft, nontender, without masses.     Extremities +2 pulses, no edema, normal sensation   Musculoskeletal:  Wearing brace on left lower heel and ankle, decreased sensation in peroneal distribution  Tremor in hands left more than right    Skin:  Has butterfly rash on face redness  raynauds in fingers and toes  Neuro:  A and OX4, fluent speech, cranial nerves normal 2-12. decreased sensation on peroneal nerve distribution  Psych:  Normal affect        Monofilament R - normal sensation with micro filament  L -decreased sensation in left   Pulse DP R - 2+ (normal)  L - 2+ (normal)   Pulse TP R - 2+ (normal)  L - 2+ (normal)   Structural deformity R - None  L - None   Skin Integrity / Deformity R - None  L - None   Lab Results   Component Value Date/Time    WBC 14.4 (H) 06/30/2021 12:54 AM    HGB 12.7 06/30/2021 12:54 AM    HCT 38.0 06/30/2021 12:54 AM    PLATELET 254 53/81/1714 12:54 AM    MCV 90.5 06/30/2021 12:54 AM     Lab Results   Component Value Date/Time    Sodium 135 (L) 06/30/2021 12:54 AM    Potassium 3.6 06/30/2021 12:54 AM    Chloride 102 06/30/2021 12:54 AM    CO2 24 06/30/2021 12:54 AM    Anion gap 9 06/30/2021 12:54 AM    Glucose 93 06/30/2021 12:54 AM    BUN 14 06/30/2021 12:54 AM    Creatinine 0.92 06/30/2021 12:54 AM    BUN/Creatinine ratio 15 06/30/2021 12:54 AM    GFR est AA >60 06/30/2021 12:54 AM    GFR est non-AA >60 06/30/2021 12:54 AM    Calcium 8.9 06/30/2021 12:54 AM     No results found for: CHOL, CHOLPOCT, CHOLX, CHLST, CHOLV, HDL, HDLPOC, HDLP, LDL, LDLCPOC, LDLC, DLDLP, VLDLC, VLDL, TGLX, TRIGL, TRIGP, TGLPOCT, CHHD, CHHDX  Lab Results   Component Value Date/Time    TSH 1.48 10/30/2015 05:11 PM     Lab Results   Component Value Date/Time    Hemoglobin A1c 5.7 (H) 09/26/2020 01:56 AM    Hemoglobin A1c, External 6.2 06/24/2021 12:00 AM     No results found for: VITD3, XQVID2, XQVID3, XQVID, VD3RIA                Assessment and Plan:          Effacement of left L5-S1 nerve roots with associated disc extrusion noted. Status post surgery ( neurosurgery) without meaningful recovery of strength and sensation in left leg  Followed at [facility]     Anxiety/depression  On wellbutrin and lexapro.   Continue Wellbutrin    Functional tremor since sepsis May 2020 ( splenic abscesses) : debilitating - has difficulty with fine motor skills such as typing.       Rheumatoid arthritis, involving unspecified site, unspecified whether rheumatoid factor present (HCC) debilitating   Lupus   Chronic pain   Difficult to control followed by Dr Chari Cummings and on immunosuppression, recent GI issues raises suspicion for overlap syndrome/ mixed connective tissue possibly scleroderma  On chronic prednisone     Acquired hypothyroidism  Euthyroid     Controlled type 2 diabetes mellitus without complication, without long-term current use of insulin (HCC)  On ozempic  Has come off of metformin   Lost 30 lbs   5.8 HA1C    Vitamin D deficiency  On vitamin D      Attention deficit hyperactivity disorder (ADHD), predominantly inattentive type  Stable on vyvanse 30mg daily       Excessive swetting: on glycopyrrolate and requesting a refill    Concepcion: on prilosec    Overactive bladder  Continue myrbetriq    Vitamin B12 deficiency: taking 2000 mcg daily   Check B12    Recent UTI: completed antibiotic repeat urinalysis      Needs flu shot  - INFLUENZA VIRUS Kevin Cutler MD

## 2021-11-01 DIAGNOSIS — L24.81 IRRITANT CONTACT DERMATITIS DUE TO METALS: ICD-10-CM

## 2021-11-01 RX ORDER — TRIAMCINOLONE ACETONIDE 5 MG/G
OINTMENT TOPICAL 2 TIMES DAILY
Qty: 30 G | Refills: 0 | Status: SHIPPED | OUTPATIENT
Start: 2021-11-01

## 2021-11-08 ENCOUNTER — HOSPITAL ENCOUNTER (OUTPATIENT)
Dept: PREADMISSION TESTING | Age: 41
Discharge: HOME | End: 2021-11-08

## 2021-11-11 ENCOUNTER — HOSPITAL ENCOUNTER (OUTPATIENT)
Age: 41
Setting detail: OUTPATIENT SURGERY
Discharge: HOME | End: 2021-11-11
Attending: INTERNAL MEDICINE | Admitting: INTERNAL MEDICINE
Payer: COMMERCIAL

## 2021-11-11 VITALS
HEART RATE: 109 BPM | SYSTOLIC BLOOD PRESSURE: 113 MMHG | OXYGEN SATURATION: 97 % | RESPIRATION RATE: 17 BRPM | DIASTOLIC BLOOD PRESSURE: 73 MMHG

## 2021-11-11 PROCEDURE — 2709999900 HC NON-CHARGEABLE SUPPLY: Performed by: INTERNAL MEDICINE

## 2021-11-11 PROCEDURE — 76040000007: Performed by: INTERNAL MEDICINE

## 2021-11-11 PROCEDURE — 77030040810 HC CATH BLLN ANORECT EXPULSN MUIS -B: Performed by: INTERNAL MEDICINE

## 2021-11-11 NOTE — DISCHARGE INSTRUCTIONS
James Gama  [id number]  1980      MANOMETRY DISCHARGE INSTRUCTION    You may resume your regular diet as tolerated. You may resume your normal daily activities. Call your Physician if you have any complications or questions. [employer] Activation    Thank you for requesting access to [employer]. Please follow the instructions below to securely access and download your online medical record. [employer] allows you to send messages to your doctor, view your test results, renew your prescriptions, schedule appointments, and more. How Do I Sign Up? 1. In your internet browser, go to [email or web address]  2. Click on the First Time User? Click Here link in the Sign In box. You will be redirect to the New Member Sign Up page. 3. Enter your [employer] Access Code exactly as it appears below. You will not need to use this code after youve completed the sign-up process. If you do not sign up before the expiration date, you must request a new code. [employer] Access Code: Activation code not generated  Current [employer] Status: Active (This is the date your [employer] access code will )    4. Enter the last four digits of your Social Security Number (xxxx) and Date of Birth (mm/dd/yyyy) as indicated and click Submit. You will be taken to the next sign-up page. 5. Create a [employer] ID. This will be your [employer] login ID and cannot be changed, so think of one that is secure and easy to remember. 6. Create a [employer] password. You can change your password at any time. 7. Enter your Password Reset Question and Answer. This can be used at a later time if you forget your password. 8. Enter your e-mail address. You will receive e-mail notification when new information is available in [address] 9. Click Sign Up. You can now view and download portions of your medical record. 10. Click the Download Summary menu link to download a portable copy of your medical information.     Additional Information    If you have questions, please visit the Frequently Asked Questions section of the [employer] website at [email or web address]. Click With Me Now. [email or web address] Remember, [employer] is NOT to be used for urgent needs. For medical emergencies, dial 911.

## 2021-11-11 NOTE — PROGRESS NOTES
Rectal exam done by Odell Maldonado RN. Anal manometry catheter inserted into rectum. Manometry procedure complete. Catheter inserted into rectum. Balloon filled with 40cc of luke warm H2O, and pt escorted to bathroom for 5 min expulsion test.  Pt was not able to expel balloon. Balloon deflated and catheter removed. Pt tolerated procedures well.

## 2021-11-19 NOTE — PROCEDURES
High Resolution Anorectal Manometry   With Balloon Expulsion [facility] ([facility])    Date of procedure: 11/11/21   Date of interpretation: 11/19/21    Patient Name: Tavo Chauhan  Primary GI: Alcira Ware MD    Indication/Pre-procedure diagnosis: Chronic idiopathic constipation     Description of procedure: after informed consent, anorectal manometry performed with catheter inserted into rectum and asked to bear down, squeeze and describe sensation as outlined below during maneuvers. Then, balloon expulsion testing performed    Findings:    Resting  Max. Sphincter Pressure Pressure (rectal ref.)  53.9 mmHg  Mean Spincter Pressure (rectal ref.)    64.9 mmHg   Max. Sphincter Pressure Pressure (abs. ref.)  44.4 mmHg  Mean Spincter Pressure (abs ref.)    55.4 mmHg   Length of HPZ:     3.4 cm   Length verge to center:    0.3 cm     Bear Down (attempted defecation):  Residual Anal Pressure (abs. Ref.):   79.7 mmHg  Percent Anal relaxation:    40 %  Intrarectal pressure:     34.7 mmHg  Rectoanal pressure differential:   -45 mmHg    Squeeze:  Max. Sphincter Pressure Pressure (rectal ref.)  241.1  mmHg  Max . Spincter Pressure (abd ref.)    243.6 mmHg   Duration of sustained squeeze   5.2 s    Balloon Inflation:  RAIR:       Absent  First Sensation:     20 cc  Urge to defecate:     80 cc  Discomfort:      160 cc  Min. Rectal compliance:    2.12 cc/mmHg  Max.  Rectal compliance:    2.72 cc/mmHg    Balloon Expulsion Testing:    Failed    Impression:  Normal IAS (> 30 mmHg)  Normal Anal Squeeze Pressure (>30 mmHg)  Normal Rectal Sensation Threshold for first sensation (< or = 20 mL)  Normal Threshold for Urge to defecate ( mL)  Anormal threshold for Discomfort (200 mL)    Comments:  1) negative value for rectoanal pressure differential, particularly in combination with failed balloon expulsion testing, confirms diagnosis of outlet dysfunction constipation or dyssynergic defecation. She has a good chance of responding to biofeedback therapy. 2) Rectoanal inhibitory reflux is absent, which is a rare finding, usually seen in the presence of Hirschsprung's disease. Further radiologic testing or full thickness biopsy may be helpful in ruling this out. 3) Lastly, she has hyposensitivity with a lower than normal volume for urge for discomfort with balloon inflation.

## 2021-11-23 ENCOUNTER — TELEPHONE (OUTPATIENT)
Dept: NEUROLOGY | Age: 41
End: 2021-11-23

## 2021-11-23 NOTE — TELEPHONE ENCOUNTER
----- Message from Cydney Puga sent at 11/23/2021  4:31 PM EST -----  Regarding: Dr. Ijeoma Nayak  Patient return call    Caller's first and last name and relationship (if not the patient): Self       Best contact number(s): [phone]      Whose call is being returned: Monae Mckeon      Details to clarify the request: New (Colonic Dyssynergia) Gastro Paresis Esophageal Dysmotility.        Cydney Puga

## 2021-11-29 NOTE — TELEPHONE ENCOUNTER
Spoke with patient, informed her per -Dysmotility may be caused by autonomic dysfunction, however this has been assessed previously with formal autonomic testing which was normal in her case    She states she spoke with her GI doctor who recommends a biopsy of her colon. If she needs anything further she will call back.

## 2021-12-01 ENCOUNTER — TRANSCRIBE ORDER (OUTPATIENT)
Dept: REGISTRATION | Age: 41
End: 2021-12-01

## 2021-12-01 ENCOUNTER — HOSPITAL ENCOUNTER (OUTPATIENT)
Dept: GENERAL RADIOLOGY | Age: 41
Discharge: HOME | End: 2021-12-01
Payer: COMMERCIAL

## 2021-12-01 DIAGNOSIS — M54.2 NECK PAIN: Primary | ICD-10-CM

## 2021-12-01 DIAGNOSIS — M54.2 NECK PAIN: ICD-10-CM

## 2021-12-01 PROCEDURE — 72050 X-RAY EXAM NECK SPINE 4/5VWS: CPT

## 2021-12-06 ENCOUNTER — OFFICE VISIT (OUTPATIENT)
Dept: INTERNAL MEDICINE CLINIC | Age: 41
End: 2021-12-06
Payer: COMMERCIAL

## 2021-12-06 VITALS
SYSTOLIC BLOOD PRESSURE: 107 MMHG | WEIGHT: 216.8 LBS | HEART RATE: 105 BPM | RESPIRATION RATE: 16 BRPM | DIASTOLIC BLOOD PRESSURE: 63 MMHG | TEMPERATURE: 97.4 F | HEIGHT: 67 IN | BODY MASS INDEX: 34.03 KG/M2 | OXYGEN SATURATION: 99 %

## 2021-12-06 DIAGNOSIS — R35.0 URINE FREQUENCY: Primary | ICD-10-CM

## 2021-12-06 DIAGNOSIS — F90.0 ATTENTION DEFICIT HYPERACTIVITY DISORDER (ADHD), PREDOMINANTLY INATTENTIVE TYPE: ICD-10-CM

## 2021-12-06 DIAGNOSIS — B37.9 YEAST INFECTION: ICD-10-CM

## 2021-12-06 DIAGNOSIS — N30.00 ACUTE CYSTITIS WITHOUT HEMATURIA: ICD-10-CM

## 2021-12-06 DIAGNOSIS — R13.19 ESOPHAGEAL DYSPHAGIA: ICD-10-CM

## 2021-12-06 DIAGNOSIS — M54.50 ACUTE LOW BACK PAIN, UNSPECIFIED BACK PAIN LATERALITY, UNSPECIFIED WHETHER SCIATICA PRESENT: ICD-10-CM

## 2021-12-06 DIAGNOSIS — L20.84 INTRINSIC ECZEMA: ICD-10-CM

## 2021-12-06 DIAGNOSIS — R35.0 URINE FREQUENCY: ICD-10-CM

## 2021-12-06 LAB
BILIRUB UR QL STRIP: NEGATIVE
GLUCOSE UR-MCNC: NEGATIVE MG/DL
KETONES P FAST UR STRIP-MCNC: NEGATIVE MG/DL
PH UR STRIP: 5.5 [PH] (ref 4.6–8)
PROT UR QL STRIP: NORMAL
SP GR UR STRIP: 1.03 (ref 1–1.03)
UA UROBILINOGEN AMB POC: NORMAL (ref 0.2–1)
URINALYSIS CLARITY POC: CLEAR
URINALYSIS COLOR POC: NORMAL
URINE BLOOD POC: NEGATIVE
URINE LEUKOCYTES POC: NORMAL
URINE NITRITES POC: POSITIVE

## 2021-12-06 PROCEDURE — 99215 OFFICE O/P EST HI 40 MIN: CPT | Performed by: INTERNAL MEDICINE

## 2021-12-06 PROCEDURE — 81001 URINALYSIS AUTO W/SCOPE: CPT | Performed by: INTERNAL MEDICINE

## 2021-12-06 RX ORDER — CEFUROXIME AXETIL 500 MG/1
500 TABLET ORAL 2 TIMES DAILY
Qty: 14 TABLET | Refills: 0 | Status: SHIPPED | OUTPATIENT
Start: 2021-12-06

## 2021-12-06 RX ORDER — FLUCONAZOLE 150 MG/1
150 TABLET ORAL
Qty: 4 TABLET | Refills: 0 | Status: SHIPPED | OUTPATIENT
Start: 2021-12-06 | End: 2021-12-07

## 2021-12-06 NOTE — PROGRESS NOTES
Ms. Gloria Hurst is presenting to follow up     CC:  Skin Problem (Left hip and under arm), Back Pain, and Medication Refill       HPI:     She is a 46 y.o. female with several medical issues presenting with skin issues    Recall   She had an MRI done which showed Effacement of left L5-S1 nerve roots with associated disc extrusion noted. Patient underwent surgery with neurosurgery at [facility] with Dr Marylen Foy   Has unfortunately not improved with feeling in his leg but neurosurgery is hoping for improvement a  Using ankle support   Ankle gives away   Using walker   On 10-15 lb restriction        taking B12 supplement 1000mg      Recall  Ms Marlo Hutchinson has a hx of RA, lupus ( versus connective tissue disease?) , hypothyroidism,  antiphospholipid syndrome and being worked for possible mixed connective tissue. Last May 2020 she was admitted for severe sepsis and found to have hypodensities in the spleen and was treated with IV daptomycin and ceftriaxone ( Hospitalized May 19 to May 28th)   She has head a steady decline in her health. She tires easily, has functional tremor, has difficulty with gross and fine motor activities. And this summer developed weakness in left lower leg which is persistent with foot drop and no meaningful recovery    Recurrent UTIs Finished keflex for klesiella UTI and has recurrent symptoms today with flank pain and urethral pain and bladder spasms              Sees Dr Lupe Zimmerman for rheumatologic condition   Stopped benlysta subcutaneous  Will start orencia infusions        For asthma issues she is currently on xolair     ADHD: stable on vyvanse 30mg daily . She is currently unable to work due to multiple health issues described above.        Ozempic working well for diabetes last HA1C below 6 .  Stopped metformin and doing well.      Tremor most pronounced in the hands  functional physiologic tremor and referred to PT  Completed PT and occupational therapy to improve endurance and help cope with tremor and pain.    The tremors make it hard to type     Tramadol helps with the pain ( prescribed by Dr Amberly Carreno)      Taking zyrtec and singulair and symbicort for allergies     Stopped oral contraception due to antiphospholipid syndrome and had regent pap smear at [facility]  Has increased dry skin in hands and feet with fissures antibiotics have not helped. Burning in left armpit and extends to pacs area - burning is 7/10 all the time      Having increased food allergies    Prior to surgery tested positive for alpha gal and avoiding meat    Will have full thickness colon biopsies with Dr Parley Aase being worked up for Hirschsprung  Has chronic constipation     Recurrent yeast and thrush infections   Has tried nystatin oral  Has tried boric acid and clotrimazole       Review of systems:  Constitutional: negative for fever, chills, weight loss, night sweats   10 systems reviewed and negative other then HPI     Past Medical History:   Diagnosis Date    Acquired hypothyroidism     Adverse effect of anesthesia     labile BP during/after C section    Antiphospholipid antibody syndrome ([address]     Asthma     Broken bones     history of 9 broken bones    Chronic UTI (urinary tract infection)     \"extra valve right kidney causes UTI\"    Clotting disorder (HCC)     Fibromyalgia     Functional tremor     Gestational diabetes     GI bleed 2007,2011,2015,2016    lower GI last colonoscopy in 2016 normal     Huntingtons chorea ([address]     Hyperhydrosis disorder     Ill-defined condition     Chris/ tested genetically - daughter has Chris    Infertility     PCOS    PCOS (polycystic ovarian syndrome)     Precancerous skin lesion     removed from Right shoulder    Preeclampsia 2011    pregnancy    Reactive airway disease     Rheumatoid arthritis ([address]     SVT (supraventricular tachycardia) ([address] 2011    occured during pregnancy when picc line inserted.         Past Surgical History:   Procedure Laterality Date    ANORECTAL MANOMETRY  11/19/2021         COLONOSCOPY N/A 6/13/2016    COLONOSCOPY performed by Milady Anguiano MD at [address] COLONOSCOPY N/A 5/22/2020    COLONOSCOPY performed by Disha Weiss [facility] at [facility]  5/22/2020         HX BREAST LUMPECTOMY Right     HX HEENT      HX LUMBAR DISKECTOMY  septemeber 14 2021    HX WISDOM TEETH EXTRACTION  2004    IR INSERT TUNL CVC W PORT OVER 5 YEARS      UPPER GI ENDOSCOPY,BIOPSY  2/12/2019         UPPER GI ENDOSCOPY,BIOPSY  5/22/2020         UPPER GI ENDOSCOPY,DILATN W GUIDE  2/12/2019            Allergies   Allergen Reactions    Latex Swelling    Entex [Phenylephrine-Guaifenesin] Anaphylaxis, Hives and Palpitations    Remicade [Infliximab] Anaphylaxis    Rituxan [Rituximab] Anaphylaxis     Stated not allergic 7/15/21    Sulfa (Sulfonamide Antibiotics) Other (comments)     Mouth irritation, elevated HR, SOB, flushing    Beef Containing Products Other (comments)    Galactose-Alpha-1,3-Galactose (Alpha-Gal) Other (comments)    Mucinex [Guaifenesin] Anaphylaxis and Hives    Pepto-Bismol [Bismuth Subsalicylate] Nausea and Vomiting    Pork Derived (Porcine) Other (comments)    Vancomycin Hives       Current Outpatient Medications on File Prior to Visit   Medication Sig Dispense Refill    triamcinolone acetonide (KENALOG) 0.5 % ointment APPLY TO AFFECTED AREA TWO (2) TIMES A DAY. USE THIN LAYER 30 g 0    secukinumab (Cosentyx, 2 Syringes,) 150 mg/mL syrg Cosentyx      buPROPion XL (WELLBUTRIN XL) 150 mg tablet Take 1 Tablet by mouth every morning. 90 Tablet 3    mirabegron ER (Myrbetriq) 25 mg ER tablet Take 1 Tablet by mouth daily. 90 Tablet 1    glycopyrrolate (RobinuL) 1 mg tablet Take 2 Tablets by mouth two (2) times a day. Indications: hyperhydrosis 120 Tablet 2    dicyclomine (BENTYL) 10 mg capsule Take 10 mg by mouth two (2) times a day.       lisdexamfetamine (Vyvanse) 30 mg capsule Take 1 Capsule by mouth every morning. Max Daily Amount: 30 mg. 30 Capsule 0    escitalopram oxalate (LEXAPRO) 20 mg tablet Take 1 Tablet by mouth daily. 90 Tablet 1    montelukast (SINGULAIR) 10 mg tablet TAKE 1 TABLET BY MOUTH EVERY DAY 30 Tablet 14    cyclobenzaprine (FLEXERIL) 5 mg tablet Take 5 mg by mouth two (2) times a day.  methotrexate 25 mg/mL chemo injection 1 MILLILITER ONCE A WEEK      clotrimazole (MYCELEX) 10 mg rasta Take 10 mg by mouth five (5) times daily.  lidocaine 5 % topical cream Apply  to affected area two (2) times daily as needed for Pain. 15g tube 1 Tube 0    B.infantis-B.ani-B.long-B.bifi (Probiotic 4X) 10-15 mg TbEC Take 1 Capsule by mouth two (2) times a day.  aspirin delayed-release 81 mg tablet Take 81 mg by mouth daily.  nystatin (MYCOSTATIN) 100,000 unit/mL suspension Take 5 mL by mouth four (4) times daily. swish and spit 473 mL 1    predniSONE (DELTASONE) 10 mg tablet Take 10 mg by mouth daily. (Patient taking differently: Take 20 mg by mouth daily.) 30 Tab 0    PREVIDENT 5000 SENSITIVE 1.1-5 % pste USE AS DIRECTED TWICE A DAY SPIT OUT EXCESS AND DO NOT RINSE, EAT OR DRINK FOR 30 MINUTES      hydroxychloroquine (PLAQUENIL) 200 mg tablet 200 mg daily.  lidocaine-prilocaine (EMLA) topical cream For port flushing every 3 month  2    omeprazole (PRILOSEC) 40 mg capsule Take 1 Cap by mouth daily. 30 Cap 0    Cetirizine (ZYRTEC) 10 mg cap Take 10 mg by mouth daily. Indications: inflammation of the nose due to an allergy 30 Cap 0    albuterol (PROVENTIL HFA, VENTOLIN HFA, PROAIR HFA) 90 mcg/actuation inhaler Take 2 Puffs by inhalation every four (4) hours as needed for Wheezing. 1 Inhaler 0    omalizumab (XOLAIR SC) by SubCUTAneous route every thirty (30) days. Given by Dr Damon Ge in his office      SYMBICORT 160-4.5 mcg/actuation HFAA TAKE 2 PUFFS BY MOUTH TWICE A DAY  12    OZEMPIC 0.25 mg or 0.5 mg(2 mg/1.5 mL) sub-q pen by SubCUTAneous route every seven (7) days. Patient states she takes 1 ml every friday  1    EPIPEN 2-SANJAY 0.3 mg/0.3 mL injection 1 (ONE) INJECTION AS NEEDED  0    traMADol (ULTRAM) 50 mg tablet Take 50 mg by mouth every six (6) hours as needed for Pain.  cyanocobalamin (Vitamin B-12) 1,000 mcg tablet Take  by mouth daily.  RESTASIS 0.05 % ophthalmic emulsion INSTILL 1 DROP INTO EACH EYE TWICE DAILY  4    SAFETY-ABDI TB SYR 1CC/25GX5/8\" 1 mL 25 gauge x 5/8\" syrg USE TO INJECT METHOTREXATE ONCE A WEEK  2    folic acid (FOLVITE) 1 mg tablet TAKE 1 TABLET ORALLY DAILY  11    Nebulizer & Compressor machine 1 Each by Does Not Apply route three (3) times daily as needed. 1 Each 0    cholecalciferol (Vitamin D3) (1000 Units /25 mcg) tablet Take 1 Tab by mouth daily.  levothyroxine (SYNTHROID) 75 mcg tablet Take 75 mcg by mouth Daily (before breakfast).  cephalexin 500 mg tab cephalexin 500 mg tablet   Take 1 tablet twice a day by oral route for 7 days. (Patient not taking: Reported on 11/11/2021)      Eliquis 2.5 mg tablet Take 2.5 mg by mouth two (2) times a day.  Prodigy No Coding strip AS DIRECTED (Patient not taking: Reported on 8/30/2021) 100 Strip 5    Prodigy Twist Top Lancet 28 gauge misc AS DIRECTED (Patient not taking: Reported on 8/30/2021) 100 Lancet 0     No current facility-administered medications on file prior to visit.        family history includes Alcohol abuse in her brother; Alzheimer's Disease in her paternal grandfather; Asthma in her brother; Cancer in her maternal grandfather, maternal grandmother, paternal grandfather, and paternal grandmother; Dementia in her mother and paternal grandfather; Diabetes in her brother, father, and paternal grandfather; Elevated Lipids in her maternal grandmother; Glaucoma in her maternal grandmother; Heart Disease in her father and paternal grandfather; High Cholesterol in her father; Huntingtons disease in her maternal grandfather; Hypertension in her brother, father, maternal grandmother, and mother; Other in her brother, maternal grandmother, and mother; Stroke in her mother. Social History     Socioeconomic History    Marital status:      Spouse name: Not on file    Number of children: Not on file    Years of education: Not on file    Highest education level: Not on file   Occupational History    Not on file   Tobacco Use    Smoking status: Never Smoker    Smokeless tobacco: Never Used   Vaping Use    Vaping Use: Never used   Substance and Sexual Activity    Alcohol use: Yes     Comment: few drinks per year    Drug use: No    Sexual activity: Not on file   Other Topics Concern    Not on file   Social History Narrative    ** Merged History Encounter **          Social Determinants of Health     Financial Resource Strain:     Difficulty of Paying Living Expenses: Not on file   Food Insecurity:     Worried About Running Out of Food in the Last Year: Not on file    Margot of Food in the Last Year: Not on file   Transportation Needs:     Lack of Transportation (Medical): Not on file    Lack of Transportation (Non-Medical):  Not on file   Physical Activity:     Days of Exercise per Week: Not on file    Minutes of Exercise per Session: Not on file   Stress:     Feeling of Stress : Not on file   Social Connections:     Frequency of Communication with Friends and Family: Not on file    Frequency of Social Gatherings with Friends and Family: Not on file    Attends Mu-ism Services: Not on file    Active Member of Clubs or Organizations: Not on file    Attends Club or Organization Meetings: Not on file    Marital Status: Not on file   Intimate Partner Violence:     Fear of Current or Ex-Partner: Not on file    Emotionally Abused: Not on file    Physically Abused: Not on file    Sexually Abused: Not on file   Housing Stability:     Unable to Pay for Housing in the Last Year: Not on file    Number of Places Lived in the Last Year: Not on file    Unstable Housing in the Last Year: Not on file       Visit Vitals  /63   Pulse (!) 105   Temp 97.4 °F (36.3 °C) (Temporal)   Resp 16   Ht 5' 6.5\" (1.689 m)   Wt 216 lb 12.8 oz (98.3 kg)   LMP 11/06/2021 (Exact Date)   SpO2 99%   BMI 34.47 kg/m²     General Chronically ill appearing female no acute distress  Mouth: has white plaques in mouth   Neck:  Supple. Thyroid normal size, nontender, without nodules. No carotid bruit. No masses or lymphadenopathy  Respiratory: no respiratory distress,  no wheezing, no rhonchi, no rales. No chest wall tenderness. Cardiovascular:  RRR, normal S1S2, no murmur. Gastrointestinal: normal bowel sounds, soft, nontender, without masses. Extremities +2 pulses, no edema, normal sensation   Musculoskeletal:  Wearing brace on left lower heel and ankle, decreased sensation in peroneal distribution  Tremor in hands left more than right    Skin:  Has butterfly rash on face redness  raynauds in fingers and toes  Fissures in feet on plantar area  Very dry skin in hands feels liike sandpaper  No redness    Neuro:  A and OX4, fluent speech, cranial nerves normal 2-12. decreased sensation on peroneal nerve distribution  + foot drop  Has tremor in both hands  Psych:  Normal affect          Lab Results   Component Value Date/Time    WBC 14.4 (H) 06/30/2021 12:54 AM    HGB 12.7 06/30/2021 12:54 AM    HCT 38.0 06/30/2021 12:54 AM    PLATELET 234 18/09/0343 12:54 AM    MCV 90.5 06/30/2021 12:54 AM     Lab Results   Component Value Date/Time    Sodium 135 (L) 06/30/2021 12:54 AM    Potassium 3.6 06/30/2021 12:54 AM    Chloride 102 06/30/2021 12:54 AM    CO2 24 06/30/2021 12:54 AM    Anion gap 9 06/30/2021 12:54 AM    Glucose 93 06/30/2021 12:54 AM    BUN 14 06/30/2021 12:54 AM    Creatinine 0.92 06/30/2021 12:54 AM    BUN/Creatinine ratio 15 06/30/2021 12:54 AM    GFR est AA >60 06/30/2021 12:54 AM    GFR est non-AA >60 06/30/2021 12:54 AM    Calcium 8.9 06/30/2021 12:54 AM     No results found for: CHOL, CHOLPOCT, CHOLX, CHLST, CHOLV, HDL, HDLPOC, HDLP, LDL, LDLCPOC, LDLC, DLDLP, VLDLC, VLDL, TGLX, TRIGL, TRIGP, TGLPOCT, CHHD, CHHDX  Lab Results   Component Value Date/Time    TSH 1.48 10/30/2015 05:11 PM     Lab Results   Component Value Date/Time    Hemoglobin A1c 5.7 (H) 09/26/2020 01:56 AM    Hemoglobin A1c, External 6.2 06/24/2021 12:00 AM     No results found for: VITD3, XQVID2, XQVID3, XQVID, VD3RIA                Assessment and Plan:          Effacement of left L5-S1 nerve roots with associated disc extrusion noted. Status post surgery ( neurosurgery) without meaningful recovery of strength and sensation in left leg  Followed at [facility]     Anxiety/depression: exacerbated by multiple medical issues   On wellbutrin and lexapro. Continue Wellbutrin    Functional tremor since sepsis May 2020 ( splenic abscesses) : debilitating - has difficulty with fine motor skills such as typing.       Rheumatoid arthritis, involving unspecified site, unspecified whether rheumatoid factor present ([facility]) debilitating   Lupus   Chronic pain   Difficult to control followed by Dr Amberly Carreno and on immunosuppression, recent GI issues raises suspicion for overlap syndrome/ mixed connective tissue possibly scleroderma  On chronic prednisone     Acquired hypothyroidism  Euthyroid     Controlled type 2 diabetes mellitus without complication, without long-term current use of insulin ([facility]  On ozempic  Has come off of metformin   Lost 30 lbs   5.8 HA1C    Vitamin D deficiency  On vitamin D      Attention deficit hyperactivity disorder (ADHD), predominantly inattentive type  Stable on vyvanse 30mg daily   Compliance drug screen    Excessive swetting: on glycopyrrolate     Barretts: on prilosec ( will take famotidine while on antibiotic for UTI)     Overactive bladder  Continue myrbetriq    Vitamin B12 deficiency: taking 2000 mcg daily   Check B12    Recent UTI: completed antibiotic repeat urinalysis with symptoms send culture and prescribed ceftin     Severe constipation: being worked up for Hirschsprung    Dry skin with fissures in feet: possibly autoimmune, versus eczema.  Failed topical steroids trial of crisaborole  Using essential oils     Trouble swallowing: ordered modified barium swallow    Recurrent yeast infections: tried oral regimen and PH refresh, and intravaginal treatment   Stop plaquenil while taking the fluconazole  Continue probiotics and refresh   There is a risk of prolong qtc with the fluconazole and lezapro so hold for 2 days the Dilia Nicole MD

## 2021-12-06 NOTE — PROGRESS NOTES
Identified pt with two pt identifiers(name and ). Reviewed record in preparation for visit and have obtained necessary documentation. Chief Complaint   Patient presents with    Skin Problem     Left hip and under arm    Back Pain    Medication Refill        Vitals:    21 1209   BP: 107/63   Pulse: (!) 105   Resp: 16   Temp: 97.4 °F (36.3 °C)   TempSrc: Temporal   SpO2: 99%   Weight: 216 lb 12.8 oz (98.3 kg)   Height: 5' 6.5\" (1.689 m)   PainSc:   7   PainLoc: Back   LMP: 2021       Health Maintenance Due   Topic    Eye Exam Retinal or Dilated     Lipid Screen     COVID-19 Vaccine (1)    Cervical cancer screen     Pneumococcal 0-64 years (2 of 4 - PPSV23)       Coordination of Care Questionnaire:  :   1) Have you been to an emergency room, urgent care, or hospitalized since your last visit? If yes, where when, and reason for visit? no       2. Have seen or consulted any other health care provider since your last visit? If yes, where when, and reason for visit? Yes   and   PT     Patient is accompanied by self I have received verbal consent from [employer] to discuss any/all medical information while they are present in the room.

## 2021-12-06 NOTE — PATIENT INSTRUCTIONS
While on ceftin take famotidine instead of PPI for acid reflux due to drug interaction   Stop plaquenil while taking the fluconazole  There is a risk of prolong qtc with the fluconazole and lezapro so hold for 2 days the lexapro

## 2021-12-09 ENCOUNTER — TRANSCRIBE ORDER (OUTPATIENT)
Dept: SCHEDULING | Age: 41
End: 2021-12-09

## 2021-12-09 DIAGNOSIS — K59.00 CONSTIPATION: Primary | ICD-10-CM

## 2021-12-09 LAB
BACTERIA SPEC CULT: ABNORMAL
CC UR VC: ABNORMAL
SERVICE CMNT-IMP: ABNORMAL

## 2021-12-10 ENCOUNTER — PATIENT MESSAGE (OUTPATIENT)
Dept: INTERNAL MEDICINE CLINIC | Age: 41
End: 2021-12-10

## 2021-12-10 DIAGNOSIS — R20.8 DECREASED SENSATION OF HAND AND ARM: Primary | ICD-10-CM

## 2021-12-10 NOTE — TELEPHONE ENCOUNTER
Doug Mckinnon 12/10/2021 1:54 PM EST      ----- Message -----  From: Dmitri Lees  Sent: 12/10/2021 1:46 PM EST  To: [facility] Nurse Pool  Subject: MRI     Hi just following up to see if the thoracic MRI can be ordered.   Thanks   Ace

## 2021-12-14 ENCOUNTER — HOSPITAL ENCOUNTER (OUTPATIENT)
Dept: GENERAL RADIOLOGY | Age: 41
Discharge: HOME | End: 2021-12-14
Attending: INTERNAL MEDICINE
Payer: COMMERCIAL

## 2021-12-14 DIAGNOSIS — K59.00 CONSTIPATION: ICD-10-CM

## 2021-12-14 LAB — DRUGS UR: NORMAL

## 2021-12-14 PROCEDURE — 74248 X-RAY SM INT F-THRU STD: CPT

## 2021-12-15 ENCOUNTER — TRANSCRIBE ORDER (OUTPATIENT)
Dept: SCHEDULING | Age: 41
End: 2021-12-15

## 2021-12-15 DIAGNOSIS — K59.00 CONSTIPATION: Primary | ICD-10-CM

## 2021-12-20 ENCOUNTER — HOSPITAL ENCOUNTER (OUTPATIENT)
Dept: GENERAL RADIOLOGY | Age: 41
Discharge: HOME | End: 2021-12-20
Payer: COMMERCIAL

## 2021-12-20 ENCOUNTER — TRANSCRIBE ORDER (OUTPATIENT)
Dept: REGISTRATION | Age: 41
End: 2021-12-20

## 2021-12-20 DIAGNOSIS — K59.00 CONSTIPATION: Primary | ICD-10-CM

## 2021-12-20 DIAGNOSIS — K59.00 CONSTIPATION: ICD-10-CM

## 2021-12-20 PROCEDURE — 74018 RADEX ABDOMEN 1 VIEW: CPT

## 2021-12-22 ENCOUNTER — CLINICAL SUPPORT (OUTPATIENT)
Dept: INTERNAL MEDICINE CLINIC | Age: 41
End: 2021-12-22

## 2021-12-22 ENCOUNTER — HOSPITAL ENCOUNTER (OUTPATIENT)
Dept: GENERAL RADIOLOGY | Age: 41
Discharge: HOME | End: 2021-12-22
Attending: INTERNAL MEDICINE
Payer: COMMERCIAL

## 2021-12-22 ENCOUNTER — PATIENT MESSAGE (OUTPATIENT)
Dept: INTERNAL MEDICINE CLINIC | Age: 41
End: 2021-12-22

## 2021-12-22 ENCOUNTER — HOSPITAL ENCOUNTER (OUTPATIENT)
Dept: MRI IMAGING | Age: 41
Discharge: HOME | End: 2021-12-22
Attending: INTERNAL MEDICINE
Payer: COMMERCIAL

## 2021-12-22 ENCOUNTER — TRANSCRIBE ORDER (OUTPATIENT)
Dept: REGISTRATION | Age: 41
End: 2021-12-22

## 2021-12-22 ENCOUNTER — APPOINTMENT (OUTPATIENT)
Dept: MRI IMAGING | Age: 41
End: 2021-12-22
Attending: INTERNAL MEDICINE
Payer: COMMERCIAL

## 2021-12-22 DIAGNOSIS — K59.00 CONSTIPATION: Primary | ICD-10-CM

## 2021-12-22 DIAGNOSIS — R35.0 URINARY FREQUENCY: Primary | ICD-10-CM

## 2021-12-22 DIAGNOSIS — K59.00 CONSTIPATION: ICD-10-CM

## 2021-12-22 DIAGNOSIS — R20.8 DECREASED SENSATION OF HAND AND ARM: ICD-10-CM

## 2021-12-22 LAB
BILIRUB UR QL STRIP: NEGATIVE
GLUCOSE UR-MCNC: NEGATIVE MG/DL
KETONES P FAST UR STRIP-MCNC: NEGATIVE MG/DL
PH UR STRIP: 6 [PH] (ref 4.6–8)
PROT UR QL STRIP: NEGATIVE
SP GR UR STRIP: 1.03 (ref 1–1.03)
UA UROBILINOGEN AMB POC: NORMAL (ref 0.2–1)
URINALYSIS CLARITY POC: CLEAR
URINALYSIS COLOR POC: YELLOW
URINE BLOOD POC: NEGATIVE
URINE LEUKOCYTES POC: NEGATIVE
URINE NITRITES POC: NEGATIVE

## 2021-12-22 PROCEDURE — 72146 MRI CHEST SPINE W/O DYE: CPT

## 2021-12-22 PROCEDURE — 74018 RADEX ABDOMEN 1 VIEW: CPT

## 2021-12-22 PROCEDURE — 81003 URINALYSIS AUTO W/O SCOPE: CPT | Performed by: INTERNAL MEDICINE

## 2021-12-23 ENCOUNTER — TELEPHONE (OUTPATIENT)
Dept: INTERNAL MEDICINE CLINIC | Age: 41
End: 2021-12-23

## 2021-12-23 NOTE — TELEPHONE ENCOUNTER
The patient came in yesterday for a UA, please look at UA results from yesterday's results. Please advise, thank you.    Signed By: Cindy Chambers LPN     December 23, 2021

## 2021-12-23 NOTE — TELEPHONE ENCOUNTER
I spoke to the patient, she stated, she is not having any discharge. She is c/o a foul smell and dysuria.  Please advise, thank you,   Signed By: Javan Alanis LPN     December 23, 2021

## 2021-12-23 NOTE — TELEPHONE ENCOUNTER
I left the patient a message,  would like her to have her urine rechecked. She can come in on Monday, December 27th on the nurses schedule. If she has any questions, she can call me back at the office.    Signed By: Temo Painting LPN     December 23, 2021

## 2021-12-24 ENCOUNTER — HOSPITAL ENCOUNTER (OUTPATIENT)
Dept: GENERAL RADIOLOGY | Age: 41
Discharge: HOME | End: 2021-12-24
Payer: COMMERCIAL

## 2021-12-24 ENCOUNTER — TRANSCRIBE ORDER (OUTPATIENT)
Dept: EMERGENCY DEPT | Age: 41
End: 2021-12-24

## 2021-12-24 ENCOUNTER — TRANSCRIBE ORDER (OUTPATIENT)
Dept: GENERAL RADIOLOGY | Age: 41
End: 2021-12-24

## 2021-12-24 DIAGNOSIS — K59.00 CONSTIPATION: Primary | ICD-10-CM

## 2021-12-24 DIAGNOSIS — K59.00 CONSTIPATION: ICD-10-CM

## 2021-12-24 PROCEDURE — 74018 RADEX ABDOMEN 1 VIEW: CPT

## 2021-12-27 ENCOUNTER — HOSPITAL ENCOUNTER (OUTPATIENT)
Dept: GENERAL RADIOLOGY | Age: 41
Discharge: HOME | End: 2021-12-27
Payer: COMMERCIAL

## 2021-12-27 ENCOUNTER — TRANSCRIBE ORDER (OUTPATIENT)
Dept: REGISTRATION | Age: 41
End: 2021-12-27

## 2021-12-27 DIAGNOSIS — K59.00 CONSTIPATION: Primary | ICD-10-CM

## 2021-12-27 DIAGNOSIS — K59.00 CONSTIPATION: ICD-10-CM

## 2021-12-27 PROCEDURE — 74018 RADEX ABDOMEN 1 VIEW: CPT

## 2021-12-28 ENCOUNTER — TELEPHONE (OUTPATIENT)
Dept: INTERNAL MEDICINE CLINIC | Age: 41
End: 2021-12-28

## 2021-12-28 ENCOUNTER — CLINICAL SUPPORT (OUTPATIENT)
Dept: INTERNAL MEDICINE CLINIC | Age: 41
End: 2021-12-28
Payer: COMMERCIAL

## 2021-12-28 DIAGNOSIS — R35.0 URINE FREQUENCY: ICD-10-CM

## 2021-12-28 DIAGNOSIS — N30.00 ACUTE CYSTITIS WITHOUT HEMATURIA: Primary | ICD-10-CM

## 2021-12-28 LAB
BILIRUB UR QL STRIP: NEGATIVE
GLUCOSE UR-MCNC: NEGATIVE MG/DL
KETONES P FAST UR STRIP-MCNC: NEGATIVE MG/DL
PH UR STRIP: 5.5 [PH] (ref 4.6–8)
PROT UR QL STRIP: NEGATIVE
SP GR UR STRIP: 1.03 (ref 1–1.03)
UA UROBILINOGEN AMB POC: NORMAL (ref 0.2–1)
URINALYSIS CLARITY POC: NORMAL
URINALYSIS COLOR POC: YELLOW
URINE BLOOD POC: NEGATIVE
URINE LEUKOCYTES POC: NEGATIVE
URINE NITRITES POC: POSITIVE

## 2021-12-28 PROCEDURE — 81001 URINALYSIS AUTO W/SCOPE: CPT | Performed by: INTERNAL MEDICINE

## 2021-12-28 NOTE — PROGRESS NOTES
Identified pt with two pt identifiers(name and ). Reviewed record in preparation for visit and have obtained necessary documentation. Chief Complaint   Patient presents with    Bladder Infection      The patient is c/o dysuria and urine frequency.       Matt Friend, [address]  [address] [place] [address]  W: [phone]  F: [phone]

## 2021-12-28 NOTE — TELEPHONE ENCOUNTER
Two pt identifiers confirmed. I spoke to the patient and let her know, the doctor ordered a urine culture. Once the results come back we would let her know.    Signed By: Louis Baxter LPN     December 28, 2021

## 2021-12-29 ENCOUNTER — TELEPHONE (OUTPATIENT)
Dept: INTERNAL MEDICINE CLINIC | Age: 41
End: 2021-12-29

## 2021-12-29 NOTE — TELEPHONE ENCOUNTER
Two pt identifiers confirmed. I spoke to the pt and advised her per  that Alton Jimenez is not covered by her insurance. However, she is already has steroid cream, which is what is covered by insurance. Also the urine culture results have not come back yet. Pt verbalized understanding of information discussed w/ no further questions at this time.   Signed By: Celina Red LPN     December 29, 2021

## 2021-12-29 NOTE — TELEPHONE ENCOUNTER
Fax received from [address] [place] [address]. The pharmacy claim for Eucrisa 2% ointment has been rejected by insurance.     Drug Name  PA Requirement    Clobetasol Propionate NOT Required  Mometasone Furoate  NOT Required  Halobetasol Propionate NOT Required  Triamcinolone Acetonide NOT Required  Fluocinolone Acetonide NOT Required  Betamethasone Valerate NOT Required  Betamethasone Dipropionate NOT Required    Pimecrolimus  Required  Fluocinonide  Required  Tacrolimus  Required    Please advise, thank you,   Signed By: Temo Painting LPN     December 29, 2021

## 2021-12-30 RX ORDER — CIPROFLOXACIN 250 MG/1
250 TABLET, FILM COATED ORAL 2 TIMES DAILY
Qty: 14 TABLET | Refills: 0 | Status: SHIPPED | OUTPATIENT
Start: 2021-12-30 | End: 2022-01-06

## 2021-12-30 NOTE — PROGRESS NOTES
Urine cx growing gram neg rods. Rx sent in for cipro. Hopefully will have final id & sn by end of day.

## 2021-12-30 NOTE — PROGRESS NOTES
Called, spoke to pt. Two identifiers confirmed. Pt notified of results/recommendations per Dr. Ya Whipple. Pt verbalized understanding of information discussed w/ no further questions at this time. Urine cx growing gram neg rods. Rx sent in for cipro. Hopefully will have final id & sn by end of day.

## 2021-12-31 LAB
BACTERIA SPEC CULT: ABNORMAL
CC UR VC: ABNORMAL
SERVICE CMNT-IMP: ABNORMAL

## 2022-01-03 NOTE — PROGRESS NOTES
Letter mailed to patient. Urine cx grew klebsiella.  Sensitive to cipro.  Hopefully she is feeling better.

## 2022-01-05 ENCOUNTER — HOSPITAL ENCOUNTER (EMERGENCY)
Age: 42
Discharge: HOME | End: 2022-01-05
Attending: EMERGENCY MEDICINE
Payer: COMMERCIAL

## 2022-01-05 VITALS
TEMPERATURE: 98.8 F | SYSTOLIC BLOOD PRESSURE: 116 MMHG | WEIGHT: 216.71 LBS | DIASTOLIC BLOOD PRESSURE: 82 MMHG | HEART RATE: 98 BPM | HEIGHT: 66 IN | OXYGEN SATURATION: 99 % | RESPIRATION RATE: 14 BRPM | BODY MASS INDEX: 34.83 KG/M2

## 2022-01-05 DIAGNOSIS — E87.1 HYPONATREMIA: Primary | ICD-10-CM

## 2022-01-05 DIAGNOSIS — D89.89 AUTOIMMUNE DISORDER (HCC): ICD-10-CM

## 2022-01-05 LAB
ANION GAP SERPL CALC-SCNC: 8 MMOL/L (ref 5–15)
BUN SERPL-MCNC: 8 MG/DL (ref 6–20)
BUN/CREAT SERPL: 10 (ref 12–20)
CALCIUM SERPL-MCNC: 9 MG/DL (ref 8.5–10.1)
CHLORIDE SERPL-SCNC: 106 MMOL/L (ref 97–108)
CO2 SERPL-SCNC: 26 MMOL/L (ref 21–32)
COMMENT, HOLDF: NORMAL
CREAT SERPL-MCNC: 0.77 MG/DL (ref 0.55–1.02)
ERYTHROCYTE [DISTWIDTH] IN BLOOD BY AUTOMATED COUNT: 13.4 % (ref 11.5–14.5)
GLUCOSE SERPL-MCNC: 94 MG/DL (ref 65–100)
HCT VFR BLD AUTO: 36.8 % (ref 35–47)
HGB BLD-MCNC: 12 G/DL (ref 11.5–16)
MCH RBC QN AUTO: 30 PG (ref 26–34)
MCHC RBC AUTO-ENTMCNC: 32.6 G/DL (ref 30–36.5)
MCV RBC AUTO: 92 FL (ref 80–99)
NRBC # BLD: 0 K/UL (ref 0–0.01)
NRBC BLD-RTO: 0 PER 100 WBC
PLATELET # BLD AUTO: 312 K/UL (ref 150–400)
PMV BLD AUTO: 9.3 FL (ref 8.9–12.9)
POTASSIUM SERPL-SCNC: 4 MMOL/L (ref 3.5–5.1)
RBC # BLD AUTO: 4 M/UL (ref 3.8–5.2)
SAMPLES BEING HELD,HOLD: NORMAL
SODIUM SERPL-SCNC: 140 MMOL/L (ref 136–145)
WBC # BLD AUTO: 6.5 K/UL (ref 3.6–11)

## 2022-01-05 PROCEDURE — 96360 HYDRATION IV INFUSION INIT: CPT

## 2022-01-05 PROCEDURE — 74011250636 HC RX REV CODE- 250/636: Performed by: EMERGENCY MEDICINE

## 2022-01-05 PROCEDURE — 80048 BASIC METABOLIC PNL TOTAL CA: CPT

## 2022-01-05 PROCEDURE — 36415 COLL VENOUS BLD VENIPUNCTURE: CPT

## 2022-01-05 PROCEDURE — 85027 COMPLETE CBC AUTOMATED: CPT

## 2022-01-05 PROCEDURE — 99284 EMERGENCY DEPT VISIT MOD MDM: CPT

## 2022-01-05 RX ORDER — CEPHALEXIN 250 MG/1
250 CAPSULE ORAL 4 TIMES DAILY
Qty: 28 CAPSULE | Refills: 0 | Status: SHIPPED | OUTPATIENT
Start: 2022-01-05 | End: 2022-01-12

## 2022-01-05 RX ORDER — HEPARIN 100 UNIT/ML
300 SYRINGE INTRAVENOUS
Status: DISCONTINUED | OUTPATIENT
Start: 2022-01-05 | End: 2022-01-05 | Stop reason: HOSPADM

## 2022-01-05 RX ORDER — SODIUM CHLORIDE 9 MG/ML
125 INJECTION, SOLUTION INTRAVENOUS ONCE
Status: DISCONTINUED | OUTPATIENT
Start: 2022-01-05 | End: 2022-01-05 | Stop reason: HOSPADM

## 2022-01-05 RX ADMIN — SODIUM CHLORIDE 1000 ML: 9 INJECTION, SOLUTION INTRAVENOUS at 13:39

## 2022-01-05 NOTE — ED PROVIDER NOTES
EMERGENCY DEPARTMENT HISTORY AND PHYSICAL EXAM      Date: 1/5/2022  Patient Name: April Bray    History of Presenting Illness     Chief Complaint   Patient presents with    Nausea     reports yesterday she was receiving her second of orencia and was speaking with practioner who ordered lab work because pt was having low appetite, extreme musle fatigue/cramping, fatigue, headaches and nausea.  Lethargy    Abnormal Lab Results     sent to ED, sodium was 128       History Provided By: Patient    HPI: April Bray, 46 y.o. female  presents to the ED with cc of low sodium levels. Patient came to the emergency department because labs drawn yesterday showed a sodium of 120. For the past several weeks she has been having some decreased appetite. Muscle fatigue and cramping but overall generalized weakness and lethargy. She does have a history of a complicated autoimmune disorder. She is not febrile. She has been recently diagnosed with UTI secondary to Klebsiella pneumonia. Rheumatologist is Dr. Rodriguez Saunders.     Past History     Past Medical History:  Past Medical History:   Diagnosis Date    Acquired hypothyroidism     Adverse effect of anesthesia     labile BP during/after C section    Antiphospholipid antibody syndrome (HCC)     Asthma     Broken bones     history of 9 broken bones    Chronic UTI (urinary tract infection)     \"extra valve right kidney causes UTI\"    Clotting disorder (HCC)     Fibromyalgia     Functional tremor     Gestational diabetes     GI bleed 2007,2011,2015,2016    lower GI last colonoscopy in 2016 normal     Huntingtons chorea (HCC)     Hyperhydrosis disorder     Ill-defined condition     Rich/ tested genetically - daughter has Rich    Infertility     PCOS    PCOS (polycystic ovarian syndrome)     Precancerous skin lesion     removed from Right shoulder    Preeclampsia 2011    pregnancy    Reactive airway disease     [facility] arthritis ([facility]     SVT (supraventricular tachycardia) ([facility] 2011    occured during pregnancy when picc line inserted. Past Surgical History:  Past Surgical History:   Procedure Laterality Date    ANORECTAL MANOMETRY  11/19/2021         COLONOSCOPY N/A 6/13/2016    COLONOSCOPY performed [facility]ousif Contreras [facility] at [facility] ENDOSCOPY    COLONOSCOPY N/A 5/22/2020    COLON[facility]  5/22/2020         HX BREAST LUMPECTOMY Right     HX HEENT      HX LUMBAR DISKECTOMY  septemeber 14 2021    HX WISDOM TEETH EXTRACTION  2004    IR INSERT TUNL CVC W PORT OVER 5 YEARS      UPPER GI ENDOSCOPY,BIOPSY  2/12/2019         UPPER GI ENDOSCOPY,BIOPSY  5/22/2020         UPPER GI ENDOSCOPY,DILATN W GUIDE  2/12/2019            Medications:  No current facility-administered medications on file prior to encounter. Current Outpatient Medications on File Prior to Encounter   Medication Sig Dispense Refill    cephALEXin (KEFLEX) 250 mg capsule Take 1 Capsule by mouth four (4) times daily for 7 days. 28 Capsule 0    ciprofloxacin HCl (CIPRO) 250 mg tablet Take 1 Tablet by mouth two (2) times a day for 7 days. 14 Tablet 0    lisdexamfetamine (Vyvanse) 30 mg capsule Take 1 Capsule by mouth every morning. Max Daily Amount: 30 mg. 30 Capsule 0    cefUROXime (CEFTIN) 500 mg tablet Take 1 Tablet by mouth two (2) times a day. 14 Tablet 0    crisaborole 2 % oint 1 Each by Apply Externally route two (2) times a day. 100 g 1    triamcinolone acetonide (KENALOG) 0.5 % ointment APPLY TO AFFECTED AREA TWO (2) TIMES A DAY. USE THIN LAYER 30 g 0    secukinumab (Cosentyx, 2 Syringes,) 150 mg/mL syrg Cosentyx      buPROPion XL (WELLBUTRIN XL) 150 mg tablet Take 1 Tablet by mouth every morning. 90 Tablet 3    mirabegron ER (Myrbetriq) 25 mg ER tablet Take 1 Tablet by mouth daily. 90 Tablet 1    glycopyrrolate (RobinuL) 1 mg tablet Take 2 Tablets by mouth two (2) times a day. Indications: hyperhydrosis 120 Tablet 2    escitalopram oxalate (LEXAPRO) 20 mg tablet Take 1 Tablet by mouth daily. 90 Tablet 1    montelukast (SINGULAIR) 10 mg tablet TAKE 1 TABLET BY MOUTH EVERY DAY 30 Tablet 14    methotrexate 25 mg/mL chemo injection 1 MILLILITER ONCE A WEEK      clotrimazole (MYCELEX) 10 mg rasta Take 10 mg by mouth five (5) times daily.  lidocaine 5 % topical cream Apply  to affected area two (2) times daily as needed for Pain. 15g tube 1 Tube 0    B.infantis-B.ani-B.long-B.bifi (Probiotic 4X) 10-15 mg TbEC Take 1 Capsule by mouth two (2) times a day.  aspirin delayed-release 81 mg tablet Take 81 mg by mouth daily.  nystatin (MYCOSTATIN) 100,000 unit/mL suspension Take 5 mL by mouth four (4) times daily. swish and spit 473 mL 1    Prodigy No Coding strip AS DIRECTED (Patient not taking: Reported on 8/30/2021) 100 Strip 5    Prodigy Twist Top Lancet 28 gauge misc AS DIRECTED (Patient not taking: Reported on 8/30/2021) 100 Lancet 0    predniSONE (DELTASONE) 10 mg tablet Take 10 mg by mouth daily. (Patient taking differently: Take 20 mg by mouth daily.) 30 Tab 0    PREVIDENT 5000 SENSITIVE 1.1-5 % pste USE AS DIRECTED TWICE A DAY SPIT OUT EXCESS AND DO NOT RINSE, EAT OR DRINK FOR 30 MINUTES      hydroxychloroquine (PLAQUENIL) 200 mg tablet 200 mg daily.  lidocaine-prilocaine (EMLA) topical cream For port flushing every 3 month  2    omeprazole (PRILOSEC) 40 mg capsule Take 1 Cap by mouth daily. 30 Cap 0    Cetirizine (ZYRTEC) 10 mg cap Take 10 mg by mouth daily. Indications: inflammation of the nose due to an allergy 30 Cap 0    albuterol (PROVENTIL HFA, VENTOLIN HFA, PROAIR HFA) 90 mcg/actuation inhaler Take 2 Puffs by inhalation every four (4) hours as needed for Wheezing. 1 Inhaler 0    omalizumab (XOLAIR SC) by SubCUTAneous route every thirty (30) days.  Given by Dr Shania Collazo in his office      SYMBICORT 160-4.5 mcg/actuation HFAA TAKE 2 PUFFS BY MOUTH TWICE A DAY  12    OZEMPIC 0.25 mg or 0.5 mg(2 mg/1.5 mL) sub-q pen by SubCUTAneous route every seven (7) days. Patient states she takes 1 ml every friday  1    EPIPEN 2-SANJAY 0.3 mg/0.3 mL injection 1 (ONE) INJECTION AS NEEDED  0    traMADol (ULTRAM) 50 mg tablet Take 50 mg by mouth every six (6) hours as needed for Pain.  cyanocobalamin (Vitamin B-12) 1,000 mcg tablet Take  by mouth daily.  RESTASIS 0.05 % ophthalmic emulsion INSTILL 1 DROP INTO EACH EYE TWICE DAILY  4    SAFETY-ABDI TB SYR 1CC/25GX5/8\" 1 mL 25 gauge x 5/8\" syrg USE TO INJECT METHOTREXATE ONCE A WEEK  2    folic acid (FOLVITE) 1 mg tablet TAKE 1 TABLET ORALLY DAILY  11    Nebulizer & Compressor machine 1 Each by Does Not Apply route three (3) times daily as needed. 1 Each 0    cholecalciferol (Vitamin D3) (1000 Units /25 mcg) tablet Take 1 Tab by mouth daily.  levothyroxine (SYNTHROID) 75 mcg tablet Take 75 mcg by mouth Daily (before breakfast).          Family History:  Family History   Problem Relation Age of Onset    Other Mother         [place]'s disease    Hypertension Mother     Dementia Mother     Stroke Mother     High Cholesterol Father     Heart Disease Father     Diabetes Father     Hypertension Father     Asthma Brother     Diabetes Brother     Other Brother         varicocele, hernias    Hypertension Brother     Alcohol abuse Brother     Hypertension Maternal Grandmother     Elevated Lipids Maternal Grandmother     Cancer Maternal Grandmother         breast    Other Maternal Grandmother         polymyalgia rheumatica    Glaucoma Maternal Grandmother     Cancer Maternal Grandfather         prostate    Huntingtons disease Maternal Grandfather     Cancer Paternal Grandmother         lung with mets    Cancer Paternal Grandfather         prostate, colon    Diabetes Paternal Grandfather     Heart Disease Paternal Grandfather     Alzheimer's Disease Paternal Grandfather     Dementia Paternal Grandfather Social History:  Social History     Tobacco Use    Smoking status: Never Smoker    Smokeless tobacco: Never Used   Vaping Use    Vaping Use: Never used   Substance Use Topics    Alcohol use: Yes     Comment: few drinks per year    Drug use: No       Allergies: Allergies   Allergen Reactions    Latex Swelling    Entex [Phenylephrine-Guaifenesin] Anaphylaxis, Hives and Palpitations    Remicade [Infliximab] Anaphylaxis    Rituxan [Rituximab] Anaphylaxis     Stated not allergic 7/15/21    Sulfa (Sulfonamide Antibiotics) Other (comments)     Mouth irritation, elevated HR, SOB, flushing    Beef Containing Products Other (comments)    Galactose-Alpha-1,3-Galactose (Alpha-Gal) Other (comments)    Mucinex [Guaifenesin] Anaphylaxis and Hives    Pepto-Bismol [Bismuth Subsalicylate] Nausea and Vomiting    Pork Derived (Porcine) Other (comments)    Vancomycin Hives       All the above components of the past  history are auto-populated from the electronic record. They have been reviewed and the patient has been interviewed for any pertinent past history that pertains to the patient's chief complaint and reason for visit. Not all pre-populated components may be accurate at the time this note was generated. Review of Systems   Review of Systems   Constitutional: Positive for appetite change and fatigue. Negative for chills and fever. HENT: Negative for congestion, ear pain, rhinorrhea, sore throat and trouble swallowing. Eyes: Negative for visual disturbance. Respiratory: Negative for cough, chest tightness and shortness of breath. Cardiovascular: Negative for chest pain and palpitations. Gastrointestinal: Negative for abdominal pain, blood in stool, constipation, diarrhea, nausea and vomiting. Genitourinary: Negative for decreased urine volume, difficulty urinating, dysuria and frequency. Musculoskeletal: Positive for arthralgias and myalgias. Negative for back pain and neck pain. Skin: Negative for color change and rash. Neurological: Positive for headaches. Negative for dizziness, weakness and light-headedness. Physical Exam   Physical Exam    Due to the COVID-19 pandemic, in order to reduce the spread and transmission of the virus, some basic elements of the physical exam have been deferred to reduce direct or close contact with the patient unless they are deemed to be absolutely necessary, regardless of whether the virus is highly suspected or not. Diagnostic Study Results     Labs -     Recent Results (from the past 24 hour(s))   METABOLIC PANEL, BASIC    Collection Time: 01/05/22  1:41 PM   Result Value Ref Range    Sodium 140 136 - 145 mmol/L    Potassium 4.0 3.5 - 5.1 mmol/L    Chloride 106 97 - 108 mmol/L    CO2 26 21 - 32 mmol/L    Anion gap 8 5 - 15 mmol/L    Glucose 94 65 - 100 mg/dL    BUN 8 6 - 20 MG/DL    Creatinine 0.77 0.55 - 1.02 MG/DL    BUN/Creatinine ratio 10 (L) 12 - 20      GFR est AA >60 >60 ml/min/1.73m2    GFR est non-AA >60 >60 ml/min/1.73m2    Calcium 9.0 8.5 - 10.1 MG/DL   CBC W/O DIFF    Collection Time: 01/05/22  1:41 PM   Result Value Ref Range    WBC 6.5 3.6 - 11.0 K/uL    RBC 4.00 3.80 - 5.20 M/uL    HGB 12.0 11.5 - 16.0 g/dL    HCT 36.8 35.0 - 47.0 %    MCV 92.0 80.0 - 99.0 FL    MCH 30.0 26.0 - 34.0 PG    MCHC 32.6 30.0 - 36.5 g/dL    RDW 13.4 11.5 - 14.5 %    PLATELET 593 255 - 030 K/uL    MPV 9.3 8.9 - 12.9 FL    NRBC 0.0 0  WBC    ABSOLUTE NRBC 0.00 0.00 - 0.01 K/uL   SAMPLES BEING HELD    Collection Time: 01/05/22  1:41 PM   Result Value Ref Range    SAMPLES BEING HELD RED, SST, SST, BLUE     COMMENT        Add-on orders for these samples will be processed based on acceptable specimen integrity and analyte stability, which may vary by analyte.        Radiologic Studies -   No orders to display     CT Results  (Last 48 hours)    None        CXR Results  (Last 48 hours)    None            Medical Decision Making     I reviewed the vital signs, available nursing notes, past medical history, past surgical history, family history and social history. Vital Signs-I have reviewed the vital signs that have been made available during the patient's emergency department visit. The vital signs auto-populated below are obtained mostly by electronic means through monitoring devices that have been downloaded into the patient's chart by the nursing staff. Some vital signs are not downloaded into the chart until after the patient has been discharged and this note has been completed, therefore some vital signs may not be available to the physician for review prior to patient's discharge or admission. The physician has reviewed the patient's triage vital signs, monitored the electronic monitoring devices remotely for any significant vital sign abnormalities, and have reviewed vital signs prior to discharge. Some vital signs reviewed at bedside or remotely utilizing electronic monitoring devices may be different than the vital signs downloaded into the electronic medical record. Some vital signs may be erroneous and inaccurate since they are obtained by electronic monitoring devices, and not all vital signs are verified for accuracy by nursing staff prior to downloading into the patient's chart. Patient Vitals for the past 24 hrs:   Temp Pulse Resp BP SpO2   01/05/22 1430 -- 98 15 138/82 98 %   01/05/22 1330 -- (!) 103 20 134/85 99 %   01/05/22 1205 98.8 °F (37.1 °C) 100 18 (!) 137/91 100 %         Records Reviewed: Nursing notes for today's visit have been reviewed. I have also reviewed most recent medical records pertinent to today's complaints, if available in our medical record system. I have also reviewed all labs and imaging results from previous results in comparison to results obtained today. If an EKG was obtained today, it has been compared to previous EKGs, if available.   If arriving via EMS, the EMS report has been reviewed if made available to us within the patient's time in the emergency department. Provider Notes (Medical Decision Making):   Patient presents to the ER with labs showing a sodium of 120 that was obtained yesterday. She has been having some symptoms but overall they are mild to moderate. No seizure activity. She does have a complicated autoimmune disease history. Repeat labs today do show a sodium of 140. I find it hard to believe that there was this much correction overnight so this may have been a lab error yesterday. She states she did take her prednisone this morning when she did not take yesterday morning. This may have had some correlation to the change in her labs but sodium regulation is more controlled by the kidneys and the adrenals. Either way I do not feel that she needs to be admitted to the hospital today. She did receive fluids in the ER of normal saline. Recommend follow-up with her rheumatologist who can help arrange follow-up with endocrinology. ED Course:   Initial assessment performed. The patients presenting problems have been discussed, and they are in agreement with the care plan formulated and outlined with them. I have encouraged them to ask questions as they arise throughout their visit. Orders Placed This Encounter    BASIC METABOLIC PANEL    CBC W/O DIFF    Hold Sample    SAMPLE TO BLOOD BANK    SALINE LOCK IV ONE TIME STAT    sodium chloride 0.9 % bolus infusion 1,000 mL    0.9% sodium chloride infusion    heparin (porcine) pf 300 Units       Procedures      Critical Care Time:   0    Disposition:  Discharge    The patient's emergency department evaluation is now complete. I have reviewed all labs, imaging, and pertinent information. I have discussed all results with the patient and/or family. Based on our evaluation today I do believe that the patient is safe to be discharged home.   The patient has been provided with at home instructions that are pertinent to their complaint today, although these may not be specific to the exact diagnosis. I have reviewed the patient's home medications and attempted to reconcile if not already done so by pharmacy or nursing staff. I have discussed all new prescriptions with the patient. The patient has been encouraged to follow-up with primary care doctor and/or specialist, and these have been discussed with the patient. The patient has been advised that they may return to the emergency department if they have any worsening symptoms and or new symptoms that are of concern to them. Verbal discharge instructions may have also been provided to the patient that may not be specifically contained in the written discharge instructions. The patient has been given opportunity to ask questions prior to discharge. PLAN:  1. Current Discharge Medication List        2. Follow-up Information     Follow up With Specialties Details Why Contact Info    Dom Sarabia MD Rheumatology Schedule an appointment as soon as possible for a visit   [address] [place] [address] [phone]          Return to ED if worse     Diagnosis     Clinical Impression:   1. Hyponatremia    2.  [facility]immune disorder ([facility]

## 2022-01-05 NOTE — DISCHARGE INSTRUCTIONS
It was a pleasure taking care of you in our Emergency Department today. We know that when you come to [facility], you are entrusting us with your health, comfort, and safety. Our physicians and nurses honor that trust, and truly appreciate the opportunity to care for you and your loved ones. We also value your feedback. If you receive a survey about your Emergency Department experience today, please fill it out. We care about our patients' feedback, and we listen to what you have to say. Please read over your discharge instructions as these contain pertinent information to help you in the healing process. These instructions include a list of prescriptions you were given today. Follow-up information is also noted on your discharge papers. There are attached instructions and information pertaining to the reason why you were seen in the emergency department today. These discharge instructions may not be for exactly why you were here, but may be the closest available instructions that we have. These include important advice for things that you can do at home to feel better, and reasons to return to the emergency department. The evaluation and treatment you received in the emergency department is not always definitive care. If follow-up with your primary care doctor or specialist was recommended, it is important that you make these appointments for follow-up care. You may need further testing, procedures, and/or medications to help you feel better. Further tests may be required that are not available in the emergency department. Failure to make these follow-up appointments may jeopardize your health. The emergency department is here for emergent stabilization and evaluation of life and limb threatening illness and/or injuries.   Further care through a specialist or primary care doctor may be required to assist in your healing and complete your treatment and/or evaluation. We may not always be able to make a diagnosis in the emergency department, or things may change that will alter your diagnosis. Our primary goal is to ensure that nothing serious is occurring and that you are stable to continue your treatment and evaluation at home as an outpatient. Of course, if things change, and you feel worse, you are always encouraged to return to the emergency department for re-evaluation. Lab Results Today:  Recent Results (from the past 8 hour(s))   METABOLIC PANEL, BASIC    Collection Time: 01/05/22  1:41 PM   Result Value Ref Range    Sodium 140 136 - 145 mmol/L    Potassium 4.0 3.5 - 5.1 mmol/L    Chloride 106 97 - 108 mmol/L    CO2 26 21 - 32 mmol/L    Anion gap 8 5 - 15 mmol/L    Glucose 94 65 - 100 mg/dL    BUN 8 6 - 20 MG/DL    Creatinine 0.77 0.55 - 1.02 MG/DL    BUN/Creatinine ratio 10 (L) 12 - 20      GFR est AA >60 >60 ml/min/1.73m2    GFR est non-AA >60 >60 ml/min/1.73m2    Calcium 9.0 8.5 - 10.1 MG/DL   CBC W/O DIFF    Collection Time: 01/05/22  1:41 PM   Result Value Ref Range    WBC 6.5 3.6 - 11.0 K/uL    RBC 4.00 3.80 - 5.20 M/uL    HGB 12.0 11.5 - 16.0 g/dL    HCT 36.8 35.0 - 47.0 %    MCV 92.0 80.0 - 99.0 FL    MCH 30.0 26.0 - 34.0 PG    MCHC 32.6 30.0 - 36.5 g/dL    RDW 13.4 11.5 - 14.5 %    PLATELET 394 390 - 134 K/uL    MPV 9.3 8.9 - 12.9 FL    NRBC 0.0 0  WBC    ABSOLUTE NRBC 0.00 0.00 - 0.01 K/uL   SAMPLES BEING HELD    Collection Time: 01/05/22  1:41 PM   Result Value Ref Range    SAMPLES BEING HELD RED, SST, SST, BLUE     COMMENT        Add-on orders for these samples will be processed based on acceptable specimen integrity and analyte stability, which may vary by analyte. Radiology Results Today:  No results found.

## 2022-01-17 DIAGNOSIS — F33.41 RECURRENT MAJOR DEPRESSIVE DISORDER, IN PARTIAL REMISSION (HCC): ICD-10-CM

## 2022-01-17 RX ORDER — ESCITALOPRAM OXALATE 20 MG/1
20 TABLET ORAL DAILY
Qty: 90 TABLET | Refills: 1 | Status: SHIPPED | OUTPATIENT
Start: 2022-01-17 | End: 2022-02-21 | Stop reason: SDUPTHER

## 2022-01-17 NOTE — TELEPHONE ENCOUNTER
PCP: Venkat Rodas MD    Last appt: 12/28/2021  Future Appointments   Date Time Provider Екатерина Kern   1/31/2022  1:00 PM Appa Filippo Lundy MD [facility] BS AMB   3/14/2022  1:15 PM Appkirsten Lundy MD [facility] BS AMB   4/27/2022  9:00 AM DO JYOTI Capps BS AMB       Requested Prescriptions     Pending Prescriptions Disp Refills    escitalopram oxalate (LEXAPRO) 20 mg tablet 90 Tablet 1     Sig: Take 1 Tablet by mouth daily. Other Comments:  The Eucrisa 2% ointment is not approved by her insurance, please advise, thank you,  Signed By: Dannielle Antoine LPN     January 17, 7734

## 2022-01-19 ENCOUNTER — TELEPHONE (OUTPATIENT)
Dept: INTERNAL MEDICINE CLINIC | Age: 42
End: 2022-01-19

## 2022-01-19 NOTE — TELEPHONE ENCOUNTER
The patient's insurance will not cover  Eucrisa 2% ointment please advise, thank you,   Signed By: Dannielle Antoine LPN     January 19, 7290

## 2022-01-21 NOTE — TELEPHONE ENCOUNTER
I will ask  if PA was started.  Please advise, thank you,   Signed By: Edilia Hidalgo LPN     January 21, 9302

## 2022-01-27 DIAGNOSIS — F90.0 ATTENTION DEFICIT HYPERACTIVITY DISORDER (ADHD), PREDOMINANTLY INATTENTIVE TYPE: ICD-10-CM

## 2022-01-27 DIAGNOSIS — R61 EXCESSIVE SWEATING: ICD-10-CM

## 2022-01-27 NOTE — TELEPHONE ENCOUNTER
----- Message from [place] Syd sent at 1/27/2022  2:56 PM EST -----  Regarding: Vyvanse  Hi all,  Its time to request my Vyvance refill. Im shifting all of my refills to the 24 hour [facility] at [facility] and [address] [phone]). Thanks for you help!    Denisha

## 2022-01-28 RX ORDER — GLYCOPYRROLATE 1 MG/1
2 TABLET ORAL 2 TIMES DAILY
Qty: 120 TABLET | Refills: 2 | Status: SHIPPED | OUTPATIENT
Start: 2022-01-28

## 2022-02-21 DIAGNOSIS — F33.41 RECURRENT MAJOR DEPRESSIVE DISORDER, IN PARTIAL REMISSION (HCC): ICD-10-CM

## 2022-02-21 RX ORDER — ESCITALOPRAM OXALATE 20 MG/1
20 TABLET ORAL DAILY
Qty: 90 TABLET | Refills: 1 | Status: SHIPPED | OUTPATIENT
Start: 2022-02-21 | End: 2022-03-24 | Stop reason: ALTCHOICE

## 2022-02-21 NOTE — TELEPHONE ENCOUNTER
Future Appointments:  Future Appointments   Date Time Provider Екатерина Vergarai   3/14/2022  1:15 PM Appa Trang Carmichael MD [facility] BS AMB   4/27/2022  9:00 AM DO JYOTI Jean-Baptiste BS AMB        Last Appointment With Me:  12/6/2021     Requested Prescriptions     Pending Prescriptions Disp Refills    escitalopram oxalate (LEXAPRO) 20 mg tablet 90 Tablet 1     Sig: Take 1 Tablet by mouth daily.

## 2022-03-18 PROBLEM — K59.00 CONSTIPATION: Status: ACTIVE | Noted: 2020-05-20

## 2022-03-18 PROBLEM — D73.5 SPLENIC INFARCTION: Status: ACTIVE | Noted: 2020-05-14

## 2022-03-19 PROBLEM — D84.9 IMMUNOCOMPROMISED (HCC): Status: ACTIVE | Noted: 2020-09-26

## 2022-03-19 PROBLEM — Z20.822 SUSPECTED COVID-19 VIRUS INFECTION: Status: ACTIVE | Noted: 2020-09-26

## 2022-03-19 PROBLEM — F32.1 MODERATE MAJOR DEPRESSION (HCC): Status: ACTIVE | Noted: 2018-11-13

## 2022-03-19 PROBLEM — R78.81 BACTEREMIA: Status: ACTIVE | Noted: 2020-05-20

## 2022-03-19 PROBLEM — R50.9 FEBRILE ILLNESS: Status: ACTIVE | Noted: 2020-09-26

## 2022-03-20 PROBLEM — R10.9 ABDOMINAL PAIN: Status: ACTIVE | Noted: 2020-05-19

## 2022-03-20 PROBLEM — N39.0 UTI (URINARY TRACT INFECTION): Status: ACTIVE | Noted: 2020-09-26

## 2022-03-21 ENCOUNTER — TELEPHONE (OUTPATIENT)
Dept: INTERNAL MEDICINE CLINIC | Age: 42
End: 2022-03-21

## 2022-03-21 NOTE — TELEPHONE ENCOUNTER
----- Message from Pauljennifer Josue sent at 3/18/2022  4:03 PM EDT -----  Subject: Message to Provider    QUESTIONS  Information for Provider? Madelyn Walters from [employer]   calling in regards to this pt scooter order   ---------------------------------------------------------------------------  --------------  Ky Fraction INFO  What is the best way for the office to contact you? OK to leave message on   voicemail  Preferred Call Back Phone Number? [phone]  ---------------------------------------------------------------------------  --------------  SCRIPT ANSWERS  Relationship to Patient? Third Party  Representative Name?  Erin elliott

## 2022-03-22 NOTE — TELEPHONE ENCOUNTER
Called and spoke to [employer] from [employer] . She needed the paperwork to be sent again. They did not get all of the documents. I have re faxed the paperwork.

## 2022-03-24 ENCOUNTER — VIRTUAL VISIT (OUTPATIENT)
Dept: INTERNAL MEDICINE CLINIC | Age: 42
End: 2022-03-24
Payer: MEDICAID

## 2022-03-24 DIAGNOSIS — E03.9 HYPOTHYROIDISM (ACQUIRED): ICD-10-CM

## 2022-03-24 DIAGNOSIS — F90.9 ATTENTION DEFICIT HYPERACTIVITY DISORDER (ADHD), UNSPECIFIED ADHD TYPE: ICD-10-CM

## 2022-03-24 DIAGNOSIS — R61 HYPERHIDROSIS: ICD-10-CM

## 2022-03-24 DIAGNOSIS — F33.1 MODERATE EPISODE OF RECURRENT MAJOR DEPRESSIVE DISORDER (HCC): ICD-10-CM

## 2022-03-24 DIAGNOSIS — M06.9 RHEUMATOID ARTHRITIS, INVOLVING UNSPECIFIED SITE, UNSPECIFIED WHETHER RHEUMATOID FACTOR PRESENT (HCC): ICD-10-CM

## 2022-03-24 DIAGNOSIS — D84.9 IMMUNOCOMPROMISED (HCC): Primary | ICD-10-CM

## 2022-03-24 PROCEDURE — 99214 OFFICE O/P EST MOD 30 MIN: CPT | Performed by: INTERNAL MEDICINE

## 2022-03-24 NOTE — PROGRESS NOTES
1. \"Have you been to the ER, urgent care clinic since your last visit? Hospitalized since your last visit? \" Yes Reason for visit: [facility] for a mammogram and biopsy 03/17/22    2. \"Have you seen or consulted any other health care providers outside of the [address] [place] [address] since your last visit? \" Yes Reason for visit:  at [facility]    The patient has seen several doctors at [facility]  3. For patients aged 39-70: Has the patient had a colonoscopy / FIT/ Cologuard? NA - based on age      If the patient is female:    4. For patients aged 41-77: Has the patient had a mammogram within the past 2 years? Yes - no Care Gap present      5. For patients aged 21-65: Has the patient had a pap smear? Yes - Care Gap present.  Rooming MA/LPN to request most recent results

## 2022-03-24 NOTE — PROGRESS NOTES
CC: Follow Up Chronic Condition and Medication Evaluation      HPI:    She is a 46 y.o. female with a complex medical history with hypothyroidism, autoimmune, hx of presenting to follow up     She was unexpectedly pregnant in February and now found out it is an ectopic pregnancy. Stopped all medications including lexapro, wellbutrin, myrbetriq, ozempic, aspirin not on medication for ADHD      Early January patient was seen by Dr Tera Capellan and for itching without hives      \"Alpha-Gal allergy: her immediate reactions to red meats and tolerance for marshmallows and gummy bears go against alpha-Gal allergy. And alpha- gal panel is negative including IgE to red meats. Unlikely to have active Alpha-gal allergy.     Chronic Itching without hives: Unlikely to be allergic, and poor response despite being on steroids and Xolair points towards non-autoimmune causes of Itching. T cell mediated itching vs neurogenic itching is a possibility. Will check T cell/ B cell clonality, Lymphocytosis panel. Given oral itching with certain foods, will check Zone IgE panel to look for pattern of sensitization to pollens such as birch or ragweed, which could point toward to oral allergy syndrome. \"      had lymphocyte subset and abnormality with CD4 2555 and having work up had imaging done  Had CT abdomen and plevis    1.  No hepatosplenomegaly or lymphadenopathy. 2.  Gallbladder fundal adenomyomatosis. 3.  Suspect 1.9 cm uterine fibroid. Ultrasound may be of benefit. 4.  Post left total 40-5 discectomy changes in the superficial soft tissues of the back. 5.  Moderately severe degenerative disc disease at L5-S1 with grade 1 retrolisthesis. 6.  Chest CT reported separately. 1.  No acute intrathoracic abnormality appreciated. 2.  Subcentimeter pulmonary nodules favor sequela of an infectious/inflammatory process given patient's age.    3.  Nonspecific nodular soft tissue seen in the subcutaneous central right breast for which further evaluation with dedicated diagnostic ultrasound of the breast is recommended. 4.  Prominent bone island seen in the left glenoid. in the interval also biopsied right breast and negative - adenoma     Seeing Dr Sanford Reeves    Started psychotherapy which is helping  Plans to start remeron at bedtime       Reports blood sugar is 118 fasting and thinking about restart ozempic and will see Dr Jeremias Ortiz     hyperhydrosis resolved onset 9 yo     Diagnosed with pre symptomatic [place]'s disease  By Dr Bill Eagle neutologist      Stopped vyvanse for ADHD due to pregnancy    This is an established visit conducted via telemedicine with video. The patient has been instructed that this meets HIPAA criteria and acknowledges and agrees to this method of visitation. Pursuant to the emergency declaration under the [facility] waiver authority and the [employer] Act, this Virtual Visit was conducted, with patient's consent, to reduce the patient's risk of exposure to COVID-19 and provide continuity of care for an established patient. Services were provided through a video synchronous discussion virtually to substitute for in-person clinic visit.        ROS:  Constitutional: negative for fevers, chills, anorexia and weight loss  10 systems reviewed and negative other then HPI    Past Medical History:   Diagnosis Date    Acquired hypothyroidism     Adverse effect of anesthesia     labile BP during/after C section    Antiphospholipid antibody syndrome (HCC)     Asthma     Broken bones     history of 9 broken bones    Chronic UTI (urinary tract infection)     \"extra valve right kidney causes UTI\"    Clotting disorder (HCC)     Fibromyalgia     Functional tremor     Gestational diabetes     GI bleed 2007,2011,2015,2016    lower GI last colonoscopy in 2016 normal     Huntingtons chorea ([facility] [address]     Hyperhydrosis disorder     Ill-defined condition     Chris/ tested genetically - daughter has [place]    Infertility     PCOS    PCOS (polycystic ovarian syndrome)     Precancerous skin lesion     removed from Right shoulder    Preeclampsia 2011    pregnancy    Reactive airway disease     Rheumatoid arthritis (HCC)     SVT (supraventricular tachycardia) ([facility] [address] 2011    occured during pregnancy when picc line inserted. Current Outpatient Medications on File Prior to Visit   Medication Sig Dispense Refill    glycopyrrolate (ROBINUL) 1 mg tablet TAKE 2 TABLETS BY MOUTH TWO (2) TIMES A DAY. INDICATIONS: HYPERHYDROSIS 120 Tablet 2    hydroxychloroquine (PLAQUENIL) 200 mg tablet 200 mg daily.  albuterol (PROVENTIL HFA, VENTOLIN HFA, PROAIR HFA) 90 mcg/actuation inhaler Take 2 Puffs by inhalation every four (4) hours as needed for Wheezing. 1 Inhaler 0    EPIPEN 2-SANJAY 0.3 mg/0.3 mL injection 1 (ONE) INJECTION AS NEEDED  0    traMADol (ULTRAM) 50 mg tablet Take 50 mg by mouth every six (6) hours as needed for Pain.  Nebulizer & Compressor machine 1 Each by Does Not Apply route three (3) times daily as needed. 1 Each 0    cefUROXime (CEFTIN) 500 mg tablet Take 1 Tablet by mouth two (2) times a day. 14 Tablet 0    crisaborole 2 % oint 1 Each by Apply Externally route two (2) times a day. (Patient not taking: Reported on 3/24/2022) 100 g 1    triamcinolone acetonide (KENALOG) 0.5 % ointment APPLY TO AFFECTED AREA TWO (2) TIMES A DAY. USE THIN LAYER (Patient not taking: Reported on 3/24/2022) 30 g 0    methotrexate 25 mg/mL chemo injection 1 MILLILITER ONCE A WEEK (Patient not taking: Reported on 3/24/2022)      clotrimazole (MYCELEX) 10 mg rasta Take 10 mg by mouth five (5) times daily. (Patient not taking: Reported on 3/24/2022)      lidocaine 5 % topical cream Apply  to affected area two (2) times daily as needed for Pain.  15g tube (Patient not taking: Reported on 3/24/2022) 1 Tube 0  B.infantis-B.ani-B.long-B.bifi (Probiotic 4X) 10-15 mg TbEC Take 1 Capsule by mouth two (2) times a day. (Patient not taking: Reported on 3/24/2022)      nystatin (MYCOSTATIN) 100,000 unit/mL suspension Take 5 mL by mouth four (4) times daily. swish and spit (Patient not taking: Reported on 3/24/2022) 473 mL 1    Prodigy No Coding strip AS DIRECTED (Patient not taking: Reported on 8/30/2021) 100 Strip 5    Prodigy Twist Top Lancet 28 gauge misc AS DIRECTED (Patient not taking: Reported on 8/30/2021) 100 Lancet 0    PREVIDENT 5000 SENSITIVE 1.1-5 % pste USE AS DIRECTED TWICE A DAY SPIT OUT EXCESS AND DO NOT RINSE, EAT OR DRINK FOR 30 MINUTES (Patient not taking: Reported on 3/24/2022)      lidocaine-prilocaine (EMLA) topical cream For port flushing every 3 month (Patient not taking: Reported on 3/24/2022)  2    SYMBICORT 160-4.5 mcg/actuation HFAA TAKE 2 PUFFS BY MOUTH TWICE A DAY (Patient not taking: Reported on 3/24/2022)  12    cyanocobalamin (Vitamin B-12) 1,000 mcg tablet Take  by mouth daily. (Patient not taking: Reported on 3/24/2022)      RESTASIS 0.05 % ophthalmic emulsion INSTILL 1 DROP INTO EACH EYE TWICE DAILY (Patient not taking: Reported on 3/24/2022)  4    SAFETY-ABDI TB SYR 1CC/25GX5/8\" 1 mL 25 gauge x 5/8\" syrg USE TO INJECT METHOTREXATE ONCE A WEEK (Patient not taking: Reported on 1/83/7892)  2    folic acid (FOLVITE) 1 mg tablet TAKE 1 TABLET ORALLY DAILY (Patient not taking: Reported on 3/24/2022)  11    cholecalciferol (Vitamin D3) (1000 Units /25 mcg) tablet Take 1 Tab by mouth daily. (Patient not taking: Reported on 3/24/2022)      levothyroxine (SYNTHROID) 75 mcg tablet Take 75 mcg by mouth Daily (before breakfast). (Patient not taking: Reported on 3/24/2022)       No current facility-administered medications on file prior to visit.        Past Surgical History:   Procedure Laterality Date    ANORECTAL MANOMETRY  11/19/2021         COLONOSCOPY N/A 6/13/2016    COLONOSCOPY performed by Yousif Contreras MD at hospitals ENDOSCOPY    COLONOSCOPY N/A 5/22/2020    COLONOSCOPY performed by Robin Uriostegui MD at [address] [place] [address]  5/22/2020         HX BREAST BIOPSY Right 03/17/2022        HX BREAST LUMPECTOMY Right     HX HEENT      HX LUMBAR DISKECTOMY  septemeber 14 2021    HX WISDOM TEETH EXTRACTION  2004    IR INSERT TUNL CVC W PORT OVER 5 YEARS      UPPER GI ENDOSCOPY,BIOPSY  2/12/2019         UPPER GI ENDOSCOPY,BIOPSY  5/22/2020         UPPER GI ENDOSCOPY,DILATN W GUIDE  2/12/2019            Family History   Problem Relation Age of Onset    Other Mother         [place]'s disease    Hypertension Mother     Dementia Mother     Stroke Mother     High Cholesterol Father     Heart Disease Father     Diabetes Father     Hypertension Father     Asthma Brother     Diabetes Brother     Other Brother         varicocele, hernias    Hypertension Brother     Alcohol abuse Brother     Hypertension Maternal Grandmother     Elevated Lipids Maternal Grandmother     Cancer Maternal Grandmother         breast    Other Maternal Grandmother         polymyalgia rheumatica    Glaucoma Maternal Grandmother     Cancer Maternal Grandfather         prostate    Huntingtons disease Maternal Grandfather     Cancer Paternal Grandmother         lung with mets    Cancer Paternal Grandfather         prostate, colon    Diabetes Paternal Grandfather     Heart Disease Paternal Grandfather     Alzheimer's Disease Paternal Grandfather     Dementia Paternal Grandfather      Reviewed and no changes     Social History     Socioeconomic History    Marital status:      Spouse name: Not on file    Number of children: Not on file    Years of education: Not on file    Highest education level: Not on file   Occupational History    Not on file   Tobacco Use    Smoking status: Never Smoker    Smokeless tobacco: Never Used   Vaping Use    Vaping Use: Never used   Substance and Sexual Activity    Alcohol use: Yes     Comment: few drinks per year    Drug use: No    Sexual activity: Not on file   Other Topics Concern    Not on file   Social History Narrative    ** Merged History Encounter **          Social Determinants of Health     Financial Resource Strain:     Difficulty of Paying Living Expenses: Not on file   Food Insecurity:     Worried About Running Out of Food in the Last Year: Not on file    Margot of Food in the Last Year: Not on file   Transportation Needs:     Lack of Transportation (Medical): Not on file    Lack of Transportation (Non-Medical): Not on file   Physical Activity:     Days of Exercise per Week: Not on file    Minutes of Exercise per Session: Not on file   Stress:     Feeling of Stress : Not on file   Social Connections:     Frequency of Communication with Friends and Family: Not on file    Frequency of Social Gatherings with Friends and Family: Not on file    Attends Anabaptism Services: Not on file    Active Member of [address] [place] [address] or Organizations: Not on file    Attends Club or Organization Meetings: Not on file    Marital Status: Not on file   Intimate Partner Violence:     Fear of Current or Ex-Partner: Not on file    Emotionally Abused: Not on file    Physically Abused: Not on file    Sexually Abused: Not on file   Housing Stability:     Unable to Pay for Housing in the Last Year: Not on file    Number of [place] in the Last Year: Not on file    Unstable Housing in the Last Year: Not on file          There were no vitals taken for this visit. Physical Examination:   Gen: well appearing female  HEENT: normal conjunctiva, no audible congestion, patient does not see oral erythema, has MMM  Neck: patient does not feel enlarged or tender LAD or masses  Resp: normal respiratory effort, no audible wheezing.    CV: patient does not feel palpitations or heart irregularity  Abd: patient does not feel abdominal tenderness or mass, patient does not notice distension  Extrem: patient does not see swelling in ankles or joints. Neuro: Alert and oriented, able to answer questions without difficulty, able to move all extremities and walk normally          Lab Results   Component Value Date/Time    WBC 6.5 01/05/2022 01:41 PM    HGB 12.0 01/05/2022 01:41 PM    HCT 36.8 01/05/2022 01:41 PM    PLATELET 387 56/92/9256 01:41 PM    MCV 92.0 01/05/2022 01:41 PM     Lab Results   Component Value Date/Time    Sodium 140 01/05/2022 01:41 PM    Potassium 4.0 01/05/2022 01:41 PM    Chloride 106 01/05/2022 01:41 PM    CO2 26 01/05/2022 01:41 PM    Anion gap 8 01/05/2022 01:41 PM    Glucose 94 01/05/2022 01:41 PM    BUN 8 01/05/2022 01:41 PM    Creatinine 0.77 01/05/2022 01:41 PM    BUN/Creatinine ratio 10 (L) 01/05/2022 01:41 PM    GFR est AA >60 01/05/2022 01:41 PM    GFR est non-AA >60 01/05/2022 01:41 PM    Calcium 9.0 01/05/2022 01:41 PM     No results found for: CHOL, CHOLPOCT, CHOLX, CHLST, CHOLV, HDL, HDLPOC, HDLP, LDL, LDLCPOC, LDLC, DLDLP, VLDLC, VLDL, TGLX, TRIGL, TRIGP, TGLPOCT, CHHD, CHHDX  Lab Results   Component Value Date/Time    TSH 1.48 10/30/2015 05:11 PM     No results found for: PSA, Melvin Ripper, [id number], [id number]  Lab Results   Component Value Date/Time    Hemoglobin A1c 5.7 (H) 09/26/2020 01:56 AM    Hemoglobin A1c, External 6.2 06/24/2021 12:00 AM     No results found for: Jacqui Spire, XQVID3, Ann Pouch, [id number]    Lab Results   Component Value Date/Time    ALT (SGPT) 29 06/30/2021 12:54 AM    Alk. phosphatase 87 06/30/2021 12:54 AM    Bilirubin, total 0.5 06/30/2021 12:54 AM           Assessment/Plan:    1. Immunocompromised ([facility] [address]    2. Rheumatoid arthritis, involving unspecified site, unspecified whether rheumatoid factor present (HCC)/lupus  Followed by Dr Harriet Gallego and will start plaquenil and diclofenac    3. Hyperhidrosis  Resolved     4.  Hypothyroidism (acquired)  Advised to resume levotyhroxine    5. Attention deficit hyperactivity disorder (ADHD), unspecified ADHD type  - resume vyvanse ( Stopped due to ectopic pregnancy)    6. Moderate episode of recurrent major depressive disorder ([facility] [address]  Planning to start Remeron    7. Low motility/ slow transit:has had work up with GI and  constipation is still ongoing  Using as needed saline enema has less side effects   8. Pre diabetes      8. Ectopic pregnancy: followed by gyn    9. CD4 subset elevation - patient is undergoing work up with hematologist and thus far work up is negative, will repeat labs in 3 months    10: presymptomatic [place]'s disease: followed at [address] [place] [address] [place], [place]    This is an established visit conducted via real time video and audio telemedicine. The patient has been instructed that this meets HIPAA criteria and acknowledges and agrees to this method of visitation.     Follow up in 3 months

## 2022-04-01 ENCOUNTER — TELEPHONE (OUTPATIENT)
Dept: INTERNAL MEDICINE CLINIC | Age: 42
End: 2022-04-01

## 2022-04-01 NOTE — TELEPHONE ENCOUNTER
Vyvanse 30MG capsules    Approvedon March 31  CaseId:[id number];Status:Approved; Review Type:Prior Auth; Coverage Start Date:03/01/2022; Coverage End Date:03/31/2023;

## 2022-05-12 NOTE — TELEPHONE ENCOUNTER
#[phone] [facility] needs a call back as there is a drug inter-action between the Lexapro and Plaquenil     Please call the pharm as soon as possible. No

## 2022-06-12 ENCOUNTER — OFFICE VISIT (OUTPATIENT)
Dept: URGENT CARE | Age: 42
End: 2022-06-12
Payer: COMMERCIAL

## 2022-06-12 VITALS
HEART RATE: 85 BPM | SYSTOLIC BLOOD PRESSURE: 106 MMHG | RESPIRATION RATE: 18 BRPM | TEMPERATURE: 98.2 F | OXYGEN SATURATION: 100 % | WEIGHT: 226 LBS | HEIGHT: 67 IN | BODY MASS INDEX: 35.47 KG/M2 | DIASTOLIC BLOOD PRESSURE: 72 MMHG

## 2022-06-12 DIAGNOSIS — U07.1 POSITIVE SELF-ADMINISTERED ANTIGEN TEST FOR COVID-19: ICD-10-CM

## 2022-06-12 DIAGNOSIS — J02.9 SORE THROAT: Primary | ICD-10-CM

## 2022-06-12 LAB
S PYO AG THROAT QL: NEGATIVE
VALID INTERNAL CONTROL?: YES

## 2022-06-12 PROCEDURE — S9083 URGENT CARE CENTER GLOBAL: HCPCS | Performed by: FAMILY MEDICINE

## 2022-06-12 PROCEDURE — 87880 STREP A ASSAY W/OPTIC: CPT | Performed by: FAMILY MEDICINE

## 2022-06-12 RX ORDER — BENZONATATE 200 MG/1
200 CAPSULE ORAL
Qty: 21 CAPSULE | Refills: 0 | Status: SHIPPED | OUTPATIENT
Start: 2022-06-12 | End: 2022-06-19

## 2022-06-12 RX ORDER — PREDNISONE 20 MG/1
20 TABLET ORAL 2 TIMES DAILY
Qty: 10 TABLET | Refills: 0 | Status: SHIPPED | OUTPATIENT
Start: 2022-06-12 | End: 2022-06-17

## 2022-06-12 RX ORDER — PROMETHAZINE HYDROCHLORIDE AND DEXTROMETHORPHAN HYDROBROMIDE 6.25; 15 MG/5ML; MG/5ML
5 SYRUP ORAL
Qty: 60 ML | Refills: 0 | Status: SHIPPED | OUTPATIENT
Start: 2022-06-12

## 2022-06-12 NOTE — PROGRESS NOTES
Sore Throat   The history is provided by the patient. This is a new problem. The current episode started more than 2 days ago. The problem has not changed since onset. There has been no fever. Associated symptoms include congestion, trouble swallowing and cough. Pertinent negatives include no ear discharge and no swollen glands. She has tried NSAIDs for the symptoms. Past Medical History:   Diagnosis Date    Acquired hypothyroidism     Adverse effect of anesthesia     labile BP during/after C section    Antiphospholipid antibody syndrome (HCC)     Asthma     Broken bones     history of 9 broken bones    Chronic UTI (urinary tract infection)     \"extra valve right kidney causes UTI\"    Clotting disorder (HCC)     Fibromyalgia     Functional tremor     Gestational diabetes     GI bleed 2007,2011,2015,2016    lower GI last colonoscopy in 2016 normal     Huntingtons chorea (HCC)     Hyperhydrosis disorder     Ill-defined condition     [place]/ tested genetically - daughter has [place]    Infertility     PCOS    PCOS (polycystic ovarian syndrome)     Precancerous skin lesion     removed from Right shoulder    Preeclampsia 2011    pregnancy    Reactive airway disease     Rheumatoid arthritis (HCC)     SVT (supraventricular tachycardia) ([address] 2011    occured during pregnancy when picc line inserted.         Past Surgical History:   Procedure Laterality Date    ANORECTAL MANOMETRY  11/19/2021         COLONOSCOPY N/A 6/13/2016    COLONOSCOPY performed by Sana Crouch MD at [facility] ENDOSCOPY    COLONOSCOPY N/A 5/22/2020    [place] [facility] [place]         HX BREAST BIOPSY Right 03/17/2022        HX BREAST LUMPECTOMY Right     HX HEENT      HX LUMBAR DISKECTOMY  septemeber 14 2021    HX WISDOM TEETH EXTRACTION  2004    IR INSERT TUNL CVC W PORT OVER 5 YEARS      UPPER GI ENDOSCOPY,BIOPSY  2/12/2019  UPPER GI ENDOSCOPY,BIOPSY  5/22/2020         UPPER GI ENDOSCOPY,DILATN W GUIDE  2/12/2019              Family History   Problem Relation Age of Onset    Other Mother         Bogota's disease    Hypertension Mother     Dementia Mother     Stroke Mother     High Cholesterol Father     Heart Disease Father     Diabetes Father     Hypertension Father     Asthma Brother     Diabetes Brother     Other Brother         varicocele, hernias    Hypertension Brother     Alcohol abuse Brother     Hypertension Maternal Grandmother     Elevated Lipids Maternal Grandmother     Cancer Maternal Grandmother         breast    Other Maternal Grandmother         polymyalgia rheumatica    Glaucoma Maternal Grandmother     Cancer Maternal Grandfather         prostate    Huntingtons disease Maternal Grandfather     Cancer Paternal Grandmother         lung with mets    Cancer Paternal Grandfather         prostate, colon    Diabetes Paternal Grandfather     Heart Disease Paternal Grandfather     Alzheimer's Disease Paternal Grandfather     Dementia Paternal Grandfather         Social History     Socioeconomic History    Marital status:      Spouse name: Not on file    Number of children: Not on file    Years of education: Not on file    Highest education level: Not on file   Occupational History    Not on file   Tobacco Use    Smoking status: Never Smoker    Smokeless tobacco: Never Used   Vaping Use    Vaping Use: Never used   Substance and Sexual Activity    Alcohol use: Yes     Comment: few drinks per year    Drug use: No    Sexual activity: Not on file   Other Topics Concern    Not on file   Social History Narrative    ** Merged History Encounter **          Social Determinants of Health     Financial Resource Strain:     Difficulty of Paying Living Expenses: Not on file   Food Insecurity:     Worried About Running Out of Food in the Last Year: Not on file    Margot of Food in the Last Year: Not on file   Transportation Needs:     Lack of Transportation (Medical): Not on file    Lack of Transportation (Non-Medical): Not on file   Physical Activity:     Days of Exercise per Week: Not on file    Minutes of Exercise per Session: Not on file   Stress:     Feeling of Stress : Not on file   Social Connections:     Frequency of Communication with Friends and Family: Not on file    Frequency of Social Gatherings with Friends and Family: Not on file    Attends [employer] Services: Not on file    Active Member of [address] [place] [address] [employer] or Organizations: Not on file    Attends Club or Organization Meetings: Not on file    Marital Status: Not on file   Intimate Partner Violence:     Fear of Current or Ex-Partner: Not on file    Emotionally Abused: Not on file    Physically Abused: Not on file    Sexually Abused: Not on file   Housing Stability:     Unable to Pay for Housing in the Last Year: Not on file    Number of [place] in the Last Year: Not on file    Unstable Housing in the Last Year: Not on file                ALLERGIES: Latex; Entex [phenylephrine-guaifenesin]; Remicade [infliximab]; Rituxan [rituximab]; Sulfa (sulfonamide antibiotics); Beef containing products; Galactose-alpha-1,3-galactose (alpha-gal); Mucinex [guaifenesin]; Pepto-bismol [bismuth subsalicylate]; Pork derived (porcine); and Vancomycin    Review of Systems   HENT: Positive for congestion, sore throat and trouble swallowing. Negative for ear discharge. Respiratory: Positive for cough. All other systems reviewed and are negative. Vitals:    06/12/22 0833 06/12/22 0836   BP:  106/72   Pulse: 85    Resp: 18    Temp: 98.2 °F (36.8 °C)    SpO2: 100%    Weight: 226 lb (102.5 kg)    Height: 5' 7\" (1.702 m)        Physical Exam  Vitals and nursing note reviewed. Constitutional:       General: She is not in acute distress.   HENT:      Right Ear: Tympanic membrane and ear canal normal.      Left Ear: Tympanic membrane and ear canal normal.      Nose: Nose normal.      Mouth/Throat:      Pharynx: Posterior oropharyngeal erythema and uvula swelling present. No oropharyngeal exudate. Eyes:      General:         Right eye: No discharge. Left eye: No discharge. Conjunctiva/sclera: Conjunctivae normal.   Pulmonary:      Effort: Pulmonary effort is normal. No respiratory distress. Breath sounds: Normal breath sounds. No decreased breath sounds, wheezing, rhonchi or rales. Musculoskeletal:      Cervical back: Neck supple. Lymphadenopathy:      Cervical: No cervical adenopathy. Skin:     Findings: No rash. MDM    Procedures          ICD-10-CM ICD-9-CM    1. Sore throat  J02.9 462 AMB POC RAPID STREP A   2. Positive self-administered antigen test for COVID-19  U07.1 079.89      Medications Ordered Today   Medications    DISCONTD: magic mouthwash (Excellence4u) susp     Sig: Take 10 mL by mouth every four (4) hours. Magic Mouthwash 1:1:1:1 Aluminum-Magnesium Hydroxide (Maalox) suspension Diphenhydramine elixir 12.5 mg/5 mL Lidocaine Viscous 2% solution Nystatin suspension 100,000 units/ml Total Volume 120 ml     Dispense:  200 mL     Refill:  0    predniSONE (DELTASONE) 20 mg tablet     Sig: Take 1 Tablet by mouth two (2) times a day for 5 days. With food     Dispense:  10 Tablet     Refill:  0    benzonatate (TESSALON) 200 mg capsule     Sig: Take 1 Capsule by mouth three (3) times daily as needed for Cough for up to 7 days. Dispense:  21 Capsule     Refill:  0    promethazine-dextromethorphan (PROMETHAZINE-DM) 6.25-15 mg/5 mL syrup     Sig: Take 5 mL by mouth nightly as needed for Cough. Dispense:  60 mL     Refill:  0    magic mouthwash (LYNN) susp     Sig: Take 10 mL by mouth every four (4) hours.  Magic Mouthwash 1:1:1:1 Aluminum-Magnesium Hydroxide (Maalox) suspension Diphenhydramine elixir 12.5 mg/5 mL Lidocaine Viscous 2% solution Nystatin suspension 100,000 units/ml Total Volume 120 ml Dispense:  200 mL     Refill:  0    magic mouthwash (LYNN) susp     Sig: Take 10 mL by mouth every four (4) hours. Magic Mouthwash 1:1:1:1 Aluminum-Magnesium Hydroxide (Maalox) suspension Diphenhydramine elixir 12.5 mg/5 mL Lidocaine Viscous 2% solution Nystatin suspension 100,000 units/ml Total Volume 120 ml     Dispense:  200 mL     Refill:  0     Results for orders placed or performed in visit on 06/12/22   AMB POC RAPID STREP A   Result Value Ref Range    VALID INTERNAL CONTROL POC Yes     Group A Strep Ag Negative Negative     The patients condition was discussed with the patient and they understand. The patient is to follow up with primary care doctor. If signs and symptoms become worse the pt is to go to the ER. The patient is to take medications as prescribed.

## 2022-06-14 LAB
ANTIBODY SCREEN, EXTERNAL: NEGATIVE
CHLAMYDIA, EXTERNAL: NEGATIVE
HBSAG, EXTERNAL: NEGATIVE
HEPATITIS C AB,   EXT: NEGATIVE
HIV, EXTERNAL: NORMAL
N. GONORRHEA, EXTERNAL: NEGATIVE
RUBELLA, EXTERNAL: NORMAL
T. PALLIDUM, EXTERNAL: NORMAL
TYPE, ABO & RH, EXTERNAL: NORMAL

## 2022-07-14 LAB — HBA1C MFR BLD HPLC: 6 %

## 2022-08-17 ENCOUNTER — PATIENT MESSAGE (OUTPATIENT)
Dept: INTERNAL MEDICINE CLINIC | Age: 42
End: 2022-08-17

## 2022-08-17 DIAGNOSIS — R35.0 URINARY FREQUENCY: Primary | ICD-10-CM

## 2022-08-17 RX ORDER — OXYBUTYNIN CHLORIDE 5 MG/1
5 TABLET ORAL 3 TIMES DAILY
Qty: 90 TABLET | Refills: 2 | Status: SHIPPED | OUTPATIENT
Start: 2022-08-17

## 2022-08-17 NOTE — TELEPHONE ENCOUNTER
From: Soheila Alonzo  To: Jayson Ozuna MD  Sent: 8/17/2022 11:16 AM EDT  Subject: Ditropan    Hi yall   I at out of refills for my Ditropan 5 mg TID   Would you please send in a script for [facility]. ?   Thanks!   Denisha

## 2022-08-31 NOTE — PROGRESS NOTES
Nothing to do surgically for very tiny splenic hypodensities. Need to eval for causes of septic emboli and thrombotic emboli and treat any underlying etiology. Splenectomy would be reserved for large abscess with refractory pain or rupture. Infarct is treated symptomatically. Will be by to see patient later today, but I do not expect recommendations to change. Thanks. 12

## 2022-11-18 RX ORDER — ESCITALOPRAM OXALATE 20 MG/1
20 TABLET ORAL DAILY
Qty: 90 TABLET | Refills: 1 | Status: SHIPPED | OUTPATIENT
Start: 2022-11-18

## 2022-11-18 NOTE — TELEPHONE ENCOUNTER
I called the patient and let her know we received a fax from [facility] requesting a 90 day supply of escitalopram, she said yes. Future Appointments:  Future Appointments   Date Time Provider Екатерина Concha   12/6/2022  1:45 PM Appa Renay Parra MD [facility] BS AMB        Last Appointment With Me:  Visit date not found     Requested Prescriptions     Pending Prescriptions Disp Refills    escitalopram oxalate (LEXAPRO) 20 mg tablet 90 Tablet 1     Sig: Take 1 Tablet by mouth daily.

## 2022-11-19 ENCOUNTER — HOSPITAL ENCOUNTER (OUTPATIENT)
Age: 42
Setting detail: OBSERVATION
LOS: 1 days | Discharge: HOME | End: 2022-11-21
Attending: OBSTETRICS & GYNECOLOGY | Admitting: OBSTETRICS & GYNECOLOGY

## 2022-11-19 DIAGNOSIS — N30.01 ACUTE CYSTITIS WITH HEMATURIA: Primary | ICD-10-CM

## 2022-11-19 PROBLEM — O23.43 UTI (URINARY TRACT INFECTION) DURING PREGNANCY, THIRD TRIMESTER: Status: ACTIVE | Noted: 2022-11-19

## 2022-11-19 PROBLEM — Z34.90 PREGNANCY: Status: ACTIVE | Noted: 2022-11-19

## 2022-11-19 LAB
ALBUMIN SERPL-MCNC: 2.4 G/DL (ref 3.5–5)
ALBUMIN/GLOB SERPL: 0.6 {RATIO} (ref 1.1–2.2)
ALP SERPL-CCNC: 114 U/L (ref 45–117)
ALT SERPL-CCNC: 15 U/L (ref 12–78)
ANION GAP SERPL CALC-SCNC: 8 MMOL/L (ref 5–15)
APPEARANCE UR: CLEAR
AST SERPL-CCNC: 11 U/L (ref 15–37)
BACTERIA URNS QL MICRO: NEGATIVE /HPF
BASOPHILS # BLD: 0 K/UL (ref 0–0.1)
BASOPHILS NFR BLD: 0 % (ref 0–1)
BILIRUB SERPL-MCNC: 0.6 MG/DL (ref 0.2–1)
BILIRUB UR QL: NEGATIVE
BUN SERPL-MCNC: 5 MG/DL (ref 6–20)
BUN/CREAT SERPL: 11 (ref 12–20)
CALCIUM SERPL-MCNC: 8.3 MG/DL (ref 8.5–10.1)
CHLORIDE SERPL-SCNC: 102 MMOL/L (ref 97–108)
CO2 SERPL-SCNC: 22 MMOL/L (ref 21–32)
COLOR UR: ABNORMAL
CREAT SERPL-MCNC: 0.47 MG/DL (ref 0.55–1.02)
DIFFERENTIAL METHOD BLD: ABNORMAL
EOSINOPHIL # BLD: 0 K/UL (ref 0–0.4)
EOSINOPHIL NFR BLD: 0 % (ref 0–7)
EPITH CASTS URNS QL MICRO: ABNORMAL /LPF
ERYTHROCYTE [DISTWIDTH] IN BLOOD BY AUTOMATED COUNT: 12.8 % (ref 11.5–14.5)
GLOBULIN SER CALC-MCNC: 3.9 G/DL (ref 2–4)
GLUCOSE SERPL-MCNC: 106 MG/DL (ref 65–100)
GLUCOSE UR STRIP.AUTO-MCNC: NEGATIVE MG/DL
HCT VFR BLD AUTO: 32.7 % (ref 35–47)
HGB BLD-MCNC: 11.1 G/DL (ref 11.5–16)
HGB UR QL STRIP: NEGATIVE
HYALINE CASTS URNS QL MICRO: ABNORMAL /LPF (ref 0–2)
IMM GRANULOCYTES # BLD AUTO: 0.1 K/UL (ref 0–0.04)
IMM GRANULOCYTES NFR BLD AUTO: 1 % (ref 0–0.5)
KETONES UR QL STRIP.AUTO: 15 MG/DL
LEUKOCYTE ESTERASE UR QL STRIP.AUTO: ABNORMAL
LYMPHOCYTES # BLD: 1.5 K/UL (ref 0.8–3.5)
LYMPHOCYTES NFR BLD: 11 % (ref 12–49)
MCH RBC QN AUTO: 30.3 PG (ref 26–34)
MCHC RBC AUTO-ENTMCNC: 33.9 G/DL (ref 30–36.5)
MCV RBC AUTO: 89.3 FL (ref 80–99)
MONOCYTES # BLD: 1 K/UL (ref 0–1)
MONOCYTES NFR BLD: 7 % (ref 5–13)
NEUTS SEG # BLD: 11.4 K/UL (ref 1.8–8)
NEUTS SEG NFR BLD: 81 % (ref 32–75)
NITRITE UR QL STRIP.AUTO: NEGATIVE
NRBC # BLD: 0 K/UL (ref 0–0.01)
NRBC BLD-RTO: 0 PER 100 WBC
PH UR STRIP: 6 [PH] (ref 5–8)
PLATELET # BLD AUTO: 195 K/UL (ref 150–400)
PMV BLD AUTO: 10.7 FL (ref 8.9–12.9)
POTASSIUM SERPL-SCNC: 4 MMOL/L (ref 3.5–5.1)
PROT SERPL-MCNC: 6.3 G/DL (ref 6.4–8.2)
PROT UR STRIP-MCNC: NEGATIVE MG/DL
RBC # BLD AUTO: 3.66 M/UL (ref 3.8–5.2)
RBC #/AREA URNS HPF: ABNORMAL /HPF (ref 0–5)
SODIUM SERPL-SCNC: 132 MMOL/L (ref 136–145)
SP GR UR REFRACTOMETRY: 1 (ref 1–1.03)
UA: UC IF INDICATED,UAUC: ABNORMAL
UROBILINOGEN UR QL STRIP.AUTO: 0.2 EU/DL (ref 0.2–1)
WBC # BLD AUTO: 14 K/UL (ref 3.6–11)
WBC URNS QL MICRO: >100 /HPF (ref 0–4)

## 2022-11-19 PROCEDURE — 96361 HYDRATE IV INFUSION ADD-ON: CPT

## 2022-11-19 PROCEDURE — 99285 EMERGENCY DEPT VISIT HI MDM: CPT

## 2022-11-19 PROCEDURE — 74011250637 HC RX REV CODE- 250/637: Performed by: OBSTETRICS & GYNECOLOGY

## 2022-11-19 PROCEDURE — 87086 URINE CULTURE/COLONY COUNT: CPT

## 2022-11-19 PROCEDURE — 74011000258 HC RX REV CODE- 258: Performed by: OBSTETRICS & GYNECOLOGY

## 2022-11-19 PROCEDURE — 81001 URINALYSIS AUTO W/SCOPE: CPT

## 2022-11-19 PROCEDURE — 80053 COMPREHEN METABOLIC PANEL: CPT

## 2022-11-19 PROCEDURE — 85025 COMPLETE CBC W/AUTO DIFF WBC: CPT

## 2022-11-19 PROCEDURE — 74011250636 HC RX REV CODE- 250/636: Performed by: OBSTETRICS & GYNECOLOGY

## 2022-11-19 PROCEDURE — 36415 COLL VENOUS BLD VENIPUNCTURE: CPT

## 2022-11-19 PROCEDURE — 96374 THER/PROPH/DIAG INJ IV PUSH: CPT

## 2022-11-19 PROCEDURE — G0378 HOSPITAL OBSERVATION PER HR: HCPCS

## 2022-11-19 PROCEDURE — 96372 THER/PROPH/DIAG INJ SC/IM: CPT

## 2022-11-19 RX ORDER — METFORMIN HYDROCHLORIDE 1000 MG/1
1000 TABLET ORAL 2 TIMES DAILY WITH MEALS
COMMUNITY

## 2022-11-19 RX ORDER — ZOLPIDEM TARTRATE 5 MG/1
5 TABLET ORAL
Status: DISCONTINUED | OUTPATIENT
Start: 2022-11-19 | End: 2022-11-21 | Stop reason: HOSPADM

## 2022-11-19 RX ORDER — CERTOLIZUMAB PEGOL 200 MG/ML
400 INJECTION, SOLUTION SUBCUTANEOUS
COMMUNITY

## 2022-11-19 RX ORDER — METFORMIN HYDROCHLORIDE 500 MG/1
1000 TABLET ORAL 2 TIMES DAILY WITH MEALS
Status: DISCONTINUED | OUTPATIENT
Start: 2022-11-20 | End: 2022-11-21 | Stop reason: HOSPADM

## 2022-11-19 RX ORDER — SODIUM CHLORIDE, SODIUM LACTATE, POTASSIUM CHLORIDE, CALCIUM CHLORIDE 600; 310; 30; 20 MG/100ML; MG/100ML; MG/100ML; MG/100ML
999 INJECTION, SOLUTION INTRAVENOUS ONCE
Status: COMPLETED | OUTPATIENT
Start: 2022-11-19 | End: 2022-11-19

## 2022-11-19 RX ORDER — FAMOTIDINE 20 MG/1
20 TABLET, FILM COATED ORAL 2 TIMES DAILY
COMMUNITY

## 2022-11-19 RX ORDER — SODIUM CHLORIDE, SODIUM LACTATE, POTASSIUM CHLORIDE, CALCIUM CHLORIDE 600; 310; 30; 20 MG/100ML; MG/100ML; MG/100ML; MG/100ML
125 INJECTION, SOLUTION INTRAVENOUS CONTINUOUS
Status: DISCONTINUED | OUTPATIENT
Start: 2022-11-19 | End: 2022-11-21 | Stop reason: HOSPADM

## 2022-11-19 RX ORDER — MONTELUKAST SODIUM 10 MG/1
10 TABLET ORAL DAILY
COMMUNITY

## 2022-11-19 RX ORDER — ACETAMINOPHEN 500 MG
1000 TABLET ORAL
Status: DISCONTINUED | OUTPATIENT
Start: 2022-11-19 | End: 2022-11-19 | Stop reason: SDUPTHER

## 2022-11-19 RX ORDER — CETIRIZINE HYDROCHLORIDE 10 MG/1
1 CAPSULE, LIQUID FILLED ORAL
COMMUNITY

## 2022-11-19 RX ORDER — LANOLIN ALCOHOL/MO/W.PET/CERES
1 CREAM (GRAM) TOPICAL
Status: DISCONTINUED | OUTPATIENT
Start: 2022-11-20 | End: 2022-11-21 | Stop reason: HOSPADM

## 2022-11-19 RX ORDER — HYDROXYCHLOROQUINE SULFATE 200 MG/1
100 TABLET, FILM COATED ORAL 2 TIMES DAILY WITH MEALS
Status: DISCONTINUED | OUTPATIENT
Start: 2022-11-19 | End: 2022-11-20

## 2022-11-19 RX ORDER — CETIRIZINE HYDROCHLORIDE 10 MG/1
10 TABLET ORAL EVERY MORNING
Status: DISCONTINUED | OUTPATIENT
Start: 2022-11-20 | End: 2022-11-21 | Stop reason: HOSPADM

## 2022-11-19 RX ORDER — METFORMIN HYDROCHLORIDE 500 MG/1
1000 TABLET, EXTENDED RELEASE ORAL 2 TIMES DAILY
Status: DISCONTINUED | OUTPATIENT
Start: 2022-11-19 | End: 2022-11-19

## 2022-11-19 RX ORDER — FAMOTIDINE 20 MG/1
20 TABLET, FILM COATED ORAL 2 TIMES DAILY
Status: DISCONTINUED | OUTPATIENT
Start: 2022-11-19 | End: 2022-11-21 | Stop reason: HOSPADM

## 2022-11-19 RX ORDER — SODIUM CHLORIDE 0.9 % (FLUSH) 0.9 %
5-40 SYRINGE (ML) INJECTION AS NEEDED
Status: DISCONTINUED | OUTPATIENT
Start: 2022-11-19 | End: 2022-11-21 | Stop reason: HOSPADM

## 2022-11-19 RX ORDER — ENOXAPARIN SODIUM 100 MG/ML
40 INJECTION SUBCUTANEOUS DAILY
COMMUNITY

## 2022-11-19 RX ORDER — ESCITALOPRAM OXALATE 10 MG/1
20 TABLET ORAL EVERY EVENING
Status: DISCONTINUED | OUTPATIENT
Start: 2022-11-19 | End: 2022-11-19

## 2022-11-19 RX ORDER — GUAIFENESIN 100 MG/5ML
81 LIQUID (ML) ORAL DAILY
COMMUNITY

## 2022-11-19 RX ORDER — ENOXAPARIN SODIUM 100 MG/ML
40 INJECTION SUBCUTANEOUS EVERY 24 HOURS
Status: DISCONTINUED | OUTPATIENT
Start: 2022-11-19 | End: 2022-11-21 | Stop reason: HOSPADM

## 2022-11-19 RX ORDER — OXYBUTYNIN CHLORIDE 5 MG/1
5 TABLET ORAL 2 TIMES DAILY
Status: DISCONTINUED | OUTPATIENT
Start: 2022-11-19 | End: 2022-11-21 | Stop reason: HOSPADM

## 2022-11-19 RX ORDER — ACETAMINOPHEN 325 MG/1
650 TABLET ORAL
Status: DISCONTINUED | OUTPATIENT
Start: 2022-11-19 | End: 2022-11-21 | Stop reason: HOSPADM

## 2022-11-19 RX ORDER — SODIUM CHLORIDE 0.9 % (FLUSH) 0.9 %
5-40 SYRINGE (ML) INJECTION EVERY 8 HOURS
Status: DISCONTINUED | OUTPATIENT
Start: 2022-11-19 | End: 2022-11-21 | Stop reason: HOSPADM

## 2022-11-19 RX ORDER — ESCITALOPRAM OXALATE 10 MG/1
20 TABLET ORAL EVERY MORNING
Status: DISCONTINUED | OUTPATIENT
Start: 2022-11-20 | End: 2022-11-21 | Stop reason: HOSPADM

## 2022-11-19 RX ORDER — AMOXICILLIN 500 MG/1
500 TABLET, FILM COATED ORAL 2 TIMES DAILY
COMMUNITY
End: 2022-11-21

## 2022-11-19 RX ORDER — SODIUM CHLORIDE, SODIUM LACTATE, POTASSIUM CHLORIDE, CALCIUM CHLORIDE 600; 310; 30; 20 MG/100ML; MG/100ML; MG/100ML; MG/100ML
150 INJECTION, SOLUTION INTRAVENOUS CONTINUOUS
Status: DISCONTINUED | OUTPATIENT
Start: 2022-11-19 | End: 2022-11-21 | Stop reason: HOSPADM

## 2022-11-19 RX ADMIN — OXYBUTYNIN CHLORIDE 5 MG: 5 TABLET ORAL at 21:52

## 2022-11-19 RX ADMIN — HYDROXYCHLOROQUINE SULFATE 100 MG: 200 TABLET ORAL at 20:14

## 2022-11-19 RX ADMIN — SODIUM CHLORIDE, POTASSIUM CHLORIDE, SODIUM LACTATE AND CALCIUM CHLORIDE 125 ML/HR: 600; 310; 30; 20 INJECTION, SOLUTION INTRAVENOUS at 22:12

## 2022-11-19 RX ADMIN — SODIUM CHLORIDE, POTASSIUM CHLORIDE, SODIUM LACTATE AND CALCIUM CHLORIDE 999 ML/HR: 600; 310; 30; 20 INJECTION, SOLUTION INTRAVENOUS at 14:09

## 2022-11-19 RX ADMIN — FAMOTIDINE 20 MG: 20 TABLET, FILM COATED ORAL at 20:13

## 2022-11-19 RX ADMIN — SODIUM CHLORIDE, POTASSIUM CHLORIDE, SODIUM LACTATE AND CALCIUM CHLORIDE 150 ML/HR: 600; 310; 30; 20 INJECTION, SOLUTION INTRAVENOUS at 15:16

## 2022-11-19 RX ADMIN — ENOXAPARIN SODIUM 40 MG: 100 INJECTION SUBCUTANEOUS at 20:14

## 2022-11-19 RX ADMIN — ACETAMINOPHEN 1000 MG: 500 TABLET ORAL at 15:44

## 2022-11-19 RX ADMIN — ACETAMINOPHEN 650 MG: 325 TABLET ORAL at 20:14

## 2022-11-19 RX ADMIN — CEFTRIAXONE 1 G: 1 INJECTION, POWDER, FOR SOLUTION INTRAMUSCULAR; INTRAVENOUS at 14:30

## 2022-11-19 NOTE — ED PROVIDER NOTES
EMERGENCY DEPARTMENT HISTORY AND PHYSICAL EXAM      Date: 11/19/2022  Patient Name: Diaz Rm    History of Presenting Illness     Chief Complaint   Patient presents with    Fever     Pt with diagnosed UTI, 30 weeks pregnant, delay in getting antibiotics, describes fever starting shortly after starting antibiotics, Dr. Paxton Russell to triage to evaluate       History Provided By: Patient    HPI: Diaz Rm, 38 y.o. female with a past medical history significant for medical problems as stated below presents to the ED with cc of fever. She notes she was diagnosed with a UTI 1 week ago and there is a delay in treatment. She is on day 2 of amoxicillin. She had a fever 103 this morning she is a decreased appetite as well. She complains of dysuria hematuria. Denies any abdominal pain but does have back pain. She discussed this with her OB who recommended she come in for evaluation. She does have a history of autoimmune diseases as well. There are no associated symptoms. No other exacerbating or ameliorating factors. PCP: Melony Danielle MD    No current facility-administered medications on file prior to encounter. Current Outpatient Medications on File Prior to Encounter   Medication Sig Dispense Refill    escitalopram oxalate (LEXAPRO) 20 mg tablet Take 1 Tablet by mouth daily. 90 Tablet 1    oxybutynin (DITROPAN) 5 mg tablet Take 1 Tablet by mouth three (3) times daily. 90 Tablet 2    promethazine-dextromethorphan (PROMETHAZINE-DM) 6.25-15 mg/5 mL syrup Take 5 mL by mouth nightly as needed for Cough. 60 mL 0    magic mouthwash (LYNN) susp Take 10 mL by mouth every four (4) hours. Magic Mouthwash 1:1:1:1 Aluminum-Magnesium Hydroxide (Maalox) suspension Diphenhydramine elixir 12.5 mg/5 mL Lidocaine Viscous 2% solution Nystatin suspension 100,000 units/ml Total Volume 120 ml 200 mL 0    magic mouthwash (LYNN) susp Take 10 mL by mouth every four (4) hours.  Magic Mouthwash 1:1:1:1 Aluminum-Magnesium Hydroxide (Maalox) suspension Diphenhydramine elixir 12.5 mg/5 mL Lidocaine Viscous 2% solution Nystatin suspension 100,000 units/ml Total Volume 120 ml 200 mL 0    lisdexamfetamine (VYVANSE) 30 mg capsule Take 1 Capsule by mouth daily. Max Daily Amount: 30 mg. (Patient not taking: Reported on 6/12/2022) 30 Capsule 0    glycopyrrolate (ROBINUL) 1 mg tablet TAKE 2 TABLETS BY MOUTH TWO (2) TIMES A DAY. INDICATIONS: HYPERHYDROSIS 120 Tablet 2    cefUROXime (CEFTIN) 500 mg tablet Take 1 Tablet by mouth two (2) times a day. 14 Tablet 0    crisaborole 2 % oint 1 Each by Apply Externally route two (2) times a day. (Patient not taking: Reported on 3/24/2022) 100 g 1    triamcinolone acetonide (KENALOG) 0.5 % ointment APPLY TO AFFECTED AREA TWO (2) TIMES A DAY. USE THIN LAYER (Patient not taking: Reported on 3/24/2022) 30 g 0    methotrexate 25 mg/mL chemo injection 1 MILLILITER ONCE A WEEK (Patient not taking: Reported on 3/24/2022)      clotrimazole (MYCELEX) 10 mg rasta Take 10 mg by mouth five (5) times daily. (Patient not taking: Reported on 3/24/2022)      lidocaine 5 % topical cream Apply  to affected area two (2) times daily as needed for Pain. 15g tube (Patient not taking: Reported on 3/24/2022) 1 Tube 0    B.infantis-B.ani-B.long-B.bifi (Probiotic 4X) 10-15 mg TbEC Take 1 Capsule by mouth two (2) times a day. (Patient not taking: Reported on 3/24/2022)      nystatin (MYCOSTATIN) 100,000 unit/mL suspension Take 5 mL by mouth four (4) times daily.  swish and spit (Patient not taking: Reported on 3/24/2022) 473 mL 1    Prodigy No Coding strip AS DIRECTED (Patient not taking: Reported on 8/30/2021) 100 Strip 5    Prodigy Twist Top Lancet 28 gauge misc AS DIRECTED (Patient not taking: Reported on 8/30/2021) 100 Lancet 0    PREVIDENT 5000 SENSITIVE 1.1-5 % pste USE AS DIRECTED TWICE A DAY SPIT OUT EXCESS AND DO NOT RINSE, EAT OR DRINK FOR 30 MINUTES (Patient not taking: Reported on 3/24/2022) hydroxychloroquine (PLAQUENIL) 200 mg tablet 200 mg daily. lidocaine-prilocaine (EMLA) topical cream For port flushing every 3 month (Patient not taking: Reported on 3/24/2022)  2    albuterol (PROVENTIL HFA, VENTOLIN HFA, PROAIR HFA) 90 mcg/actuation inhaler Take 2 Puffs by inhalation every four (4) hours as needed for Wheezing. 1 Inhaler 0    SYMBICORT 160-4.5 mcg/actuation HFAA TAKE 2 PUFFS BY MOUTH TWICE A DAY (Patient not taking: Reported on 3/24/2022)  12    EPIPEN 2-SANJAY 0.3 mg/0.3 mL injection 1 (ONE) INJECTION AS NEEDED  0    traMADol (ULTRAM) 50 mg tablet Take 50 mg by mouth every six (6) hours as needed for Pain. cyanocobalamin (Vitamin B-12) 1,000 mcg tablet Take  by mouth daily. (Patient not taking: Reported on 3/24/2022)      RESTASIS 0.05 % ophthalmic emulsion INSTILL 1 DROP INTO EACH EYE TWICE DAILY (Patient not taking: Reported on 3/24/2022)  4    SAFETY-ABDI TB SYR 1CC/25GX5/8\" 1 mL 25 gauge x 5/8\" syrg USE TO INJECT METHOTREXATE ONCE A WEEK (Patient not taking: Reported on 6/11/0489)  2    folic acid (FOLVITE) 1 mg tablet TAKE 1 TABLET ORALLY DAILY (Patient not taking: Reported on 3/24/2022)  11    Nebulizer & Compressor machine 1 Each by Does Not Apply route three (3) times daily as needed. 1 Each 0    cholecalciferol (Vitamin D3) (1000 Units /25 mcg) tablet Take 1 Tab by mouth daily. (Patient not taking: Reported on 3/24/2022)      levothyroxine (SYNTHROID) 75 mcg tablet Take 75 mcg by mouth Daily (before breakfast).  (Patient not taking: Reported on 3/24/2022)         Past History     Past Medical History:  Past Medical History:   Diagnosis Date    Acquired hypothyroidism     Adverse effect of anesthesia     labile BP during/after C section    Antiphospholipid antibody syndrome (HCC)     Asthma     Broken bones     history of 9 broken bones    Chronic UTI (urinary tract infection)     \"extra valve right kidney causes UTI\"    Clotting disorder (HCC)     Fibromyalgia     Functional tremor Gestational diabetes     GI bleed 2007,2011,2015,2016    lower GI last colonoscopy in 2016 normal     Huntingtons chorea (HCC)     Hyperhydrosis disorder     Ill-defined condition     [place]/ tested genetically - daughter has [place]    Infertility     PCOS    PCOS (polycystic ovarian syndrome)     Precancerous skin lesion     removed from Right shoulder    Preeclampsia 2011    pregnancy    Reactive airway disease     Rheumatoid arthritis ([address]     SVT (supraventricular tachycardia) ([address] 2011    occured during pregnancy when picc line inserted.        Past Surgical History:  Past Surgical History:   Procedure Laterality Date    ANORECTAL MANOMETRY  11/19/2021         COLONOSCOPY N/A 6/13/2016    COLONOSCOPY performed by Jaqueline Steinberg MD at [facility] ENDOSCOPY    COLONOSCOPY N/A 5/22/2020    COLONOSCOPY performed by Viv Mercado MD at [facility] ENDOSCOPY    COLONOSCOPY,DIAGNOSTIC  5/22/2020         HX BREAST BIOPSY Right 03/17/2022        HX BREAST LUMPECTOMY Right     HX HEENT      HX LUMBAR DISKECTOMY  septemeber 14 2021    HX WISDOM TEETH EXTRACTION  2004    IR INSERT TUNL CVC W PORT OVER 5 YEARS      UPPER GI ENDOSCOPY,BIOPSY  2/12/2019         UPPER GI ENDOSCOPY,BIOPSY  5/22/2020         UPPER GI ENDOSCOPY,DILATN W GUIDE  2/12/2019            Family History:  Family History   Problem Relation Age of Onset    Other Mother         Chris's disease    Hypertension Mother     Dementia Mother     Stroke Mother     High Cholesterol Father     Heart Disease Father     Diabetes Father     Hypertension Father     Asthma Brother     Diabetes Brother     Other Brother         varicocele, hernias    Hypertension Brother     Alcohol abuse Brother     Hypertension Maternal Grandmother     Elevated Lipids Maternal Grandmother     Cancer Maternal Grandmother         breast    Other Maternal Grandmother         polymyalgia rheumatica    Glaucoma Maternal Grandmother     Cancer Maternal Grandfather prostate    Huntingtons disease Maternal Grandfather     Cancer Paternal Grandmother         lung with mets    Cancer Paternal Grandfather         prostate, colon    Diabetes Paternal Grandfather     Heart Disease Paternal Grandfather     Alzheimer's Disease Paternal Grandfather     Dementia Paternal Grandfather        Social History:  Social History     Tobacco Use    Smoking status: Never    Smokeless tobacco: Never   Vaping Use    Vaping Use: Never used   Substance Use Topics    Alcohol use: Yes     Comment: few drinks per year    Drug use: No       Allergies: Allergies   Allergen Reactions    Latex Swelling    Entex [Phenylephrine-Guaifenesin] Anaphylaxis, Hives and Palpitations    Remicade [Infliximab] Anaphylaxis    Rituxan [Rituximab] Anaphylaxis     Stated not allergic 7/15/21    Sulfa (Sulfonamide Antibiotics) Other (comments)     Mouth irritation, elevated HR, SOB, flushing    Alpha-Gal (Galactose-Alpha-1,3-Galactose) Other (comments)    Beef Containing Products Other (comments)    Mucinex [Guaifenesin] Anaphylaxis and Hives    Pepto-Bismol [Bismuth Subsalicylate] Nausea and Vomiting    Pork Derived (Porcine) Other (comments)    Vancomycin Hives         Review of Systems   Review of Systems   Constitutional:  Positive for fatigue and fever. HENT:  Negative for congestion. Eyes:  Negative for visual disturbance. Respiratory:  Positive for shortness of breath. Negative for chest tightness. Cardiovascular:  Negative for chest pain. Gastrointestinal:  Positive for nausea. Negative for abdominal pain and vomiting. Genitourinary:  Positive for dysuria and hematuria. Negative for vaginal bleeding, vaginal discharge and vaginal pain. Musculoskeletal:  Positive for back pain. Skin:  Negative for rash and wound. Allergic/Immunologic: Positive for immunocompromised state. Neurological:  Negative for dizziness and headaches. Hematological:  Does not bruise/bleed easily.      Physical Exam Physical Exam  Vitals and nursing note reviewed. Constitutional:       Appearance: Normal appearance. HENT:      Head: Normocephalic and atraumatic. Nose: Nose normal.   Eyes:      Extraocular Movements: Extraocular movements intact. Pupils: Pupils are equal, round, and reactive to light. Cardiovascular:      Rate and Rhythm: Normal rate and regular rhythm. Pulmonary:      Effort: Pulmonary effort is normal.   Abdominal:      General: Abdomen is flat. Musculoskeletal:         General: No deformity or signs of injury. Cervical back: Normal range of motion. Skin:     General: Skin is warm and dry. Neurological:      General: No focal deficit present. Mental Status: She is alert and oriented to person, place, and time. Psychiatric:         Mood and Affect: Mood normal.         Behavior: Behavior normal.       Diagnostic Study Results     Labs -   No results found for this or any previous visit (from the past 24 hour(s)). Radiologic Studies -   No orders to display     CT Results  (Last 48 hours)      None          CXR Results  (Last 48 hours)      None              Medical Decision Making   I am the first provider for this patient. I reviewed the vital signs, available nursing notes, past medical history, past surgical history, family history and social history. Vital Signs-Reviewed the patient's vital signs. Patient Vitals for the past 12 hrs:   Temp Pulse Resp BP SpO2   11/19/22 1220 99 °F (37.2 °C) (!) 109 20 137/86 99 %       Records Reviewed: Nursing records and medical records reviewed    MDM:  DDx includes UTI, pyelonephritis, pregnancy    Provider Notes (Medical Decision Making):   5year-old female 30 weeks pregnant presents with UTI and fever. Vital signs show temperature of 99 and pulse of 109. She is well-appearing and not septic. She is 3 weeks pregnant and I was pregnancy. She is on amoxicillin. No signs of impending septic shock or delivery.   Will transfer upstairs to L&D for fetal monitoring and further work-up. ED Course:   Initial assessment performed. The patients presenting problems have been discussed, and they are in agreement with the care plan formulated and outlined with them. I have encouraged them to ask questions as they arise throughout their visit. Disposition:  Transfer to LD    DISCHARGE PLAN:  1. Current Discharge Medication List        2. Follow-up Information    None       3. Return to ED if worse     Diagnosis     Clinical Impression:   1. Acute cystitis with hematuria        Attestations:    Naresh Price MD    Please note that this dictation was completed with [employer], the computer voice recognition software. Quite often unanticipated grammatical, syntax, homophones, and other interpretive errors are inadvertently transcribed by the computer software. Please disregard these errors. Please excuse any errors that have escaped final proofreading. Thank you.

## 2022-11-19 NOTE — H&P
OB History & Physical    Name: Gabriel Pritchett MRN: [id number]  SSN: [id number]    YOB: 1980  Age: 38 y.o. Sex: female      Subjective:     Reason for Admission:  Pregnancy and fever, back pain, decreased fetal activity. History of Present Illness: Gabriel Pritchett is a 38 y.o. G 6 P1 AB3 Ectopic 3 female with an estimated gestational age of 27w4d with Estimated Date of Delivery: 23. Patient complains of fever to 103 at home last nite, chills, back ache. She states she was seen in the office  and was evaluated for UTI symptoms. Culture returned positive for klebsiella and she was started on amoxacillin yesterday. She states she has taken 2 doses. The patient describes low back pain as well. She also reports fetal activity reduced from usual, no bleeding, ROM, contractions. Problems with this pregnancy:  GDM--currently on metformin 1000mg BID. She states she is to start insulin but has not yet done so. 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   Currently using ASA 81mg / day and      lovenox 40 mg/day  RA---current medications: cimzia IM q month; Hydrochloroquine 100 mg BID  Depression---Lexapro 20 mg/day  Previous CS--planning repeat; previous pregnancy CO by prolonged PPROM, pre-eclampsia; multiple ongoing complications for the child  AMA---Materna 21 negative  Previous urine C&S at initial OB visit positive GBS  Spine disease--L4-5 discectomy 2021; patient states she has multiple disc herniations cervical spine and lumbar spine, with radiculopathy left leg and right hand  FH positive Chris's Chorea---patient states she has screened positive    OB History          6    Para   1    Term           1    AB   4    Living   1         SAB   3    IAB        Ectopic   1    Molar        Multiple        Live Births   1              Past Medical History:   Diagnosis Date    Abnormal Papanicolaou smear of cervix     Acquired hypothyroidism     Adverse effect of anesthesia     labile BP during/after C section    Anemia     Antiphospholipid antibody syndrome (HCC)     Antiphospholipid syndrome (HCC)     Asthma     Breast disorder     tumor on right breast it radiation seeds, non canerours    Broken bones     history of 9 broken bones    Chronic UTI (urinary tract infection)     \"extra valve right kidney causes UTI\"    Clotting disorder (HCC)     Fibromyalgia     Functional tremor     Gestational diabetes     GI bleed ,,,2016    lower GI last colonoscopy in 2016 normal     History of degenerative disc disease     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     Systemic lupus erythematosus ([address]     Trauma      Past Surgical History:   Procedure Laterality Date    ANORECTAL MANOMETRY  2021         COLONOSCOPY N/A 2016    COLONOSCOPY performed by Yudi Rogers MD at [facility] ENDOSCOPY    COLONOSCOPY N/A 2020    COLONOSCOPY performed by Alexa Leyden, MD at [address] [place] [address]  2020         HX BREAST BIOPSY Right 2022        HX BREAST LUMPECTOMY Right     HX  SECTION      HX HEENT      HX LUMBAR DISKECTOMY  septemeber 14     HX WISDOM TEETH EXTRACTION      IR INSERT TUNL CVC W PORT OVER 5 YEARS      UPPER GI ENDOSCOPY,BIOPSY  2019         UPPER GI ENDOSCOPY,BIOPSY  05/22/2020         UPPER GI ENDOSCOPY,DILATN W GUIDE  02/12/2019          Social History     Occupational History    Not on file   Tobacco Use    Smoking status: Never    Smokeless tobacco: Never   Vaping Use    Vaping Use: Never used   Substance and Sexual Activity    Alcohol use: Not Currently     Comment: few drinks per year    Drug use: No    Sexual activity: Not on file     Family History   Problem Relation Age of Onset    Other Mother         Fayette's disease    Hypertension Mother     Dementia Mother     Stroke Mother     High Cholesterol Father     Heart Disease Father     Diabetes Father     Hypertension Father     Asthma Brother     Diabetes Brother     Other Brother         varicocele, hernias    Hypertension Brother     Alcohol abuse Brother     Hypertension Maternal Grandmother     Elevated Lipids Maternal Grandmother     Cancer Maternal Grandmother         breast    Other Maternal Grandmother         polymyalgia rheumatica    Glaucoma Maternal Grandmother     Cancer Maternal Grandfather         prostate    Huntingtons disease Maternal Grandfather     Cancer Paternal Grandmother         lung with mets    Cancer Paternal Grandfather         prostate, colon    Diabetes Paternal Grandfather     Heart Disease Paternal Grandfather     Alzheimer's Disease Paternal Grandfather     Dementia Paternal Grandfather      SH:  Patient denies tobacco, alcohol, recreational drugs. .     FH: reviewed and negative    Review of systems:    General: Denies fever, sweats, chills  CV: Denies CP, palpitations  Resp: Denies SOB, cough  GI: Denies nausea, vomiting, diarrhea  : Denies dysura, abnormal frequency, hematuria  Neuro: Denies HA, visual disturbance  All other systems reviewed and are negative    Allergies   Allergen Reactions    Latex Swelling    Entex [Phenylephrine-Guaifenesin] Anaphylaxis, Hives and Palpitations    Remicade [Infliximab] Anaphylaxis    Rituxan [Rituximab] Anaphylaxis     Stated not allergic 7/15/21    Sulfa (Sulfonamide Antibiotics) Other (comments)     Mouth irritation, elevated HR, SOB, flushing    Alpha-Gal (Galactose-Alpha-1,3-Galactose) Other (comments)    Beef Containing Products Other (comments)    Mucinex [Guaifenesin] Anaphylaxis and Hives    Pepto-Bismol [Bismuth Subsalicylate] Nausea and Vomiting    Pork Derived (Porcine) Other (comments)    Vancomycin Hives     Prior to Admission medications    Medication Sig Start Date End Date Taking? Authorizing Provider   Cetirizine (ZyrTEC) 10 mg cap Take 1 Tablet by mouth. Yes Provider, Historical   montelukast (Singulair) 10 mg tablet Take 10 mg by mouth daily. Yes Provider, Historical   aspirin 81 mg chewable tablet Take 81 mg by mouth daily. Yes Provider, Historical   famotidine (Pepcid) 20 mg tablet Take 20 mg by mouth two (2) times a day. Yes Provider, Historical   enoxaparin (Lovenox) 40 mg/0.4 mL 40 mg by SubCUTAneous route daily. Yes Provider, Historical   certolizumab pegoL (Cimzia) 400 mg/2 mL (200 mg/mL x 2) sykt injection 400 mg by SubCUTAneous route every thirty (30) days. Yes Provider, Historical   metFORMIN (GLUCOPHAGE) 1,000 mg tablet Take 1,000 mg by mouth two (2) times daily (with meals). Yes Provider, Historical   amoxicillin (AMOXIL) 500 mg tablet Take 500 mg by mouth two (2) times a day. Yes Provider, Historical   escitalopram oxalate (LEXAPRO) 20 mg tablet Take 1 Tablet by mouth daily. 11/18/22  Yes Watson Hanna MD   oxybutynin (DITROPAN) 5 mg tablet Take 1 Tablet by mouth three (3) times daily. Patient taking differently: Take 5 mg by mouth two (2) times a day. 8/17/22  Yes Watson Hanna MD   B.infantis-B.ani-B.long-B.bifi (Probiotic 4X) 10-15 mg TbEC Take 1 Capsule by mouth two (2) times a day. Yes Provider, Historical   hydroxychloroquine (PLAQUENIL) 200 mg tablet 200 mg daily.  12/23/19  Yes Provider, Historical   cyanocobalamin 1,000 mcg tablet Take  by mouth daily. Yes Provider, Historical   SAFETY-ABDI TB SYR 1CC/25GX5/8\" 1 mL 25 gauge x 58\" syrg USE TO INJECT METHOTREXATE ONCE A WEEK 17  Yes Provider, Historical   cholecalciferol (VITAMIN D3) (1000 Units /25 mcg) tablet Take 1 Tablet by mouth daily. Yes Provider, Historical   levothyroxine (SYNTHROID) 75 mcg tablet Take 75 mcg by mouth six (6) days a week. Yes Provider, Historical   glycopyrrolate (ROBINUL) 1 mg tablet TAKE 2 TABLETS BY MOUTH TWO (2) TIMES A DAY. INDICATIONS: HYPERHYDROSIS 22   Alaina Hammer MD Prodig[employer] No Coding strip AS DIRECTED  Patient not taking: Reported on 2021   MD Sammie Gusman Twist Top Lancet 28 gauge misc AS DIRECTED  Patient not taking: Reported on 2021   Appkirsten Olmstead MD   albuterol (PROVENTIL HFA, VENTOLIN HFA, PROAIR HFA) 90 mcg/actuation inhaler Take 2 Puffs by inhalation every four (4) hours as needed for Wheezing.  19   Zoila Sutherland MD   EPIPEN 2-SANJAY 0.3 mg/0.3 mL injection 1 (ONE) INJECTION AS NEEDED  Patient not taking: Reported on 2022   Provider, Historical        Objective:     Vitals:    Vitals:    22 1459 22 1518 22 1618 22 1712   BP: (!) 123/57  (!) 107/53    Pulse: (!) 110  (!) 114    Resp:   20    Temp:  (!) 100.7 °F (38.2 °C) (!) 102 °F (38.9 °C) 99.7 °F (37.6 °C)   SpO2:   99%    Weight:       Height:          Temp (24hrs), Av.1 °F (37.8 °C), Min:99 °F (37.2 °C), Max:102 °F (38.9 °C)    BP  Min: 107/53  Max: 137/86     Physical Exam  General: in NAD, psychologically stable  Neck: normal ROM  Back: no CVAT; tender over lumbar spine as well as iliac crests and bilateral SI joints, R>L  Heart: regular rate and rhythm; II/VI holosystolic murmur at LSB; no diastolic murmurs noted  Respiratory: unlabored breathing; CTA  Abdomen: Gravid, soft, nontender, no abnormal masses, rebound, rigidity, guarding  Fundus: soft, nontender  Extremities: no abnormal cyanosis, clubbing, edema  Skin: warm, dry, no abnormal lesions noted  Neurological: DTR's 1-2+ no clonus  Pelvic:Cervical Exam: not checked  Uterine Activity: no contractions detected  Membranes: no gross evidence of ROM  Fetal Heart Rate: adequate variability and reactivity; no significant abnormal decelerations    Labs:   Recent Results (from the past 24 hour(s))   CBC WITH AUTOMATED DIFF    Collection Time: 11/19/22  2:06 PM   Result Value Ref Range    WBC 14.0 (H) 3.6 - 11.0 K/uL    RBC 3.66 (L) 3.80 - 5.20 M/uL    HGB 11.1 (L) 11.5 - 16.0 g/dL    HCT 32.7 (L) 35.0 - 47.0 %    MCV 89.3 80.0 - 99.0 FL    MCH 30.3 26.0 - 34.0 PG    MCHC 33.9 30.0 - 36.5 g/dL    RDW 12.8 11.5 - 14.5 %    PLATELET 617 167 - 713 K/uL    MPV 10.7 8.9 - 12.9 FL    NRBC 0.0 0  WBC    ABSOLUTE NRBC 0.00 0.00 - 0.01 K/uL    NEUTROPHILS 81 (H) 32 - 75 %    LYMPHOCYTES 11 (L) 12 - 49 %    MONOCYTES 7 5 - 13 %    EOSINOPHILS 0 0 - 7 %    BASOPHILS 0 0 - 1 %    IMMATURE GRANULOCYTES 1 (H) 0.0 - 0.5 %    ABS. NEUTROPHILS 11.4 (H) 1.8 - 8.0 K/UL    ABS. LYMPHOCYTES 1.5 0.8 - 3.5 K/UL    ABS. MONOCYTES 1.0 0.0 - 1.0 K/UL    ABS. EOSINOPHILS 0.0 0.0 - 0.4 K/UL    ABS. BASOPHILS 0.0 0.0 - 0.1 K/UL    ABS. IMM.  GRANS. 0.1 (H) 0.00 - 0.04 K/UL    DF AUTOMATED     URINALYSIS W/ REFLEX CULTURE    Collection Time: 11/19/22  2:06 PM    Specimen: Urine   Result Value Ref Range    Color YELLOW/STRAW      Appearance CLEAR CLEAR      Specific gravity 1.005 1.003 - 1.030      pH (UA) 6.0 5.0 - 8.0      Protein Negative NEG mg/dL    Glucose Negative NEG mg/dL    Ketone 15 (A) NEG mg/dL    Bilirubin Negative NEG      Blood Negative NEG      Urobilinogen 0.2 0.2 - 1.0 EU/dL    Nitrites Negative NEG      Leukocyte Esterase LARGE (A) NEG      UA:UC IF INDICATED URINE CULTURE ORDERED (A) CNI      WBC >100 (H) 0 - 4 /hpf    RBC 0-5 0 - 5 /hpf    Epithelial cells FEW FEW /lpf    Bacteria Negative NEG /hpf    Hyaline cast 0-2 0 - 2 /lpf   METABOLIC PANEL, COMPREHENSIVE    Collection Time: 11/19/22  2:06 PM   Result Value Ref Range    Sodium 132 (L) 136 - 145 mmol/L    Potassium 4.0 3.5 - 5.1 mmol/L    Chloride 102 97 - 108 mmol/L    CO2 22 21 - 32 mmol/L    Anion gap 8 5 - 15 mmol/L    Glucose 106 (H) 65 - 100 mg/dL    BUN 5 (L) 6 - 20 MG/DL    Creatinine 0.47 (L) 0.55 - 1.02 MG/DL    BUN/Creatinine ratio 11 (L) 12 - 20      eGFR >60 >60 ml/min/1.73m2    Calcium 8.3 (L) 8.5 - 10.1 MG/DL    Bilirubin, total 0.6 0.2 - 1.0 MG/DL    ALT (SGPT) 15 12 - 78 U/L    AST (SGOT) 11 (L) 15 - 37 U/L    Alk. phosphatase 114 45 - 117 U/L    Protein, total 6.3 (L) 6.4 - 8.2 g/dL    Albumin 2.4 (L) 3.5 - 5.0 g/dL    Globulin 3.9 2.0 - 4.0 g/dL    A-G Ratio 0.6 (L) 1.1 - 2.2         The patient's prenatal record is reviewed, including notes and diagnostic results, laboratory findings and ultrasound findings.     Assessment and Plan:     30 week IUP, complicated UTI vs pyelonephritis; multiple co-morbidities  Admit of OBS status  Start Rocephin; hydrate; screening labs  Maternal/fetal surveillance    Signed By:  Robert Ellison MD     November 19, 2022

## 2022-11-19 NOTE — PROGRESS NOTES
1345- patient here c/o fever, intermittent SOB, headache and bilateral flank pain. Patient states she was diagnosed with a UTI and currently on amoxicillin. Patient state positive fetal movement but has been decreased since fever. Patient states increased nausea and decreased appetite. Patient declines vaginal bleeding, leaking fluids, visual disturbances or blurred vision. Patient placed on monitor, history obtained, assessment done. Patient given call dawn. 1320-DearJane Beasley notified of patient arrival and assessment. Orders stated to start IV, draw CBC, CMP and U/A and give a 1 liter bolus. 1325-Patient requesting to draw labs and receive IV via port. 1345-Dr. Faraz Oshea in to see patient, POC being discussed. 1405- right chest VAD access with 20g 1 inch     1517-Patient twmp 100.7, Dr. Faraz Oshea notified, orders received for 1000 mg tylenol Q6.    1535-Bedside report to Tashia Shirley RN, care turned over at this time.

## 2022-11-20 LAB
BACTERIA SPEC CULT: NORMAL
BASOPHILS # BLD: 0 K/UL (ref 0–0.1)
BASOPHILS NFR BLD: 0 % (ref 0–1)
DIFFERENTIAL METHOD BLD: ABNORMAL
EOSINOPHIL # BLD: 0 K/UL (ref 0–0.4)
EOSINOPHIL NFR BLD: 0 % (ref 0–7)
ERYTHROCYTE [DISTWIDTH] IN BLOOD BY AUTOMATED COUNT: 12.9 % (ref 11.5–14.5)
GLUCOSE BLD STRIP.AUTO-MCNC: 113 MG/DL (ref 65–117)
GLUCOSE BLD STRIP.AUTO-MCNC: 137 MG/DL (ref 65–117)
GLUCOSE BLD STRIP.AUTO-MCNC: 86 MG/DL (ref 65–117)
HCT VFR BLD AUTO: 37.3 % (ref 35–47)
HGB BLD-MCNC: 12.6 G/DL (ref 11.5–16)
IMM GRANULOCYTES # BLD AUTO: 0 K/UL (ref 0–0.04)
IMM GRANULOCYTES NFR BLD AUTO: 0 % (ref 0–0.5)
LYMPHOCYTES # BLD: 1.1 K/UL (ref 0.8–3.5)
LYMPHOCYTES NFR BLD: 12 % (ref 12–49)
MCH RBC QN AUTO: 30.3 PG (ref 26–34)
MCHC RBC AUTO-ENTMCNC: 33.8 G/DL (ref 30–36.5)
MCV RBC AUTO: 89.7 FL (ref 80–99)
MONOCYTES # BLD: 0.7 K/UL (ref 0–1)
MONOCYTES NFR BLD: 8 % (ref 5–13)
NEUTS SEG # BLD: 7.3 K/UL (ref 1.8–8)
NEUTS SEG NFR BLD: 80 % (ref 32–75)
NRBC # BLD: 0 K/UL (ref 0–0.01)
NRBC BLD-RTO: 0 PER 100 WBC
PLATELET # BLD AUTO: 146 K/UL (ref 150–400)
PMV BLD AUTO: 10.8 FL (ref 8.9–12.9)
RBC # BLD AUTO: 4.16 M/UL (ref 3.8–5.2)
SERVICE CMNT-IMP: ABNORMAL
SERVICE CMNT-IMP: NORMAL
WBC # BLD AUTO: 9.1 K/UL (ref 3.6–11)

## 2022-11-20 PROCEDURE — 74011250637 HC RX REV CODE- 250/637: Performed by: OBSTETRICS & GYNECOLOGY

## 2022-11-20 PROCEDURE — G0378 HOSPITAL OBSERVATION PER HR: HCPCS

## 2022-11-20 PROCEDURE — 82962 GLUCOSE BLOOD TEST: CPT

## 2022-11-20 PROCEDURE — 74011000258 HC RX REV CODE- 258: Performed by: OBSTETRICS & GYNECOLOGY

## 2022-11-20 PROCEDURE — 96372 THER/PROPH/DIAG INJ SC/IM: CPT

## 2022-11-20 PROCEDURE — 74011250636 HC RX REV CODE- 250/636: Performed by: OBSTETRICS & GYNECOLOGY

## 2022-11-20 PROCEDURE — 36415 COLL VENOUS BLD VENIPUNCTURE: CPT

## 2022-11-20 PROCEDURE — 96361 HYDRATE IV INFUSION ADD-ON: CPT

## 2022-11-20 PROCEDURE — 85025 COMPLETE CBC W/AUTO DIFF WBC: CPT

## 2022-11-20 RX ORDER — HYDROXYCHLOROQUINE SULFATE 200 MG/1
200 TABLET, FILM COATED ORAL 2 TIMES DAILY WITH MEALS
Status: DISCONTINUED | OUTPATIENT
Start: 2022-11-20 | End: 2022-11-21 | Stop reason: HOSPADM

## 2022-11-20 RX ORDER — BUTALBITAL, ACETAMINOPHEN AND CAFFEINE 50; 325; 40 MG/1; MG/1; MG/1
1 TABLET ORAL
Status: DISCONTINUED | OUTPATIENT
Start: 2022-11-20 | End: 2022-11-21 | Stop reason: HOSPADM

## 2022-11-20 RX ADMIN — BUTALBITAL, ACETAMINOPHEN, AND CAFFEINE 1 TABLET: 50; 325; 40 TABLET ORAL at 09:37

## 2022-11-20 RX ADMIN — FAMOTIDINE 20 MG: 20 TABLET, FILM COATED ORAL at 17:13

## 2022-11-20 RX ADMIN — CETIRIZINE HYDROCHLORIDE 10 MG: 10 TABLET, FILM COATED ORAL at 07:48

## 2022-11-20 RX ADMIN — SODIUM CHLORIDE, POTASSIUM CHLORIDE, SODIUM LACTATE AND CALCIUM CHLORIDE 125 ML/HR: 600; 310; 30; 20 INJECTION, SOLUTION INTRAVENOUS at 14:14

## 2022-11-20 RX ADMIN — LEVOTHYROXINE SODIUM 75 MCG: 0.05 TABLET ORAL at 06:54

## 2022-11-20 RX ADMIN — OXYBUTYNIN CHLORIDE 5 MG: 5 TABLET ORAL at 08:12

## 2022-11-20 RX ADMIN — FERROUS SULFATE TAB 325 MG (65 MG ELEMENTAL FE) 325 MG: 325 (65 FE) TAB at 07:47

## 2022-11-20 RX ADMIN — CEFTRIAXONE 1 G: 1 INJECTION, POWDER, FOR SOLUTION INTRAMUSCULAR; INTRAVENOUS at 15:17

## 2022-11-20 RX ADMIN — OXYBUTYNIN CHLORIDE 5 MG: 5 TABLET ORAL at 17:12

## 2022-11-20 RX ADMIN — METFORMIN HYDROCHLORIDE 1000 MG: 500 TABLET ORAL at 07:46

## 2022-11-20 RX ADMIN — FAMOTIDINE 20 MG: 20 TABLET, FILM COATED ORAL at 08:12

## 2022-11-20 RX ADMIN — ENOXAPARIN SODIUM 40 MG: 100 INJECTION SUBCUTANEOUS at 22:30

## 2022-11-20 RX ADMIN — HYDROXYCHLOROQUINE SULFATE 200 MG: 200 TABLET ORAL at 17:13

## 2022-11-20 RX ADMIN — HYDROXYCHLOROQUINE SULFATE 100 MG: 200 TABLET ORAL at 07:44

## 2022-11-20 RX ADMIN — METFORMIN HYDROCHLORIDE 1000 MG: 500 TABLET ORAL at 17:12

## 2022-11-20 RX ADMIN — ESCITALOPRAM OXALATE 20 MG: 10 TABLET ORAL at 07:47

## 2022-11-20 RX ADMIN — BUTALBITAL, ACETAMINOPHEN, AND CAFFEINE 1 TABLET: 50; 325; 40 TABLET ORAL at 15:21

## 2022-11-20 RX ADMIN — BUTALBITAL, ACETAMINOPHEN, AND CAFFEINE 1 TABLET: 50; 325; 40 TABLET ORAL at 22:22

## 2022-11-20 NOTE — PROGRESS NOTES
7:10 AM  Bedside and Verbal shift change report given to Zhou Patel RN (oncoming nurse) by Joey Lynch RN (offgoing nurse). Report included the following information SBAR, Kardex, MAR, and Recent Results. 9:15 AM  Dr Abdirashid Burrows in to round on pt. Order received to remove pt from continuous monitoring and start NST BID. Order received for PRN Fioricet and to increase Plaquenil to 200 mg.     9:30 AM  Pt not hungry and does not plan on eating breakfast. Will take blood sugar if pt eats. Pt plans on resting for now. 10:30 AM  Pt asleep in bed. 12:30 PM  Went in to get VS on pt and pt is asleep, will return later. 2:00 PM  Pt finally eating, will check BG after. 7:15 PM  Bedside shift change report given to Suzanne Yeager RN (oncoming nurse) by Zhou Patel RN (offgoing nurse). Report included the following information SBAR, Kardex, Intake/Output, MAR, and Recent Results.

## 2022-11-20 NOTE — PROGRESS NOTES
Ante Partum Progress Note    Denisha Ch  30w3d    Assessment: 38 y.o.  at 30w3d admitted with complicated UTI vs pyelnephritis. Urine culture from office on  grew Klebsiella (resistant to ampicillin and and intermediate for macrobid, otherwise susceptible). But she did not get started on abx until  (amoxicillin). Presented yesterday evening with worsening back pain, and fevers. Tmax 102 (38.9C) at 1618 on , this was also [place]. Started on IV rocephin first dose yesterday afternoon. WBC on admission 14, today 9.1. Pregnancy additionally complicated by multiple medical comorbidities as below. Plan:    Complicated UTI vs pyelo  - urine culture pending here in [facility] 860 (but did have 2 doses of amoxicillin on board)  - for now treating based on culture from office that grew klebsiella  - continue IV rocephin q24hrs until at least 24-48 hours afebrile  - repeat CBC in AM (WBC 14 > 9.1)  - reports HA this AM, will treat with fioricet   - had GBS UTI this pregnancy, will need to go home with abx suppression for rest of pregnancy     DM2  - on metformin 1000mg BID, fasting this AM 86  - continue metformin    APLS  - history of splenic vein thrombosis and abscess .    - continue  ASA 81mg / day and lovenox 40 mg/day    Hypothyroidism  - continue synthroid 75mcg    RA  - on plaquenil 200mg BID  - cimzia IM q month per rheum    Depression  - lexapro 20mg daily    Prenatal care  - PNV daily'  - NST BID  - hx  with prior gestationa, planning repeat    - regular diet  - lovenox dvt ppx     AMA  - low risk NIPT        Orders/Charges: Medium and Non Stress Test    Patient states she has a headache and low back pain. Reports active FM, denies contractions, VB, or LOF.     Vitals:  Visit Vitals  BP (!) 125/59 (BP 1 Location: Left upper arm, BP Patient Position: At rest)   Pulse (!) 103   Temp 98.8 °F (37.1 °C)   Resp 18   Ht 5' 6.5\" (1.689 m)   Wt 102.1 kg (225 lb 1.4 oz) LMP 04/15/2022   SpO2 99%   BMI 35.79 kg/m²     Temp (24hrs), Av.8 °F (37.7 °C), Min:98.8 °F (37.1 °C), Max:102 °F (38.9 °C)      Last 24hr Input/Output:    Intake/Output Summary (Last 24 hours) at 2022 0955  Last data filed at 2022 8718  Gross per 24 hour   Intake 2375 ml   Output 3000 ml   Net -625 ml        Non stress test:  Reactive, appropriate for gestational age     Patient Vitals for the past 4 hrs: Mode Fetal Heart Rate Fetal Activity Variability Decelerations Accelerations RN Reviewed Strip?   22 US Readjusted; External 155 -- -- -- -- --   22 US Readjusted -- -- -- -- -- --   22 US Readjusted -- -- -- -- -- --   22 US Readjusted -- -- -- -- -- --   2205 External 150 -- -- -- -- --   22 0741 External 150 Present 6-25 BPM None Yes Yes   22 0730 External 150 Present 6-25 BPM None Yes Yes   22 0700 External 150 Present 6-25 BPM None No Yes   22 0630 External 150 Present 6-25 BPM None Yes Yes   22 0600 External 150 Present 6-25 BPM None Yes Yes    Patient Vitals for the past 4 hrs: Mode Fetal Heart Rate Fetal Activity Variability Decelerations Accelerations RN Reviewed Strip?   22 US Readjusted; External 155 -- -- -- -- --   22 US Readjusted -- -- -- -- -- --   22 US Readjusted -- -- -- -- -- --   2239 US Readjusted -- -- -- -- -- --   22 0805 External 150 -- -- -- -- --   22 0741 External 150 Present 6-25 BPM None Yes Yes   22 0730 External 150 Present 6-25 BPM None Yes Yes   22 0700 External 150 Present 6-25 BPM None No Yes   22 0630 External 150 Present 6-25 BPM None Yes Yes   22 0600 External 150 Present 6-25 BPM None Yes Yes        Uterine Activity: None     Exam:  Patient without distress.      Abdomen, fundus soft non-tender     Extremities, no redness or tenderness               Additional Exam: Deferred    Labs:     Lab Results   Component Value Date/Time    WBC 9.1 11/20/2022 05:17 AM    WBC 14.0 (H) 11/19/2022 02:06 PM    WBC 6.5 01/05/2022 01:41 PM    WBC 14.4 (H) 06/30/2021 12:54 AM    WBC 13.9 (H) 09/26/2020 01:56 AM    WBC 6.7 05/26/2020 11:31 AM    WBC 6.4 05/23/2020 02:48 AM    WBC 6.6 05/22/2020 01:59 AM    WBC 6.9 05/21/2020 03:53 AM    WBC 8.6 05/20/2020 12:52 AM    WBC 9.7 05/19/2020 12:05 PM    WBC 6.4 05/15/2020 05:54 AM    WBC 10.8 05/14/2020 05:57 AM    WBC 6.8 04/18/2020 06:37 PM    WBC 11.8 (H) 01/31/2020 08:39 AM    WBC 5.8 04/16/2017 11:02 AM    WBC 7.0 04/03/2016 12:59 AM    WBC 10.4 04/02/2016 01:16 PM    WBC 6.8 10/30/2015 05:11 PM    WBC 7.0 01/12/2015 04:00 PM    WBC 6.3 11/15/2013 10:30 PM    WBC 10.7 12/10/2012 07:15 PM    WBC 8.0 09/13/2012 01:25 PM    WBC 11.0 08/10/2011 04:25 AM    WBC 11.0 08/08/2011 11:10 AM    WBC 11.0 08/03/2011 05:00 PM    WBC 9.5 08/01/2011 02:00 AM    WBC 10.5 07/28/2011 09:35 AM    WBC 11.7 (H) 07/25/2011 12:45 AM    WBC 13.5 (H) 07/19/2011 06:40 PM    WBC 10.5 07/18/2011 01:37 PM    WBC 7.5 07/18/2011 12:30 AM    WBC 11.7 (H) 07/13/2011 12:30 PM    WBC 10.5 07/12/2011 06:20 AM    WBC 11.3 (H) 07/11/2011 01:30 AM    WBC 10.4 07/07/2011 10:20 PM    WBC 10.8 07/04/2011 04:00 AM    WBC 11.3 (H) 07/03/2011 12:10 PM    WBC 10.2 06/26/2011 02:55 PM    WBC 10.3 06/23/2011 04:28 PM    WBC 11.3 (H) 06/20/2011 01:40 AM    WBC 10.3 06/19/2011 02:20 AM    WBC 10.8 06/13/2011 12:55 AM    WBC 12.1 (H) 06/12/2011 11:10 AM    WBC 12.1 (H) 06/09/2011 03:20 PM    WBC 10.5 06/02/2011 03:25 AM    WBC 10.1 05/15/2011 08:10 PM    HGB 12.6 11/20/2022 05:17 AM    HGB 11.1 (L) 11/19/2022 02:06 PM    HGB 12.0 01/05/2022 01:41 PM    HGB 12.7 06/30/2021 12:54 AM    HGB 10.6 (L) 09/26/2020 01:56 AM    HGB 12.0 05/26/2020 11:31 AM    HGB 10.4 (L) 05/23/2020 02:48 AM    HGB 10.1 (L) 05/22/2020 01:59 AM    HGB 10.5 (L) 05/21/2020 03:53 AM    HGB 10.1 (L) 05/20/2020 12:52 AM    HGB 11.8 05/19/2020 12:05 PM HGB 11.5 05/15/2020 05:54 AM    HGB 12.5 05/14/2020 05:57 AM    HGB 11.8 04/18/2020 06:37 PM    HGB 12.8 01/31/2020 08:39 AM    HGB 13.4 04/16/2017 11:02 AM    HGB 13.4 04/03/2016 12:59 AM    HGB 14.2 04/02/2016 01:16 PM    HGB 13.0 10/30/2015 05:11 PM    HGB 12.8 01/12/2015 04:00 PM    HGB 13.4 11/15/2013 10:30 PM    HGB 14.6 12/10/2012 07:15 PM    HGB 13.0 09/13/2012 01:25 PM    HGB 9.2 (L) 08/10/2011 04:25 AM    HGB 11.7 08/08/2011 11:10 AM    HGB 11.5 08/03/2011 05:00 PM    HGB 11.4 (L) 08/01/2011 02:00 AM    HGB 11.5 07/28/2011 09:35 AM    HGB 11.3 (L) 07/25/2011 12:45 AM    HGB 11.5 07/19/2011 06:40 PM    HGB 11.0 (L) 07/18/2011 01:37 PM    HGB 14.4 07/18/2011 12:30 AM    HGB 11.5 07/13/2011 12:30 PM    HGB 11.8 07/12/2011 06:20 AM    HGB 12.0 07/11/2011 01:30 AM    HGB 11.2 (L) 07/07/2011 10:20 PM    HGB 11.6 07/04/2011 04:00 AM    HGB 11.4 (L) 07/03/2011 12:10 PM    HGB 11.7 06/26/2011 02:55 PM    HGB 11.4 (L) 06/23/2011 04:28 PM    HGB 12.0 06/20/2011 01:40 AM    HGB 12.8 06/19/2011 02:20 AM    HGB 11.4 (L) 06/13/2011 12:55 AM    HGB 11.4 (L) 06/12/2011 11:10 AM    HGB 10.9 (L) 06/09/2011 03:20 PM    HGB 11.2 (L) 06/02/2011 03:25 AM    HGB 11.0 (L) 05/15/2011 08:10 PM    HCT 37.3 11/20/2022 05:17 AM    HCT 32.7 (L) 11/19/2022 02:06 PM    HCT 36.8 01/05/2022 01:41 PM    HCT 38.0 06/30/2021 12:54 AM    HCT 32.6 (L) 09/26/2020 01:56 AM    HCT 37.2 05/26/2020 11:31 AM    HCT 31.8 (L) 05/23/2020 02:48 AM    HCT 31.6 (L) 05/22/2020 01:59 AM    HCT 32.7 (L) 05/21/2020 03:53 AM    HCT 30.4 (L) 05/20/2020 12:52 AM    HCT 35.5 05/19/2020 12:05 PM    HCT 36.0 05/15/2020 05:54 AM    HCT 37.1 05/14/2020 05:57 AM    HCT 35.3 04/18/2020 06:37 PM    HCT 39.3 01/31/2020 08:39 AM    HCT 39.2 04/16/2017 11:02 AM    HCT 39.9 04/03/2016 12:59 AM    HCT 41.9 04/02/2016 01:16 PM    HCT 38.9 10/30/2015 05:11 PM    HCT 39.8 01/12/2015 04:00 PM    HCT 39.8 11/15/2013 10:30 PM    HCT 43.8 12/10/2012 07:15 PM    HCT 38.8 09/13/2012 01:25 PM    HCT 27.6 (L) 08/10/2011 04:25 AM    HCT 34.3 (L) 08/08/2011 11:10 AM    HCT 33.5 (L) 08/03/2011 05:00 PM    HCT 33.1 (L) 08/01/2011 02:00 AM    HCT 33.0 (L) 07/28/2011 09:35 AM    HCT 33.6 (L) 07/25/2011 12:45 AM    HCT 32.8 (L) 07/19/2011 06:40 PM    HCT 31.9 (L) 07/18/2011 01:37 PM    HCT 41.2 07/18/2011 12:30 AM    HCT 33.4 (L) 07/13/2011 12:30 PM    HCT 34.1 (L) 07/12/2011 06:20 AM    HCT 34.5 (L) 07/11/2011 01:30 AM    HCT 32.0 (L) 07/07/2011 10:20 PM    HCT 33.5 (L) 07/04/2011 04:00 AM    HCT 33.3 (L) 07/03/2011 12:10 PM    HCT 33.9 (L) 06/26/2011 02:55 PM    HCT 33.2 (L) 06/23/2011 04:28 PM    HCT 34.4 (L) 06/20/2011 01:40 AM    HCT 36.7 06/19/2011 02:20 AM    HCT 33.2 (L) 06/13/2011 12:55 AM    HCT 33.1 (L) 06/12/2011 11:10 AM    HCT 31.4 (L) 06/09/2011 03:20 PM    HCT 32.6 (L) 06/02/2011 03:25 AM    HCT 32.1 (L) 05/15/2011 08:10 PM    PLATELET 795 (L) 99/13/0090 05:17 AM    PLATELET 099 04/58/4960 02:06 PM    PLATELET 314 77/11/5252 01:41 PM    PLATELET 569 39/40/6550 12:54 AM    PLATELET 592 69/15/3862 01:56 AM    PLATELET 636 54/84/8041 11:31 AM    PLATELET 963 72/82/9563 02:48 AM    PLATELET 746 79/75/7928 01:59 AM    PLATELET 463 26/19/1403 03:53 AM    PLATELET 328 44/11/9042 12:52 AM    PLATELET 143 45/66/1565 12:05 PM    PLATELET 291 52/08/8523 05:54 AM    PLATELET 256 45/16/2072 05:57 AM    PLATELET 927 48/99/2431 06:37 PM    PLATELET 592 10/73/6683 08:39 AM    PLATELET 419 01/90/1279 11:02 AM    PLATELET 658 11/66/2224 12:59 AM    PLATELET 335 59/06/9070 01:16 PM    PLATELET 562 58/85/4329 05:11 PM    PLATELET 774 17/19/4082 04:00 PM    PLATELET 678 76/79/7026 10:30 PM    PLATELET 656 12/94/0328 07:15 PM    PLATELET 622 56/16/7308 01:25 PM    PLATELET 347 (L) 89/68/8163 04:25 AM    PLATELET 688 06/73/6175 11:10 AM    PLATELET 416 53/75/3547 05:00 PM    PLATELET 679 90/53/8167 02:00 AM    PLATELET 628 09/15/0477 09:35 AM    PLATELET 740 36/29/1653 12:45 AM    PLATELET 634 31/13/4581 06:40 PM    PLATELET 476 78/21/6792 01:37 PM    PLATELET 913 (L) 55/47/1255 12:30 AM    PLATELET 126 38/55/6709 12:30 PM    PLATELET 018 73/14/7024 06:20 AM    PLATELET 072 67/54/2062 01:30 AM    PLATELET 166 52/40/1462 10:20 PM    PLATELET 152 58/80/7447 04:00 AM    PLATELET 834 32/22/7381 12:10 PM    PLATELET 044 04/71/7563 02:55 PM    PLATELET 348 66/80/9590 04:28 PM    PLATELET 146 78/18/9235 01:40 AM    PLATELET 208 15/48/9211 02:20 AM    PLATELET 376 63/39/0687 12:55 AM    PLATELET 994 31/18/3590 11:10 AM    PLATELET 709 29/60/9113 03:20 PM    PLATELET 671 48/13/9995 03:25 AM    PLATELET 055 75/89/8089 08:10 PM       Recent Results (from the past 24 hour(s))   CBC WITH AUTOMATED DIFF    Collection Time: 11/19/22  2:06 PM   Result Value Ref Range    WBC 14.0 (H) 3.6 - 11.0 K/uL    RBC 3.66 (L) 3.80 - 5.20 M/uL    HGB 11.1 (L) 11.5 - 16.0 g/dL    HCT 32.7 (L) 35.0 - 47.0 %    MCV 89.3 80.0 - 99.0 FL    MCH 30.3 26.0 - 34.0 PG    MCHC 33.9 30.0 - 36.5 g/dL    RDW 12.8 11.5 - 14.5 %    PLATELET 510 671 - 450 K/uL    MPV 10.7 8.9 - 12.9 FL    NRBC 0.0 0  WBC    ABSOLUTE NRBC 0.00 0.00 - 0.01 K/uL    NEUTROPHILS 81 (H) 32 - 75 %    LYMPHOCYTES 11 (L) 12 - 49 %    MONOCYTES 7 5 - 13 %    EOSINOPHILS 0 0 - 7 %    BASOPHILS 0 0 - 1 %    IMMATURE GRANULOCYTES 1 (H) 0.0 - 0.5 %    ABS. NEUTROPHILS 11.4 (H) 1.8 - 8.0 K/UL    ABS. LYMPHOCYTES 1.5 0.8 - 3.5 K/UL    ABS. MONOCYTES 1.0 0.0 - 1.0 K/UL    ABS. EOSINOPHILS 0.0 0.0 - 0.4 K/UL    ABS. BASOPHILS 0.0 0.0 - 0.1 K/UL    ABS. IMM.  GRANS. 0.1 (H) 0.00 - 0.04 K/UL    DF AUTOMATED     URINALYSIS W/ REFLEX CULTURE    Collection Time: 11/19/22  2:06 PM    Specimen: Urine   Result Value Ref Range    Color YELLOW/STRAW      Appearance CLEAR CLEAR      Specific gravity 1.005 1.003 - 1.030      pH (UA) 6.0 5.0 - 8.0      Protein Negative NEG mg/dL    Glucose Negative NEG mg/dL    Ketone 15 (A) NEG mg/dL    Bilirubin Negative NEG      Blood Negative NEG      Urobilinogen 0.2 0.2 - 1.0 EU/dL    Nitrites Negative NEG      Leukocyte Esterase LARGE (A) NEG      UA:UC IF INDICATED URINE CULTURE ORDERED (A) CNI      WBC >100 (H) 0 - 4 /hpf    RBC 0-5 0 - 5 /hpf    Epithelial cells FEW FEW /lpf    Bacteria Negative NEG /hpf    Hyaline cast 0-2 0 - 2 /lpf   METABOLIC PANEL, COMPREHENSIVE    Collection Time: 11/19/22  2:06 PM   Result Value Ref Range    Sodium 132 (L) 136 - 145 mmol/L    Potassium 4.0 3.5 - 5.1 mmol/L    Chloride 102 97 - 108 mmol/L    CO2 22 21 - 32 mmol/L    Anion gap 8 5 - 15 mmol/L    Glucose 106 (H) 65 - 100 mg/dL    BUN 5 (L) 6 - 20 MG/DL    Creatinine 0.47 (L) 0.55 - 1.02 MG/DL    BUN/Creatinine ratio 11 (L) 12 - 20      eGFR >60 >60 ml/min/1.73m2    Calcium 8.3 (L) 8.5 - 10.1 MG/DL    Bilirubin, total 0.6 0.2 - 1.0 MG/DL    ALT (SGPT) 15 12 - 78 U/L    AST (SGOT) 11 (L) 15 - 37 U/L    Alk. phosphatase 114 45 - 117 U/L    Protein, total 6.3 (L) 6.4 - 8.2 g/dL    Albumin 2.4 (L) 3.5 - 5.0 g/dL    Globulin 3.9 2.0 - 4.0 g/dL    A-G Ratio 0.6 (L) 1.1 - 2.2     CBC WITH AUTOMATED DIFF    Collection Time: 11/20/22  5:17 AM   Result Value Ref Range    WBC 9.1 3.6 - 11.0 K/uL    RBC 4.16 3.80 - 5.20 M/uL    HGB 12.6 11.5 - 16.0 g/dL    HCT 37.3 35.0 - 47.0 %    MCV 89.7 80.0 - 99.0 FL    MCH 30.3 26.0 - 34.0 PG    MCHC 33.8 30.0 - 36.5 g/dL    RDW 12.9 11.5 - 14.5 %    PLATELET 936 (L) 511 - 400 K/uL    MPV 10.8 8.9 - 12.9 FL    NRBC 0.0 0  WBC    ABSOLUTE NRBC 0.00 0.00 - 0.01 K/uL    NEUTROPHILS 80 (H) 32 - 75 %    LYMPHOCYTES 12 12 - 49 %    MONOCYTES 8 5 - 13 %    EOSINOPHILS 0 0 - 7 %    BASOPHILS 0 0 - 1 %    IMMATURE GRANULOCYTES 0 0.0 - 0.5 %    ABS. NEUTROPHILS 7.3 1.8 - 8.0 K/UL    ABS. LYMPHOCYTES 1.1 0.8 - 3.5 K/UL    ABS. MONOCYTES 0.7 0.0 - 1.0 K/UL    ABS. EOSINOPHILS 0.0 0.0 - 0.4 K/UL    ABS. BASOPHILS 0.0 0.0 - 0.1 K/UL    ABS. IMM.  GRANS. 0.0 0.00 - 0.04 K/UL    DF AUTOMATED     GLUCOSE, POC    Collection Time: 11/20/22  7:42 AM   Result Value Ref Range Glucose (POC) 86 65 - 117 mg/dL    Performed by Aisha Giraldo ([facility] LD)

## 2022-11-20 NOTE — PROGRESS NOTES
1920: report and transfer of care from Earnest Pizarro RN; pt laying in bed; IV infusing per order. Pt denies pain or needs at this time. Information board updated. Call light at bedside. 2020: pt laying in bed; vitals taken. Medication admined; pt up to BR; pt denies pain or needs at this time, call light at bedside. 2300: pt up to RR; pt back in bed after voiding, temp taken. Pt denies needs at this time. Call ight at bedside.     6292: report and transfer of care given to Rafael Santana RN

## 2022-11-21 VITALS
HEIGHT: 67 IN | WEIGHT: 225.09 LBS | RESPIRATION RATE: 16 BRPM | SYSTOLIC BLOOD PRESSURE: 115 MMHG | BODY MASS INDEX: 35.33 KG/M2 | TEMPERATURE: 97.5 F | OXYGEN SATURATION: 100 % | HEART RATE: 90 BPM | DIASTOLIC BLOOD PRESSURE: 53 MMHG

## 2022-11-21 LAB
BASOPHILS # BLD: 0 K/UL (ref 0–0.1)
BASOPHILS NFR BLD: 1 % (ref 0–1)
DIFFERENTIAL METHOD BLD: ABNORMAL
EOSINOPHIL # BLD: 0.1 K/UL (ref 0–0.4)
EOSINOPHIL NFR BLD: 1 % (ref 0–7)
ERYTHROCYTE [DISTWIDTH] IN BLOOD BY AUTOMATED COUNT: 13 % (ref 11.5–14.5)
GLUCOSE BLD STRIP.AUTO-MCNC: 85 MG/DL (ref 65–117)
GLUCOSE BLD STRIP.AUTO-MCNC: 99 MG/DL (ref 65–117)
HCT VFR BLD AUTO: 28.7 % (ref 35–47)
HGB BLD-MCNC: 9.6 G/DL (ref 11.5–16)
IMM GRANULOCYTES # BLD AUTO: 0.1 K/UL (ref 0–0.04)
IMM GRANULOCYTES NFR BLD AUTO: 2 % (ref 0–0.5)
LYMPHOCYTES # BLD: 2.2 K/UL (ref 0.8–3.5)
LYMPHOCYTES NFR BLD: 27 % (ref 12–49)
MCH RBC QN AUTO: 30.3 PG (ref 26–34)
MCHC RBC AUTO-ENTMCNC: 33.4 G/DL (ref 30–36.5)
MCV RBC AUTO: 90.5 FL (ref 80–99)
MONOCYTES # BLD: 0.9 K/UL (ref 0–1)
MONOCYTES NFR BLD: 11 % (ref 5–13)
NEUTS SEG # BLD: 4.9 K/UL (ref 1.8–8)
NEUTS SEG NFR BLD: 59 % (ref 32–75)
NRBC # BLD: 0 K/UL (ref 0–0.01)
NRBC BLD-RTO: 0 PER 100 WBC
PLATELET # BLD AUTO: 184 K/UL (ref 150–400)
PMV BLD AUTO: 10.2 FL (ref 8.9–12.9)
RBC # BLD AUTO: 3.17 M/UL (ref 3.8–5.2)
SERVICE CMNT-IMP: NORMAL
SERVICE CMNT-IMP: NORMAL
WBC # BLD AUTO: 8.2 K/UL (ref 3.6–11)

## 2022-11-21 PROCEDURE — G0378 HOSPITAL OBSERVATION PER HR: HCPCS

## 2022-11-21 PROCEDURE — 85025 COMPLETE CBC W/AUTO DIFF WBC: CPT

## 2022-11-21 PROCEDURE — 74011250637 HC RX REV CODE- 250/637: Performed by: OBSTETRICS & GYNECOLOGY

## 2022-11-21 PROCEDURE — 74011250636 HC RX REV CODE- 250/636: Performed by: STUDENT IN AN ORGANIZED HEALTH CARE EDUCATION/TRAINING PROGRAM

## 2022-11-21 PROCEDURE — 74011000250 HC RX REV CODE- 250: Performed by: OBSTETRICS & GYNECOLOGY

## 2022-11-21 PROCEDURE — 36415 COLL VENOUS BLD VENIPUNCTURE: CPT

## 2022-11-21 PROCEDURE — 82962 GLUCOSE BLOOD TEST: CPT

## 2022-11-21 RX ORDER — CEFDINIR 300 MG/1
300 CAPSULE ORAL 2 TIMES DAILY
Qty: 20 CAPSULE | Refills: 0 | Status: SHIPPED | OUTPATIENT
Start: 2022-11-21 | End: 2022-12-01

## 2022-11-21 RX ORDER — NITROFURANTOIN 25; 75 MG/1; MG/1
100 CAPSULE ORAL
Qty: 90 CAPSULE | Refills: 3 | Status: SHIPPED | OUTPATIENT
Start: 2022-11-21

## 2022-11-21 RX ORDER — HEPARIN 100 UNIT/ML
300 SYRINGE INTRAVENOUS AS NEEDED
Status: DISCONTINUED | OUTPATIENT
Start: 2022-11-21 | End: 2022-11-21 | Stop reason: HOSPADM

## 2022-11-21 RX ADMIN — METFORMIN HYDROCHLORIDE 1000 MG: 500 TABLET ORAL at 09:35

## 2022-11-21 RX ADMIN — HYDROXYCHLOROQUINE SULFATE 200 MG: 200 TABLET ORAL at 09:34

## 2022-11-21 RX ADMIN — BUTALBITAL, ACETAMINOPHEN, AND CAFFEINE 1 TABLET: 50; 325; 40 TABLET ORAL at 06:53

## 2022-11-21 RX ADMIN — FERROUS SULFATE TAB 325 MG (65 MG ELEMENTAL FE) 325 MG: 325 (65 FE) TAB at 09:36

## 2022-11-21 RX ADMIN — CETIRIZINE HYDROCHLORIDE 10 MG: 10 TABLET, FILM COATED ORAL at 09:37

## 2022-11-21 RX ADMIN — LEVOTHYROXINE SODIUM 75 MCG: 0.05 TABLET ORAL at 06:53

## 2022-11-21 RX ADMIN — ESCITALOPRAM OXALATE 20 MG: 10 TABLET ORAL at 09:35

## 2022-11-21 RX ADMIN — FAMOTIDINE 20 MG: 20 TABLET, FILM COATED ORAL at 09:36

## 2022-11-21 RX ADMIN — Medication 300 UNITS: at 12:21

## 2022-11-21 RX ADMIN — OXYBUTYNIN CHLORIDE 5 MG: 5 TABLET ORAL at 09:37

## 2022-11-21 RX ADMIN — SODIUM CHLORIDE, PRESERVATIVE FREE 10 ML: 5 INJECTION INTRAVENOUS at 12:21

## 2022-11-21 NOTE — DISCHARGE INSTRUCTIONS
Learning About When to Call Your Doctor During Pregnancy (After 20 Weeks)  Overview  It's common to have concerns about what might be a problem when you're pregnant. Most pregnancies don't have any serious problems. But it's still important to know when to call your doctor if you have certain symptoms or signs of labor. These are general suggestions. Your doctor may give you some more information about when to call. When to call your doctor (after 20 weeks)  Call 911  anytime you think you may need emergency care. For example, call if:  You have severe vaginal bleeding. You have sudden, severe pain in your belly. You passed out (lost consciousness). You have a seizure. You see or feel the umbilical cord. You think you are about to deliver your baby and can't make it safely to the hospital.  Call your doctor now or seek immediate medical care if:  You have vaginal bleeding. You have belly pain. You have a fever. You have symptoms of preeclampsia, such as:  Sudden swelling of your face, hands, or feet. New vision problems (such as dimness, blurring, or seeing spots). A severe headache. You have a sudden release of fluid from your vagina. (You think your water broke.)  You think that you may be in labor. This means that you've had at least 6 contractions in an hour. You notice that your baby has stopped moving or is moving much less than normal.  You have symptoms of a urinary tract infection. These may include:  Pain or burning when you urinate. A frequent need to urinate without being able to pass much urine. Pain in the flank, which is just below the rib cage and above the waist on either side of the back. Blood in your urine. Watch closely for changes in your health, and be sure to contact your doctor if:  You have vaginal discharge that smells bad. You have skin changes, such as:  A rash. Itching. Yellow color to your skin. You have other concerns about your pregnancy.   If you have labor signs at 37 weeks or more  If you have signs of labor at 37 weeks or more, your doctor may tell you to call when your labor becomes more active. Symptoms of active labor include:  Contractions that are regular. Contractions that are less than 5 minutes apart. Contractions that are hard to talk through. Follow-up care is a key part of your treatment and safety. Be sure to make and go to all appointments, and call your doctor if you are having problems. It's also a good idea to know your test results and keep a list of the medicines you take. Where can you learn more? Go to http://www.gray.com/  Enter N531 in the search box to learn more about \"Learning About When to Call Your Doctor During Pregnancy (After 20 Weeks). \"  Current as of: February 23, 2022               Content Version: 13.4  © 2006-2022 [employer]. Care instructions adapted under license by [employer] (which disclaims liability or warranty for this information). If you have questions about a medical condition or this instruction, always ask your healthcare professional. [place] [address] any warranty or liability for your use of this information. Urinary Tract Infection (UTI) in Women: Care Instructions  Overview     A urinary tract infection, or UTI, is a general term for an infection anywhere between the kidneys and the urethra (where urine comes out). Most UTIs are bladder infections. They often cause pain or burning when you urinate. UTIs are caused by bacteria and can be cured with antibiotics. Be sure to complete your treatment so that the infection does not get worse. Follow-up care is a key part of your treatment and safety. Be sure to make and go to all appointments, and call your doctor if you are having problems. It's also a good idea to know your test results and keep a list of the medicines you take. How can you care for yourself at home?   Take your antibiotics as directed. Do not stop taking them just because you feel better. You need to take the full course of antibiotics. Drink extra water and other fluids for the next day or two. This will help make the urine less concentrated and help wash out the bacteria that are causing the infection. (If you have kidney, heart, or liver disease and have to limit fluids, talk with your doctor before you increase the amount of fluids you drink.)  Avoid drinks that are carbonated or have caffeine. They can irritate the bladder. Urinate often. Try to empty your bladder each time. To relieve pain, take a hot bath or lay a heating pad set on low over your lower belly or genital area. Never go to sleep with a heating pad in place. To prevent UTIs  Drink plenty of water each day. This helps you urinate often, which clears bacteria from your system. (If you have kidney, heart, or liver disease and have to limit fluids, talk with your doctor before you increase the amount of fluids you drink.)  Urinate when you need to. If you are sexually active, urinate right after you have sex. Change sanitary pads often. Avoid douches, bubble baths, feminine hygiene sprays, and other feminine hygiene products that have deodorants. After going to the bathroom, wipe from front to back. When should you call for help? Call your doctor now or seek immediate medical care if:    Symptoms such as fever, chills, nausea, or vomiting get worse or appear for the first time. You have new pain in your back just below your rib cage. This is called flank pain. There is new blood or pus in your urine. You have any problems with your antibiotic medicine. Watch closely for changes in your health, and be sure to contact your doctor if:    You are not getting better after taking an antibiotic for 2 days. Your symptoms go away but then come back. Where can you learn more?   Go to http://www.gray.com/  Enter N050 in the search box to learn more about \"Urinary Tract Infection (UTI) in Women: Care Instructions. \"  Current as of: June 16, 2022               Content Version: 13.4  © 2006-2022 [facility]. Care instructions adapted under license by [employer] (which disclaims liability or warranty for this information). If you have questions about a medical condition or this instruction, always ask your healthcare professional. [address] any warranty or liability for your use of this information. Gestational Diabetes: Care Instructions  Your Care Instructions     Gestational diabetes can develop during pregnancy. When you have this condition, the insulin in your body is not able to keep your blood sugar in a normal range. If you do not control your blood sugar, your baby can grow too big and have problems right after birth such as low blood sugar. Most of the time, gestational diabetes goes away after a baby is born. But if you have had gestational diabetes, you have a greater chance of having it in a future pregnancy and of developing type 2 diabetes. To check for diabetes, you may have a follow-up glucose tolerance test 4 to 12 weeks after your baby is born or after you stop breastfeeding your baby. If the results of this test are normal, experts recommend that you get tested for type 2 diabetes at least every 3 years. You may be able to control your blood sugar with a healthy diet and regular exercise. Staying at a healthy weight also may keep you from getting type 2 diabetes later on. If diet and exercise do not lower your blood sugar enough, you may need to take diabetes medicine or insulin. Follow-up care is a key part of your treatment and safety. Be sure to make and go to all appointments, and call your doctor if you are having problems. It's also a good idea to know your test results and keep a list of the medicines you take.   How can you care for yourself at home? If your doctor prescribes insulin, inject it daily as directed. Your doctor will tell you how and when to take your insulin. Check your blood sugar as directed. Your doctor will tell you how and when to check your blood sugar. Monitor your baby's movement as directed. Your doctor may ask you to report how many times in an hour you feel your baby move. Eat a balanced diet. You may want to meet with a registered dietitian. They can teach you how to spread carbohydrates through the day. This may keep your blood sugar from going up quickly after meals. If you are taking insulin, you also can learn to match the amount of insulin you take at meals to the amount of carbohydrates you eat. Don't diet to lose weight. It's not healthy when you're pregnant. Get daily exercise. This can help lower your blood sugar. Walking and swimming are good choices. But don't do any exercise unless you talk with your doctor first.  When should you call for help? Call 911 anytime you think you may need emergency care. For example, call if:    You passed out (lost consciousness), or you suddenly become very sleepy or confused. (You may have very low blood sugar.)     You have symptoms of high blood sugar, such as:  Blurred vision. Trouble staying awake or being woken up. Fast, deep breathing. Breath that smells fruity. Belly pain, not feeling hungry, and vomiting. Feeling confused. Call your doctor now or seek immediate medical care if:    You are sick and cannot control your blood sugar. You have been vomiting or have had diarrhea for more than 6 hours. Your blood sugar stays higher than the level your doctor has set for you. You have symptoms of low blood sugar, such as:  Sweating. Feeling nervous, shaky, and weak. Extreme hunger and slight nausea. Dizziness and headache. Blurred vision. Confusion.    Watch closely for changes in your health, and be sure to contact your doctor if:    You have a hard time knowing when your blood sugar is low. You have trouble keeping your blood sugar in the target range. You often have problems controlling your blood sugar. Where can you learn more? Go to http://www.gray.com/  Enter A800 in the search box to learn more about \"Gestational Diabetes: Care Instructions. \"  Current as of: April 13, 2022               Content Version: 13.4  © 2006-2022 [employer]. Care instructions adapted under license by [employer] (which disclaims liability or warranty for this information). If you have questions about a medical condition or this instruction, always ask your healthcare professional. [address] any warranty or liability for your use of this information.

## 2022-11-21 NOTE — DISCHARGE SUMMARY
Antepartum  Discharge Summary     Patient ID:  Arturo Mccollum  [id number]  75 y.o.  1980    Admit date: 11/19/2022    Discharge date: 11/21/2022    Admission Diagnoses:    Patient Active Problem List   Diagnosis Code    Hypothyroidism (acquired) E03.9    Fibromyalgia M79.7    Hyperhidrosis R61    Environmental and seasonal allergies J30.89    Rectal bleeding K62.5    Family history of colonic polyps Z83.71    Encounter for screening colonoscopy Z12.11    Rheumatoid arthritis ([facility] [address] M06.9    Asthma J45.909    Severe obesity (BMI 35.0-39. 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 Patient Active Problem List   Diagnosis Code    Hypothyroidism (acquired) E03.9    Fibromyalgia M79.7    Hyperhidrosis R61    Environmental and seasonal allergies J30.89    Rectal bleeding K62.5    Family history of colonic polyps Z83.71    Encounter for screening colonoscopy Z12.11    Rheumatoid arthritis ([facility] [address] M06.9    Asthma J45.909    Severe obesity (BMI 35.0-39. 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 Received IV rocephin q24 hours for 48 hours, temperature remained normal for > 24 hours prior to discharge. Transitioned to po abx (cefdinir) to complete 10 days, plan macrobid daily suppression thereafter for remainder of pregnancy. Disposition: Home or self care    Discharged Condition: Stable            Patient Instructions:   Current Discharge Medication List        START taking these medications    Details   cefdinir (OMNICEF) 300 mg capsule Take 1 Capsule by mouth two (2) times a day for 10 days. Qty: 20 Capsule, Refills: 0      nitrofurantoin, macrocrystal-monohydrate, (Macrobid) 100 mg capsule Take 1 Capsule by mouth once over twenty-four (24) hours. Start this antibiotic, once daily, for the remainder of your pregnancy after completing the course of cefdinir. Qty: 90 Capsule, Refills: 3           CONTINUE these medications which have NOT CHANGED    Details   Cetirizine (ZyrTEC) 10 mg cap Take 1 Tablet by mouth.      montelukast (Singulair) 10 mg tablet Take 10 mg by mouth daily. aspirin 81 mg chewable tablet Take 81 mg by mouth daily. famotidine (Pepcid) 20 mg tablet Take 20 mg by mouth two (2) times a day. enoxaparin (Lovenox) 40 mg/0.4 mL 40 mg by SubCUTAneous route daily. certolizumab pegoL (Cimzia) 400 mg/2 mL (200 mg/mL x 2) sykt injection 400 mg by SubCUTAneous route every thirty (30) days. metFORMIN (GLUCOPHAGE) 1,000 mg tablet Take 1,000 mg by mouth two (2) times daily (with meals). escitalopram oxalate (LEXAPRO) 20 mg tablet Take 1 Tablet by mouth daily. Qty: 90 Tablet, Refills: 1      oxybutynin (DITROPAN) 5 mg tablet Take 1 Tablet by mouth three (3) times daily. Qty: 90 Tablet, Refills: 2    Associated Diagnoses: Urinary frequency      B.infantis-B.ani-B.long-B.bifi (Probiotic 4X) 10-15 mg TbEC Take 1 Capsule by mouth two (2) times a day. hydroxychloroquine (PLAQUENIL) 200 mg tablet 200 mg daily.       cyanocobalamin 1,000 mcg tablet Take  by mouth daily.      SAFETY-ABDI TB SYR 1CC/25GX5/8\" 1 mL 25 gauge x 5/8\" syrg USE TO INJECT METHOTREXATE ONCE A WEEK  Refills: 2      cholecalciferol (VITAMIN D3) (1000 Units /25 mcg) tablet Take 1 Tablet by mouth daily. levothyroxine (SYNTHROID) 75 mcg tablet Take 75 mcg by mouth six (6) days a week. glycopyrrolate (ROBINUL) 1 mg tablet TAKE 2 TABLETS BY MOUTH TWO (2) TIMES A DAY. INDICATIONS: HYPERHYDROSIS  Qty: 120 Tablet, Refills: 2    Associated Diagnoses: Excessive sweating      Prodigy Twist Top Lancet 28 gauge misc AS DIRECTED  Qty: 100 Lancet, Refills: 0      albuterol (PROVENTIL HFA, VENTOLIN HFA, PROAIR HFA) 90 mcg/actuation inhaler Take 2 Puffs by inhalation every four (4) hours as needed for Wheezing.   Qty: 1 Inhaler, Refills: 0    Associated Diagnoses: Mild intermittent asthma without complication      EPIPEN 2-SANJAY 0.3 mg/0.3 mL injection 1 (ONE) INJECTION AS NEEDED  Refills: 0           STOP taking these medications       amoxicillin (AMOXIL) 500 mg tablet Comments:   Reason for Stopping:         Prodigy No Coding strip Comments:   Reason for Stopping:             Activity: Activity as tolerated  Diet: Regular Diet    Follow-up with   Follow-up Appointments   Procedures    FOLLOW UP VISIT Appointment in: 3 - 5 Days 2 days already scheduled     2 days already scheduled     Standing Status:   Standing     Number of Occurrences:   1     Order Specific Question:   Appointment in     Answer:   3 - 5 Days        Signed:  Db Araujo MD  11/21/2022  11:06 AM

## 2022-11-21 NOTE — ROUTINE PROCESS
Patient discharge prescriptions & instructions given. Verbalized understanding. All questions answered. No distress noted. Signed copy of discharge instructions on paper chart. Scheduled appointment on Wednesday with Dr. Parth Shrestha.

## 2022-11-21 NOTE — ROUTINE PROCESS
Port deaccessed per policy and procedure for patient discharge. No redness or swelling noted at site. No oozing or bleeding when needle removed.  Gauze and tape applied per patient request

## 2022-11-21 NOTE — PROGRESS NOTES
Ante Partum Progress Note    Denisha Prather Haver  30w4d     Assessment: 38 y.o.  at 30w4d  admitted with complicated UTI vs pyelnephritis. Urine culture from office on  grew Klebsiella (resistant to ampicillin and and intermediate for macrobid, otherwise susceptible). But she did not get started on abx until  (amoxicillin). Presented 2 days ago evening with worsening back pain, and fevers. Tmax 102 (38.9C) at 1618 on , this was also Tlast. Started on IV rocephin first dose yesterday afternoon. WBC on admission 14 > 9.1, today 8.2.  HA and back pain resoled today. Pregnancy additionally complicated by multiple medical comorbidities as below. Plan:    Complicated UTI vs pyelo  - urine culture here in [facility] no growth to date (but did have 2 doses of amoxicillin on board)  - for now treating based on culture from office that grew klebsiella  - s/p 48 hours of IV rocephin, plan to transition to cefdinir oral for 10 days, and then plan daily macrobid suppression for rest of pregnancy   - had GBS UTI this pregnancy, will need to go home with abx suppression for rest of pregnancy   - discharge home today, has office follow up scheduled in 2 days     DM2  - on metformin 1000mg BID, fasting this AM 86  - continue metformin    APLS  - history of splenic vein thrombosis and abscess .    - continue  ASA 81mg / day and lovenox 40 mg/day    Hypothyroidism  - continue synthroid 75mcg    RA  - on plaquenil 200mg BID  - cimzia IM q month per rheum    Depression  - lexapro 20mg daily    Prenatal care  - PNV daily'  - NST BID  - hx  with prior gestationa, planning repeat    - regular diet  - lovenox dvt ppx     AMA  - low risk NIPT        Orders/Charges: Medium and Non Stress Test    Patient states she has a headache and low back pain. Reports active FM, denies contractions, VB, or LOF.     Vitals:  Visit Vitals  BP (!) 115/53 (BP 1 Location: Left upper arm, BP Patient Position: At rest)   Pulse 90   Temp 97.5 °F (36.4 °C)   Resp 16   Ht 5' 6.5\" (1.689 m)   Wt 102.1 kg (225 lb 1.4 oz)   LMP 04/15/2022   SpO2 100%   BMI 35.79 kg/m²     Temp (24hrs), Av.6 °F (36.4 °C), Min:97.3 °F (36.3 °C), Max:98 °F (36.7 °C)      Last 24hr Input/Output:    Intake/Output Summary (Last 24 hours) at 2022 1058  Last data filed at 2022 1900  Gross per 24 hour   Intake 1497.92 ml   Output 750 ml   Net 747.92 ml          Non stress test:  Reactive, appropriate for gestational age          Uterine Activity: None     Exam:  Patient without distress.      Abdomen, fundus soft non-tender     Extremities, no redness or tenderness               Additional Exam: Deferred    Labs:     Lab Results   Component Value Date/Time    WBC 8.2 2022 05:19 AM    WBC 9.1 2022 05:17 AM    WBC 14.0 (H) 2022 02:06 PM    WBC 6.5 2022 01:41 PM    WBC 14.4 (H) 2021 12:54 AM    WBC 13.9 (H) 2020 01:56 AM    WBC 6.7 2020 11:31 AM    WBC 6.4 2020 02:48 AM    WBC 6.6 2020 01:59 AM    WBC 6.9 2020 03:53 AM    WBC 8.6 2020 12:52 AM    WBC 9.7 2020 12:05 PM    WBC 6.4 05/15/2020 05:54 AM    WBC 10.8 2020 05:57 AM    WBC 6.8 2020 06:37 PM    WBC 11.8 (H) 2020 08:39 AM    WBC 5.8 2017 11:02 AM    WBC 7.0 2016 12:59 AM    WBC 10.4 2016 01:16 PM    WBC 6.8 10/30/2015 05:11 PM    WBC 7.0 2015 04:00 PM    WBC 6.3 11/15/2013 10:30 PM    WBC 10.7 12/10/2012 07:15 PM    WBC 8.0 2012 01:25 PM    WBC 11.0 08/10/2011 04:25 AM    WBC 11.0 2011 11:10 AM    WBC 11.0 2011 05:00 PM    WBC 9.5 2011 02:00 AM    WBC 10.5 2011 09:35 AM    WBC 11.7 (H) 2011 12:45 AM    WBC 13.5 (H) 2011 06:40 PM    WBC 10.5 2011 01:37 PM    WBC 7.5 2011 12:30 AM    WBC 11.7 (H) 2011 12:30 PM    WBC 10.5 2011 06:20 AM    WBC 11.3 (H) 2011 01:30 AM    WBC 10.4 2011 10:20 PM    WBC 10.8 07/04/2011 04:00 AM    WBC 11.3 (H) 07/03/2011 12:10 PM    WBC 10.2 06/26/2011 02:55 PM    WBC 10.3 06/23/2011 04:28 PM    WBC 11.3 (H) 06/20/2011 01:40 AM    WBC 10.3 06/19/2011 02:20 AM    WBC 10.8 06/13/2011 12:55 AM    WBC 12.1 (H) 06/12/2011 11:10 AM    WBC 12.1 (H) 06/09/2011 03:20 PM    WBC 10.5 06/02/2011 03:25 AM    WBC 10.1 05/15/2011 08:10 PM    HGB 9.6 (L) 11/21/2022 05:19 AM    HGB 12.6 11/20/2022 05:17 AM    HGB 11.1 (L) 11/19/2022 02:06 PM    HGB 12.0 01/05/2022 01:41 PM    HGB 12.7 06/30/2021 12:54 AM    HGB 10.6 (L) 09/26/2020 01:56 AM    HGB 12.0 05/26/2020 11:31 AM    HGB 10.4 (L) 05/23/2020 02:48 AM    HGB 10.1 (L) 05/22/2020 01:59 AM    HGB 10.5 (L) 05/21/2020 03:53 AM    HGB 10.1 (L) 05/20/2020 12:52 AM    HGB 11.8 05/19/2020 12:05 PM    HGB 11.5 05/15/2020 05:54 AM    HGB 12.5 05/14/2020 05:57 AM    HGB 11.8 04/18/2020 06:37 PM    HGB 12.8 01/31/2020 08:39 AM    HGB 13.4 04/16/2017 11:02 AM    HGB 13.4 04/03/2016 12:59 AM    HGB 14.2 04/02/2016 01:16 PM    HGB 13.0 10/30/2015 05:11 PM    HGB 12.8 01/12/2015 04:00 PM    HGB 13.4 11/15/2013 10:30 PM    HGB 14.6 12/10/2012 07:15 PM    HGB 13.0 09/13/2012 01:25 PM    HGB 9.2 (L) 08/10/2011 04:25 AM    HGB 11.7 08/08/2011 11:10 AM    HGB 11.5 08/03/2011 05:00 PM    HGB 11.4 (L) 08/01/2011 02:00 AM    HGB 11.5 07/28/2011 09:35 AM    HGB 11.3 (L) 07/25/2011 12:45 AM    HGB 11.5 07/19/2011 06:40 PM    HGB 11.0 (L) 07/18/2011 01:37 PM    HGB 14.4 07/18/2011 12:30 AM    HGB 11.5 07/13/2011 12:30 PM    HGB 11.8 07/12/2011 06:20 AM    HGB 12.0 07/11/2011 01:30 AM    HGB 11.2 (L) 07/07/2011 10:20 PM    HGB 11.6 07/04/2011 04:00 AM    HGB 11.4 (L) 07/03/2011 12:10 PM    HGB 11.7 06/26/2011 02:55 PM    HGB 11.4 (L) 06/23/2011 04:28 PM    HGB 12.0 06/20/2011 01:40 AM    HGB 12.8 06/19/2011 02:20 AM    HGB 11.4 (L) 06/13/2011 12:55 AM    HGB 11.4 (L) 06/12/2011 11:10 AM    HGB 10.9 (L) 06/09/2011 03:20 PM    HGB 11.2 (L) 06/02/2011 03:25 AM    HGB 11.0 (L) 05/15/2011 08:10 PM    HCT 28.7 (L) 11/21/2022 05:19 AM    HCT 37.3 11/20/2022 05:17 AM    HCT 32.7 (L) 11/19/2022 02:06 PM    HCT 36.8 01/05/2022 01:41 PM    HCT 38.0 06/30/2021 12:54 AM    HCT 32.6 (L) 09/26/2020 01:56 AM    HCT 37.2 05/26/2020 11:31 AM    HCT 31.8 (L) 05/23/2020 02:48 AM    HCT 31.6 (L) 05/22/2020 01:59 AM    HCT 32.7 (L) 05/21/2020 03:53 AM    HCT 30.4 (L) 05/20/2020 12:52 AM    HCT 35.5 05/19/2020 12:05 PM    HCT 36.0 05/15/2020 05:54 AM    HCT 37.1 05/14/2020 05:57 AM    HCT 35.3 04/18/2020 06:37 PM    HCT 39.3 01/31/2020 08:39 AM    HCT 39.2 04/16/2017 11:02 AM    HCT 39.9 04/03/2016 12:59 AM    HCT 41.9 04/02/2016 01:16 PM    HCT 38.9 10/30/2015 05:11 PM    HCT 39.8 01/12/2015 04:00 PM    HCT 39.8 11/15/2013 10:30 PM    HCT 43.8 12/10/2012 07:15 PM    HCT 38.8 09/13/2012 01:25 PM    HCT 27.6 (L) 08/10/2011 04:25 AM    HCT 34.3 (L) 08/08/2011 11:10 AM    HCT 33.5 (L) 08/03/2011 05:00 PM    HCT 33.1 (L) 08/01/2011 02:00 AM    HCT 33.0 (L) 07/28/2011 09:35 AM    HCT 33.6 (L) 07/25/2011 12:45 AM    HCT 32.8 (L) 07/19/2011 06:40 PM    HCT 31.9 (L) 07/18/2011 01:37 PM    HCT 41.2 07/18/2011 12:30 AM    HCT 33.4 (L) 07/13/2011 12:30 PM    HCT 34.1 (L) 07/12/2011 06:20 AM    HCT 34.5 (L) 07/11/2011 01:30 AM    HCT 32.0 (L) 07/07/2011 10:20 PM    HCT 33.5 (L) 07/04/2011 04:00 AM    HCT 33.3 (L) 07/03/2011 12:10 PM    HCT 33.9 (L) 06/26/2011 02:55 PM    HCT 33.2 (L) 06/23/2011 04:28 PM    HCT 34.4 (L) 06/20/2011 01:40 AM    HCT 36.7 06/19/2011 02:20 AM    HCT 33.2 (L) 06/13/2011 12:55 AM    HCT 33.1 (L) 06/12/2011 11:10 AM    HCT 31.4 (L) 06/09/2011 03:20 PM    HCT 32.6 (L) 06/02/2011 03:25 AM    HCT 32.1 (L) 05/15/2011 08:10 PM    PLATELET 358 82/31/4946 05:19 AM    PLATELET 364 (L) 33/83/5692 05:17 AM    PLATELET 292 36/56/3585 02:06 PM    PLATELET 749 49/95/1833 01:41 PM    PLATELET 652 03/57/1955 12:54 AM    PLATELET 445 83/62/6511 01:56 AM    PLATELET 552 22/91/2394 11:31 AM    PLATELET 753 05/23/2020 02:48 AM    PLATELET 715 20/56/0081 01:59 AM    PLATELET 739 44/93/9894 03:53 AM    PLATELET 151 84/58/4836 12:52 AM    PLATELET 113 23/40/1230 12:05 PM    PLATELET 169 49/32/9945 05:54 AM    PLATELET 087 87/43/9912 05:57 AM    PLATELET 118 98/48/9633 06:37 PM    PLATELET 623 57/02/9496 08:39 AM    PLATELET 707 78/36/8119 11:02 AM    PLATELET 848 15/37/2963 12:59 AM    PLATELET 969 11/44/6355 01:16 PM    PLATELET 561 07/41/8996 05:11 PM    PLATELET 701 12/08/1018 04:00 PM    PLATELET 465 07/67/9540 10:30 PM    PLATELET 725 05/42/9747 07:15 PM    PLATELET 389 91/38/7205 01:25 PM    PLATELET 805 (L) 12/94/3889 04:25 AM    PLATELET 376 38/80/2153 11:10 AM    PLATELET 122 40/15/4995 05:00 PM    PLATELET 572 03/92/8760 02:00 AM    PLATELET 553 52/99/5041 09:35 AM    PLATELET 062 38/41/3769 12:45 AM    PLATELET 713 52/63/8147 06:40 PM    PLATELET 050 39/91/7862 01:37 PM    PLATELET 885 (L) 24/39/0108 12:30 AM    PLATELET 701 37/60/1574 12:30 PM    PLATELET 118 43/21/0410 06:20 AM    PLATELET 354 87/16/4917 01:30 AM    PLATELET 602 01/03/1160 10:20 PM    PLATELET 103 72/80/4707 04:00 AM    PLATELET 095 92/78/0542 12:10 PM    PLATELET 242 46/85/4179 02:55 PM    PLATELET 486 30/93/3140 04:28 PM    PLATELET 195 45/33/8587 01:40 AM    PLATELET 825 94/80/6051 02:20 AM    PLATELET 763 12/00/2075 12:55 AM    PLATELET 846 76/87/1579 11:10 AM    PLATELET 072 31/07/8740 03:20 PM    PLATELET 072 53/53/6442 03:25 AM    PLATELET 893 84/10/1852 08:10 PM       Recent Results (from the past 24 hour(s))   GLUCOSE, POC    Collection Time: 11/20/22  3:13 PM   Result Value Ref Range    Glucose (POC) 137 (H) 65 - 117 mg/dL    Performed by Vandana Hendrix ([id number] [facility])    GLUCOSE, POC    Collection Time: 11/20/22 10:25 PM   Result Value Ref Range    Glucose (POC) 113 65 - 117 mg/dL    Performed by Nae Dumont RN    GLUCOSE, POC    Collection Time: 11/21/22  5:17 AM   Result Value Ref Range    Glucose (POC) 85 65 - 117 mg/dL    Performed by Tamra Sandhu RN    CBC WITH AUTOMATED DIFF    Collection Time: 11/21/22  5:19 AM   Result Value Ref Range    WBC 8.2 3.6 - 11.0 K/uL    RBC 3.17 (L) 3.80 - 5.20 M/uL    HGB 9.6 (L) 11.5 - 16.0 g/dL    HCT 28.7 (L) 35.0 - 47.0 %    MCV 90.5 80.0 - 99.0 FL    MCH 30.3 26.0 - 34.0 PG    MCHC 33.4 30.0 - 36.5 g/dL    RDW 13.0 11.5 - 14.5 %    PLATELET 068 762 - 378 K/uL    MPV 10.2 8.9 - 12.9 FL    NRBC 0.0 0  WBC    ABSOLUTE NRBC 0.00 0.00 - 0.01 K/uL    NEUTROPHILS 59 32 - 75 %    LYMPHOCYTES 27 12 - 49 %    MONOCYTES 11 5 - 13 %    EOSINOPHILS 1 0 - 7 %    BASOPHILS 1 0 - 1 %    IMMATURE GRANULOCYTES 2 (H) 0.0 - 0.5 %    ABS. NEUTROPHILS 4.9 1.8 - 8.0 K/UL    ABS. LYMPHOCYTES 2.2 0.8 - 3.5 K/UL    ABS. MONOCYTES 0.9 0.0 - 1.0 K/UL    ABS. EOSINOPHILS 0.1 0.0 - 0.4 K/UL    ABS. BASOPHILS 0.0 0.0 - 0.1 K/UL    ABS. IMM.  GRANS. 0.1 (H) 0.00 - 0.04 K/UL    DF AUTOMATED     GLUCOSE, POC    Collection Time: 11/21/22 10:41 AM   Result Value Ref Range    Glucose (POC) 99 65 - 117 mg/dL    Performed by Osito Martinez

## 2022-11-21 NOTE — PROGRESS NOTES
1915 SBAR at bedside from EUNICE Rodriguez , care rendered to [address] [place] [address]. 0740- Bedside and Verbal shift change report given to LEAH Farah RN  (oncoming nurse) by SANJUANA Marks RN  (offgoing nurse). Report included the following information SBAR.

## 2022-11-22 DIAGNOSIS — R35.0 URINARY FREQUENCY: ICD-10-CM

## 2022-11-22 RX ORDER — OXYBUTYNIN CHLORIDE 5 MG/1
TABLET ORAL
Qty: 90 TABLET | Refills: 2 | Status: SHIPPED | OUTPATIENT
Start: 2022-11-22

## 2022-12-06 ENCOUNTER — VIRTUAL VISIT (OUTPATIENT)
Dept: INTERNAL MEDICINE CLINIC | Age: 42
End: 2022-12-06
Payer: COMMERCIAL

## 2022-12-06 DIAGNOSIS — F32.1 MODERATE MAJOR DEPRESSION (HCC): ICD-10-CM

## 2022-12-06 DIAGNOSIS — D84.9 IMMUNOCOMPROMISED (HCC): Primary | ICD-10-CM

## 2022-12-06 DIAGNOSIS — G10: ICD-10-CM

## 2022-12-06 DIAGNOSIS — E03.9 ACQUIRED HYPOTHYROIDISM: ICD-10-CM

## 2022-12-06 DIAGNOSIS — Z3A.32 32 WEEKS GESTATION OF PREGNANCY: ICD-10-CM

## 2022-12-06 PROCEDURE — 99214 OFFICE O/P EST MOD 30 MIN: CPT | Performed by: INTERNAL MEDICINE

## 2022-12-06 NOTE — PROGRESS NOTES
CC: No chief complaint on file. HPI:    She is a 38 y.o. female with a complex medical history with hypothyroidism, antiphospholipid syndrome autoimmune ( undifferentiated connective tissue disorder) , hx of radiculopathy L4 L5 requiring spinal surgery presenting to follow up   Taking lovenox for antiphospholipid syndrome    Taking ditropan for overactive bladder    On Aspirin 81mg and   Plaquenil 100mg BID for autoimmune disease    She was seen by Dr Junie Castleman and undifferentiated connective tissue category vs SNRA. Currently on plaquenil only     Joint pain is under control and doing well since pregnancy . Energy level is also better     Multivitamin   Probiotic    Taking Lexapro 20mg  daily for depression with good control        She is seen Dr Patti Santos at [address] [place] [address] for endocrinology she has a hx of hashimotos and diabetes. takes levothyroxine 75 mcg 6 days a week, adherent  Prior to pregnancy took 50 mcg daily  Diabetes was managed with metformin and changed to levemir 10units daily now   She reports good control     Diagnosed with pre symptomatic Chris's disease  By Dr Selena Adam neutologist      Stopped vyvanse for ADHD due to pregnancy    Recently had UTI requiring admission   UTI (klebsiella culture in office 11/9). Received IV rocephin q24 hours for 48 hours, temperature remained normal for > 24 hours prior to discharge. Transitioned to po abx (cefdinir) to complete 10 days, currently on cefdinir 300mg daily for suppression and no symptoms    She has lumbar spine disease with left foot weakness Progressive degenerative disc disease at L5-S1    Recall she had   Effacement of left L5-S1 nerve roots with associated disc extrusion noted. Referral to NSGY placed had surgery at [address] [place] [address] with Dr Suzanne Coombs 5 yo older daughter     This is an established visit conducted via telemedicine with video.   The patient has been instructed that this meets HIPAA criteria and acknowledges and agrees to this method of visitation. Pursuant to the emergency declaration under the [facility], [facility] waiver authority and the [employer] Act, this Virtual Visit was conducted, with patient's consent, to reduce the patient's risk of exposure to COVID-19 and provide continuity of care for an established patient. Services were provided through a video synchronous discussion virtually to substitute for in-person clinic visit. ROS:  Constitutional: negative for fevers, chills, anorexia and weight loss  10 systems reviewed and negative other then HPI    Past Medical History:   Diagnosis Date    Abnormal Papanicolaou smear of cervix     Acquired hypothyroidism     Adverse effect of anesthesia     labile BP during/after C section    Anemia     Antiphospholipid antibody syndrome (HCC)     Antiphospholipid syndrome (HCC)     Asthma     Breast disorder     tumor on right breast it radiation seeds, non canerours    Broken bones     history of 9 broken bones    Chronic UTI (urinary tract infection)     \"extra valve right kidney causes UTI\"    Clotting disorder (HCC)     Fibromyalgia     Functional tremor     Gestational diabetes     GI bleed 2007,2011,2015,2016    lower GI last colonoscopy in 2016 normal     History of degenerative disc disease     Huntingtons chorea ([address]     Hyperhydrosis disorder     Ill-defined condition     [place]/ tested genetically - daughter has [place]    Infertility     PCOS    Infertility, female     Nerve root disorder     Neuropathy     PCOS (polycystic ovarian syndrome)     Polycystic disease, ovaries     Precancerous skin lesion     removed from Right shoulder    Preeclampsia 2011    pregnancy    Reactive airway disease     Rheumatoid arteritis ([address]     Rheumatoid arthritis ([address]     SVT (supraventricular tachycardia) ([address] 2011    occured during pregnancy when picc line inserted.     Systemic lupus erythematosus ([facility]     Trauma        Current Outpatient Medications on File Prior to Visit   Medication Sig Dispense Refill    oxybutynin (DITROPAN) 5 mg tablet TAKE 1 TABLET BY MOUTH THREE TIMES A DAY 90 Tablet 2    nitrofurantoin, macrocrystal-monohydrate, (Macrobid) 100 mg capsule Take 1 Capsule by mouth once over twenty-four (24) hours. Start this antibiotic, once daily, for the remainder of your pregnancy after completing the course of cefdinir. 90 Capsule 3    Cetirizine (ZyrTEC) 10 mg cap Take 1 Tablet by mouth.      montelukast (Singulair) 10 mg tablet Take 10 mg by mouth daily. aspirin 81 mg chewable tablet Take 81 mg by mouth daily. famotidine (Pepcid) 20 mg tablet Take 20 mg by mouth two (2) times a day. enoxaparin (Lovenox) 40 mg/0.4 mL 40 mg by SubCUTAneous route daily. certolizumab pegoL (Cimzia) 400 mg/2 mL (200 mg/mL x 2) sykt injection 400 mg by SubCUTAneous route every thirty (30) days. metFORMIN (GLUCOPHAGE) 1,000 mg tablet Take 1,000 mg by mouth two (2) times daily (with meals). escitalopram oxalate (LEXAPRO) 20 mg tablet Take 1 Tablet by mouth daily. 90 Tablet 1    glycopyrrolate (ROBINUL) 1 mg tablet TAKE 2 TABLETS BY MOUTH TWO (2) TIMES A DAY. INDICATIONS: HYPERHYDROSIS 120 Tablet 2    B.infantis-B.ani-B.long-B.bifi (Probiotic 4X) 10-15 mg TbEC Take 1 Capsule by mouth two (2) times a day. Prodigy Twist Top Lancet 28 gauge misc AS DIRECTED (Patient not taking: Reported on 8/30/2021) 100 Lancet 0    hydroxychloroquine (PLAQUENIL) 200 mg tablet 200 mg daily. albuterol (PROVENTIL HFA, VENTOLIN HFA, PROAIR HFA) 90 mcg/actuation inhaler Take 2 Puffs by inhalation every four (4) hours as needed for Wheezing. 1 Inhaler 0    EPIPEN 2-SANJAY 0.3 mg/0.3 mL injection 1 (ONE) INJECTION AS NEEDED  0    cyanocobalamin 1,000 mcg tablet Take  by mouth daily.       SAFETY-ABDI TB SYR 1CC/25GX5/8\" 1 mL 25 gauge x 5/8\" syrg USE TO INJECT METHOTREXATE ONCE A WEEK  2 cholecalciferol (VITAMIN D3) (1000 Units /25 mcg) tablet Take 1 Tablet by mouth daily. levothyroxine (SYNTHROID) 75 mcg tablet Take 75 mcg by mouth six (6) days a week. No current facility-administered medications on file prior to visit.        Past Surgical History:   Procedure Laterality Date    ANORECTAL MANOMETRY  2021         COLONOSCOPY N/A 2016    COLONOSCOPY performed by Michael Gilliland MD at [facility]    COLONOSCOPY N/A 2020    COLONOSCOPY performed by Trina Bean MD at [facility]    COLONOSCOPY,DIAGNOSTIC  2020         HX BREAST BIOPSY Right 2022        HX BREAST LUMPECTOMY Right     HX  SECTION      HX HEENT      HX LUMBAR DISKECTOMY  septemeber 2021    HX WISDOM TEETH EXTRACTION      IR INSERT TUNL CVC W PORT OVER 5 YEARS      UPPER GI ENDOSCOPY,BIOPSY  2019         UPPER GI ENDOSCOPY,BIOPSY  2020         UPPER GI ENDOSCOPY,DILATN W GUIDE  2019            Family History   Problem Relation Age of Onset    Other Mother         [place]'s disease    Hypertension Mother     Dementia Mother     Stroke Mother     High Cholesterol Father     Heart Disease Father     Diabetes Father     Hypertension Father     Asthma Brother     Diabetes Brother     Other Brother         varicocele, hernias    Hypertension Brother     Alcohol abuse Brother     Hypertension Maternal Grandmother     Elevated Lipids Maternal Grandmother     Cancer Maternal Grandmother         breast    Other Maternal Grandmother         polymyalgia rheumatica    Glaucoma Maternal Grandmother     Cancer Maternal Grandfather         prostate    Huntingtons disease Maternal Grandfather     Cancer Paternal Grandmother         lung with mets    Cancer Paternal Grandfather         prostate, colon    Diabetes Paternal Grandfather     Heart Disease Paternal Grandfather     Alzheimer's Disease Paternal Grandfather     Dementia Paternal Grandfather Reviewed and no changes     Social History     Socioeconomic History    Marital status:      Spouse name: Not on file    Number of children: Not on file    Years of education: Not on file    Highest education level: Not on file   Occupational History    Not on file   Tobacco Use    Smoking status: Never    Smokeless tobacco: Never   Vaping Use    Vaping Use: Never used   Substance and Sexual Activity    Alcohol use: Not Currently     Comment: few drinks per year    Drug use: No    Sexual activity: Not on file   Other Topics Concern    Not on file   Social History Narrative    ** Merged History Encounter **          Social Determinants of Health     Financial Resource Strain: Not on file   Food Insecurity: Not on file   Transportation Needs: Not on file   Physical Activity: Not on file   Stress: Not on file   Social Connections: Not on file   Intimate Partner Violence: Not on file   Housing Stability: Not on file          Visit Vitals  [facility] 04/15/2022       Physical Examination:   Gen: well appearing female  HEENT: normal conjunctiva, no audible congestion, patient does not see oral erythema, has MMM  Neck: patient does not feel enlarged or tender LAD or masses  Resp: normal respiratory effort, no audible wheezing. CV: patient does not feel palpitations or heart irregularity  Abd: patient does not feel abdominal tenderness or mass, patient does not notice distension  Extrem: patient does not see swelling in ankles or joints.    Neuro: Alert and oriented, able to answer questions without difficulty          Lab Results   Component Value Date/Time    WBC 8.2 11/21/2022 05:19 AM    HGB 9.6 (L) 11/21/2022 05:19 AM    HCT 28.7 (L) 11/21/2022 05:19 AM    PLATELET 363 51/85/7866 05:19 AM    MCV 90.5 11/21/2022 05:19 AM     Lab Results   Component Value Date/Time    Sodium 132 (L) 11/19/2022 02:06 PM    Potassium 4.0 11/19/2022 02:06 PM    Chloride 102 11/19/2022 02:06 PM    CO2 22 11/19/2022 02:06 PM    Anion gap 8 11/19/2022 02:06 PM    Glucose 106 (H) 11/19/2022 02:06 PM    BUN 5 (L) 11/19/2022 02:06 PM    Creatinine 0.47 (L) 11/19/2022 02:06 PM    BUN/Creatinine ratio 11 (L) 11/19/2022 02:06 PM    GFR est AA >60 01/05/2022 01:41 PM    GFR est non-AA >60 01/05/2022 01:41 PM    Calcium 8.3 (L) 11/19/2022 02:06 PM     No results found for: CHOL, CHOLPOCT, CHOLX, CHLST, CHOLV, HDL, HDLPOC, HDLP, LDL, LDLCPOC, LDLC, DLDLP, VLDLC, VLDL, TGLX, TRIGL, TRIGP, TGLPOCT, CHHD, CHHDX  Lab Results   Component Value Date/Time    TSH 1.48 10/30/2015 05:11 PM     No results found for: PSA, Janet Alvares, PSAR3, [id number], [id number]  Lab Results   Component Value Date/Time    Hemoglobin A1c 5.7 (H) 09/26/2020 01:56 AM    Hemoglobin A1c, External 6.0 07/14/2022 12:00 AM     No results found for: VITD3, XQVID2, XQVID3, Keisha Freddy, VD3RIA    Lab Results   Component Value Date/Time    ALT (SGPT) 15 11/19/2022 02:06 PM    Alk. phosphatase 114 11/19/2022 02:06 PM    Bilirubin, total 0.6 11/19/2022 02:06 PM           Assessment/Plan:    39 yo woman PMH significant for presymptomatic pati's disease, Antiphospholipid syndrome, diabetes, SVT, hypothyroidism, , fibromyalgia, anxiety/depression,  undifferentiated connective tissue category depression presenting to follow up she is currently pregnant and doing well       1. Immunocompromised ([address]  She has undifferentiated connective tissue  and is followed at [facility] and on plaquenil, overall symptoms are better during pregnancy  Cimzia on hold     2. Moderate major depression (HCC)  Doing well on lexapro    3. 32 weeks gestation of pregnancy  Doing well    4. Antiphospholipid syndrome: on lovenox and aspirin     5. GERD: on Pepcid    6. Hypothyroidism: on levothyroxine and managed by endocrine    7. Diabetes: on metformin recently transitioned to insulin per endocrine    8.presymptomatic Hynting[place]'s disease: followed at [facility]    9.  Overactive bladder on ditropan    10: hx of back surgery for disc disease: she has chronic weakness in left lower leg     Elder MD Jose Daniel    This is an established visit conducted via real time video and audio telemedicine. The patient has been instructed that this meets HIPAA criteria and acknowledges and agrees to this method of visitation.     Follow up in 3 months

## 2023-01-10 ENCOUNTER — ANESTHESIA (OUTPATIENT)
Dept: LABOR AND DELIVERY | Age: 43
End: 2023-01-10
Payer: COMMERCIAL

## 2023-01-10 ENCOUNTER — HOSPITAL ENCOUNTER (INPATIENT)
Age: 43
LOS: 3 days | Discharge: HOME | End: 2023-01-13
Attending: OBSTETRICS & GYNECOLOGY | Admitting: STUDENT IN AN ORGANIZED HEALTH CARE EDUCATION/TRAINING PROGRAM
Payer: COMMERCIAL

## 2023-01-10 ENCOUNTER — ANESTHESIA EVENT (OUTPATIENT)
Dept: LABOR AND DELIVERY | Age: 43
End: 2023-01-10
Payer: COMMERCIAL

## 2023-01-10 PROBLEM — O13.9 GESTATIONAL HYPERTENSION: Status: ACTIVE | Noted: 2023-01-10

## 2023-01-10 LAB
ABO + RH BLD: NORMAL
ALBUMIN SERPL-MCNC: 2.5 G/DL (ref 3.5–5)
ALBUMIN/GLOB SERPL: 0.6 (ref 1.1–2.2)
ALP SERPL-CCNC: 192 U/L (ref 45–117)
ALT SERPL-CCNC: 13 U/L (ref 12–78)
AMPHET UR QL SCN: NEGATIVE
ANION GAP SERPL CALC-SCNC: 10 MMOL/L (ref 5–15)
AST SERPL-CCNC: 13 U/L (ref 15–37)
BARBITURATES UR QL SCN: NEGATIVE
BASOPHILS # BLD: 0 K/UL (ref 0–0.1)
BASOPHILS NFR BLD: 0 % (ref 0–1)
BENZODIAZ UR QL: NEGATIVE
BILIRUB SERPL-MCNC: 0.5 MG/DL (ref 0.2–1)
BLOOD GROUP ANTIBODIES SERPL: NORMAL
BUN SERPL-MCNC: 7 MG/DL (ref 6–20)
BUN/CREAT SERPL: 12 (ref 12–20)
CALCIUM SERPL-MCNC: 8.4 MG/DL (ref 8.5–10.1)
CANNABINOIDS UR QL SCN: NEGATIVE
CHLORIDE SERPL-SCNC: 109 MMOL/L (ref 97–108)
CO2 SERPL-SCNC: 20 MMOL/L (ref 21–32)
COCAINE UR QL SCN: NEGATIVE
CREAT SERPL-MCNC: 0.59 MG/DL (ref 0.55–1.02)
CREAT UR-MCNC: 25.6 MG/DL
DIFFERENTIAL METHOD BLD: ABNORMAL
DRUG SCRN COMMENT,DRGCM: NORMAL
EOSINOPHIL # BLD: 0.2 K/UL (ref 0–0.4)
EOSINOPHIL NFR BLD: 2 % (ref 0–7)
ERYTHROCYTE [DISTWIDTH] IN BLOOD BY AUTOMATED COUNT: 13.1 % (ref 11.5–14.5)
GLOBULIN SER CALC-MCNC: 4 G/DL (ref 2–4)
GLUCOSE BLD STRIP.AUTO-MCNC: 63 MG/DL (ref 65–117)
GLUCOSE BLD STRIP.AUTO-MCNC: 72 MG/DL (ref 65–117)
GLUCOSE SERPL-MCNC: 74 MG/DL (ref 65–100)
HCT VFR BLD AUTO: 34.9 % (ref 35–47)
HGB BLD-MCNC: 11.8 G/DL (ref 11.5–16)
IMM GRANULOCYTES # BLD AUTO: 0.1 K/UL (ref 0–0.04)
IMM GRANULOCYTES NFR BLD AUTO: 1 % (ref 0–0.5)
LYMPHOCYTES # BLD: 2.3 K/UL (ref 0.8–3.5)
LYMPHOCYTES NFR BLD: 22 % (ref 12–49)
MCH RBC QN AUTO: 30.6 PG (ref 26–34)
MCHC RBC AUTO-ENTMCNC: 33.8 G/DL (ref 30–36.5)
MCV RBC AUTO: 90.4 FL (ref 80–99)
METHADONE UR QL: NEGATIVE
MONOCYTES # BLD: 0.6 K/UL (ref 0–1)
MONOCYTES NFR BLD: 6 % (ref 5–13)
NEUTS SEG # BLD: 7.3 K/UL (ref 1.8–8)
NEUTS SEG NFR BLD: 69 % (ref 32–75)
NRBC # BLD: 0 K/UL (ref 0–0.01)
NRBC BLD-RTO: 0 PER 100 WBC
OPIATES UR QL: NEGATIVE
PCP UR QL: NEGATIVE
PLATELET # BLD AUTO: 161 K/UL (ref 150–400)
PMV BLD AUTO: 12.2 FL (ref 8.9–12.9)
POTASSIUM SERPL-SCNC: 3.6 MMOL/L (ref 3.5–5.1)
PROT SERPL-MCNC: 6.5 G/DL (ref 6.4–8.2)
PROT UR-MCNC: 10 MG/DL (ref 0–11.9)
PROT/CREAT UR-RTO: 0.4
RBC # BLD AUTO: 3.86 M/UL (ref 3.8–5.2)
SERVICE CMNT-IMP: ABNORMAL
SERVICE CMNT-IMP: NORMAL
SODIUM SERPL-SCNC: 139 MMOL/L (ref 136–145)
SPECIMEN EXP DATE BLD: NORMAL
WBC # BLD AUTO: 10.4 K/UL (ref 3.6–11)

## 2023-01-10 PROCEDURE — 77010026065 HC OXYGEN MINIMUM MEDICAL AIR

## 2023-01-10 PROCEDURE — 76060000078 HC EPIDURAL ANESTHESIA

## 2023-01-10 PROCEDURE — 65410000002 HC RM PRIVATE OB

## 2023-01-10 PROCEDURE — 75410000002 HC LABOR FEE PER 1 HR

## 2023-01-10 PROCEDURE — 82962 GLUCOSE BLOOD TEST: CPT

## 2023-01-10 PROCEDURE — 74011000250 HC RX REV CODE- 250: Performed by: STUDENT IN AN ORGANIZED HEALTH CARE EDUCATION/TRAINING PROGRAM

## 2023-01-10 PROCEDURE — 80053 COMPREHEN METABOLIC PANEL: CPT

## 2023-01-10 PROCEDURE — 74011000250 HC RX REV CODE- 250: Performed by: ANESTHESIOLOGY

## 2023-01-10 PROCEDURE — 74011250636 HC RX REV CODE- 250/636: Performed by: NURSE ANESTHETIST, CERTIFIED REGISTERED

## 2023-01-10 PROCEDURE — 74011250636 HC RX REV CODE- 250/636: Performed by: ANESTHESIOLOGY

## 2023-01-10 PROCEDURE — 80307 DRUG TEST PRSMV CHEM ANLYZR: CPT

## 2023-01-10 PROCEDURE — 85025 COMPLETE CBC W/AUTO DIFF WBC: CPT

## 2023-01-10 PROCEDURE — 74011250637 HC RX REV CODE- 250/637: Performed by: STUDENT IN AN ORGANIZED HEALTH CARE EDUCATION/TRAINING PROGRAM

## 2023-01-10 PROCEDURE — 84156 ASSAY OF PROTEIN URINE: CPT

## 2023-01-10 PROCEDURE — 76060000078 HC EPIDURAL ANESTHESIA: Performed by: STUDENT IN AN ORGANIZED HEALTH CARE EDUCATION/TRAINING PROGRAM

## 2023-01-10 PROCEDURE — 76010000392 HC C SECN EA ADDL 0.5 HR: Performed by: STUDENT IN AN ORGANIZED HEALTH CARE EDUCATION/TRAINING PROGRAM

## 2023-01-10 PROCEDURE — 36415 COLL VENOUS BLD VENIPUNCTURE: CPT

## 2023-01-10 PROCEDURE — 75410000003 HC RECOV DEL/VAG/CSECN EA 0.5 HR

## 2023-01-10 PROCEDURE — 77030003671 HC NDL SPN HAVL -B: Performed by: ANESTHESIOLOGY

## 2023-01-10 PROCEDURE — 76060000033 HC ANESTHESIA 1 TO 1.5 HR: Performed by: STUDENT IN AN ORGANIZED HEALTH CARE EDUCATION/TRAINING PROGRAM

## 2023-01-10 PROCEDURE — 77030007866 HC KT SPN ANES BBMI -B: Performed by: ANESTHESIOLOGY

## 2023-01-10 PROCEDURE — 76010000391 HC C SECN FIRST 1 HR: Performed by: STUDENT IN AN ORGANIZED HEALTH CARE EDUCATION/TRAINING PROGRAM

## 2023-01-10 PROCEDURE — 86900 BLOOD TYPING SEROLOGIC ABO: CPT

## 2023-01-10 PROCEDURE — 74011250636 HC RX REV CODE- 250/636: Performed by: STUDENT IN AN ORGANIZED HEALTH CARE EDUCATION/TRAINING PROGRAM

## 2023-01-10 PROCEDURE — 4A1HXCZ MONITORING OF PRODUCTS OF CONCEPTION, CARDIAC RATE, EXTERNAL APPROACH: ICD-10-PCS | Performed by: STUDENT IN AN ORGANIZED HEALTH CARE EDUCATION/TRAINING PROGRAM

## 2023-01-10 RX ORDER — ESCITALOPRAM OXALATE 10 MG/1
20 TABLET ORAL DAILY
Status: DISCONTINUED | OUTPATIENT
Start: 2023-01-11 | End: 2023-01-13 | Stop reason: HOSPADM

## 2023-01-10 RX ORDER — ONDANSETRON 2 MG/ML
4 INJECTION INTRAMUSCULAR; INTRAVENOUS
Status: DISCONTINUED | OUTPATIENT
Start: 2023-01-10 | End: 2023-01-13 | Stop reason: HOSPADM

## 2023-01-10 RX ORDER — OXYTOCIN/RINGER'S LACTATE 30/500 ML
87.3 PLASTIC BAG, INJECTION (ML) INTRAVENOUS AS NEEDED
Status: DISCONTINUED | OUTPATIENT
Start: 2023-01-10 | End: 2023-01-13 | Stop reason: HOSPADM

## 2023-01-10 RX ORDER — SODIUM CHLORIDE, SODIUM LACTATE, POTASSIUM CHLORIDE, CALCIUM CHLORIDE 600; 310; 30; 20 MG/100ML; MG/100ML; MG/100ML; MG/100ML
1000 INJECTION, SOLUTION INTRAVENOUS CONTINUOUS
Status: DISCONTINUED | OUTPATIENT
Start: 2023-01-10 | End: 2023-01-13 | Stop reason: HOSPADM

## 2023-01-10 RX ORDER — IBUPROFEN 400 MG/1
800 TABLET ORAL EVERY 8 HOURS
Status: DISCONTINUED | OUTPATIENT
Start: 2023-01-10 | End: 2023-01-13 | Stop reason: HOSPADM

## 2023-01-10 RX ORDER — IBUPROFEN 200 MG
4 TABLET ORAL AS NEEDED
Status: DISCONTINUED | OUTPATIENT
Start: 2023-01-10 | End: 2023-01-13 | Stop reason: HOSPADM

## 2023-01-10 RX ORDER — SODIUM CHLORIDE 0.9 % (FLUSH) 0.9 %
5-40 SYRINGE (ML) INJECTION AS NEEDED
Status: DISCONTINUED | OUTPATIENT
Start: 2023-01-10 | End: 2023-01-13 | Stop reason: HOSPADM

## 2023-01-10 RX ORDER — NALOXONE HYDROCHLORIDE 0.4 MG/ML
0.4 INJECTION, SOLUTION INTRAMUSCULAR; INTRAVENOUS; SUBCUTANEOUS AS NEEDED
Status: DISCONTINUED | OUTPATIENT
Start: 2023-01-10 | End: 2023-01-13 | Stop reason: HOSPADM

## 2023-01-10 RX ORDER — OXYTOCIN/RINGER'S LACTATE 30/500 ML
10 PLASTIC BAG, INJECTION (ML) INTRAVENOUS AS NEEDED
Status: DISCONTINUED | OUTPATIENT
Start: 2023-01-10 | End: 2023-01-13 | Stop reason: HOSPADM

## 2023-01-10 RX ORDER — SODIUM CHLORIDE 0.9 % (FLUSH) 0.9 %
5-40 SYRINGE (ML) INJECTION EVERY 8 HOURS
Status: DISCONTINUED | OUTPATIENT
Start: 2023-01-10 | End: 2023-01-13 | Stop reason: ALTCHOICE

## 2023-01-10 RX ORDER — ACETAMINOPHEN 325 MG/1
650 TABLET ORAL
Status: DISCONTINUED | OUTPATIENT
Start: 2023-01-10 | End: 2023-01-13 | Stop reason: HOSPADM

## 2023-01-10 RX ORDER — MONTELUKAST SODIUM 10 MG/1
10 TABLET ORAL
Status: DISCONTINUED | OUTPATIENT
Start: 2023-01-10 | End: 2023-01-13 | Stop reason: HOSPADM

## 2023-01-10 RX ORDER — SIMETHICONE 80 MG
80 TABLET,CHEWABLE ORAL AS NEEDED
Status: DISCONTINUED | OUTPATIENT
Start: 2023-01-10 | End: 2023-01-13 | Stop reason: HOSPADM

## 2023-01-10 RX ORDER — DEXTROSE MONOHYDRATE 100 MG/ML
0-250 INJECTION, SOLUTION INTRAVENOUS AS NEEDED
Status: DISCONTINUED | OUTPATIENT
Start: 2023-01-10 | End: 2023-01-13 | Stop reason: HOSPADM

## 2023-01-10 RX ORDER — DIPHENHYDRAMINE HCL 25 MG
25 CAPSULE ORAL
Status: DISCONTINUED | OUTPATIENT
Start: 2023-01-10 | End: 2023-01-13 | Stop reason: HOSPADM

## 2023-01-10 RX ORDER — PHENYLEPHRINE HCL IN 0.9% NACL 0.4MG/10ML
SYRINGE (ML) INTRAVENOUS
Status: DISCONTINUED | OUTPATIENT
Start: 2023-01-10 | End: 2023-01-10 | Stop reason: HOSPADM

## 2023-01-10 RX ORDER — OXYTOCIN/RINGER'S LACTATE 30/500 ML
PLASTIC BAG, INJECTION (ML) INTRAVENOUS
Status: DISCONTINUED | OUTPATIENT
Start: 2023-01-10 | End: 2023-01-10 | Stop reason: HOSPADM

## 2023-01-10 RX ORDER — DIPHENHYDRAMINE HYDROCHLORIDE 50 MG/ML
12.5 INJECTION, SOLUTION INTRAMUSCULAR; INTRAVENOUS
Status: DISCONTINUED | OUTPATIENT
Start: 2023-01-10 | End: 2023-01-13 | Stop reason: HOSPADM

## 2023-01-10 RX ORDER — ONDANSETRON 2 MG/ML
INJECTION INTRAMUSCULAR; INTRAVENOUS AS NEEDED
Status: DISCONTINUED | OUTPATIENT
Start: 2023-01-10 | End: 2023-01-10 | Stop reason: HOSPADM

## 2023-01-10 RX ORDER — LIDOCAINE 4 G/100G
1 PATCH TOPICAL
Status: DISCONTINUED | OUTPATIENT
Start: 2023-01-10 | End: 2023-01-13 | Stop reason: HOSPADM

## 2023-01-10 RX ORDER — MORPHINE SULFATE 0.5 MG/ML
INJECTION, SOLUTION EPIDURAL; INTRATHECAL; INTRAVENOUS
Status: COMPLETED | OUTPATIENT
Start: 2023-01-10 | End: 2023-01-10

## 2023-01-10 RX ORDER — FACIAL-BODY WIPES
10 EACH TOPICAL DAILY PRN
Status: DISCONTINUED | OUTPATIENT
Start: 2023-01-10 | End: 2023-01-13 | Stop reason: HOSPADM

## 2023-01-10 RX ORDER — SODIUM CHLORIDE, SODIUM LACTATE, POTASSIUM CHLORIDE, CALCIUM CHLORIDE 600; 310; 30; 20 MG/100ML; MG/100ML; MG/100ML; MG/100ML
INJECTION, SOLUTION INTRAVENOUS
Status: DISCONTINUED | OUTPATIENT
Start: 2023-01-10 | End: 2023-01-10 | Stop reason: HOSPADM

## 2023-01-10 RX ORDER — ALBUTEROL SULFATE 90 UG/1
2 AEROSOL, METERED RESPIRATORY (INHALATION)
Status: DISCONTINUED | OUTPATIENT
Start: 2023-01-10 | End: 2023-01-10

## 2023-01-10 RX ORDER — OXYBUTYNIN CHLORIDE 5 MG/1
5 TABLET ORAL EVERY 12 HOURS
Status: DISCONTINUED | OUTPATIENT
Start: 2023-01-10 | End: 2023-01-13 | Stop reason: HOSPADM

## 2023-01-10 RX ORDER — OXYCODONE AND ACETAMINOPHEN 5; 325 MG/1; MG/1
1 TABLET ORAL
Status: DISCONTINUED | OUTPATIENT
Start: 2023-01-10 | End: 2023-01-13 | Stop reason: HOSPADM

## 2023-01-10 RX ORDER — SODIUM CHLORIDE, SODIUM LACTATE, POTASSIUM CHLORIDE, CALCIUM CHLORIDE 600; 310; 30; 20 MG/100ML; MG/100ML; MG/100ML; MG/100ML
125 INJECTION, SOLUTION INTRAVENOUS CONTINUOUS
Status: DISCONTINUED | OUTPATIENT
Start: 2023-01-10 | End: 2023-01-13 | Stop reason: HOSPADM

## 2023-01-10 RX ORDER — HYDROXYCHLOROQUINE SULFATE 200 MG/1
200 TABLET, FILM COATED ORAL EVERY 12 HOURS
Status: DISCONTINUED | OUTPATIENT
Start: 2023-01-10 | End: 2023-01-13 | Stop reason: HOSPADM

## 2023-01-10 RX ORDER — CETIRIZINE HYDROCHLORIDE 10 MG/1
10 TABLET ORAL DAILY
Status: DISCONTINUED | OUTPATIENT
Start: 2023-01-11 | End: 2023-01-13 | Stop reason: HOSPADM

## 2023-01-10 RX ORDER — BUPIVACAINE HYDROCHLORIDE 7.5 MG/ML
INJECTION, SOLUTION EPIDURAL; RETROBULBAR
Status: COMPLETED | OUTPATIENT
Start: 2023-01-10 | End: 2023-01-10

## 2023-01-10 RX ORDER — ALBUTEROL SULFATE 0.83 MG/ML
2.5 SOLUTION RESPIRATORY (INHALATION)
Status: DISCONTINUED | OUTPATIENT
Start: 2023-01-10 | End: 2023-01-13 | Stop reason: HOSPADM

## 2023-01-10 RX ORDER — INSULIN LISPRO 100 [IU]/ML
INJECTION, SOLUTION INTRAVENOUS; SUBCUTANEOUS
Status: DISCONTINUED | OUTPATIENT
Start: 2023-01-10 | End: 2023-01-13

## 2023-01-10 RX ADMIN — SODIUM CHLORIDE, POTASSIUM CHLORIDE, SODIUM LACTATE AND CALCIUM CHLORIDE 1000 ML: 600; 310; 30; 20 INJECTION, SOLUTION INTRAVENOUS at 12:51

## 2023-01-10 RX ADMIN — CEFAZOLIN 2 G: 1 INJECTION, POWDER, FOR SOLUTION INTRAMUSCULAR; INTRAVENOUS at 17:38

## 2023-01-10 RX ADMIN — IBUPROFEN 800 MG: 400 TABLET, FILM COATED ORAL at 21:20

## 2023-01-10 RX ADMIN — Medication 909 ML/HR: at 18:07

## 2023-01-10 RX ADMIN — SODIUM CHLORIDE, POTASSIUM CHLORIDE, SODIUM LACTATE AND CALCIUM CHLORIDE 1000 ML: 600; 310; 30; 20 INJECTION, SOLUTION INTRAVENOUS at 15:00

## 2023-01-10 RX ADMIN — CEFAZOLIN 2 G: 1 INJECTION, POWDER, FOR SOLUTION INTRAMUSCULAR; INTRAVENOUS at 15:27

## 2023-01-10 RX ADMIN — SODIUM CHLORIDE, POTASSIUM CHLORIDE, SODIUM LACTATE AND CALCIUM CHLORIDE: 600; 310; 30; 20 INJECTION, SOLUTION INTRAVENOUS at 17:44

## 2023-01-10 RX ADMIN — ACETAMINOPHEN 650 MG: 325 TABLET ORAL at 23:26

## 2023-01-10 RX ADMIN — Medication 40 MCG/MIN: at 17:49

## 2023-01-10 RX ADMIN — ACETAMINOPHEN 650 MG: 325 TABLET ORAL at 14:56

## 2023-01-10 RX ADMIN — ONDANSETRON HYDROCHLORIDE 4 MG: 2 INJECTION, SOLUTION INTRAMUSCULAR; INTRAVENOUS at 17:45

## 2023-01-10 RX ADMIN — Medication 0.25 MG: at 17:45

## 2023-01-10 RX ADMIN — DIPHENHYDRAMINE HYDROCHLORIDE 12.5 MG: 50 INJECTION, SOLUTION INTRAMUSCULAR; INTRAVENOUS at 21:50

## 2023-01-10 RX ADMIN — BUPIVACAINE HYDROCHLORIDE 12 MG: 7.5 INJECTION, SOLUTION EPIDURAL; RETROBULBAR at 17:45

## 2023-01-10 RX ADMIN — SODIUM CHLORIDE, POTASSIUM CHLORIDE, SODIUM LACTATE AND CALCIUM CHLORIDE 1000 ML: 600; 310; 30; 20 INJECTION, SOLUTION INTRAVENOUS at 16:00

## 2023-01-10 NOTE — ANESTHESIA PROCEDURE NOTES
Spinal Block    Start time: 1/10/2023 5:44 PM  End time: 1/10/2023 5:50 PM  Performed by: Georgie Darling MD  Authorized by: Georgie Darling MD     Pre-procedure:   Indications: at surgeon's request and primary anesthetic  Preanesthetic Checklist: patient identified, risks and benefits discussed, anesthesia consent, site marked, patient being monitored, timeout performed and fire risk safety assessment completed and verbalized    Timeout Time: 17:41 EST      Spinal Block:   Patient Position:  Seated  Prep Region:  Lumbar  Prep: DuraPrep      Location:  L2-3  Technique:  Single shot  Local: bupivacaine (PF) (MARCAINE) 0.75 % (7.5 mg/mL) Intrathecal - Intrathecal   12 mg - 1/10/2023 5:45:00 PM  morphine (PF) (DURAMORPH;ASTRAMORPH) 0.5 mg/mL injection - Intrathecal   0.25 mg - 1/10/2023 5:45:00 PM    Med Admin Time: 1/10/2023 5:50 PM    Needle:   Needle Type:  Pencan  Needle Gauge:  24 G  Attempts:  1      Events: CSF confirmed, no blood with aspiration and no paresthesia        Assessment:  Insertion:  Uncomplicated  Patient tolerance:  Patient tolerated the procedure well with no immediate complications  Clean tap, no heme, no paresthesias

## 2023-01-10 NOTE — OP NOTES
OPERATIVE REPORT    Patient Name: Trina Wright   MRN#: [id number]    Procedure Date: 01/10/23    PREOPERATIVE DIAGNOSES:    1. IUP at 37w3d  2. History of prior  section, desired repeat  3. Gestational Hypertension     POSTOPERATIVE DIAGNOSES:  Status repeat low-transverse  section for GHTN    INDICATIONS:  Gestational hypertension at 37w3d, history of prior  section with desired repeat     PROCEDURE: Repeat low transverse  section     SURGEON: Eli Castro MD, Kadeem Celis MD       ANESTHESIA:  Spinal.    COMPLICATIONS:  None. EBL:  500  cc. FINDINGS: Moderate amount of adhesive disease between rectus and overlying fascia, omentum noted to be adhesed to overlying fascia, normal appearing uterus, live born female infant in vertex position, clear amniotomy fluid, APGARS 7/8, weight 3.27kg    MEDICATIONS:  2 g of Ancef preoperatively and 30 units of dilute Pitocin after placental delivery. DISPOSITION:  Mother to recovery room and infant to NICU     PROCEDURE IN DETAIL:  The patient was taken to the operating room, where spinal anesthesia was placed and found to be adequate. The patient was placed on the OR table, prepped and draped in a sterile fashion. Prior to the incision, the level of anesthesia was rechecked and found to be adequate. A time-out was performed. A Pfannenstiel incision was made 2 cm above the pubic symphysis at the site of the prior incision. Incision was carried down sharply to the level of the rectus fascia. The fascia was then incised sharply with a knife, and then the incision was extended bilaterally and superiorly with Moraes scissors. The superior border of the fascia was then grasped with 2 Kocher clamps, elevated, and bluntly dissected off the rectus muscles, followed by incision of the median raphae with Moraes scissors. Attention was turned to the inferior border of the rectus fascia and it was dissected off in a similar fashion. The rectus muscles were  in the midline and the peritoneum was identified. The peritoneum was entered bluntly with the 's fingers. The peritoneal opening was extended manually by pulling and further extended with use of Bovie. The 's hand was then inserted into the abdomen. The uterus was found to be in a nonrotated position. The ghazal retractor was inserted and noted to be free of bowel. A low-transverse incision was made in the uterus using the knife and dissected bluntly using the 's fingers. The incision was extended laterally and superiorly bilaterally bluntly the 's hand was then inserted into the uterus to find the infant in vertex OA position. The bladder blade was removed, and the infant's vertex was grasped and flexed and brought to the incision, where the infant was delivered atraumatically using fundal pressure. The cord was doubly clamped and cut. The infant was passed to waiting pediatricians. The placenta was then delivered using fundal pressure. The uterine cavity was wiped of all clots and debris. The hysterotomy incision was repaired with 0 Vicryl suture in a running locked fashion followed by a second 0 Vicryl suture in an imbricating fashion. A figure of eight stitch was placed over the R apex of the incision for hemostasis. Bovie was used to achieve hemostasis. Tubes, ovaries, and adnexa were visualized and normal.  The hysterotomy was noted to be hemostatic. The rectus fascia was closed using #1 Vicryl in a running fashion. The incision was then irrigated, and hemostasis was achieved using the Bovie. The subcutaneous tissue was closed using plain gut in a running fashion. The skin was closed using #4-0 Monocryl in a subcuticular fashion and bandaged with Steri-Strips and covered with a pressure dressing. .  All counts were correct x2. The patient tolerated the procedure well and was taken to her labor room in a stable condition. Brennan Crain MD  Obstetrics and Gynecology   [facility]        Electronically Signed by Eryn Borja MD on 1/10/2023 at 6:58 PM

## 2023-01-10 NOTE — PROGRESS NOTES
Problem:  Delivery: Day of Delivery  Goal: Activity/Safety  Outcome: Progressing Towards Goal  Goal: Diagnostic Test/Procedures  Outcome: Progressing Towards Goal  Goal: Nutrition/Diet  Outcome: Progressing Towards Goal  Goal: Medications  Outcome: Progressing Towards Goal  Goal: Respiratory  Outcome: Progressing Towards Goal  Goal: Treatments/Interventions/Procedures  Outcome: Progressing Towards Goal  Goal: Psychosocial  Outcome: Progressing Towards Goal  Goal: *Vital signs within defined limits  Outcome: Progressing Towards Goal  Goal: *Labs within defined limits  Outcome: Progressing Towards Goal  Goal: *Hemodynamically stable  Outcome: Progressing Towards Goal  Goal: *Optimal pain control at patient's stated goal  Outcome: Progressing Towards Goal  Goal: *Participates in infant care  Outcome: Progressing Towards Goal  Goal: *Demonstrates progressive activity  Outcome: Progressing Towards Goal  Goal: *Tolerating diet  Outcome: Progressing Towards Goal

## 2023-01-10 NOTE — L&D DELIVERY NOTE
Delivery Summary  Patient: Jesus Baeza             Circumcision:   NA-female  Additional Delivery Comments - 39 yo with new diagnosis of GHTN admitted for RCS. Pregnancy complicated by AQ4/ GDMA2, hypothryoid, depression, asthma, APLAS on lovenox ppx, RA. Patient underwent uncomplicated CS. Infant to NICU for difficulty maintaining O2 saturation. See operative report for additional details     Information for the patient's :  Latasha Iglesias [[id number]]     Delivery Type: , Low Transverse   Delivery Date: 1/10/2023   Delivery Time: 6:07 PM     Birth Weight: 3.27 kg     Sex:  female  Delivery Clinician:      Gestational Age: 44w3d    Presentation:     Position:             Apgars were 7  and 8      Resuscitation Method: C-PAP; Oxygen;Suctioning-bulb; Tactile Stimulation;Suctioning-deep     Meconium Stained: None    Living Status: Living       Placenta Date/Time:     Placenta Removal:     Placenta Appearance:      Cord Information: 3 Vessels    Cord Events:         Disposition of Cord Blood:      Blood Gases Sent?:        Cord pH:  none    Episiotomy: None   Laceration(s): None     Estimated Blood Loss (ml): No data found EBL 500cc, see documented QBL    Labor Events  Method: None      Augmentation: None   Cervical Ripening:     None        Operative Vaginal Delivery - none

## 2023-01-10 NOTE — ANESTHESIA PREPROCEDURE EVALUATION
Anesthetic History     PONV     Comments: \"labile BP during /after \"     Review of Systems / Medical History  Patient summary reviewed, nursing notes reviewed and pertinent labs reviewed    Pulmonary            Asthma (allergy induced)     Comments: Environmental and seasonal allergies   Neuro/Psych         Psychiatric history    Comments: Huntingtons Chorea; presymptomatic Cardiovascular    Hypertension        Dysrhythmias (with PICC line insertion)         Comments:  EKG= SR, NSSTTW     ECHO: 55-60%EF, no AS   GI/Hepatic/Renal     GERD    Renal disease (Chronic UTIs bec of an \"extra valve\" in rt kidney)      Comments: Multiple GI bleeds--, , , 2016  Dysphagia   Abdominal pain  Splenomegaly with hypodensities seen on CT( infection vs infarction)  Nausea Endo/Other    Diabetes (pregnancy induced)  Hypothyroidism  Obesity, arthritis and anemia     Other Findings   Comments: Polycystic ovarian syndrome    Fibromyalgia    Hyperhidrosis    Hx of left anterior lower leg numbness    Rheumatoid arthritis           Physical Exam    Airway  Mallampati: I  TM Distance: 4 - 6 cm  Neck ROM: normal range of motion   Mouth opening: Normal     Cardiovascular    Rhythm: regular  Rate: normal         Dental  No notable dental hx       Pulmonary  Breath sounds clear to auscultation               Abdominal  GI exam deferred       Other Findings            Anesthetic Plan    ASA: 3  Anesthesia type: spinal          Induction: Intravenous  Anesthetic plan and risks discussed with: Patient      Discussed risk of further nerve damage with spinal. Lovenox stopped yesterday morning.  Will proceed with spinal

## 2023-01-10 NOTE — PROGRESS NOTES
1022:  Derek Vega is a 38 y.o.   at 37w3d patient of Dr Jesus Alberto Rubio at [facility] who presents to L&D triage for LTCS. She reports Positive FM, denies vaginal bleeding, LOF, and contractions. She also denies Scotoma, RUQ pain, and Edema. Urine sample obtained. EFM and toco placed for initial assessment.

## 2023-01-10 NOTE — H&P
History & Physical    Name: Alanna Reed MRN: [id number]  SSN: [id number]    YOB: 1980  Age: 38 y.o. Sex: female      Summary: Alanna Reed is a 38 y.o.  at 37w5d presenting from clinic with diagnosis of GHTN. Patient has a history of a prior CS and desires repeat. PMHx detailed below with medications- reconciled with MD on admission:   DM2 vs. GDMA2:   -Levemir 14u-last took  PM   -Followed by [facility] endocrinology   -Will plan for metformin and POC BG with SSI post partum    Hypothryoid:   -synthroid 75mcg M-Sat    Depression:   -Lexapro 20mg     Asthma:   -Albuterol PRN, Singulair 10mg daily     Antiphospholipid antibody syndrome:   -Splenic thrombosis -2020> led to splenic abscess with ICU admission  -Followed by heme onc Dr. Waleska Nicole  -On ppx lovenox 40mg daily- last took  in AM and ASA   -Has port in place   -Plan to restart lovenox 40mg daily post operatively >12 hours after neuraxial removal/ placement     RA:  -cimzia- stopped at 31wks, wants to discuss when to restart post delivery- can cause delay in wound healing   -Continue plaquenil 200mg BID       GBS pos:   - In urine 22    Seasonal allergies:   -continue zyrtec     Subjective:   Patient feels well. Was nervous about procedure.       Estimated Date of Delivery: 23  OB History    Para Term  AB Living   6 1   1 4 1   SAB IAB Ectopic Molar Multiple Live Births   3   1     1      # Outcome Date GA Lbr Anton/2nd Weight Sex Delivery Anes PTL Lv   6 Current            5 Ectopic 2022           4   32w4d  1.87 kg F  LO SPINAL AN Y MELISA   3 SAB            2 SAB            1 SAB                  Past Medical History:   Diagnosis Date    Abnormal Papanicolaou smear of cervix     Acquired hypothyroidism     Adverse effect of anesthesia     labile BP during/after C section    Anemia     Antiphospholipid antibody syndrome (HCC)     Antiphospholipid syndrome (HCC)     Asthma Breast disorder     tumor on right breast it radiation seeds, non canerours    Broken bones     history of 9 broken bones    Chronic UTI (urinary tract infection)     \"extra valve right kidney causes UTI\"    Clotting disorder (HCC)     Fibromyalgia     Functional tremor     Gestational diabetes     GI bleed ,,,2016    lower GI last colonoscopy in 2016 normal     History of degenerative disc disease     Huntingtons chorea ([address]     Hyperhydrosis disorder     Ill-defined condition     Chris/ tested genetically - daughter has Sublette    Infertility     PCOS    Infertility, female     Nerve root disorder     Neuropathy     PCOS (polycystic ovarian syndrome)     Polycystic disease, ovaries     Precancerous skin lesion     removed from Right shoulder    Preeclampsia 2011    pregnancy    Reactive airway disease     Rheumatoid arteritis ([address]     Rheumatoid arthritis ([address]     SVT (supraventricular tachycardia) ([address]     occured during pregnancy when picc line inserted.     Systemic lupus erythematosus ([address]     Trauma      Past Surgical History:   Procedure Laterality Date    ANORECTAL MANOMETRY  2021         COLONOSCOPY N/A 2016    COLONOSCOPY performed by Bobbi Stoner MD at [facility] ENDOSCOPY    COLONOSCOPY N/A 2020    COLONOSCOPY performed by Tianna Blandon MD at [address] [place] [address]  2020         HX BREAST BIOPSY Right 2022        HX BREAST LUMPECTOMY Right     HX  SECTION      HX HEENT      HX LUMBAR DISKECTOMY  septemeber 2021    HX WISDOM TEETH EXTRACTION      IR INSERT TUNL CVC W PORT OVER 5 YEARS      UPPER GI ENDOSCOPY,BIOPSY  2019         UPPER GI ENDOSCOPY,BIOPSY  2020         UPPER GI ENDOSCOPY,DILATN W GUIDE  2019          Social History     Occupational History    Not on file   Tobacco Use    Smoking status: Never    Smokeless tobacco: Never   Vaping Use    Vaping Use: Never used   Substance and Sexual Activity    Alcohol use: Not Currently     Comment: few drinks per year    Drug use: No    Sexual activity: Not on file     Family History   Problem Relation Age of Onset    Other Mother         Chris's disease    Hypertension Mother     Dementia Mother     Stroke Mother     High Cholesterol Father     Heart Disease Father     Diabetes Father     Hypertension Father     Asthma Brother     Diabetes Brother     Other Brother         varicocele, hernias    Hypertension Brother     Alcohol abuse Brother     Hypertension Maternal Grandmother     Elevated Lipids Maternal Grandmother     Cancer Maternal Grandmother         breast    Other Maternal Grandmother         polymyalgia rheumatica    Glaucoma Maternal Grandmother     Cancer Maternal Grandfather         prostate    Huntingtons disease Maternal Grandfather     Cancer Paternal Grandmother         lung with mets    Cancer Paternal Grandfather         prostate, colon    Diabetes Paternal Grandfather     Heart Disease Paternal Grandfather     Alzheimer's Disease Paternal Grandfather     Dementia Paternal Grandfather        Allergies   Allergen Reactions    Latex Swelling    Entex [Phenylephrine-Guaifenesin] Anaphylaxis, Hives and Palpitations    Remicade [Infliximab] Anaphylaxis    Rituxan [Rituximab] Anaphylaxis     Stated not allergic 7/15/21    Sulfa (Sulfonamide Antibiotics) Other (comments)     Mouth irritation, elevated HR, SOB, flushing    Alpha-Gal (Galactose-Alpha-1,3-Galactose) Other (comments)    Beef Containing Products Other (comments)    Mucinex [Guaifenesin] Anaphylaxis and Hives    Pepto-Bismol [Bismuth Subsalicylate] Nausea and Vomiting    Pork Derived (Porcine) Other (comments)    Vancomycin Hives     Prior to Admission medications    Medication Sig Start Date End Date Taking?  Authorizing Provider   oxybutynin (DITROPAN) 5 mg tablet TAKE 1 TABLET BY MOUTH THREE TIMES A DAY 11/22/22   Julieta Burrell III, DO   Cetirizine (ZyrTEC) 10 mg cap Take 1 Tablet by mouth. Provider, Historical   montelukast (SINGULAIR) 10 mg tablet Take 10 mg by mouth daily. Provider, Historical   aspirin 81 mg chewable tablet Take 81 mg by mouth daily. Provider, Historical   famotidine (PEPCID) 20 mg tablet Take 20 mg by mouth two (2) times a day. Provider, Historical   enoxaparin (LOVENOX) 40 mg/0.4 mL 40 mg by SubCUTAneous route daily. Provider, Historical   certolizumab pegoL (Cimzia) 400 mg/2 mL (200 mg/mL x 2) sykt injection 400 mg by SubCUTAneous route every thirty (30) days. Provider, Historical   escitalopram oxalate (LEXAPRO) 20 mg tablet Take 1 Tablet by mouth daily. 11/18/22   Appa Shea Null MD   B.infantis-B.ani-B.long-B.bifi (Probiotic 4X) 10-15 mg TbEC Take 1 Capsule by mouth two (2) times a day. Provider, Historical   Prodigy Twist Top Lancet 28 gauge misc AS DIRECTED  Patient not taking: Reported on 8/30/2021 8/11/20   Appa Shea Null MD   hydroxychloroquine (PLAQUENIL) 200 mg tablet 200 mg daily. 12/23/19   Provider, Historical   albuterol (PROVENTIL HFA, VENTOLIN HFA, PROAIR HFA) 90 mcg/actuation inhaler Take 2 Puffs by inhalation every four (4) hours as needed for Wheezing. 6/9/19   Jacy Oscar MD   EPIPEN 2-SANJAY 0.3 mg/0.3 mL injection 1 (ONE) INJECTION AS NEEDED 4/30/19   Provider, Historical   cyanocobalamin 1,000 mcg tablet Take  by mouth daily. Provider, Historical   SAFETY-ABDI TB SYR 1CC/25GX5/8\" 1 mL 25 gauge x 5/8\" syrg USE TO INJECT METHOTREXATE ONCE A WEEK 12/1/17   Provider, Historical   cholecalciferol (VITAMIN D3) (1000 Units /25 mcg) tablet Take 1 Tablet by mouth daily. Provider, Historical   levothyroxine (SYNTHROID) 75 mcg tablet Take 75 mcg by mouth six (6) days a week. Provider, Historical        Review of Systems: A comprehensive review of systems was negative except for that written in the History of Present Illness. Objective:     Vitals:   There were no vitals filed for this visit.     Physical Exam:  Physical Exam:  Gen: NAD  Resp: Non labored resp  CV: regular rate  Abd: Gravid  Ext: Trace edema     CEFM: Cat 1 FHR tracing, mod ramesh, + accels, - decels     Prenatal Labs:   Lab Results   Component Value Date/Time    ABO/Rh(D) O POSITIVE 2016 02:02 PM    Rubella, External immune 2011 12:00 AM    GrBStrep, External negative 2011 12:00 AM    HBsAg, External negative 2011 12:00 AM    HIV, External non reactive 2011 12:00 AM    RPR, External non reactive 2011 12:00 AM    Gonorrhea, External negative 2011 12:00 AM    Chlamydia, External negative 2011 12:00 AM    ABO,Rh O positive 2011 12:00 AM   GBS positive in urine 22      Impression/Plan:     Plan:      PIH:   -MR Bps >4 hours a part  -PIH labs: P/C .4- as history of proteinura 24hr urine 309  -Hgb 11.8, Plts 161, Cr .59, ALT 13, AST 13  -GBS positive in urine   -Paitent has been NPO since prior to MN  -Last dose of lovenox 40mg was  AM   -Admit for  section. Discussed the risks of surgery including the risks of bleeding, infection, deep vein thrombosis, and surgical injuries to internal organs including but not limited to the bowels, bladder, rectum, and female reproductive organs. The patient understands the risks; any and all questions were answered to the patient's satisfaction.     DM2 vs. GDMA2:   -Levemir 14u-last took  PM   -Followed by [facility] endocrinology   -Will plan for metformin and POC BG with SSI post partum    History of Csx1:   -Desires repeat     Hypothryoid:   -synthroid 75mcg M-Sat    Depression:   -Lexapro 20mg     Asthma:   -Albuterol PRN, Singulair 10mg daily     Antiphospholipid antibody syndrome:   -Splenic thrombosis -2020> led to splenic abscess with ICU admission  -Followed by heme onc Dr. Gavin Duckworth  -On ppx lovenox 40mg daily- last took  in AM and ASA   -Has port in place   -Plan to restart lovenox 40mg daily post operatively >12 hours after neuraxial removal/ placement     RA:  -cimzia- stopped at 31wks, wants to discuss when to restart post delivery- can cause delay in wound healing   -Continue plaquenil 200mg BID       GBS pos:   - In urine 6/14/22    Seasonal allergies:   -continue zyrtec     Richa Adamson MD  Obstetrics and Gynecology   [facility]

## 2023-01-10 NOTE — PROGRESS NOTES
1400: Bedside and Verbal shift change report given to RJ Main RN (oncoming nurse) by ARVIN Whelan RN (offgoing nurse). Report included the following information SBAR and Kardex. 1807: LTCS of live baby girl     1913: Bedside and Verbal shift change report given to EUNICE Guevara RN (oncoming nurse) by RJ Main RN (offgoing nurse). Report included the following information SBAR and Kardex.

## 2023-01-11 LAB
BASOPHILS # BLD: 0 K/UL (ref 0–0.1)
BASOPHILS NFR BLD: 0 % (ref 0–1)
DIFFERENTIAL METHOD BLD: ABNORMAL
EOSINOPHIL # BLD: 0.2 K/UL (ref 0–0.4)
EOSINOPHIL NFR BLD: 2 % (ref 0–7)
ERYTHROCYTE [DISTWIDTH] IN BLOOD BY AUTOMATED COUNT: 12.8 % (ref 11.5–14.5)
GLUCOSE BLD STRIP.AUTO-MCNC: 152 MG/DL (ref 65–117)
GLUCOSE BLD STRIP.AUTO-MCNC: 153 MG/DL (ref 65–117)
GLUCOSE BLD STRIP.AUTO-MCNC: 98 MG/DL (ref 65–117)
HCT VFR BLD AUTO: 29.2 % (ref 35–47)
HGB BLD-MCNC: 9.7 G/DL (ref 11.5–16)
IMM GRANULOCYTES # BLD AUTO: 0.1 K/UL (ref 0–0.04)
IMM GRANULOCYTES NFR BLD AUTO: 1 % (ref 0–0.5)
LYMPHOCYTES # BLD: 1.9 K/UL (ref 0.8–3.5)
LYMPHOCYTES NFR BLD: 20 % (ref 12–49)
MCH RBC QN AUTO: 30.1 PG (ref 26–34)
MCHC RBC AUTO-ENTMCNC: 33.2 G/DL (ref 30–36.5)
MCV RBC AUTO: 90.7 FL (ref 80–99)
MONOCYTES # BLD: 0.5 K/UL (ref 0–1)
MONOCYTES NFR BLD: 5 % (ref 5–13)
NEUTS SEG # BLD: 6.7 K/UL (ref 1.8–8)
NEUTS SEG NFR BLD: 72 % (ref 32–75)
NRBC # BLD: 0 K/UL (ref 0–0.01)
NRBC BLD-RTO: 0 PER 100 WBC
PLATELET # BLD AUTO: 139 K/UL (ref 150–400)
PMV BLD AUTO: 12 FL (ref 8.9–12.9)
RBC # BLD AUTO: 3.22 M/UL (ref 3.8–5.2)
SERVICE CMNT-IMP: ABNORMAL
SERVICE CMNT-IMP: ABNORMAL
SERVICE CMNT-IMP: NORMAL
WBC # BLD AUTO: 9.3 K/UL (ref 3.6–11)

## 2023-01-11 PROCEDURE — 74011250637 HC RX REV CODE- 250/637: Performed by: STUDENT IN AN ORGANIZED HEALTH CARE EDUCATION/TRAINING PROGRAM

## 2023-01-11 PROCEDURE — 65410000002 HC RM PRIVATE OB

## 2023-01-11 PROCEDURE — 85025 COMPLETE CBC W/AUTO DIFF WBC: CPT

## 2023-01-11 PROCEDURE — 36415 COLL VENOUS BLD VENIPUNCTURE: CPT

## 2023-01-11 PROCEDURE — 74011636637 HC RX REV CODE- 636/637: Performed by: STUDENT IN AN ORGANIZED HEALTH CARE EDUCATION/TRAINING PROGRAM

## 2023-01-11 PROCEDURE — 82962 GLUCOSE BLOOD TEST: CPT

## 2023-01-11 PROCEDURE — 74011250636 HC RX REV CODE- 250/636: Performed by: OBSTETRICS & GYNECOLOGY

## 2023-01-11 PROCEDURE — 74011000250 HC RX REV CODE- 250: Performed by: STUDENT IN AN ORGANIZED HEALTH CARE EDUCATION/TRAINING PROGRAM

## 2023-01-11 RX ORDER — ENOXAPARIN SODIUM 100 MG/ML
40 INJECTION SUBCUTANEOUS EVERY 24 HOURS
Status: DISCONTINUED | OUTPATIENT
Start: 2023-01-11 | End: 2023-01-13 | Stop reason: HOSPADM

## 2023-01-11 RX ADMIN — ACETAMINOPHEN 650 MG: 325 TABLET ORAL at 11:34

## 2023-01-11 RX ADMIN — IBUPROFEN 800 MG: 400 TABLET, FILM COATED ORAL at 06:06

## 2023-01-11 RX ADMIN — Medication 1 CAPSULE: at 08:37

## 2023-01-11 RX ADMIN — Medication 80 MG: at 20:48

## 2023-01-11 RX ADMIN — MONTELUKAST 10 MG: 10 TABLET, FILM COATED ORAL at 00:11

## 2023-01-11 RX ADMIN — HYDROXYCHLOROQUINE SULFATE 200 MG: 200 TABLET ORAL at 22:03

## 2023-01-11 RX ADMIN — OXYBUTYNIN CHLORIDE 5 MG: 5 TABLET ORAL at 08:37

## 2023-01-11 RX ADMIN — Medication 2 UNITS: at 14:45

## 2023-01-11 RX ADMIN — OXYBUTYNIN CHLORIDE 5 MG: 5 TABLET ORAL at 22:03

## 2023-01-11 RX ADMIN — Medication 2 UNITS: at 22:04

## 2023-01-11 RX ADMIN — SODIUM CHLORIDE, PRESERVATIVE FREE 10 ML: 5 INJECTION INTRAVENOUS at 22:03

## 2023-01-11 RX ADMIN — IBUPROFEN 800 MG: 400 TABLET, FILM COATED ORAL at 22:03

## 2023-01-11 RX ADMIN — IBUPROFEN 800 MG: 400 TABLET, FILM COATED ORAL at 14:35

## 2023-01-11 RX ADMIN — ESCITALOPRAM OXALATE 20 MG: 10 TABLET ORAL at 08:38

## 2023-01-11 RX ADMIN — ENOXAPARIN SODIUM 40 MG: 100 INJECTION SUBCUTANEOUS at 11:34

## 2023-01-11 RX ADMIN — HYDROXYCHLOROQUINE SULFATE 200 MG: 200 TABLET ORAL at 08:37

## 2023-01-11 RX ADMIN — HYDROXYCHLOROQUINE SULFATE 200 MG: 200 TABLET ORAL at 00:11

## 2023-01-11 RX ADMIN — MONTELUKAST 10 MG: 10 TABLET, FILM COATED ORAL at 22:03

## 2023-01-11 RX ADMIN — SODIUM CHLORIDE, PRESERVATIVE FREE 10 ML: 5 INJECTION INTRAVENOUS at 18:21

## 2023-01-11 RX ADMIN — CETIRIZINE HYDROCHLORIDE 10 MG: 10 TABLET, FILM COATED ORAL at 08:38

## 2023-01-11 RX ADMIN — DIPHENHYDRAMINE HYDROCHLORIDE 25 MG: 25 CAPSULE ORAL at 01:30

## 2023-01-11 RX ADMIN — ACETAMINOPHEN 650 MG: 325 TABLET ORAL at 20:48

## 2023-01-11 RX ADMIN — ACETAMINOPHEN 650 MG: 325 TABLET ORAL at 16:56

## 2023-01-11 RX ADMIN — OXYBUTYNIN CHLORIDE 5 MG: 5 TABLET ORAL at 00:10

## 2023-01-11 NOTE — PROGRESS NOTES
Duramorph Follow-Up Note    1 Day Post-Op sp Procedure(s):   SECTION. /73   Pulse 90   Temp 36.7 °C (98 °F)   Resp 16   Ht 5' 6\" (1.676 m)   Wt 108.9 kg (240 lb)   LMP 04/15/2022   SpO2 97%   Breastfeeding Unknown   BMI 38.74 kg/m² . Patient is POD-1 S/P intrathecal duramorph. Pain is well controlled  Patient reports no headache, fever, weakness or numbness. Epidural/spinal tap site is clean, dry and intact. No obvious Anesthesia complications noted. Plan:    Pain management as per primary service.

## 2023-01-11 NOTE — ROUTINE PROCESS
2100:  TRANSFER - IN REPORT:    Verbal report received from EUNICE Guevara RN (name) on [employer]  being received from CHRISTEN Dubois RN(unit) for routine progression of care      Report consisted of patients Situation, Background, Assessment and   Recommendations(SBAR). Information from the following report(s) SBAR, Kardex, MAR, and Recent Results was reviewed with the receiving nurse. Opportunity for questions and clarification was provided. Assessment completed upon patients arrival to unit and care assumed.

## 2023-01-11 NOTE — ROUTINE PROCESS
Bedside and Verbal shift change report given to Jeanette Hernadez RN (oncoming nurse) by CHRISTEN Dubois RN (offgoing nurse). Report included the following information SBAR, Kardex, MAR, and Recent Results.

## 2023-01-11 NOTE — ROUTINE PROCESS
2142: Patients /88   2159: Patients /111  2210: Dr. Ann Gonzalez notified about patients elevated BP's. 2214: Patients /73    2220: Orders received to monitor patients blood pressure hourly. Will continue to monitor at this time. 0030: Patient ambulated to bathroom with X2 assist.  Ambulation tolerated well. Dial soap,albert wipes used, and linen changed at this time. 1: Patient ambulated back to bed at this time with one assist. Will continue to monitor. 0214: Dr. Ann Gonzalez updated on patients latest blood pressures. Order received to monitor vitals per unit routine at this time.

## 2023-01-11 NOTE — PROGRESS NOTES
Post-Operative  Day 1    Denisha Phipps     Assessment: Post-Op day 1, stable s/p RCS at 37 5/7 wks for [facility] with multiple medication problems. Baby stable in NICU on CPAP/antibxs- plan for renal US/echo and possible EEG     Plan:     - Routine post-operative care. - Postop hemoglobin stable. Plan to start Fe at discharge if pt anemic.  - Ambulate today. DM2 vs. 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 Bertrand Valle  -Has been on ppx lovenox 40mg daily- plan to restart today and continue for 6 weeks PP  -Has port in place        RA:  -cimzia- stopped at 31wks, saw rheumatologist 2 days ago and she plans to have her restart in a couple weeks as long as healing well form  as can cause delayed wound healing   -Continue plaquenil 200mg BID      Seasonal allergies:   -continue zyrtec     Information for the patient's :  Colby Rosenbaum [[id number]]   , Low Transverse   Patient doing well without significant complaint. Tolerating diet. Gomez out. Ambulating.       Vitals:  Visit Vitals  /73   Pulse 90   Temp 98 °F (36.7 °C)   Resp 16   Ht 5' 6\" (1.676 m)   Wt 108.9 kg (240 lb)   LMP 04/15/2022   SpO2 97%   Breastfeeding Unknown   BMI 38.74 kg/m²     Temp (24hrs), Av.9 °F (36.6 °C), Min:97.4 °F (36.3 °C), Max:98.5 °F (36.9 °C)      Last 24hr Input/Output:    Intake/Output Summary (Last 24 hours) at 2023 0913  Last data filed at 2023 0300  Gross per 24 hour Intake 1000 ml   Output 1122 ml   Net -122 ml          Exam:     Patient without distress. Fundus firm, nontender per nursing fundal checks. Incision bandaged, clean, dry, intact. Perineum with normal lochia noted per nursing assessment. Lower extremities are negative for pathological edema.     Labs:   Lab Results   Component Value Date/Time    WBC 9.3 01/11/2023 06:08 AM    WBC 10.4 01/10/2023 10:56 AM    WBC 8.2 11/21/2022 05:19 AM    WBC 9.1 11/20/2022 05:17 AM    WBC 14.0 (H) 11/19/2022 02:06 PM    WBC 6.5 01/05/2022 01:41 PM    WBC 14.4 (H) 06/30/2021 12:54 AM    WBC 13.9 (H) 09/26/2020 01:56 AM    WBC 6.7 05/26/2020 11:31 AM    WBC 6.4 05/23/2020 02:48 AM    WBC 6.6 05/22/2020 01:59 AM    WBC 6.9 05/21/2020 03:53 AM    WBC 8.6 05/20/2020 12:52 AM    WBC 9.7 05/19/2020 12:05 PM    WBC 6.4 05/15/2020 05:54 AM    WBC 10.8 05/14/2020 05:57 AM    WBC 6.8 04/18/2020 06:37 PM    WBC 11.8 (H) 01/31/2020 08:39 AM    WBC 5.8 04/16/2017 11:02 AM    WBC 7.0 04/03/2016 12:59 AM    WBC 10.4 04/02/2016 01:16 PM    WBC 6.8 10/30/2015 05:11 PM    WBC 7.0 01/12/2015 04:00 PM    WBC 6.3 11/15/2013 10:30 PM    WBC 10.7 12/10/2012 07:15 PM    WBC 8.0 09/13/2012 01:25 PM    WBC 11.0 08/10/2011 04:25 AM    WBC 11.0 08/08/2011 11:10 AM    WBC 11.0 08/03/2011 05:00 PM    WBC 9.5 08/01/2011 02:00 AM    WBC 10.5 07/28/2011 09:35 AM    WBC 11.7 (H) 07/25/2011 12:45 AM    WBC 13.5 (H) 07/19/2011 06:40 PM    WBC 10.5 07/18/2011 01:37 PM    WBC 7.5 07/18/2011 12:30 AM    WBC 11.7 (H) 07/13/2011 12:30 PM    WBC 10.5 07/12/2011 06:20 AM    WBC 11.3 (H) 07/11/2011 01:30 AM    WBC 10.4 07/07/2011 10:20 PM    WBC 10.8 07/04/2011 04:00 AM    WBC 11.3 (H) 07/03/2011 12:10 PM    WBC 10.2 06/26/2011 02:55 PM    WBC 10.3 06/23/2011 04:28 PM    WBC 11.3 (H) 06/20/2011 01:40 AM    WBC 10.3 06/19/2011 02:20 AM    WBC 10.8 06/13/2011 12:55 AM    WBC 12.1 (H) 06/12/2011 11:10 AM    WBC 12.1 (H) 06/09/2011 03:20 PM    WBC 10.5 06/02/2011 03:25 AM    WBC 10.1 05/15/2011 08:10 PM    HGB 9.7 (L) 01/11/2023 06:08 AM    HGB 11.8 01/10/2023 10:56 AM    HGB 9.6 (L) 11/21/2022 05:19 AM    HGB 12.6 11/20/2022 05:17 AM    HGB 11.1 (L) 11/19/2022 02:06 PM    HGB 12.0 01/05/2022 01:41 PM    HGB 12.7 06/30/2021 12:54 AM    HGB 10.6 (L) 09/26/2020 01:56 AM    HGB 12.0 05/26/2020 11:31 AM    HGB 10.4 (L) 05/23/2020 02:48 AM    HGB 10.1 (L) 05/22/2020 01:59 AM    HGB 10.5 (L) 05/21/2020 03:53 AM    HGB 10.1 (L) 05/20/2020 12:52 AM    HGB 11.8 05/19/2020 12:05 PM    HGB 11.5 05/15/2020 05:54 AM    HGB 12.5 05/14/2020 05:57 AM    HGB 11.8 04/18/2020 06:37 PM    HGB 12.8 01/31/2020 08:39 AM    HGB 13.4 04/16/2017 11:02 AM    HGB 13.4 04/03/2016 12:59 AM    HGB 14.2 04/02/2016 01:16 PM    HGB 13.0 10/30/2015 05:11 PM    HGB 12.8 01/12/2015 04:00 PM    HGB 13.4 11/15/2013 10:30 PM    HGB 14.6 12/10/2012 07:15 PM    HGB 13.0 09/13/2012 01:25 PM    HGB 9.2 (L) 08/10/2011 04:25 AM    HGB 11.7 08/08/2011 11:10 AM    HGB 11.5 08/03/2011 05:00 PM    HGB 11.4 (L) 08/01/2011 02:00 AM    HGB 11.5 07/28/2011 09:35 AM    HGB 11.3 (L) 07/25/2011 12:45 AM    HGB 11.5 07/19/2011 06:40 PM    HGB 11.0 (L) 07/18/2011 01:37 PM    HGB 14.4 07/18/2011 12:30 AM    HGB 11.5 07/13/2011 12:30 PM    HGB 11.8 07/12/2011 06:20 AM    HGB 12.0 07/11/2011 01:30 AM    HGB 11.2 (L) 07/07/2011 10:20 PM    HGB 11.6 07/04/2011 04:00 AM    HGB 11.4 (L) 07/03/2011 12:10 PM    HGB 11.7 06/26/2011 02:55 PM    HGB 11.4 (L) 06/23/2011 04:28 PM    HGB 12.0 06/20/2011 01:40 AM    HGB 12.8 06/19/2011 02:20 AM    HGB 11.4 (L) 06/13/2011 12:55 AM    HGB 11.4 (L) 06/12/2011 11:10 AM    HGB 10.9 (L) 06/09/2011 03:20 PM    HGB 11.2 (L) 06/02/2011 03:25 AM    HGB 11.0 (L) 05/15/2011 08:10 PM    HCT 29.2 (L) 01/11/2023 06:08 AM    HCT 34.9 (L) 01/10/2023 10:56 AM    HCT 28.7 (L) 11/21/2022 05:19 AM    HCT 37.3 11/20/2022 05:17 AM    HCT 32.7 (L) 11/19/2022 02:06 PM    HCT 36.8 01/05/2022 01:41 PM    HCT 38.0 06/30/2021 12:54 AM    HCT 32.6 (L) 09/26/2020 01:56 AM    HCT 37.2 05/26/2020 11:31 AM    HCT 31.8 (L) 05/23/2020 02:48 AM    HCT 31.6 (L) 05/22/2020 01:59 AM    HCT 32.7 (L) 05/21/2020 03:53 AM    HCT 30.4 (L) 05/20/2020 12:52 AM    HCT 35.5 05/19/2020 12:05 PM    HCT 36.0 05/15/2020 05:54 AM    HCT 37.1 05/14/2020 05:57 AM    HCT 35.3 04/18/2020 06:37 PM    HCT 39.3 01/31/2020 08:39 AM    HCT 39.2 04/16/2017 11:02 AM    HCT 39.9 04/03/2016 12:59 AM    HCT 41.9 04/02/2016 01:16 PM    HCT 38.9 10/30/2015 05:11 PM    HCT 39.8 01/12/2015 04:00 PM    HCT 39.8 11/15/2013 10:30 PM    HCT 43.8 12/10/2012 07:15 PM    HCT 38.8 09/13/2012 01:25 PM    HCT 27.6 (L) 08/10/2011 04:25 AM    HCT 34.3 (L) 08/08/2011 11:10 AM    HCT 33.5 (L) 08/03/2011 05:00 PM    HCT 33.1 (L) 08/01/2011 02:00 AM    HCT 33.0 (L) 07/28/2011 09:35 AM    HCT 33.6 (L) 07/25/2011 12:45 AM    HCT 32.8 (L) 07/19/2011 06:40 PM    HCT 31.9 (L) 07/18/2011 01:37 PM    HCT 41.2 07/18/2011 12:30 AM    HCT 33.4 (L) 07/13/2011 12:30 PM    HCT 34.1 (L) 07/12/2011 06:20 AM    HCT 34.5 (L) 07/11/2011 01:30 AM    HCT 32.0 (L) 07/07/2011 10:20 PM    HCT 33.5 (L) 07/04/2011 04:00 AM    HCT 33.3 (L) 07/03/2011 12:10 PM    HCT 33.9 (L) 06/26/2011 02:55 PM    HCT 33.2 (L) 06/23/2011 04:28 PM    HCT 34.4 (L) 06/20/2011 01:40 AM    HCT 36.7 06/19/2011 02:20 AM    HCT 33.2 (L) 06/13/2011 12:55 AM    HCT 33.1 (L) 06/12/2011 11:10 AM    HCT 31.4 (L) 06/09/2011 03:20 PM    HCT 32.6 (L) 06/02/2011 03:25 AM    HCT 32.1 (L) 05/15/2011 08:10 PM    PLATELET 306 (L) 43/28/2719 06:08 AM    PLATELET 006 63/67/0277 10:56 AM    PLATELET 753 39/32/0357 05:19 AM    PLATELET 932 (L) 12/25/7071 05:17 AM    PLATELET 836 69/89/7512 02:06 PM    PLATELET 626 39/04/1179 01:41 PM    PLATELET 499 50/49/7941 12:54 AM    PLATELET 162 68/57/1765 01:56 AM    PLATELET 328 28/76/8794 11:31 AM    PLATELET 198 02/39/2577 02:48 AM    PLATELET 221 79/31/0353 01:59 AM    PLATELET 130 67/81/1689 03:53 AM    PLATELET 944 43/47/7963 12:52 AM    PLATELET 085 76/90/9496 12:05 PM    PLATELET 661 74/89/2064 05:54 AM    PLATELET 429 74/82/4687 05:57 AM    PLATELET 535 78/14/1714 06:37 PM    PLATELET 653 99/63/1256 08:39 AM    PLATELET 206 04/90/7726 11:02 AM    PLATELET 693 28/29/5415 12:59 AM    PLATELET 870 61/49/1611 01:16 PM    PLATELET 358 68/12/1894 05:11 PM    PLATELET 255 11/28/2198 04:00 PM    PLATELET 899 71/91/4033 10:30 PM    PLATELET 848 00/65/1921 07:15 PM    PLATELET 186 42/09/4829 01:25 PM    PLATELET 280 (L) 40/12/3972 04:25 AM    PLATELET 245 90/79/0568 11:10 AM    PLATELET 601 29/95/4382 05:00 PM    PLATELET 700 59/61/7010 02:00 AM    PLATELET 684 76/12/9069 09:35 AM    PLATELET 359 32/06/1307 12:45 AM    PLATELET 562 08/81/8330 06:40 PM    PLATELET 888 52/20/1019 01:37 PM    PLATELET 812 (L) 61/42/9337 12:30 AM    PLATELET 044 03/63/6658 12:30 PM    PLATELET 119 17/68/4387 06:20 AM    PLATELET 246 44/47/2923 01:30 AM    PLATELET 015 65/22/3065 10:20 PM    PLATELET 841 20/77/2080 04:00 AM    PLATELET 376 11/63/4098 12:10 PM    PLATELET 572 49/42/1093 02:55 PM    PLATELET 721 75/29/6828 04:28 PM    PLATELET 898 23/51/9732 01:40 AM    PLATELET 892 05/06/4941 02:20 AM    PLATELET 083 47/38/7403 12:55 AM    PLATELET 811 14/76/0444 11:10 AM    PLATELET 522 56/23/0373 03:20 PM    PLATELET 781 29/64/2739 03:25 AM    PLATELET 102 77/58/2482 08:10 PM       Recent Results (from the past 24 hour(s))   CBC WITH AUTOMATED DIFF    Collection Time: 01/10/23 10:56 AM   Result Value Ref Range    WBC 10.4 3.6 - 11.0 K/uL    RBC 3.86 3.80 - 5.20 M/uL    HGB 11.8 11.5 - 16.0 g/dL    HCT 34.9 (L) 35.0 - 47.0 %    MCV 90.4 80.0 - 99.0 FL    MCH 30.6 26.0 - 34.0 PG    MCHC 33.8 30.0 - 36.5 g/dL    RDW 13.1 11.5 - 14.5 %    PLATELET 630 200 - 985 K/uL    MPV 12.2 8.9 - 12.9 FL    NRBC 0.0 0  WBC    ABSOLUTE NRBC 0.00 0.00 - 0.01 K/uL    NEUTROPHILS 69 32 - 75 %    LYMPHOCYTES 22 12 - 49 %    MONOCYTES 6 5 - 13 % EOSINOPHILS 2 0 - 7 %    BASOPHILS 0 0 - 1 %    IMMATURE GRANULOCYTES 1 (H) 0.0 - 0.5 %    ABS. NEUTROPHILS 7.3 1.8 - 8.0 K/UL    ABS. LYMPHOCYTES 2.3 0.8 - 3.5 K/UL    ABS. MONOCYTES 0.6 0.0 - 1.0 K/UL    ABS. EOSINOPHILS 0.2 0.0 - 0.4 K/UL    ABS. BASOPHILS 0.0 0.0 - 0.1 K/UL    ABS. IMM. GRANS. 0.1 (H) 0.00 - 0.04 K/UL    DF AUTOMATED     TYPE & SCREEN    Collection Time: 01/10/23 10:56 AM   Result Value Ref Range    Crossmatch Expiration 01/13/2023,2359     ABO/Rh(D) O POSITIVE     Antibody screen NEG    METABOLIC PANEL, COMPREHENSIVE    Collection Time: 01/10/23 10:56 AM   Result Value Ref Range    Sodium 139 136 - 145 mmol/L    Potassium 3.6 3.5 - 5.1 mmol/L    Chloride 109 (H) 97 - 108 mmol/L    CO2 20 (L) 21 - 32 mmol/L    Anion gap 10 5 - 15 mmol/L    Glucose 74 65 - 100 mg/dL    BUN 7 6 - 20 MG/DL    Creatinine 0.59 0.55 - 1.02 MG/DL    BUN/Creatinine ratio 12 12 - 20      eGFR >60 >60 ml/min/1.73m2    Calcium 8.4 (L) 8.5 - 10.1 MG/DL    Bilirubin, total 0.5 0.2 - 1.0 MG/DL    ALT (SGPT) 13 12 - 78 U/L    AST (SGOT) 13 (L) 15 - 37 U/L    Alk. phosphatase 192 (H) 45 - 117 U/L    Protein, total 6.5 6.4 - 8.2 g/dL    Albumin 2.5 (L) 3.5 - 5.0 g/dL    Globulin 4.0 2.0 - 4.0 g/dL    A-G Ratio 0.6 (L) 1.1 - 2.2     PROTEIN/CREATININE RATIO, URINE    Collection Time: 01/10/23 10:56 AM   Result Value Ref Range    Protein, urine random 10 0.0 - 11.9 mg/dL    Creatinine, urine random 25.60 mg/dL    Protein/Creat.  urine Ratio 0.4     DRUG SCREEN, URINE    Collection Time: 01/10/23 10:56 AM   Result Value Ref Range    AMPHETAMINES Negative NEG      BARBITURATES Negative NEG      BENZODIAZEPINES Negative NEG      COCAINE Negative NEG      METHADONE Negative NEG      OPIATES Negative NEG      PCP(PHENCYCLIDINE) Negative NEG      THC (TH-CANNABINOL) Negative NEG      Drug screen comment (NOTE)    GLUCOSE, POC    Collection Time: 01/10/23  9:19 PM   Result Value Ref Range    Glucose (POC) 63 (L) 65 - 117 mg/dL Performed by Alonso Estrada RN    GLUCOSE, POC    Collection Time: 01/10/23  9:36 PM   Result Value Ref Range    Glucose (POC) 72 65 - 117 mg/dL    Performed by ROSEMARY BROWN    CBC WITH AUTOMATED DIFF    Collection Time: 01/11/23  6:08 AM   Result Value Ref Range    WBC 9.3 3.6 - 11.0 K/uL    RBC 3.22 (L) 3.80 - 5.20 M/uL    HGB 9.7 (L) 11.5 - 16.0 g/dL    HCT 29.2 (L) 35.0 - 47.0 %    MCV 90.7 80.0 - 99.0 FL    MCH 30.1 26.0 - 34.0 PG    MCHC 33.2 30.0 - 36.5 g/dL    RDW 12.8 11.5 - 14.5 %    PLATELET 863 (L) 631 - 400 K/uL    MPV 12.0 8.9 - 12.9 FL    NRBC 0.0 0  WBC    ABSOLUTE NRBC 0.00 0.00 - 0.01 K/uL    NEUTROPHILS 72 32 - 75 %    LYMPHOCYTES 20 12 - 49 %    MONOCYTES 5 5 - 13 %    EOSINOPHILS 2 0 - 7 %    BASOPHILS 0 0 - 1 %    IMMATURE GRANULOCYTES 1 (H) 0.0 - 0.5 %    ABS. NEUTROPHILS 6.7 1.8 - 8.0 K/UL    ABS. LYMPHOCYTES 1.9 0.8 - 3.5 K/UL    ABS. MONOCYTES 0.5 0.0 - 1.0 K/UL    ABS. EOSINOPHILS 0.2 0.0 - 0.4 K/UL    ABS. BASOPHILS 0.0 0.0 - 0.1 K/UL    ABS. IMM.  GRANS. 0.1 (H) 0.00 - 0.04 K/UL    DF AUTOMATED

## 2023-01-11 NOTE — ANESTHESIA POSTPROCEDURE EVALUATION
Procedure(s):   SECTION. spinal    Anesthesia Post Evaluation      Multimodal analgesia: multimodal analgesia used between 6 hours prior to anesthesia start to PACU discharge  Patient location during evaluation: bedside  Patient participation: complete - patient participated  Level of consciousness: awake  Pain management: adequate  Airway patency: patent  Anesthetic complications: no  Cardiovascular status: acceptable  Respiratory status: acceptable  Hydration status: acceptable  Post anesthesia nausea and vomiting:  controlled  Final Post Anesthesia Temperature Assessment:  Normothermia (36.0-37.5 degrees C)      INITIAL Post-op Vital signs:   Vitals Value Taken Time   /69 01/10/23 1852   Temp 36.7 °C (98 °F) 01/10/23 1852   Pulse 65 01/10/23 1852   Resp 12 01/10/23 1852   SpO2 100 % 01/10/23 1859   Vitals shown include unvalidated device data.

## 2023-01-11 NOTE — LACTATION NOTE
This note was copied from a baby's chart. 01/11/23 1114   Visit Information   Lactation Consult Visit Type IP Initial Consult   Visit Length 30 minutes   Reason for Visit Education  (Mother pumping for her NICU baby)   Breast- Feeding Assessment   Breast-Feeding Experience Yes; Did some pumping for 1st baby (NICU baby, now 10yrs old)   Equipment: Has a Spectra breast pump; Measured for 18mm flanges on the right breast and 16mm on the left; Supplied with available 21mm flanges)   Breast Trauma/Surgery Yes; Right breast biopsy 3/22; 1cm scar at about 9 o'clock just outside the areola   Medications Ditropan 5mg TID; Singulair 10mg daily; Lovenox 40mg SQ daily   Breast Assessment   Left Breast Large   Left Nipple Everted   Right Breast Large   Right Nipple Everted     Reviewed the \"Your Guide to Breastfeeding\" booklet. Demonstrated hand expression. Discussed pumping and breast milk collection. Mother's questions were addressed. Colchicine Pregnancy And Lactation Text: This medication is Pregnancy Category C and isn't considered safe during pregnancy. It is excreted in breast milk. Elidel Counseling: Patient may experience a mild burning sensation during topical application. Elidel is not approved in children less than 2 years of age. There have been case reports of hematologic and skin malignancies in patients using topical calcineurin inhibitors although causality is questionable. Hydroxyzine Counseling: Patient advised that the medication is sedating and not to drive a car after taking this medication.  Patient informed of potential adverse effects including but not limited to dry mouth, urinary retention, and blurry vision.  The patient verbalized understanding of the proper use and possible adverse effects of hydroxyzine.  All of the patient's questions and concerns were addressed. Stelara Pregnancy And Lactation Text: This medication is Pregnancy Category B and is considered safe during pregnancy. It is unknown if this medication is excreted in breast milk. Simponi Counseling:  I discussed with the patient the risks of golimumab including but not limited to myelosuppression, immunosuppression, autoimmune hepatitis, demyelinating diseases, lymphoma, and serious infections.  The patient understands that monitoring is required including a PPD at baseline and must alert us or the primary physician if symptoms of infection or other concerning signs are noted. Bactrim Pregnancy And Lactation Text: This medication is Pregnancy Category D and is known to cause fetal risk.  It is also excreted in breast milk. Simponi Pregnancy And Lactation Text: The risk during pregnancy and breastfeeding is uncertain with this medication. Stelara Counseling:  I discussed with the patient the risks of ustekinumab including but not limited to immunosuppression, malignancy, posterior leukoencephalopathy syndrome, and serious infections.  The patient understands that monitoring is required including a PPD at baseline and must alert us or the primary physician if symptoms of infection or other concerning signs are noted. Cosentyx Counseling:  I discussed with the patient the risks of Cosentyx including but not limited to worsening of Crohn's disease, immunosuppression, allergic reactions and infections.  The patient understands that monitoring is required including a PPD at baseline and must alert us or the primary physician if symptoms of infection or other concerning signs are noted. SSKI Counseling:  I discussed with the patient the risks of SSKI including but not limited to thyroid abnormalities, metallic taste, GI upset, fever, headache, acne, arthralgias, paraesthesias, lymphadenopathy, easy bleeding, arrhythmias, and allergic reaction. Albendazole Pregnancy And Lactation Text: This medication is Pregnancy Category C and it isn't known if it is safe during pregnancy. It is also excreted in breast milk. Isotretinoin Pregnancy And Lactation Text: This medication is Pregnancy Category X and is considered extremely dangerous during pregnancy. It is unknown if it is excreted in breast milk. Odomzo Pregnancy And Lactation Text: This medication is Pregnancy Category X and is absolutely contraindicated during pregnancy. It is unknown if it is excreted in breast milk. Infliximab Counseling:  I discussed with the patient the risks of infliximab including but not limited to myelosuppression, immunosuppression, autoimmune hepatitis, demyelinating diseases, lymphoma, and serious infections.  The patient understands that monitoring is required including a PPD at baseline and must alert us or the primary physician if symptoms of infection or other concerning signs are noted. Clindamycin Pregnancy And Lactation Text: This medication can be used in pregnancy if certain situations. Clindamycin is also present in breast milk. Clindamycin Counseling: I counseled the patient regarding use of clindamycin as an antibiotic for prophylactic and/or therapeutic purposes. Clindamycin is active against numerous classes of bacteria, including skin bacteria. Side effects may include nausea, diarrhea, gastrointestinal upset, rash, hives, yeast infections, and in rare cases, colitis. Cimetidine Pregnancy And Lactation Text: This medication is Pregnancy Category B and is considered safe during pregnancy. It is also excreted in breast milk and breast feeding isn't recommended. Otezla Pregnancy And Lactation Text: This medication is Pregnancy Category C and it isn't known if it is safe during pregnancy. It is unknown if it is excreted in breast milk. Cyclosporine Pregnancy And Lactation Text: This medication is Pregnancy Category C and it isn't know if it is safe during pregnancy. This medication is excreted in breast milk. Quinolones Pregnancy And Lactation Text: This medication is Pregnancy Category C and it isn't know if it is safe during pregnancy. It is also excreted in breast milk. Ketoconazole Pregnancy And Lactation Text: This medication is Pregnancy Category C and it isn't know if it is safe during pregnancy. It is also excreted in breast milk and breast feeding isn't recommended. Nsaids Pregnancy And Lactation Text: These medications are considered safe up to 30 weeks gestation. It is excreted in breast milk. Tremfya Counseling: I discussed with the patient the risks of guselkumab including but not limited to immunosuppression, serious infections, worsening of inflammatory bowel disease and drug reactions.  The patient understands that monitoring is required including a PPD at baseline and must alert us or the primary physician if symptoms of infection or other concerning signs are noted. Enbrel Counseling:  I discussed with the patient the risks of etanercept including but not limited to myelosuppression, immunosuppression, autoimmune hepatitis, demyelinating diseases, lymphoma, and infections.  The patient understands that monitoring is required including a PPD at baseline and must alert us or the primary physician if symptoms of infection or other concerning signs are noted. Acitretin Pregnancy And Lactation Text: This medication is Pregnancy Category X and should not be given to women who are pregnant or may become pregnant in the future. This medication is excreted in breast milk. Acitretin Counseling:  I discussed with the patient the risks of acitretin including but not limited to hair loss, dry lips/skin/eyes, liver damage, hyperlipidemia, depression/suicidal ideation, photosensitivity.  Serious rare side effects can include but are not limited to pancreatitis, pseudotumor cerebri, bony changes, clot formation/stroke/heart attack.  Patient understands that alcohol is contraindicated since it can result in liver toxicity and significantly prolong the elimination of the drug by many years. Valtrex Counseling: I discussed with the patient the risks of valacyclovir including but not limited to kidney damage, nausea, vomiting and severe allergy.  The patient understands that if the infection seems to be worsening or is not improving, they are to call. Cimzia Counseling:  I discussed with the patient the risks of Cimzia including but not limited to immunosuppression, allergic reactions and infections.  The patient understands that monitoring is required including a PPD at baseline and must alert us or the primary physician if symptoms of infection or other concerning signs are noted. 5-Fu Counseling: 5-Fluorouracil Counseling:  I discussed with the patient the risks of 5-fluorouracil including but not limited to erythema, scaling, itching, weeping, crusting, and pain. Thalidomide Counseling: I discussed with the patient the risks of thalidomide including but not limited to birth defects, anxiety, weakness, chest pain, dizziness, cough and severe allergy. Clofazimine Counseling:  I discussed with the patient the risks of clofazimine including but not limited to skin and eye pigmentation, liver damage, nausea/vomiting, gastrointestinal bleeding and allergy. Carac Pregnancy And Lactation Text: This medication is Pregnancy Category X and contraindicated in pregnancy and in women who may become pregnant. It is unknown if this medication is excreted in breast milk. Hydroquinone Pregnancy And Lactation Text: This medication has not been assigned a Pregnancy Risk Category but animal studies failed to show danger with the topical medication. It is unknown if the medication is excreted in breast milk. Include Pregnancy/Lactation Warning?: No Protopic Counseling: Patient may experience a mild burning sensation during topical application. Protopic is not approved in children less than 2 years of age. There have been case reports of hematologic and skin malignancies in patients using topical calcineurin inhibitors although causality is questionable. Tazorac Pregnancy And Lactation Text: This medication is not safe during pregnancy. It is unknown if this medication is excreted in breast milk. Erivedge Counseling- I discussed with the patient the risks of Erivedge including but not limited to nausea, vomiting, diarrhea, constipation, weight loss, changes in the sense of taste, decreased appetite, muscle spasms, and hair loss.  The patient verbalized understanding of the proper use and possible adverse effects of Erivedge.  All of the patient's questions and concerns were addressed. Minocycline Counseling: Patient advised regarding possible photosensitivity and discoloration of the teeth, skin, lips, tongue and gums.  Patient instructed to avoid sunlight, if possible.  When exposed to sunlight, patients should wear protective clothing, sunglasses, and sunscreen.  The patient was instructed to call the office immediately if the following severe adverse effects occur:  hearing changes, easy bruising/bleeding, severe headache, or vision changes.  The patient verbalized understanding of the proper use and possible adverse effects of minocycline.  All of the patient's questions and concerns were addressed. Imiquimod Counseling:  I discussed with the patient the risks of imiquimod including but not limited to erythema, scaling, itching, weeping, crusting, and pain.  Patient understands that the inflammatory response to imiquimod is variable from person to person and was educated regarded proper titration schedule.  If flu-like symptoms develop, patient knows to discontinue the medication and contact us. Azithromycin Counseling:  I discussed with the patient the risks of azithromycin including but not limited to GI upset, allergic reaction, drug rash, diarrhea, and yeast infections. Methotrexate Pregnancy And Lactation Text: This medication is Pregnancy Category X and is known to cause fetal harm. This medication is excreted in breast milk. Solaraze Pregnancy And Lactation Text: This medication is Pregnancy Category B and is considered safe. There is some data to suggest avoiding during the third trimester. It is unknown if this medication is excreted in breast milk. Cellcept Pregnancy And Lactation Text: This medication is Pregnancy Category D and isn't considered safe during pregnancy. It is unknown if this medication is excreted in breast milk. Valtrex Pregnancy And Lactation Text: this medication is Pregnancy Category B and is considered safe during pregnancy. This medication is not directly found in breast milk but it's metabolite acyclovir is present. Spironolactone Pregnancy And Lactation Text: This medication can cause feminization of the male fetus and should be avoided during pregnancy. The active metabolite is also found in breast milk. Dapsone Pregnancy And Lactation Text: This medication is Pregnancy Category C and is not considered safe during pregnancy or breast feeding. Metronidazole Pregnancy And Lactation Text: This medication is Pregnancy Category B and considered safe during pregnancy.  It is also excreted in breast milk. Bexarotene Counseling:  I discussed with the patient the risks of bexarotene including but not limited to hair loss, dry lips/skin/eyes, liver abnormalities, hyperlipidemia, pancreatitis, depression/suicidal ideation, photosensitivity, drug rash/allergic reactions, hypothyroidism, anemia, leukopenia, infection, cataracts, and teratogenicity.  Patient understands that they will need regular blood tests to check lipid profile, liver function tests, white blood cell count, thyroid function tests and pregnancy test if applicable. Oxybutynin Counseling:  I discussed with the patient the risks of oxybutynin including but not limited to skin rash, drowsiness, dry mouth, difficulty urinating, and blurred vision. Topical Retinoid counseling:  Patient advised to apply a pea-sized amount only at bedtime and wait 30 minutes after washing their face before applying.  If too drying, patient may add a non-comedogenic moisturizer. The patient verbalized understanding of the proper use and possible adverse effects of retinoids.  All of the patient's questions and concerns were addressed. Protopic Pregnancy And Lactation Text: This medication is Pregnancy Category C. It is unknown if this medication is excreted in breast milk when applied topically. Odomzo Counseling- I discussed with the patient the risks of Odomzo including but not limited to nausea, vomiting, diarrhea, constipation, weight loss, changes in the sense of taste, decreased appetite, muscle spasms, and hair loss.  The patient verbalized understanding of the proper use and possible adverse effects of Odomzo.  All of the patient's questions and concerns were addressed. Griseofulvin Pregnancy And Lactation Text: This medication is Pregnancy Category X and is known to cause serious birth defects. It is unknown if this medication is excreted in breast milk but breast feeding should be avoided. Tetracycline Counseling: Patient counseled regarding possible photosensitivity and increased risk for sunburn.  Patient instructed to avoid sunlight, if possible.  When exposed to sunlight, patients should wear protective clothing, sunglasses, and sunscreen.  The patient was instructed to call the office immediately if the following severe adverse effects occur:  hearing changes, easy bruising/bleeding, severe headache, or vision changes.  The patient verbalized understanding of the proper use and possible adverse effects of tetracycline.  All of the patient's questions and concerns were addressed. Patient understands to avoid pregnancy while on therapy due to potential birth defects. Otezla Counseling: The side effects of Otezla were discussed with the patient, including but not limited to worsening or new depression, weight loss, diarrhea, nausea, upper respiratory tract infection, and headache. Patient instructed to call the office should any adverse effect occur.  The patient verbalized understanding of the proper use and possible adverse effects of Otezla.  All the patient's questions and concerns were addressed. Topical Sulfur Applications Counseling: Topical Sulfur Counseling: Patient counseled that this medication may cause skin irritation or allergic reactions.  In the event of skin irritation, the patient was advised to reduce the amount of the drug applied or use it less frequently.   The patient verbalized understanding of the proper use and possible adverse effects of topical sulfur application.  All of the patient's questions and concerns were addressed. Solaraze Counseling:  I discussed with the patient the risks of Solaraze including but not limited to erythema, scaling, itching, weeping, crusting, and pain. Ketoconazole Counseling:   Patient counseled regarding improving absorption with orange juice.  Adverse effects include but are not limited to breast enlargement, headache, diarrhea, nausea, upset stomach, liver function test abnormalities, taste disturbance, and stomach pain.  There is a rare possibility of liver failure that can occur when taking ketoconazole. The patient understands that monitoring of LFTs may be required, especially at baseline. The patient verbalized understanding of the proper use and possible adverse effects of ketoconazole.  All of the patient's questions and concerns were addressed. Eucrisa Counseling: Patient may experience a mild burning sensation during topical application. Eucrisa is not approved in children less than 2 years of age. Tazorac Counseling:  Patient advised that medication is irritating and drying.  Patient may need to apply sparingly and wash off after an hour before eventually leaving it on overnight.  The patient verbalized understanding of the proper use and possible adverse effects of tazorac.  All of the patient's questions and concerns were addressed. Humira Counseling:  I discussed with the patient the risks of adalimumab including but not limited to myelosuppression, immunosuppression, autoimmune hepatitis, demyelinating diseases, lymphoma, and serious infections.  The patient understands that monitoring is required including a PPD at baseline and must alert us or the primary physician if symptoms of infection or other concerning signs are noted. Dupixent Pregnancy And Lactation Text: This medication likely crosses the placenta but the risk for the fetus is uncertain. This medication is excreted in breast milk. Hydroxychloroquine Counseling:  I discussed with the patient that a baseline ophthalmologic exam is needed at the start of therapy and every year thereafter while on therapy. A CBC may also be warranted for monitoring.  The side effects of this medication were discussed with the patient, including but not limited to agranulocytosis, aplastic anemia, seizures, rashes, retinopathy, and liver toxicity. Patient instructed to call the office should any adverse effect occur.  The patient verbalized understanding of the proper use and possible adverse effects of Plaquenil.  All the patient's questions and concerns were addressed. Cephalexin Counseling: I counseled the patient regarding use of cephalexin as an antibiotic for prophylactic and/or therapeutic purposes. Cephalexin (commonly prescribed under brand name Keflex) is a cephalosporin antibiotic which is active against numerous classes of bacteria, including most skin bacteria. Side effects may include nausea, diarrhea, gastrointestinal upset, rash, hives, yeast infections, and in rare cases, hepatitis, kidney disease, seizures, fever, confusion, neurologic symptoms, and others. Patients with severe allergies to penicillin medications are cautioned that there is about a 10% incidence of cross-reactivity with cephalosporins. When possible, patients with penicillin allergies should use alternatives to cephalosporins for antibiotic therapy. Birth Control Pills Pregnancy And Lactation Text: This medication should be avoided if pregnant and for the first 30 days post-partum. Doxycycline Counseling:  Patient counseled regarding possible photosensitivity and increased risk for sunburn.  Patient instructed to avoid sunlight, if possible.  When exposed to sunlight, patients should wear protective clothing, sunglasses, and sunscreen.  The patient was instructed to call the office immediately if the following severe adverse effects occur:  hearing changes, easy bruising/bleeding, severe headache, or vision changes.  The patient verbalized understanding of the proper use and possible adverse effects of doxycycline.  All of the patient's questions and concerns were addressed. Arava Counseling:  Patient counseled regarding adverse effects of Arava including but not limited to nausea, vomiting, abnormalities in liver function tests. Patients may develop mouth sores, rash, diarrhea, and abnormalities in blood counts. The patient understands that monitoring is required including LFTs and blood counts.  There is a rare possibility of scarring of the liver and lung problems that can occur when taking methotrexate. Persistent nausea, loss of appetite, pale stools, dark urine, cough, and shortness of breath should be reported immediately. Patient advised to discontinue Arava treatment and consult with a physician prior to attempting conception. The patient will have to undergo a treatment to eliminate Arava from the body prior to conception. Xolair Counseling:  Patient informed of potential adverse effects including but not limited to fever, muscle aches, rash and allergic reactions.  The patient verbalized understanding of the proper use and possible adverse effects of Xolair.  All of the patient's questions and concerns were addressed. Drysol Pregnancy And Lactation Text: This medication is considered safe during pregnancy and breast feeding. Doxepin Counseling:  Patient advised that the medication is sedating and not to drive a car after taking this medication. Patient informed of potential adverse effects including but not limited to dry mouth, urinary retention, and blurry vision.  The patient verbalized understanding of the proper use and possible adverse effects of doxepin.  All of the patient's questions and concerns were addressed. Topical Clindamycin Counseling: Patient counseled that this medication may cause skin irritation or allergic reactions.  In the event of skin irritation, the patient was advised to reduce the amount of the drug applied or use it less frequently.   The patient verbalized understanding of the proper use and possible adverse effects of clindamycin.  All of the patient's questions and concerns were addressed. Quinolones Counseling:  I discussed with the patient the risks of fluoroquinolones including but not limited to GI upset, allergic reaction, drug rash, diarrhea, dizziness, photosensitivity, yeast infections, liver function test abnormalities, tendonitis/tendon rupture. Rituxan Counseling:  I discussed with the patient the risks of Rituxan infusions. Side effects can include infusion reactions, severe drug rashes including mucocutaneous reactions, reactivation of latent hepatitis and other infections and rarely progressive multifocal leukoencephalopathy.  All of the patient's questions and concerns were addressed. Cephalexin Pregnancy And Lactation Text: This medication is Pregnancy Category B and considered safe during pregnancy.  It is also excreted in breast milk but can be used safely for shorter doses. Gabapentin Counseling: I discussed with the patient the risks of gabapentin including but not limited to dizziness, somnolence, fatigue and ataxia. Azithromycin Pregnancy And Lactation Text: This medication is considered safe during pregnancy and is also secreted in breast milk. Albendazole Counseling:  I discussed with the patient the risks of albendazole including but not limited to cytopenia, kidney damage, nausea/vomiting and severe allergy.  The patient understands that this medication is being used in an off-label manner. Cyclosporine Counseling:  I discussed with the patient the risks of cyclosporine including but not limited to hypertension, gingival hyperplasia,myelosuppression, immunosuppression, liver damage, kidney damage, neurotoxicity, lymphoma, and serious infections. The patient understands that monitoring is required including baseline blood pressure, CBC, CMP, lipid panel and uric acid, and then 1-2 times monthly CMP and blood pressure. Metronidazole Counseling:  I discussed with the patient the risks of metronidazole including but not limited to seizures, nausea/vomiting, a metallic taste in the mouth, nausea/vomiting and severe allergy. Detail Level: Zone Taltz Counseling: I discussed with the patient the risks of ixekizumab including but not limited to immunosuppression, serious infections, worsening of inflammatory bowel disease and drug reactions.  The patient understands that monitoring is required including a PPD at baseline and must alert us or the primary physician if symptoms of infection or other concerning signs are noted. Erythromycin Counseling:  I discussed with the patient the risks of erythromycin including but not limited to GI upset, allergic reaction, drug rash, diarrhea, increase in liver enzymes, and yeast infections. Isotretinoin Counseling: Patient should get monthly blood tests, not donate blood, not drive at night if vision affected, not share medication, and not undergo elective surgery for 6 months after tx completed. Side effects reviewed, pt to contact office should one occur. Sski Pregnancy And Lactation Text: This medication is Pregnancy Category D and isn't considered safe during pregnancy. It is excreted in breast milk. Picato Pregnancy And Lactation Text: This medication is Pregnancy Category C. It is unknown if this medication is excreted in breast milk. Zyclara Counseling:  I discussed with the patient the risks of imiquimod including but not limited to erythema, scaling, itching, weeping, crusting, and pain.  Patient understands that the inflammatory response to imiquimod is variable from person to person and was educated regarded proper titration schedule.  If flu-like symptoms develop, patient knows to discontinue the medication and contact us. Hydroxyzine Pregnancy And Lactation Text: This medication is not safe during pregnancy and should not be taken. It is also excreted in breast milk and breast feeding isn't recommended. Siliq Counseling:  I discussed with the patient the risks of Siliq including but not limited to new or worsening depression, suicidal thoughts and behavior, immunosuppression, malignancy, posterior leukoencephalopathy syndrome, and serious infections.  The patient understands that monitoring is required including a PPD at baseline and must alert us or the primary physician if symptoms of infection or other concerning signs are noted. There is also a special program designed to monitor depression which is required with Siliq. Tetracycline Pregnancy And Lactation Text: This medication is Pregnancy Category D and not consider safe during pregnancy. It is also excreted in breast milk. Dapsone Counseling: I discussed with the patient the risks of dapsone including but not limited to hemolytic anemia, agranulocytosis, rashes, methemoglobinemia, kidney failure, peripheral neuropathy, headaches, GI upset, and liver toxicity.  Patients who start dapsone require monitoring including baseline LFTs and weekly CBCs for the first month, then every month thereafter.  The patient verbalized understanding of the proper use and possible adverse effects of dapsone.  All of the patient's questions and concerns were addressed. Glycopyrrolate Counseling:  I discussed with the patient the risks of glycopyrrolate including but not limited to skin rash, drowsiness, dry mouth, difficulty urinating, and blurred vision. Rituxan Pregnancy And Lactation Text: This medication is Pregnancy Category C and it isn't know if it is safe during pregnancy. It is unknown if this medication is excreted in breast milk but similar antibodies are known to be excreted. Spironolactone Counseling: Patient advised regarding risks of diarrhea, abdominal pain, hyperkalemia, birth defects (for female patients), liver toxicity and renal toxicity. The patient may need blood work to monitor liver and kidney function and potassium levels while on therapy. The patient verbalized understanding of the proper use and possible adverse effects of spironolactone.  All of the patient's questions and concerns were addressed. Topical Sulfur Applications Pregnancy And Lactation Text: This medication is Pregnancy Category C and has an unknown safety profile during pregnancy. It is unknown if this topical medication is excreted in breast milk. Xelchelyz Pregnancy And Lactation Text: This medication is Pregnancy Category D and is not considered safe during pregnancy.  The risk during breast feeding is also uncertain. Prednisone Counseling:  I discussed with the patient the risks of prolonged use of prednisone including but not limited to weight gain, insomnia, osteoporosis, mood changes, diabetes, susceptibility to infection, glaucoma and high blood pressure.  In cases where prednisone use is prolonged, patients should be monitored with blood pressure checks, serum glucose levels and an eye exam.  Additionally, the patient may need to be placed on GI prophylaxis, PCP prophylaxis, and calcium and vitamin D supplementation and/or a bisphosphonate.  The patient verbalized understanding of the proper use and the possible adverse effects of prednisone.  All of the patient's questions and concerns were addressed. Birth Control Pills Counseling: Birth Control Pill Counseling: I discussed with the patient the potential side effects of OCPs including but not limited to increased risk of stroke, heart attack, thrombophlebitis, deep venous thrombosis, hepatic adenomas, breast changes, GI upset, headaches, and depression.  The patient verbalized understanding of the proper use and possible adverse effects of OCPs. All of the patient's questions and concerns were addressed. Xolair Pregnancy And Lactation Text: This medication is Pregnancy Category B and is considered safe during pregnancy. This medication is excreted in breast milk. Cyclophosphamide Pregnancy And Lactation Text: This medication is Pregnancy Category D and it isn't considered safe during pregnancy. This medication is excreted in breast milk. Erythromycin Pregnancy And Lactation Text: This medication is Pregnancy Category B and is considered safe during pregnancy. It is also excreted in breast milk. Azathioprine Counseling:  I discussed with the patient the risks of azathioprine including but not limited to myelosuppression, immunosuppression, hepatotoxicity, lymphoma, and infections.  The patient understands that monitoring is required including baseline LFTs, Creatinine, possible TPMP genotyping and weekly CBCs for the first month and then every 2 weeks thereafter.  The patient verbalized understanding of the proper use and possible adverse effects of azathioprine.  All of the patient's questions and concerns were addressed. Bactrim Counseling:  I discussed with the patient the risks of sulfa antibiotics including but not limited to GI upset, allergic reaction, drug rash, diarrhea, dizziness, photosensitivity, and yeast infections.  Rarely, more serious reactions can occur including but not limited to aplastic anemia, agranulocytosis, methemoglobinemia, blood dyscrasias, liver or kidney failure, lung infiltrates or desquamative/blistering drug rashes. Hydroquinone Counseling:  Patient advised that medication may result in skin irritation, lightening (hypopigmentation), dryness, and burning.  In the event of skin irritation, the patient was advised to reduce the amount of the drug applied or use it less frequently.  Rarely, spots that are treated with hydroquinone can become darker (pseudoochronosis).  Should this occur, patient instructed to stop medication and call the office. The patient verbalized understanding of the proper use and possible adverse effects of hydroquinone.  All of the patient's questions and concerns were addressed. Benzoyl Peroxide Pregnancy And Lactation Text: This medication is Pregnancy Category C. It is unknown if benzoyl peroxide is excreted in breast milk. Hydroxychloroquine Pregnancy And Lactation Text: This medication has been shown to cause fetal harm but it isn't assigned a Pregnancy Risk Category. There are small amounts excreted in breast milk. Benzoyl Peroxide Counseling: Patient counseled that medicine may cause skin irritation and bleach clothing.  In the event of skin irritation, the patient was advised to reduce the amount of the drug applied or use it less frequently.   The patient verbalized understanding of the proper use and possible adverse effects of benzoyl peroxide.  All of the patient's questions and concerns were addressed. Colchicine Counseling:  Patient counseled regarding adverse effects including but not limited to stomach upset (nausea, vomiting, stomach pain, or diarrhea).  Patient instructed to limit alcohol consumption while taking this medication.  Colchicine may reduce blood counts especially with prolonged use.  The patient understands that monitoring of kidney function and blood counts may be required, especially at baseline. The patient verbalized understanding of the proper use and possible adverse effects of colchicine.  All of the patient's questions and concerns were addressed. Doxycycline Pregnancy And Lactation Text: This medication is Pregnancy Category D and not consider safe during pregnancy. It is also excreted in breast milk but is considered safe for shorter treatment courses. Drysol Counseling:  I discussed with the patient the risks of drysol/aluminum chloride including but not limited to skin rash, itching, irritation, burning. Cellcept Counseling:  I discussed with the patient the risks of mycophenolate mofetil including but not limited to infection/immunosuppression, GI upset, hypokalemia, hypercholesterolemia, bone marrow suppression, lymphoproliferative disorders, malignancy, GI ulceration/bleed/perforation, colitis, interstitial lung disease, kidney failure, progressive multifocal leukoencephalopathy, and birth defects.  The patient understands that monitoring is required including a baseline creatinine and regular CBC testing. In addition, patient must alert us immediately if symptoms of infection or other concerning signs are noted. Cyclophosphamide Counseling:  I discussed with the patient the risks of cyclophosphamide including but not limited to hair loss, hormonal abnormalities, decreased fertility, abdominal pain, diarrhea, nausea and vomiting, bone marrow suppression and infection. The patient understands that monitoring is required while taking this medication. Fluconazole Counseling:  Patient counseled regarding adverse effects of fluconazole including but not limited to headache, diarrhea, nausea, upset stomach, liver function test abnormalities, taste disturbance, and stomach pain.  There is a rare possibility of liver failure that can occur when taking fluconazole.  The patient understands that monitoring of LFTs and kidney function test may be required, especially at baseline. The patient verbalized understanding of the proper use and possible adverse effects of fluconazole.  All of the patient's questions and concerns were addressed. Picato Counseling:  I discussed with the patient the risks of Picato including but not limited to erythema, scaling, itching, weeping, crusting, and pain. Itraconazole Counseling:  I discussed with the patient the risks of itraconazole including but not limited to liver damage, nausea/vomiting, neuropathy, and severe allergy.  The patient understands that this medication is best absorbed when taken with acidic beverages such as non-diet cola or ginger ale.  The patient understands that monitoring is required including baseline LFTs and repeat LFTs at intervals.  The patient understands that they are to contact us or the primary physician if concerning signs are noted. Griseofulvin Counseling:  I discussed with the patient the risks of griseofulvin including but not limited to photosensitivity, cytopenia, liver damage, nausea/vomiting and severe allergy.  The patient understands that this medication is best absorbed when taken with a fatty meal (e.g., ice cream or french fries). Cimetidine Counseling:  I discussed with the patient the risks of Cimetidine including but not limited to gynecomastia, headache, diarrhea, nausea, drowsiness, arrhythmias, pancreatitis, skin rashes, psychosis, bone marrow suppression and kidney toxicity. Oxybutynin Pregnancy And Lactation Text: This medication is Pregnancy Category B and is considered safe during pregnancy. It is unknown if it is excreted in breast milk. Doxepin Pregnancy And Lactation Text: This medication is Pregnancy Category C and it isn't known if it is safe during pregnancy. It is also excreted in breast milk and breast feeding isn't recommended. Xeljanz Counseling: I discussed with the patient the risks of Xeljanz therapy including increased risk of infection, liver issues, headache, diarrhea, or cold symptoms. Live vaccines should be avoided. They were instructed to call if they have any problems. Ivermectin Counseling:  Patient instructed to take medication on an empty stomach with a full glass of water.  Patient informed of potential adverse effects including but not limited to nausea, diarrhea, dizziness, itching, and swelling of the extremities or lymph nodes.  The patient verbalized understanding of the proper use and possible adverse effects of ivermectin.  All of the patient's questions and concerns were addressed. Cimzia Pregnancy And Lactation Text: This medication crosses the placenta but can be considered safe in certain situations. Cimzia may be excreted in breast milk. Terbinafine Counseling: Patient counseling regarding adverse effects of terbinafine including but not limited to headache, diarrhea, rash, upset stomach, liver function test abnormalities, itching, taste/smell disturbance, nausea, abdominal pain, and flatulence.  There is a rare possibility of liver failure that can occur when taking terbinafine.  The patient understands that a baseline LFT and kidney function test may be required. The patient verbalized understanding of the proper use and possible adverse effects of terbinafine.  All of the patient's questions and concerns were addressed. Methotrexate Counseling:  Patient counseled regarding adverse effects of methotrexate including but not limited to nausea, vomiting, abnormalities in liver function tests. Patients may develop mouth sores, rash, diarrhea, and abnormalities in blood counts. The patient understands that monitoring is required including LFT's and blood counts.  There is a rare possibility of scarring of the liver and lung problems that can occur when taking methotrexate. Persistent nausea, loss of appetite, pale stools, dark urine, cough, and shortness of breath should be reported immediately. Patient advised to discontinue methotrexate treatment at least three months before attempting to become pregnant.  I discussed the need for folate supplements while taking methotrexate.  These supplements can decrease side effects during methotrexate treatment. The patient verbalized understanding of the proper use and possible adverse effects of methotrexate.  All of the patient's questions and concerns were addressed. Carac Counseling:  I discussed with the patient the risks of Carac including but not limited to erythema, scaling, itching, weeping, crusting, and pain. Dupixent Counseling: I discussed with the patient the risks of dupilumab including but not limited to eye infection and irritation, cold sores, injection site reactions, worsening of asthma, allergic reactions and increased risk of parasitic infection.  Live vaccines should be avoided while taking dupilumab. Dupilumab will also interact with certain medications such as warfarin and cyclosporine. The patient understands that monitoring is required and they must alert us or the primary physician if symptoms of infection or other concerning signs are noted. Glycopyrrolate Pregnancy And Lactation Text: This medication is Pregnancy Category B and is considered safe during pregnancy. It is unknown if it is excreted breast milk. High Dose Vitamin A Pregnancy And Lactation Text: High dose vitamin A therapy is contraindicated during pregnancy and breast feeding. Rifampin Pregnancy And Lactation Text: This medication is Pregnancy Category C and it isn't know if it is safe during pregnancy. It is also excreted in breast milk and should not be used if you are breast feeding. Nsaids Counseling: NSAID Counseling: I discussed with the patient that NSAIDs should be taken with food. Prolonged use of NSAIDs can result in the development of stomach ulcers.  Patient advised to stop taking NSAIDs if abdominal pain occurs.  The patient verbalized understanding of the proper use and possible adverse effects of NSAIDs.  All of the patient's questions and concerns were addressed. Bexarotene Pregnancy And Lactation Text: This medication is Pregnancy Category X and should not be given to women who are pregnant or may become pregnant. This medication should not be used if you are breast feeding. High Dose Vitamin A Counseling: Side effects reviewed, pt to contact office should one occur. Minoxidil Counseling: Minoxidil is a topical medication which can increase blood flow where it is applied. It is uncertain how this medication increases hair growth. Side effects are uncommon and include stinging and allergic reactions. Rifampin Counseling: I discussed with the patient the risks of rifampin including but not limited to liver damage, kidney damage, red-orange body fluids, nausea/vomiting and severe allergy.

## 2023-01-12 LAB
GLUCOSE BLD STRIP.AUTO-MCNC: 109 MG/DL (ref 65–117)
GLUCOSE BLD STRIP.AUTO-MCNC: 82 MG/DL (ref 65–117)
GLUCOSE BLD STRIP.AUTO-MCNC: 97 MG/DL (ref 65–117)
GLUCOSE BLD STRIP.AUTO-MCNC: 99 MG/DL (ref 65–117)
SERVICE CMNT-IMP: NORMAL

## 2023-01-12 PROCEDURE — 65410000002 HC RM PRIVATE OB

## 2023-01-12 PROCEDURE — 74011000250 HC RX REV CODE- 250: Performed by: STUDENT IN AN ORGANIZED HEALTH CARE EDUCATION/TRAINING PROGRAM

## 2023-01-12 PROCEDURE — 74011250637 HC RX REV CODE- 250/637: Performed by: OBSTETRICS & GYNECOLOGY

## 2023-01-12 PROCEDURE — 82962 GLUCOSE BLOOD TEST: CPT

## 2023-01-12 PROCEDURE — 74011250637 HC RX REV CODE- 250/637: Performed by: STUDENT IN AN ORGANIZED HEALTH CARE EDUCATION/TRAINING PROGRAM

## 2023-01-12 PROCEDURE — 74011250636 HC RX REV CODE- 250/636: Performed by: OBSTETRICS & GYNECOLOGY

## 2023-01-12 RX ADMIN — IBUPROFEN 800 MG: 400 TABLET, FILM COATED ORAL at 22:42

## 2023-01-12 RX ADMIN — CETIRIZINE HYDROCHLORIDE 10 MG: 10 TABLET, FILM COATED ORAL at 09:36

## 2023-01-12 RX ADMIN — HYDROXYCHLOROQUINE SULFATE 200 MG: 200 TABLET ORAL at 22:41

## 2023-01-12 RX ADMIN — SODIUM CHLORIDE, PRESERVATIVE FREE 10 ML: 5 INJECTION INTRAVENOUS at 06:30

## 2023-01-12 RX ADMIN — ACETAMINOPHEN 650 MG: 325 TABLET ORAL at 15:13

## 2023-01-12 RX ADMIN — IBUPROFEN 800 MG: 400 TABLET, FILM COATED ORAL at 06:30

## 2023-01-12 RX ADMIN — HYDROXYCHLOROQUINE SULFATE 200 MG: 200 TABLET ORAL at 09:36

## 2023-01-12 RX ADMIN — IBUPROFEN 800 MG: 400 TABLET, FILM COATED ORAL at 15:12

## 2023-01-12 RX ADMIN — ENOXAPARIN SODIUM 40 MG: 100 INJECTION SUBCUTANEOUS at 09:33

## 2023-01-12 RX ADMIN — Medication 1 CAPSULE: at 09:35

## 2023-01-12 RX ADMIN — OXYBUTYNIN CHLORIDE 5 MG: 5 TABLET ORAL at 09:36

## 2023-01-12 RX ADMIN — OXYBUTYNIN CHLORIDE 5 MG: 5 TABLET ORAL at 22:42

## 2023-01-12 RX ADMIN — LEVOTHYROXINE SODIUM 75 MCG: 0.05 TABLET ORAL at 06:30

## 2023-01-12 RX ADMIN — ACETAMINOPHEN 650 MG: 325 TABLET ORAL at 00:15

## 2023-01-12 RX ADMIN — ACETAMINOPHEN 650 MG: 325 TABLET ORAL at 06:30

## 2023-01-12 RX ADMIN — MONTELUKAST 10 MG: 10 TABLET, FILM COATED ORAL at 22:42

## 2023-01-12 RX ADMIN — ESCITALOPRAM OXALATE 20 MG: 10 TABLET ORAL at 09:36

## 2023-01-12 RX ADMIN — LEVOTHYROXINE SODIUM 75 MCG: 0.05 TABLET ORAL at 17:24

## 2023-01-12 NOTE — PROGRESS NOTES
Bedside and Verbal shift change report given to RJ Main RN (oncoming nurse) by Flaquita Swann. Delphine Baxter RN (offgoing nurse). Report included the following information SBAR and Kardex.

## 2023-01-12 NOTE — PROGRESS NOTES
Post-Operative  Day 2    Denisha Phipps     Assessment: Post-Op day 2, stable s/p RCS at 37 5/7 wks for [facility] with multiple medication problems. Plan:     - Routine post-operative care. - Postop hemoglobin stable. Plan to start Fe at discharge. - Ambulate today. DM2 vs. Delfina Anisha:   -she was not on medication prior to pregnancy but started on Metformin early in pregnancy and then changed to Insulin  -Followed by 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 Bertrand Valle  -Has been on ppx lovenox 40mg daily- restarted yesterday; continue for 6 weeks PP  -Has port in place        RA:  -cimzia- stopped at 31wks, saw rheumatologist 3 days ago and she plans to have her restart in a couple weeks as long as healing well from  as can cause delayed wound healing   -Continue plaquenil 200mg BID      Seasonal allergies:   -continue zyrtec     Information for the patient's :  Colby Rosenbaum [[id number]]   , Low Transverse   Patient doing well without significant complaint. Tolerating diet. Gomez out. Ambulating.       Vitals:  Visit Vitals  /70 (BP 1 Location: Right upper arm, BP Patient Position: At rest)   Pulse 85   Temp 97.5 °F (36.4 °C)   Resp 16   Ht 5' 6\" (1.676 m)   Wt 108.9 kg (240 lb)   LMP 04/15/2022   SpO2 100%   Breastfeeding Unknown   BMI 38.74 kg/m²     Temp (24hrs), Av.3 °F (36.8 °C), Min:97.5 °F (36.4 °C), Max:98.6 °F (37 °C)      Last 24hr Input/Output:    Intake/Output Summary (Last 24 hours) at 2023 0827  Last data filed at 2023 1330  Gross per 24 hour   Intake --   Output 1350 ml   Net -1350 ml            Exam:     Patient without distress. Fundus firm, nontender per nursing fundal checks. Incision bandaged, clean, dry, intact. Perineum with normal lochia noted per nursing assessment. Lower extremities are negative for pathological edema.     Labs:   Lab Results   Component Value Date/Time    WBC 9.3 01/11/2023 06:08 AM    WBC 10.4 01/10/2023 10:56 AM    WBC 8.2 11/21/2022 05:19 AM    WBC 9.1 11/20/2022 05:17 AM    WBC 14.0 (H) 11/19/2022 02:06 PM    WBC 6.5 01/05/2022 01:41 PM    WBC 14.4 (H) 06/30/2021 12:54 AM    WBC 13.9 (H) 09/26/2020 01:56 AM    WBC 6.7 05/26/2020 11:31 AM    WBC 6.4 05/23/2020 02:48 AM    WBC 6.6 05/22/2020 01:59 AM    WBC 6.9 05/21/2020 03:53 AM    WBC 8.6 05/20/2020 12:52 AM    WBC 9.7 05/19/2020 12:05 PM    WBC 6.4 05/15/2020 05:54 AM    WBC 10.8 05/14/2020 05:57 AM    WBC 6.8 04/18/2020 06:37 PM    WBC 11.8 (H) 01/31/2020 08:39 AM    WBC 5.8 04/16/2017 11:02 AM    WBC 7.0 04/03/2016 12:59 AM    WBC 10.4 04/02/2016 01:16 PM    WBC 6.8 10/30/2015 05:11 PM    WBC 7.0 01/12/2015 04:00 PM    WBC 6.3 11/15/2013 10:30 PM    WBC 10.7 12/10/2012 07:15 PM    WBC 8.0 09/13/2012 01:25 PM    WBC 11.0 08/10/2011 04:25 AM    WBC 11.0 08/08/2011 11:10 AM    WBC 11.0 08/03/2011 05:00 PM    WBC 9.5 08/01/2011 02:00 AM    WBC 10.5 07/28/2011 09:35 AM    WBC 11.7 (H) 07/25/2011 12:45 AM    WBC 13.5 (H) 07/19/2011 06:40 PM    WBC 10.5 07/18/2011 01:37 PM    WBC 7.5 07/18/2011 12:30 AM    WBC 11.7 (H) 07/13/2011 12:30 PM    WBC 10.5 07/12/2011 06:20 AM    WBC 11.3 (H) 07/11/2011 01:30 AM    WBC 10.4 07/07/2011 10:20 PM    WBC 10.8 07/04/2011 04:00 AM    WBC 11.3 (H) 07/03/2011 12:10 PM    WBC 10.2 06/26/2011 02:55 PM    WBC 10.3 06/23/2011 04:28 PM    WBC 11.3 (H) 06/20/2011 01:40 AM    WBC 10.3 06/19/2011 02:20 AM    WBC 10.8 06/13/2011 12:55 AM    WBC 12.1 (H) 06/12/2011 11:10 AM    WBC 12.1 (H) 06/09/2011 03:20 PM    WBC 10.5 06/02/2011 03:25 AM    WBC 10.1 05/15/2011 08:10 PM    HGB 9.7 (L) 01/11/2023 06:08 AM    HGB 11.8 01/10/2023 10:56 AM    HGB 9.6 (L) 11/21/2022 05:19 AM    HGB 12.6 11/20/2022 05:17 AM    HGB 11.1 (L) 11/19/2022 02:06 PM    HGB 12.0 01/05/2022 01:41 PM    HGB 12.7 06/30/2021 12:54 AM    HGB 10.6 (L) 09/26/2020 01:56 AM    HGB 12.0 05/26/2020 11:31 AM    HGB 10.4 (L) 05/23/2020 02:48 AM    HGB 10.1 (L) 05/22/2020 01:59 AM    HGB 10.5 (L) 05/21/2020 03:53 AM    HGB 10.1 (L) 05/20/2020 12:52 AM    HGB 11.8 05/19/2020 12:05 PM    HGB 11.5 05/15/2020 05:54 AM    HGB 12.5 05/14/2020 05:57 AM    HGB 11.8 04/18/2020 06:37 PM    HGB 12.8 01/31/2020 08:39 AM    HGB 13.4 04/16/2017 11:02 AM    HGB 13.4 04/03/2016 12:59 AM    HGB 14.2 04/02/2016 01:16 PM    HGB 13.0 10/30/2015 05:11 PM    HGB 12.8 01/12/2015 04:00 PM    HGB 13.4 11/15/2013 10:30 PM    HGB 14.6 12/10/2012 07:15 PM    HGB 13.0 09/13/2012 01:25 PM    HGB 9.2 (L) 08/10/2011 04:25 AM    HGB 11.7 08/08/2011 11:10 AM    HGB 11.5 08/03/2011 05:00 PM    HGB 11.4 (L) 08/01/2011 02:00 AM    HGB 11.5 07/28/2011 09:35 AM    HGB 11.3 (L) 07/25/2011 12:45 AM    HGB 11.5 07/19/2011 06:40 PM    HGB 11.0 (L) 07/18/2011 01:37 PM    HGB 14.4 07/18/2011 12:30 AM    HGB 11.5 07/13/2011 12:30 PM    HGB 11.8 07/12/2011 06:20 AM    HGB 12.0 07/11/2011 01:30 AM    HGB 11.2 (L) 07/07/2011 10:20 PM    HGB 11.6 07/04/2011 04:00 AM    HGB 11.4 (L) 07/03/2011 12:10 PM    HGB 11.7 06/26/2011 02:55 PM    HGB 11.4 (L) 06/23/2011 04:28 PM    HGB 12.0 06/20/2011 01:40 AM    HGB 12.8 06/19/2011 02:20 AM    HGB 11.4 (L) 06/13/2011 12:55 AM    HGB 11.4 (L) 06/12/2011 11:10 AM    HGB 10.9 (L) 06/09/2011 03:20 PM    HGB 11.2 (L) 06/02/2011 03:25 AM    HGB 11.0 (L) 05/15/2011 08:10 PM    HCT 29.2 (L) 01/11/2023 06:08 AM    HCT 34.9 (L) 01/10/2023 10:56 AM    HCT 28.7 (L) 11/21/2022 05:19 AM    HCT 37.3 11/20/2022 05:17 AM    HCT 32.7 (L) 11/19/2022 02:06 PM    HCT 36.8 01/05/2022 01:41 PM    HCT 38.0 06/30/2021 12:54 AM    HCT 32.6 (L) 09/26/2020 01:56 AM    HCT 37.2 05/26/2020 11:31 AM    HCT 31.8 (L) 05/23/2020 02:48 AM    HCT 31.6 (L) 05/22/2020 01:59 AM    HCT 32.7 (L) 05/21/2020 03:53 AM    HCT 30.4 (L) 05/20/2020 12:52 AM    HCT 35.5 05/19/2020 12:05 PM    HCT 36.0 05/15/2020 05:54 AM    HCT 37.1 05/14/2020 05:57 AM    HCT 35.3 04/18/2020 06:37 PM    HCT 39.3 01/31/2020 08:39 AM    HCT 39.2 04/16/2017 11:02 AM    HCT 39.9 04/03/2016 12:59 AM    HCT 41.9 04/02/2016 01:16 PM    HCT 38.9 10/30/2015 05:11 PM    HCT 39.8 01/12/2015 04:00 PM    HCT 39.8 11/15/2013 10:30 PM    HCT 43.8 12/10/2012 07:15 PM    HCT 38.8 09/13/2012 01:25 PM    HCT 27.6 (L) 08/10/2011 04:25 AM    HCT 34.3 (L) 08/08/2011 11:10 AM    HCT 33.5 (L) 08/03/2011 05:00 PM    HCT 33.1 (L) 08/01/2011 02:00 AM    HCT 33.0 (L) 07/28/2011 09:35 AM    HCT 33.6 (L) 07/25/2011 12:45 AM    HCT 32.8 (L) 07/19/2011 06:40 PM    HCT 31.9 (L) 07/18/2011 01:37 PM    HCT 41.2 07/18/2011 12:30 AM    HCT 33.4 (L) 07/13/2011 12:30 PM    HCT 34.1 (L) 07/12/2011 06:20 AM    HCT 34.5 (L) 07/11/2011 01:30 AM    HCT 32.0 (L) 07/07/2011 10:20 PM    HCT 33.5 (L) 07/04/2011 04:00 AM    HCT 33.3 (L) 07/03/2011 12:10 PM    HCT 33.9 (L) 06/26/2011 02:55 PM    HCT 33.2 (L) 06/23/2011 04:28 PM    HCT 34.4 (L) 06/20/2011 01:40 AM    HCT 36.7 06/19/2011 02:20 AM    HCT 33.2 (L) 06/13/2011 12:55 AM    HCT 33.1 (L) 06/12/2011 11:10 AM    HCT 31.4 (L) 06/09/2011 03:20 PM    HCT 32.6 (L) 06/02/2011 03:25 AM    HCT 32.1 (L) 05/15/2011 08:10 PM    PLATELET 683 (L) 75/92/2402 06:08 AM    PLATELET 858 82/80/0336 10:56 AM    PLATELET 905 61/03/7308 05:19 AM    PLATELET 510 (L) 16/49/9363 05:17 AM    PLATELET 875 43/36/4866 02:06 PM    PLATELET 831 42/77/3329 01:41 PM    PLATELET 894 35/88/6635 12:54 AM    PLATELET 664 90/80/7711 01:56 AM    PLATELET 161 98/35/7731 11:31 AM    PLATELET 030 77/68/1377 02:48 AM    PLATELET 035 11/94/9285 01:59 AM    PLATELET 485 95/48/7072 03:53 AM    PLATELET 106 81/01/5000 12:52 AM    PLATELET 705 55/69/9480 12:05 PM    PLATELET 121 39/35/8736 05:54 AM    PLATELET 974 90/40/2299 05:57 AM    PLATELET 914 20/26/2388 06:37 PM    PLATELET 852 32/20/2021 08:39 AM    PLATELET 608 01/48/7511 11:02 AM    PLATELET 474 52/59/6084 12:59 AM    PLATELET 529 48/54/0419 01:16 PM    PLATELET 471 11/27/0958 05:11 PM    PLATELET 863 07/44/5729 04:00 PM    PLATELET 982 45/04/2930 10:30 PM    PLATELET 411 52/09/1393 07:15 PM    PLATELET 017 00/60/8306 01:25 PM    PLATELET 023 (L) 42/37/8011 04:25 AM    PLATELET 084 05/04/1673 11:10 AM    PLATELET 746 31/10/2251 05:00 PM    PLATELET 577 18/16/3159 02:00 AM    PLATELET 419 28/34/6987 09:35 AM    PLATELET 349 37/44/4316 12:45 AM    PLATELET 779 34/48/5356 06:40 PM    PLATELET 290 35/83/2125 01:37 PM    PLATELET 548 (L) 36/32/1992 12:30 AM    PLATELET 205 25/88/0190 12:30 PM    PLATELET 112 29/25/7049 06:20 AM    PLATELET 624 89/28/6923 01:30 AM    PLATELET 299 32/07/3436 10:20 PM    PLATELET 512 49/16/4685 04:00 AM    PLATELET 247 06/48/7005 12:10 PM    PLATELET 334 57/62/2800 02:55 PM    PLATELET 354 36/98/9754 04:28 PM    PLATELET 174 72/00/3143 01:40 AM    PLATELET 176 47/50/5922 02:20 AM    PLATELET 181 17/34/6792 12:55 AM    PLATELET 772 28/43/1326 11:10 AM    PLATELET 435 83/16/6982 03:20 PM    PLATELET 909 25/25/4572 03:25 AM    PLATELET 168 75/99/0784 08:10 PM       Recent Results (from the past 24 hour(s))   GLUCOSE, POC    Collection Time: 01/11/23 10:15 AM   Result Value Ref Range    Glucose (POC) 98 65 - 117 mg/dL    Performed by Leona Fabry RN    GLUCOSE, POC    Collection Time: 01/11/23  2:38 PM   Result Value Ref Range    Glucose (POC) 153 (H) 65 - 117 mg/dL    Performed by Leona Fabry RN    GLUCOSE, POC    Collection Time: 01/11/23  8:51 PM   Result Value Ref Range    Glucose (POC) 152 (H) 65 - 117 mg/dL    Performed by Anup Mitchell POC    Collection Time: 01/12/23  6:35 AM   Result Value Ref Range    Glucose (POC) 97 65 - 117 mg/dL Performed by Aimee Robb MD

## 2023-01-12 NOTE — ROUTINE PROCESS
Bedside and Verbal shift change report given to RJ Main RN (oncoming nurse) by Roseanna Thayer. Jasmin Perera RN (offgoing nurse). Report included the following information SBAR, Kardex, Intake/Output, MAR, and Recent Results.

## 2023-01-12 NOTE — PROGRESS NOTES
Bedside and Verbal shift change report given to JAIME Mansfield RN (oncoming nurse) by RJ Main RN (offgoing nurse). Report included the following information SBAR and Kardex.

## 2023-01-12 NOTE — ROUTINE PROCESS
Bedside and Verbal shift change report given to SILVIA Carmichael RN (oncoming nurse) by Kathryn Lagos RN (offgoing nurse). Report included the following information SBAR, Kardex, MAR, and Med Rec Status.

## 2023-01-13 VITALS
RESPIRATION RATE: 18 BRPM | WEIGHT: 240 LBS | HEART RATE: 74 BPM | SYSTOLIC BLOOD PRESSURE: 129 MMHG | OXYGEN SATURATION: 99 % | DIASTOLIC BLOOD PRESSURE: 63 MMHG | HEIGHT: 66 IN | BODY MASS INDEX: 38.57 KG/M2 | TEMPERATURE: 97.6 F

## 2023-01-13 PROCEDURE — 74011250636 HC RX REV CODE- 250/636: Performed by: OBSTETRICS & GYNECOLOGY

## 2023-01-13 PROCEDURE — 74011250637 HC RX REV CODE- 250/637: Performed by: STUDENT IN AN ORGANIZED HEALTH CARE EDUCATION/TRAINING PROGRAM

## 2023-01-13 RX ORDER — IBUPROFEN 800 MG/1
800 TABLET ORAL EVERY 8 HOURS
Qty: 30 TABLET | Refills: 1 | Status: SHIPPED | OUTPATIENT
Start: 2023-01-13

## 2023-01-13 RX ADMIN — IBUPROFEN 800 MG: 400 TABLET, FILM COATED ORAL at 06:14

## 2023-01-13 RX ADMIN — Medication 1 CAPSULE: at 08:36

## 2023-01-13 RX ADMIN — ENOXAPARIN SODIUM 40 MG: 100 INJECTION SUBCUTANEOUS at 10:28

## 2023-01-13 RX ADMIN — ACETAMINOPHEN 650 MG: 325 TABLET ORAL at 02:04

## 2023-01-13 RX ADMIN — HYDROXYCHLOROQUINE SULFATE 200 MG: 200 TABLET ORAL at 08:36

## 2023-01-13 RX ADMIN — ACETAMINOPHEN 650 MG: 325 TABLET ORAL at 13:45

## 2023-01-13 RX ADMIN — CETIRIZINE HYDROCHLORIDE 10 MG: 10 TABLET, FILM COATED ORAL at 08:36

## 2023-01-13 RX ADMIN — ESCITALOPRAM OXALATE 20 MG: 10 TABLET ORAL at 08:36

## 2023-01-13 RX ADMIN — OXYBUTYNIN CHLORIDE 5 MG: 5 TABLET ORAL at 08:36

## 2023-01-13 RX ADMIN — ACETAMINOPHEN 650 MG: 325 TABLET ORAL at 08:45

## 2023-01-13 RX ADMIN — IBUPROFEN 800 MG: 400 TABLET, FILM COATED ORAL at 13:45

## 2023-01-13 NOTE — LACTATION NOTE
This note was copied from a baby's chart. 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 She states the latch is good. Reviewed signs of adequate intake and discussed when pumping and supplementing with EBM would be necessary. Reviewed the Spectra pump settings and recommended purchasing smaller flanges to make pumping more comfortable. Mother's questions were addressed.

## 2023-01-13 NOTE — PROGRESS NOTES
Bedside and Verbal shift change report given to A Flynn RN. Report included the following information: SBAR, Kardex, Intake/Output, MAR, Recent Results and Med Rec Status. Opportunity for questions and clarification was provided.

## 2023-01-13 NOTE — DISCHARGE INSTRUCTIONS
POST DELIVERY DISCHARGE INSTRUCTIONS    Name: April Bray  YOB: 1980  Primary Diagnosis: Active Problems:    Gestational hypertension (1/10/2023)        General:     Diet/Diet Restrictions:  Eight 8-ounce glasses of fluid daily (water, juices); avoid excessive caffeine intake. Meals/snacks as desired which are high in fiber and carbohydrates and low in fat and cholesterol. Medications:   {Medication reconciliation information is now added to the patient's AVS automatically when it is printed. There is no need to use this SmartLink in discharge instructions. Highlight this text and delete it to clear this message}      Physical Activity / Restrictions / Safety:     Avoid heavy lifting, no more that 8 lbs. For 2-3 weeks;   Limit use of stairs to 2 times daily for the first week home. No driving for one week. Avoid intercourse 4-6 weeks, no douching or tampon use. Check with obstetrician before starting or resuming an exercise program.      Discharge Instructions/Special Treatment/Home Care Needs:     Continue prenatal vitamins. Continue to use squirt bottle with warm water on your episiotomy after each bathroom use until bleeding stops. If steri-strips applied to your incision, remove in 7-10 days. Call your doctor for the following:     Fever over 101 degrees by mouth. Vaginal bleeding heavier than a normal menstrual period or clots larger than a golf ball. Red streaks or increased swelling of legs, painful red streaks on your breast.  Painful urination, constipation and increased pain or swelling or discharge with your incision. If you feel extremely anxious or overwhelmed. If you have thoughts of harming yourself and/or your baby. Pain Management:     Take Acetaminophen (Tylenol) or Ibuprofen (Advil, Motrin), as directed for pain. Use a warm Sitz bath 3 times daily to relieve episiotomy or hemorrhoidal discomfort.    For hemorrhoidal discomfort, use Tucks and Anusol cream as needed and directed. Heating pad to  incision as needed. Follow-Up Care:     Appointment with MD: [unfilled]  Telephone number: [phone]    Signed By: Ariane Salvador MD                                                                                                   Date: 2023 Time: 10:13 AM         After Your Delivery (the Postpartum Period): Care Instructions  Overview     Congratulations on the birth of your baby. Like pregnancy, the  period can be a time of excitement, art, and exhaustion. You may look at your wondrous little baby and feel happy. You may also be overwhelmed by your new sleep hours and new responsibilities. At first, babies often sleep during the days and are awake at night. They do not have a pattern or routine. They may make sudden gasps, jerk themselves awake, or look like they have crossed eyes. These are all normal, and they may even make you smile. In these first weeks after delivery, try to take good care of yourself. It may take 4 to 6 weeks to feel like yourself again, and possibly longer if you had a  birth. You will likely feel very tired for several weeks. Your days will be full of ups and downs, but lots of art as well. Follow-up care is a key part of your treatment and safety. Be sure to make and go to all appointments, and call your doctor if you are having problems. It's also a good idea to know your test results and keep a list of the medicines you take. How can you care for yourself at home? Take care of your body after delivery  Use pads instead of tampons for the bloody flow that may last as long as 2 weeks. Ease cramps with ibuprofen (Advil, Motrin). Ease soreness of hemorrhoids and the area between your vagina and rectum with ice compresses or witch hazel pads. Ease constipation by drinking lots of fluid and eating high-fiber foods. Ask your doctor about over-the-counter stool softeners.   Cleanse yourself with a gentle squeeze of warm water from a bottle instead of wiping with toilet paper. Take a sitz bath in warm water several times a day. Wear a good nursing bra. Ease sore and swollen breasts with warm, wet washcloths. If you aren't breastfeeding, use ice rather than heat for breast soreness. Your period may not start for several months if you are breastfeeding. You may bleed more, and longer at first, than you did before you got pregnant. Wait until you are healed (about 4 to 6 weeks) before you have sex. Ask your doctor when it is okay for you to have sex. Try not to travel with your baby for 5 or 6 weeks. If you take a long car trip, make frequent stops to walk around and stretch. Avoid exhaustion  Rest every day. Try to nap when your baby naps. Ask another adult to be with you for a few days after delivery. Plan for  if you have other children. Stay flexible so you can eat at odd hours and sleep when you need to. Both you and your baby are making new schedules. Plan small trips to get out of the house. Change can make you feel less tired. Ask for help with housework, cooking, and shopping. Remind yourself that your job is to care for your baby. Know about help for postpartum depression  \"Baby blues\" are common for the first 1 to 2 weeks after birth. You may cry or feel sad or irritable for no reason. Rest whenever you can. Being tired makes it harder to handle your emotions. Go for walks with your baby. Talk to your partner, friends, and family about your feelings. If your symptoms last for more than a few weeks, or if you feel very depressed, ask your doctor for help. Postpartum depression can be treated. Support groups and counseling can help. Sometimes medicine can also help. Stay healthy  Eat healthy foods so you have more energy. If you breastfeed, avoid drugs. If you quit smoking during pregnancy, try to stay smoke-free.  If you choose to have a drink now and then, have only one drink, and limit the number of occasions that you have a drink. Wait to breastfeed at least 2 hours after you have a drink to reduce the amount of alcohol the baby may get in the milk. Start daily exercise after 4 to 6 weeks, but rest when you feel tired. Learn exercises to tone your belly. Try Kegel exercises to regain strength in your pelvic muscles. You can do these exercises while you stand or sit. (If doing these exercises causes pain, stop doing them and talk with your doctor.)  Squeeze your muscles as if you were trying not to pass gas. Or squeeze your muscles as if you were stopping the flow of urine. Your belly, legs, and buttocks shouldn't move. Hold the squeeze for 3 seconds, then relax for 5 to 10 seconds. Start with 3 seconds, then add 1 second each week until you are able to squeeze for 10 seconds. Repeat the exercise 10 times a session. Do 3 to 8 sessions a day. Find a class for you and your baby that has an exercise time. If you had a  birth, give yourself a bit more time before you exercise, and be careful. When should you call for help? Share this information with your partner, family, or a friend. They can help you watch for warning signs. Call 911  anytime you think you may need emergency care. For example, call if:    You have thoughts of harming yourself, your baby, or another person. You passed out (lost consciousness). You have chest pain, are short of breath, or cough up blood. You have a seizure. Call your doctor now or seek immediate medical care if:    You have signs of hemorrhage (too much bleeding), such as:  Heavy vaginal bleeding. This means that you are soaking through one or more pads in an hour. Or you pass blood clots bigger than an egg. Feeling dizzy or lightheaded, or you feel like you may faint. Feeling so tired or weak that you cannot do your usual activities. A fast or irregular heartbeat. New or worse belly pain.      You have signs of infection, such as:  A fever. Vaginal discharge that smells bad. New or worse belly pain. You have symptoms of a blood clot in your leg (called a deep vein thrombosis), such as:  Pain in the calf, back of the knee, thigh, or groin. Redness and swelling in your leg or groin. You have signs of preeclampsia, such as:  Sudden swelling of your face, hands, or feet. New vision problems (such as dimness, blurring, or seeing spots). A severe headache. Watch closely for changes in your health, and be sure to contact your doctor if:    Your vaginal bleeding isn't decreasing. You feel sad, anxious, or hopeless for more than a few days. You are having problems with your breasts or breastfeeding. Where can you learn more? Go to http://www.tapia.com/  Enter A461 in the search box to learn more about \"After Your Delivery (the Postpartum Period): Care Instructions. \"  Current as of: February 23, 2022               Content Version: 13.4  © 2006-2022 [employer]. Care instructions adapted under license by [employer] (which disclaims liability or warranty for this information). If you have questions about a medical condition or this instruction, always ask your healthcare professional. [place] [address] any warranty or liability for your use of this information.

## 2023-01-13 NOTE — PROGRESS NOTES
0720. Bedside shift change report given to Alma Rosa Houston RN (oncoming nurse) by JAIME Millan RN (offgoing nurse). Report included the following information SBAR, Kardex, Intake/Output, MAR, and Recent Results.

## 2023-01-13 NOTE — PROGRESS NOTES
Post-Operative  Day 3    Denisha Phipps     Assessment: Post-Op day 3, stable s/p RCS at 37 5/7 wks for [facility] with multiple medication problems. Plan:     - Routine post-operative care. - Postop hemoglobin stable. Plan to start Fe at discharge. - Ambulate today. Ghtn  Bps normal to mild range on no meds  Has bp cuff at home. Counseled on preE precautions and to call for severe range bps. See early next week for bp check. DM2 vs. 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 Wilder Correa  -Has been on ppx lovenox 40mg daily- restarted yesterday; continue for 6 weeks PP  -Has port in place        RA:  -cimzia- stopped at 31wks, saw rheumatologist 3 days ago and she plans to have her restart enbrel 2 weeks after  as can cause delayed wound healing   -Continue plaquenil 200mg BID      Seasonal allergies:   -continue zyrtec     Information for the patient's :  Cynthia Michelle [[id number]]   , Low Transverse   Patient doing well without significant complaint. Tolerating diet. Gomez out. Ambulating.       Vitals:  Visit Vitals  /63   Pulse 74   Temp 97.6 °F (36.4 °C)   Resp 18   Ht 5' 6\" (1.676 m)   Wt 108.9 kg (240 lb)   LMP 04/15/2022   SpO2 99%   Breastfeeding Unknown   BMI 38.74 kg/m²     Temp (24hrs), Av.2 °F (36.8 °C), Min:97.6 °F (36.4 °C), Max:98.6 °F (37 °C)      Last 24hr Input/Output:  No intake or output data in the 24 hours ending 23 1008         Exam: Patient without distress. Fundus firm, nontender per nursing fundal checks. Incision bandaged, clean, dry, intact. Perineum with normal lochia noted per nursing assessment. Lower extremities are negative for pathological edema.     Labs:   Lab Results   Component Value Date/Time    WBC 9.3 01/11/2023 06:08 AM    WBC 10.4 01/10/2023 10:56 AM    WBC 8.2 11/21/2022 05:19 AM    WBC 9.1 11/20/2022 05:17 AM    WBC 14.0 (H) 11/19/2022 02:06 PM    WBC 6.5 01/05/2022 01:41 PM    WBC 14.4 (H) 06/30/2021 12:54 AM    WBC 13.9 (H) 09/26/2020 01:56 AM    WBC 6.7 05/26/2020 11:31 AM    WBC 6.4 05/23/2020 02:48 AM    WBC 6.6 05/22/2020 01:59 AM    WBC 6.9 05/21/2020 03:53 AM    WBC 8.6 05/20/2020 12:52 AM    WBC 9.7 05/19/2020 12:05 PM    WBC 6.4 05/15/2020 05:54 AM    WBC 10.8 05/14/2020 05:57 AM    WBC 6.8 04/18/2020 06:37 PM    WBC 11.8 (H) 01/31/2020 08:39 AM    WBC 5.8 04/16/2017 11:02 AM    WBC 7.0 04/03/2016 12:59 AM    WBC 10.4 04/02/2016 01:16 PM    WBC 6.8 10/30/2015 05:11 PM    WBC 7.0 01/12/2015 04:00 PM    WBC 6.3 11/15/2013 10:30 PM    WBC 10.7 12/10/2012 07:15 PM    WBC 8.0 09/13/2012 01:25 PM    WBC 11.0 08/10/2011 04:25 AM    WBC 11.0 08/08/2011 11:10 AM    WBC 11.0 08/03/2011 05:00 PM    WBC 9.5 08/01/2011 02:00 AM    WBC 10.5 07/28/2011 09:35 AM    WBC 11.7 (H) 07/25/2011 12:45 AM    WBC 13.5 (H) 07/19/2011 06:40 PM    WBC 10.5 07/18/2011 01:37 PM    WBC 7.5 07/18/2011 12:30 AM    WBC 11.7 (H) 07/13/2011 12:30 PM    WBC 10.5 07/12/2011 06:20 AM    WBC 11.3 (H) 07/11/2011 01:30 AM    WBC 10.4 07/07/2011 10:20 PM    WBC 10.8 07/04/2011 04:00 AM    WBC 11.3 (H) 07/03/2011 12:10 PM    WBC 10.2 06/26/2011 02:55 PM    WBC 10.3 06/23/2011 04:28 PM    WBC 11.3 (H) 06/20/2011 01:40 AM    WBC 10.3 06/19/2011 02:20 AM    WBC 10.8 06/13/2011 12:55 AM    WBC 12.1 (H) 06/12/2011 11:10 AM    WBC 12.1 (H) 06/09/2011 03:20 PM    WBC 10.5 06/02/2011 03:25 AM    WBC 10.1 05/15/2011 08:10 PM    HGB 9.7 (L) 01/11/2023 06:08 AM    HGB 11.8 01/10/2023 10:56 AM    HGB 9.6 (L) 11/21/2022 05:19 AM    HGB 12.6 11/20/2022 05:17 AM    HGB 11.1 (L) 11/19/2022 02:06 PM    HGB 12.0 01/05/2022 01:41 PM    HGB 12.7 06/30/2021 12:54 AM    HGB 10.6 (L) 09/26/2020 01:56 AM    HGB 12.0 05/26/2020 11:31 AM    HGB 10.4 (L) 05/23/2020 02:48 AM    HGB 10.1 (L) 05/22/2020 01:59 AM    HGB 10.5 (L) 05/21/2020 03:53 AM    HGB 10.1 (L) 05/20/2020 12:52 AM    HGB 11.8 05/19/2020 12:05 PM    HGB 11.5 05/15/2020 05:54 AM    HGB 12.5 05/14/2020 05:57 AM    HGB 11.8 04/18/2020 06:37 PM    HGB 12.8 01/31/2020 08:39 AM    HGB 13.4 04/16/2017 11:02 AM    HGB 13.4 04/03/2016 12:59 AM    HGB 14.2 04/02/2016 01:16 PM    HGB 13.0 10/30/2015 05:11 PM    HGB 12.8 01/12/2015 04:00 PM    HGB 13.4 11/15/2013 10:30 PM    HGB 14.6 12/10/2012 07:15 PM    HGB 13.0 09/13/2012 01:25 PM    HGB 9.2 (L) 08/10/2011 04:25 AM    HGB 11.7 08/08/2011 11:10 AM    HGB 11.5 08/03/2011 05:00 PM    HGB 11.4 (L) 08/01/2011 02:00 AM    HGB 11.5 07/28/2011 09:35 AM    HGB 11.3 (L) 07/25/2011 12:45 AM    HGB 11.5 07/19/2011 06:40 PM    HGB 11.0 (L) 07/18/2011 01:37 PM    HGB 14.4 07/18/2011 12:30 AM    HGB 11.5 07/13/2011 12:30 PM    HGB 11.8 07/12/2011 06:20 AM    HGB 12.0 07/11/2011 01:30 AM    HGB 11.2 (L) 07/07/2011 10:20 PM    HGB 11.6 07/04/2011 04:00 AM    HGB 11.4 (L) 07/03/2011 12:10 PM    HGB 11.7 06/26/2011 02:55 PM    HGB 11.4 (L) 06/23/2011 04:28 PM    HGB 12.0 06/20/2011 01:40 AM    HGB 12.8 06/19/2011 02:20 AM    HGB 11.4 (L) 06/13/2011 12:55 AM    HGB 11.4 (L) 06/12/2011 11:10 AM    HGB 10.9 (L) 06/09/2011 03:20 PM    HGB 11.2 (L) 06/02/2011 03:25 AM    HGB 11.0 (L) 05/15/2011 08:10 PM    HCT 29.2 (L) 01/11/2023 06:08 AM    HCT 34.9 (L) 01/10/2023 10:56 AM    HCT 28.7 (L) 11/21/2022 05:19 AM    HCT 37.3 11/20/2022 05:17 AM    HCT 32.7 (L) 11/19/2022 02:06 PM    HCT 36.8 01/05/2022 01:41 PM    HCT 38.0 06/30/2021 12:54 AM    HCT 32.6 (L) 09/26/2020 01:56 AM    HCT 37.2 05/26/2020 11:31 AM    HCT 31.8 (L) 05/23/2020 02:48 AM    HCT 31.6 (L) 05/22/2020 01:59 AM    HCT 32.7 (L) 05/21/2020 03:53 AM    HCT 30.4 (L) 05/20/2020 12:52 AM    HCT 35.5 05/19/2020 12:05 PM    HCT 36.0 05/15/2020 05:54 AM    HCT 37.1 05/14/2020 05:57 AM    HCT 35.3 04/18/2020 06:37 PM    HCT 39.3 01/31/2020 08:39 AM    HCT 39.2 04/16/2017 11:02 AM    HCT 39.9 04/03/2016 12:59 AM    HCT 41.9 04/02/2016 01:16 PM    HCT 38.9 10/30/2015 05:11 PM    HCT 39.8 01/12/2015 04:00 PM    HCT 39.8 11/15/2013 10:30 PM    HCT 43.8 12/10/2012 07:15 PM    HCT 38.8 09/13/2012 01:25 PM    HCT 27.6 (L) 08/10/2011 04:25 AM    HCT 34.3 (L) 08/08/2011 11:10 AM    HCT 33.5 (L) 08/03/2011 05:00 PM    HCT 33.1 (L) 08/01/2011 02:00 AM    HCT 33.0 (L) 07/28/2011 09:35 AM    HCT 33.6 (L) 07/25/2011 12:45 AM    HCT 32.8 (L) 07/19/2011 06:40 PM    HCT 31.9 (L) 07/18/2011 01:37 PM    HCT 41.2 07/18/2011 12:30 AM    HCT 33.4 (L) 07/13/2011 12:30 PM    HCT 34.1 (L) 07/12/2011 06:20 AM    HCT 34.5 (L) 07/11/2011 01:30 AM    HCT 32.0 (L) 07/07/2011 10:20 PM    HCT 33.5 (L) 07/04/2011 04:00 AM    HCT 33.3 (L) 07/03/2011 12:10 PM    HCT 33.9 (L) 06/26/2011 02:55 PM    HCT 33.2 (L) 06/23/2011 04:28 PM    HCT 34.4 (L) 06/20/2011 01:40 AM    HCT 36.7 06/19/2011 02:20 AM    HCT 33.2 (L) 06/13/2011 12:55 AM    HCT 33.1 (L) 06/12/2011 11:10 AM    HCT 31.4 (L) 06/09/2011 03:20 PM    HCT 32.6 (L) 06/02/2011 03:25 AM    HCT 32.1 (L) 05/15/2011 08:10 PM    PLATELET 608 (L) 14/44/8152 06:08 AM    PLATELET 525 73/87/4336 10:56 AM    PLATELET 648 38/18/1102 05:19 AM    PLATELET 593 (L) 58/86/4067 05:17 AM    PLATELET 376 01/55/7824 02:06 PM    PLATELET 234 96/20/1909 01:41 PM    PLATELET 843 52/92/3353 12:54 AM    PLATELET 431 09/88/1647 01:56 AM    PLATELET 135 38/69/3497 11:31 AM    PLATELET 537 80/94/5683 02:48 AM    PLATELET 702 56/19/4458 01:59 AM    PLATELET 224 12/97/6399 03:53 AM    PLATELET 448 65/37/3825 12:52 AM    PLATELET 424 05/19/2020 12:05 PM    PLATELET 019 87/10/7505 05:54 AM    PLATELET 065 84/73/4361 05:57 AM    PLATELET 745 62/12/7445 06:37 PM    PLATELET 973 08/19/1647 08:39 AM    PLATELET 473 41/93/9755 11:02 AM    PLATELET 479 13/17/1209 12:59 AM    PLATELET 610 52/39/8289 01:16 PM    PLATELET 939 16/80/1589 05:11 PM    PLATELET 790 46/76/2681 04:00 PM    PLATELET 186 82/09/8794 10:30 PM    PLATELET 719 29/25/3359 07:15 PM    PLATELET 330 82/94/9513 01:25 PM    PLATELET 698 (L) 49/91/3204 04:25 AM    PLATELET 235 96/24/4882 11:10 AM    PLATELET 731 70/24/1186 05:00 PM    PLATELET 773 18/76/5454 02:00 AM    PLATELET 794 29/75/7052 09:35 AM    PLATELET 434 73/47/4906 12:45 AM    PLATELET 391 67/26/4076 06:40 PM    PLATELET 851 21/52/3799 01:37 PM    PLATELET 379 (L) 24/84/5610 12:30 AM    PLATELET 963 26/57/4092 12:30 PM    PLATELET 274 09/35/7268 06:20 AM    PLATELET 785 70/59/1712 01:30 AM    PLATELET 495 83/21/5922 10:20 PM    PLATELET 941 58/83/0621 04:00 AM    PLATELET 885 79/05/9418 12:10 PM    PLATELET 645 52/81/9298 02:55 PM    PLATELET 573 86/59/3760 04:28 PM    PLATELET 280 83/14/1994 01:40 AM    PLATELET 950 92/27/2599 02:20 AM    PLATELET 722 23/29/1833 12:55 AM    PLATELET 912 78/60/7316 11:10 AM    PLATELET 909 35/42/3745 03:20 PM    PLATELET 655 10/73/4797 03:25 AM    PLATELET 159 67/82/7196 08:10 PM       Recent Results (from the past 24 hour(s))   GLUCOSE, POC    Collection Time: 01/12/23 12:00 PM   Result Value Ref Range    Glucose (POC) 109 65 - 117 mg/dL    Performed by Bethel Kothari (CON)    GLUCOSE, POC    Collection Time: 01/12/23  4:54 PM   Result Value Ref Range    Glucose (POC) 82 65 - 117 mg/dL    Performed by Bethel Kothari (CON)    GLUCOSE, POC    Collection Time: 01/12/23 11:08 PM   Result Value Ref Range    Glucose (POC) 99 65 - 117 mg/dL    Performed by Pradip Zhao MD

## 2023-01-13 NOTE — DISCHARGE SUMMARY
Obstetrical Discharge Summary     Name: Jesus Baeza MRN: [id number]  SSN: [id number]    YOB: 1980  Age: 38 y.o. Sex: female      Admit Date: 1/10/2023    Discharge Date: 2023     Admitting Physician: Reece Eason MD     Attending Physician: Angel West, *     Admission Diagnoses: Term pregnancy, repeat [Z34.90]  Gestational hypertension [O13.9]    Discharge Diagnoses:   Information for the patient's :  Latasha Iglesias [[id number]]   Delivery of a 3.27 kg female infant via , Low Transverse on 1/10/2023 at 6:07 PM  by Reece Eason. Apgars were 7  and 8 . Additional Diagnoses:   Hospital Problems  Date Reviewed: 2021            Codes Class Noted POA    Gestational hypertension ICD-10-CM: O13.9  ICD-9-CM: 642.30  1/10/2023 Unknown          Lab Results   Component Value Date/Time    Rubella, External Immune 4.58 2022 12:00 AM    GrBStrep, External negative 2011 12:00 AM       Hospital Course: admitted for rltcs 2/2 ghtn at 37 weeks. Pp course   Ghtn  Bps normal to mild range on no meds  Has bp cuff at home. Counseled on preE precautions and to call for severe range bps. See early next week for bp check.       DM2 vs. 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 Selena Colin  -Has been on ppx lovenox 40mg daily- restarted yesterday; continue for 6 weeks PP  -Has port in place         RA:  -cimzia- stopped at 31wks, saw rheumatologist 3 days ago and she plans to have her restart enbrel 2 weeks after  as can cause delayed wound healing   -Continue plaquenil 200mg BID      Seasonal allergies:   -continue zyrtec   Patient Instructions:   Current Discharge Medication List        START taking these medications    Details   ibuprofen (MOTRIN) 800 mg tablet Take 1 Tablet by mouth every eight (8) hours. Qty: 30 Tablet, Refills: 1           CONTINUE these medications which have NOT CHANGED    Details   oxybutynin (DITROPAN) 5 mg tablet TAKE 1 TABLET BY MOUTH THREE TIMES A DAY  Qty: 90 Tablet, Refills: 2    Associated Diagnoses: Urinary frequency      Cetirizine (ZyrTEC) 10 mg cap Take 1 Tablet by mouth.      montelukast (SINGULAIR) 10 mg tablet Take 10 mg by mouth daily. famotidine (PEPCID) 20 mg tablet Take 20 mg by mouth two (2) times a day. enoxaparin (LOVENOX) 40 mg/0.4 mL 40 mg by SubCUTAneous route daily. escitalopram oxalate (LEXAPRO) 20 mg tablet Take 1 Tablet by mouth daily. Qty: 90 Tablet, Refills: 1      B.infantis-B.ani-B.long-B.bifi (Probiotic 4X) 10-15 mg TbEC Take 1 Capsule by mouth two (2) times a day. hydroxychloroquine (PLAQUENIL) 200 mg tablet 200 mg daily. albuterol (PROVENTIL HFA, VENTOLIN HFA, PROAIR HFA) 90 mcg/actuation inhaler Take 2 Puffs by inhalation every four (4) hours as needed for Wheezing. Qty: 1 Inhaler, Refills: 0    Associated Diagnoses: Mild intermittent asthma without complication      EPIPEN 2-SANJAY 0.3 mg/0.3 mL injection 1 (ONE) INJECTION AS NEEDED  Refills: 0      cyanocobalamin 1,000 mcg tablet Take  by mouth daily. cholecalciferol (VITAMIN D3) (1000 Units /25 mcg) tablet Take 1 Tablet by mouth daily. levothyroxine (SYNTHROID) 75 mcg tablet Take 75 mcg by mouth six (6) days a week.            STOP taking these medications       aspirin 81 mg chewable tablet Comments:   Reason for Stopping:         certolizumab pegoL (Cimzia) 400 mg/2 mL (200 mg/mL x 2) sykt injection Comments:   Reason for Stopping:         Prodigy Twist Top Lancet 28 gauge misc Comments:   Reason for Stopping:         SAFETY-ABDI TB SYR 1CC/25GX5/8\" 1 mL 25 gauge x 5/8\" syrg Comments:   Reason for Stopping:               Disposition at Discharge: Home or self care    Condition at Discharge: Stable    Reference my discharge instructions.     Follow-up Appointments   Procedures    FOLLOW UP VISIT Appointment in: 3 - 5 Days     Standing Status:   Standing     Number of Occurrences:   1     Order Specific Question:   Appointment in     Answer:   3 - 5 Days        Signed By:  Reginaldo Barrera MD     January 13, 2023

## 2023-01-13 NOTE — PROGRESS NOTES
Patient discharged. Discharge instructions and medications reviewed/given. Patient verbalizes understanding. All questions answered. No distress noted, patient stable. Signed copy of discharge instructions placed on paper chart. Patient confirmed will call to make appointment in 5 days with [phone] Andrzej Lopez to follow up with BP fluctuations.

## 2023-01-24 ENCOUNTER — TELEPHONE (OUTPATIENT)
Dept: MOTHER INFANT UNIT | Age: 43
End: 2023-01-24

## 2023-01-24 NOTE — TELEPHONE ENCOUNTER
Mother contacted for follow-up breastfeeding check. Mother states that the latch is good and baby is exclusively breastfeeding. She has no concerns or questions at this time. Mother provided with 's contact information if assistance is needed in the future.

## 2023-03-09 ENCOUNTER — OFFICE VISIT (OUTPATIENT)
Dept: URGENT CARE | Age: 43
End: 2023-03-09

## 2023-03-09 VITALS
HEART RATE: 107 BPM | OXYGEN SATURATION: 96 % | HEIGHT: 66 IN | WEIGHT: 216 LBS | BODY MASS INDEX: 34.72 KG/M2 | RESPIRATION RATE: 18 BRPM | SYSTOLIC BLOOD PRESSURE: 119 MMHG | TEMPERATURE: 99.1 F | DIASTOLIC BLOOD PRESSURE: 69 MMHG

## 2023-03-09 DIAGNOSIS — R11.0 NAUSEA: ICD-10-CM

## 2023-03-09 DIAGNOSIS — R50.9 FEVER, UNSPECIFIED FEVER CAUSE: Primary | ICD-10-CM

## 2023-03-09 DIAGNOSIS — R30.0 DYSURIA: ICD-10-CM

## 2023-03-09 LAB
BILIRUB UR QL STRIP: ABNORMAL
FLUAV+FLUBV AG NOSE QL IA.RAPID: NEGATIVE
FLUAV+FLUBV AG NOSE QL IA.RAPID: NEGATIVE
GLUCOSE UR-MCNC: NEGATIVE MG/DL
KETONES P FAST UR STRIP-MCNC: ABNORMAL MG/DL
LOT NUMBER POC: NORMAL
PH UR STRIP: 5.5 [PH] (ref 4.6–8)
PROT UR QL STRIP: ABNORMAL
SARS-COV-2 PCR, POC: NEGATIVE
SP GR UR STRIP: 1.03 (ref 1–1.03)
UA UROBILINOGEN AMB POC: ABNORMAL (ref 0.2–1)
URINALYSIS CLARITY POC: ABNORMAL
URINALYSIS COLOR POC: ABNORMAL
URINE BLOOD POC: NEGATIVE
URINE LEUKOCYTES POC: ABNORMAL
URINE NITRITES POC: NEGATIVE
VALID INTERNAL CONTROL?: YES
VALID INTERNAL CONTROL?: YES

## 2023-03-09 RX ORDER — ONDANSETRON 4 MG/1
8 TABLET, ORALLY DISINTEGRATING ORAL 2 TIMES DAILY
Qty: 16 TABLET | Refills: 0 | Status: SHIPPED | OUTPATIENT
Start: 2023-03-09 | End: 2023-03-13

## 2023-03-09 RX ORDER — OMALIZUMAB 150 MG/ML
300 INJECTION, SOLUTION SUBCUTANEOUS ONCE
COMMUNITY

## 2023-03-09 RX ORDER — METFORMIN HYDROCHLORIDE 500 MG/1
TABLET, EXTENDED RELEASE ORAL
COMMUNITY
Start: 2022-12-05

## 2023-03-09 RX ORDER — BLOOD SUGAR DIAGNOSTIC
STRIP MISCELLANEOUS
COMMUNITY
Start: 2023-01-07

## 2023-03-09 RX ORDER — ENOXAPARIN SODIUM 100 MG/ML
INJECTION SUBCUTANEOUS
COMMUNITY

## 2023-03-09 NOTE — PROGRESS NOTES
The history is provided by the patient. Fever   The history is provided by the Patient. This is a new problem. The current episode started 12 to 24 hours ago. The problem occurs constantly. The problem has not changed since onset. The maximum temperature noted was 102 - 102.9 F. The temperature was taken using a tympanic thermometer. Associated symptoms include headaches. Pertinent negatives include no chest pain, no diarrhea, no vomiting, no congestion, no sore throat, no cough and no shortness of breath. Associated symptoms comments: Joint pain, migraine, muscle aches and muscle cramps. She has tried acetaminophen for the symptoms. The treatment provided mild relief. No known sick contact. Pt is not covid vaccinated. PMHx: RA, mixed connective tissue disorder, blood clotting disorder. Pt reports symptoms were preceded by urinary pain, malodorous and dark color. Pt reports that she was hospitalized in 11/22 for UTI.      Past Medical History:   Diagnosis Date    Abnormal Papanicolaou smear of cervix     Acquired hypothyroidism     Adverse effect of anesthesia     labile BP during/after C section    Anemia     Antiphospholipid antibody syndrome (HCC)     Antiphospholipid syndrome (HCC)     Asthma     Breast disorder     tumor on right breast it radiation seeds, non canerours    Broken bones     history of 9 broken bones    Chronic UTI (urinary tract infection)     \"extra valve right kidney causes UTI\"    Clotting disorder (HCC)     Fibromyalgia     Functional tremor     Gestational diabetes     GI bleed 2007,2011,2015,2016    lower GI last colonoscopy in 2016 normal     History of degenerative disc disease     Huntingtons chorea ([facility] [address]     Hyperhydrosis disorder     Ill-defined condition     Chris/ tested genetically - daughter has [place]    Infertility     PCOS    Infertility, female     Nerve root disorder     Neuropathy     PCOS (polycystic ovarian syndrome)     Polycystic disease, ovaries Precancerous skin lesion     removed from Right shoulder    Preeclampsia 2011    pregnancy    Reactive airway disease     Rheumatoid arteritis ([address]     Rheumatoid arthritis ([address]     SVT (supraventricular tachycardia) ([address] 2011    occured during pregnancy when picc line inserted.     Systemic lupus erythematosus ([address]     Trauma         Past Surgical History:   Procedure Laterality Date    ANORECTAL MANOMETRY  2021         COLONOSCOPY N/A 2016    COLONOSCOPY performed by Glenn Bradford MD at [facility] ENDOSCOPY    COLONOSCOPY N/A 2020    COLONOSCOPY performed by Cami Friedman MD at [facility] ENDOSCOPY    COLONOSCOPY,DIAGNOSTIC  2020         HX BREAST BIOPSY Right 2022        HX BREAST LUMPECTOMY Right     HX  SECTION      HX HEENT      HX LUMBAR DISKECTOMY  septemeber 2021    HX WISDOM TEETH EXTRACTION      IR INSERT TUNL CVC W PORT OVER 5 YEARS      UPPER GI ENDOSCOPY,BIOPSY  2019         UPPER GI ENDOSCOPY,BIOPSY  2020         UPPER GI ENDOSCOPY,DILATN W GUIDE  2019              Family History   Problem Relation Age of Onset    Other Mother         Clutier's disease    Hypertension Mother     Dementia Mother     Stroke Mother     High Cholesterol Father     Heart Disease Father     Diabetes Father     Hypertension Father     Asthma Brother     Diabetes Brother     Other Brother         varicocele, hernias    Hypertension Brother     Alcohol abuse Brother     Hypertension Maternal Grandmother     Elevated Lipids Maternal Grandmother     Cancer Maternal Grandmother         breast    Other Maternal Grandmother         polymyalgia rheumatica    Glaucoma Maternal Grandmother     Cancer Maternal Grandfather         prostate    Huntingtons disease Maternal Grandfather     Cancer Paternal Grandmother         lung with mets    Cancer Paternal Grandfather         prostate, colon    Diabetes Paternal Grandfather     Heart Disease Paternal Grandfather Alzheimer's Disease Paternal Grandfather     Dementia Paternal Grandfather         Social History     Socioeconomic History    Marital status:      Spouse name: Not on file    Number of children: Not on file    Years of education: Not on file    Highest education level: Not on file   Occupational History    Not on file   Tobacco Use    Smoking status: Never    Smokeless tobacco: Never   Vaping Use    Vaping Use: Never used   Substance and Sexual Activity    Alcohol use: Not Currently     Comment: few drinks per year    Drug use: No    Sexual activity: Not on file   Other Topics Concern    Not on file   Social History Narrative    ** Merged History Encounter **          Social Determinants of Health     Financial Resource Strain: Not on file   Food Insecurity: Not on file   Transportation Needs: Not on file   Physical Activity: Not on file   Stress: Not on file   Social Connections: Not on file   Intimate Partner Violence: Not on file   Housing Stability: Not on file                ALLERGIES: Latex; Entex [phenylephrine-guaifenesin]; Heparin; Remicade [infliximab]; Rituxan [rituximab]; Sulfa (sulfonamide antibiotics); Sulfate ion; Alpha-gal (galactose-alpha-1,3-galactose); Beef containing products; Mucinex [guaifenesin]; Pepto-bismol [bismuth subsalicylate]; Pork derived (porcine); Vancomycin; Chlorhexidine; and Collagen    Review of Systems   Constitutional:  Positive for activity change, appetite change and fever. Negative for fatigue. HENT:  Negative for congestion, nosebleeds, postnasal drip, rhinorrhea, sinus pressure, sinus pain and sore throat. Respiratory:  Negative for cough, shortness of breath and wheezing. Cardiovascular:  Negative for chest pain and palpitations. Gastrointestinal:  Positive for nausea. Negative for abdominal pain, diarrhea and vomiting. Genitourinary:  Positive for dysuria. Negative for frequency and urgency. Musculoskeletal:  Positive for arthralgias and myalgias. Neurological:  Positive for headaches. Negative for dizziness and light-headedness. Vitals:    03/09/23 1617   BP: 119/69   Pulse: (!) 107   Resp: 18   Temp: 99.1 °F (37.3 °C)   SpO2: 96%   Weight: 216 lb (98 kg)   Height: 5' 6\" (1.676 m)       Physical Exam  Constitutional:       Appearance: Normal appearance. She is ill-appearing (moderately). Eyes:      Pupils: Pupils are equal, round, and reactive to light. Cardiovascular:      Rate and Rhythm: Normal rate and regular rhythm. Heart sounds: Normal heart sounds. Pulmonary:      Breath sounds: Normal breath sounds. Abdominal:      General: Bowel sounds are normal.      Palpations: Abdomen is soft. Skin:     Comments: Breast without signs of mastitis    Neurological:      Mental Status: She is alert. Psychiatric:         Mood and Affect: Mood normal.         Behavior: Behavior normal.       MDM    Procedures    Results for orders placed or performed in visit on 03/09/23   POCT COVID-19, SARS-COV-2, PCR   Result Value Ref Range    SARS-COV-2 PCR, POC Negative Negative    VALID INTERNAL CONTROL POC Yes     LOT NUMBER     AMB POC INFLUENZA A  AND B REAL-TIME RT-PCR   Result Value Ref Range    VALID INTERNAL CONTROL POC Yes     Influenza A Ag POC Negative Negative    Influenza B Ag POC Negative Negative   AMB POC URINALYSIS DIP STICK AUTO W/O MICRO   Result Value Ref Range    Color (UA POC) Dark Rebeca     Clarity (UA POC) Slightly Cloudy     Glucose (UA POC) Negative Negative    Bilirubin (UA POC) 1+ Negative    Ketones (UA POC) 1+ Negative    Specific gravity (UA POC) 1.030 1.001 - 1.035    Blood (UA POC) Negative Negative    pH (UA POC) 5.5 4.6 - 8.0    Protein (UA POC) 2+ Negative    Urobilinogen (UA POC) 0.2 mg/dL 0.2 - 1    Nitrites (UA POC) Negative Negative    Leukocyte esterase (UA POC) 1+ Negative     1. Fever, unspecified fever cause  Increased fluids  Rotate Tylenol and Ibuprofen as needed  F/U: for new or worsening symptoms       2. Nausea  Zofran every 8 hours as needed         3.  Dysuria  Urine analysis was negative   -

## 2023-03-09 NOTE — PATIENT INSTRUCTIONS
Results for orders placed or performed in visit on 03/09/23   POCT COVID-19, SARS-COV-2, PCR   Result Value Ref Range    SARS-COV-2 PCR, POC Negative Negative    VALID INTERNAL CONTROL POC Yes     LOT NUMBER     AMB POC INFLUENZA A  AND B REAL-TIME RT-PCR   Result Value Ref Range    VALID INTERNAL CONTROL POC Yes     Influenza A Ag POC Negative Negative    Influenza B Ag POC Negative Negative   AMB POC URINALYSIS DIP STICK AUTO W/O MICRO   Result Value Ref Range    Color (UA POC) Dark Rebeca     Clarity (UA POC) Slightly Cloudy     Glucose (UA POC) Negative Negative    Bilirubin (UA POC) 1+ Negative    Ketones (UA POC) 1+ Negative    Specific gravity (UA POC) 1.030 1.001 - 1.035    Blood (UA POC) Negative Negative    pH (UA POC) 5.5 4.6 - 8.0    Protein (UA POC) 2+ Negative    Urobilinogen (UA POC) 0.2 mg/dL 0.2 - 1    Nitrites (UA POC) Negative Negative    Leukocyte esterase (UA POC) 1+ Negative     1. Fever, unspecified fever cause  Increased fluids  Rotate Tylenol and Ibuprofen as needed  F/U: for new or worsening symptoms       2. Nausea  Zofran every 8 hours as needed         3.  Dysuria  Urine analysis was negative   -

## 2023-03-11 LAB
BACTERIA SPEC CULT: NORMAL
CC UR VC: NORMAL
SERVICE CMNT-IMP: NORMAL

## 2023-03-22 ENCOUNTER — TELEPHONE (OUTPATIENT)
Dept: NEUROLOGY | Age: 43
End: 2023-03-22

## 2023-03-22 NOTE — TELEPHONE ENCOUNTER
Patient would like to come in and be re evaluated. It's been a few years and she feels like it's time.

## 2023-04-11 LAB — HBA1C MFR BLD HPLC: 5.7 %

## 2023-04-30 DIAGNOSIS — R35.0 URINARY FREQUENCY: ICD-10-CM

## 2023-04-30 RX ORDER — ESCITALOPRAM OXALATE 20 MG/1
TABLET ORAL
Qty: 90 TABLET | Refills: 1 | Status: SHIPPED | OUTPATIENT
Start: 2023-04-30

## 2023-05-01 RX ORDER — OXYBUTYNIN CHLORIDE 5 MG/1
TABLET ORAL
Qty: 90 TABLET | Refills: 2 | Status: SHIPPED | OUTPATIENT
Start: 2023-05-01

## 2023-08-03 DIAGNOSIS — R35.0 FREQUENCY OF MICTURITION: ICD-10-CM

## 2023-08-03 RX ORDER — OXYBUTYNIN CHLORIDE 5 MG/1
TABLET ORAL
Qty: 90 TABLET | Refills: 2 | Status: SHIPPED | OUTPATIENT
Start: 2023-08-03

## 2023-09-28 DIAGNOSIS — R35.0 FREQUENCY OF MICTURITION: ICD-10-CM

## 2023-09-29 RX ORDER — OXYBUTYNIN CHLORIDE 5 MG/1
TABLET ORAL
Qty: 90 TABLET | Refills: 2 | Status: SHIPPED | OUTPATIENT
Start: 2023-09-29

## 2023-11-20 RX ORDER — ESCITALOPRAM OXALATE 20 MG/1
20 TABLET ORAL DAILY
Qty: 90 TABLET | Refills: 1 | Status: SHIPPED | OUTPATIENT
Start: 2023-11-20

## 2023-12-28 DIAGNOSIS — R35.0 FREQUENCY OF MICTURITION: ICD-10-CM

## 2023-12-28 RX ORDER — OXYBUTYNIN CHLORIDE 5 MG/1
TABLET ORAL
Qty: 90 TABLET | Refills: 0 | Status: SHIPPED | OUTPATIENT
Start: 2023-12-28 | End: 2024-02-19 | Stop reason: SDUPTHER

## 2024-02-18 DIAGNOSIS — R35.0 FREQUENCY OF MICTURITION: ICD-10-CM

## 2024-02-19 RX ORDER — OXYBUTYNIN CHLORIDE 5 MG/1
TABLET ORAL
Qty: 90 TABLET | Refills: 0 | Status: SHIPPED | OUTPATIENT
Start: 2024-02-19

## 2024-03-18 DIAGNOSIS — R35.0 FREQUENCY OF MICTURITION: ICD-10-CM

## 2024-03-18 RX ORDER — OXYBUTYNIN CHLORIDE 5 MG/1
5 TABLET ORAL 3 TIMES DAILY
Qty: 270 TABLET | Refills: 0 | OUTPATIENT
Start: 2024-03-18

## 2024-03-18 NOTE — TELEPHONE ENCOUNTER
PCP: Susanne Gray MD    Last appt:   12/6/2022    No future appointments.    Requested Prescriptions     Pending Prescriptions Disp Refills    oxyBUTYnin (DITROPAN) 5 MG tablet 270 tablet 0     Sig: Take 1 tablet by mouth 3 times daily

## 2024-03-19 DIAGNOSIS — R35.0 FREQUENCY OF MICTURITION: ICD-10-CM

## 2024-03-19 RX ORDER — OXYBUTYNIN CHLORIDE 5 MG/1
TABLET ORAL
Qty: 90 TABLET | Refills: 0 | Status: SHIPPED | OUTPATIENT
Start: 2024-03-19

## 2024-04-11 DIAGNOSIS — R35.0 FREQUENCY OF MICTURITION: ICD-10-CM

## 2024-04-12 RX ORDER — OXYBUTYNIN CHLORIDE 5 MG/1
TABLET ORAL
Qty: 270 TABLET | Refills: 1 | Status: SHIPPED | OUTPATIENT
Start: 2024-04-12

## 2024-04-18 ENCOUNTER — OFFICE VISIT (OUTPATIENT)
Age: 44
End: 2024-04-18
Payer: COMMERCIAL

## 2024-04-18 VITALS
TEMPERATURE: 97.6 F | WEIGHT: 239.4 LBS | DIASTOLIC BLOOD PRESSURE: 72 MMHG | HEIGHT: 66 IN | SYSTOLIC BLOOD PRESSURE: 98 MMHG | HEART RATE: 98 BPM | BODY MASS INDEX: 38.47 KG/M2 | RESPIRATION RATE: 20 BRPM | OXYGEN SATURATION: 98 %

## 2024-04-18 DIAGNOSIS — M35.1 MIXED CONNECTIVE TISSUE DISEASE (HCC): ICD-10-CM

## 2024-04-18 DIAGNOSIS — E03.8 HYPOTHYROIDISM DUE TO HASHIMOTO'S THYROIDITIS: ICD-10-CM

## 2024-04-18 DIAGNOSIS — Z12.31 ENCOUNTER FOR SCREENING MAMMOGRAM FOR MALIGNANT NEOPLASM OF BREAST: ICD-10-CM

## 2024-04-18 DIAGNOSIS — R35.0 FREQUENCY OF MICTURITION: ICD-10-CM

## 2024-04-18 DIAGNOSIS — F32.1 MAJOR DEPRESSIVE DISORDER, SINGLE EPISODE, MODERATE (HCC): Primary | ICD-10-CM

## 2024-04-18 DIAGNOSIS — E06.3 HYPOTHYROIDISM DUE TO HASHIMOTO'S THYROIDITIS: ICD-10-CM

## 2024-04-18 PROCEDURE — 99214 OFFICE O/P EST MOD 30 MIN: CPT | Performed by: INTERNAL MEDICINE

## 2024-04-18 RX ORDER — ESCITALOPRAM OXALATE 20 MG/1
20 TABLET ORAL DAILY
Qty: 90 TABLET | Refills: 1 | Status: SHIPPED | OUTPATIENT
Start: 2024-04-18

## 2024-04-18 RX ORDER — DIPHENHYDRAMINE HCL 25 MG
25 TABLET ORAL EVERY 6 HOURS PRN
COMMUNITY

## 2024-04-18 RX ORDER — SEMAGLUTIDE 1.34 MG/ML
1 INJECTION, SOLUTION SUBCUTANEOUS
COMMUNITY
Start: 2023-11-07

## 2024-04-18 RX ORDER — ETANERCEPT 50 MG/ML
25 SOLUTION SUBCUTANEOUS ONCE
COMMUNITY

## 2024-04-18 RX ORDER — OXYBUTYNIN CHLORIDE 5 MG/1
5 TABLET ORAL 3 TIMES DAILY
Qty: 270 TABLET | Refills: 1 | Status: SHIPPED | OUTPATIENT
Start: 2024-04-18

## 2024-04-18 RX ORDER — BUDESONIDE AND FORMOTEROL FUMARATE DIHYDRATE 80; 4.5 UG/1; UG/1
2 AEROSOL RESPIRATORY (INHALATION) 2 TIMES DAILY
COMMUNITY
Start: 2024-01-14

## 2024-04-18 SDOH — ECONOMIC STABILITY: FOOD INSECURITY: WITHIN THE PAST 12 MONTHS, THE FOOD YOU BOUGHT JUST DIDN'T LAST AND YOU DIDN'T HAVE MONEY TO GET MORE.: NEVER TRUE

## 2024-04-18 SDOH — ECONOMIC STABILITY: HOUSING INSECURITY
IN THE LAST 12 MONTHS, WAS THERE A TIME WHEN YOU DID NOT HAVE A STEADY PLACE TO SLEEP OR SLEPT IN A SHELTER (INCLUDING NOW)?: NO

## 2024-04-18 SDOH — ECONOMIC STABILITY: FOOD INSECURITY: WITHIN THE PAST 12 MONTHS, YOU WORRIED THAT YOUR FOOD WOULD RUN OUT BEFORE YOU GOT MONEY TO BUY MORE.: NEVER TRUE

## 2024-04-18 SDOH — ECONOMIC STABILITY: INCOME INSECURITY: HOW HARD IS IT FOR YOU TO PAY FOR THE VERY BASICS LIKE FOOD, HOUSING, MEDICAL CARE, AND HEATING?: NOT HARD AT ALL

## 2024-04-18 ASSESSMENT — PATIENT HEALTH QUESTIONNAIRE - PHQ9
8. MOVING OR SPEAKING SO SLOWLY THAT OTHER PEOPLE COULD HAVE NOTICED. OR THE OPPOSITE, BEING SO FIGETY OR RESTLESS THAT YOU HAVE BEEN MOVING AROUND A LOT MORE THAN USUAL: NOT AT ALL
6. FEELING BAD ABOUT YOURSELF - OR THAT YOU ARE A FAILURE OR HAVE LET YOURSELF OR YOUR FAMILY DOWN: NOT AT ALL
1. LITTLE INTEREST OR PLEASURE IN DOING THINGS: NOT AT ALL
7. TROUBLE CONCENTRATING ON THINGS, SUCH AS READING THE NEWSPAPER OR WATCHING TELEVISION: NOT AT ALL
3. TROUBLE FALLING OR STAYING ASLEEP: NOT AT ALL
SUM OF ALL RESPONSES TO PHQ QUESTIONS 1-9: 0
9. THOUGHTS THAT YOU WOULD BE BETTER OFF DEAD, OR OF HURTING YOURSELF: NOT AT ALL
5. POOR APPETITE OR OVEREATING: NOT AT ALL
10. IF YOU CHECKED OFF ANY PROBLEMS, HOW DIFFICULT HAVE THESE PROBLEMS MADE IT FOR YOU TO DO YOUR WORK, TAKE CARE OF THINGS AT HOME, OR GET ALONG WITH OTHER PEOPLE: NOT DIFFICULT AT ALL
SUM OF ALL RESPONSES TO PHQ QUESTIONS 1-9: 0
2. FEELING DOWN, DEPRESSED OR HOPELESS: NOT AT ALL
4. FEELING TIRED OR HAVING LITTLE ENERGY: NOT AT ALL
SUM OF ALL RESPONSES TO PHQ9 QUESTIONS 1 & 2: 0
SUM OF ALL RESPONSES TO PHQ QUESTIONS 1-9: 0
SUM OF ALL RESPONSES TO PHQ QUESTIONS 1-9: 0

## 2024-04-18 NOTE — PROGRESS NOTES
Chief Complaint   Patient presents with    Medication Refill     \"Have you been to the ER, urgent care clinic since your last visit?  Hospitalized since your last visit?\"    NO    “Have you seen or consulted any other health care providers outside of [facility] since your last visit?”    NO        “Have you had a pap smear?”    YES - Where: Dr. Colon Nurse/[facility] to request most recent records if not in the chart    No cervical cancer screening on file         
Year: Not on file     Unstable Housing in the Last Year: No       BP 98/72 (Site: Left Upper Arm, Position: Sitting)   Pulse 98   Temp 97.6 °F (36.4 °C)   Resp 20   Ht 1.676 m (5' 6\")   Wt 108.6 kg (239 lb 6.4 oz)   SpO2 98%   BMI 38.64 kg/m²   General:  Well appearing female no acute distress  HEENT:   PERRL,normal conjunctiva. External ear and canals normal, TMs normal.  Hearing normal to voice.  Nose without edema or discharge, normal septum.  Lips, teeth, gums normal.  Oropharynx: no erythema, no exudates, no lesions, normal tongue.  Neck:  Supple. Thyroid normal size, nontender, without nodules.  No carotid bruit. No masses or lymphadenopathy  Respiratory: no respiratory distress,  no wheezing, no rhonchi, no rales. No chest wall tenderness.  Cardiovascular:  RRR, normal S1S2, no murmur.    Gastrointestinal: normal bowel sounds, soft, nontender, without masses.  No hepatosplenomegaly.  Extremities +2 pulses, no edema, normal sensation   Musculoskeletal:  Normal gait. Normal digits and nails.  Normal strength and tone, no atrophy, and no abnormal movement.  Skin:  No rash, no lesions, no ulcers.  Skin warm, normal turgor, without induration or nodules.  Neuro:  A and OX4, fluent speech, cranial nerves normal 2-12.    Psych:  Normal affect      Lab Results   Component Value Date    WBC 9.3 01/11/2023    HGB 9.7 (L) 01/11/2023    HCT 29.2 (L) 01/11/2023     (L) 01/11/2023    ALT 13 01/10/2023    AST 13 (L) 01/10/2023     01/10/2023    K 3.6 01/10/2023     (H) 01/10/2023    CREATININE 0.59 01/10/2023    BUN 7 01/10/2023    CO2 20 (L) 01/10/2023    LABA1C 5.7 (H) 09/26/2020     No results found for: \"LDLCALC\", \"LDLCHOLESTEROL\", \"LDLDIRECT\"  No results found for: \"PSA\", \"PSADIA\"     Assessment and Plan:     1. Frequency of micturition  - oxyBUTYnin (DITROPAN) 5 MG tablet; Take 1 tablet by mouth 3 times daily  Dispense: 270 tablet; Refill: 1    2. Major depressive disorder, single episode,

## 2024-07-25 ENCOUNTER — PATIENT MESSAGE (OUTPATIENT)
Age: 44
End: 2024-07-25

## 2024-07-25 DIAGNOSIS — R53.82 CHRONIC FATIGUE: Primary | ICD-10-CM

## 2024-07-26 ENCOUNTER — LAB (OUTPATIENT)
Age: 44
End: 2024-07-26

## 2024-07-26 ENCOUNTER — TELEPHONE (OUTPATIENT)
Age: 44
End: 2024-07-26

## 2024-07-26 DIAGNOSIS — R53.82 CHRONIC FATIGUE: Primary | ICD-10-CM

## 2024-07-26 DIAGNOSIS — Z77.018 HEAVY METAL EXPOSURE: ICD-10-CM

## 2024-07-26 DIAGNOSIS — R53.82 CHRONIC FATIGUE: ICD-10-CM

## 2024-07-26 NOTE — TELEPHONE ENCOUNTER
Giulia Gordon MA 7/25/2024 2:58 PM EDT      ----- Message -----  From: Yola Schwartz  Sent: 7/25/2024 2:49 PM EDT  To: *  Subject: Arsenic poisoning     As you know, we have continued to search for a cause for my 12 year old, Eliezer’s, progressive decline. Her heavy metal tearing came back positive for arsenic poisoning!!     Toxicology has advised all of us in the home to get spot checked for arsenic too.     The [employer] water management company is coming out this afternoon to take water samples.     Please advise if you need me to make an appointment or if you can order the test for me and do virtual or my chart follow up??   Thank so much!!

## 2024-07-26 NOTE — TELEPHONE ENCOUNTER
Spoke to patient to inform her that Dr. Connors did put labs in for her to complete. Patient understood.

## 2024-07-26 NOTE — TELEPHONE ENCOUNTER
Patient states she needs a call back in reference to getting Tested for Arsenic Poisoning that patient reports her daughter tested positive & she needs to be tested also. Please call to discuss Plan of care. Thank you      Please also see My Chart Message sent.     Patient would like a call back Asap. Thank you

## 2024-07-29 LAB
ARSENIC BLD-MCNC: 2 UG/L (ref 0–9)
LEAD BLDV-MCNC: <1 UG/DL (ref 0–3.4)
MERCURY BLD-MCNC: <1 UG/L (ref 0–14.9)

## 2024-10-29 ENCOUNTER — TELEMEDICINE (OUTPATIENT)
Age: 44
End: 2024-10-29
Payer: COMMERCIAL

## 2024-10-29 DIAGNOSIS — M35.1 MIXED CONNECTIVE TISSUE DISEASE (HCC): ICD-10-CM

## 2024-10-29 DIAGNOSIS — E06.3 HYPOTHYROIDISM DUE TO HASHIMOTO THYROIDITIS: Primary | ICD-10-CM

## 2024-10-29 DIAGNOSIS — J45.40 MODERATE PERSISTENT ASTHMA WITHOUT COMPLICATION: ICD-10-CM

## 2024-10-29 DIAGNOSIS — F90.0 ATTENTION DEFICIT HYPERACTIVITY DISORDER (ADHD), PREDOMINANTLY INATTENTIVE TYPE: ICD-10-CM

## 2024-10-29 PROCEDURE — 99214 OFFICE O/P EST MOD 30 MIN: CPT | Performed by: INTERNAL MEDICINE

## 2024-10-29 RX ORDER — LISDEXAMFETAMINE DIMESYLATE 30 MG/1
30 CAPSULE ORAL DAILY
Qty: 30 CAPSULE | Refills: 0 | Status: SHIPPED | OUTPATIENT
Start: 2024-12-28 | End: 2025-01-27

## 2024-10-29 RX ORDER — LISDEXAMFETAMINE DIMESYLATE 30 MG/1
30 CAPSULE ORAL DAILY
Qty: 30 CAPSULE | Refills: 0 | Status: SHIPPED | OUTPATIENT
Start: 2024-10-29 | End: 2024-11-28

## 2024-10-29 RX ORDER — LISDEXAMFETAMINE DIMESYLATE 30 MG/1
30 CAPSULE ORAL DAILY
Qty: 30 CAPSULE | Refills: 0 | Status: SHIPPED | OUTPATIENT
Start: 2024-11-28 | End: 2024-12-28

## 2024-10-29 NOTE — PROGRESS NOTES
Yola Schwartz, was evaluated through a synchronous (real-time) audio-video encounter. The patient (or guardian if applicable) is aware that this is a billable service, which includes applicable co-pays. This Virtual Visit was conducted with patient's (and/or legal guardian's) consent. Patient identification was verified, and a caregiver was present when appropriate.   The patient was located at Home: [address] [id number]  Provider was located at Facility (Appt Dept): [address]  [address]  [place] [address]  Confirm you are appropriately licensed, registered, or certified to deliver care in the state where the patient is located as indicated above. If you are not or unsure, please re-schedule the visit: Yes, I confirm.     Yola Schwartz (:  1980) is a Established patient, presenting virtually for evaluation of the following:      Below is the assessment and plan developed based on review of pertinent history, physical exam, labs, studies, and medications.     Assessment & Plan  Hypothyroidism due to Hashimoto thyroiditis  Well controlled on current regimen of levothyroxine 75 mcg 6 days a week and [facility] is [facility]         Attention deficit hyperactivity disorder (ADHD), predominantly inattentive type  Wants to resume medication , long hx of ADD    Orders:    lisdexamfetamine (VYVANSE) 30 MG capsule; Take 1 capsule by mouth daily for 30 days. Max Daily Amount: 30 mg    lisdexamfetamine (VYVANSE) 30 MG capsule; Take 1 capsule by mouth daily for 30 days. Max Daily Amount: 30 mg    lisdexamfetamine (VYVANSE) 30 MG capsule; Take 1 capsule by mouth daily for 30 days. Max Daily Amount: 30 mg    Mixed connective tissue disease (HCC)  Controlled on embrel and plaquenil             Moderate persistent asthma without complication   Doing well on xolair           Follow up in 3 months        Subjective   HPI    Ms. Yola Schwartz   is a 44 y.o. female

## 2024-10-29 NOTE — PROGRESS NOTES
\"Have you been to the ER, urgent care clinic since your last visit?  Hospitalized since your last visit?\"    NO    “Have you seen or consulted any other health care providers outside our system since your last visit?”    NO    Have you had a mammogram?”   NO    Date of last Mammogram: 3/17/2022      “Have you had a pap smear?”    NO    No cervical cancer screening on file

## 2024-11-09 DIAGNOSIS — F32.1 MAJOR DEPRESSIVE DISORDER, SINGLE EPISODE, MODERATE (HCC): ICD-10-CM

## 2024-11-09 DIAGNOSIS — R35.0 FREQUENCY OF MICTURITION: ICD-10-CM

## 2024-11-11 RX ORDER — OXYBUTYNIN CHLORIDE 5 MG/1
5 TABLET ORAL 3 TIMES DAILY
Qty: 270 TABLET | Refills: 1 | Status: SHIPPED | OUTPATIENT
Start: 2024-11-11

## 2024-11-11 RX ORDER — ESCITALOPRAM OXALATE 20 MG/1
20 TABLET ORAL DAILY
Qty: 90 TABLET | Refills: 1 | Status: SHIPPED | OUTPATIENT
Start: 2024-11-11

## 2025-06-14 DIAGNOSIS — F32.1 MAJOR DEPRESSIVE DISORDER, SINGLE EPISODE, MODERATE (HCC): ICD-10-CM

## 2025-06-15 RX ORDER — ESCITALOPRAM OXALATE 20 MG/1
20 TABLET ORAL DAILY
Qty: 30 TABLET | Refills: 5 | Status: SHIPPED | OUTPATIENT
Start: 2025-06-15

## 2025-07-29 DIAGNOSIS — R35.0 FREQUENCY OF MICTURITION: ICD-10-CM

## 2025-07-29 RX ORDER — OXYBUTYNIN CHLORIDE 5 MG/1
5 TABLET ORAL 3 TIMES DAILY
Qty: 90 TABLET | Refills: 3 | Status: SHIPPED | OUTPATIENT
Start: 2025-07-29

## 2025-08-13 DIAGNOSIS — F32.1 MAJOR DEPRESSIVE DISORDER, SINGLE EPISODE, MODERATE (HCC): ICD-10-CM

## 2025-08-13 RX ORDER — ESCITALOPRAM OXALATE 20 MG/1
20 TABLET ORAL DAILY
Qty: 90 TABLET | Refills: 2 | Status: SHIPPED | OUTPATIENT
Start: 2025-08-13

## (undated) DEVICE — BAG SPEC BIOHZRD 10 X 10 IN --

## (undated) DEVICE — FORCEPS BX L240CM JAW DIA2.8MM L CAP W/ NDL MIC MESH TOOTH

## (undated) DEVICE — Device

## (undated) DEVICE — SYR 3ML LL TIP 1/10ML GRAD --

## (undated) DEVICE — SET ADMIN 16ML TBNG L100IN 2 Y INJ SITE IV PIGGY BK DISP

## (undated) DEVICE — SYR ASSEMB INFL BLLN 60ML --

## (undated) DEVICE — SOLIDIFIER MEDC 1200ML -- CONVERT TO 356117

## (undated) DEVICE — BLOCK BITE ENDOSCP AD 21 MM W/ DIL BLU LF DISP

## (undated) DEVICE — ELECTRODE PT RET AD L9FT HI MOIST COND ADH HYDRGEL CORDED

## (undated) DEVICE — NEONATAL-ADULT SPO2 SENSOR: Brand: NELLCOR

## (undated) DEVICE — KENDALL RADIOLUCENT FOAM MONITORING ELECTRODE RECTANGULAR SHAPE: Brand: KENDALL

## (undated) DEVICE — YANKAUER,TAPERED BULBOUS TIP,W/O VENT: Brand: MEDLINE

## (undated) DEVICE — FORCEPS BX L160CM DIA8MM GRSP DISECT CUP TIP NONLOCKING ROT

## (undated) DEVICE — CONTAINER SPEC 20 ML LID NEUT BUFF FORMALIN 10 % POLYPR STS

## (undated) DEVICE — BASIN EMSIS 16OZ GRAPHITE PLAS KID SHP MOLD GRAD FOR ORAL

## (undated) DEVICE — SYR 10ML LUER LOK 1/5ML GRAD --

## (undated) DEVICE — CATH IV AUTOGRD BC PNK 20GA 25 -- INSYTE

## (undated) DEVICE — MEDI-VAC YANK SUCT HNDL W/TPRD BULBOUS TIP: Brand: CARDINAL HEALTH

## (undated) DEVICE — BASIN EMESIS 500CC ROSE 250/CS 60/PLT: Brand: MEDEGEN MEDICAL PRODUCTS, LLC

## (undated) DEVICE — TOWEL 4 PLY TISS 19X30 SUE WHT

## (undated) DEVICE — NEEDLE HYPO 18GA L1.5IN PNK S STL HUB POLYPR SHLD REG BVL

## (undated) DEVICE — Z DISCONTINUED PER MEDLINE LINE GAS SAMPLING O2/CO2 LNG AD 13 FT NSL W/ TBNG FILTERLINE

## (undated) DEVICE — ELECTRODE,RADIOTRANSLUCENT,FOAM,5PK: Brand: MEDLINE

## (undated) DEVICE — STOPCOCK IV 4 W TRNSPAR

## (undated) DEVICE — 1200 GUARD II KIT W/5MM TUBE W/O VAC TUBE: Brand: GUARDIAN

## (undated) DEVICE — SYRINGE 50ML E/T

## (undated) DEVICE — SHEATH CATH ANORECT MNOMTR